# Patient Record
Sex: FEMALE | Race: WHITE | NOT HISPANIC OR LATINO | Employment: OTHER | URBAN - METROPOLITAN AREA
[De-identification: names, ages, dates, MRNs, and addresses within clinical notes are randomized per-mention and may not be internally consistent; named-entity substitution may affect disease eponyms.]

---

## 2017-09-20 ENCOUNTER — HOSPITAL ENCOUNTER (OUTPATIENT)
Dept: GENERAL RADIOLOGY | Facility: HOSPITAL | Age: 82
Discharge: HOME | End: 2017-09-20
Attending: INTERNAL MEDICINE | Admitting: INTERNAL MEDICINE

## 2017-09-20 ENCOUNTER — TRANSCRIBE ORDERS (OUTPATIENT)
Dept: ADMINISTRATIVE | Facility: HOSPITAL | Age: 82
End: 2017-09-20

## 2017-09-20 DIAGNOSIS — M79.672 LEFT FOOT PAIN: Primary | ICD-10-CM

## 2017-09-20 PROCEDURE — 73630 X-RAY EXAM OF FOOT: CPT

## 2017-11-23 ENCOUNTER — HOSPITAL ENCOUNTER (INPATIENT)
Facility: HOSPITAL | Age: 82
LOS: 6 days | Discharge: NURSING, REHAB OR LONG-TERM CARE | End: 2017-11-29
Attending: EMERGENCY MEDICINE | Admitting: HOSPITALIST

## 2017-11-23 ENCOUNTER — APPOINTMENT (OUTPATIENT)
Dept: GENERAL RADIOLOGY | Facility: HOSPITAL | Age: 82
End: 2017-11-23

## 2017-11-23 ENCOUNTER — ANESTHESIA EVENT (OUTPATIENT)
Dept: PERIOP | Facility: HOSPITAL | Age: 82
End: 2017-11-23

## 2017-11-23 ENCOUNTER — APPOINTMENT (OUTPATIENT)
Dept: CT IMAGING | Facility: HOSPITAL | Age: 82
End: 2017-11-23

## 2017-11-23 ENCOUNTER — ANESTHESIA (OUTPATIENT)
Dept: PERIOP | Facility: HOSPITAL | Age: 82
End: 2017-11-23

## 2017-11-23 DIAGNOSIS — Z86.79 HISTORY OF CORONARY ARTERY DISEASE: ICD-10-CM

## 2017-11-23 DIAGNOSIS — I48.91 ATRIAL FIBRILLATION WITH RVR (HCC): ICD-10-CM

## 2017-11-23 DIAGNOSIS — M51.34 DDD (DEGENERATIVE DISC DISEASE), THORACIC: ICD-10-CM

## 2017-11-23 DIAGNOSIS — R73.09 ELEVATED GLUCOSE: ICD-10-CM

## 2017-11-23 DIAGNOSIS — M50.30 DDD (DEGENERATIVE DISC DISEASE), CERVICAL: ICD-10-CM

## 2017-11-23 DIAGNOSIS — Z74.09 IMPAIRED MOBILITY AND ADLS: ICD-10-CM

## 2017-11-23 DIAGNOSIS — M51.36 DDD (DEGENERATIVE DISC DISEASE), LUMBAR: ICD-10-CM

## 2017-11-23 DIAGNOSIS — S72.002A CLOSED FRACTURE OF LEFT HIP, INITIAL ENCOUNTER (HCC): ICD-10-CM

## 2017-11-23 DIAGNOSIS — S09.90XA TRAUMATIC INJURY OF HEAD, INITIAL ENCOUNTER: ICD-10-CM

## 2017-11-23 DIAGNOSIS — I10 HYPERTENSION, UNSPECIFIED TYPE: ICD-10-CM
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
## 2017-11-23 DIAGNOSIS — E87.1 HYPONATREMIA: ICD-10-CM

## 2017-11-23 DIAGNOSIS — Z78.9 IMPAIRED MOBILITY AND ADLS: ICD-10-CM

## 2017-11-23 DIAGNOSIS — Z74.09 IMPAIRED FUNCTIONAL MOBILITY, BALANCE, GAIT, AND ENDURANCE: ICD-10-CM

## 2017-11-23 LAB
ABO GROUP BLD: NORMAL
ALBUMIN SERPL-MCNC: 4.7 G/DL (ref 3.2–4.8)
ALBUMIN/GLOB SERPL: 1.6 G/DL (ref 1.5–2.5)
ALP SERPL-CCNC: 80 U/L (ref 25–100)
ALT SERPL W P-5'-P-CCNC: 28 U/L (ref 7–40)
ANION GAP SERPL CALCULATED.3IONS-SCNC: 10 MMOL/L (ref 3–11)
ANION GAP SERPL CALCULATED.3IONS-SCNC: 9 MMOL/L (ref 3–11)
ANION GAP SERPL CALCULATED.3IONS-SCNC: 9 MMOL/L (ref 3–11)
AST SERPL-CCNC: 41 U/L (ref 0–33)
BACTERIA UR QL AUTO: ABNORMAL /HPF
BASOPHILS # BLD AUTO: 0.01 10*3/MM3 (ref 0–0.2)
BASOPHILS NFR BLD AUTO: 0.1 % (ref 0–1)
BILIRUB SERPL-MCNC: 1.4 MG/DL (ref 0.3–1.2)
BILIRUB UR QL STRIP: NEGATIVE
BLD GP AB SCN SERPL QL: NEGATIVE
BUN BLD-MCNC: 11 MG/DL (ref 9–23)
BUN BLD-MCNC: 13 MG/DL (ref 9–23)
BUN BLD-MCNC: 14 MG/DL (ref 9–23)
BUN/CREAT SERPL: 12.2 (ref 7–25)
BUN/CREAT SERPL: 13 (ref 7–25)
BUN/CREAT SERPL: 14 (ref 7–25)
CALCIUM SPEC-SCNC: 8.6 MG/DL (ref 8.7–10.4)
CALCIUM SPEC-SCNC: 8.7 MG/DL (ref 8.7–10.4)
CALCIUM SPEC-SCNC: 9.6 MG/DL (ref 8.7–10.4)
CHLORIDE SERPL-SCNC: 88 MMOL/L (ref 99–109)
CHLORIDE SERPL-SCNC: 93 MMOL/L (ref 99–109)
CHLORIDE SERPL-SCNC: 98 MMOL/L (ref 99–109)
CLARITY UR: ABNORMAL
CO2 SERPL-SCNC: 27 MMOL/L (ref 20–31)
CO2 SERPL-SCNC: 27 MMOL/L (ref 20–31)
CO2 SERPL-SCNC: 31 MMOL/L (ref 20–31)
COLOR UR: YELLOW
CREAT BLD-MCNC: 0.9 MG/DL (ref 0.6–1.3)
CREAT BLD-MCNC: 1 MG/DL (ref 0.6–1.3)
CREAT BLD-MCNC: 1 MG/DL (ref 0.6–1.3)
DEPRECATED RDW RBC AUTO: 45.4 FL (ref 37–54)
EOSINOPHIL # BLD AUTO: 0 10*3/MM3 (ref 0–0.3)
EOSINOPHIL NFR BLD AUTO: 0 % (ref 0–3)
ERYTHROCYTE [DISTWIDTH] IN BLOOD BY AUTOMATED COUNT: 14.2 % (ref 11.3–14.5)
GFR SERPL CREATININE-BSD FRML MDRD: 53 ML/MIN/1.73
GFR SERPL CREATININE-BSD FRML MDRD: 53 ML/MIN/1.73
GFR SERPL CREATININE-BSD FRML MDRD: 60 ML/MIN/1.73
GLOBULIN UR ELPH-MCNC: 2.9 GM/DL
GLUCOSE BLD-MCNC: 234 MG/DL (ref 70–100)
GLUCOSE BLD-MCNC: 262 MG/DL (ref 70–100)
GLUCOSE BLD-MCNC: 290 MG/DL (ref 70–100)
GLUCOSE BLDC GLUCOMTR-MCNC: 196 MG/DL (ref 70–130)
GLUCOSE BLDC GLUCOMTR-MCNC: 198 MG/DL (ref 70–130)
GLUCOSE UR STRIP-MCNC: ABNORMAL MG/DL
HCT VFR BLD AUTO: 40.2 % (ref 34.5–44)
HCT VFR BLD AUTO: 43.3 % (ref 34.5–44)
HGB BLD-MCNC: 13.6 G/DL (ref 11.5–15.5)
HGB BLD-MCNC: 14.8 G/DL (ref 11.5–15.5)
HGB UR QL STRIP.AUTO: ABNORMAL
HOLD SPECIMEN: NORMAL
HOLD SPECIMEN: NORMAL
HYALINE CASTS UR QL AUTO: ABNORMAL /LPF
IMM GRANULOCYTES # BLD: 0.04 10*3/MM3 (ref 0–0.03)
IMM GRANULOCYTES NFR BLD: 0.3 % (ref 0–0.6)
INR PPP: 1.04
KETONES UR QL STRIP: ABNORMAL
LEUKOCYTE ESTERASE UR QL STRIP.AUTO: NEGATIVE
LYMPHOCYTES # BLD AUTO: 1.18 10*3/MM3 (ref 0.6–4.8)
LYMPHOCYTES NFR BLD AUTO: 8 % (ref 24–44)
MCH RBC QN AUTO: 29.9 PG (ref 27–31)
MCHC RBC AUTO-ENTMCNC: 34.2 G/DL (ref 32–36)
MCV RBC AUTO: 87.5 FL (ref 80–99)
MONOCYTES # BLD AUTO: 1.27 10*3/MM3 (ref 0–1)
MONOCYTES NFR BLD AUTO: 8.6 % (ref 0–12)
NEUTROPHILS # BLD AUTO: 12.34 10*3/MM3 (ref 1.5–8.3)
NEUTROPHILS NFR BLD AUTO: 83 % (ref 41–71)
NITRITE UR QL STRIP: NEGATIVE
PH UR STRIP.AUTO: 6.5 [PH] (ref 5–8)
PLATELET # BLD AUTO: 207 10*3/MM3 (ref 150–450)
PMV BLD AUTO: 12.2 FL (ref 6–12)
POTASSIUM BLD-SCNC: 3.2 MMOL/L (ref 3.5–5.5)
POTASSIUM BLD-SCNC: 3.3 MMOL/L (ref 3.5–5.5)
POTASSIUM BLD-SCNC: 3.5 MMOL/L (ref 3.5–5.5)
PROT SERPL-MCNC: 7.6 G/DL (ref 5.7–8.2)
PROT UR QL STRIP: ABNORMAL
PROTHROMBIN TIME: 11.4 SECONDS (ref 9.6–11.5)
RBC # BLD AUTO: 4.95 10*6/MM3 (ref 3.89–5.14)
RBC # UR: ABNORMAL /HPF
REF LAB TEST METHOD: ABNORMAL
RH BLD: NEGATIVE
SODIUM BLD-SCNC: 128 MMOL/L (ref 132–146)
SODIUM BLD-SCNC: 130 MMOL/L (ref 132–146)
SODIUM BLD-SCNC: 134 MMOL/L (ref 132–146)
SP GR UR STRIP: 1.02 (ref 1–1.03)
SQUAMOUS #/AREA URNS HPF: ABNORMAL /HPF
UROBILINOGEN UR QL STRIP: ABNORMAL
WBC NRBC COR # BLD: 14.84 10*3/MM3 (ref 3.5–10.8)
WBC UR QL AUTO: ABNORMAL /HPF
WHOLE BLOOD HOLD SPECIMEN: NORMAL
WHOLE BLOOD HOLD SPECIMEN: NORMAL

## 2017-11-23 PROCEDURE — 86900 BLOOD TYPING SEROLOGIC ABO: CPT | Performed by: EMERGENCY MEDICINE

## 2017-11-23 PROCEDURE — 63710000001 INSULIN LISPRO (HUMAN) PER 5 UNITS: Performed by: INTERNAL MEDICINE

## 2017-11-23 PROCEDURE — C1713 ANCHOR/SCREW BN/BN,TIS/BN: HCPCS | Performed by: ORTHOPAEDIC SURGERY

## 2017-11-23 PROCEDURE — 76000 FLUOROSCOPY <1 HR PHYS/QHP: CPT

## 2017-11-23 PROCEDURE — 72128 CT CHEST SPINE W/O DYE: CPT

## 2017-11-23 PROCEDURE — 25010000002 ROPIVACAINE PER 1 MG: Performed by: ANESTHESIOLOGY

## 2017-11-23 PROCEDURE — 70450 CT HEAD/BRAIN W/O DYE: CPT

## 2017-11-23 PROCEDURE — 0QS736Z REPOSITION LEFT UPPER FEMUR WITH INTRAMEDULLARY INTERNAL FIXATION DEVICE, PERCUTANEOUS APPROACH: ICD-10-PCS | Performed by: ORTHOPAEDIC SURGERY

## 2017-11-23 PROCEDURE — 85014 HEMATOCRIT: CPT | Performed by: ORTHOPAEDIC SURGERY

## 2017-11-23 PROCEDURE — 25010000002 FENTANYL CITRATE (PF) 100 MCG/2ML SOLUTION: Performed by: EMERGENCY MEDICINE

## 2017-11-23 PROCEDURE — 80053 COMPREHEN METABOLIC PANEL: CPT | Performed by: EMERGENCY MEDICINE

## 2017-11-23 PROCEDURE — 99223 1ST HOSP IP/OBS HIGH 75: CPT | Performed by: INTERNAL MEDICINE

## 2017-11-23 PROCEDURE — 94799 UNLISTED PULMONARY SVC/PX: CPT

## 2017-11-23 PROCEDURE — 85025 COMPLETE CBC W/AUTO DIFF WBC: CPT | Performed by: EMERGENCY MEDICINE

## 2017-11-23 PROCEDURE — 25010000002 DEXAMETHASONE PER 1 MG: Performed by: ANESTHESIOLOGY

## 2017-11-23 PROCEDURE — 73502 X-RAY EXAM HIP UNI 2-3 VIEWS: CPT

## 2017-11-23 PROCEDURE — 99284 EMERGENCY DEPT VISIT MOD MDM: CPT

## 2017-11-23 PROCEDURE — 25010000002 SUCCINYLCHOLINE PER 20 MG: Performed by: ANESTHESIOLOGY

## 2017-11-23 PROCEDURE — 63710000001 INSULIN REGULAR HUMAN PER 5 UNITS: Performed by: EMERGENCY MEDICINE

## 2017-11-23 PROCEDURE — 25010000002 PHENYLEPHRINE PER 1 ML: Performed by: ANESTHESIOLOGY

## 2017-11-23 PROCEDURE — 25010000002 ONDANSETRON PER 1 MG: Performed by: ANESTHESIOLOGY

## 2017-11-23 PROCEDURE — 85018 HEMOGLOBIN: CPT | Performed by: ORTHOPAEDIC SURGERY

## 2017-11-23 PROCEDURE — 71010 HC CHEST PA OR AP: CPT

## 2017-11-23 PROCEDURE — 93005 ELECTROCARDIOGRAM TRACING: CPT | Performed by: EMERGENCY MEDICINE

## 2017-11-23 PROCEDURE — P9612 CATHETERIZE FOR URINE SPEC: HCPCS

## 2017-11-23 PROCEDURE — 25010000002 ONDANSETRON PER 1 MG: Performed by: EMERGENCY MEDICINE

## 2017-11-23 PROCEDURE — 72131 CT LUMBAR SPINE W/O DYE: CPT

## 2017-11-23 PROCEDURE — 63710000001 INSULIN DETEMIR PER 5 UNITS: Performed by: INTERNAL MEDICINE

## 2017-11-23 PROCEDURE — 25010000002 PROPOFOL 10 MG/ML EMULSION: Performed by: ANESTHESIOLOGY

## 2017-11-23 PROCEDURE — 25010000003 CEFAZOLIN IN DEXTROSE 2-4 GM/100ML-% SOLUTION: Performed by: ANESTHESIOLOGY

## 2017-11-23 PROCEDURE — 63710000001 INSULIN LISPRO (HUMAN) PER 5 UNITS

## 2017-11-23 PROCEDURE — 82962 GLUCOSE BLOOD TEST: CPT

## 2017-11-23 PROCEDURE — 85610 PROTHROMBIN TIME: CPT | Performed by: EMERGENCY MEDICINE

## 2017-11-23 PROCEDURE — 86850 RBC ANTIBODY SCREEN: CPT | Performed by: EMERGENCY MEDICINE

## 2017-11-23 PROCEDURE — 86901 BLOOD TYPING SEROLOGIC RH(D): CPT | Performed by: EMERGENCY MEDICINE

## 2017-11-23 PROCEDURE — 72125 CT NECK SPINE W/O DYE: CPT

## 2017-11-23 PROCEDURE — 25010000002 FENTANYL CITRATE (PF) 100 MCG/2ML SOLUTION: Performed by: ANESTHESIOLOGY

## 2017-11-23 PROCEDURE — 81001 URINALYSIS AUTO W/SCOPE: CPT | Performed by: EMERGENCY MEDICINE

## 2017-11-23 DEVICE — SCRW LK STRDRV TI 5X34MM STRL: Type: IMPLANTABLE DEVICE | Status: FUNCTIONAL

## 2017-11-23 DEVICE — SCRW CANN TFN ADV TI 10.35X85MM STRL: Type: IMPLANTABLE DEVICE | Status: FUNCTIONAL

## 2017-11-23 DEVICE — NAIL FEM TFN ADV PROX 130D SHT 11X170MM STRL: Type: IMPLANTABLE DEVICE | Status: FUNCTIONAL

## 2017-11-23 RX ORDER — AMLODIPINE BESYLATE 5 MG/1
5 TABLET ORAL DAILY
Status: DISCONTINUED | OUTPATIENT
Start: 2017-11-23 | End: 2017-11-25

## 2017-11-23 RX ORDER — ROPIVACAINE HYDROCHLORIDE 2 MG/ML
INJECTION, SOLUTION EPIDURAL; INFILTRATION; PERINEURAL AS NEEDED
Status: DISCONTINUED | OUTPATIENT
Start: 2017-11-23 | End: 2017-11-23 | Stop reason: SURG

## 2017-11-23 RX ORDER — ACETAMINOPHEN 650 MG
TABLET, EXTENDED RELEASE ORAL AS NEEDED
Status: DISCONTINUED | OUTPATIENT
Start: 2017-11-23 | End: 2017-11-23 | Stop reason: HOSPADM

## 2017-11-23 RX ORDER — LABETALOL HYDROCHLORIDE 5 MG/ML
20 INJECTION, SOLUTION INTRAVENOUS ONCE
Status: COMPLETED | OUTPATIENT
Start: 2017-11-23 | End: 2017-11-23

## 2017-11-23 RX ORDER — ONDANSETRON 4 MG/1
4 TABLET, FILM COATED ORAL EVERY 6 HOURS PRN
Status: DISCONTINUED | OUTPATIENT
Start: 2017-11-23 | End: 2017-11-29 | Stop reason: HOSPADM

## 2017-11-23 RX ORDER — HYDROMORPHONE HYDROCHLORIDE 1 MG/ML
0.5 INJECTION, SOLUTION INTRAMUSCULAR; INTRAVENOUS; SUBCUTANEOUS
Status: DISCONTINUED | OUTPATIENT
Start: 2017-11-23 | End: 2017-11-23 | Stop reason: HOSPADM

## 2017-11-23 RX ORDER — ROPIVACAINE HYDROCHLORIDE 2 MG/ML
8 INJECTION, SOLUTION EPIDURAL; INFILTRATION CONTINUOUS
Status: DISCONTINUED | OUTPATIENT
Start: 2017-11-23 | End: 2017-11-29 | Stop reason: HOSPADM

## 2017-11-23 RX ORDER — CEFAZOLIN SODIUM 2 G/100ML
2 INJECTION, SOLUTION INTRAVENOUS EVERY 8 HOURS
Status: COMPLETED | OUTPATIENT
Start: 2017-11-24 | End: 2017-11-24

## 2017-11-23 RX ORDER — LOSARTAN POTASSIUM AND HYDROCHLOROTHIAZIDE 25; 100 MG/1; MG/1
1 TABLET ORAL DAILY
COMMUNITY
End: 2017-11-29 | Stop reason: HOSPADM

## 2017-11-23 RX ORDER — CEFAZOLIN SODIUM 2 G/100ML
INJECTION, SOLUTION INTRAVENOUS AS NEEDED
Status: DISCONTINUED | OUTPATIENT
Start: 2017-11-23 | End: 2017-11-23 | Stop reason: SURG

## 2017-11-23 RX ORDER — ONDANSETRON 2 MG/ML
INJECTION INTRAMUSCULAR; INTRAVENOUS AS NEEDED
Status: DISCONTINUED | OUTPATIENT
Start: 2017-11-23 | End: 2017-11-23 | Stop reason: SURG

## 2017-11-23 RX ORDER — FUROSEMIDE 20 MG/1
20 TABLET ORAL DAILY
COMMUNITY
End: 2017-11-29 | Stop reason: HOSPADM

## 2017-11-23 RX ORDER — GLIMEPIRIDE 4 MG/1
8 TABLET ORAL
COMMUNITY
End: 2017-11-29 | Stop reason: HOSPADM

## 2017-11-23 RX ORDER — OXYCODONE HYDROCHLORIDE AND ACETAMINOPHEN 5; 325 MG/1; MG/1
1 TABLET ORAL EVERY 4 HOURS PRN
Status: DISCONTINUED | OUTPATIENT
Start: 2017-11-23 | End: 2017-11-29 | Stop reason: HOSPADM

## 2017-11-23 RX ORDER — SODIUM CHLORIDE 0.9 % (FLUSH) 0.9 %
1-10 SYRINGE (ML) INJECTION AS NEEDED
Status: DISCONTINUED | OUTPATIENT
Start: 2017-11-23 | End: 2017-11-23

## 2017-11-23 RX ORDER — SODIUM CHLORIDE 0.9 % (FLUSH) 0.9 %
10 SYRINGE (ML) INJECTION AS NEEDED
Status: DISCONTINUED | OUTPATIENT
Start: 2017-11-23 | End: 2017-11-23

## 2017-11-23 RX ORDER — NALOXONE HCL 0.4 MG/ML
0.4 VIAL (ML) INJECTION
Status: DISCONTINUED | OUTPATIENT
Start: 2017-11-23 | End: 2017-11-29 | Stop reason: HOSPADM

## 2017-11-23 RX ORDER — ACETAMINOPHEN 325 MG/1
650 TABLET ORAL EVERY 4 HOURS PRN
Status: DISCONTINUED | OUTPATIENT
Start: 2017-11-23 | End: 2017-11-23

## 2017-11-23 RX ORDER — FAMOTIDINE 10 MG/ML
20 INJECTION, SOLUTION INTRAVENOUS ONCE
Status: COMPLETED | OUTPATIENT
Start: 2017-11-23 | End: 2017-11-23

## 2017-11-23 RX ORDER — FENTANYL CITRATE 50 UG/ML
50 INJECTION, SOLUTION INTRAMUSCULAR; INTRAVENOUS
Status: DISCONTINUED | OUTPATIENT
Start: 2017-11-23 | End: 2017-11-23 | Stop reason: HOSPADM

## 2017-11-23 RX ORDER — NICOTINE POLACRILEX 4 MG
15 LOZENGE BUCCAL
Status: DISCONTINUED | OUTPATIENT
Start: 2017-11-23 | End: 2017-11-29 | Stop reason: HOSPADM

## 2017-11-23 RX ORDER — PROMETHAZINE HYDROCHLORIDE 25 MG/ML
6.25 INJECTION, SOLUTION INTRAMUSCULAR; INTRAVENOUS ONCE AS NEEDED
Status: DISCONTINUED | OUTPATIENT
Start: 2017-11-23 | End: 2017-11-23 | Stop reason: HOSPADM

## 2017-11-23 RX ORDER — OXYCODONE AND ACETAMINOPHEN 7.5; 325 MG/1; MG/1
1 TABLET ORAL EVERY 4 HOURS PRN
Status: DISCONTINUED | OUTPATIENT
Start: 2017-11-23 | End: 2017-11-29 | Stop reason: HOSPADM

## 2017-11-23 RX ORDER — FENTANYL CITRATE 50 UG/ML
25 INJECTION, SOLUTION INTRAMUSCULAR; INTRAVENOUS
Status: DISCONTINUED | OUTPATIENT
Start: 2017-11-23 | End: 2017-11-24

## 2017-11-23 RX ORDER — ATORVASTATIN CALCIUM 40 MG/1
40 TABLET, FILM COATED ORAL NIGHTLY
Status: DISCONTINUED | OUTPATIENT
Start: 2017-11-23 | End: 2017-11-29 | Stop reason: HOSPADM

## 2017-11-23 RX ORDER — FENTANYL CITRATE 50 UG/ML
INJECTION, SOLUTION INTRAMUSCULAR; INTRAVENOUS AS NEEDED
Status: DISCONTINUED | OUTPATIENT
Start: 2017-11-23 | End: 2017-11-23 | Stop reason: SURG

## 2017-11-23 RX ORDER — ATRACURIUM BESYLATE 10 MG/ML
INJECTION, SOLUTION INTRAVENOUS AS NEEDED
Status: DISCONTINUED | OUTPATIENT
Start: 2017-11-23 | End: 2017-11-23 | Stop reason: SURG

## 2017-11-23 RX ORDER — POTASSIUM CHLORIDE 750 MG/1
10 TABLET, FILM COATED, EXTENDED RELEASE ORAL 3 TIMES DAILY
COMMUNITY
End: 2017-11-29 | Stop reason: HOSPADM

## 2017-11-23 RX ORDER — MAGNESIUM HYDROXIDE 1200 MG/15ML
LIQUID ORAL AS NEEDED
Status: DISCONTINUED | OUTPATIENT
Start: 2017-11-23 | End: 2017-11-23 | Stop reason: HOSPADM

## 2017-11-23 RX ORDER — CITALOPRAM 20 MG/1
20 TABLET ORAL DAILY
COMMUNITY

## 2017-11-23 RX ORDER — ASPIRIN 325 MG
325 TABLET ORAL DAILY
COMMUNITY
End: 2017-11-29 | Stop reason: HOSPADM

## 2017-11-23 RX ORDER — AMLODIPINE BESYLATE 5 MG/1
5 TABLET ORAL DAILY
COMMUNITY
End: 2017-11-29 | Stop reason: HOSPADM

## 2017-11-23 RX ORDER — ONDANSETRON 2 MG/ML
4 INJECTION INTRAMUSCULAR; INTRAVENOUS EVERY 6 HOURS PRN
Status: DISCONTINUED | OUTPATIENT
Start: 2017-11-23 | End: 2017-11-29 | Stop reason: HOSPADM

## 2017-11-23 RX ORDER — DEXTROSE MONOHYDRATE 25 G/50ML
25 INJECTION, SOLUTION INTRAVENOUS
Status: DISCONTINUED | OUTPATIENT
Start: 2017-11-23 | End: 2017-11-29 | Stop reason: HOSPADM

## 2017-11-23 RX ORDER — ONDANSETRON 2 MG/ML
4 INJECTION INTRAMUSCULAR; INTRAVENOUS ONCE
Status: COMPLETED | OUTPATIENT
Start: 2017-11-23 | End: 2017-11-23

## 2017-11-23 RX ORDER — ATENOLOL 50 MG/1
50 TABLET ORAL DAILY
COMMUNITY
End: 2017-11-29 | Stop reason: HOSPADM

## 2017-11-23 RX ORDER — SODIUM CHLORIDE 0.9 % (FLUSH) 0.9 %
1-10 SYRINGE (ML) INJECTION AS NEEDED
Status: DISCONTINUED | OUTPATIENT
Start: 2017-11-23 | End: 2017-11-29 | Stop reason: HOSPADM

## 2017-11-23 RX ORDER — CALCIUM CARBONATE 200(500)MG
1 TABLET,CHEWABLE ORAL 2 TIMES DAILY PRN
Status: DISCONTINUED | OUTPATIENT
Start: 2017-11-23 | End: 2017-11-29 | Stop reason: HOSPADM

## 2017-11-23 RX ORDER — PROPOFOL 10 MG/ML
VIAL (ML) INTRAVENOUS AS NEEDED
Status: DISCONTINUED | OUTPATIENT
Start: 2017-11-23 | End: 2017-11-23 | Stop reason: SURG

## 2017-11-23 RX ORDER — ATORVASTATIN CALCIUM 40 MG/1
40 TABLET, FILM COATED ORAL DAILY
COMMUNITY
End: 2018-11-05

## 2017-11-23 RX ORDER — LIDOCAINE HYDROCHLORIDE 10 MG/ML
INJECTION, SOLUTION INFILTRATION; PERINEURAL AS NEEDED
Status: DISCONTINUED | OUTPATIENT
Start: 2017-11-23 | End: 2017-11-23 | Stop reason: SURG

## 2017-11-23 RX ORDER — PROMETHAZINE HYDROCHLORIDE 25 MG/1
25 SUPPOSITORY RECTAL ONCE AS NEEDED
Status: DISCONTINUED | OUTPATIENT
Start: 2017-11-23 | End: 2017-11-23 | Stop reason: HOSPADM

## 2017-11-23 RX ORDER — NITROGLYCERIN 400 UG/1
1 SPRAY ORAL
COMMUNITY
End: 2018-09-06 | Stop reason: HOSPADM

## 2017-11-23 RX ORDER — SODIUM CHLORIDE, SODIUM LACTATE, POTASSIUM CHLORIDE, CALCIUM CHLORIDE 600; 310; 30; 20 MG/100ML; MG/100ML; MG/100ML; MG/100ML
9 INJECTION, SOLUTION INTRAVENOUS CONTINUOUS
Status: DISCONTINUED | OUTPATIENT
Start: 2017-11-23 | End: 2017-11-23

## 2017-11-23 RX ORDER — SODIUM CHLORIDE 9 MG/ML
100 INJECTION, SOLUTION INTRAVENOUS CONTINUOUS
Status: DISCONTINUED | OUTPATIENT
Start: 2017-11-23 | End: 2017-11-26

## 2017-11-23 RX ORDER — PROMETHAZINE HYDROCHLORIDE 25 MG/1
25 TABLET ORAL ONCE AS NEEDED
Status: DISCONTINUED | OUTPATIENT
Start: 2017-11-23 | End: 2017-11-23 | Stop reason: HOSPADM

## 2017-11-23 RX ORDER — HYDROMORPHONE HYDROCHLORIDE 1 MG/ML
0.5 INJECTION, SOLUTION INTRAMUSCULAR; INTRAVENOUS; SUBCUTANEOUS
Status: DISCONTINUED | OUTPATIENT
Start: 2017-11-23 | End: 2017-11-29 | Stop reason: HOSPADM

## 2017-11-23 RX ORDER — ASPIRIN 325 MG
325 TABLET ORAL DAILY
Status: DISCONTINUED | OUTPATIENT
Start: 2017-11-24 | End: 2017-11-26

## 2017-11-23 RX ORDER — CITALOPRAM 20 MG/1
20 TABLET ORAL DAILY
Status: DISCONTINUED | OUTPATIENT
Start: 2017-11-23 | End: 2017-11-29 | Stop reason: HOSPADM

## 2017-11-23 RX ORDER — SODIUM CHLORIDE 9 MG/ML
INJECTION, SOLUTION INTRAVENOUS CONTINUOUS PRN
Status: DISCONTINUED | OUTPATIENT
Start: 2017-11-23 | End: 2017-11-23 | Stop reason: SURG

## 2017-11-23 RX ORDER — LOSARTAN POTASSIUM AND HYDROCHLOROTHIAZIDE 12.5; 5 MG/1; MG/1
1 TABLET ORAL DAILY
Status: DISCONTINUED | OUTPATIENT
Start: 2017-11-24 | End: 2017-11-24

## 2017-11-23 RX ORDER — FAMOTIDINE 20 MG/1
20 TABLET, FILM COATED ORAL ONCE
Status: DISCONTINUED | OUTPATIENT
Start: 2017-11-23 | End: 2017-11-23

## 2017-11-23 RX ORDER — LIDOCAINE HYDROCHLORIDE 10 MG/ML
0.5 INJECTION, SOLUTION EPIDURAL; INFILTRATION; INTRACAUDAL; PERINEURAL ONCE AS NEEDED
Status: DISCONTINUED | OUTPATIENT
Start: 2017-11-23 | End: 2017-11-23

## 2017-11-23 RX ORDER — DEXAMETHASONE SODIUM PHOSPHATE 4 MG/ML
INJECTION, SOLUTION INTRA-ARTICULAR; INTRALESIONAL; INTRAMUSCULAR; INTRAVENOUS; SOFT TISSUE AS NEEDED
Status: DISCONTINUED | OUTPATIENT
Start: 2017-11-23 | End: 2017-11-23 | Stop reason: SURG

## 2017-11-23 RX ORDER — DOCUSATE SODIUM 100 MG/1
100 CAPSULE, LIQUID FILLED ORAL 2 TIMES DAILY
Status: DISCONTINUED | OUTPATIENT
Start: 2017-11-23 | End: 2017-11-29 | Stop reason: HOSPADM

## 2017-11-23 RX ORDER — HYDROMORPHONE HYDROCHLORIDE 1 MG/ML
0.5 INJECTION, SOLUTION INTRAMUSCULAR; INTRAVENOUS; SUBCUTANEOUS EVERY 4 HOURS PRN
Status: DISCONTINUED | OUTPATIENT
Start: 2017-11-23 | End: 2017-11-29 | Stop reason: HOSPADM

## 2017-11-23 RX ORDER — SUCCINYLCHOLINE CHLORIDE 20 MG/ML
INJECTION INTRAMUSCULAR; INTRAVENOUS AS NEEDED
Status: DISCONTINUED | OUTPATIENT
Start: 2017-11-23 | End: 2017-11-23 | Stop reason: SURG

## 2017-11-23 RX ORDER — INSULIN GLARGINE 100 [IU]/ML
20 INJECTION, SOLUTION SUBCUTANEOUS NIGHTLY
COMMUNITY
End: 2017-11-29 | Stop reason: HOSPADM

## 2017-11-23 RX ORDER — SENNA AND DOCUSATE SODIUM 50; 8.6 MG/1; MG/1
2 TABLET, FILM COATED ORAL 2 TIMES DAILY
Status: DISCONTINUED | OUTPATIENT
Start: 2017-11-23 | End: 2017-11-23

## 2017-11-23 RX ADMIN — ATRACURIUM BESYLATE 10 MG: 10 INJECTION, SOLUTION INTRAVENOUS at 15:49

## 2017-11-23 RX ADMIN — SODIUM CHLORIDE: 9 INJECTION, SOLUTION INTRAVENOUS at 15:45

## 2017-11-23 RX ADMIN — PROPOFOL 100 MG: 10 INJECTION, EMULSION INTRAVENOUS at 15:49

## 2017-11-23 RX ADMIN — LABETALOL HYDROCHLORIDE 20 MG: 5 INJECTION INTRAVENOUS at 13:38

## 2017-11-23 RX ADMIN — INSULIN HUMAN 10 UNITS: 100 INJECTION, SOLUTION PARENTERAL at 13:37

## 2017-11-23 RX ADMIN — ROPIVACAINE HYDROCHLORIDE 100 MG: 2 INJECTION, SOLUTION EPIDURAL; INFILTRATION at 17:02

## 2017-11-23 RX ADMIN — LIDOCAINE HYDROCHLORIDE 25 MG: 10 INJECTION, SOLUTION INFILTRATION; PERINEURAL at 15:49

## 2017-11-23 RX ADMIN — ROPIVACAINE HYDROCHLORIDE 8 ML/HR: 2 INJECTION, SOLUTION EPIDURAL; INFILTRATION at 17:20

## 2017-11-23 RX ADMIN — PHENYLEPHRINE HYDROCHLORIDE 100 MCG: 10 INJECTION INTRAVENOUS at 16:02

## 2017-11-23 RX ADMIN — CITALOPRAM HYDROBROMIDE 20 MG: 20 TABLET ORAL at 20:29

## 2017-11-23 RX ADMIN — DEXAMETHASONE SODIUM PHOSPHATE 4 MG: 4 INJECTION, SOLUTION INTRAMUSCULAR; INTRAVENOUS at 16:05

## 2017-11-23 RX ADMIN — SODIUM CHLORIDE 125 ML/HR: 9 INJECTION, SOLUTION INTRAVENOUS at 12:09

## 2017-11-23 RX ADMIN — SUCCINYLCHOLINE CHLORIDE 100 MG: 20 INJECTION, SOLUTION INTRAMUSCULAR; INTRAVENOUS at 15:49

## 2017-11-23 RX ADMIN — AMLODIPINE BESYLATE 5 MG: 5 TABLET ORAL at 20:29

## 2017-11-23 RX ADMIN — ONDANSETRON 4 MG: 2 INJECTION INTRAMUSCULAR; INTRAVENOUS at 16:36

## 2017-11-23 RX ADMIN — EPHEDRINE SULFATE 10 MG: 50 INJECTION INTRAMUSCULAR; INTRAVENOUS; SUBCUTANEOUS at 16:10

## 2017-11-23 RX ADMIN — ATORVASTATIN CALCIUM 40 MG: 40 TABLET, FILM COATED ORAL at 20:30

## 2017-11-23 RX ADMIN — DOCUSATE SODIUM 100 MG: 100 CAPSULE, LIQUID FILLED ORAL at 20:30

## 2017-11-23 RX ADMIN — FENTANYL CITRATE 25 MCG: 50 INJECTION INTRAMUSCULAR; INTRAVENOUS at 13:39

## 2017-11-23 RX ADMIN — ONDANSETRON 4 MG: 2 INJECTION INTRAMUSCULAR; INTRAVENOUS at 12:16

## 2017-11-23 RX ADMIN — PHENYLEPHRINE HYDROCHLORIDE 100 MCG: 10 INJECTION INTRAVENOUS at 15:53

## 2017-11-23 RX ADMIN — FAMOTIDINE 20 MG: 10 INJECTION, SOLUTION INTRAVENOUS at 15:39

## 2017-11-23 RX ADMIN — CEFAZOLIN SODIUM 2 G: 2 INJECTION, SOLUTION INTRAVENOUS at 15:58

## 2017-11-23 RX ADMIN — INSULIN DETEMIR 15 UNITS: 100 INJECTION, SOLUTION SUBCUTANEOUS at 20:30

## 2017-11-23 RX ADMIN — FENTANYL CITRATE 50 MCG: 50 INJECTION, SOLUTION INTRAMUSCULAR; INTRAVENOUS at 15:49

## 2017-11-23 NOTE — ANESTHESIA POSTPROCEDURE EVALUATION
Patient: Mary Ramirez    Procedure Summary     Date Anesthesia Start Anesthesia Stop Room / Location    11/23/17 1545 [id number] [facility] / [facility]       Procedure Diagnosis Surgeon Provider    HIP TROCANTERIC NAILING WITH INTRAMEDULLARY HIP SCREW (Left Hip) No diagnosis on file. MD Gala Benavides MD          Anesthesia Type: general  Last vitals  BP   (!) 188/83 (11/23/17 1530)   Temp   98.7 °F (37.1 °C) (11/23/17 1049)   Pulse   67 (11/23/17 1530)   Resp   16 (11/23/17 1530)     SpO2   97 % (11/23/17 1530)     Post Anesthesia Care and Evaluation    Patient location during evaluation: PACU  Patient participation: complete - patient participated  Level of consciousness: awake  Pain score: 0  Pain management: adequate  Airway patency: patent  Anesthetic complications: No anesthetic complications  PONV Status: none  Cardiovascular status: acceptable and hemodynamically stable  Respiratory status: acceptable, spontaneous ventilation, nonlabored ventilation and nasal cannula  Hydration status: acceptable    Comments: No apparent anesthesia complications noted

## 2017-11-23 NOTE — ANESTHESIA PROCEDURE NOTES
Peripheral Block    Patient location during procedure: pre-op  Start time: 11/23/2017 5:02 PM  Reason for block: procedure for pain and at surgeon's request  Performed by  Anesthesiologist: ANNA ALDANA  Preanesthetic Checklist  Completed: patient identified, site marked, surgical consent, pre-op evaluation, timeout performed, IV checked, risks and benefits discussed and monitors and equipment checked  Prep:  Pt Position: supine  Sterile barriers:cap, gloves and mask  Prep: ChloraPrep  Patient monitoring: blood pressure monitoring, continuous pulse oximetry and EKG  Procedure  Sedation:yes  Performed under: general  Guidance:ultrasound guided  Images:still images obtained    Laterality:left  Block Type:fascia iliaca catheter  Injection Technique:catheter  Needle Type:echogenic  Needle Gauge:18 G    Catheter Size:20 G (20g)  Cath Depth at skin: 12 cm  Medications  Preservative Free Saline:10ml  Local Injected:ropivacaine 0.2% Local Amount Injected:40mL  Post Assessment  Injection Assessment: negative aspiration for heme, no paresthesia on injection and incremental injection  Patient Tolerance:comfortable throughout block  Complications:no  Additional Notes  Procedure:                 Pt placed in supine position.   The insertion site was prepped in sterile fashion with Chlorapreop and clear plastic drapes.  Analgesia was provided by skin infiltration at insertion site with Lidocaine 1% 3mls.  A B-Andrews 18 g , 4 inch echogenic Touhy needle was advance In-plane under ultrasound guidance. The   Anterior superior Iliac crest was initially visualized and the probe was directed slightly medially and slightly towards the umbilicus.  The course of the needle was tracked over the sartorius muscle through the fascia Iliacus and into the anterior portion of the Iliacus muscle.  Major vessels where identified and avoided as where structures of the peritoneal cavity.  LA injection was made incrementally in 1-5ml amounts  spread was visualized superiorly below fascia iliacus.  Injection was completed with negative aspiration of blood and negative intravascular injection.  Injection pressures where normal or minimal resistance.  A 20 g B-Andrews wire styleted catheter was then advance thru the needle and very easily placed in a superior or cephalad direction.  The catheter was secured at insertion site with skin afix , mastisol, steristreps.  A CHG tegaderm dressing was placed over the insertion site and the nerve catheter labeled and capped.  Thank You.

## 2017-11-23 NOTE — ANESTHESIA PROCEDURE NOTES
Airway  Urgency: elective    Date/Time: 11/23/2017 3:50 PM  Airway not difficult    General Information and Staff    Patient location during procedure: OR  Anesthesiologist: ANNA ALDANA    Indications and Patient Condition  Indications for airway management: airway protection    Preoxygenated: yes  MILS maintained throughout  Mask difficulty assessment: 0 - not attempted    Final Airway Details  Final airway type: endotracheal airway      Successful airway: ETT  Cuffed: yes   Successful intubation technique: direct laryngoscopy  Facilitating devices/methods: intubating stylet  Endotracheal tube insertion site: oral  Blade: Lakeisha  Blade size: #3  ETT size: 7.0 mm  Cormack-Lehane Classification: grade IIa - partial view of glottis  Placement verified by: chest auscultation and capnometry   Cuff volume (mL): 22  Measured from: teeth  Number of attempts at approach: 1    Additional Comments  Atraumatic

## 2017-11-23 NOTE — ANESTHESIA PREPROCEDURE EVALUATION
Anesthesia Evaluation     Patient summary reviewed and Nursing notes reviewed   no history of anesthetic complications:  NPO Solid Status: > 6 hours  NPO Liquid Status: Waived due to emergency     Airway   Mallampati: II  TM distance: >3 FB  Neck ROM: full  no difficulty expected  Dental - normal exam     Pulmonary - negative pulmonary ROS and normal exam    breath sounds clear to auscultation  Cardiovascular - normal exam  Exercise tolerance: good (4-7 METS)    ECG reviewed  Rhythm: regular  Rate: normal    (+) hypertension, CAD, cardiac stents more than 12 months ago hyperlipidemia      Neuro/Psych- negative ROS  GI/Hepatic/Renal/Endo    (+)  GERD well controlled, diabetes mellitus,     Musculoskeletal     (+) arthralgias,       ROS comment: Hip fx  Abdominal    Substance History - negative use     OB/GYN          Other   (+) arthritis                                   Anesthesia Plan    ASA 3 - emergent     general   (Labs/studies reviewed  FICB)  intravenous induction   Anesthetic plan and risks discussed with patient.

## 2017-11-24 PROBLEM — I10 HYPERTENSION: Status: ACTIVE | Noted: 2017-11-24

## 2017-11-24 PROBLEM — E87.1 HYPONATREMIA: Status: ACTIVE | Noted: 2017-11-24

## 2017-11-24 PROBLEM — E11.9 DIABETES (HCC): Status: ACTIVE | Noted: 2017-11-24

## 2017-11-24 PROBLEM — I25.10 CAD (CORONARY ARTERY DISEASE): Status: ACTIVE | Noted: 2017-11-24

## 2017-11-24 PROBLEM — S72.142A FRACTURE, INTERTROCHANTERIC, LEFT FEMUR: Status: ACTIVE | Noted: 2017-11-24

## 2017-11-24 LAB
ANION GAP SERPL CALCULATED.3IONS-SCNC: 9 MMOL/L (ref 3–11)
BUN BLD-MCNC: 10 MG/DL (ref 9–23)
BUN/CREAT SERPL: 11.1 (ref 7–25)
CALCIUM SPEC-SCNC: 8.2 MG/DL (ref 8.7–10.4)
CHLORIDE SERPL-SCNC: 98 MMOL/L (ref 99–109)
CO2 SERPL-SCNC: 24 MMOL/L (ref 20–31)
CREAT BLD-MCNC: 0.9 MG/DL (ref 0.6–1.3)
DEPRECATED RDW RBC AUTO: 48.2 FL (ref 37–54)
ERYTHROCYTE [DISTWIDTH] IN BLOOD BY AUTOMATED COUNT: 14.7 % (ref 11.3–14.5)
GFR SERPL CREATININE-BSD FRML MDRD: 60 ML/MIN/1.73
GLUCOSE BLD-MCNC: 293 MG/DL (ref 70–100)
GLUCOSE BLDC GLUCOMTR-MCNC: 142 MG/DL (ref 70–130)
GLUCOSE BLDC GLUCOMTR-MCNC: 163 MG/DL (ref 70–130)
GLUCOSE BLDC GLUCOMTR-MCNC: 292 MG/DL (ref 70–130)
GLUCOSE BLDC GLUCOMTR-MCNC: 296 MG/DL (ref 70–130)
HCT VFR BLD AUTO: 35.1 % (ref 34.5–44)
HGB BLD-MCNC: 11.4 G/DL (ref 11.5–15.5)
MCH RBC QN AUTO: 29.2 PG (ref 27–31)
MCHC RBC AUTO-ENTMCNC: 32.5 G/DL (ref 32–36)
MCV RBC AUTO: 89.8 FL (ref 80–99)
PLATELET # BLD AUTO: 161 10*3/MM3 (ref 150–450)
PMV BLD AUTO: 11.6 FL (ref 6–12)
POTASSIUM BLD-SCNC: 3.6 MMOL/L (ref 3.5–5.5)
RBC # BLD AUTO: 3.91 10*6/MM3 (ref 3.89–5.14)
SODIUM BLD-SCNC: 131 MMOL/L (ref 132–146)
TROPONIN I SERPL-MCNC: 0.04 NG/ML
WBC NRBC COR # BLD: 12.31 10*3/MM3 (ref 3.5–10.8)

## 2017-11-24 PROCEDURE — 97116 GAIT TRAINING THERAPY: CPT

## 2017-11-24 PROCEDURE — 84484 ASSAY OF TROPONIN QUANT: CPT | Performed by: INTERNAL MEDICINE

## 2017-11-24 PROCEDURE — 25010000002 ENOXAPARIN PER 10 MG: Performed by: ORTHOPAEDIC SURGERY

## 2017-11-24 PROCEDURE — 97166 OT EVAL MOD COMPLEX 45 MIN: CPT | Performed by: OCCUPATIONAL THERAPIST

## 2017-11-24 PROCEDURE — 80048 BASIC METABOLIC PNL TOTAL CA: CPT | Performed by: INTERNAL MEDICINE

## 2017-11-24 PROCEDURE — 93010 ELECTROCARDIOGRAM REPORT: CPT | Performed by: INTERNAL MEDICINE

## 2017-11-24 PROCEDURE — 25010000003 CEFAZOLIN IN DEXTROSE 2-4 GM/100ML-% SOLUTION: Performed by: ORTHOPAEDIC SURGERY

## 2017-11-24 PROCEDURE — 97162 PT EVAL MOD COMPLEX 30 MIN: CPT

## 2017-11-24 PROCEDURE — 82962 GLUCOSE BLOOD TEST: CPT

## 2017-11-24 PROCEDURE — 85027 COMPLETE CBC AUTOMATED: CPT | Performed by: INTERNAL MEDICINE

## 2017-11-24 PROCEDURE — 93005 ELECTROCARDIOGRAM TRACING: CPT | Performed by: INTERNAL MEDICINE

## 2017-11-24 PROCEDURE — 63710000001 INSULIN DETEMIR PER 5 UNITS: Performed by: INTERNAL MEDICINE

## 2017-11-24 PROCEDURE — 99233 SBSQ HOSP IP/OBS HIGH 50: CPT | Performed by: HOSPITALIST

## 2017-11-24 PROCEDURE — 25010000002 ROPIVACAINE PER 1 MG: Performed by: ANESTHESIOLOGY

## 2017-11-24 RX ORDER — LOSARTAN POTASSIUM 25 MG/1
50 TABLET ORAL
Status: DISCONTINUED | OUTPATIENT
Start: 2017-11-25 | End: 2017-11-29 | Stop reason: HOSPADM

## 2017-11-24 RX ADMIN — INSULIN DETEMIR 15 UNITS: 100 INJECTION, SOLUTION SUBCUTANEOUS at 21:04

## 2017-11-24 RX ADMIN — LOSARTAN POTASSIUM AND HYDROCHLOROTHIAZIDE 1 TABLET: 12.5; 5 TABLET ORAL at 08:12

## 2017-11-24 RX ADMIN — INSULIN LISPRO 5 UNITS: 100 INJECTION, SOLUTION INTRAVENOUS; SUBCUTANEOUS at 17:20

## 2017-11-24 RX ADMIN — OXYCODONE AND ACETAMINOPHEN 1 TABLET: 5; 325 TABLET ORAL at 08:14

## 2017-11-24 RX ADMIN — INSULIN LISPRO 5 UNITS: 100 INJECTION, SOLUTION INTRAVENOUS; SUBCUTANEOUS at 11:27

## 2017-11-24 RX ADMIN — SODIUM CHLORIDE 100 ML/HR: 9 INJECTION, SOLUTION INTRAVENOUS at 13:45

## 2017-11-24 RX ADMIN — DOCUSATE SODIUM 100 MG: 100 CAPSULE, LIQUID FILLED ORAL at 17:20

## 2017-11-24 RX ADMIN — ATORVASTATIN CALCIUM 40 MG: 40 TABLET, FILM COATED ORAL at 21:04

## 2017-11-24 RX ADMIN — CITALOPRAM HYDROBROMIDE 20 MG: 20 TABLET ORAL at 08:12

## 2017-11-24 RX ADMIN — CEFAZOLIN SODIUM 2 G: 2 INJECTION, SOLUTION INTRAVENOUS at 00:03

## 2017-11-24 RX ADMIN — ROPIVACAINE HYDROCHLORIDE 8 ML/HR: 2 INJECTION, SOLUTION EPIDURAL; INFILTRATION at 17:20

## 2017-11-24 RX ADMIN — DOCUSATE SODIUM 100 MG: 100 CAPSULE, LIQUID FILLED ORAL at 08:13

## 2017-11-24 RX ADMIN — AMLODIPINE BESYLATE 5 MG: 5 TABLET ORAL at 08:13

## 2017-11-24 RX ADMIN — CEFAZOLIN SODIUM 2 G: 2 INJECTION, SOLUTION INTRAVENOUS at 08:15

## 2017-11-24 RX ADMIN — INSULIN LISPRO 5 UNITS: 100 INJECTION, SOLUTION INTRAVENOUS; SUBCUTANEOUS at 08:15

## 2017-11-24 RX ADMIN — ENOXAPARIN SODIUM 40 MG: 40 INJECTION SUBCUTANEOUS at 08:13

## 2017-11-24 RX ADMIN — ROPIVACAINE HYDROCHLORIDE 8 ML/HR: 2 INJECTION, SOLUTION EPIDURAL; INFILTRATION at 05:22

## 2017-11-24 RX ADMIN — ASPIRIN 325 MG ORAL TABLET 325 MG: 325 PILL ORAL at 08:12

## 2017-11-25 LAB
ANION GAP SERPL CALCULATED.3IONS-SCNC: 8 MMOL/L (ref 3–11)
BUN BLD-MCNC: 11 MG/DL (ref 9–23)
BUN/CREAT SERPL: 13.8 (ref 7–25)
CALCIUM SPEC-SCNC: 8.1 MG/DL (ref 8.7–10.4)
CHLORIDE SERPL-SCNC: 101 MMOL/L (ref 99–109)
CO2 SERPL-SCNC: 24 MMOL/L (ref 20–31)
CREAT BLD-MCNC: 0.8 MG/DL (ref 0.6–1.3)
DEPRECATED RDW RBC AUTO: 48.7 FL (ref 37–54)
ERYTHROCYTE [DISTWIDTH] IN BLOOD BY AUTOMATED COUNT: 15 % (ref 11.3–14.5)
GFR SERPL CREATININE-BSD FRML MDRD: 69 ML/MIN/1.73
GLUCOSE BLD-MCNC: 86 MG/DL (ref 70–100)
GLUCOSE BLDC GLUCOMTR-MCNC: 133 MG/DL (ref 70–130)
GLUCOSE BLDC GLUCOMTR-MCNC: 232 MG/DL (ref 70–130)
GLUCOSE BLDC GLUCOMTR-MCNC: 83 MG/DL (ref 70–130)
GLUCOSE BLDC GLUCOMTR-MCNC: 92 MG/DL (ref 70–130)
HCT VFR BLD AUTO: 30.9 % (ref 34.5–44)
HGB BLD-MCNC: 10.2 G/DL (ref 11.5–15.5)
MAGNESIUM SERPL-MCNC: 1.6 MG/DL (ref 1.3–2.7)
MCH RBC QN AUTO: 29.7 PG (ref 27–31)
MCHC RBC AUTO-ENTMCNC: 33 G/DL (ref 32–36)
MCV RBC AUTO: 89.8 FL (ref 80–99)
PLATELET # BLD AUTO: 163 10*3/MM3 (ref 150–450)
PMV BLD AUTO: 11.8 FL (ref 6–12)
POTASSIUM BLD-SCNC: 3.1 MMOL/L (ref 3.5–5.5)
POTASSIUM BLD-SCNC: 3.7 MMOL/L (ref 3.5–5.5)
RBC # BLD AUTO: 3.44 10*6/MM3 (ref 3.89–5.14)
SODIUM BLD-SCNC: 133 MMOL/L (ref 132–146)
WBC NRBC COR # BLD: 11.84 10*3/MM3 (ref 3.5–10.8)

## 2017-11-25 PROCEDURE — 83735 ASSAY OF MAGNESIUM: CPT | Performed by: INTERNAL MEDICINE

## 2017-11-25 PROCEDURE — 25010000002 ENOXAPARIN PER 10 MG: Performed by: ORTHOPAEDIC SURGERY

## 2017-11-25 PROCEDURE — 99232 SBSQ HOSP IP/OBS MODERATE 35: CPT | Performed by: NURSE PRACTITIONER

## 2017-11-25 PROCEDURE — 97110 THERAPEUTIC EXERCISES: CPT

## 2017-11-25 PROCEDURE — 93005 ELECTROCARDIOGRAM TRACING: CPT | Performed by: INTERNAL MEDICINE

## 2017-11-25 PROCEDURE — 25010000002 ROPIVACAINE PER 1 MG: Performed by: ANESTHESIOLOGY

## 2017-11-25 PROCEDURE — 94799 UNLISTED PULMONARY SVC/PX: CPT

## 2017-11-25 PROCEDURE — 80048 BASIC METABOLIC PNL TOTAL CA: CPT | Performed by: HOSPITALIST

## 2017-11-25 PROCEDURE — 82962 GLUCOSE BLOOD TEST: CPT

## 2017-11-25 PROCEDURE — 25010000002 MAGNESIUM SULFATE 2 GM/50ML SOLUTION: Performed by: INTERNAL MEDICINE

## 2017-11-25 PROCEDURE — 84132 ASSAY OF SERUM POTASSIUM: CPT | Performed by: NURSE PRACTITIONER

## 2017-11-25 PROCEDURE — 85027 COMPLETE CBC AUTOMATED: CPT | Performed by: HOSPITALIST

## 2017-11-25 PROCEDURE — 63710000001 INSULIN DETEMIR PER 5 UNITS: Performed by: INTERNAL MEDICINE

## 2017-11-25 PROCEDURE — 93010 ELECTROCARDIOGRAM REPORT: CPT | Performed by: INTERNAL MEDICINE

## 2017-11-25 PROCEDURE — 97116 GAIT TRAINING THERAPY: CPT

## 2017-11-25 RX ORDER — POTASSIUM CHLORIDE 7.45 MG/ML
10 INJECTION INTRAVENOUS
Status: DISCONTINUED | OUTPATIENT
Start: 2017-11-25 | End: 2017-11-29 | Stop reason: HOSPADM

## 2017-11-25 RX ORDER — MAGNESIUM SULFATE HEPTAHYDRATE 40 MG/ML
2 INJECTION, SOLUTION INTRAVENOUS ONCE
Status: COMPLETED | OUTPATIENT
Start: 2017-11-25 | End: 2017-11-25

## 2017-11-25 RX ORDER — AMLODIPINE BESYLATE 5 MG/1
5 TABLET ORAL ONCE
Status: COMPLETED | OUTPATIENT
Start: 2017-11-25 | End: 2017-11-25

## 2017-11-25 RX ORDER — POTASSIUM CHLORIDE 750 MG/1
40 CAPSULE, EXTENDED RELEASE ORAL AS NEEDED
Status: DISCONTINUED | OUTPATIENT
Start: 2017-11-25 | End: 2017-11-29 | Stop reason: HOSPADM

## 2017-11-25 RX ORDER — POTASSIUM CHLORIDE 1.5 G/1.77G
40 POWDER, FOR SOLUTION ORAL AS NEEDED
Status: DISCONTINUED | OUTPATIENT
Start: 2017-11-25 | End: 2017-11-29 | Stop reason: HOSPADM

## 2017-11-25 RX ORDER — DILTIAZEM HCL IN NACL,ISO-OSM 125 MG/125
5-15 PLASTIC BAG, INJECTION (ML) INTRAVENOUS
Status: DISCONTINUED | OUTPATIENT
Start: 2017-11-26 | End: 2017-11-26

## 2017-11-25 RX ORDER — ATENOLOL 50 MG/1
25 TABLET ORAL
Status: DISCONTINUED | OUTPATIENT
Start: 2017-11-26 | End: 2017-11-29 | Stop reason: HOSPADM

## 2017-11-25 RX ORDER — AMLODIPINE BESYLATE 10 MG/1
10 TABLET ORAL DAILY
Status: DISCONTINUED | OUTPATIENT
Start: 2017-11-26 | End: 2017-11-26

## 2017-11-25 RX ADMIN — POTASSIUM CHLORIDE 40 MEQ: 750 CAPSULE, EXTENDED RELEASE ORAL at 10:02

## 2017-11-25 RX ADMIN — AMLODIPINE BESYLATE 5 MG: 5 TABLET ORAL at 17:27

## 2017-11-25 RX ADMIN — CITALOPRAM HYDROBROMIDE 20 MG: 20 TABLET ORAL at 07:22

## 2017-11-25 RX ADMIN — ATORVASTATIN CALCIUM 40 MG: 40 TABLET, FILM COATED ORAL at 19:42

## 2017-11-25 RX ADMIN — MAGNESIUM SULFATE HEPTAHYDRATE 2 G: 40 INJECTION, SOLUTION INTRAVENOUS at 23:12

## 2017-11-25 RX ADMIN — OXYCODONE AND ACETAMINOPHEN 1 TABLET: 5; 325 TABLET ORAL at 19:42

## 2017-11-25 RX ADMIN — INSULIN LISPRO 5 UNITS: 100 INJECTION, SOLUTION INTRAVENOUS; SUBCUTANEOUS at 12:05

## 2017-11-25 RX ADMIN — SODIUM CHLORIDE 100 ML/HR: 9 INJECTION, SOLUTION INTRAVENOUS at 10:05

## 2017-11-25 RX ADMIN — DOCUSATE SODIUM 100 MG: 100 CAPSULE, LIQUID FILLED ORAL at 17:22

## 2017-11-25 RX ADMIN — INSULIN DETEMIR 15 UNITS: 100 INJECTION, SOLUTION SUBCUTANEOUS at 21:04

## 2017-11-25 RX ADMIN — POTASSIUM CHLORIDE 40 MEQ: 750 CAPSULE, EXTENDED RELEASE ORAL at 18:14

## 2017-11-25 RX ADMIN — ENOXAPARIN SODIUM 40 MG: 40 INJECTION SUBCUTANEOUS at 07:22

## 2017-11-25 RX ADMIN — ASPIRIN 325 MG ORAL TABLET 325 MG: 325 PILL ORAL at 07:22

## 2017-11-25 RX ADMIN — INSULIN LISPRO 5 UNITS: 100 INJECTION, SOLUTION INTRAVENOUS; SUBCUTANEOUS at 17:22

## 2017-11-25 RX ADMIN — AMLODIPINE BESYLATE 5 MG: 5 TABLET ORAL at 07:23

## 2017-11-25 RX ADMIN — INSULIN LISPRO 5 UNITS: 100 INJECTION, SOLUTION INTRAVENOUS; SUBCUTANEOUS at 07:23

## 2017-11-25 RX ADMIN — DILTIAZEM HCL-SODIUM CHLORIDE IV SOLN 125 MG/125ML-0.9% 5 MG/HR: 125-0.9/125 SOLUTION at 23:54

## 2017-11-25 RX ADMIN — DOCUSATE SODIUM 100 MG: 100 CAPSULE, LIQUID FILLED ORAL at 07:23

## 2017-11-25 RX ADMIN — LOSARTAN POTASSIUM 50 MG: 25 TABLET, FILM COATED ORAL at 07:22

## 2017-11-25 RX ADMIN — ROPIVACAINE HYDROCHLORIDE 8 ML/HR: 2 INJECTION, SOLUTION EPIDURAL; INFILTRATION at 19:42

## 2017-11-25 RX ADMIN — ROPIVACAINE HYDROCHLORIDE 8 ML/HR: 2 INJECTION, SOLUTION EPIDURAL; INFILTRATION at 07:23

## 2017-11-26 ENCOUNTER — APPOINTMENT (OUTPATIENT)
Dept: GENERAL RADIOLOGY | Facility: HOSPITAL | Age: 82
End: 2017-11-26

## 2017-11-26 ENCOUNTER — APPOINTMENT (OUTPATIENT)
Dept: CARDIOLOGY | Facility: HOSPITAL | Age: 82
End: 2017-11-26
Attending: INTERNAL MEDICINE

## 2017-11-26 PROBLEM — R77.8 ELEVATED TROPONIN: Status: ACTIVE | Noted: 2017-11-26

## 2017-11-26 PROBLEM — I48.91 ATRIAL FIBRILLATION WITH RVR: Status: ACTIVE | Noted: 2017-11-26

## 2017-11-26 PROBLEM — R79.89 ELEVATED TROPONIN: Status: ACTIVE | Noted: 2017-11-26

## 2017-11-26 LAB
ALBUMIN SERPL-MCNC: 3.3 G/DL (ref 3.2–4.8)
ALBUMIN/GLOB SERPL: 1.6 G/DL (ref 1.5–2.5)
ALP SERPL-CCNC: 58 U/L (ref 25–100)
ALT SERPL W P-5'-P-CCNC: 19 U/L (ref 7–40)
ANION GAP SERPL CALCULATED.3IONS-SCNC: 4 MMOL/L (ref 3–11)
AST SERPL-CCNC: 35 U/L (ref 0–33)
BASOPHILS # BLD AUTO: 0.02 10*3/MM3 (ref 0–0.2)
BASOPHILS NFR BLD AUTO: 0.2 % (ref 0–1)
BILIRUB SERPL-MCNC: 0.8 MG/DL (ref 0.3–1.2)
BNP SERPL-MCNC: 471 PG/ML (ref 0–100)
BUN BLD-MCNC: 10 MG/DL (ref 9–23)
BUN/CREAT SERPL: 14.3 (ref 7–25)
CALCIUM SPEC-SCNC: 8.3 MG/DL (ref 8.7–10.4)
CHLORIDE SERPL-SCNC: 102 MMOL/L (ref 99–109)
CO2 SERPL-SCNC: 27 MMOL/L (ref 20–31)
CREAT BLD-MCNC: 0.7 MG/DL (ref 0.6–1.3)
DEPRECATED RDW RBC AUTO: 48.4 FL (ref 37–54)
EOSINOPHIL # BLD AUTO: 0.11 10*3/MM3 (ref 0–0.3)
EOSINOPHIL NFR BLD AUTO: 1 % (ref 0–3)
ERYTHROCYTE [DISTWIDTH] IN BLOOD BY AUTOMATED COUNT: 14.9 % (ref 11.3–14.5)
GFR SERPL CREATININE-BSD FRML MDRD: 80 ML/MIN/1.73
GLOBULIN UR ELPH-MCNC: 2.1 GM/DL
GLUCOSE BLD-MCNC: 120 MG/DL (ref 70–100)
GLUCOSE BLDC GLUCOMTR-MCNC: 116 MG/DL (ref 70–130)
GLUCOSE BLDC GLUCOMTR-MCNC: 194 MG/DL (ref 70–130)
GLUCOSE BLDC GLUCOMTR-MCNC: 75 MG/DL (ref 70–130)
GLUCOSE BLDC GLUCOMTR-MCNC: 91 MG/DL (ref 70–130)
HCT VFR BLD AUTO: 31.3 % (ref 34.5–44)
HGB BLD-MCNC: 10.4 G/DL (ref 11.5–15.5)
IMM GRANULOCYTES # BLD: 0.03 10*3/MM3 (ref 0–0.03)
IMM GRANULOCYTES NFR BLD: 0.3 % (ref 0–0.6)
LYMPHOCYTES # BLD AUTO: 1.27 10*3/MM3 (ref 0.6–4.8)
LYMPHOCYTES NFR BLD AUTO: 12 % (ref 24–44)
MCH RBC QN AUTO: 29.5 PG (ref 27–31)
MCHC RBC AUTO-ENTMCNC: 33.2 G/DL (ref 32–36)
MCV RBC AUTO: 88.9 FL (ref 80–99)
MONOCYTES # BLD AUTO: 0.84 10*3/MM3 (ref 0–1)
MONOCYTES NFR BLD AUTO: 7.9 % (ref 0–12)
NEUTROPHILS # BLD AUTO: 8.3 10*3/MM3 (ref 1.5–8.3)
NEUTROPHILS NFR BLD AUTO: 78.6 % (ref 41–71)
PLATELET # BLD AUTO: 165 10*3/MM3 (ref 150–450)
PMV BLD AUTO: 11 FL (ref 6–12)
POTASSIUM BLD-SCNC: 3.5 MMOL/L (ref 3.5–5.5)
POTASSIUM BLD-SCNC: 4.2 MMOL/L (ref 3.5–5.5)
PROT SERPL-MCNC: 5.4 G/DL (ref 5.7–8.2)
RBC # BLD AUTO: 3.52 10*6/MM3 (ref 3.89–5.14)
SODIUM BLD-SCNC: 133 MMOL/L (ref 132–146)
TROPONIN I SERPL-MCNC: 0.11 NG/ML
TROPONIN I SERPL-MCNC: 0.21 NG/ML
WBC NRBC COR # BLD: 10.57 10*3/MM3 (ref 3.5–10.8)

## 2017-11-26 PROCEDURE — 82962 GLUCOSE BLOOD TEST: CPT

## 2017-11-26 PROCEDURE — 84484 ASSAY OF TROPONIN QUANT: CPT | Performed by: INTERNAL MEDICINE

## 2017-11-26 PROCEDURE — 25010000002 ENOXAPARIN PER 10 MG: Performed by: ORTHOPAEDIC SURGERY

## 2017-11-26 PROCEDURE — 84132 ASSAY OF SERUM POTASSIUM: CPT | Performed by: HOSPITALIST

## 2017-11-26 PROCEDURE — 63710000001 INSULIN DETEMIR PER 5 UNITS: Performed by: INTERNAL MEDICINE

## 2017-11-26 PROCEDURE — 71010 HC CHEST PA OR AP: CPT

## 2017-11-26 PROCEDURE — 99222 1ST HOSP IP/OBS MODERATE 55: CPT | Performed by: INTERNAL MEDICINE

## 2017-11-26 PROCEDURE — 93306 TTE W/DOPPLER COMPLETE: CPT

## 2017-11-26 PROCEDURE — 83880 ASSAY OF NATRIURETIC PEPTIDE: CPT | Performed by: INTERNAL MEDICINE

## 2017-11-26 PROCEDURE — 25010000002 ROPIVACAINE PER 1 MG: Performed by: ANESTHESIOLOGY

## 2017-11-26 PROCEDURE — 93306 TTE W/DOPPLER COMPLETE: CPT | Performed by: INTERNAL MEDICINE

## 2017-11-26 PROCEDURE — 85025 COMPLETE CBC W/AUTO DIFF WBC: CPT | Performed by: INTERNAL MEDICINE

## 2017-11-26 PROCEDURE — 99233 SBSQ HOSP IP/OBS HIGH 50: CPT | Performed by: HOSPITALIST

## 2017-11-26 PROCEDURE — 80053 COMPREHEN METABOLIC PANEL: CPT | Performed by: INTERNAL MEDICINE

## 2017-11-26 RX ORDER — DILTIAZEM HCL IN NACL,ISO-OSM 125 MG/125
5-15 PLASTIC BAG, INJECTION (ML) INTRAVENOUS
Status: DISCONTINUED | OUTPATIENT
Start: 2017-11-26 | End: 2017-11-29 | Stop reason: HOSPADM

## 2017-11-26 RX ADMIN — INSULIN LISPRO 5 UNITS: 100 INJECTION, SOLUTION INTRAVENOUS; SUBCUTANEOUS at 08:25

## 2017-11-26 RX ADMIN — POTASSIUM CHLORIDE 40 MEQ: 750 CAPSULE, EXTENDED RELEASE ORAL at 11:41

## 2017-11-26 RX ADMIN — INSULIN LISPRO 5 UNITS: 100 INJECTION, SOLUTION INTRAVENOUS; SUBCUTANEOUS at 11:41

## 2017-11-26 RX ADMIN — AMLODIPINE BESYLATE 10 MG: 10 TABLET ORAL at 08:24

## 2017-11-26 RX ADMIN — ASPIRIN 325 MG ORAL TABLET 325 MG: 325 PILL ORAL at 08:24

## 2017-11-26 RX ADMIN — DILTIAZEM HCL-SODIUM CHLORIDE IV SOLN 125 MG/125ML-0.9% 5 MG/HR: 125-0.9/125 SOLUTION at 00:23

## 2017-11-26 RX ADMIN — ATORVASTATIN CALCIUM 40 MG: 40 TABLET, FILM COATED ORAL at 19:31

## 2017-11-26 RX ADMIN — DILTIAZEM HYDROCHLORIDE 30 MG: 30 TABLET, FILM COATED ORAL at 11:41

## 2017-11-26 RX ADMIN — INSULIN DETEMIR 15 UNITS: 100 INJECTION, SOLUTION SUBCUTANEOUS at 21:39

## 2017-11-26 RX ADMIN — ATENOLOL 25 MG: 50 TABLET ORAL at 08:24

## 2017-11-26 RX ADMIN — DILTIAZEM HYDROCHLORIDE 30 MG: 30 TABLET, FILM COATED ORAL at 17:13

## 2017-11-26 RX ADMIN — DILTIAZEM HCL-SODIUM CHLORIDE IV SOLN 125 MG/125ML-0.9% 10 MG/HR: 125-0.9/125 SOLUTION at 07:34

## 2017-11-26 RX ADMIN — CITALOPRAM HYDROBROMIDE 20 MG: 20 TABLET ORAL at 08:24

## 2017-11-26 RX ADMIN — ENOXAPARIN SODIUM 40 MG: 40 INJECTION SUBCUTANEOUS at 08:25

## 2017-11-26 RX ADMIN — ROPIVACAINE HYDROCHLORIDE 8 ML/HR: 2 INJECTION, SOLUTION EPIDURAL; INFILTRATION at 19:32

## 2017-11-26 RX ADMIN — POTASSIUM CHLORIDE 40 MEQ: 750 CAPSULE, EXTENDED RELEASE ORAL at 06:42

## 2017-11-26 RX ADMIN — LOSARTAN POTASSIUM 50 MG: 25 TABLET, FILM COATED ORAL at 08:24

## 2017-11-26 RX ADMIN — DOCUSATE SODIUM 100 MG: 100 CAPSULE, LIQUID FILLED ORAL at 17:13

## 2017-11-26 RX ADMIN — ROPIVACAINE HYDROCHLORIDE 8 ML/HR: 2 INJECTION, SOLUTION EPIDURAL; INFILTRATION at 07:34

## 2017-11-26 RX ADMIN — DOCUSATE SODIUM 100 MG: 100 CAPSULE, LIQUID FILLED ORAL at 08:25

## 2017-11-27 LAB
ANION GAP SERPL CALCULATED.3IONS-SCNC: 5 MMOL/L (ref 3–11)
BH CV ECHO MEAS - AO MAX PG (FULL): 5.7 MMHG
BH CV ECHO MEAS - AO MAX PG: 9 MMHG
BH CV ECHO MEAS - AO MEAN PG (FULL): 3.2 MMHG
BH CV ECHO MEAS - AO MEAN PG: 5.2 MMHG
BH CV ECHO MEAS - AO ROOT AREA (BSA CORRECTED): 1.9
BH CV ECHO MEAS - AO ROOT AREA: 5.6 CM^2
BH CV ECHO MEAS - AO ROOT DIAM: 2.7 CM
BH CV ECHO MEAS - AO V2 MAX: 152.8 CM/SEC
BH CV ECHO MEAS - AO V2 MEAN: 107 CM/SEC
BH CV ECHO MEAS - AO V2 VTI: 26.9 CM
BH CV ECHO MEAS - AVA(I,A): 1.6 CM^2
BH CV ECHO MEAS - AVA(I,D): 1.6 CM^2
BH CV ECHO MEAS - AVA(V,A): 1.4 CM^2
BH CV ECHO MEAS - AVA(V,D): 1.4 CM^2
BH CV ECHO MEAS - BSA(HAYCOCK): 1.4 M^2
BH CV ECHO MEAS - BSA: 1.4 M^2
BH CV ECHO MEAS - BZI_BMI: 21.1 KILOGRAMS/M^2
BH CV ECHO MEAS - BZI_METRIC_HEIGHT: 152.4 CM
BH CV ECHO MEAS - BZI_METRIC_WEIGHT: 49 KG
BH CV ECHO MEAS - CONTRAST EF (2CH): 69 ML/M^2
BH CV ECHO MEAS - CONTRAST EF 4CH: 86.1 ML/M^2
BH CV ECHO MEAS - EDV(CUBED): 31.4 ML
BH CV ECHO MEAS - EDV(MOD-SP2): 29 ML
BH CV ECHO MEAS - EDV(MOD-SP4): 36 ML
BH CV ECHO MEAS - EDV(TEICH): 39.6 ML
BH CV ECHO MEAS - EF(CUBED): 80.1 %
BH CV ECHO MEAS - EF(MOD-SP2): 69 %
BH CV ECHO MEAS - EF(MOD-SP4): 86.1 %
BH CV ECHO MEAS - EF(TEICH): 73.9 %
BH CV ECHO MEAS - ESV(CUBED): 6.3 ML
BH CV ECHO MEAS - ESV(MOD-SP2): 9 ML
BH CV ECHO MEAS - ESV(MOD-SP4): 5 ML
BH CV ECHO MEAS - ESV(TEICH): 10.3 ML
BH CV ECHO MEAS - FS: 41.6 %
BH CV ECHO MEAS - IVS/LVPW: 0.98
BH CV ECHO MEAS - IVSD: 1.2 CM
BH CV ECHO MEAS - LA DIMENSION: 3.2 CM
BH CV ECHO MEAS - LA/AO: 1.2
BH CV ECHO MEAS - LAT PEAK E' VEL: 5.9 CM/SEC
BH CV ECHO MEAS - LV DIASTOLIC VOL/BSA (35-75): 25.1 ML/M^2
BH CV ECHO MEAS - LV MASS(C)D: 125.3 GRAMS
BH CV ECHO MEAS - LV MASS(C)DI: 87.2 GRAMS/M^2
BH CV ECHO MEAS - LV MAX PG: 3.3 MMHG
BH CV ECHO MEAS - LV MEAN PG: 2 MMHG
BH CV ECHO MEAS - LV SYSTOLIC VOL/BSA (12-30): 3.5 ML/M^2
BH CV ECHO MEAS - LV V1 MAX: 90.5 CM/SEC
BH CV ECHO MEAS - LV V1 MEAN: 65 CM/SEC
BH CV ECHO MEAS - LV V1 VTI: 17.7 CM
BH CV ECHO MEAS - LVIDD: 3.2 CM
BH CV ECHO MEAS - LVIDS: 1.8 CM
BH CV ECHO MEAS - LVLD AP2: 6 CM
BH CV ECHO MEAS - LVLD AP4: 5.9 CM
BH CV ECHO MEAS - LVLS AP2: 5.2 CM
BH CV ECHO MEAS - LVLS AP4: 4.9 CM
BH CV ECHO MEAS - LVOT AREA (M): 2.3 CM^2
BH CV ECHO MEAS - LVOT AREA: 2.4 CM^2
BH CV ECHO MEAS - LVOT DIAM: 1.7 CM
BH CV ECHO MEAS - LVPWD: 1.3 CM
BH CV ECHO MEAS - MED PEAK E' VEL: 5.3 CM/SEC
BH CV ECHO MEAS - MV DEC TIME: 0.18 SEC
BH CV ECHO MEAS - MV E MAX VEL: 123.4 CM/SEC
BH CV ECHO MEAS - MV E/A: 22.1
BH CV ECHO MEAS - PA ACC SLOPE: 1095 CM/SEC^2
BH CV ECHO MEAS - PA ACC TIME: 0.07 SEC
BH CV ECHO MEAS - PA PR(ACCEL): 49.3 MMHG
BH CV ECHO MEAS - PULM DIAS VEL: 86.6 CM/SEC
BH CV ECHO MEAS - PULM S/D: 0.44
BH CV ECHO MEAS - PULM SYS VEL: 38.1 CM/SEC
BH CV ECHO MEAS - SI(AO): 104.8 ML/M^2
BH CV ECHO MEAS - SI(CUBED): 17.5 ML/M^2
BH CV ECHO MEAS - SI(LVOT): 29.4 ML/M^2
BH CV ECHO MEAS - SI(MOD-SP2): 13.9 ML/M^2
BH CV ECHO MEAS - SI(MOD-SP4): 21.6 ML/M^2
BH CV ECHO MEAS - SI(TEICH): 20.4 ML/M^2
BH CV ECHO MEAS - SV(AO): 150.5 ML
BH CV ECHO MEAS - SV(CUBED): 25.1 ML
BH CV ECHO MEAS - SV(LVOT): 42.3 ML
BH CV ECHO MEAS - SV(MOD-SP2): 20 ML
BH CV ECHO MEAS - SV(MOD-SP4): 31 ML
BH CV ECHO MEAS - SV(TEICH): 29.2 ML
BH CV ECHO MEAS - TAPSE (>1.6): 1.3 CM2
BH CV ECHO MEAS - TR MAX VEL: 243.5 CM/SEC
BH CV VAS BP LEFT ARM: NORMAL MMHG
BH CV XLRA - RV BASE: 3.1 CM
BH CV XLRA - RV LENGTH: 5.2 CM
BH CV XLRA - RV MID: 2.8 CM
BH CV XLRA - TDI S': 11.2 CM/SEC
BUN BLD-MCNC: 13 MG/DL (ref 9–23)
BUN/CREAT SERPL: 18.6 (ref 7–25)
CALCIUM SPEC-SCNC: 8 MG/DL (ref 8.7–10.4)
CHLORIDE SERPL-SCNC: 97 MMOL/L (ref 99–109)
CO2 SERPL-SCNC: 28 MMOL/L (ref 20–31)
CREAT BLD-MCNC: 0.7 MG/DL (ref 0.6–1.3)
DEPRECATED RDW RBC AUTO: 49.5 FL (ref 37–54)
ERYTHROCYTE [DISTWIDTH] IN BLOOD BY AUTOMATED COUNT: 15.4 % (ref 11.3–14.5)
GFR SERPL CREATININE-BSD FRML MDRD: 80 ML/MIN/1.73
GLUCOSE BLD-MCNC: 70 MG/DL (ref 70–100)
GLUCOSE BLDC GLUCOMTR-MCNC: 100 MG/DL (ref 70–130)
GLUCOSE BLDC GLUCOMTR-MCNC: 113 MG/DL (ref 70–130)
GLUCOSE BLDC GLUCOMTR-MCNC: 225 MG/DL (ref 70–130)
GLUCOSE BLDC GLUCOMTR-MCNC: 68 MG/DL (ref 70–130)
GLUCOSE BLDC GLUCOMTR-MCNC: 76 MG/DL (ref 70–130)
HCT VFR BLD AUTO: 30.4 % (ref 34.5–44)
HGB BLD-MCNC: 10 G/DL (ref 11.5–15.5)
LV EF 2D ECHO EST: 80 %
MAXIMAL PREDICTED HEART RATE: 138 BPM
MCH RBC QN AUTO: 29.3 PG (ref 27–31)
MCHC RBC AUTO-ENTMCNC: 32.9 G/DL (ref 32–36)
MCV RBC AUTO: 89.1 FL (ref 80–99)
PLATELET # BLD AUTO: 206 10*3/MM3 (ref 150–450)
PMV BLD AUTO: 12 FL (ref 6–12)
POTASSIUM BLD-SCNC: 3.8 MMOL/L (ref 3.5–5.5)
RBC # BLD AUTO: 3.41 10*6/MM3 (ref 3.89–5.14)
SODIUM BLD-SCNC: 130 MMOL/L (ref 132–146)
STRESS TARGET HR: 117 BPM
WBC NRBC COR # BLD: 8.43 10*3/MM3 (ref 3.5–10.8)

## 2017-11-27 PROCEDURE — 80048 BASIC METABOLIC PNL TOTAL CA: CPT | Performed by: HOSPITALIST

## 2017-11-27 PROCEDURE — 97530 THERAPEUTIC ACTIVITIES: CPT

## 2017-11-27 PROCEDURE — 82962 GLUCOSE BLOOD TEST: CPT

## 2017-11-27 PROCEDURE — 97116 GAIT TRAINING THERAPY: CPT

## 2017-11-27 PROCEDURE — 85027 COMPLETE CBC AUTOMATED: CPT | Performed by: HOSPITALIST

## 2017-11-27 PROCEDURE — 99232 SBSQ HOSP IP/OBS MODERATE 35: CPT | Performed by: INTERNAL MEDICINE

## 2017-11-27 PROCEDURE — 63710000001 INSULIN DETEMIR PER 5 UNITS: Performed by: INTERNAL MEDICINE

## 2017-11-27 PROCEDURE — 99233 SBSQ HOSP IP/OBS HIGH 50: CPT | Performed by: HOSPITALIST

## 2017-11-27 RX ADMIN — ATORVASTATIN CALCIUM 40 MG: 40 TABLET, FILM COATED ORAL at 21:05

## 2017-11-27 RX ADMIN — CITALOPRAM HYDROBROMIDE 20 MG: 20 TABLET ORAL at 09:03

## 2017-11-27 RX ADMIN — APIXABAN 2.5 MG: 2.5 TABLET, FILM COATED ORAL at 09:03

## 2017-11-27 RX ADMIN — INSULIN LISPRO 5 UNITS: 100 INJECTION, SOLUTION INTRAVENOUS; SUBCUTANEOUS at 11:49

## 2017-11-27 RX ADMIN — DOCUSATE SODIUM 100 MG: 100 CAPSULE, LIQUID FILLED ORAL at 17:32

## 2017-11-27 RX ADMIN — DILTIAZEM HYDROCHLORIDE 30 MG: 30 TABLET, FILM COATED ORAL at 00:10

## 2017-11-27 RX ADMIN — DILTIAZEM HYDROCHLORIDE 30 MG: 30 TABLET, FILM COATED ORAL at 05:36

## 2017-11-27 RX ADMIN — APIXABAN 2.5 MG: 2.5 TABLET, FILM COATED ORAL at 21:05

## 2017-11-27 RX ADMIN — INSULIN DETEMIR 15 UNITS: 100 INJECTION, SOLUTION SUBCUTANEOUS at 21:05

## 2017-11-27 RX ADMIN — ATENOLOL 25 MG: 50 TABLET ORAL at 09:03

## 2017-11-27 RX ADMIN — LOSARTAN POTASSIUM 50 MG: 25 TABLET, FILM COATED ORAL at 09:03

## 2017-11-27 RX ADMIN — DILTIAZEM HYDROCHLORIDE 30 MG: 30 TABLET, FILM COATED ORAL at 11:49

## 2017-11-27 RX ADMIN — DOCUSATE SODIUM 100 MG: 100 CAPSULE, LIQUID FILLED ORAL at 09:03

## 2017-11-27 RX ADMIN — DILTIAZEM HYDROCHLORIDE 30 MG: 30 TABLET, FILM COATED ORAL at 17:32

## 2017-11-28 LAB
ANION GAP SERPL CALCULATED.3IONS-SCNC: 7 MMOL/L (ref 3–11)
BUN BLD-MCNC: 15 MG/DL (ref 9–23)
BUN/CREAT SERPL: 18.8 (ref 7–25)
CALCIUM SPEC-SCNC: 7.8 MG/DL (ref 8.7–10.4)
CHLORIDE SERPL-SCNC: 102 MMOL/L (ref 99–109)
CO2 SERPL-SCNC: 25 MMOL/L (ref 20–31)
CREAT BLD-MCNC: 0.8 MG/DL (ref 0.6–1.3)
DEPRECATED RDW RBC AUTO: 50.9 FL (ref 37–54)
ERYTHROCYTE [DISTWIDTH] IN BLOOD BY AUTOMATED COUNT: 15.5 % (ref 11.3–14.5)
GFR SERPL CREATININE-BSD FRML MDRD: 69 ML/MIN/1.73
GLUCOSE BLD-MCNC: 83 MG/DL (ref 70–100)
GLUCOSE BLDC GLUCOMTR-MCNC: 112 MG/DL (ref 70–130)
GLUCOSE BLDC GLUCOMTR-MCNC: 172 MG/DL (ref 70–130)
GLUCOSE BLDC GLUCOMTR-MCNC: 75 MG/DL (ref 70–130)
GLUCOSE BLDC GLUCOMTR-MCNC: 92 MG/DL (ref 70–130)
GLUCOSE BLDC GLUCOMTR-MCNC: 97 MG/DL (ref 70–130)
HCT VFR BLD AUTO: 28.6 % (ref 34.5–44)
HGB BLD-MCNC: 9.5 G/DL (ref 11.5–15.5)
MCH RBC QN AUTO: 30.4 PG (ref 27–31)
MCHC RBC AUTO-ENTMCNC: 33.2 G/DL (ref 32–36)
MCV RBC AUTO: 91.4 FL (ref 80–99)
PLATELET # BLD AUTO: 143 10*3/MM3 (ref 150–450)
PMV BLD AUTO: 11.2 FL (ref 6–12)
POTASSIUM BLD-SCNC: 3.6 MMOL/L (ref 3.5–5.5)
POTASSIUM BLD-SCNC: 4.3 MMOL/L (ref 3.5–5.5)
RBC # BLD AUTO: 3.13 10*6/MM3 (ref 3.89–5.14)
SODIUM BLD-SCNC: 134 MMOL/L (ref 132–146)
WBC NRBC COR # BLD: 7.15 10*3/MM3 (ref 3.5–10.8)

## 2017-11-28 PROCEDURE — 82962 GLUCOSE BLOOD TEST: CPT

## 2017-11-28 PROCEDURE — 80048 BASIC METABOLIC PNL TOTAL CA: CPT | Performed by: HOSPITALIST

## 2017-11-28 PROCEDURE — 99233 SBSQ HOSP IP/OBS HIGH 50: CPT | Performed by: NURSE PRACTITIONER

## 2017-11-28 PROCEDURE — 63710000001 INSULIN DETEMIR PER 5 UNITS: Performed by: INTERNAL MEDICINE

## 2017-11-28 PROCEDURE — 97116 GAIT TRAINING THERAPY: CPT

## 2017-11-28 PROCEDURE — 99232 SBSQ HOSP IP/OBS MODERATE 35: CPT | Performed by: INTERNAL MEDICINE

## 2017-11-28 PROCEDURE — 84132 ASSAY OF SERUM POTASSIUM: CPT | Performed by: HOSPITALIST

## 2017-11-28 PROCEDURE — 85027 COMPLETE CBC AUTOMATED: CPT | Performed by: HOSPITALIST

## 2017-11-28 PROCEDURE — 97110 THERAPEUTIC EXERCISES: CPT

## 2017-11-28 PROCEDURE — 97530 THERAPEUTIC ACTIVITIES: CPT

## 2017-11-28 RX ADMIN — DILTIAZEM HYDROCHLORIDE 30 MG: 30 TABLET, FILM COATED ORAL at 17:45

## 2017-11-28 RX ADMIN — OXYCODONE AND ACETAMINOPHEN 1 TABLET: 5; 325 TABLET ORAL at 21:32

## 2017-11-28 RX ADMIN — APIXABAN 2.5 MG: 2.5 TABLET, FILM COATED ORAL at 21:32

## 2017-11-28 RX ADMIN — DOCUSATE SODIUM 100 MG: 100 CAPSULE, LIQUID FILLED ORAL at 08:47

## 2017-11-28 RX ADMIN — CITALOPRAM HYDROBROMIDE 20 MG: 20 TABLET ORAL at 08:46

## 2017-11-28 RX ADMIN — INSULIN LISPRO 5 UNITS: 100 INJECTION, SOLUTION INTRAVENOUS; SUBCUTANEOUS at 08:47

## 2017-11-28 RX ADMIN — LOSARTAN POTASSIUM 50 MG: 25 TABLET, FILM COATED ORAL at 08:46

## 2017-11-28 RX ADMIN — POTASSIUM CHLORIDE 40 MEQ: 750 CAPSULE, EXTENDED RELEASE ORAL at 11:43

## 2017-11-28 RX ADMIN — DILTIAZEM HYDROCHLORIDE 30 MG: 30 TABLET, FILM COATED ORAL at 11:43

## 2017-11-28 RX ADMIN — INSULIN DETEMIR 15 UNITS: 100 INJECTION, SOLUTION SUBCUTANEOUS at 21:32

## 2017-11-28 RX ADMIN — ATORVASTATIN CALCIUM 40 MG: 40 TABLET, FILM COATED ORAL at 21:32

## 2017-11-28 RX ADMIN — APIXABAN 2.5 MG: 2.5 TABLET, FILM COATED ORAL at 08:47

## 2017-11-28 RX ADMIN — DILTIAZEM HYDROCHLORIDE 30 MG: 30 TABLET, FILM COATED ORAL at 00:44

## 2017-11-28 RX ADMIN — INSULIN LISPRO 5 UNITS: 100 INJECTION, SOLUTION INTRAVENOUS; SUBCUTANEOUS at 17:45

## 2017-11-28 RX ADMIN — POTASSIUM CHLORIDE 40 MEQ: 750 CAPSULE, EXTENDED RELEASE ORAL at 16:00

## 2017-11-28 RX ADMIN — ATENOLOL: 50 TABLET ORAL at 08:48

## 2017-11-29 VITALS
HEIGHT: 60 IN | BODY MASS INDEX: 21.2 KG/M2 | TEMPERATURE: 96.7 F | HEART RATE: 57 BPM | SYSTOLIC BLOOD PRESSURE: 149 MMHG | RESPIRATION RATE: 16 BRPM | DIASTOLIC BLOOD PRESSURE: 62 MMHG | OXYGEN SATURATION: 94 % | WEIGHT: 108 LBS

## 2017-11-29 PROBLEM — R77.8 ELEVATED TROPONIN: Status: RESOLVED | Noted: 2017-11-26 | Resolved: 2017-11-29

## 2017-11-29 PROBLEM — E87.1 HYPONATREMIA: Status: RESOLVED | Noted: 2017-11-24 | Resolved: 2017-11-29

## 2017-11-29 PROBLEM — E11.8 TYPE 2 DIABETES MELLITUS WITH COMPLICATION, WITH LONG-TERM CURRENT USE OF INSULIN (HCC): Status: ACTIVE | Noted: 2017-11-24

## 2017-11-29 PROBLEM — Z79.4 TYPE 2 DIABETES MELLITUS WITH COMPLICATION, WITH LONG-TERM CURRENT USE OF INSULIN: Status: ACTIVE | Noted: 2017-11-24

## 2017-11-29 PROBLEM — R79.89 ELEVATED TROPONIN: Status: RESOLVED | Noted: 2017-11-26 | Resolved: 2017-11-29

## 2017-11-29 LAB
GLUCOSE BLDC GLUCOMTR-MCNC: 52 MG/DL (ref 70–130)
GLUCOSE BLDC GLUCOMTR-MCNC: 86 MG/DL (ref 70–130)

## 2017-11-29 PROCEDURE — 82962 GLUCOSE BLOOD TEST: CPT

## 2017-11-29 PROCEDURE — 99239 HOSP IP/OBS DSCHRG MGMT >30: CPT | Performed by: PHYSICIAN ASSISTANT

## 2017-11-29 RX ORDER — DILTIAZEM HYDROCHLORIDE 120 MG/1
120 CAPSULE, EXTENDED RELEASE ORAL DAILY
Start: 2017-11-29 | End: 2018-03-05 | Stop reason: HOSPADM

## 2017-11-29 RX ORDER — ATENOLOL 25 MG/1
25 TABLET ORAL
Start: 2017-11-29 | End: 2018-03-05 | Stop reason: HOSPADM

## 2017-11-29 RX ORDER — DILTIAZEM HYDROCHLORIDE 120 MG/1
120 CAPSULE, COATED, EXTENDED RELEASE ORAL
Status: DISCONTINUED | OUTPATIENT
Start: 2017-11-29 | End: 2017-11-29 | Stop reason: HOSPADM

## 2017-11-29 RX ORDER — ACETAMINOPHEN 325 MG/1
650 TABLET ORAL EVERY 6 HOURS PRN
Start: 2017-11-29 | End: 2019-01-15

## 2017-11-29 RX ORDER — PSEUDOEPHEDRINE HCL 30 MG
100 TABLET ORAL 2 TIMES DAILY
Start: 2017-11-29 | End: 2019-01-15

## 2017-11-29 RX ORDER — LOSARTAN POTASSIUM 50 MG/1
50 TABLET ORAL
Start: 2017-11-29 | End: 2018-03-05 | Stop reason: HOSPADM

## 2017-11-29 RX ORDER — ASPIRIN 81 MG/1
81 TABLET ORAL DAILY
Start: 2017-11-29 | End: 2018-04-07

## 2017-11-29 RX ORDER — HYDROCODONE BITARTRATE AND ACETAMINOPHEN 5; 325 MG/1; MG/1
1 TABLET ORAL EVERY 6 HOURS PRN
Qty: 8 TABLET | Refills: 0 | Status: SHIPPED | OUTPATIENT
Start: 2017-11-29 | End: 2018-04-11 | Stop reason: HOSPADM

## 2017-11-29 RX ADMIN — ATENOLOL 25 MG: 50 TABLET ORAL at 08:18

## 2017-11-29 RX ADMIN — DILTIAZEM HYDROCHLORIDE 30 MG: 30 TABLET, FILM COATED ORAL at 00:12

## 2017-11-29 RX ADMIN — DILTIAZEM HYDROCHLORIDE 30 MG: 30 TABLET, FILM COATED ORAL at 06:08

## 2017-11-29 RX ADMIN — LOSARTAN POTASSIUM 50 MG: 25 TABLET, FILM COATED ORAL at 08:16

## 2017-11-29 RX ADMIN — CITALOPRAM HYDROBROMIDE 20 MG: 20 TABLET ORAL at 08:16

## 2017-11-29 RX ADMIN — APIXABAN 2.5 MG: 2.5 TABLET, FILM COATED ORAL at 08:18

## 2017-11-29 RX ADMIN — DOCUSATE SODIUM 100 MG: 100 CAPSULE, LIQUID FILLED ORAL at 08:16

## 2017-12-12 ENCOUNTER — APPOINTMENT (OUTPATIENT)
Dept: GENERAL RADIOLOGY | Facility: HOSPITAL | Age: 82
End: 2017-12-12

## 2017-12-12 ENCOUNTER — APPOINTMENT (OUTPATIENT)
Dept: CT IMAGING | Facility: HOSPITAL | Age: 82
End: 2017-12-12

## 2017-12-12 ENCOUNTER — HOSPITAL ENCOUNTER (EMERGENCY)
Facility: HOSPITAL | Age: 82
Discharge: HOME | End: 2017-12-12
Attending: EMERGENCY MEDICINE | Admitting: EMERGENCY MEDICINE

## 2017-12-12 VITALS
BODY MASS INDEX: 23.18 KG/M2 | HEIGHT: 59 IN | RESPIRATION RATE: 16 BRPM | WEIGHT: 115 LBS | HEART RATE: 64 BPM | DIASTOLIC BLOOD PRESSURE: 69 MMHG | TEMPERATURE: 97.6 F | OXYGEN SATURATION: 98 % | SYSTOLIC BLOOD PRESSURE: 162 MMHG

## 2017-12-12 DIAGNOSIS — S09.90XA INJURY OF HEAD, INITIAL ENCOUNTER: ICD-10-CM

## 2017-12-12 DIAGNOSIS — S80.02XA CONTUSION OF LEFT KNEE, INITIAL ENCOUNTER: ICD-10-CM

## 2017-12-12 DIAGNOSIS — R42 EPISODE OF DIZZINESS: ICD-10-CM
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
## 2017-12-12 LAB
ALBUMIN SERPL-MCNC: 3.7 G/DL (ref 3.2–4.8)
ALBUMIN/GLOB SERPL: 1.4 G/DL (ref 1.5–2.5)
ALP SERPL-CCNC: 225 U/L (ref 25–100)
ALT SERPL W P-5'-P-CCNC: 20 U/L (ref 7–40)
ANION GAP SERPL CALCULATED.3IONS-SCNC: 5 MMOL/L (ref 3–11)
AST SERPL-CCNC: 20 U/L (ref 0–33)
BASOPHILS # BLD AUTO: 0.04 10*3/MM3 (ref 0–0.2)
BASOPHILS NFR BLD AUTO: 0.7 % (ref 0–1)
BILIRUB SERPL-MCNC: 0.8 MG/DL (ref 0.3–1.2)
BUN BLD-MCNC: 15 MG/DL (ref 9–23)
BUN/CREAT SERPL: 15 (ref 7–25)
CALCIUM SPEC-SCNC: 8.8 MG/DL (ref 8.7–10.4)
CHLORIDE SERPL-SCNC: 101 MMOL/L (ref 99–109)
CO2 SERPL-SCNC: 28 MMOL/L (ref 20–31)
CREAT BLD-MCNC: 1 MG/DL (ref 0.6–1.3)
DEPRECATED RDW RBC AUTO: 57.5 FL (ref 37–54)
EOSINOPHIL # BLD AUTO: 0.14 10*3/MM3 (ref 0–0.3)
EOSINOPHIL NFR BLD AUTO: 2.4 % (ref 0–3)
ERYTHROCYTE [DISTWIDTH] IN BLOOD BY AUTOMATED COUNT: 16.9 % (ref 11.3–14.5)
GFR SERPL CREATININE-BSD FRML MDRD: 53 ML/MIN/1.73
GLOBULIN UR ELPH-MCNC: 2.6 GM/DL
GLUCOSE BLD-MCNC: 249 MG/DL (ref 70–100)
HCT VFR BLD AUTO: 34.1 % (ref 34.5–44)
HGB BLD-MCNC: 10.5 G/DL (ref 11.5–15.5)
HOLD SPECIMEN: NORMAL
HOLD SPECIMEN: NORMAL
IMM GRANULOCYTES # BLD: 0.01 10*3/MM3 (ref 0–0.03)
IMM GRANULOCYTES NFR BLD: 0.2 % (ref 0–0.6)
INR PPP: 1.05
LYMPHOCYTES # BLD AUTO: 0.96 10*3/MM3 (ref 0.6–4.8)
LYMPHOCYTES NFR BLD AUTO: 16.6 % (ref 24–44)
MCH RBC QN AUTO: 28.7 PG (ref 27–31)
MCHC RBC AUTO-ENTMCNC: 30.8 G/DL (ref 32–36)
MCV RBC AUTO: 93.2 FL (ref 80–99)
MONOCYTES # BLD AUTO: 0.5 10*3/MM3 (ref 0–1)
MONOCYTES NFR BLD AUTO: 8.6 % (ref 0–12)
NEUTROPHILS # BLD AUTO: 4.15 10*3/MM3 (ref 1.5–8.3)
NEUTROPHILS NFR BLD AUTO: 71.5 % (ref 41–71)
PLATELET # BLD AUTO: 351 10*3/MM3 (ref 150–450)
PMV BLD AUTO: 9.8 FL (ref 6–12)
POTASSIUM BLD-SCNC: 3.8 MMOL/L (ref 3.5–5.5)
PROT SERPL-MCNC: 6.3 G/DL (ref 5.7–8.2)
PROTHROMBIN TIME: 11.5 SECONDS (ref 9.6–11.5)
RBC # BLD AUTO: 3.66 10*6/MM3 (ref 3.89–5.14)
SODIUM BLD-SCNC: 134 MMOL/L (ref 132–146)
TROPONIN I SERPL-MCNC: 0.01 NG/ML (ref 0–0.07)
WBC NRBC COR # BLD: 5.8 10*3/MM3 (ref 3.5–10.8)
WHOLE BLOOD HOLD SPECIMEN: NORMAL
WHOLE BLOOD HOLD SPECIMEN: NORMAL

## 2017-12-12 PROCEDURE — 71010 HC CHEST PA OR AP: CPT

## 2017-12-12 PROCEDURE — 84484 ASSAY OF TROPONIN QUANT: CPT

## 2017-12-12 PROCEDURE — 99284 EMERGENCY DEPT VISIT MOD MDM: CPT

## 2017-12-12 PROCEDURE — 85025 COMPLETE CBC W/AUTO DIFF WBC: CPT | Performed by: PHYSICIAN ASSISTANT

## 2017-12-12 PROCEDURE — 73560 X-RAY EXAM OF KNEE 1 OR 2: CPT

## 2017-12-12 PROCEDURE — 93005 ELECTROCARDIOGRAM TRACING: CPT | Performed by: PHYSICIAN ASSISTANT

## 2017-12-12 PROCEDURE — 73502 X-RAY EXAM HIP UNI 2-3 VIEWS: CPT

## 2017-12-12 PROCEDURE — 70450 CT HEAD/BRAIN W/O DYE: CPT

## 2017-12-12 PROCEDURE — 80053 COMPREHEN METABOLIC PANEL: CPT | Performed by: PHYSICIAN ASSISTANT

## 2017-12-12 PROCEDURE — 96374 THER/PROPH/DIAG INJ IV PUSH: CPT

## 2017-12-12 PROCEDURE — 85610 PROTHROMBIN TIME: CPT | Performed by: PHYSICIAN ASSISTANT

## 2017-12-12 RX ORDER — ATENOLOL 50 MG/1
25 TABLET ORAL ONCE
Status: COMPLETED | OUTPATIENT
Start: 2017-12-12 | End: 2017-12-12

## 2017-12-12 RX ORDER — LABETALOL HYDROCHLORIDE 5 MG/ML
10 INJECTION, SOLUTION INTRAVENOUS ONCE
Status: COMPLETED | OUTPATIENT
Start: 2017-12-12 | End: 2017-12-12

## 2017-12-12 RX ORDER — INSULIN GLARGINE 100 [IU]/ML
10 INJECTION, SOLUTION SUBCUTANEOUS DAILY
COMMUNITY
End: 2018-03-05 | Stop reason: HOSPADM

## 2017-12-12 RX ORDER — LOSARTAN POTASSIUM 50 MG/1
50 TABLET ORAL ONCE
Status: COMPLETED | OUTPATIENT
Start: 2017-12-12 | End: 2017-12-12

## 2017-12-12 RX ORDER — DILTIAZEM HYDROCHLORIDE 120 MG/1
120 CAPSULE, COATED, EXTENDED RELEASE ORAL ONCE
Status: COMPLETED | OUTPATIENT
Start: 2017-12-12 | End: 2017-12-12

## 2017-12-12 RX ADMIN — LABETALOL HYDROCHLORIDE 10 MG: 5 INJECTION INTRAVENOUS at 12:31

## 2017-12-12 RX ADMIN — LOSARTAN POTASSIUM 50 MG: 50 TABLET ORAL at 10:06

## 2017-12-12 RX ADMIN — ATENOLOL 25 MG: 50 TABLET ORAL at 10:06

## 2017-12-12 RX ADMIN — DILTIAZEM HYDROCHLORIDE 120 MG: 120 CAPSULE, COATED, EXTENDED RELEASE ORAL at 10:06

## 2017-12-12 NOTE — ED NOTES
Cee GUERRA aware of BP. Pt has not had morning meds. Will wait for CT scan results to medicate      Donna Portillo RN  12/12/17 5815

## 2017-12-12 NOTE — ED PROVIDER NOTES
Subjective   HPI Comments: Pt presenting to ED by EMS after a fall this morning at the [facility]. She states she was getting out of bed and felt lightheaded and then lost her balance falling backwards hitting her head. She is also complaining of soreness to her knees since the fall. She explains she had to go the bathroom and was trying to rush and her wheelchair was not by her bed. Pt with recent hx of surgical repair of left hip fracture by Dr. Stratton on 11-23-17. During admission patient developed Afib with RVR and was started on Eliquis, Atenolol and Diltiazem. Pt denies continued dizziness after initial episode this morning. She denies CP, SOB, visual changes or headache. She denies rectal bleeding. She has not been weight bearing since the fall this morning but denies new hip pain.     Patient is a 82 y.o. female presenting with fall.   History provided by:  Patient  Fall   Mechanism of injury: fall    Injury location:  Head/neck and leg  Leg injury location:  L knee and R knee  Incident location:  long-term  Time since incident: PTA   Fall:     Fall occurred: Getting out of bed     Point of impact:  Head  Prior to arrival data:     Loss of consciousness: no    Associated symptoms: no abdominal pain, no back pain, no chest pain, no difficulty breathing, no headaches, no loss of consciousness, no nausea, no neck pain and no vomiting    Anticoagulation therapy: Eliquis         Review of Systems   Constitutional: Negative for chills and fever.   HENT: Negative for congestion, ear pain, sore throat and trouble swallowing.    Eyes: Negative for pain, redness and visual disturbance.   Respiratory: Negative for cough, chest tightness and shortness of breath.    Cardiovascular: Negative for chest pain and leg swelling.   Gastrointestinal: Negative for abdominal pain, constipation, diarrhea, nausea and vomiting.   Genitourinary: Negative for difficulty urinating, dysuria, flank pain, hematuria and  vaginal bleeding.   Musculoskeletal: Positive for arthralgias (Bilateral knee pain). Negative for back pain, joint swelling and neck pain.   Skin: Negative for rash and wound.   Neurological: Positive for dizziness and light-headedness. Negative for loss of consciousness, syncope, speech difficulty, weakness, numbness and headaches.   Psychiatric/Behavioral: Negative for confusion.   All other systems reviewed and are negative.      History reviewed. No pertinent past medical history.    Allergies   Allergen Reactions   • Penicillins        Past Surgical History:   Procedure Laterality Date   • ANKLE SURGERY Left 1996    STEEL PLATE PLACEMENT, LATER REMOVED PLATE AND PINS   • ATHRECTOMY ILIAC, FEMORAL, TIBIAL ARTERY     • BREAST LUMPECTOMY Left 1981   • COLON SURGERY     • CORONARY STENT PLACEMENT     • HAND SURGERY Right     INDEX FINGER   • HIP TROCANTERIC NAILING WITH INTRAMEDULLARY HIP SCREW Left 11/23/2017    Procedure: HIP TROCANTERIC NAILING WITH INTRAMEDULLARY HIP SCREW;  Surgeon: Jayjay Stratton MD;  Location: [facility];  Service:    • PARTIAL HYSTERECTOMY         History reviewed. No pertinent family history.    Social History     Social History   • Marital status:      Spouse name: N/A   • Number of children: N/A   • Years of education: N/A     Social History Main Topics   • Smoking status: Never Smoker   • Smokeless tobacco: Never Used   • Alcohol use No   • Drug use: No   • Sexual activity: Defer     Other Topics Concern   • None     Social History Narrative   • None           Objective   Physical Exam   Constitutional: She is oriented to person, place, and time. Vital signs are normal. She appears well-developed.   HENT:   Head: Atraumatic.   Nose: Nose normal.   Mouth/Throat: Mucous membranes are normal.   Eyes: Conjunctivae, EOM and lids are normal. Pupils are equal, round, and reactive to light.   Neck: Normal range of motion. Neck supple.   Cardiovascular: Normal rate, regular rhythm  and normal heart sounds.    Pulmonary/Chest: Effort normal and breath sounds normal. She has no wheezes.   Abdominal: Soft. She exhibits no distension. There is no tenderness. There is no rebound and no guarding.   Musculoskeletal: Normal range of motion. She exhibits no edema.        Right hip: She exhibits normal range of motion, no tenderness and no swelling.        Left hip: She exhibits tenderness (mild, consistent with recent post op). She exhibits normal strength, no swelling and no deformity.        Right knee: She exhibits ecchymosis (and abrasion ). She exhibits normal range of motion, no swelling, no deformity and no erythema. Tenderness found.        Left knee: She exhibits ecchymosis (and abrasion). She exhibits normal range of motion, no swelling, no deformity and no erythema. Tenderness found.        Cervical back: She exhibits normal range of motion and no tenderness.        Thoracic back: She exhibits normal range of motion and no tenderness.        Lumbar back: She exhibits normal range of motion and no tenderness.   Neurological: She is alert and oriented to person, place, and time. No sensory deficit.   Skin: Skin is warm and dry. No rash noted. No erythema.   Psychiatric: She has a normal mood and affect. Her speech is normal and behavior is normal.   Nursing note and vitals reviewed.      Procedures         ED Course  ED Course      Re-examined patient several times in ED. Pt feeling better, no distress, no dizziness or lightheadedness in ED. Noted elevated BP, patient has not taken her morning BP meds. Dose given in ED. Discussed results and outpatient follow up. She is agreeable with plan.     Reviewed old records.     Recent Results (from the past 24 hour(s))   Comprehensive Metabolic Panel    Collection Time: 12/12/17  8:01 AM   Result Value Ref Range    Glucose 249 (H) 70 - 100 mg/dL    BUN 15 9 - 23 mg/dL    Creatinine 1.00 0.60 - 1.30 mg/dL    Sodium 134 132 - 146 mmol/L    Potassium 3.8  3.5 - 5.5 mmol/L    Chloride 101 99 - 109 mmol/L    CO2 28.0 20.0 - 31.0 mmol/L    Calcium 8.8 8.7 - 10.4 mg/dL    Total Protein 6.3 5.7 - 8.2 g/dL    Albumin 3.70 3.20 - 4.80 g/dL    ALT (SGPT) 20 7 - 40 U/L    AST (SGOT) 20 0 - 33 U/L    Alkaline Phosphatase 225 (H) 25 - 100 U/L    Total Bilirubin 0.8 0.3 - 1.2 mg/dL    eGFR Non African Amer 53 (L) >60 mL/min/1.73    Globulin 2.6 gm/dL    A/G Ratio 1.4 (L) 1.5 - 2.5 g/dL    BUN/Creatinine Ratio 15.0 7.0 - 25.0    Anion Gap 5.0 3.0 - 11.0 mmol/L   Protime-INR    Collection Time: 12/12/17  8:01 AM   Result Value Ref Range    Protime 11.5 9.6 - 11.5 Seconds    INR 1.05    CBC Auto Differential    Collection Time: 12/12/17  8:01 AM   Result Value Ref Range    WBC 5.80 3.50 - 10.80 10*3/mm3    RBC 3.66 (L) 3.89 - 5.14 10*6/mm3    Hemoglobin 10.5 (L) 11.5 - 15.5 g/dL    Hematocrit 34.1 (L) 34.5 - 44.0 %    MCV 93.2 80.0 - 99.0 fL    MCH 28.7 27.0 - 31.0 pg    MCHC 30.8 (L) 32.0 - 36.0 g/dL    RDW 16.9 (H) 11.3 - 14.5 %    RDW-SD 57.5 (H) 37.0 - 54.0 fl    MPV 9.8 6.0 - 12.0 fL    Platelets 351 150 - 450 10*3/mm3    Neutrophil % 71.5 (H) 41.0 - 71.0 %    Lymphocyte % 16.6 (L) 24.0 - 44.0 %    Monocyte % 8.6 0.0 - 12.0 %    Eosinophil % 2.4 0.0 - 3.0 %    Basophil % 0.7 0.0 - 1.0 %    Immature Grans % 0.2 0.0 - 0.6 %    Neutrophils, Absolute 4.15 1.50 - 8.30 10*3/mm3    Lymphocytes, Absolute 0.96 0.60 - 4.80 10*3/mm3    Monocytes, Absolute 0.50 0.00 - 1.00 10*3/mm3    Eosinophils, Absolute 0.14 0.00 - 0.30 10*3/mm3    Basophils, Absolute 0.04 0.00 - 0.20 10*3/mm3    Immature Grans, Absolute 0.01 0.00 - 0.03 10*3/mm3   Light Blue Top    Collection Time: 12/12/17  8:01 AM   Result Value Ref Range    Extra Tube hold for add-on    Green Top (Gel)    Collection Time: 12/12/17  8:01 AM   Result Value Ref Range    Extra Tube Hold for add-ons.    Lavender Top    Collection Time: 12/12/17  8:01 AM   Result Value Ref Range    Extra Tube hold for add-on    Gold Top - SST     "Collection Time: 12/12/17  8:01 AM   Result Value Ref Range    Extra Tube Hold for add-ons.    POC Troponin, Rapid    Collection Time: 12/12/17  8:08 AM   Result Value Ref Range    Troponin I 0.01 0.00 - 0.07 ng/mL     Note: In addition to lab results from this visit, the labs listed above may include labs taken at another facility or during a different encounter within the last 24 hours. Please correlate lab times with ED admission and discharge times for further clarification of the services performed during this visit.    XR Chest 1 View   Preliminary Result   1. Borderline cardiomegaly, compensated.   2. Interstitial fibrotic scarring.   3. No active disease.       D:  12/12/2017   E:  12/12/2017          XR Hip With or Without Pelvis 2 - 3 View Left   Preliminary Result   1. Stable postoperative findings left hip.   2. No evidence of pelvic or hip fracture identified.       D:  12/12/2017   E:  12/12/2017          XR Knee 1 or 2 View Left   Preliminary Result   1. Senile degenerative arthropathy left knee.   2. No acute left knee fracture.       D:  12/12/2017   E:  12/12/2017          CT Head Without Contrast    (Results Pending)     Vitals:    12/12/17 0729 12/12/17 1006   BP: (!) 221/85 (!) 178/115   BP Location: Right arm    Patient Position: Lying    Pulse: 70 64   Resp: 16    Temp: 97.6 °F (36.4 °C)    TempSrc: Oral    SpO2: 97%    Weight: 52.2 kg (115 lb)    Height: 149.9 cm (59\")      Medications   atenolol (TENORMIN) tablet 25 mg (25 mg Oral Given 12/12/17 1006)   losartan (COZAAR) tablet 50 mg (50 mg Oral Given 12/12/17 1006)   diltiaZEM CD (CARDIZEM CD) 24 hr capsule 120 mg (120 mg Oral Given 12/12/17 1006)     ECG/EMG Results (last 24 hours)     Procedure Component Value Units Date/Time    ECG 12 Lead [[id number]] Collected:  12/12/17 0812     Updated:  12/12/17 0824    Narrative:       Test Reason : dizziness  Blood Pressure : **/** mmHG  Vent. Rate : 070 BPM     Atrial Rate : 070 BPM     P-R Int : " 154 ms          QRS Dur : 082 ms      QT Int : 422 ms       P-R-T Axes : 072 -03 029 degrees     QTc Int : 455 ms    Sinus rhythm with premature atrial complexes  Otherwise normal ECG  When compared with ECG of 25-NOV-2017 22:58,  Sinus rhythm has replaced Atrial fibrillation  Vent. rate has decreased BY  68 BPM  ST no longer depressed in Inferior leads  ST no longer depressed in Anterolateral leads  T wave inversion no longer evident in Lateral leads  Confirmed by MARKELL LORA MD ([id number]) on 12/12/2017 8:24:22 AM    Referred By:  GENO           Confirmed By:MARKELL LORA MD                    [facility]    Final diagnoses:   Fall, initial encounter   Contusion of left knee, initial encounter   Injury of head, initial encounter   Episode of dizziness            MARI Angel  12/12/17 [id number]

## 2018-01-17 ENCOUNTER — APPOINTMENT (OUTPATIENT)
Dept: GENERAL RADIOLOGY | Facility: HOSPITAL | Age: 83
End: 2018-01-17

## 2018-01-17 ENCOUNTER — APPOINTMENT (OUTPATIENT)
Dept: MRI IMAGING | Facility: HOSPITAL | Age: 83
End: 2018-01-17

## 2018-01-17 ENCOUNTER — HOSPITAL ENCOUNTER (EMERGENCY)
Facility: HOSPITAL | Age: 83
Discharge: NURSING, REHAB OR LONG-TERM CARE | End: 2018-01-17
Attending: EMERGENCY MEDICINE | Admitting: EMERGENCY MEDICINE

## 2018-01-17 VITALS
DIASTOLIC BLOOD PRESSURE: 82 MMHG | HEIGHT: 61 IN | OXYGEN SATURATION: 97 % | TEMPERATURE: 100.2 F | RESPIRATION RATE: 14 BRPM | HEART RATE: 76 BPM | WEIGHT: 115 LBS | BODY MASS INDEX: 21.71 KG/M2 | SYSTOLIC BLOOD PRESSURE: 168 MMHG

## 2018-01-17 DIAGNOSIS — R40.4 TRANSIENT ALTERATION OF AWARENESS: Primary | ICD-10-CM

## 2018-01-17 DIAGNOSIS — R41.3 MEMORY DEFICIT: ICD-10-CM

## 2018-01-17 LAB
ALBUMIN SERPL-MCNC: 4 G/DL (ref 3.2–4.8)
ALBUMIN/GLOB SERPL: 1.5 G/DL (ref 1.5–2.5)
ALP SERPL-CCNC: 159 U/L (ref 25–100)
ALT SERPL W P-5'-P-CCNC: 20 U/L (ref 7–40)
ANION GAP SERPL CALCULATED.3IONS-SCNC: 6 MMOL/L (ref 3–11)
AST SERPL-CCNC: 21 U/L (ref 0–33)
BACTERIA UR QL AUTO: ABNORMAL /HPF
BASOPHILS # BLD AUTO: 0.01 10*3/MM3 (ref 0–0.2)
BASOPHILS NFR BLD AUTO: 0.1 % (ref 0–1)
BILIRUB SERPL-MCNC: 0.6 MG/DL (ref 0.3–1.2)
BILIRUB UR QL STRIP: NEGATIVE
BUN BLD-MCNC: 17 MG/DL (ref 9–23)
BUN/CREAT SERPL: 17 (ref 7–25)
CALCIUM SPEC-SCNC: 9.3 MG/DL (ref 8.7–10.4)
CHLORIDE SERPL-SCNC: 100 MMOL/L (ref 99–109)
CLARITY UR: CLEAR
CO2 SERPL-SCNC: 29 MMOL/L (ref 20–31)
COLOR UR: YELLOW
CREAT BLD-MCNC: 1 MG/DL (ref 0.6–1.3)
DEPRECATED RDW RBC AUTO: 46.3 FL (ref 37–54)
EOSINOPHIL # BLD AUTO: 0.05 10*3/MM3 (ref 0–0.3)
EOSINOPHIL NFR BLD AUTO: 0.5 % (ref 0–3)
ERYTHROCYTE [DISTWIDTH] IN BLOOD BY AUTOMATED COUNT: 14.4 % (ref 11.3–14.5)
GFR SERPL CREATININE-BSD FRML MDRD: 53 ML/MIN/1.73
GLOBULIN UR ELPH-MCNC: 2.7 GM/DL
GLUCOSE BLD-MCNC: 187 MG/DL (ref 70–100)
GLUCOSE UR STRIP-MCNC: NEGATIVE MG/DL
HCT VFR BLD AUTO: 37.3 % (ref 34.5–44)
HGB BLD-MCNC: 11.6 G/DL (ref 11.5–15.5)
HGB UR QL STRIP.AUTO: ABNORMAL
HOLD SPECIMEN: NORMAL
HOLD SPECIMEN: NORMAL
HYALINE CASTS UR QL AUTO: ABNORMAL /LPF
IMM GRANULOCYTES # BLD: 0.02 10*3/MM3 (ref 0–0.03)
IMM GRANULOCYTES NFR BLD: 0.2 % (ref 0–0.6)
KETONES UR QL STRIP: NEGATIVE
LEUKOCYTE ESTERASE UR QL STRIP.AUTO: NEGATIVE
LYMPHOCYTES # BLD AUTO: 0.71 10*3/MM3 (ref 0.6–4.8)
LYMPHOCYTES NFR BLD AUTO: 7.7 % (ref 24–44)
MCH RBC QN AUTO: 27.2 PG (ref 27–31)
MCHC RBC AUTO-ENTMCNC: 31.1 G/DL (ref 32–36)
MCV RBC AUTO: 87.6 FL (ref 80–99)
MONOCYTES # BLD AUTO: 0.33 10*3/MM3 (ref 0–1)
MONOCYTES NFR BLD AUTO: 3.6 % (ref 0–12)
NEUTROPHILS # BLD AUTO: 8.12 10*3/MM3 (ref 1.5–8.3)
NEUTROPHILS NFR BLD AUTO: 87.9 % (ref 41–71)
NITRITE UR QL STRIP: NEGATIVE
PH UR STRIP.AUTO: 5.5 [PH] (ref 5–8)
PLATELET # BLD AUTO: 252 10*3/MM3 (ref 150–450)
PMV BLD AUTO: 10.9 FL (ref 6–12)
POTASSIUM BLD-SCNC: 3.9 MMOL/L (ref 3.5–5.5)
PROT SERPL-MCNC: 6.7 G/DL (ref 5.7–8.2)
PROT UR QL STRIP: ABNORMAL
RBC # BLD AUTO: 4.26 10*6/MM3 (ref 3.89–5.14)
RBC # UR: ABNORMAL /HPF
REF LAB TEST METHOD: ABNORMAL
SODIUM BLD-SCNC: 135 MMOL/L (ref 132–146)
SP GR UR STRIP: 1.02 (ref 1–1.03)
SQUAMOUS #/AREA URNS HPF: ABNORMAL /HPF
TROPONIN I SERPL-MCNC: 0.02 NG/ML (ref 0–0.07)
TROPONIN I SERPL-MCNC: 0.02 NG/ML (ref 0–0.07)
UROBILINOGEN UR QL STRIP: ABNORMAL
WBC NRBC COR # BLD: 9.24 10*3/MM3 (ref 3.5–10.8)
WBC UR QL AUTO: ABNORMAL /HPF
WHOLE BLOOD HOLD SPECIMEN: NORMAL
WHOLE BLOOD HOLD SPECIMEN: NORMAL

## 2018-01-17 PROCEDURE — 84484 ASSAY OF TROPONIN QUANT: CPT

## 2018-01-17 PROCEDURE — 71045 X-RAY EXAM CHEST 1 VIEW: CPT

## 2018-01-17 PROCEDURE — 70551 MRI BRAIN STEM W/O DYE: CPT

## 2018-01-17 PROCEDURE — 80053 COMPREHEN METABOLIC PANEL: CPT | Performed by: EMERGENCY MEDICINE

## 2018-01-17 PROCEDURE — 81001 URINALYSIS AUTO W/SCOPE: CPT | Performed by: EMERGENCY MEDICINE

## 2018-01-17 PROCEDURE — P9612 CATHETERIZE FOR URINE SPEC: HCPCS

## 2018-01-17 PROCEDURE — 93005 ELECTROCARDIOGRAM TRACING: CPT | Performed by: EMERGENCY MEDICINE

## 2018-01-17 PROCEDURE — 99285 EMERGENCY DEPT VISIT HI MDM: CPT

## 2018-01-17 PROCEDURE — 36415 COLL VENOUS BLD VENIPUNCTURE: CPT

## 2018-01-17 PROCEDURE — 85025 COMPLETE CBC W/AUTO DIFF WBC: CPT | Performed by: EMERGENCY MEDICINE

## 2018-01-17 RX ORDER — ONDANSETRON 4 MG/1
4 TABLET, ORALLY DISINTEGRATING ORAL EVERY 8 HOURS PRN
COMMUNITY
End: 2018-04-11 | Stop reason: HOSPADM

## 2018-01-17 RX ORDER — ISOSORBIDE MONONITRATE 60 MG/1
60 TABLET, EXTENDED RELEASE ORAL 3 TIMES DAILY
Status: ON HOLD | COMMUNITY
End: 2022-08-06

## 2018-01-17 RX ORDER — CARVEDILOL 25 MG/1
25 TABLET ORAL 2 TIMES DAILY WITH MEALS
COMMUNITY
End: 2018-09-06 | Stop reason: HOSPADM

## 2018-01-17 NOTE — ED PROVIDER NOTES
"Subjective   HPI Comments: Mary Ramirez is a pleasant 83 y.o.female with dementia who presents to the emergency department with c/o altered mental status. EMS report that they were initially dispatched to the [facility] for AMS of sudden onset at 1400 this afternoon. The patient is normally \"pleasantly confused\" at baseline but was more confused today than usual. Staff at the facility told EMS that the patient was back to her mental baseline by the time they arrived.     Here in the ED, the patient is having some word finding difficulties. She says that she has had several \"knock knocks\" (falls) but denies a headache currently.     A complete history is unable to be obtained due to the patient's dementia.    There are no other known complaints at this time.         Patient is a 83 y.o. female presenting with altered mental status.   History provided by:  Patient, EMS personnel and nursing home  History limited by:  Dementia  Altered Mental Status   Presenting symptoms: confusion    Presenting symptoms comment:  Word finding difficulties  Severity:  Severe  Most recent episode:  Today  Episode history:  Single  Duration:  2 hours  Timing:  Constant  Progression:  Resolved  Chronicity:  New  Context: dementia and nursing home resident    Associated symptoms: no headaches        Review of Systems   Unable to perform ROS: Dementia   Neurological: Positive for speech difficulty (word finding difficulty). Negative for headaches.   Psychiatric/Behavioral: Positive for confusion.       Past Medical History:   Diagnosis Date   • Dementia    • Diabetes mellitus    • Hypertension      Review of medical records shows she was seen here after a fall at the [facility] on 12/12. She had stroke symptoms in 2014 and again in January of 2016. CT with perfusion was negative in 2016.       Discharge summary from 11/29/2017:    Date of Admission: 11/23/2017  Date of Discharge:    Length of Stay: 6  Primary Care Physician: Nirmal VINES" MD Toño     Consults     Date and Time Order Name Status Description     11/25/2017 2344 Inpatient Consult to Cardiology Completed           Hospital Course      Presenting Problem:   Fall, initial encounter [W19.XXXA]           Active Hospital Problems (** Indicates Principal Problem)     Diagnosis Date Noted   • **Fracture, intertrochanteric, left femur [S72.142A] 11/24/2017   • Atrial fibrillation with RVR, new onset [I48.91] 11/26/2017   • Hypertension [I10] 11/24/2017   • Type 2 diabetes mellitus with complication, with long-term current use of insulin [E11.8, Z79.4] 11/24/2017   • CAD (coronary artery disease) s/p stents [I25.10] 11/24/2017   • Fall [W19.XXXA] 11/23/2017       Resolved Hospital Problems     Diagnosis Date Noted Date Resolved   • Elevated troponin [R74.8] 11/26/2017 11/29/2017   • Hyponatremia [E87.1] 11/24/2017 11/29/2017      Hospital Course:  Ms. Mary Ramirez is an 81yo female with PMH significant for HTN, insulin-dependent DMII, CAD, mild dementia and short-term memory loss. She lves alone and is able to more-or-less, care for herself independently. She was brought to [facility] ED on 11/23/17 after a fall at home. A neighbor brought her dinner on the evening of 11/22 and the next morning, went over to check on her and found her on the floor, confused, with dinner still on the table untouched. ED evaluation revealed a L intertrochanteric hip fracture. She was also hyperglycemic, hyponatremic, hypokalemic.      She underwent L hip intertrochanteric nailing with intramedullary hip screw on 11/23/17 by Dr. Stratton. She tolerated the procedure well. HCTZ held due to worsening hyponatremia. BP meds adjusted due to worsening hypertension.      She developed atrial fibrillation with RVR on 11/25. Troponins noted to be elevated, thought to be secondary to rapid afib. She was started on Cardizem and cardiology was consulted. She converted to normal sinus rhythm spontaneously.  Echocardiogram normal with EF >70%. She was started on Eliquis 2.5mg PO BID for stroke prophylaxis. She was transitioned to Atenolol and Diltiazem ER 120mg daily for rate control.     Insulin regimen adjusted due to hypoglycemia while hospitalized. Decreased home insulin and stopped home PO hypoglycemics.      PT/OT recommended rehab and she was accepted to the [facility] [place]. She will transfer in stable condition on 11/29/17.   Follow up with Dr. Stratton on Wednesday 12/6 @ 11:30am  PCP follow up one week after d/c from rehab     Procedure(s):  HIP TROCANTERIC NAILING WITH INTRAMEDULLARY HIP SCREW       Allergies   Allergen Reactions   • Penicillins        Past Surgical History:   Procedure Laterality Date   • ANKLE SURGERY Left 1996    STEEL PLATE PLACEMENT, LATER REMOVED PLATE AND PINS   • ATHRECTOMY ILIAC, FEMORAL, TIBIAL ARTERY     • BREAST LUMPECTOMY Left 1981   • COLON SURGERY     • CORONARY STENT PLACEMENT     • HAND SURGERY Right     INDEX FINGER   • HIP TROCANTERIC NAILING WITH INTRAMEDULLARY HIP SCREW Left 11/23/2017    Procedure: HIP TROCANTERIC NAILING WITH INTRAMEDULLARY HIP SCREW;  Surgeon: Jayjay Stratton MD;  Location: [facility];  Service:    • PARTIAL HYSTERECTOMY         History reviewed. No pertinent family history.    Social History     Social History   • Marital status:      Spouse name: N/A   • Number of children: N/A   • Years of education: N/A     Social History Main Topics   • Smoking status: Never Smoker   • Smokeless tobacco: Never Used   • Alcohol use No   • Drug use: No   • Sexual activity: Defer     Other Topics Concern   • None     Social History Narrative         Objective   Physical Exam   Constitutional: She appears well-developed and well-nourished. No distress.   She is alert but pleasantly confused and has problems finding he words. This seems to be more likely to be dementia than any other etiology. No acute distress.   HENT:   Head: Normocephalic and  atraumatic.   Nose: Nose normal.   Mouth/Throat: Oropharynx is clear and moist.   Eyes: Conjunctivae are normal. No scleral icterus.   Neck: Normal range of motion. Neck supple. Carotid bruit is not present.   Cardiovascular: Normal rate, regular rhythm and normal heart sounds.  Exam reveals no gallop and no friction rub.    No murmur heard.  Pulmonary/Chest: Effort normal and breath sounds normal. No respiratory distress. She has no wheezes. She has no rales.   Abdominal: Soft. Bowel sounds are normal. She exhibits no mass. There is no tenderness. There is no rebound and no guarding.   Musculoskeletal: Normal range of motion. She exhibits no edema.   No synovitis, edema, or rash.   Neurological: She is alert.   Word finding difficulties. Good strength in the upper and lower extremities. Good  strength. 1+ reflexes. Face symmetric. Voice strong. Tongue midline. Vision, hearing and speech all preserved.   Skin: Skin is warm and dry. No erythema.   Psychiatric: She has a normal mood and affect. Her behavior is normal.   Nursing note and vitals reviewed.      Procedures         ED Course  ED Course     Recent Results (from the past 24 hour(s))   Comprehensive Metabolic Panel    Collection Time: 01/17/18  4:55 PM   Result Value Ref Range    Glucose 187 (H) 70 - 100 mg/dL    BUN 17 9 - 23 mg/dL    Creatinine 1.00 0.60 - 1.30 mg/dL    Sodium 135 132 - 146 mmol/L    Potassium 3.9 3.5 - 5.5 mmol/L    Chloride 100 99 - 109 mmol/L    CO2 29.0 20.0 - 31.0 mmol/L    Calcium 9.3 8.7 - 10.4 mg/dL    Total Protein 6.7 5.7 - 8.2 g/dL    Albumin 4.00 3.20 - 4.80 g/dL    ALT (SGPT) 20 7 - 40 U/L    AST (SGOT) 21 0 - 33 U/L    Alkaline Phosphatase 159 (H) 25 - 100 U/L    Total Bilirubin 0.6 0.3 - 1.2 mg/dL    eGFR Non African Amer 53 (L) >60 mL/min/1.73    Globulin 2.7 gm/dL    A/G Ratio 1.5 1.5 - 2.5 g/dL    BUN/Creatinine Ratio 17.0 7.0 - 25.0    Anion Gap 6.0 3.0 - 11.0 mmol/L   CBC Auto Differential    Collection Time:  01/17/18  4:55 PM   Result Value Ref Range    WBC 9.24 3.50 - 10.80 10*3/mm3    RBC 4.26 3.89 - 5.14 10*6/mm3    Hemoglobin 11.6 11.5 - 15.5 g/dL    Hematocrit 37.3 34.5 - 44.0 %    MCV 87.6 80.0 - 99.0 fL    MCH 27.2 27.0 - 31.0 pg    MCHC 31.1 (L) 32.0 - 36.0 g/dL    RDW 14.4 11.3 - 14.5 %    RDW-SD 46.3 37.0 - 54.0 fl    MPV 10.9 6.0 - 12.0 fL    Platelets 252 150 - 450 10*3/mm3    Neutrophil % 87.9 (H) 41.0 - 71.0 %    Lymphocyte % 7.7 (L) 24.0 - 44.0 %    Monocyte % 3.6 0.0 - 12.0 %    Eosinophil % 0.5 0.0 - 3.0 %    Basophil % 0.1 0.0 - 1.0 %    Immature Grans % 0.2 0.0 - 0.6 %    Neutrophils, Absolute 8.12 1.50 - 8.30 10*3/mm3    Lymphocytes, Absolute 0.71 0.60 - 4.80 10*3/mm3    Monocytes, Absolute 0.33 0.00 - 1.00 10*3/mm3    Eosinophils, Absolute 0.05 0.00 - 0.30 10*3/mm3    Basophils, Absolute 0.01 0.00 - 0.20 10*3/mm3    Immature Grans, Absolute 0.02 0.00 - 0.03 10*3/mm3   Light Blue Top    Collection Time: 01/17/18  4:55 PM   Result Value Ref Range    Extra Tube hold for add-on    Green Top (Gel)    Collection Time: 01/17/18  4:55 PM   Result Value Ref Range    Extra Tube Hold for add-ons.    Lavender Top    Collection Time: 01/17/18  4:55 PM   Result Value Ref Range    Extra Tube hold for add-on    Gold Top - SST    Collection Time: 01/17/18  4:55 PM   Result Value Ref Range    Extra Tube Hold for add-ons.    POC Troponin, Rapid    Collection Time: 01/17/18  4:59 PM   Result Value Ref Range    Troponin I 0.02 0.00 - 0.07 ng/mL   Urinalysis With / Culture If Indicated - Urine, Catheter    Collection Time: 01/17/18  5:27 PM   Result Value Ref Range    Color, UA Yellow Yellow, Straw    Appearance, UA Clear Clear    pH, UA 5.5 5.0 - 8.0    Specific Gravity, UA 1.016 1.001 - 1.030    Glucose, UA Negative Negative    Ketones, UA Negative Negative    Bilirubin, UA Negative Negative    Blood, UA Moderate (2+) (A) Negative    Protein, UA Trace (A) Negative    Leuk Esterase, UA Negative Negative    Nitrite, UA  Negative Negative    Urobilinogen, UA 0.2 E.U./dL 0.2 - 1.0 E.U./dL   Urinalysis, Microscopic Only - Urine, Clean Catch    Collection Time: 01/17/18  5:27 PM   Result Value Ref Range    RBC, UA 3-6 (A) None Seen, 0-2 /HPF    WBC, UA 3-5 (A) None Seen /HPF    Bacteria, UA None Seen None Seen, Trace /HPF    Squamous Epithelial Cells, UA 0-2 None Seen, 0-2 /HPF    Hyaline Casts, UA 0-6 0 - 6 /LPF    Methodology Automated Microscopy    POC Troponin, Rapid    Collection Time: 01/17/18  7:48 PM   Result Value Ref Range    Troponin I 0.02 0.00 - 0.07 ng/mL     Note: In addition to lab results from this visit, the labs listed above may include labs taken at another facility or during a different encounter within the last 24 hours. Please correlate lab times with ED admission and discharge times for further clarification of the services performed during this visit.    MRI Brain Without Contrast   Final Result     Periventricular white matter changes consistent with chronic microvascular    ischemia.     Generalized cerebral atrophy.     Otherwise, no acute changes within the brain.      THIS DOCUMENT HAS BEEN ELECTRONICALLY SIGNED BY NLELY RDZ MD      XR Chest 1 View   Final Result   Chronic change; no acute disease.       DICTATED:     01/17/2018   EDITED    :     01/17/2018        This report was finalized on 1/17/2018 5:21 PM by Dr. Raad De Paz MD.            Vitals:    01/17/18 2008 01/17/18 2030 01/17/18 2041 01/17/18 2100   BP:  172/67  168/82   Pulse: 77 74  76   Resp: 14  14    Temp:       TempSrc:       SpO2:  97%  97%   Weight:       Height:         Medications - No data to display  ECG/EMG Results (last 24 hours)     ** No results found for the last 24 hours. **                        MDM  Number of Diagnoses or Management Options  Memory deficit:   Transient alteration of awareness:   Diagnosis management comments:       I reviewed all available studies at the bedside the patient and her son.  Per her son's  report she is back to her call her baseline is had fairly steady decline in her memory over the past several months.    Exact etiology of the event today is unclear but her MRI is reassuring as there is no acute change.  She is a low-grade fever but her urine and labs are bland that her chest x-ray doesn't show anything acute on recheck she did not feel arm.  An occult infection cannot be ruled out that this point she is backed her baseline I don't think she needs admission.  She changes and gets worse she'll need to be reevaluated.    Talked to them about treatment of memory issues and I'll refer her to neurology for this but suggested Dr. Michael baker in the interim to see if this offers her any relief.    She'll return to the ER if worsening anyway.  Follow-up will be with her primary care doctor this week for recheck.    All are agreeable with the plan       Amount and/or Complexity of Data Reviewed  Clinical lab tests: reviewed  Tests in the radiology section of CPT®: reviewed  Tests in the medicine section of CPT®: reviewed        Final diagnoses:   Transient alteration of awareness   Memory deficit       Documentation assistance provided by drew Evans.  Information recorded by the drew was done at my direction and has been verified and validated by me.     Isamar Evans  01/17/18 2993       Shukri Heck MD  01/17/18 1482

## 2018-02-27 ENCOUNTER — APPOINTMENT (OUTPATIENT)
Dept: CT IMAGING | Facility: HOSPITAL | Age: 83
End: 2018-02-27

## 2018-02-27 ENCOUNTER — HOSPITAL ENCOUNTER (INPATIENT)
Facility: HOSPITAL | Age: 83
LOS: 5 days | Discharge: HOME | End: 2018-03-05
Attending: EMERGENCY MEDICINE | Admitting: FAMILY MEDICINE

## 2018-02-27 DIAGNOSIS — I48.91 ATRIAL FIBRILLATION WITH RVR (HCC): Primary | ICD-10-CM

## 2018-02-27 DIAGNOSIS — I50.23 ACUTE ON CHRONIC SYSTOLIC CONGESTIVE HEART FAILURE (HCC): ICD-10-CM

## 2018-02-27 DIAGNOSIS — Z74.09 IMPAIRED FUNCTIONAL MOBILITY, BALANCE, GAIT, AND ENDURANCE: ICD-10-CM

## 2018-02-27 DIAGNOSIS — K52.9 ENTERITIS: ICD-10-CM

## 2018-02-27 DIAGNOSIS — J90 PLEURAL EFFUSION: ICD-10-CM

## 2018-02-27 LAB
BASOPHILS # BLD AUTO: 0.02 10*3/MM3 (ref 0–0.2)
BASOPHILS NFR BLD AUTO: 0.2 % (ref 0–1)
DEPRECATED RDW RBC AUTO: 46.2 FL (ref 37–54)
EOSINOPHIL # BLD AUTO: 0.2 10*3/MM3 (ref 0–0.3)
EOSINOPHIL NFR BLD AUTO: 1.6 % (ref 0–3)
ERYTHROCYTE [DISTWIDTH] IN BLOOD BY AUTOMATED COUNT: 15.3 % (ref 11.3–14.5)
HCT VFR BLD AUTO: 36.8 % (ref 34.5–44)
HGB BLD-MCNC: 11.2 G/DL (ref 11.5–15.5)
IMM GRANULOCYTES # BLD: 0.04 10*3/MM3 (ref 0–0.03)
IMM GRANULOCYTES NFR BLD: 0.3 % (ref 0–0.6)
LYMPHOCYTES # BLD AUTO: 1.37 10*3/MM3 (ref 0.6–4.8)
LYMPHOCYTES NFR BLD AUTO: 10.7 % (ref 24–44)
MCH RBC QN AUTO: 25 PG (ref 27–31)
MCHC RBC AUTO-ENTMCNC: 30.4 G/DL (ref 32–36)
MCV RBC AUTO: 82.1 FL (ref 80–99)
MONOCYTES # BLD AUTO: 1.04 10*3/MM3 (ref 0–1)
MONOCYTES NFR BLD AUTO: 8.1 % (ref 0–12)
NEUTROPHILS # BLD AUTO: 10.15 10*3/MM3 (ref 1.5–8.3)
NEUTROPHILS NFR BLD AUTO: 79.1 % (ref 41–71)
PLATELET # BLD AUTO: 276 10*3/MM3 (ref 150–450)
PMV BLD AUTO: 10.5 FL (ref 6–12)
RBC # BLD AUTO: 4.48 10*6/MM3 (ref 3.89–5.14)
TROPONIN I SERPL-MCNC: 0.01 NG/ML (ref 0–0.07)
WBC NRBC COR # BLD: 12.82 10*3/MM3 (ref 3.5–10.8)

## 2018-02-27 PROCEDURE — 85025 COMPLETE CBC W/AUTO DIFF WBC: CPT | Performed by: EMERGENCY MEDICINE

## 2018-02-27 PROCEDURE — 99284 EMERGENCY DEPT VISIT MOD MDM: CPT

## 2018-02-27 PROCEDURE — 93005 ELECTROCARDIOGRAM TRACING: CPT

## 2018-02-27 PROCEDURE — 93005 ELECTROCARDIOGRAM TRACING: CPT | Performed by: EMERGENCY MEDICINE

## 2018-02-27 PROCEDURE — 86901 BLOOD TYPING SEROLOGIC RH(D): CPT | Performed by: EMERGENCY MEDICINE

## 2018-02-27 PROCEDURE — 36415 COLL VENOUS BLD VENIPUNCTURE: CPT

## 2018-02-27 PROCEDURE — 74176 CT ABD & PELVIS W/O CONTRAST: CPT

## 2018-02-27 PROCEDURE — 86850 RBC ANTIBODY SCREEN: CPT | Performed by: EMERGENCY MEDICINE

## 2018-02-27 PROCEDURE — 83880 ASSAY OF NATRIURETIC PEPTIDE: CPT | Performed by: PHYSICIAN ASSISTANT

## 2018-02-27 PROCEDURE — P9612 CATHETERIZE FOR URINE SPEC: HCPCS

## 2018-02-27 PROCEDURE — 86900 BLOOD TYPING SEROLOGIC ABO: CPT | Performed by: EMERGENCY MEDICINE

## 2018-02-27 PROCEDURE — 83690 ASSAY OF LIPASE: CPT | Performed by: EMERGENCY MEDICINE

## 2018-02-27 PROCEDURE — 70450 CT HEAD/BRAIN W/O DYE: CPT

## 2018-02-27 PROCEDURE — 80053 COMPREHEN METABOLIC PANEL: CPT | Performed by: EMERGENCY MEDICINE

## 2018-02-27 PROCEDURE — 84484 ASSAY OF TROPONIN QUANT: CPT

## 2018-02-27 RX ORDER — DONEPEZIL HYDROCHLORIDE 5 MG/1
5 TABLET, FILM COATED ORAL NIGHTLY
COMMUNITY
End: 2018-11-05

## 2018-02-27 RX ORDER — DILTIAZEM HYDROCHLORIDE 5 MG/ML
20 INJECTION INTRAVENOUS ONCE
Status: COMPLETED | OUTPATIENT
Start: 2018-02-27 | End: 2018-02-28

## 2018-02-27 RX ORDER — SODIUM CHLORIDE 0.9 % (FLUSH) 0.9 %
10 SYRINGE (ML) INJECTION AS NEEDED
Status: DISCONTINUED | OUTPATIENT
Start: 2018-02-27 | End: 2018-03-05 | Stop reason: HOSPADM

## 2018-02-28 ENCOUNTER — APPOINTMENT (OUTPATIENT)
Dept: GENERAL RADIOLOGY | Facility: HOSPITAL | Age: 83
End: 2018-02-28

## 2018-02-28 ENCOUNTER — APPOINTMENT (OUTPATIENT)
Dept: GENERAL RADIOLOGY | Facility: HOSPITAL | Age: 83
End: 2018-02-28
Attending: HOSPITALIST

## 2018-02-28 PROBLEM — K52.9 ENTERITIS: Status: ACTIVE | Noted: 2018-02-28

## 2018-02-28 PROBLEM — F03.90 DEMENTIA: Status: ACTIVE | Noted: 2018-02-28

## 2018-02-28 PROBLEM — M25.561 RIGHT KNEE PAIN: Status: ACTIVE | Noted: 2018-02-28

## 2018-02-28 PROBLEM — D72.829 LEUKOCYTOSIS: Status: ACTIVE | Noted: 2018-02-28

## 2018-02-28 PROBLEM — J90 BILATERAL PLEURAL EFFUSION: Status: ACTIVE | Noted: 2018-02-28

## 2018-02-28 PROBLEM — I48.91 ATRIAL FIBRILLATION WITH RVR (HCC): Status: ACTIVE | Noted: 2018-02-28

## 2018-02-28 PROBLEM — K56.7 ILEUS (HCC): Status: ACTIVE | Noted: 2018-02-28

## 2018-02-28 LAB
ABO GROUP BLD: NORMAL
ALBUMIN SERPL-MCNC: 4.1 G/DL (ref 3.2–4.8)
ALBUMIN/GLOB SERPL: 1.3 G/DL (ref 1.5–2.5)
ALP SERPL-CCNC: 109 U/L (ref 25–100)
ALT SERPL W P-5'-P-CCNC: 18 U/L (ref 7–40)
ANION GAP SERPL CALCULATED.3IONS-SCNC: 8 MMOL/L (ref 3–11)
ANION GAP SERPL CALCULATED.3IONS-SCNC: 9 MMOL/L (ref 3–11)
AST SERPL-CCNC: 36 U/L (ref 0–33)
BACTERIA UR QL AUTO: ABNORMAL /HPF
BILIRUB SERPL-MCNC: 0.4 MG/DL (ref 0.3–1.2)
BILIRUB UR QL STRIP: NEGATIVE
BLD GP AB SCN SERPL QL: NEGATIVE
BNP SERPL-MCNC: 254 PG/ML (ref 0–100)
BUN BLD-MCNC: 17 MG/DL (ref 9–23)
BUN BLD-MCNC: 18 MG/DL (ref 9–23)
BUN/CREAT SERPL: 15.5 (ref 7–25)
BUN/CREAT SERPL: 16.4 (ref 7–25)
CALCIUM SPEC-SCNC: 8.7 MG/DL (ref 8.7–10.4)
CALCIUM SPEC-SCNC: 9.1 MG/DL (ref 8.7–10.4)
CHLORIDE SERPL-SCNC: 101 MMOL/L (ref 99–109)
CHLORIDE SERPL-SCNC: 106 MMOL/L (ref 99–109)
CLARITY UR: CLEAR
CO2 SERPL-SCNC: 21 MMOL/L (ref 20–31)
CO2 SERPL-SCNC: 26 MMOL/L (ref 20–31)
COLOR UR: YELLOW
CREAT BLD-MCNC: 1.1 MG/DL (ref 0.6–1.3)
CREAT BLD-MCNC: 1.1 MG/DL (ref 0.6–1.3)
DEPRECATED RDW RBC AUTO: 45.8 FL (ref 37–54)
ERYTHROCYTE [DISTWIDTH] IN BLOOD BY AUTOMATED COUNT: 15.3 % (ref 11.3–14.5)
GFR SERPL CREATININE-BSD FRML MDRD: 47 ML/MIN/1.73
GFR SERPL CREATININE-BSD FRML MDRD: 47 ML/MIN/1.73
GLOBULIN UR ELPH-MCNC: 3.2 GM/DL
GLUCOSE BLD-MCNC: 128 MG/DL (ref 70–100)
GLUCOSE BLD-MCNC: 202 MG/DL (ref 70–100)
GLUCOSE BLDC GLUCOMTR-MCNC: 160 MG/DL (ref 70–130)
GLUCOSE BLDC GLUCOMTR-MCNC: 206 MG/DL (ref 70–130)
GLUCOSE BLDC GLUCOMTR-MCNC: 214 MG/DL (ref 70–130)
GLUCOSE UR STRIP-MCNC: NEGATIVE MG/DL
HCT VFR BLD AUTO: 35.1 % (ref 34.5–44)
HGB BLD-MCNC: 10.3 G/DL (ref 11.5–15.5)
HGB UR QL STRIP.AUTO: NEGATIVE
HYALINE CASTS UR QL AUTO: ABNORMAL /LPF
KETONES UR QL STRIP: NEGATIVE
LEUKOCYTE ESTERASE UR QL STRIP.AUTO: ABNORMAL
LIPASE SERPL-CCNC: 115 U/L (ref 6–51)
MAGNESIUM SERPL-MCNC: 1.8 MG/DL (ref 1.3–2.7)
MCH RBC QN AUTO: 24.2 PG (ref 27–31)
MCHC RBC AUTO-ENTMCNC: 29.3 G/DL (ref 32–36)
MCV RBC AUTO: 82.4 FL (ref 80–99)
NITRITE UR QL STRIP: NEGATIVE
PH UR STRIP.AUTO: 6.5 [PH] (ref 5–8)
PLATELET # BLD AUTO: 313 10*3/MM3 (ref 150–450)
PMV BLD AUTO: 10.9 FL (ref 6–12)
POTASSIUM BLD-SCNC: 4.2 MMOL/L (ref 3.5–5.5)
POTASSIUM BLD-SCNC: 4.9 MMOL/L (ref 3.5–5.5)
PROCALCITONIN SERPL-MCNC: <0.05 NG/ML
PROT SERPL-MCNC: 7.3 G/DL (ref 5.7–8.2)
PROT UR QL STRIP: NEGATIVE
RBC # BLD AUTO: 4.26 10*6/MM3 (ref 3.89–5.14)
RBC # UR: ABNORMAL /HPF
REF LAB TEST METHOD: ABNORMAL
RH BLD: NEGATIVE
SODIUM BLD-SCNC: 135 MMOL/L (ref 132–146)
SODIUM BLD-SCNC: 136 MMOL/L (ref 132–146)
SP GR UR STRIP: 1.01 (ref 1–1.03)
SQUAMOUS #/AREA URNS HPF: ABNORMAL /HPF
TSH SERPL DL<=0.05 MIU/L-ACNC: 1.86 MIU/ML (ref 0.35–5.35)
URATE SERPL-MCNC: 5.9 MG/DL (ref 3.1–7.8)
UROBILINOGEN UR QL STRIP: ABNORMAL
WBC NRBC COR # BLD: 7.48 10*3/MM3 (ref 3.5–10.8)
WBC UR QL AUTO: ABNORMAL /HPF

## 2018-02-28 PROCEDURE — 80048 BASIC METABOLIC PNL TOTAL CA: CPT | Performed by: PHYSICIAN ASSISTANT

## 2018-02-28 PROCEDURE — 25010000002 HYDRALAZINE PER 20 MG: Performed by: NURSE PRACTITIONER

## 2018-02-28 PROCEDURE — 83735 ASSAY OF MAGNESIUM: CPT | Performed by: PHYSICIAN ASSISTANT

## 2018-02-28 PROCEDURE — 63710000001 INSULIN LISPRO (HUMAN) PER 5 UNITS: Performed by: PHYSICIAN ASSISTANT

## 2018-02-28 PROCEDURE — 93005 ELECTROCARDIOGRAM TRACING: CPT | Performed by: EMERGENCY MEDICINE

## 2018-02-28 PROCEDURE — 97161 PT EVAL LOW COMPLEX 20 MIN: CPT

## 2018-02-28 PROCEDURE — 84550 ASSAY OF BLOOD/URIC ACID: CPT | Performed by: PHYSICIAN ASSISTANT

## 2018-02-28 PROCEDURE — 73560 X-RAY EXAM OF KNEE 1 OR 2: CPT

## 2018-02-28 PROCEDURE — 81001 URINALYSIS AUTO W/SCOPE: CPT | Performed by: EMERGENCY MEDICINE

## 2018-02-28 PROCEDURE — 85027 COMPLETE CBC AUTOMATED: CPT | Performed by: PHYSICIAN ASSISTANT

## 2018-02-28 PROCEDURE — 82962 GLUCOSE BLOOD TEST: CPT

## 2018-02-28 PROCEDURE — 87086 URINE CULTURE/COLONY COUNT: CPT | Performed by: EMERGENCY MEDICINE

## 2018-02-28 PROCEDURE — 99223 1ST HOSP IP/OBS HIGH 75: CPT | Performed by: PHYSICIAN ASSISTANT

## 2018-02-28 PROCEDURE — 84443 ASSAY THYROID STIM HORMONE: CPT | Performed by: PHYSICIAN ASSISTANT

## 2018-02-28 PROCEDURE — 71046 X-RAY EXAM CHEST 2 VIEWS: CPT

## 2018-02-28 PROCEDURE — 84145 PROCALCITONIN (PCT): CPT | Performed by: PHYSICIAN ASSISTANT

## 2018-02-28 RX ORDER — CITALOPRAM 20 MG/1
20 TABLET ORAL DAILY
Status: DISCONTINUED | OUTPATIENT
Start: 2018-02-28 | End: 2018-03-05 | Stop reason: HOSPADM

## 2018-02-28 RX ORDER — DONEPEZIL HYDROCHLORIDE 10 MG/1
5 TABLET, FILM COATED ORAL NIGHTLY
Status: DISCONTINUED | OUTPATIENT
Start: 2018-02-28 | End: 2018-03-05 | Stop reason: HOSPADM

## 2018-02-28 RX ORDER — ESMOLOL HYDROCHLORIDE 10 MG/ML
500 INJECTION INTRAVENOUS ONCE
Status: COMPLETED | OUTPATIENT
Start: 2018-02-28 | End: 2018-02-28

## 2018-02-28 RX ORDER — NICOTINE POLACRILEX 4 MG
15 LOZENGE BUCCAL
Status: DISCONTINUED | OUTPATIENT
Start: 2018-02-28 | End: 2018-03-05 | Stop reason: HOSPADM

## 2018-02-28 RX ORDER — ACETAMINOPHEN 325 MG/1
650 TABLET ORAL EVERY 4 HOURS PRN
Status: DISCONTINUED | OUTPATIENT
Start: 2018-02-28 | End: 2018-03-05 | Stop reason: HOSPADM

## 2018-02-28 RX ORDER — DEXTROSE MONOHYDRATE 25 G/50ML
25 INJECTION, SOLUTION INTRAVENOUS
Status: DISCONTINUED | OUTPATIENT
Start: 2018-02-28 | End: 2018-03-05 | Stop reason: HOSPADM

## 2018-02-28 RX ORDER — FAMOTIDINE 20 MG/1
20 TABLET, FILM COATED ORAL 2 TIMES DAILY
Status: DISCONTINUED | OUTPATIENT
Start: 2018-02-28 | End: 2018-03-05 | Stop reason: HOSPADM

## 2018-02-28 RX ORDER — HYDRALAZINE HYDROCHLORIDE 20 MG/ML
10 INJECTION INTRAMUSCULAR; INTRAVENOUS EVERY 6 HOURS PRN
Status: DISCONTINUED | OUTPATIENT
Start: 2018-02-28 | End: 2018-03-05 | Stop reason: HOSPADM

## 2018-02-28 RX ORDER — FAMOTIDINE 10 MG/ML
20 INJECTION, SOLUTION INTRAVENOUS EVERY 12 HOURS SCHEDULED
Status: DISCONTINUED | OUTPATIENT
Start: 2018-02-28 | End: 2018-02-28

## 2018-02-28 RX ORDER — DOCUSATE SODIUM 100 MG/1
100 CAPSULE, LIQUID FILLED ORAL 2 TIMES DAILY
Status: DISCONTINUED | OUTPATIENT
Start: 2018-02-28 | End: 2018-03-05 | Stop reason: HOSPADM

## 2018-02-28 RX ORDER — ESMOLOL HYDROCHLORIDE 10 MG/ML
50-300 INJECTION, SOLUTION INTRAVENOUS
Status: DISCONTINUED | OUTPATIENT
Start: 2018-02-28 | End: 2018-03-04

## 2018-02-28 RX ORDER — SODIUM CHLORIDE 0.9 % (FLUSH) 0.9 %
1-10 SYRINGE (ML) INJECTION AS NEEDED
Status: DISCONTINUED | OUTPATIENT
Start: 2018-02-28 | End: 2018-03-05 | Stop reason: HOSPADM

## 2018-02-28 RX ORDER — ATORVASTATIN CALCIUM 40 MG/1
40 TABLET, FILM COATED ORAL DAILY
Status: DISCONTINUED | OUTPATIENT
Start: 2018-02-28 | End: 2018-03-05 | Stop reason: HOSPADM

## 2018-02-28 RX ORDER — METOCLOPRAMIDE 10 MG/1
5 TABLET ORAL
Status: DISCONTINUED | OUTPATIENT
Start: 2018-02-28 | End: 2018-03-01

## 2018-02-28 RX ORDER — ISOSORBIDE MONONITRATE 60 MG/1
60 TABLET, EXTENDED RELEASE ORAL DAILY
Status: DISCONTINUED | OUTPATIENT
Start: 2018-02-28 | End: 2018-03-05 | Stop reason: HOSPADM

## 2018-02-28 RX ORDER — LOSARTAN POTASSIUM 50 MG/1
100 TABLET ORAL
Status: DISCONTINUED | OUTPATIENT
Start: 2018-02-28 | End: 2018-03-05 | Stop reason: HOSPADM

## 2018-02-28 RX ADMIN — ESMOLOL HYDROCHLORIDE 200 MCG/KG/MIN: 10 INJECTION INTRAVENOUS at 18:49

## 2018-02-28 RX ADMIN — DILTIAZEM HYDROCHLORIDE 20 MG: 5 INJECTION INTRAVENOUS at 00:20

## 2018-02-28 RX ADMIN — DONEPEZIL HYDROCHLORIDE 5 MG: 10 TABLET, FILM COATED ORAL at 20:24

## 2018-02-28 RX ADMIN — CITALOPRAM HYDROBROMIDE 20 MG: 20 TABLET ORAL at 09:35

## 2018-02-28 RX ADMIN — APIXABAN 2.5 MG: 2.5 TABLET, FILM COATED ORAL at 09:35

## 2018-02-28 RX ADMIN — ESMOLOL HYDROCHLORIDE 250 MCG/KG/MIN: 10 INJECTION INTRAVENOUS at 15:18

## 2018-02-28 RX ADMIN — INSULIN LISPRO 3 UNITS: 100 INJECTION, SOLUTION INTRAVENOUS; SUBCUTANEOUS at 09:36

## 2018-02-28 RX ADMIN — Medication 10 ML: at 00:31

## 2018-02-28 RX ADMIN — Medication 10 MG: at 23:53

## 2018-02-28 RX ADMIN — ESMOLOL HYDROCHLORIDE 26.1 MG: 10 INJECTION, SOLUTION INTRAVENOUS at 04:46

## 2018-02-28 RX ADMIN — FAMOTIDINE 20 MG: 10 INJECTION, SOLUTION INTRAVENOUS at 09:35

## 2018-02-28 RX ADMIN — ESMOLOL HYDROCHLORIDE 50 MCG/KG/MIN: 10 INJECTION INTRAVENOUS at 04:46

## 2018-02-28 RX ADMIN — INSULIN LISPRO 2 UNITS: 100 INJECTION, SOLUTION INTRAVENOUS; SUBCUTANEOUS at 17:31

## 2018-02-28 RX ADMIN — ESMOLOL HYDROCHLORIDE 250 MCG/KG/MIN: 10 INJECTION INTRAVENOUS at 11:45

## 2018-02-28 RX ADMIN — ACETAMINOPHEN 650 MG: 325 TABLET, FILM COATED ORAL at 06:25

## 2018-02-28 RX ADMIN — ISOSORBIDE MONONITRATE 60 MG: 60 TABLET, EXTENDED RELEASE ORAL at 09:35

## 2018-02-28 RX ADMIN — INSULIN LISPRO 3 UNITS: 100 INJECTION, SOLUTION INTRAVENOUS; SUBCUTANEOUS at 21:44

## 2018-02-28 RX ADMIN — ESMOLOL HYDROCHLORIDE 250 MCG/KG/MIN: 10 INJECTION INTRAVENOUS at 08:42

## 2018-02-28 RX ADMIN — LOSARTAN POTASSIUM 100 MG: 50 TABLET ORAL at 09:35

## 2018-02-28 RX ADMIN — FAMOTIDINE 20 MG: 20 TABLET, FILM COATED ORAL at 20:24

## 2018-02-28 RX ADMIN — ACETAMINOPHEN 650 MG: 325 TABLET, FILM COATED ORAL at 20:24

## 2018-02-28 RX ADMIN — APIXABAN 2.5 MG: 2.5 TABLET, FILM COATED ORAL at 20:24

## 2018-02-28 RX ADMIN — SODIUM CHLORIDE 1000 ML: 9 INJECTION, SOLUTION INTRAVENOUS at 00:16

## 2018-02-28 RX ADMIN — ATORVASTATIN CALCIUM 40 MG: 40 TABLET, FILM COATED ORAL at 09:35

## 2018-02-28 RX ADMIN — METOCLOPRAMIDE HYDROCHLORIDE 5 MG: 10 TABLET ORAL at 17:31

## 2018-03-01 LAB
ANION GAP SERPL CALCULATED.3IONS-SCNC: 8 MMOL/L (ref 3–11)
BASOPHILS # BLD AUTO: 0.02 10*3/MM3 (ref 0–0.2)
BASOPHILS NFR BLD AUTO: 0.3 % (ref 0–1)
BUN BLD-MCNC: 13 MG/DL (ref 9–23)
BUN/CREAT SERPL: 13 (ref 7–25)
CALCIUM SPEC-SCNC: 8.9 MG/DL (ref 8.7–10.4)
CHLORIDE SERPL-SCNC: 103 MMOL/L (ref 99–109)
CO2 SERPL-SCNC: 20 MMOL/L (ref 20–31)
CREAT BLD-MCNC: 1 MG/DL (ref 0.6–1.3)
DEPRECATED RDW RBC AUTO: 48.1 FL (ref 37–54)
EOSINOPHIL # BLD AUTO: 0.06 10*3/MM3 (ref 0–0.3)
EOSINOPHIL NFR BLD AUTO: 0.9 % (ref 0–3)
ERYTHROCYTE [DISTWIDTH] IN BLOOD BY AUTOMATED COUNT: 15.8 % (ref 11.3–14.5)
GFR SERPL CREATININE-BSD FRML MDRD: 53 ML/MIN/1.73
GLUCOSE BLD-MCNC: 217 MG/DL (ref 70–100)
GLUCOSE BLDC GLUCOMTR-MCNC: 177 MG/DL (ref 70–130)
GLUCOSE BLDC GLUCOMTR-MCNC: 196 MG/DL (ref 70–130)
GLUCOSE BLDC GLUCOMTR-MCNC: 212 MG/DL (ref 70–130)
GLUCOSE BLDC GLUCOMTR-MCNC: 252 MG/DL (ref 70–130)
HBA1C MFR BLD: 7.5 % (ref 4.8–5.6)
HCT VFR BLD AUTO: 37.6 % (ref 34.5–44)
HGB BLD-MCNC: 10.9 G/DL (ref 11.5–15.5)
IMM GRANULOCYTES # BLD: 0.01 10*3/MM3 (ref 0–0.03)
IMM GRANULOCYTES NFR BLD: 0.2 % (ref 0–0.6)
LYMPHOCYTES # BLD AUTO: 1.27 10*3/MM3 (ref 0.6–4.8)
LYMPHOCYTES NFR BLD AUTO: 19.1 % (ref 24–44)
MCH RBC QN AUTO: 24.3 PG (ref 27–31)
MCHC RBC AUTO-ENTMCNC: 29 G/DL (ref 32–36)
MCV RBC AUTO: 83.7 FL (ref 80–99)
MONOCYTES # BLD AUTO: 0.42 10*3/MM3 (ref 0–1)
MONOCYTES NFR BLD AUTO: 6.3 % (ref 0–12)
NEUTROPHILS # BLD AUTO: 4.87 10*3/MM3 (ref 1.5–8.3)
NEUTROPHILS NFR BLD AUTO: 73.2 % (ref 41–71)
PLATELET # BLD AUTO: 343 10*3/MM3 (ref 150–450)
PMV BLD AUTO: 10.5 FL (ref 6–12)
POTASSIUM BLD-SCNC: 4.2 MMOL/L (ref 3.5–5.5)
RBC # BLD AUTO: 4.49 10*6/MM3 (ref 3.89–5.14)
SODIUM BLD-SCNC: 131 MMOL/L (ref 132–146)
WBC NRBC COR # BLD: 6.65 10*3/MM3 (ref 3.5–10.8)

## 2018-03-01 PROCEDURE — 83036 HEMOGLOBIN GLYCOSYLATED A1C: CPT | Performed by: HOSPITALIST

## 2018-03-01 PROCEDURE — 85025 COMPLETE CBC W/AUTO DIFF WBC: CPT | Performed by: HOSPITALIST

## 2018-03-01 PROCEDURE — 25010000002 HYDRALAZINE PER 20 MG: Performed by: NURSE PRACTITIONER

## 2018-03-01 PROCEDURE — 82962 GLUCOSE BLOOD TEST: CPT

## 2018-03-01 PROCEDURE — 80048 BASIC METABOLIC PNL TOTAL CA: CPT | Performed by: HOSPITALIST

## 2018-03-01 PROCEDURE — 97165 OT EVAL LOW COMPLEX 30 MIN: CPT

## 2018-03-01 PROCEDURE — 63710000001 INSULIN DETEMIR PER 5 UNITS: Performed by: HOSPITALIST

## 2018-03-01 PROCEDURE — 99233 SBSQ HOSP IP/OBS HIGH 50: CPT | Performed by: HOSPITALIST

## 2018-03-01 RX ORDER — DILTIAZEM HYDROCHLORIDE 120 MG/1
120 CAPSULE, COATED, EXTENDED RELEASE ORAL
Status: DISCONTINUED | OUTPATIENT
Start: 2018-03-01 | End: 2018-03-04

## 2018-03-01 RX ORDER — ASPIRIN 81 MG/1
81 TABLET ORAL DAILY
Status: DISCONTINUED | OUTPATIENT
Start: 2018-03-01 | End: 2018-03-05 | Stop reason: HOSPADM

## 2018-03-01 RX ORDER — HYDROCODONE BITARTRATE AND ACETAMINOPHEN 5; 325 MG/1; MG/1
1 TABLET ORAL EVERY 6 HOURS PRN
Status: DISCONTINUED | OUTPATIENT
Start: 2018-03-01 | End: 2018-03-05 | Stop reason: HOSPADM

## 2018-03-01 RX ORDER — NITROGLYCERIN 400 UG/1
1 SPRAY ORAL
Status: DISCONTINUED | OUTPATIENT
Start: 2018-03-01 | End: 2018-03-05 | Stop reason: HOSPADM

## 2018-03-01 RX ORDER — METOPROLOL TARTRATE 5 MG/5ML
5 INJECTION INTRAVENOUS ONCE
Status: COMPLETED | OUTPATIENT
Start: 2018-03-01 | End: 2018-03-01

## 2018-03-01 RX ORDER — ATENOLOL 25 MG/1
25 TABLET ORAL
Status: DISCONTINUED | OUTPATIENT
Start: 2018-03-01 | End: 2018-03-02

## 2018-03-01 RX ADMIN — DILTIAZEM HYDROCHLORIDE 120 MG: 120 CAPSULE, COATED, EXTENDED RELEASE ORAL at 11:38

## 2018-03-01 RX ADMIN — ATORVASTATIN CALCIUM 40 MG: 40 TABLET, FILM COATED ORAL at 08:29

## 2018-03-01 RX ADMIN — INSULIN LISPRO 2 UNITS: 100 INJECTION, SOLUTION INTRAVENOUS; SUBCUTANEOUS at 21:29

## 2018-03-01 RX ADMIN — Medication 10 MG: at 10:37

## 2018-03-01 RX ADMIN — INSULIN LISPRO 3 UNITS: 100 INJECTION, SOLUTION INTRAVENOUS; SUBCUTANEOUS at 08:29

## 2018-03-01 RX ADMIN — APIXABAN 2.5 MG: 2.5 TABLET, FILM COATED ORAL at 08:29

## 2018-03-01 RX ADMIN — ATENOLOL 25 MG: 25 TABLET ORAL at 11:38

## 2018-03-01 RX ADMIN — LOSARTAN POTASSIUM 100 MG: 50 TABLET ORAL at 08:27

## 2018-03-01 RX ADMIN — DONEPEZIL HYDROCHLORIDE 5 MG: 10 TABLET, FILM COATED ORAL at 21:20

## 2018-03-01 RX ADMIN — FAMOTIDINE 20 MG: 20 TABLET, FILM COATED ORAL at 21:20

## 2018-03-01 RX ADMIN — DOCUSATE SODIUM 100 MG: 100 CAPSULE, LIQUID FILLED ORAL at 21:20

## 2018-03-01 RX ADMIN — METOCLOPRAMIDE HYDROCHLORIDE 5 MG: 10 TABLET ORAL at 08:28

## 2018-03-01 RX ADMIN — APIXABAN 2.5 MG: 2.5 TABLET, FILM COATED ORAL at 21:20

## 2018-03-01 RX ADMIN — ASPIRIN 81 MG: 81 TABLET, COATED ORAL at 11:38

## 2018-03-01 RX ADMIN — ESMOLOL HYDROCHLORIDE 250 MCG/KG/MIN: 10 INJECTION INTRAVENOUS at 10:37

## 2018-03-01 RX ADMIN — INSULIN DETEMIR 10 UNITS: 100 INJECTION, SOLUTION SUBCUTANEOUS at 21:29

## 2018-03-01 RX ADMIN — CITALOPRAM HYDROBROMIDE 20 MG: 20 TABLET ORAL at 08:29

## 2018-03-01 RX ADMIN — METOROPROLOL TARTRATE 5 MG: 5 INJECTION, SOLUTION INTRAVENOUS at 05:40

## 2018-03-01 RX ADMIN — INSULIN LISPRO 2 UNITS: 100 INJECTION, SOLUTION INTRAVENOUS; SUBCUTANEOUS at 11:38

## 2018-03-01 RX ADMIN — ISOSORBIDE MONONITRATE 60 MG: 60 TABLET, EXTENDED RELEASE ORAL at 08:29

## 2018-03-01 RX ADMIN — ESMOLOL HYDROCHLORIDE 250 MCG/KG/MIN: 10 INJECTION INTRAVENOUS at 06:29

## 2018-03-01 RX ADMIN — INSULIN LISPRO 4 UNITS: 100 INJECTION, SOLUTION INTRAVENOUS; SUBCUTANEOUS at 18:32

## 2018-03-01 RX ADMIN — FAMOTIDINE 20 MG: 20 TABLET, FILM COATED ORAL at 08:28

## 2018-03-01 NOTE — PLAN OF CARE
Problem: Patient Care Overview (Adult)  Goal: Plan of Care Review  Outcome: Ongoing (interventions implemented as appropriate)   03/01/18 1126   Coping/Psychosocial Response Interventions   Plan Of Care Reviewed With patient   Outcome Evaluation   Outcome Summary/Follow up Plan Pt's ADL participation limited by current evolving symptoms and PMH. Pt supervision for bed mobility, min A for StoS transfers, expected min A for ADL's, pt declined functional mobility. Recommend SNF at discharge.       Problem: Inpatient Occupational Therapy  Goal: Transfer Training Goal 1 LTG- OT  Outcome: Ongoing (interventions implemented as appropriate)   03/01/18 1126   Transfer Training OT LTG   Transfer Training OT LTG, Date Established 03/01/18   Transfer Training OT LTG, Time to Achieve 1 wk   Transfer Training OT LTG, Activity Type sit to stand/stand to sit;toilet   Transfer Training OT LTG, [place] Level contact guard assist   Transfer Training OT LTG, Assist Device walker, rolling   Transfer Training OT LTG, Outcome goal ongoing     Goal: Toileting Goal LTG- OT  Outcome: Ongoing (interventions implemented as appropriate)   03/01/18 1126   Toileting OT LTG   Toileting Goal OT LTG, Date Established 03/01/18   Toileting Goal OT LTG, Time to Achieve 1 wk   Toileting Goal OT LTG, [place] Level minimum assist (75% patient effort)   Toileting Goal OT LTG, Outcome goal ongoing     Goal: Functional Mobility Goal LTG- OT  Outcome: Ongoing (interventions implemented as appropriate)   03/01/18 1126   Functional Mobility OT LTG   Functional Mobility Goal OT LTG, Date Established 03/01/18   Functional Mobility Goal OT LTG, Time to Achieve 1 wk   Functional Mobility Goal OT LTG, [place] Level min assist   Functional Mobility Goal OT LTG, Assist Device rolling walker   Functional Mobility Goal OT LTG, Distance to Achieve to the bathroom;in hallway   Functional Mobility Goal OT LTG, Outcome goal ongoing     Goal: Activity  Tolerance Goal LTG- OT  Outcome: Ongoing (interventions implemented as appropriate)   03/01/18 1126   Activity Tolerance OT LTG   Activity Tolerance Goal OT LTG, Date Established 03/01/18   Activity Tolerance Goal OT LTG, Time to Achieve 1 wk   Activity Tolerance Goal OT LTG, Activity Level 5 min activity;10 min activity   Activity Tolerance Goal OT LTG, Additional Goal ADL of choice while standing   Activity Tolerance Goal OT LTG, Outcome goal ongoing

## 2018-03-01 NOTE — PROGRESS NOTES
[facility] Medicine Services  PROGRESS NOTE    Patient Name: Mary Ramirez  : 1934  MRN: [id number]    Date of Admission: 2018  Length of Stay: 1  Primary Care Physician: Nirmal Lundberg MD    Subjective   Subjective     CC:  N/V    HPI:  No acute events O/N. Pt is doing well and denies any abd pain, N/V, chest pain, palpitations, or SOA. No BM, but reports flatus this am. No family present.    Review of Systems  Otherwise ROS is negative except as mentioned in the HPI.    Objective   Objective     Vital Signs:   Temp:  [97.2 °F (36.2 °C)-98.1 °F (36.7 °C)] 98.1 °F (36.7 °C)  Heart Rate:  [] 69  Resp:  [16-18] 18  BP: ()/() 126/60        Physical Exam:  General Assessment: No acute cardiopulmonary distress. nontoxic     HEENT: NCAT, PERRL, MM moist     Neck: Supple     CVS: RRR (sinus rhythm on tele), S1S2 normal, no murmurs     Resp: CTAB, no adventitious sound     Abd: soft, NT, distended, +BS, no guarding or peritoneal signs     Ext: No edema, both calves are symmetric and NTTP     Neuro: No facial asymmetry, speech clear     Skin: W/D/I. No rash.     Psych: Affect is appropriate         Results Reviewed:  I have personally reviewed current lab, radiology, and data and agree.      Results from last 7 days  Lab Units 18  2306   WBC 10*3/mm3 6.65 7.48 12.82*   HEMOGLOBIN g/dL 10.9* 10.3* 11.2*   HEMATOCRIT % 37.6 35.1 36.8   PLATELETS 10*3/mm3 343 313 276       Results from last 7 days  Lab Units 18  0718  2306   SODIUM mmol/L 131* 135 136   POTASSIUM mmol/L 4.2 4.2 4.9   CHLORIDE mmol/L 103 101 106   CO2 mmol/L 20.0 26.0 21.0   BUN mg/dL 13 17 18   CREATININE mg/dL 1.00 1.10 1.10   GLUCOSE mg/dL 217* 202* 128*   CALCIUM mg/dL 8.9 8.7 9.1   ALT (SGPT) U/L  --   --  18   AST (SGOT) U/L  --   --  36*     Estimated Creatinine Clearance: 35.5 mL/min (by C-G formula based on Cr of 1).  BNP    Date Value Ref Range Status   02/27/2018 254.0 (H) 0.0 - 100.0 pg/mL Final     Comment:     Results may be falsely decreased if patient taking Biotin.     No results found for: PHART    Microbiology Results Abnormal     Procedure Component Value - Date/Time    Urine Culture - Urine, Urine, Clean Catch [[id number]]  (Normal) Collected:  02/28/18 0013    Lab Status:  Preliminary result Specimen:  Urine from Urine, Catheter Updated:  03/01/18 0823     Urine Culture Culture in progress          Imaging Results (last 24 hours)     Procedure Component Value Units Date/Time    XR Chest PA & Lateral [[id number]] Collected:  02/28/18 1344     Updated:  02/28/18 1631    Narrative:       EXAMINATION: XR CHEST PA AND LATERAL- 02/28/2018     INDICATION: R/O pneumonia, had emesis, possible aspiration;  I48.91-Unspecified atrial fibrillation; I50.23-Acute on chronic systolic  (congestive) heart failure; K50-Uktkjcy effusion, not elsewhere  classified; K52.9-Noninfective gastroenteritis and colitis, unspecified      COMPARISON: 01/17/2018     FINDINGS: PA and lateral views of the chest reveals heart to be  enlarged. Increased pulmonary vascularity bilaterally worsened in the  interval. Small bilateral pleural effusions. Degenerative changes seen  within the spine. No new focal parenchymal opacification present.           Impression:       Increased pulmonary vascularity bilaterally with small  bilateral pleural effusions. Heart is enlarged.     D:  02/28/2018  E:  02/28/2018     This report was finalized on 2/28/2018 4:29 PM by Dr. Renetta Alvarenga MD.           Results for orders placed during the hospital encounter of 11/23/17   Adult Transthoracic Echo Complete W/ Cont if Necessary Per Protocol    Narrative · The left ventricular cavity is small.  · Left ventricular wall thickness is consistent with mild concentric   hypertrophy.  · Left ventricular systolic function is hyperdynamic (EF > 70).  · Mild mitral valve  regurgitation is present  · Mild tricuspid valve regurgitation is present.          I have reviewed the medications.    Assessment/Plan   Assessment / Plan     Hospital Problem List     * (Principal)Atrial fibrillation with RVR    Hypertension    Type 2 diabetes mellitus with complication, with long-term current use of insulin    CAD (coronary artery disease) s/p stents    Dementia    Bilateral pleural effusion    Leukocytosis    Enteritis    Right knee pain    Ileus             Brief Hospital Course to date:  Mary Ramirez is an 84 yo F who is pleasant, but unfortunately relatively poor historian. No family present at time of my exam. She stated that her son just left to go to work. According to record, she is from [facility]. She has history of dementia, CAD-s/p stent, PAF, IDDM2, and HTN. She was brought in due to concerns for hematemesis. Apparently she ate some spaghetti with red  sauce and had nausea and vomiting after. Staff could not discern between blood vs sauce, so she was brought in for evaluation. She reported that she had some flutter in her chest, but denies any pain or SOA. She felt weak/dizzy and felt like she just wanted to go to sleep. In the ED, she was found to be in Afib with RVR. She also had leukocytosis, but afebrile. CT abp/pelv showed no signs of any bowel obstructions or other acute findings, but did show incidental cardiomegaly with bibasilar pulm edema/effusion, and small pericardial effusion. Pt reports no BM or flatus today.    Assessment & Plan:  - ACS ruled out. TSH WNL.  - BP uncontrolled and HR on the upper limit of normal range. NH had dc'ed Coreg and Cardizem, not sure why, I will resume and  been on Atenolol and Cardizem, so I will resume them and wean off Esmolol gtt. Will consider cardiology consult if not able to wean from Esmolol after pt gets all her home meds.   - DC Reglan today since BS is present and pt passing flatus. If symptoms return after Reglan is stopped,  will consider Gastric emptying study to R/O gastroparesis. I suspect pt has mild ileus/viral gastroenteritis.  - Leukocytosis has resolved, likely reactive in the first place. CXR and UA negative.  - Bilat pleural effusion/? pulm edema on CXR, but pt is asymptomatic.  - DM is fairly controlled with insulin, A1C 7.5. Currently hyperglycemic, likely from basal insulin being held. Will start Levemir 10 units BID.  - Ongoing PT/OT      CODE STATUS: Full Code    Disposition: Back to the [facility] once medically stable, hopefully in 1-2 days.    Electronically signed by Rocael Parada MD, 03/01/18, 2:01 PM.

## 2018-03-01 NOTE — SIGNIFICANT NOTE
RN states HTN -190s. HR 50s-60s. On esmolol gtt.    Back off on BB gtt, goal to sustain rate <110. Add hydralazine PRN for SBP>180, already on an ARB. Could consider cardene gtt but want to wean her off gtts as able and not add. Appears her facility stopped her BB and PO diltiazem?? Consider to add back PO diltiazem (IV on backorder).    Otherwise assessment unchanged and asymptomatic.    Please f/u.

## 2018-03-01 NOTE — PLAN OF CARE
Problem: Patient Care Overview (Adult)  Goal: Plan of Care Review  Outcome: Ongoing (interventions implemented as appropriate)   03/01/18 0218   Coping/Psychosocial Response Interventions   Plan Of Care Reviewed With patient   Patient Care Overview   Progress improving   Outcome Evaluation   Outcome Summary/Follow up Plan Pt. has been in NSR with no complaints. Resting well        Problem: Pressure Ulcer (Adult)  Goal: Signs and Symptoms of Listed Potential Problems Will be Absent or Manageable (Pressure Ulcer)  Outcome: Ongoing (interventions implemented as appropriate)   03/01/18 0218   Pressure Ulcer   Problems Assessed (Pressure Ulcer) all   Problems Present (Pressure Ulcer) none

## 2018-03-01 NOTE — THERAPY EVALUATION
Acute Care - Occupational Therapy Initial Evaluation  [facility]     Patient Name: Mary Ramirez  : 1934  MRN: [id number]  Today's Date: 3/1/2018  Onset of Illness/Injury or Date of Surgery Date: (P) 18  Date of Referral to OT: (P) 18  Referring Physician: IBIS) JOI Jhaveri    Admit Date: 2018       ICD-10-CM ICD-9-CM   1. Atrial fibrillation with RVR I48.91 427.31   2. Acute on chronic systolic congestive heart failure I50.23 428.23     428.0   3. Pleural effusion J90 511.9   4. Enteritis K52.9 558.9   5. Impaired functional mobility, balance, gait, and endurance Z74.09 V49.89     Patient Active Problem List   Diagnosis   • Fall   • Fracture, intertrochanteric, left femur   • Hypertension   • Type 2 diabetes mellitus with complication, with long-term current use of insulin   • CAD (coronary artery disease) s/p stents   • Atrial fibrillation with RVR, new onset   • Dementia   • Atrial fibrillation with RVR   • Bilateral pleural effusion   • Leukocytosis   • Enteritis   • Right knee pain   • Ileus     Past Medical History:   Diagnosis Date   • Atrial fibrillation    • Dementia    • Diabetes mellitus    • Hypertension      Past Surgical History:   Procedure Laterality Date   • ANKLE SURGERY Left     STEEL PLATE PLACEMENT, LATER REMOVED PLATE AND PINS   • ATHRECTOMY ILIAC, FEMORAL, TIBIAL ARTERY     • BREAST LUMPECTOMY Left    • COLON SURGERY     • CORONARY STENT PLACEMENT     • HAND SURGERY Right     INDEX FINGER   • HIP TROCANTERIC NAILING WITH INTRAMEDULLARY HIP SCREW Left 2017    Procedure: HIP TROCANTERIC NAILING WITH INTRAMEDULLARY HIP SCREW;  Surgeon: Jayjay Stratton MD;  Location: [facility];  Service:    • PARTIAL HYSTERECTOMY            OT ASSESSMENT FLOWSHEET (last 72 hours)      OT Evaluation       18 1036 18 0815 18 1557 18 1556    Rehab Evaluation    Document Type (P)  evaluation  -SS        Subjective Information (P)  no  complaints;agree to therapy  -SS        Patient Effort, Rehab Treatment (P)  good  -SS        Symptoms Noted During/After Treatment (P)  dizziness  -SS        Symptoms Noted Comment (P)  Mild dizziness with standing  -        General Information    Patient Profile Review (P)  yes  -SS        Onset of Illness/Injury or Date of Surgery Date (P)  02/27/18  -        Referring Physician (P)  MARI Jhaveri-C  -SS        General Observations (P)  Pt supine in bed, bed alarm on, IV  -SS        Pertinent History Of Current Problem (P)     Pt presents to ED from [facility] with intractable nausea/vomiting. Pt with c/o abdominal pain and fatigue. Pt found to be in a-fib upon arrival to ED   -SS        Precautions/Limitations (P)  fall precautions   Dementia  -SS        Prior Level of Function (P)  independent:;all household mobility;transfer;ADL's;dependent:;driving   By pt report. Pt poor hisorian  -        Equipment Currently Used at Home (P)  walker, rolling  -SS   walker, rolling  -SG     Plans/Goals Discussed With (P)  patient;agreed upon  -SS        Risks Reviewed (P)  patient:;LOB;lines disloged;change in vital signs  -SS        Benefits Reviewed (P)  patient:;improve function;increase independence;increase strength;increase balance  -        Barriers to Rehab (P)  cognitive status  -SS        Living Environment    Lives With (P)  facility resident  -SS   facility resident  -     Living Arrangements (P)  residential facility   [facility]  -   other (see comments)   PERSONAL CARE UNIT  -     Home Accessibility (P)  no concerns  -SS        Stair Railings at Home (P)  none  -SS        Type of Financial/Environmental Concern (P)  none  -SS        Transportation Available    car;family or friend will provide  -SG     Clinical Impression    Date of Referral to OT (P)  03/01/18  -SS        OT Diagnosis (P)  Decreased I with ADL's  -SS        Impairments Found (describe specific impairments) (P)  arousal, attention,  and cognition;ergonomics and body mechanics;gait, locomotion, and balance;muscle performance;motor function  -SS        Patient/Family Goals Statement (P)  Agreeable to OT eval  -        Criteria for Skilled Therapeutic Interventions Met (P)  yes  -SS        Rehab Potential (P)  good, to achieve stated therapy goals  -        Therapy Frequency (P)  daily  -        Anticipated Equipment Needs At Discharge (P)  --   TBD  -        Anticipated Discharge Disposition (P)  skilled nursing facility  -        Functional Level Prior    Ambulation     1-->assistive equipment  -SG    Transferring     1-->assistive equipment  -SG    Toileting     0-->independent  -SG    Bathing     1-->assistive equipment  -SG    Dressing     0-->independent  -SG    Eating     0-->independent  -SG    Communication     2-->difficulty understanding (not related to language barrier)  -SG    Vital Signs    Pre Systolic BP Rehab (P)  190  -SS        Pre Treatment Diastolic BP (P)  83  -SS        Post Systolic BP Rehab (P)  163  -SS        Post Treatment Diastolic BP (P)  72  -SS        Pretreatment Heart Rate (beats/min) (P)  74  -SS        Posttreatment Heart Rate (beats/min) (P)  74  -SS        Pre SpO2 (%) (P)  96  -SS        O2 Delivery Pre Treatment (P)  room air  -SS        Post SpO2 (%) (P)  98  -SS        O2 Delivery Post Treatment (P)  room air  -SS        Pre Patient Position (P)  Supine  -SS        Intra Patient Position (P)  Standing  -SS        Post Patient Position (P)  Sitting  -SS        Pain Assessment    Pain Assessment (P)  No/denies pain  -SS        Vision Assessment/Intervention    Visual Impairment (P)  WNL  -SS        Visual Impairment Comment (P)  Pt states her glasses are at home  -        Cognitive Assessment/Intervention    Current Cognitive/Communication Assessment (P)  impaired  -SS        Orientation Status (P)  oriented x 4   Needed extended time to answer questions  -        Follows Commands/Answers  Questions (P)  100% of the time;able to follow single-step instructions;needs increased time;needs cueing  -        Personal Safety (P)  mild impairment;decreased awareness, need for assist;decreased awareness, need for safety;decreased insight to deficits;unaware of cognitive deficits  -        Personal Safety Interventions (P)  gait belt;muscle strengthening facilitated;supervised activity  -        ROM (Range of Motion)    General ROM (P)  no range of motion deficits identified  -        MMT (Manual Muscle Testing)    General MMT Assessment (P)  no strength deficits identified  -        Bed Mobility, Assessment/Treatment    Bed Mobility, Assistive Device (P)  bed rails;head of bed elevated  -        Bed Mob, Supine to Sit, Mobile (P)  supervision required;verbal cues required  -        Bed Mobility, Impairments (P)  strength decreased;impaired balance;coordination impaired  -        Bed Mobility, Comment (P)  VC to grab bed rails and to scoot to EOB  -SS        Transfer Assessment/Treatment    Transfers, Chair-Bed Mobile (P)  moderate assist (50% patient effort);verbal cues required  -SS        Transfers, Sit-Stand Mobile (P)  minimum assist (75% patient effort);verbal cues required  -SS        Transfers, Stand-Sit Mobile (P)  minimum assist (75% patient effort);verbal cues required  -SS        Transfer, Safety Issues (P)  sequencing ability decreased;step length decreased;weight-shifting ability decreased  -        Transfer, Impairments (P)  strength decreased;impaired balance;coordination impaired  -        Transfer, Comment (P)  VC for sequencing   -        Functional Mobility    Functional Mobility- Ind. Level (P)  not tested  -SS        Lower Body Dressing Assessment/Training    LB Dressing Assess/Train, Clothing Type (P)  donning:;slipper socks  -        LB Dressing Assess/Train, Position (P)  edge of bed  -        LB Dressing Assess/Train, Mobile (P)   contact guard assist  -SS        LB Dressing Assess/Train, Impairments (P)  impaired balance  -SS        Motor Skills/Interventions    Additional Documentation (P)  Balance Skills Training (Group);Fine Motor Coordination Training (Group);Gross Motor Coordination Training (Group)  -SS        Balance Skills Training    Sitting-Level of Assistance (P)  Close supervision  -SS        Sitting-Balance Support (P)  Feet supported  -SS        Sitting-Balance Activities (P)  Forward lean;Right UE Weight Bearing;Left UE Weight Bearing  -SS        Sitting # of Minutes (P)  10  -SS        Standing-Level of Assistance (P)  Minimum assistance  -SS        Static Standing Balance Support (P)  Right upper extremity supported;Left upper extremity supported  -SS        Standing-Balance Activities (P)  Weight Shift A-P;Weight Shift R-L;Forward lean  -SS        Standing Balance # of Minutes (P)  2  -SS        Gross Motor Coordination Training    Gross Motor Skill, Impairments Detail (P)  Intact  -SS        Fine Motor Coordination Training    Detail (Fine Motor Coordination Training) (P)  Intact  -SS        Opposition (P)  Right:;Left:;intact  -SS        Sensory Assessment/Intervention    Light Touch (P)  LUE;RUE  -SS (P)  LLE;RLE  -BR LLE;RLE  -HG      LUE Light Touch (P)  WNL  -SS        RUE Light Touch (P)  WNL  -SS        LLE Light Touch  (P)  WNL  -BR WNL  -HG      RLE Light Touch  (P)  WNL  -BR WNL  -HG      General Therapy Interventions    Planned Therapy Interventions (P)  ADL retraining;transfer training;activity intolerance  -SS        Positioning and Restraints    Pre-Treatment Position (P)  in bed  -SS        Post Treatment Position (P)  chair  -SS        In Chair (P)  call light within reach;encouraged to call for assist;exit alarm on;sitting  -SS          02/28/18 1547 02/28/18 0553 02/28/18 0546          Rehab Evaluation    Document Type evaluation  -        Subjective Information agree to therapy;no complaints  -MJ         Patient Effort, Rehab Treatment good  -MJ        Symptoms Noted During/After Treatment none  -MJ        General Information    Patient Profile Review yes  -MJ        Onset of Illness/Injury or Date of Surgery Date 02/27/18  -        Referring Physician MARI Jhaveri  -MJ        General Observations Pt supine in bed, IV, tele.  -MJ        Pertinent History Of Current Problem Pt presents to ED from the [facility] with intractable nausea/vomiting. Pt with c/o abdominal pain and fatigue. Pt found to be in a-fib upon arrival to ED  -MJ        Precautions/Limitations fall precautions;other (see comments)   dementia, exit alarm  -MJ        Prior Level of Function independent:;all household mobility;gait;transfer;bed mobility;ADL's   per pt, questionable historian  -MJ        Equipment Currently Used at Home walker, rolling  -MJ  walker, standard  -IRIS      Plans/Goals Discussed With patient;agreed upon  -        Risks Reviewed patient:;LOB;increased discomfort  -MJ        Benefits Reviewed patient:;improve function;increase independence;increase strength;increase balance;decrease pain  -MJ        Barriers to Rehab cognitive status;previous functional deficit  -MJ        Living Environment    Lives With facility resident  -MJ facility resident  -IRIS       Living Arrangements residential facility  -MJ residential facility  -IRIS       Home Accessibility no concerns  -MJ no concerns  -IRIS       Stair Railings at Home  none  -IRIS       Type of Financial/Environmental Concern  none  -IRIS       Transportation Available  ambulance;car;family or friend will provide  -IRIS       Living Environment Comment Pt reports going to dining room for meals, but that meals could be brought to her. States she ambulates with RW and performs ADLs, independently  -MJ        Functional Level Prior    Ambulation   1-->assistive equipment  -IRIS      Transferring   1-->assistive equipment  -IRIS      Toileting   0-->independent  -IRIS      Bathing    0-->independent  -IRIS      Dressing   0-->independent  -IRIS      Eating   0-->independent  -IRIS      Communication   0-->understands/communicates without difficulty  -IRIS      Swallowing   0-->swallows foods/liquids without difficulty  -IRIS      Vital Signs    Pretreatment Heart Rate (beats/min) 67  -MJ        Posttreatment Heart Rate (beats/min) 72  -MJ        Pre Patient Position Supine  -MJ        Post Patient Position Sitting  -MJ        Pain Assessment    Pain Assessment No/denies pain  -MJ        Vision Assessment/Intervention    Visual Impairment WFL  -MJ        Cognitive Assessment/Intervention    Current Cognitive/Communication Assessment impaired  -MJ        Orientation Status oriented to;person;disoriented to;place;time   knew [place], [facility]; choices for month and year  -        Follows Commands/Answers Questions 100% of the time;able to follow single-step instructions;needs cueing;needs repetition  -MJ        Personal Safety WNL/WFL  -MJ        ROM (Range of Motion)    General ROM no range of motion deficits identified  -MJ        MMT (Manual Muscle Testing)    General MMT Assessment lower extremity strength deficits identified  -MJ        Bed Mobility, Assessment/Treatment    Bed Mobility, Assistive Device bed rails;head of bed elevated  -MJ        Bed Mob, Supine to Sit, Pablo supervision required;verbal cues required  -MJ        Bed Mob, Sit to Supine, Pablo not tested   Pt UIC  -MJ        Bed Mobility, Impairments strength decreased  -MJ        Bed Mobility, Comment Verbal cues for sequencing  -MJ        Transfer Assessment/Treatment    Transfers, Sit-Stand Pablo contact guard assist;verbal cues required  -MJ        Transfers, Stand-Sit Pablo contact guard assist;verbal cues required  -MJ        Transfers, Sit-Stand-Sit, Assist Device rolling walker  -MJ        Transfer, Safety Issues step length decreased  -MJ        Transfer, Impairments strength decreased;impaired  balance  -MJ        Transfer, Comment Verbal cues for correct hand placement  -MJ        Motor Skills/Interventions    Additional Documentation Balance Skills Training (Group)  -MJ        Balance Skills Training    Sitting-Level of Assistance Independent  -MJ        Standing-Level of Assistance Contact guard  -MJ        Static Standing Balance Support assistive device  -MJ        Standing-Balance Activities --   Pt stood and brushed hair at mirror  -MJ        Standing Balance # of Minutes 1  -MJ        Gait Balance-Level of Assistance Contact guard  -MJ        Gait Balance Support assistive device  -MJ        Sensory Assessment/Intervention    Light Touch LLE;RLE  -MJ        LLE Light Touch WNL  -MJ        RLE Light Touch WNL  -MJ        Positioning and Restraints    Pre-Treatment Position in bed  -MJ        Post Treatment Position chair  -MJ        In Chair notified nsg;reclined;call light within reach;encouraged to call for assist;exit alarm on  -MJ        Lower Extremity    Lower Ext Manual Muscle Testing Detail R LE 4/5; L LE 4/5  -MJ          User Key  (r) = Recorded By, (t) = Taken By, (c) = Cosigned By    Initials Name Effective Dates    AMITA Oliva 05/02/16 -     MJ Jerry Echavarria, PT 03/14/16 -     IRIS Laguerre, RN 06/16/16 -     HG Theresa Fernandes, RN 01/17/17 -     BR Ana Maria Becker, Nursing Student 03/07/17 -     SS Roger Keys, OT Student 12/11/17 -            Occupational Therapy Education     Title: PT OT SLP Therapies (Active)     Topic: Occupational Therapy (Active)     Point: ADL training (Active)    Description: Instruct learner(s) on proper safety adaptation and remediation techniques during self care or transfers.   Instruct in proper use of assistive devices.    Learning Progress Summary    Learner Readiness Method Response Comment Documented by Status   Patient Acceptance E NR Pt educated on proper hand placement and body mechanics during transfers to facilitate  participation in ADL's.  03/01/18 1124 Active               Point: Body mechanics (Active)    Description: Instruct learner(s) on proper positioning and spine alignment during self-care, functional mobility activities and/or exercises.    Learning Progress Summary    Learner Readiness Method Response Comment Documented by Status   Patient Acceptance E NR Pt educated on proper hand placement and body mechanics during transfers to facilitate participation in ADL's.  03/01/18 1124 Active                      User Key     Initials Effective Dates Name Provider Type Discipline     12/11/17 -  Roger Keys, OT Student OT Student OT                  OT Recommendation and Plan  Anticipated Equipment Needs At Discharge: (P)  (TBD)  Anticipated Discharge Disposition: (P) skilled nursing facility  Planned Therapy Interventions: (P) ADL retraining, transfer training, activity intolerance  Therapy Frequency: (P) daily  Plan of Care Review  Plan Of Care Reviewed With: (P) patient  Outcome Summary/Follow up Plan: (P) Pt's ADL participation limited by current evolving symptoms and PMH. Pt supervision for bed mobility, min A for StoS transfers, expected min A for ADL's, pt declined functional mobility. Recommend SNF at discharge.          OT Goals       03/01/18 1126          Transfer Training OT LTG    Transfer Training OT LTG, Date Established (P)  03/01/18  -SS      Transfer Training OT LTG, Time to Achieve (P)  1 wk  -SS      Transfer Training OT LTG, Activity Type (P)  sit to stand/stand to sit;toilet  -SS      Transfer Training OT LTG, [place] Level (P)  contact guard assist  -SS      Transfer Training OT LTG, Assist Device (P)  walker, rolling  -SS      Transfer Training OT LTG, Outcome (P)  goal ongoing  -SS      Toileting OT LTG    Toileting Goal OT LTG, Date Established (P)  03/01/18  -SS      Toileting Goal OT LTG, Time to Achieve (P)  1 wk  -SS      Toileting Goal OT LTG, [place] Level (P)   minimum assist (75% patient effort)  -      Toileting Goal OT LTG, Outcome (P)  goal ongoing  -SS      Functional Mobility OT LTG    Functional Mobility Goal OT LTG, Date Established (P)  03/01/18  -      Functional Mobility Goal OT LTG, Time to Achieve (P)  1 wk  -SS      Functional Mobility Goal OT LTG, [place] Level (P)  min assist  -SS      Functional Mobility Goal OT LTG, Assist Device (P)  rolling walker  -      Functional Mobility Goal OT LTG, Distance to Achieve (P)  to the bathroom;in hallway  -      Functional Mobility Goal OT LTG, Outcome (P)  goal ongoing  -      Activity Tolerance OT LTG    Activity Tolerance Goal OT LTG, Date Established (P)  03/01/18  -      Activity Tolerance Goal OT LTG, Time to Achieve (P)  1 wk  -SS      Activity Tolerance Goal OT LTG, Activity Level (P)  5 min activity;10 min activity  -SS      Activity Tolerance Goal OT LTG, Additional Goal (P)  ADL of choice while standing  -      Activity Tolerance Goal OT LTG, Outcome (P)  goal ongoing  -        User Key  (r) = Recorded By, (t) = Taken By, (c) = Cosigned By    Initials Name Provider Type    SS Roger Keys, OT Student OT Student                Outcome Measures       03/01/18 1036 02/28/18 1547       How much help from another person do you currently need...    Turning from your back to your side while in flat bed without using bedrails?  3  -MJ     Moving from lying on back to sitting on the side of a flat bed without bedrails?  3  -MJ     Moving to and from a bed to a chair (including a wheelchair)?  3  -MJ     Standing up from a chair using your arms (e.g., wheelchair, bedside chair)?  3  -MJ     Climbing 3-5 steps with a railing?  2  -MJ     To walk in hospital room?  3  -MJ     AM-PAC 6 Clicks Score  17  -MJ     How much help from another is currently needed...    Putting on and taking off regular lower body clothing? (P)  3  -SS      Bathing (including washing, rinsing, and drying) (P)  3   -SS      Toileting (which includes using toilet bed pan or urinal) (P)  3  -SS      Putting on and taking off regular upper body clothing (P)  3  -SS      Taking care of personal grooming (such as brushing teeth) (P)  4  -SS      Eating meals (P)  4  -SS      Score (P)  20  -SS      Functional Assessment    Outcome Measure Options (P)  AM-PAC 6 Clicks Daily Activity (OT)  -SS AM-PAC 6 Clicks Basic Mobility (PT)  -       User Key  (r) = Recorded By, (t) = Taken By, (c) = Cosigned By    Initials Name Provider Type    RJEI Echavarria, PT Physical Therapist    SS Roger Keys, OT Student OT Student          Time Calculation:   OT Start Time: (P) 1036    Therapy Charges for Today     Code Description Service Date Service Provider Modifiers Qty    [id number] HC OT EVAL LOW COMPLEXITY 4 3/1/2018 Roger Keys, OT Student GO 1               Roger Keys OT Student  3/1/2018

## 2018-03-02 LAB
BACTERIA SPEC AEROBE CULT: NORMAL
BACTERIA SPEC AEROBE CULT: NORMAL
GLUCOSE BLDC GLUCOMTR-MCNC: 109 MG/DL (ref 70–130)
GLUCOSE BLDC GLUCOMTR-MCNC: 124 MG/DL (ref 70–130)
GLUCOSE BLDC GLUCOMTR-MCNC: 149 MG/DL (ref 70–130)
GLUCOSE BLDC GLUCOMTR-MCNC: 180 MG/DL (ref 70–130)

## 2018-03-02 PROCEDURE — 25010000002 HYDRALAZINE PER 20 MG: Performed by: NURSE PRACTITIONER

## 2018-03-02 PROCEDURE — 99222 1ST HOSP IP/OBS MODERATE 55: CPT | Performed by: INTERNAL MEDICINE

## 2018-03-02 PROCEDURE — 97116 GAIT TRAINING THERAPY: CPT

## 2018-03-02 PROCEDURE — 99232 SBSQ HOSP IP/OBS MODERATE 35: CPT | Performed by: NURSE PRACTITIONER

## 2018-03-02 PROCEDURE — 63710000001 INSULIN DETEMIR PER 5 UNITS: Performed by: HOSPITALIST

## 2018-03-02 PROCEDURE — 82962 GLUCOSE BLOOD TEST: CPT

## 2018-03-02 PROCEDURE — 97110 THERAPEUTIC EXERCISES: CPT

## 2018-03-02 RX ORDER — AMIODARONE HYDROCHLORIDE 200 MG/1
400 TABLET ORAL EVERY 12 HOURS SCHEDULED
Status: DISCONTINUED | OUTPATIENT
Start: 2018-03-02 | End: 2018-03-04

## 2018-03-02 RX ADMIN — DILTIAZEM HYDROCHLORIDE 120 MG: 120 CAPSULE, COATED, EXTENDED RELEASE ORAL at 08:44

## 2018-03-02 RX ADMIN — Medication 10 MG: at 04:43

## 2018-03-02 RX ADMIN — ISOSORBIDE MONONITRATE 60 MG: 60 TABLET, EXTENDED RELEASE ORAL at 08:45

## 2018-03-02 RX ADMIN — APIXABAN 2.5 MG: 2.5 TABLET, FILM COATED ORAL at 08:44

## 2018-03-02 RX ADMIN — CITALOPRAM HYDROBROMIDE 20 MG: 20 TABLET ORAL at 08:45

## 2018-03-02 RX ADMIN — FAMOTIDINE 20 MG: 20 TABLET, FILM COATED ORAL at 08:45

## 2018-03-02 RX ADMIN — AMIODARONE HYDROCHLORIDE 400 MG: 200 TABLET ORAL at 12:44

## 2018-03-02 RX ADMIN — INSULIN LISPRO 2 UNITS: 100 INJECTION, SOLUTION INTRAVENOUS; SUBCUTANEOUS at 12:47

## 2018-03-02 RX ADMIN — ATORVASTATIN CALCIUM 40 MG: 40 TABLET, FILM COATED ORAL at 08:44

## 2018-03-02 RX ADMIN — ATENOLOL 25 MG: 25 TABLET ORAL at 08:44

## 2018-03-02 RX ADMIN — LOSARTAN POTASSIUM 100 MG: 50 TABLET ORAL at 08:45

## 2018-03-02 RX ADMIN — AMIODARONE HYDROCHLORIDE 400 MG: 200 TABLET ORAL at 20:33

## 2018-03-02 RX ADMIN — DONEPEZIL HYDROCHLORIDE 5 MG: 10 TABLET, FILM COATED ORAL at 20:33

## 2018-03-02 RX ADMIN — INSULIN DETEMIR 10 UNITS: 100 INJECTION, SOLUTION SUBCUTANEOUS at 20:34

## 2018-03-02 RX ADMIN — INSULIN DETEMIR 10 UNITS: 100 INJECTION, SOLUTION SUBCUTANEOUS at 08:40

## 2018-03-02 RX ADMIN — APIXABAN 2.5 MG: 2.5 TABLET, FILM COATED ORAL at 20:33

## 2018-03-02 RX ADMIN — DOCUSATE SODIUM 100 MG: 100 CAPSULE, LIQUID FILLED ORAL at 20:33

## 2018-03-02 RX ADMIN — FAMOTIDINE 20 MG: 20 TABLET, FILM COATED ORAL at 20:33

## 2018-03-02 RX ADMIN — ASPIRIN 81 MG: 81 TABLET, COATED ORAL at 08:44

## 2018-03-02 RX ADMIN — HYDROCODONE BITARTRATE AND ACETAMINOPHEN 1 TABLET: 5; 325 TABLET ORAL at 20:33

## 2018-03-02 NOTE — PLAN OF CARE
Problem: Patient Care Overview (Adult)  Goal: Plan of Care Review  Outcome: Ongoing (interventions implemented as appropriate)   03/02/18 9501   Coping/Psychosocial Response Interventions   Plan Of Care Reviewed With patient;shahab   Patient Care Overview   Progress improving   Outcome Evaluation   Outcome Summary/Follow up Plan Pt is now tolerating the advancement of her diet, with no N/V noted. Pt exhibits no signs or symptoms of SOA or dizziness with the controlled a-fib.       Problem: Arrhythmia/Dysrhythmia (Symptomatic) (Adult)  Goal: Signs and Symptoms of Listed Potential Problems Will be Absent or Manageable (Arrhythmia/Dysrhythmia)  Outcome: Ongoing (interventions implemented as appropriate)      Problem: Pressure Ulcer (Adult)  Goal: Signs and Symptoms of Listed Potential Problems Will be Absent or Manageable (Pressure Ulcer)  Outcome: Ongoing (interventions implemented as appropriate)

## 2018-03-02 NOTE — CONSULTS
[facility] Cardiology Consult    03/02/2018     Subjective:      Mary Ramirez  [id number]  1934  2    Nirmal Lundberg MD    Chief Complaint: Atrial Fibrillation      Problem List:  1. Atrial Fibrillation  A. Diagnosed 11/26/2017 following Left femur repair  B. CHADS-VASc= 5  C. Chronic Eliquis therapy  D. Atrial Fibrillation with RVR on Adm 2/27/18  2. Hypertension  3. Type 2 Diabetes Mellitus  4. Coronary Artery Disease- S/P Stents  5. Nausea and Vomiting 2/27/18  6. Gout      apixaban 2.5 mg Oral Q12H   aspirin 81 mg Oral Daily   atenolol 25 mg Oral Q24H   atorvastatin 40 mg Oral Daily   citalopram 20 mg Oral Daily   diltiaZEM  mg Oral Q24H   docusate sodium 100 mg Oral BID   donepezil 5 mg Oral Nightly   famotidine 20 mg Oral BID   insulin detemir 10 Units Subcutaneous Q12H   insulin lispro 0-7 Units Subcutaneous 4x Daily With Meals & Nightly   isosorbide mononitrate 60 mg Oral Daily   losartan 100 mg Oral Q24H       esmolol  mcg/kg/min Last Rate: Stopped (03/01/18 1703)     is allergic to penicillins.    HPI: Mrs. Ramirez is an 83-year-old white female with a past medical history significant for paroxysmal atrial fibrillation, coronary artery disease, status post stent placement, hypertension, insulin-dependent diabetes mellitus type 2 and dementia who presented to the [facility] ED on 2/27/18 from the [facility] with complaints of intractable nausea and vomiting which began after eating spaghetti for dinner.  There was concern for hematemesis as the patient and nursing staff were unable to discern the difference between food and blood.  Due to the fact that she was on chronic anticoagulation EMS was called and the patient was transported to the [facility] ED.  Arrival patient was noted to be in atrial fibrillation with RVR at a rate in the 140s.  Undergone an asthma well drip.  It was noted that for some reason the nursing home had stopped the  demonstration of the patient's atenolol and Cardizem.  These medications have been restarted.  The patient did convert to normal sinus rhythm on 3/1/18 and her esmolol drip was discontinued.  Since that time she has converted back into atrial fibrillation with RVR.     Cardiac risk factors: advanced age (older than 55 for men, 65 for women), diabetes mellitus, hypertension and sedentary lifestyle.     History  History reviewed. No pertinent family history.  Past Surgical History:   Procedure Laterality Date   • ANKLE SURGERY Left 1996    STEEL PLATE PLACEMENT, LATER REMOVED PLATE AND PINS   • ATHRECTOMY ILIAC, FEMORAL, TIBIAL ARTERY     • BREAST LUMPECTOMY Left 1981   • COLON SURGERY     • CORONARY STENT PLACEMENT     • HAND SURGERY Right     INDEX FINGER   • HIP TROCANTERIC NAILING WITH INTRAMEDULLARY HIP SCREW Left 11/23/2017    Procedure: HIP TROCANTERIC NAILING WITH INTRAMEDULLARY HIP SCREW;  Surgeon: Jayjay Stratton MD;  Location: [facility];  Service:    • PARTIAL HYSTERECTOMY        Past Medical History:   Diagnosis Date   • Atrial fibrillation    • Dementia    • Diabetes mellitus    • Hypertension      History   Smoking Status   • Former Smoker   Smokeless Tobacco   • Never Used     History   Alcohol Use No     Past Surgical History:   Procedure Laterality Date   • ANKLE SURGERY Left 1996    STEEL PLATE PLACEMENT, LATER REMOVED PLATE AND PINS   • ATHRECTOMY ILIAC, FEMORAL, TIBIAL ARTERY     • BREAST LUMPECTOMY Left 1981   • COLON SURGERY     • CORONARY STENT PLACEMENT     • HAND SURGERY Right     INDEX FINGER   • HIP TROCANTERIC NAILING WITH INTRAMEDULLARY HIP SCREW Left 11/23/2017    Procedure: HIP TROCANTERIC NAILING WITH INTRAMEDULLARY HIP SCREW;  Surgeon: Jayjay Stratton MD;  Location: [facility] OR;  Service:    • PARTIAL HYSTERECTOMY         Review of Systems  Review of Systems   Constitution: Positive for weakness.   Cardiovascular: Positive for dyspnea on exertion, irregular heartbeat, leg  "swelling and palpitations. Negative for near-syncope, orthopnea, paroxysmal nocturnal dyspnea and syncope.   Respiratory: Positive for shortness of breath.    Endocrine: Negative.    Hematologic/Lymphatic: Negative.    Skin: Negative.    Musculoskeletal: Positive for arthritis and gout.   Gastrointestinal: Positive for nausea and vomiting.   Genitourinary: Negative.    Psychiatric/Behavioral: Positive for altered mental status and memory loss.   Allergic/Immunologic: Negative.        Objective:     height is 156.2 cm (61.5\") and weight is 58.3 kg (128 lb 8 oz). Her oral temperature is 97.9 °F (36.6 °C). Her blood pressure is 155/95 and her pulse is 106. Her respiration is 18 and oxygen saturation is 95%.     Physical Exam   Constitutional: She appears well-developed and well-nourished.   HENT:   Head: Normocephalic and atraumatic.   Neck: Normal range of motion. No thyromegaly present.   Cardiovascular: Exam reveals no gallop and no friction rub.    No murmur heard.  Irregular rate and rhythm   Pulmonary/Chest: Effort normal and breath sounds normal. No respiratory distress. She has no wheezes. She has no rales.   Abdominal: Soft. Bowel sounds are normal.   Neurological: She is alert.   Skin: Skin is warm and dry.   Psychiatric: She has a normal mood and affect. Her behavior is normal.   Vitals reviewed.      Cardiographics  EC/28 Atrial Fibrillation @ 118 bpm .  Echocardiogram: not done    Imaging  Chest x-ray: increased pulmonary vascularity bilaterally with small bilateral pleural effusions. Cardiomegaly.     Lab Review   Lab Results   Component Value Date    GLUCOSE 217 (H) 2018    BUN 13 2018    CREATININE 1.00 2018    EGFRIFNONA 53 (L) 2018    BCR 13.0 2018    CO2 20.0 2018    CALCIUM 8.9 2018    ALBUMIN 4.10 2018    LABIL2 1.3 (L) 2018    AST 36 (H) 2018    ALT 18 2018     Lab Results   Component Value Date    WBC 6.65 2018    HGB " 10.9 (L) 03/01/2018    HCT 37.6 03/01/2018    MCV 83.7 03/01/2018     03/01/2018     No results found for: CHOL  Lab Results   Component Value Date    TRIG 149 01/24/2016     Lab Results   Component Value Date    HDL 32 (L) 01/24/2016        Assessment:    83 yr old white female with a history of CAD, Hypertension, DM2 and paroxysmal atrial fibrillation with a CHADS-VASc of 5 who presented to the  ED on 2/27/18 with intractable nausea and vomiting.  Patient was noted to be in Atrial fibrillation with RVR.     Plan:   1. Discontinue Atenolol  2. Add Amiodarone 400 mg po bid while in hospital  3. Decrease to Amiodarone 200 mg po bid at discharge  4. EKG in 1 week  5. If remains in Atrial Fibrillation will plan for External Cardioversion in 2 weeks.  Our office will call to arrange.      Scribed for Ta Flores MD by Zahra Sen RN. 3/2/2018  10:09 AM     ITa M.D., personally performed the services described in this documentation as scribed by the above named individual in my presence, and it is both accurate and complete.  3/2/2018  12:16 PM

## 2018-03-02 NOTE — PROGRESS NOTES
Continued Stay Note  [facility]     Patient Name: Mary Ramirez  MRN: [id number]  Today's Date: 3/2/2018    Admit Date: 2/27/2018          Discharge Plan       03/02/18 1104    Case Management/Social Work Plan    Patient/Family In Agreement With Plan yes    Additional Comments Spoke with pt's son, Josh, via phone to f/u DCP.  Also spoke with Pennie at [facility].  Plan is for pt to return to her personal care unit when medically ready.  Confirmed they will accept admissions over the weekend.  RN to call report to [phone] (ask for personal care unit) and fax DC summary to [phone].  If pt is ready to transfer on Sat, she will need [facility] to transport as son will be out of town.  However, he will be available should she not be ready until Sunday.  CM will cont to follow and facilitate transportation as needed.                Discharge Codes     None        Expected Discharge Date and Time     Expected Discharge Date Expected Discharge Time    Mar 3, 2018             Jackelyn Oliva

## 2018-03-02 NOTE — PROGRESS NOTES
Continued Stay Note  [facility]     Patient Name: Mary Ramirez  MRN: [id number]  Today's Date: 3/2/2018    Admit Date: 2/27/2018          Discharge Plan     Consent obtained for the participation in the [facility] Transitions Program.     Neha Kang RN                Discharge Codes     None        Expected Discharge Date and Time     Expected Discharge Date Expected Discharge Time    Mar 2, 2018             Neha Kang RN

## 2018-03-02 NOTE — PLAN OF CARE
Problem: Patient Care Overview (Adult)  Goal: Plan of Care Review  Outcome: Ongoing (interventions implemented as appropriate)   03/02/18 0205   Coping/Psychosocial Response Interventions   Plan Of Care Reviewed With patient   Patient Care Overview   Progress no change   Outcome Evaluation   Outcome Summary/Follow up Plan Pt. has been in a controlled rate all night with no complaints of pain or discomfort. She is confused at times but easily redirected        Problem: Arrhythmia/Dysrhythmia (Symptomatic) (Adult)  Goal: Signs and Symptoms of Listed Potential Problems Will be Absent or Manageable (Arrhythmia/Dysrhythmia)  Outcome: Ongoing (interventions implemented as appropriate)   02/28/18 0608 03/02/18 0205   Arrhythmia/Dysrhythmia (Symptomatic)   Problems Assessed (Arrhythmia/Dysrhythmia) --  all   Problems Present (Arrhythmia/Dysrhythmia) electrophysiological conduction defect --

## 2018-03-02 NOTE — PROGRESS NOTES
[facility] Medicine Services  PROGRESS NOTE    Patient Name: Mary Ramirez  : 1934  MRN: [id number]    Date of Admission: 2018  Length of Stay: 2  Primary Care Physician: Nirmal Lundberg MD    Subjective   Subjective     CC:  N/V    HPI:  Patient resting in bed in NAD. Patient is alert to name only. She is pleasant and follows commands. Denies any N/V or soa this am. Per nurse no acute events overnight. Was weaned off esmolol drip and converted back to Afib once drip dc'd.    Review of Systems  Otherwise ROS is negative except as mentioned in the HPI.    Objective   Objective     Vital Signs:   Temp:  [97.9 °F (36.6 °C)-98.2 °F (36.8 °C)] 97.9 °F (36.6 °C)  Heart Rate:  [] 106  Resp:  [18] 18  BP: (115-190)/() 155/95        Physical Exam:  General Assessment: No acute cardiopulmonary distress. Nontoxic, confused but pleasent     HEENT: NCAT, PERRL, MM moist     Neck: Supple     CVS: irregualar Afib on monitor, S1S2 normal, no murmurs     Resp: CTAB, no adventitious sound     Abd: soft, NT, distended, +BS, no guarding or peritoneal signs     Ext: No edema, both calves are symmetric and NTTP     Neuro: No facial asymmetry, speech clear     Skin: W/D/I. No rash.     Psych: Affect is appropriate         Results Reviewed:  I have personally reviewed current lab, radiology, and data and agree.      Results from last 7 days  Lab Units 18  2306   WBC 10*3/mm3 6.65 7.48 12.82*   HEMOGLOBIN g/dL 10.9* 10.3* 11.2*   HEMATOCRIT % 37.6 35.1 36.8   PLATELETS 10*3/mm3 343 313 276       Results from last 7 days  Lab Units 18  0718  2306   SODIUM mmol/L 131* 135 136   POTASSIUM mmol/L 4.2 4.2 4.9   CHLORIDE mmol/L 103 101 106   CO2 mmol/L 20.0 26.0 21.0   BUN mg/dL 13 17 18   CREATININE mg/dL 1.00 1.10 1.10   GLUCOSE mg/dL 217* 202* 128*   CALCIUM mg/dL 8.9 8.7 9.1   ALT (SGPT) U/L  --   --  18   AST (SGOT)  U/L  --   --  36*     Estimated Creatinine Clearance: 39.2 mL/min (by C-G formula based on Cr of 1).  BNP   Date Value Ref Range Status   02/27/2018 254.0 (H) 0.0 - 100.0 pg/mL Final     Comment:     Results may be falsely decreased if patient taking Biotin.     No results found for: PHART    Microbiology Results Abnormal     Procedure Component Value - Date/Time    Urine Culture - Urine, Urine, Clean Catch [[id number]] Collected:  02/28/18 0013    Lab Status:  Final result Specimen:  Urine from Urine, Catheter Updated:  03/02/18 0824     Urine Culture --      50,000-60,000 CFU/mL Normal Urogenital Barbara          Imaging Results (last 24 hours)     ** No results found for the last 24 hours. **        Results for orders placed during the hospital encounter of 11/23/17   Adult Transthoracic Echo Complete W/ Cont if Necessary Per Protocol    Narrative · The left ventricular cavity is small.  · Left ventricular wall thickness is consistent with mild concentric   hypertrophy.  · Left ventricular systolic function is hyperdynamic (EF > 70).  · Mild mitral valve regurgitation is present  · Mild tricuspid valve regurgitation is present.          I have reviewed the medications.    Assessment/Plan   Assessment / Plan     Hospital Problem List     * (Principal)Atrial fibrillation with RVR    Hypertension    Type 2 diabetes mellitus with complication, with long-term current use of insulin    CAD (coronary artery disease) s/p stents    Dementia    Bilateral pleural effusion    Leukocytosis    Enteritis    Right knee pain    Ileus             Brief Hospital Course to date:  Mary Ramirez is an 84 yo F who is pleasant, but unfortunately relatively poor historian. No family present at time of my exam. She stated that her son just left to go to work. According to record, she is from [facility]. She has history of dementia, CAD-s/p stent, PAF, IDDM2, and HTN. She was brought in due to concerns for hematemesis. Apparently she ate  some spaghetti with red  sauce and had nausea and vomiting after. Staff could not discern between blood vs sauce, so she was brought in for evaluation. She reported that she had some flutter in her chest, but denies any pain or SOA. She felt weak/dizzy and felt like she just wanted to go to sleep. In the ED, she was found to be in Afib with RVR. She also had leukocytosis, but afebrile. CT abp/pelv showed no signs of any bowel obstructions or other acute findings, but did show incidental cardiomegaly with bibasilar pulm edema/effusion, and small pericardial effusion. Pt reports no BM or flatus today.    Assessment & Plan:  - ACS ruled out. TSH WNL.  - BP uncontrolled and HR on the upper limit of normal range. NH had dc'ed Coreg and Cardizem, not sure why, Atenolol and Cardizem were resumed and esmolol gtt weaned off. so I will resume them and wean off Esmolol gtt. Patient still afib and rate 120's after home meds  -- consult cardiology   - DC Reglan today since BS is present and pt passing flatus. If symptoms return after Reglan is stopped, will consider Gastric emptying study to R/O gastroparesis. I suspect pt has mild ileus/viral gastroenteritis.  - Leukocytosis has resolved, likely reactive in the first place. CXR and UA negative.  - Bilat pleural effusion/? pulm edema on CXR, but pt is asymptomatic.  - DM is fairly controlled with insulin, A1C 7.5. Currently hyperglycemic, likely from basal insulin being held. Will start Levemir 10 units BID.  - Ongoing PT/OT      CODE STATUS: Full Code    Disposition: Back to the [place] once medically stable, hopefully in 1-2 days.    Electronically signed by CRISTA Correia, 03/02/18, 9:11 AM.

## 2018-03-02 NOTE — THERAPY TREATMENT NOTE
Acute Care - Physical Therapy Treatment Note  [facility]     Patient Name: Mary Ramirez  : 1934  MRN: [id number]  Today's Date: 3/2/2018  Onset of Illness/Injury or Date of Surgery Date: 18  Date of Referral to PT: 18  Referring Physician: JOI Jhaveri    Admit Date: 2018    Visit Dx:    ICD-10-CM ICD-9-CM   1. Atrial fibrillation with RVR I48.91 427.31   2. Acute on chronic systolic congestive heart failure I50.23 428.23     428.0   3. Pleural effusion J90 511.9   4. Enteritis K52.9 558.9   5. Impaired functional mobility, balance, gait, and endurance Z74.09 V49.89     Patient Active Problem List   Diagnosis   • Fall   • Fracture, intertrochanteric, left femur   • Hypertension   • Type 2 diabetes mellitus with complication, with long-term current use of insulin   • CAD (coronary artery disease) s/p stents   • Atrial fibrillation with RVR, new onset   • Dementia   • Atrial fibrillation with RVR   • Bilateral pleural effusion   • Leukocytosis   • Enteritis   • Right knee pain   • Ileus               Adult Rehabilitation Note       18 1120          Rehab Assessment/Intervention    Discipline physical therapy assistant  -UD      Document Type therapy note (daily note)  -UD      Subjective Information agree to therapy;no complaints  -UD      Patient Effort, Rehab Treatment good  -UD      Symptoms Noted During/After Treatment fatigue  -UD      Precautions/Limitations fall precautions  -UD      Recorded by [UD] Barbara Vaughan PTA      Vital Signs    O2 Delivery Post Treatment room air  -UD      Pre Patient Position Sitting  -UD      Intra Patient Position Standing  -UD      Post Patient Position Sitting  -UD      Recorded by [UD] Barbara Vaughan PTA      Pain Assessment    Pain Assessment No/denies pain  -UD      Recorded by [UD] Barbara Vaughan PTA      Cognitive Assessment/Intervention    Orientation Status oriented to;person  -UD      Follows Commands/Answers Questions 100% of the time   -UD      Personal Safety mild impairment  -UD      Recorded by [UD] Barbara Vaughan PTA      Bed Mobility, Assessment/Treatment    Bed Mob, Supine to Sit, [place] not tested   up in chair  -UD      Recorded by [UD] Barbara Vaughan PTA      Transfer Assessment/Treatment    Transfers, Sit-Stand [place] contact guard assist  -UD      Transfers, Stand-Sit [place] contact guard assist  -UD      Transfers, Sit-Stand-Sit, Assist Device rolling walker  -UD      Recorded by [UD] Barbara Vaughan PTA      Gait Assessment/Treatment    Gait, [place] Level contact guard assist  -UD      Gait, Assistive Device rolling walker  -UD      Gait, Distance (Feet) 350  -UD      Gait, Gait Deviations trey decreased  -UD      Gait, Safety Issues step length decreased  -UD      Gait, Impairments strength decreased  -UD      Recorded by [UD] Barbara Vaughan PTA      Therapy Exercises    Bilateral Lower Extremities 10 reps;AROM:;sitting  -UD      Recorded by [UD] Barbara Vaughan PTA      Positioning and Restraints    Pre-Treatment Position sitting in chair/recliner  -UD      Post Treatment Position chair  -UD      In Chair notified nsg;reclined;sitting;call light within reach;exit alarm on;legs elevated  -UD      Recorded by [UD] Barbara Vaughan PTA        User Key  (r) = Recorded By, (t) = Taken By, (c) = Cosigned By    Initials Name Effective Dates    UD Barbraa Vaughan PTA 06/22/15 -                 IP PT Goals       03/02/18 1227 02/28/18 1624       Bed Mobility PT LTG    Bed Mobility PT LTG, Date Established  02/28/18  -MJ     Bed Mobility PT LTG, Time to Achieve  5 days  -MJ     Bed Mobility PT LTG, Activity Type  supine to sit/sit to supine  -MJ     Bed Mobility PT LTG, [place] Level  conditional independence  -MJ     Bed Mobility PT LTG, Date Goal Reviewed  02/28/18  -MJ     Bed Mobility PT LTG, Outcome goal ongoing  -UD goal ongoing  -MJ     Transfer Training PT LTG    Transfer Training PT LTG, Date Established  02/28/18   -MJ     Transfer Training PT LTG, Time to Achieve  5 days  -MJ     Transfer Training PT LTG, Activity Type  sit to stand/stand to sit  -MJ     Transfer Training PT LTG, [place] Level  conditional independence  -MJ     Transfer Training PT LTG, Assist Device  walker, rolling  -MJ     Transfer Training PT  LTG, Date Goal Reviewed  02/28/18  -MJ     Transfer Training PT LTG, Outcome goal ongoing  -UD goal ongoing  -MJ     Gait Training PT LTG    Gait Training Goal PT LTG, Date Established  02/28/18  -MJ     Gait Training Goal PT LTG, Time to Achieve  5 days  -MJ     Gait Training Goal PT LTG, [place] Level  conditional independence  -MJ     Gait Training Goal PT LTG, Assist Device  walker, rolling  -MJ     Gait Training Goal PT LTG, Distance to Achieve  250 feet  -MJ     Gait Training Goal PT LTG, Date Goal Reviewed  02/28/18  -MJ     Gait Training Goal PT LTG, Outcome goal partially met  -UD goal ongoing  -MJ       User Key  (r) = Recorded By, (t) = Taken By, (c) = Cosigned By    Initials Name Provider Type    EMILIANA Vaughan, PTA Physical Therapy Assistant    REJI Echavarria, PT Physical Therapist          Physical Therapy Education     Title: PT OT SLP Therapies (Active)     Topic: Physical Therapy (Done)     Point: Mobility training (Done)    Learning Progress Summary    Learner Readiness Method Response Comment Documented by Status   Patient Acceptance GLORIA FARRELL,NR   03/02/18 1226 Done    Acceptance E,D NR Reviewed benefits of mobility  02/28/18 1624 Active               Point: Home exercise program (Done)    Learning Progress Summary    Learner Readiness Method Response Comment Documented by Status   Patient Acceptance GLORIA FARRELL,NR   03/02/18 1226 Done               Point: Body mechanics (Done)    Learning Progress Summary    Learner Readiness Method Response Comment Documented by Status   Patient Acceptance GLORIA FARRELL,NR   03/02/18 1226 Done    Acceptance E,D NR Reviewed benefits of mobility  02/28/18  1624 Active               Point: Precautions (Done)    Learning Progress Summary    Learner Readiness Method Response Comment Documented by Status   Patient Acceptance E,D DU,NR   03/02/18 1226 Done    Acceptance E,D NR Reviewed benefits of mobility  02/28/18 1624 Active                      User Key     Initials Effective Dates Name Provider Type Discipline     06/22/15 -  Barbara Vaughan, PTA Physical Therapy Assistant PT     03/14/16 -  Jerry Echavarria, PT Physical Therapist PT                    PT Recommendation and Plan  Anticipated Discharge Disposition: extended care facility (return to the [facility])  Planned Therapy Interventions: balance training, bed mobility training, gait training, home exercise program, patient/family education, strengthening, transfer training  PT Frequency: daily  Plan of Care Review  Plan Of Care Reviewed With: patient  Progress: improving  Outcome Summary/Follow up Plan: pt up in chair.needs only CG assist for transfers and gait.ambulat 350 ft.          Outcome Measures       03/02/18 1120 03/01/18 1036 02/28/18 1547    How much help from another person do you currently need...    Turning from your back to your side while in flat bed without using bedrails? 3  -UD  3  -MJ    Moving from lying on back to sitting on the side of a flat bed without bedrails? 3  -UD  3  -MJ    Moving to and from a bed to a chair (including a wheelchair)? 3  -UD  3  -MJ    Standing up from a chair using your arms (e.g., wheelchair, bedside chair)? 3  -UD  3  -MJ    Climbing 3-5 steps with a railing? 2  -UD  2  -MJ    To walk in hospital room? 3  -UD  3  -MJ    AM-PAC 6 Clicks Score 17  -UD  17  -MJ    How much help from another is currently needed...    Putting on and taking off regular lower body clothing?  3  -AN (r) SS (t) AN (c)     Bathing (including washing, rinsing, and drying)  3  -AN (r) SS (t) AN (c)     Toileting (which includes using toilet bed pan or urinal)  3  -AN (r) SS (t) AN (c)      Putting on and taking off regular upper body clothing  3  -AN (r) SS (t) AN (c)     Taking care of personal grooming (such as brushing teeth)  4  -AN (r) SS (t) AN (c)     Eating meals  4  -AN (r) SS (t) AN (c)     Score  20  -AN (r) SS (t)     Functional Assessment    Outcome Measure Options  AM-PAC 6 Clicks Daily Activity (OT)  -AN (r) SS (t) AN (c) AM-PAC 6 Clicks Basic Mobility (PT)  -      User Key  (r) = Recorded By, (t) = Taken By, (c) = Cosigned By    Initials Name Provider Type    UD Barbara Vaughan PTA Physical Therapy Assistant    GUILLERMO Villa, OT Occupational Therapist    REJI Echavarria, PT Physical Therapist    SS Roger Keys, OT Student OT Student           Time Calculation:         PT Charges       03/02/18 1228          Time Calculation    PT Received On 03/02/18  -      PT Goal Re-Cert Due Date 03/10/18  -      Time Calculation- PT    Total Timed Code Minutes- PT 24 minute(s)  -UD        User Key  (r) = Recorded By, (t) = Taken By, (c) = Cosigned By    Initials Name Provider Type    EMILIANA Vaughan PTA Physical Therapy Assistant          Therapy Charges for Today     Code Description Service Date Service Provider Modifiers Qty    34901022870 HC PT THER PROC EA 15 MIN 3/2/2018 Barbara Vaughan PTA GP 1    13527543799 HC GAIT TRAINING EA 15 MIN 3/2/2018 Barbara Vaughan PTA GP 1    99753545114 HC PT THER SUPP EA 15 MIN 3/2/2018 Barbara Vaughan, COCO GP 1          PT G-Codes  Outcome Measure Options: AM-PAC 6 Clicks Daily Activity (OT)    Barbara Vaughan PTA  3/2/2018

## 2018-03-02 NOTE — PLAN OF CARE
Problem: Patient Care Overview (Adult)  Goal: Plan of Care Review  Outcome: Ongoing (interventions implemented as appropriate)   03/02/18 1227   Coping/Psychosocial Response Interventions   Plan Of Care Reviewed With patient   Patient Care Overview   Progress improving   Outcome Evaluation   Outcome Summary/Follow up Plan pt up in chair.needs only CG assist for transfers and gait.ambulat 350 ft.       Problem: Inpatient Physical Therapy  Goal: Bed Mobility Goal LTG- PT  Outcome: Ongoing (interventions implemented as appropriate)   02/28/18 1624 03/02/18 1227   Bed Mobility PT LTG   Bed Mobility PT LTG, Date Established 02/28/18 --    Bed Mobility PT LTG, Time to Achieve 5 days --    Bed Mobility PT LTG, Activity Type supine to sit/sit to supine --    Bed Mobility PT LTG, [place] Level conditional independence --    Bed Mobility PT LTG, Date Goal Reviewed 02/28/18 --    Bed Mobility PT LTG, Outcome --  goal ongoing     Goal: Transfer Training Goal 1 LTG- PT  Outcome: Ongoing (interventions implemented as appropriate)   02/28/18 1624 03/02/18 1227   Transfer Training PT LTG   Transfer Training PT LTG, Date Established 02/28/18 --    Transfer Training PT LTG, Time to Achieve 5 days --    Transfer Training PT LTG, Activity Type sit to stand/stand to sit --    Transfer Training PT LTG, [place] Level conditional independence --    Transfer Training PT LTG, Assist Device walker, rolling --    Transfer Training PT LTG, Date Goal Reviewed 02/28/18 --    Transfer Training PT LTG, Outcome --  goal ongoing     Goal: Gait Training Goal LTG- PT  Outcome: Ongoing (interventions implemented as appropriate)   02/28/18 1624 03/02/18 1227   Gait Training PT LTG   Gait Training Goal PT LTG, Date Established 02/28/18 --    Gait Training Goal PT LTG, Time to Achieve 5 days --    Gait Training Goal PT LTG, [place] Level conditional independence --    Gait Training Goal PT LTG, Assist Device walker, rolling --    Gait  Training Goal PT LTG, Distance to Achieve 250 feet --    Gait Training Goal PT LTG, Date Goal Reviewed 02/28/18 --    Gait Training Goal PT LTG, Outcome --  goal partially met

## 2018-03-02 NOTE — DISCHARGE INSTR - LAB
Patient needs to have an EKG done in 1 week with the results faxed to Dr. [facility] office.  Also, [facility] office will call the patient for a electrical cardioversion to be done in 2 weeks.

## 2018-03-03 LAB
ALBUMIN SERPL-MCNC: 3.7 G/DL (ref 3.2–4.8)
ALBUMIN/GLOB SERPL: 1.4 G/DL (ref 1.5–2.5)
ALP SERPL-CCNC: 94 U/L (ref 25–100)
ALT SERPL W P-5'-P-CCNC: 26 U/L (ref 7–40)
ANION GAP SERPL CALCULATED.3IONS-SCNC: 8 MMOL/L (ref 3–11)
AST SERPL-CCNC: 28 U/L (ref 0–33)
BILIRUB SERPL-MCNC: 0.8 MG/DL (ref 0.3–1.2)
BUN BLD-MCNC: 12 MG/DL (ref 9–23)
BUN/CREAT SERPL: 13.3 (ref 7–25)
CALCIUM SPEC-SCNC: 8.9 MG/DL (ref 8.7–10.4)
CHLORIDE SERPL-SCNC: 102 MMOL/L (ref 99–109)
CO2 SERPL-SCNC: 26 MMOL/L (ref 20–31)
CREAT BLD-MCNC: 0.9 MG/DL (ref 0.6–1.3)
DEPRECATED RDW RBC AUTO: 45.5 FL (ref 37–54)
ERYTHROCYTE [DISTWIDTH] IN BLOOD BY AUTOMATED COUNT: 15.5 % (ref 11.3–14.5)
GFR SERPL CREATININE-BSD FRML MDRD: 60 ML/MIN/1.73
GLOBULIN UR ELPH-MCNC: 2.6 GM/DL
GLUCOSE BLD-MCNC: 119 MG/DL (ref 70–100)
GLUCOSE BLDC GLUCOMTR-MCNC: 115 MG/DL (ref 70–130)
GLUCOSE BLDC GLUCOMTR-MCNC: 121 MG/DL (ref 70–130)
GLUCOSE BLDC GLUCOMTR-MCNC: 173 MG/DL (ref 70–130)
HCT VFR BLD AUTO: 36.1 % (ref 34.5–44)
HGB BLD-MCNC: 11 G/DL (ref 11.5–15.5)
MCH RBC QN AUTO: 24.4 PG (ref 27–31)
MCHC RBC AUTO-ENTMCNC: 30.5 G/DL (ref 32–36)
MCV RBC AUTO: 80 FL (ref 80–99)
PLATELET # BLD AUTO: 345 10*3/MM3 (ref 150–450)
PMV BLD AUTO: 11 FL (ref 6–12)
POTASSIUM BLD-SCNC: 3.1 MMOL/L (ref 3.5–5.5)
PROT SERPL-MCNC: 6.3 G/DL (ref 5.7–8.2)
RBC # BLD AUTO: 4.51 10*6/MM3 (ref 3.89–5.14)
SODIUM BLD-SCNC: 136 MMOL/L (ref 132–146)
WBC NRBC COR # BLD: 8.6 10*3/MM3 (ref 3.5–10.8)

## 2018-03-03 PROCEDURE — 85027 COMPLETE CBC AUTOMATED: CPT | Performed by: INTERNAL MEDICINE

## 2018-03-03 PROCEDURE — 80053 COMPREHEN METABOLIC PANEL: CPT | Performed by: INTERNAL MEDICINE

## 2018-03-03 PROCEDURE — 63710000001 INSULIN DETEMIR PER 5 UNITS: Performed by: HOSPITALIST

## 2018-03-03 PROCEDURE — 99232 SBSQ HOSP IP/OBS MODERATE 35: CPT | Performed by: INTERNAL MEDICINE

## 2018-03-03 PROCEDURE — 25010000002 HYDRALAZINE PER 20 MG: Performed by: NURSE PRACTITIONER

## 2018-03-03 PROCEDURE — 82962 GLUCOSE BLOOD TEST: CPT

## 2018-03-03 PROCEDURE — 99232 SBSQ HOSP IP/OBS MODERATE 35: CPT | Performed by: NURSE PRACTITIONER

## 2018-03-03 RX ORDER — CLONIDINE HYDROCHLORIDE 0.1 MG/1
0.1 TABLET ORAL EVERY 6 HOURS PRN
Status: DISCONTINUED | OUTPATIENT
Start: 2018-03-03 | End: 2018-03-05 | Stop reason: HOSPADM

## 2018-03-03 RX ORDER — POTASSIUM CHLORIDE 7.45 MG/ML
10 INJECTION INTRAVENOUS
Status: DISCONTINUED | OUTPATIENT
Start: 2018-03-03 | End: 2018-03-05 | Stop reason: HOSPADM

## 2018-03-03 RX ORDER — POTASSIUM CHLORIDE 1.5 G/1.77G
40 POWDER, FOR SOLUTION ORAL AS NEEDED
Status: DISCONTINUED | OUTPATIENT
Start: 2018-03-03 | End: 2018-03-03

## 2018-03-03 RX ORDER — POTASSIUM CHLORIDE 750 MG/1
40 CAPSULE, EXTENDED RELEASE ORAL AS NEEDED
Status: DISCONTINUED | OUTPATIENT
Start: 2018-03-03 | End: 2018-03-05 | Stop reason: HOSPADM

## 2018-03-03 RX ORDER — CARVEDILOL 12.5 MG/1
25 TABLET ORAL EVERY 12 HOURS SCHEDULED
Status: DISCONTINUED | OUTPATIENT
Start: 2018-03-03 | End: 2018-03-05 | Stop reason: HOSPADM

## 2018-03-03 RX ADMIN — CARVEDILOL 25 MG: 12.5 TABLET, FILM COATED ORAL at 11:13

## 2018-03-03 RX ADMIN — APIXABAN 2.5 MG: 2.5 TABLET, FILM COATED ORAL at 21:16

## 2018-03-03 RX ADMIN — POTASSIUM CHLORIDE 40 MEQ: 750 CAPSULE, EXTENDED RELEASE ORAL at 13:39

## 2018-03-03 RX ADMIN — FAMOTIDINE 20 MG: 20 TABLET, FILM COATED ORAL at 21:16

## 2018-03-03 RX ADMIN — FAMOTIDINE 20 MG: 20 TABLET, FILM COATED ORAL at 08:04

## 2018-03-03 RX ADMIN — AMIODARONE HYDROCHLORIDE 400 MG: 200 TABLET ORAL at 21:16

## 2018-03-03 RX ADMIN — CITALOPRAM HYDROBROMIDE 20 MG: 20 TABLET ORAL at 08:04

## 2018-03-03 RX ADMIN — INSULIN DETEMIR 10 UNITS: 100 INJECTION, SOLUTION SUBCUTANEOUS at 21:16

## 2018-03-03 RX ADMIN — ATORVASTATIN CALCIUM 40 MG: 40 TABLET, FILM COATED ORAL at 08:04

## 2018-03-03 RX ADMIN — AMIODARONE HYDROCHLORIDE 400 MG: 200 TABLET ORAL at 08:04

## 2018-03-03 RX ADMIN — APIXABAN 2.5 MG: 2.5 TABLET, FILM COATED ORAL at 08:04

## 2018-03-03 RX ADMIN — Medication 10 MG: at 09:04

## 2018-03-03 RX ADMIN — HYDROCODONE BITARTRATE AND ACETAMINOPHEN 1 TABLET: 5; 325 TABLET ORAL at 21:16

## 2018-03-03 RX ADMIN — DILTIAZEM HYDROCHLORIDE 120 MG: 120 CAPSULE, COATED, EXTENDED RELEASE ORAL at 08:01

## 2018-03-03 RX ADMIN — POTASSIUM CHLORIDE 40 MEQ: 750 CAPSULE, EXTENDED RELEASE ORAL at 17:42

## 2018-03-03 RX ADMIN — ISOSORBIDE MONONITRATE 60 MG: 60 TABLET, EXTENDED RELEASE ORAL at 08:04

## 2018-03-03 RX ADMIN — ASPIRIN 81 MG: 81 TABLET, COATED ORAL at 08:04

## 2018-03-03 RX ADMIN — DOCUSATE SODIUM 100 MG: 100 CAPSULE, LIQUID FILLED ORAL at 21:16

## 2018-03-03 RX ADMIN — LOSARTAN POTASSIUM 100 MG: 50 TABLET ORAL at 08:04

## 2018-03-03 RX ADMIN — DOCUSATE SODIUM 100 MG: 100 CAPSULE, LIQUID FILLED ORAL at 08:01

## 2018-03-03 RX ADMIN — INSULIN DETEMIR 10 UNITS: 100 INJECTION, SOLUTION SUBCUTANEOUS at 09:09

## 2018-03-03 RX ADMIN — DONEPEZIL HYDROCHLORIDE 5 MG: 10 TABLET, FILM COATED ORAL at 21:16

## 2018-03-03 RX ADMIN — CARVEDILOL 25 MG: 12.5 TABLET, FILM COATED ORAL at 21:15

## 2018-03-03 RX ADMIN — INSULIN LISPRO 2 UNITS: 100 INJECTION, SOLUTION INTRAVENOUS; SUBCUTANEOUS at 17:27

## 2018-03-03 NOTE — PROGRESS NOTES
"    [facility] Medicine Services  PROGRESS NOTE    Patient Name: Mary Ramirez  : 1934  MRN: [id number]    Date of Admission: 2018  Length of Stay: 3  Primary Care Physician: Nirmal Lundberg MD    Subjective   Subjective     CC:  N/V    HPI:  Patient is seen at 11:55 AM resting upright in bed dosing intermittently.  No visitors at bedside.  Upon awakening she is alert and oriented to person, hospital in [place] and year but is very slow with her responses.  She reports that she slept poorly.  Denies current pain, headache, nausea, vomiting, abdominal pain, or hematemesis.  Asked specifically about bloody this she stated \"did I ever do that\"?  Per nursing staff patient is not eating well today, sleeping a lot, and not as interactive.  She is reportedly back in sinus rhythm and off of her esmolol drip.  Controlled on amiodarone by mouth.  However significant hypertension today with BP meds changes per cardiology.    Review of Systems   Gen- no pain  CV- No chest pain, palpitations  Resp- No cough, dyspnea  GI- No N/V/D, abd pain    Full ROS difficult to obtain due to mild dementia.     Otherwise ROS is negative except as mentioned in the HPI.    Objective   Objective     Vital Signs:   Temp:  [98 °F (36.7 °C)-98.4 °F (36.9 °C)] 98 °F (36.7 °C)  Heart Rate:  [] 61  Resp:  [16-18] 16  BP: (138-234)/() 138/61        Physical Exam:  General Assessment: No acute cardiopulmonary distress. Nontoxic, confused but pleasant.  Resting upright in bed dosing intermittently.  No visitors at bedside.     HEENT: NCAT, PERRL, MM moist     Neck: Supple, trachea midline     CVS: SR, S1S2 normal, no murmurs     Resp: CTAB but decreased at bases bilaterally, no adventitious sound     Abd: soft, NT, distended, +BS     Ext: No edema.  ARTE[place]A spont      Neuro: No facial asymmetry, speech clear.  Easily awakens and is oriented to person, hospital, town and year but very slow responses.  Mild " generalized confusion.  Very pleasant and calm.  Follows simple commands.     Skin: W/D/I. No rash.     Psych: Affect is appropriate, and cooperative.         Results Reviewed:  I have personally reviewed current lab, radiology, and data and agree.      Results from last 7 days  Lab Units 03/03/18  1058 03/01/18  0457 02/28/18  0724   WBC 10*3/mm3 8.60 6.65 7.48   HEMOGLOBIN g/dL 11.0* 10.9* 10.3*   HEMATOCRIT % 36.1 37.6 35.1   PLATELETS 10*3/mm3 345 343 313       Results from last 7 days  Lab Units 03/03/18  1058 03/01/18  0457 02/28/18  0724 02/27/18  2306   SODIUM mmol/L 136 131* 135 136   POTASSIUM mmol/L 3.1* 4.2 4.2 4.9   CHLORIDE mmol/L 102 103 101 106   CO2 mmol/L 26.0 20.0 26.0 21.0   BUN mg/dL 12 13 17 18   CREATININE mg/dL 0.90 1.00 1.10 1.10   GLUCOSE mg/dL 119* 217* 202* 128*   CALCIUM mg/dL 8.9 8.9 8.7 9.1   ALT (SGPT) U/L 26  --   --  18   AST (SGOT) U/L 28  --   --  36*     Estimated Creatinine Clearance: 43.6 mL/min (by C-G formula based on Cr of 0.9).  No results found for: BNP  No results found for: PHART    Microbiology Results Abnormal     Procedure Component Value - Date/Time    Urine Culture - Urine, Urine, Clean Catch [[id number]] Collected:  02/28/18 0013    Lab Status:  Final result Specimen:  Urine from Urine, Catheter Updated:  03/02/18 0824     Urine Culture --      50,000-60,000 CFU/mL Normal Urogenital Barbara          Imaging Results (last 24 hours)     ** No results found for the last 24 hours. **        Results for orders placed during the hospital encounter of 11/23/17   Adult Transthoracic Echo Complete W/ Cont if Necessary Per Protocol    Narrative · The left ventricular cavity is small.  · Left ventricular wall thickness is consistent with mild concentric   hypertrophy.  · Left ventricular systolic function is hyperdynamic (EF > 70).  · Mild mitral valve regurgitation is present  · Mild tricuspid valve regurgitation is present.          I have reviewed the  medications.    Assessment/Plan   Assessment / Plan     Hospital Problem List     * (Principal)Atrial fibrillation with RVR    Hypertension    Type 2 diabetes mellitus with complication, with long-term current use of insulin    CAD (coronary artery disease) s/p stents    Dementia    Bilateral pleural effusion    Leukocytosis    Enteritis    Right knee pain    Ileus             Brief Hospital Course to date:  Mary Ramirez is an 84 yo F who is pleasant, but unfortunately relatively poor historian. No family present at time of my exam. She stated that her son just left to go to work. According to record, she is from [facility]. She has history of dementia, CAD-s/p stent, PAF, IDDM2, and HTN. She was brought in due to concerns for hematemesis. Apparently she ate some spaghetti with red  sauce and had nausea and vomiting after. Staff could not discern between blood vs sauce, so she was brought in for evaluation. She reported that she had some flutter in her chest, but denies any pain or SOA. She felt weak/dizzy and felt like she just wanted to go to sleep. In the ED, she was found to be in Afib with RVR. She also had leukocytosis, but afebrile. CT abp/pelv showed no signs of any bowel obstructions or other acute findings, but did show incidental cardiomegaly with bibasilar pulm edema/effusion, and small pericardial effusion. Pt reports no BM or flatus today.    Assessment & Plan:  - ACS ruled out. TSH WNL.  - BP uncontrolled and HR on the upper limit of normal range. NH had dc'ed Coreg and Cardizem, not sure why.  She remained tachycardic/A. fib.  On esmolol drip, since DC'd and heart rate sinus rhythm on oral amiodarone per cardiology.   ----- However this morning A. fib is now controlled yet patient is significantly hypertensive.  Medication adjustments per cardiology and will monitor.    - N/V poss viral gastroenteritis vs other--Prior DC Reglan 3/2 since BS present and pt passing flatus. If symptoms return  after Reglan is stopped, will consider Gastric emptying study to R/O gastroparesis. Suspect pt has mild ileus/viral gastroenteritis-->>>3/3 nausea/vomiting improved however poor appetite still.  Will add boost supplement and consult nutrition.  Consider further workup if indicated.  - Leukocytosis has resolved, likely reactive in the first place. CXR and UA negative.  - Bilat pleural effusion/? pulm edema on CXR, but pt is asymptomatic.  - DM is fairly controlled with insulin, A1C 7.5. Was hyperglycemic, likely from basal insulin being held so restarted Levemir at 10 units BID 3/2.  Glucoses is currently stable  - Ongoing PT/OT  --Hypokalemia  replace per protocol  --AM labs    PLAN:  Continue bp meds per cards  Ck am labs  Encourage po intake  Poss txr back to the [place] by car per her son tomorrow, [place] 3/4 if medically ready        CODE STATUS: Full Code    Disposition: Back to the [place] once medically stable, hopefully in 1-2 days.    Electronically signed by CRISTA Castillo, 03/03/18, 2:06 PM.

## 2018-03-03 NOTE — PROGRESS NOTES
"  [facility]   Inpatient Progress Note       LOS: 3 days   Patient Care Team:  iNrmal Lundberg MD as PCP - General (Internal Medicine)    Chief Complaint:  Follow-up for atrial fibrillation.    Subjective     Interval History:   Patient is poor historian, says she feels bad but cannot give specific complaints.      Review of Systems:   Pertinent positives noted in history, exam, and assessment. Otherwise reviewed and negative.      Objective     Vitals:  Blood pressure (!) 213/81, pulse 71, temperature 98 °F (36.7 °C), temperature source Oral, resp. rate 16, height 156.2 cm (61.5\"), weight 58.3 kg (128 lb 8 oz), SpO2 97 %.     Intake/Output Summary (Last 24 hours) at 03/03/18 1017  Last data filed at 03/02/18 1800   Gross per 24 hour   Intake              240 ml   Output                0 ml   Net              240 ml     Physical Exam   Constitutional: She appears well-developed and well-nourished.   HENT:   Mouth/Throat: Oropharynx is clear and moist.   Neck: No JVD present. Carotid bruit is not present. No thyromegaly present.   Cardiovascular: Regular rhythm, S1 normal, S2 normal, normal heart sounds and intact distal pulses.  Exam reveals no gallop, no S3 and no S4.    No murmur heard.  Pulses:       Carotid pulses are 2+ on the right side, and 2+ on the left side.       Radial pulses are 2+ on the right side, and 2+ on the left side.   Pulmonary/Chest: Breath sounds normal.   Abdominal: Soft. Bowel sounds are normal. She exhibits no mass. There is no tenderness.   Musculoskeletal: She exhibits no edema.   Skin: Skin is warm and dry. No rash noted.          Results Review:     I reviewed the patient's new clinical results.      Results from last 7 days  Lab Units 03/01/18  0457   WBC 10*3/mm3 6.65   HEMOGLOBIN g/dL 10.9*   HEMATOCRIT % 37.6   PLATELETS 10*3/mm3 343       Results from last 7 days  Lab Units 03/01/18  0457  02/27/18  2306   SODIUM mmol/L 131*  < > 136   POTASSIUM " mmol/L 4.2  < > 4.9   CHLORIDE mmol/L 103  < > 106   CO2 mmol/L 20.0  < > 21.0   BUN mg/dL 13  < > 18   CREATININE mg/dL 1.00  < > 1.10   CALCIUM mg/dL 8.9  < > 9.1   BILIRUBIN mg/dL  --   --  0.4   ALK PHOS U/L  --   --  109*   ALT (SGPT) U/L  --   --  18   AST (SGOT) U/L  --   --  36*   GLUCOSE mg/dL 217*  < > 128*   < > = values in this interval not displayed.    Results from last 7 days  Lab Units 03/01/18  0457   SODIUM mmol/L 131*   POTASSIUM mmol/L 4.2   CHLORIDE mmol/L 103   CO2 mmol/L 20.0   BUN mg/dL 13   CREATININE mg/dL 1.00   GLUCOSE mg/dL 217*   CALCIUM mg/dL 8.9           Lab Results  Lab Value Date/Time   TROPONINI 0.208 (H) 11/26/2017 0502   TROPONINI 0.108 (H) 11/26/2017 0000   TROPONINI 0.039 11/24/2017 0536   TROPONINI 0.02 02/13/2016 1552   TROPONINI <0.01 05/12/2014 0705       Results from last 7 days  Lab Units 02/28/18  0724   TSH mIU/mL 1.865                 Tele:  SR    Assessment/Plan     Principal Problem:    Atrial fibrillation with RVR  Active Problems:    Hypertension    Type 2 diabetes mellitus with complication, with long-term current use of insulin    CAD (coronary artery disease) s/p stents    Dementia    Bilateral pleural effusion    Leukocytosis    Enteritis    Right knee pain    Ileus      1. Atrial fibrillation  - RVR on admission  - on chronic anticoagulation  - on amiodarone, cardizem and chronic anticoagulation  2. Hypertension, uncontrolled.  3. CAD, stable no angina  4. Dementia  5. Dyslipidemia, on statin    Plan:  Patient is back in sinus rhythm on amiodarone, will continue this.  She is however severely hypertensive today, unclear whether this represents some component of beta blocker withdrawal (now off esmolol) or due to patient having pain in her other symptom that she cannot express to us due to her dementia.  We'll need to evaluate further for this, I'll order labs, hospitalist input.  We will resume her beta blocker and add when necessary clonidine.  Karen  Alida, CRISTA  03/03/18  10:17 AM  I, Bartolo Goldstein MD, personally performed the services described in this documentation as scribed by the above individual in my presence, and it is both accurate and complete    Dictated utilizing Dragon dictation

## 2018-03-03 NOTE — PLAN OF CARE
Problem: Patient Care Overview (Adult)  Goal: Plan of Care Review  Outcome: Ongoing (interventions implemented as appropriate)    Goal: Adult Individualization and Mutuality  Outcome: Ongoing (interventions implemented as appropriate)    Goal: Discharge Needs Assessment  Outcome: Ongoing (interventions implemented as appropriate)      Problem: Arrhythmia/Dysrhythmia (Symptomatic) (Adult)  Goal: Signs and Symptoms of Listed Potential Problems Will be Absent or Manageable (Arrhythmia/Dysrhythmia)  Outcome: Ongoing (interventions implemented as appropriate)      Problem: Pressure Ulcer (Adult)  Goal: Signs and Symptoms of Listed Potential Problems Will be Absent or Manageable (Pressure Ulcer)  Outcome: Ongoing (interventions implemented as appropriate)

## 2018-03-04 LAB
ANION GAP SERPL CALCULATED.3IONS-SCNC: 6 MMOL/L (ref 3–11)
BASOPHILS # BLD AUTO: 0.01 10*3/MM3 (ref 0–0.2)
BASOPHILS NFR BLD AUTO: 0.1 % (ref 0–1)
BUN BLD-MCNC: 15 MG/DL (ref 9–23)
BUN/CREAT SERPL: 15 (ref 7–25)
CALCIUM SPEC-SCNC: 9 MG/DL (ref 8.7–10.4)
CHLORIDE SERPL-SCNC: 107 MMOL/L (ref 99–109)
CO2 SERPL-SCNC: 26 MMOL/L (ref 20–31)
CREAT BLD-MCNC: 1 MG/DL (ref 0.6–1.3)
DEPRECATED RDW RBC AUTO: 46.8 FL (ref 37–54)
EOSINOPHIL # BLD AUTO: 0.16 10*3/MM3 (ref 0–0.3)
EOSINOPHIL NFR BLD AUTO: 1.9 % (ref 0–3)
ERYTHROCYTE [DISTWIDTH] IN BLOOD BY AUTOMATED COUNT: 15.9 % (ref 11.3–14.5)
GFR SERPL CREATININE-BSD FRML MDRD: 53 ML/MIN/1.73
GLUCOSE BLD-MCNC: 43 MG/DL (ref 70–100)
GLUCOSE BLDC GLUCOMTR-MCNC: 103 MG/DL (ref 70–130)
GLUCOSE BLDC GLUCOMTR-MCNC: 114 MG/DL (ref 70–130)
GLUCOSE BLDC GLUCOMTR-MCNC: 133 MG/DL (ref 70–130)
GLUCOSE BLDC GLUCOMTR-MCNC: 147 MG/DL (ref 70–130)
GLUCOSE BLDC GLUCOMTR-MCNC: 58 MG/DL (ref 70–130)
HCT VFR BLD AUTO: 34.7 % (ref 34.5–44)
HGB BLD-MCNC: 10.4 G/DL (ref 11.5–15.5)
IMM GRANULOCYTES # BLD: 0.02 10*3/MM3 (ref 0–0.03)
IMM GRANULOCYTES NFR BLD: 0.2 % (ref 0–0.6)
LYMPHOCYTES # BLD AUTO: 1.13 10*3/MM3 (ref 0.6–4.8)
LYMPHOCYTES NFR BLD AUTO: 13.6 % (ref 24–44)
MCH RBC QN AUTO: 24.2 PG (ref 27–31)
MCHC RBC AUTO-ENTMCNC: 30 G/DL (ref 32–36)
MCV RBC AUTO: 80.9 FL (ref 80–99)
MONOCYTES # BLD AUTO: 0.95 10*3/MM3 (ref 0–1)
MONOCYTES NFR BLD AUTO: 11.5 % (ref 0–12)
NEUTROPHILS # BLD AUTO: 6.02 10*3/MM3 (ref 1.5–8.3)
NEUTROPHILS NFR BLD AUTO: 72.7 % (ref 41–71)
PLATELET # BLD AUTO: 291 10*3/MM3 (ref 150–450)
PMV BLD AUTO: 11.5 FL (ref 6–12)
POTASSIUM BLD-SCNC: 3.7 MMOL/L (ref 3.5–5.5)
RBC # BLD AUTO: 4.29 10*6/MM3 (ref 3.89–5.14)
SODIUM BLD-SCNC: 139 MMOL/L (ref 132–146)
WBC NRBC COR # BLD: 8.29 10*3/MM3 (ref 3.5–10.8)

## 2018-03-04 PROCEDURE — 85025 COMPLETE CBC W/AUTO DIFF WBC: CPT | Performed by: NURSE PRACTITIONER

## 2018-03-04 PROCEDURE — 63710000001 INSULIN DETEMIR PER 5 UNITS: Performed by: HOSPITALIST

## 2018-03-04 PROCEDURE — 99232 SBSQ HOSP IP/OBS MODERATE 35: CPT | Performed by: NURSE PRACTITIONER

## 2018-03-04 PROCEDURE — 97530 THERAPEUTIC ACTIVITIES: CPT

## 2018-03-04 PROCEDURE — 80048 BASIC METABOLIC PNL TOTAL CA: CPT | Performed by: NURSE PRACTITIONER

## 2018-03-04 PROCEDURE — 25010000002 HYDRALAZINE PER 20 MG: Performed by: NURSE PRACTITIONER

## 2018-03-04 PROCEDURE — 82962 GLUCOSE BLOOD TEST: CPT

## 2018-03-04 RX ORDER — AMIODARONE HYDROCHLORIDE 200 MG/1
200 TABLET ORAL EVERY 12 HOURS SCHEDULED
Status: DISCONTINUED | OUTPATIENT
Start: 2018-03-04 | End: 2018-03-05 | Stop reason: HOSPADM

## 2018-03-04 RX ORDER — AMLODIPINE BESYLATE 10 MG/1
10 TABLET ORAL
Status: DISCONTINUED | OUTPATIENT
Start: 2018-03-04 | End: 2018-03-05 | Stop reason: HOSPADM

## 2018-03-04 RX ADMIN — APIXABAN 2.5 MG: 2.5 TABLET, FILM COATED ORAL at 08:58

## 2018-03-04 RX ADMIN — INSULIN DETEMIR 10 UNITS: 100 INJECTION, SOLUTION SUBCUTANEOUS at 08:59

## 2018-03-04 RX ADMIN — AMLODIPINE BESYLATE 10 MG: 10 TABLET ORAL at 13:17

## 2018-03-04 RX ADMIN — HYDROCODONE BITARTRATE AND ACETAMINOPHEN 1 TABLET: 5; 325 TABLET ORAL at 20:39

## 2018-03-04 RX ADMIN — ACETAMINOPHEN 650 MG: 325 TABLET, FILM COATED ORAL at 14:33

## 2018-03-04 RX ADMIN — AMIODARONE HYDROCHLORIDE 200 MG: 200 TABLET ORAL at 20:40

## 2018-03-04 RX ADMIN — CITALOPRAM HYDROBROMIDE 20 MG: 20 TABLET ORAL at 08:58

## 2018-03-04 RX ADMIN — AMIODARONE HYDROCHLORIDE 400 MG: 200 TABLET ORAL at 08:58

## 2018-03-04 RX ADMIN — LOSARTAN POTASSIUM 100 MG: 50 TABLET ORAL at 08:58

## 2018-03-04 RX ADMIN — DOCUSATE SODIUM 100 MG: 100 CAPSULE, LIQUID FILLED ORAL at 20:40

## 2018-03-04 RX ADMIN — DOCUSATE SODIUM 100 MG: 100 CAPSULE, LIQUID FILLED ORAL at 08:58

## 2018-03-04 RX ADMIN — ATORVASTATIN CALCIUM 40 MG: 40 TABLET, FILM COATED ORAL at 08:58

## 2018-03-04 RX ADMIN — FAMOTIDINE 20 MG: 20 TABLET, FILM COATED ORAL at 20:39

## 2018-03-04 RX ADMIN — ASPIRIN 81 MG: 81 TABLET, COATED ORAL at 08:58

## 2018-03-04 RX ADMIN — DONEPEZIL HYDROCHLORIDE 5 MG: 10 TABLET, FILM COATED ORAL at 20:40

## 2018-03-04 RX ADMIN — APIXABAN 2.5 MG: 2.5 TABLET, FILM COATED ORAL at 20:39

## 2018-03-04 RX ADMIN — CLONIDINE HYDROCHLORIDE 0.1 MG: 0.1 TABLET ORAL at 05:29

## 2018-03-04 RX ADMIN — FAMOTIDINE 20 MG: 20 TABLET, FILM COATED ORAL at 08:58

## 2018-03-04 RX ADMIN — Medication 10 MG: at 07:01

## 2018-03-04 RX ADMIN — ISOSORBIDE MONONITRATE 60 MG: 60 TABLET, EXTENDED RELEASE ORAL at 08:58

## 2018-03-04 NOTE — CONSULTS
"Adult Nutrition  Assessment/PES    Patient Name:  Mary Ramirez  YOB: 1934  MRN: [id number]  Admit Date:  2/27/2018    Assessment Date:  3/4/2018    Comments:            Reason for Assessment       03/04/18 1122    Reason for Assessment    Reason For Assessment/Visit nurse/nurse practitioner consult    Identified At Risk By Screening Criteria other (see comments)   nutrition assessment    Time Spent (min) 15    Cardiac AVR;PAF;HTN;CAD   CAD s/p stents    Endocrine DM Type 2    Gastrointestinal Other (comment)   Enteritis    Hematological Other (comment)   Leukocytosis    Neurological Dementia    Pulmonary/Critical Care Other (comment)   bilateral pleural effusion              Nutrition/Diet History       03/04/18 1124    Nutrition/Diet History    Reported/Observed By Patient    Other Patient unable to answer my questions at time of visit.            Anthropometrics       03/04/18 1124    Anthropometrics    Height 156.2 cm (61.5\")    Weight 57.7 kg (127 lb 3.3 oz)    Ideal Body Weight (IBW)    Ideal Body Weight (IBW), Female 49.69    % Ideal Body Weight 116.37    Body Mass Index (BMI)    BMI (kg/m2) 23.7            Labs/Tests/Procedures/Meds       03/04/18 1124    Labs/Tests/Procedures/Meds    Labs/Tests Review Reviewed                Nutrition Prescription Ordered       03/04/18 1125    Nutrition Prescription PO    Current PO Diet Soft Texture    Texture Whole foods    Fluid Consistency Thin    Supplement Boost Glucose Control    Supplement Frequency Daily    Common Modifiers Cardiac;Consistent Carbohydrate;GI Soft/[place]            Evaluation of Received Nutrient/Fluid Intake       03/04/18 1125    PO Evaluation    Number of Meals 3    % PO Intake 25            Problem/Interventions:        Problem 1       03/04/18 1125    Nutrition Diagnoses Problem 1    Problem 1 Inadequate Intake/Infusion    Inadequate Intake Type Oral    Etiology (related to) Medical Diagnosis    Neurological Dementia    " Signs/Symptoms (evidenced by) PO Intake    Percent (%) intake recorded 25 %    Over number of meals 3                    Intervention Goal       03/04/18 1125    Intervention Goal    General Nutrition support treatment    PO Increase intake            Nutrition Intervention       03/04/18 1126    Nutrition Intervention    RD/Tech Action Follow Tx progress;Care plan reviewd              Education/Evaluation       03/04/18 1126    Monitor/Evaluation    Monitor Per protocol;PO intake;Supplement intake;Pertinent labs        Electronically signed by:  Joann Roth RD  03/04/18 11:26 AM

## 2018-03-04 NOTE — PLAN OF CARE
Problem: Patient Care Overview (Adult)  Goal: Plan of Care Review  Outcome: Ongoing (interventions implemented as appropriate)   03/04/18 7466   Coping/Psychosocial Response Interventions   Plan Of Care Reviewed With patient   Patient Care Overview   Progress improving   Outcome Evaluation   Outcome Summary/Follow up Plan bp meds changed today. bp much better, hr in the 70's       Problem: Arrhythmia/Dysrhythmia (Symptomatic) (Adult)  Goal: Signs and Symptoms of Listed Potential Problems Will be Absent or Manageable (Arrhythmia/Dysrhythmia)  Outcome: Ongoing (interventions implemented as appropriate)      Problem: Pressure Ulcer (Adult)  Goal: Signs and Symptoms of Listed Potential Problems Will be Absent or Manageable (Pressure Ulcer)  Outcome: Ongoing (interventions implemented as appropriate)

## 2018-03-04 NOTE — PROGRESS NOTES
"  [facility]   Inpatient Progress Note       LOS: 4 days   Patient Care Team:  Nirmal Lundberg MD as PCP - General (Internal Medicine)    Chief Complaint:  Follow-up for atrial fibrillation.    Subjective     Interval History:   BB and CCB held this AM due to HR/bradycardia. Has only received one dose of clonidine which was this AM at 0530. Patient with no current CV complaints. HR in 40's while in the room.  Sitting in chair, asymptomatic despite low heart rate      Review of Systems:   Pertinent positives noted in history, exam, and assessment. Otherwise reviewed and negative.      Objective     Vitals:  Blood pressure 174/75, pulse 50, temperature 98.8 °F (37.1 °C), temperature source Oral, resp. rate 16, height 156.2 cm (61.5\"), weight 57.7 kg (127 lb 4.8 oz), SpO2 96 %.     Intake/Output Summary (Last 24 hours) at 03/04/18 1013  Last data filed at 03/03/18 1939   Gross per 24 hour   Intake              120 ml   Output              200 ml   Net              -80 ml     Physical Exam   Constitutional: She appears well-developed and well-nourished.   HENT:   Mouth/Throat: Oropharynx is clear and moist.   Neck: No JVD present. Carotid bruit is not present. No thyromegaly present.   Cardiovascular: Regular rhythm, S1 normal, S2 normal, normal heart sounds and intact distal pulses.  Exam reveals no gallop, no S3 and no S4.    No murmur heard.  Pulmonary/Chest: Breath sounds normal.   Abdominal: Soft. Bowel sounds are normal. She exhibits no mass. There is no tenderness.   Musculoskeletal: She exhibits no edema.   Skin: Skin is warm and dry. No rash noted.     I've examined and agree with the above     Results Review:     I reviewed the patient's new clinical results.      Results from last 7 days  Lab Units 03/04/18  0358   WBC 10*3/mm3 8.29   HEMOGLOBIN g/dL 10.4*   HEMATOCRIT % 34.7   PLATELETS 10*3/mm3 291       Results from last 7 days  Lab Units 03/04/18  0358 03/03/18  1058 "   SODIUM mmol/L 139 136   POTASSIUM mmol/L 3.7 3.1*   CHLORIDE mmol/L 107 102   CO2 mmol/L 26.0 26.0   BUN mg/dL 15 12   CREATININE mg/dL 1.00 0.90   CALCIUM mg/dL 9.0 8.9   BILIRUBIN mg/dL  --  0.8   ALK PHOS U/L  --  94   ALT (SGPT) U/L  --  26   AST (SGOT) U/L  --  28   GLUCOSE mg/dL 43* 119*       Results from last 7 days  Lab Units 03/04/18  0358   SODIUM mmol/L 139   POTASSIUM mmol/L 3.7   CHLORIDE mmol/L 107   CO2 mmol/L 26.0   BUN mg/dL 15   CREATININE mg/dL 1.00   GLUCOSE mg/dL 43*   CALCIUM mg/dL 9.0           Lab Results  Lab Value Date/Time   TROPONINI 0.208 (H) 11/26/2017 0502   TROPONINI 0.108 (H) 11/26/2017 0000   TROPONINI 0.039 11/24/2017 0536   TROPONINI 0.02 02/13/2016 1552   TROPONINI <0.01 05/12/2014 0705       Results from last 7 days  Lab Units 02/28/18  0724   TSH mIU/mL 1.865                 Tele:  SR    Assessment/Plan     Principal Problem:    Atrial fibrillation with RVR  Active Problems:    Hypertension    Type 2 diabetes mellitus with complication, with long-term current use of insulin    CAD (coronary artery disease) s/p stents    Dementia    Bilateral pleural effusion    Leukocytosis    Enteritis    Right knee pain    Ileus      1. Atrial fibrillation  - RVR on admission  - on chronic anticoagulation  - on amiodarone, cardizem and chronic anticoagulation  2. Sick sinus syndrome/bradycardia, asymptomatic  3. Hypertension, uncontrolled.  4. CAD, stable no angina  5. Dementia  6. Dyslipidemia, on statin    Plan:  Change diltiazem to amlodipine.  Will decrease amiodarone loading dose  to resume cardiology care in AM.    Karen Irwin, APRN  03/04/18  10:13 AM    Dictated utilizing Dragon dictation

## 2018-03-04 NOTE — PROGRESS NOTES
[facility] Medicine Services  PROGRESS NOTE    Patient Name: Mary Ramirez  : 1934  MRN: [id number]    Date of Admission: 2018  Length of Stay: 4  Primary Care Physician: Nirmal Lundberg MD    Subjective   Subjective     CC:  N/V    HPI:  Patient sitting up in chair when seen earlier today.  No family at bedside.  Denies any new complaints or issues. NSR on monitor.      Review of Systems   Gen- no pain  CV- No chest pain, palpitations  Resp- No cough, dyspnea  GI- No N/V/D, abd pain    Full ROS difficult to obtain due to mild dementia.     Otherwise ROS is negative except as mentioned in the HPI.    Objective   Objective     Vital Signs:   Temp:  [98.2 °F (36.8 °C)-98.8 °F (37.1 °C)] 98.8 °F (37.1 °C)  Heart Rate:  [44-61] 51  Resp:  [16] 16  BP: (116-200)/(56-93) 133/63        Physical Exam:  General Assessment: No acute cardiopulmonary distress. Nontoxic, confused but pleasant.  Sitting up in chair.     HEENT: NCAT, PERRL, MM moist      CVS: SR, S1S2 normal, no murmurs     Resp: CTAB but decreased at bases bilaterally, no adventitious sound     Abd: soft, NT, distended, +BS     Ext: No edema.  [place] spont      Neuro: No facial asymmetry, speech clear.  Easily awakens and is oriented to person, hospital, town and year but very slow responses.  Mild generalized confusion.  Very pleasant and calm.  Follows simple commands.     Skin: W/D/I. No rash.     Psych: Affect is appropriate, and cooperative.         Results Reviewed:  I have personally reviewed current lab, radiology, and data and agree.      Results from last 7 days  Lab Units 18  0358 18  1058 18  0457   WBC 10*3/mm3 8.29 8.60 6.65   HEMOGLOBIN g/dL 10.4* 11.0* 10.9*   HEMATOCRIT % 34.7 36.1 37.6   PLATELETS 10*3/mm3 291 345 343       Results from last 7 days  Lab Units 18  0358 18  1058 18  0457  18  2306   SODIUM mmol/L 139 136 131*  < > 136   POTASSIUM mmol/L 3.7  3.1* 4.2  < > 4.9   CHLORIDE mmol/L 107 102 103  < > 106   CO2 mmol/L 26.0 26.0 20.0  < > 21.0   BUN mg/dL 15 12 13  < > 18   CREATININE mg/dL 1.00 0.90 1.00  < > 1.10   GLUCOSE mg/dL 43* 119* 217*  < > 128*   CALCIUM mg/dL 9.0 8.9 8.9  < > 9.1   ALT (SGPT) U/L  --  26  --   --  18   AST (SGOT) U/L  --  28  --   --  36*   < > = values in this interval not displayed.  Estimated Creatinine Clearance: 38.8 mL/min (by C-G formula based on Cr of 1).  No results found for: BNP  No results found for: PHART    Microbiology Results Abnormal     Procedure Component Value - Date/Time    Urine Culture - Urine, Urine, Clean Catch [[id number]] Collected:  02/28/18 0013    Lab Status:  Final result Specimen:  Urine from Urine, Catheter Updated:  03/02/18 0824     Urine Culture --      50,000-60,000 CFU/mL Normal Urogenital Barbara          Imaging Results (last 24 hours)     ** No results found for the last 24 hours. **        Results for orders placed during the hospital encounter of 11/23/17   Adult Transthoracic Echo Complete W/ Cont if Necessary Per Protocol    Narrative · The left ventricular cavity is small.  · Left ventricular wall thickness is consistent with mild concentric   hypertrophy.  · Left ventricular systolic function is hyperdynamic (EF > 70).  · Mild mitral valve regurgitation is present  · Mild tricuspid valve regurgitation is present.          I have reviewed the medications.    Assessment/Plan   Assessment / Plan     Hospital Problem List     * (Principal)Atrial fibrillation with RVR    Hypertension    Type 2 diabetes mellitus with complication, with long-term current use of insulin    CAD (coronary artery disease) s/p stents    Dementia    Bilateral pleural effusion    Leukocytosis    Enteritis    Right knee pain    Ileus             Brief Hospital Course to date:  Mary Ramirez is an 84 yo F who is pleasant, but unfortunately relatively poor historian. No family present at time of my exam. She stated that her  son just left to go to work. According to record, she is from [facility]. She has history of dementia, CAD-s/p stent, PAF, IDDM2, and HTN. She was brought in due to concerns for hematemesis. Apparently she ate some spaghetti with red sauce and had nausea and vomiting after. Staff could not discern between blood vs sauce, so she was brought in for evaluation. She reported that she had some flutter in her chest, but denies any pain or SOA. She felt weak/dizzy and felt like she just wanted to go to sleep. In the ED, she was found to be in Afib with RVR. She also had leukocytosis, but afebrile. CT abp/pelv showed no signs of any bowel obstructions or other acute findings, but did show incidental cardiomegaly with bibasilar pulm edema/effusion, and small pericardial effusion. Pt reports no BM or flatus today.    Assessment & Plan:  - ACS ruled out. TSH WNL.  - BP uncontrolled and HR on the upper limit of normal range. NH had dc'ed Coreg and Cardizem, not sure why.  She remained tachycardic/A. fib.  On esmolol drip, since DC'd and heart rate sinus rhythm on oral amiodarone per cardiology.   ----- However this morning A. fib is now controlled yet patient continues with hypertension.  Medication adjustmented per cardiology and will monitor.    - N/V poss viral gastroenteritis vs other--Prior DC Reglan 3/2 since BS present and pt passing flatus. If symptoms return after Reglan is stopped, will consider Gastric emptying study to R/O gastroparesis. Suspect pt has mild ileus/viral gastroenteritis.  Appetite poor.  Continue boost supplement.    - Leukocytosis has resolved, likely reactive in the first place. CXR and UA negative.  - Bilat pleural effusion/? pulm edema on CXR, but pt is asymptomatic.  - DM is fairly controlled with insulin, A1C 7.5.   - Hypoglycemic this am.  Appetite poor.  Decrease Levemir to once a day from every 12 hours.    - Ongoing PT/OT  --Hypokalemia  replace per protocol  --AM labs    PLAN:  Continue  bp meds per cards  Encourage po intake  Possible transfer back to the [facility] by car per her son tomorrow, Monday 3/5 if medically ready        CODE STATUS: Full Code    Disposition: Back to the [facility] once medically stable, hopefully in 1-2 days.    Electronically signed by CRISTA Foreman, 03/04/18, 1:40 PM.

## 2018-03-04 NOTE — PLAN OF CARE
Problem: Patient Care Overview (Adult)  Goal: Plan of Care Review  Outcome: Ongoing (interventions implemented as appropriate)   03/04/18 1151   Coping/Psychosocial Response Interventions   Plan Of Care Reviewed With patient   Patient Care Overview   Progress progress toward functional goals as expected   Outcome Evaluation   Outcome Summary/Follow up Plan Pt limited this date by dizziness; VSS; RN notified/aware; deferred fxl mobility d/t dizziness. /68. Pt tolerated BU/LE strengthening exercises, 10 reps all ex's. Cotninue per OT POC.       Problem: Inpatient Occupational Therapy  Goal: Transfer Training Goal 1 LTG- OT  Outcome: Ongoing (interventions implemented as appropriate)   03/01/18 1126 03/04/18 1151   Transfer Training OT LTG   Transfer Training OT LTG, Date Established 03/01/18 --    Transfer Training OT LTG, Time to Achieve 1 wk --    Transfer Training OT LTG, Activity Type sit to stand/stand to sit;toilet --    Transfer Training OT LTG, [place] Level contact guard assist --    Transfer Training OT LTG, Assist Device walker, rolling --    Transfer Training OT LTG, Outcome --  goal ongoing  (Deferred this date d/t dizziness)     Goal: Toileting Goal LTG- OT  Outcome: Ongoing (interventions implemented as appropriate)   03/01/18 1126 03/04/18 1151   Toileting OT LTG   Toileting Goal OT LTG, Date Established 03/01/18 --    Toileting Goal OT LTG, Time to Achieve 1 wk --    Toileting Goal OT LTG, [place] Level minimum assist (75% patient effort) --    Toileting Goal OT LTG, Outcome --  goal ongoing     Goal: Functional Mobility Goal LTG- OT  Outcome: Ongoing (interventions implemented as appropriate)   03/01/18 1126 03/04/18 1151   Functional Mobility OT LTG   Functional Mobility Goal OT LTG, Date Established 03/01/18 --    Functional Mobility Goal OT LTG, Time to Achieve 1 wk --    Functional Mobility Goal OT LTG, [place] Level min assist --    Functional Mobility Goal OT LTG, Assist  Device rolling walker --    Functional Mobility Goal OT LTG, Distance to Achieve to the bathroom;in hallway --    Functional Mobility Goal OT LTG, Outcome --  goal ongoing  (Deferred this date d/t dizziness)     Goal: Activity Tolerance Goal LTG- OT  Outcome: Ongoing (interventions implemented as appropriate)   03/01/18 1126 03/04/18 1151   Activity Tolerance OT LTG   Activity Tolerance Goal OT LTG, Date Established 03/01/18 --    Activity Tolerance Goal OT LTG, Time to Achieve 1 wk --    Activity Tolerance Goal OT LTG, Activity Level 5 min activity;10 min activity --    Activity Tolerance Goal OT LTG, Additional Goal ADL of choice while standing --    Activity Tolerance Goal OT LTG, Outcome --  goal ongoing

## 2018-03-04 NOTE — THERAPY TREATMENT NOTE
Acute Care - Occupational Therapy Treatment Note  [facility]     Patient Name: Mary Ramirez  : 1934  MRN: [id number]  Today's Date: 3/4/2018  Onset of Illness/Injury or Date of Surgery Date: 18  Date of Referral to OT: 18  Referring Physician: JOI Jhaveri      Admit Date: 2018    Visit Dx:     ICD-10-CM ICD-9-CM   1. Atrial fibrillation with RVR I48.91 427.31   2. Acute on chronic systolic congestive heart failure I50.23 428.23     428.0   3. Pleural effusion J90 511.9   4. Enteritis K52.9 558.9   5. Impaired functional mobility, balance, gait, and endurance Z74.09 V49.89     Patient Active Problem List   Diagnosis   • Fall   • Fracture, intertrochanteric, left femur   • Hypertension   • Type 2 diabetes mellitus with complication, with long-term current use of insulin   • CAD (coronary artery disease) s/p stents   • Atrial fibrillation with RVR, new onset   • Dementia   • Atrial fibrillation with RVR   • Bilateral pleural effusion   • Leukocytosis   • Enteritis   • Right knee pain   • Ileus             Adult Rehabilitation Note       18 1118 18 1120       Rehab Assessment/Intervention    Discipline occupational therapist  -TA physical therapy assistant  -UD     Document Type therapy note (daily note)  -TA therapy note (daily note)  -UD     Subjective Information agree to therapy;complains of;dizziness;weakness  -TA agree to therapy;no complaints  -UD     Patient Effort, Rehab Treatment adequate  -TA good  -UD     Symptoms Noted During/After Treatment fatigue;dizziness  -TA fatigue  -UD     Symptoms Noted Comment Mild dizziness throughout tx; VSS; fxl mobility deferred  -TA      Precautions/Limitations fall precautions  -TA fall precautions  -UD     Recorded by [TA] Raad Kendrick OT [UD] Barbara Vaughan PTA     Vital Signs    Pre Systolic BP Rehab 165  -TA      Pre Treatment Diastolic BP 68  -TA      Pretreatment Heart Rate (beats/min) 49  -TA      Intratreatment Heart  Rate (beats/min) 51  -TA      Posttreatment Heart Rate (beats/min) 50  -TA      Pre SpO2 (%) 96  -TA      O2 Delivery Pre Treatment room air  -TA      Post SpO2 (%) 97  -TA      O2 Delivery Post Treatment room air  -TA room air  -UD     Pre Patient Position Sitting  -TA Sitting  -UD     Intra Patient Position Sitting  -TA Standing  -UD     Post Patient Position Sitting  -TA Sitting  -UD     Recorded by [TA] Raad Kendrick OT [UD] Barbara Vaughan PTA     Pain Assessment    Pain Assessment No/denies pain  -TA No/denies pain  -UD     Recorded by [TA] Raad Kendrick OT [UD] Barbara Vaughan PTA     Cognitive Assessment/Intervention    Current Cognitive/Communication Assessment impaired  -TA      Orientation Status oriented to;person;situation  -TA oriented to;person  -UD     Follows Commands/Answers Questions 100% of the time;able to follow single-step instructions;needs cueing;needs repetition  -% of the time  -UD     Personal Safety mild impairment  -TA mild impairment  -UD     Personal Safety Interventions fall prevention program maintained;gait belt;muscle strengthening facilitated;nonskid shoes/slippers when out of bed;supervised activity  -TA      Recorded by [TA] Raad Kendrick OT [UD] Barbara Vaughan PTA     Bed Mobility, Assessment/Treatment    Bed Mob, Supine to Sit, [place] not tested  -TA not tested   up in chair  -UD     Bed Mob, Sit to Supine, [place] not tested  -TA      Bed Mobility, Comment Pt UIC  -TA      Recorded by [TA] Raad Kendrick OT [UD] Barbara Vaughan PTA     Transfer Assessment/Treatment    Transfers, Sit-Stand [place]  contact guard assist  -UD     Transfers, Stand-Sit [place]  contact guard assist  -UD     Transfers, Sit-Stand-Sit, Assist Device  rolling walker  -UD     Transfer, Comment Transfers deferred 2/2 pt dizziness  -TA      Recorded by [TA] Raad Kendrick OT [UD] Barbara Vaughan PTA     Gait Assessment/Treatment    Gait, [place] Level   contact guard assist  -UD     Gait, Assistive Device  rolling walker  -UD     Gait, Distance (Feet)  350  -UD     Gait, Gait Deviations  trey decreased  -UD     Gait, Safety Issues  step length decreased  -UD     Gait, Impairments  strength decreased  -UD     Recorded by  [UD] Barbara Vaughan PTA     Motor Skills/Interventions    Additional Documentation Balance Skills Training (Group)  -TA      Recorded by [TA] Raad Kendrick OT      Balance Skills Training    Sitting-Level of Assistance Close supervision  -TA      Sitting-Balance Support Feet supported  -TA      Sitting-Balance Activities Forward lean;Trunk control activities  -TA      Sitting # of Minutes 15  -TA      Recorded by [TA] Raad Kendrick OT      Therapy Exercises    Left Lower Extremity AROM:;10 reps;sitting;glut sets;quad sets;SLR;SAQ  -TA      Bilateral Lower Extremities  10 reps;AROM:;sitting  -UD     Bilateral Upper Extremity AROM:;10 reps;sitting;elbow flexion/extension;hand pumps;shoulder circles;shoulder extension/flexion;shoulder horizontal abd/add;shoulder rolls/shrugs  -TA      Trunk Exercises 5 reps;sitting;trunk flexion  -TA      Recorded by [TA] Raad Kendrick OT [UD] Barbara Vaughan PTA     Positioning and Restraints    Pre-Treatment Position sitting in chair/recliner  -TA sitting in chair/recliner  -UD     Post Treatment Position chair  -TA chair  -UD     In Chair notified nsg;reclined;call light within reach;encouraged to call for assist;exit alarm on;legs elevated;heels elevated  -TA notified nsg;reclined;sitting;call light within reach;exit alarm on;legs elevated  -UD     Recorded by [TA] Raad Kendrick OT [UD] Barbara Vaughan PTA       User Key  (r) = Recorded By, (t) = Taken By, (c) = Cosigned By    Initials Name Effective Dates    UD Barbara Vaughan PTA 06/22/15 -     TA Raad Kendrick OT 03/14/16 -                 OT Goals       03/04/18 1151 03/01/18 1126       Transfer Training OT LTG    Transfer Training OT  LTG, Date Established  03/01/18  -AN (r) SS (t) AN (c)     Transfer Training OT LTG, Time to Achieve  1 wk  -AN (r) SS (t) AN (c)     Transfer Training OT LTG, Activity Type  sit to stand/stand to sit;toilet  -AN (r) SS (t) AN (c)     Transfer Training OT LTG, [place] Level  contact guard assist  -AN (r) SS (t) AN (c)     Transfer Training OT LTG, Assist Device  walker, rolling  -AN (r) SS (t) AN (c)     Transfer Training OT LTG, Outcome goal ongoing   Deferred this date d/t dizziness  -TA goal ongoing  -AN (r) SS (t) AN (c)     Toileting OT LTG    Toileting Goal OT LTG, Date Established  03/01/18  -AN (r) SS (t) AN (c)     Toileting Goal OT LTG, Time to Achieve  1 wk  -AN (r) SS (t) AN (c)     Toileting Goal OT LTG, [place] Level  minimum assist (75% patient effort)  -AN (r) SS (t) AN (c)     Toileting Goal OT LTG, Outcome goal ongoing  -TA goal ongoing  -AN (r) SS (t) AN (c)     Functional Mobility OT LTG    Functional Mobility Goal OT LTG, Date Established  03/01/18  -AN (r) SS (t) AN (c)     Functional Mobility Goal OT LTG, Time to Achieve  1 wk  -AN (r) SS (t) AN (c)     Functional Mobility Goal OT LTG, [place] Level  min assist  -AN (r) SS (t) AN (c)     Functional Mobility Goal OT LTG, Assist Device  rolling walker  -AN (r) SS (t) AN (c)     Functional Mobility Goal OT LTG, Distance to Achieve  to the bathroom;in hallway  -AN (r) SS (t) AN (c)     Functional Mobility Goal OT LTG, Outcome goal ongoing   Deferred this date d/t dizziness  -TA goal ongoing  -AN (r) SS (t) AN (c)     Activity Tolerance OT LTG    Activity Tolerance Goal OT LTG, Date Established  03/01/18  -AN (r) SS (t) AN (c)     Activity Tolerance Goal OT LTG, Time to Achieve  1 wk  -AN (r) SS (t) AN (c)     Activity Tolerance Goal OT LTG, Activity Level  5 min activity;10 min activity  -AN (r) SS (t) AN (c)     Activity Tolerance Goal OT LTG, Additional Goal  ADL of choice while standing  -AN (r) SS (t) AN (c)     Activity  Tolerance Goal OT LTG, Outcome goal ongoing  -TA goal ongoing  -AN (r) SS (t) AN (c)       User Key  (r) = Recorded By, (t) = Taken By, (c) = Cosigned By    Initials Name Provider Type    GUILLERMO Villa, OT Occupational Therapist    TA Raad Kendrick, OT Occupational Therapist    SS Roger Keys, OT Student OT Student          Occupational Therapy Education     Title: PT OT SLP Therapies (Active)     Topic: Occupational Therapy (Active)     Point: ADL training (Active)    Description: Instruct learner(s) on proper safety adaptation and remediation techniques during self care or transfers.   Instruct in proper use of assistive devices.    Learning Progress Summary    Learner Readiness Method Response Comment Documented by Status   Patient Acceptance E NR Pt educated on proper hand placement and body mechanics during transfers to facilitate participation in ADL's.  03/01/18 1124 Active               Point: Home exercise program (Done)    Description: Instruct learner(s) on appropriate technique for monitoring, assisting and/or progressing therapeutic exercises/activities.    Learning Progress Summary    Learner Readiness Method Response Comment Documented by Status   Patient Acceptance E,D VU,DU,NR BUE HEP initiated; reinforceed need for call for assist with OOB activities. TA 03/04/18 1151 Done               Point: Precautions (Done)    Description: Instruct learner(s) on prescribed precautions during self-care and functional transfers.    Learning Progress Summary    Learner Readiness Method Response Comment Documented by Status   Patient Acceptance E,D VU,DU,NR BUE HEP initiated; reinforceed need for call for assist with OOB activities. TA 03/04/18 1151 Done               Point: Body mechanics (Active)    Description: Instruct learner(s) on proper positioning and spine alignment during self-care, functional mobility activities and/or exercises.    Learning Progress Summary    Learner Readiness  Method Response Comment Documented by Status   Patient Acceptance E NR Pt educated on proper hand placement and body mechanics during transfers to facilitate participation in ADL's.  03/01/18 1124 Active                      User Key     Initials Effective Dates Name Provider Type Discipline    TA 03/14/16 -  Raad Kendrick, OT Occupational Therapist OT    SS 12/11/17 -  Roger Keys, OT Student OT Student OT                  OT Recommendation and Plan  Anticipated Equipment Needs At Discharge:  (TBD)  Anticipated Discharge Disposition: skilled nursing facility  Planned Therapy Interventions: ADL retraining, transfer training, activity intolerance  Therapy Frequency: daily  Plan of Care Review  Plan Of Care Reviewed With: patient  Progress: progress toward functional goals as expected  Outcome Summary/Follow up Plan: Pt limited this date by dizziness; VSS; RN notified/aware; deferred fxl mobility d/t dizziness. /68. Pt tolerated BU/LE strengthening exercises, 10 reps all ex's. Cotninue per OT POC.        Outcome Measures       03/04/18 1118 03/02/18 1120       How much help from another person do you currently need...    Turning from your back to your side while in flat bed without using bedrails?  3  -UD     Moving from lying on back to sitting on the side of a flat bed without bedrails?  3  -UD     Moving to and from a bed to a chair (including a wheelchair)?  3  -UD     Standing up from a chair using your arms (e.g., wheelchair, bedside chair)?  3  -UD     Climbing 3-5 steps with a railing?  2  -UD     To walk in hospital room?  3  -UD     AM-PAC 6 Clicks Score  17  -UD     How much help from another is currently needed...    Putting on and taking off regular lower body clothing? 3  -TA      Bathing (including washing, rinsing, and drying) 3  -TA      Toileting (which includes using toilet bed pan or urinal) 3  -TA      Putting on and taking off regular upper body clothing 3  -TA      Taking  care of personal grooming (such as brushing teeth) 4  -TA      Eating meals 4  -TA      Score 20  -TA      Functional Assessment    Outcome Measure Options AM-PAC 6 Clicks Daily Activity (OT)  -TA        User Key  (r) = Recorded By, (t) = Taken By, (c) = Cosigned By    Initials Name Provider Type    EMILIANA Vaughan PTA Physical Therapy Assistant    MARIA ELENA Kendrick OT Occupational Therapist           Time Calculation:         Time Calculation- OT       03/04/18 1155          Time Calculation- OT    OT Start Time 1118   ttc 23 minutes  -TA      Total Timed Code Minutes- OT 23 minute(s)  -TA      OT Received On 03/04/18  -TA      OT Goal Re-Cert Due Date 03/11/18  -TA        User Key  (r) = Recorded By, (t) = Taken By, (c) = Cosigned By    Initials Name Provider Type    MARIA ELENA Kendrick OT Occupational Therapist           Therapy Charges for Today     Code Description Service Date Service Provider Modifiers Qty    [id number]  OT THERAPEUTIC ACT EA 15 MIN 3/4/2018 Raad Kendrick OT GO 2               Raad Kendrick OT  3/4/2018

## 2018-03-05 VITALS
RESPIRATION RATE: 16 BRPM | HEART RATE: 73 BPM | BODY MASS INDEX: 22.78 KG/M2 | OXYGEN SATURATION: 97 % | WEIGHT: 123.8 LBS | SYSTOLIC BLOOD PRESSURE: 169 MMHG | DIASTOLIC BLOOD PRESSURE: 68 MMHG | TEMPERATURE: 98.2 F | HEIGHT: 62 IN

## 2018-03-05 PROBLEM — K52.9 ENTERITIS: Status: RESOLVED | Noted: 2018-02-28 | Resolved: 2018-03-05

## 2018-03-05 PROBLEM — K56.7 ILEUS (HCC): Status: RESOLVED | Noted: 2018-02-28 | Resolved: 2018-03-05

## 2018-03-05 PROBLEM — I48.91 ATRIAL FIBRILLATION WITH RVR (HCC): Status: RESOLVED | Noted: 2017-11-26 | Resolved: 2018-03-05

## 2018-03-05 PROBLEM — D72.829 LEUKOCYTOSIS: Status: RESOLVED | Noted: 2018-02-28 | Resolved: 2018-03-05

## 2018-03-05 PROBLEM — I48.91 ATRIAL FIBRILLATION WITH RVR (HCC): Status: RESOLVED | Noted: 2018-02-28 | Resolved: 2018-03-05

## 2018-03-05 LAB
GLUCOSE BLDC GLUCOMTR-MCNC: 187 MG/DL (ref 70–130)
GLUCOSE BLDC GLUCOMTR-MCNC: 254 MG/DL (ref 70–130)
GLUCOSE BLDC GLUCOMTR-MCNC: 71 MG/DL (ref 70–130)

## 2018-03-05 PROCEDURE — 97139 UNLISTED THERAPEUTIC PX: CPT

## 2018-03-05 PROCEDURE — 82962 GLUCOSE BLOOD TEST: CPT

## 2018-03-05 PROCEDURE — 97110 THERAPEUTIC EXERCISES: CPT

## 2018-03-05 PROCEDURE — 99239 HOSP IP/OBS DSCHRG MGMT >30: CPT | Performed by: NURSE PRACTITIONER

## 2018-03-05 PROCEDURE — 99232 SBSQ HOSP IP/OBS MODERATE 35: CPT | Performed by: NURSE PRACTITIONER

## 2018-03-05 PROCEDURE — 97116 GAIT TRAINING THERAPY: CPT

## 2018-03-05 RX ORDER — AMIODARONE HYDROCHLORIDE 200 MG/1
200 TABLET ORAL EVERY 12 HOURS SCHEDULED
Start: 2018-03-05 | End: 2018-03-14 | Stop reason: SDUPTHER

## 2018-03-05 RX ORDER — AMLODIPINE BESYLATE 10 MG/1
10 TABLET ORAL
Start: 2018-03-06 | End: 2018-12-12

## 2018-03-05 RX ORDER — TERAZOSIN 5 MG/1
5 CAPSULE ORAL NIGHTLY
Status: DISCONTINUED | OUTPATIENT
Start: 2018-03-05 | End: 2018-03-05 | Stop reason: HOSPADM

## 2018-03-05 RX ORDER — TERAZOSIN 5 MG/1
5 CAPSULE ORAL NIGHTLY
Start: 2018-03-05 | End: 2018-04-11 | Stop reason: HOSPADM

## 2018-03-05 RX ORDER — LOSARTAN POTASSIUM 100 MG/1
100 TABLET ORAL
Start: 2018-03-06 | End: 2018-09-06 | Stop reason: HOSPADM

## 2018-03-05 RX ADMIN — INSULIN LISPRO 4 UNITS: 100 INJECTION, SOLUTION INTRAVENOUS; SUBCUTANEOUS at 12:40

## 2018-03-05 RX ADMIN — APIXABAN 2.5 MG: 2.5 TABLET, FILM COATED ORAL at 08:02

## 2018-03-05 RX ADMIN — AMIODARONE HYDROCHLORIDE 200 MG: 200 TABLET ORAL at 08:02

## 2018-03-05 RX ADMIN — LOSARTAN POTASSIUM 100 MG: 50 TABLET ORAL at 08:02

## 2018-03-05 RX ADMIN — INSULIN LISPRO 2 UNITS: 100 INJECTION, SOLUTION INTRAVENOUS; SUBCUTANEOUS at 17:00

## 2018-03-05 RX ADMIN — DOCUSATE SODIUM 100 MG: 100 CAPSULE, LIQUID FILLED ORAL at 08:02

## 2018-03-05 RX ADMIN — FAMOTIDINE 20 MG: 20 TABLET, FILM COATED ORAL at 08:02

## 2018-03-05 RX ADMIN — CITALOPRAM HYDROBROMIDE 20 MG: 20 TABLET ORAL at 08:02

## 2018-03-05 RX ADMIN — ASPIRIN 81 MG: 81 TABLET, COATED ORAL at 08:02

## 2018-03-05 RX ADMIN — AMLODIPINE BESYLATE 10 MG: 10 TABLET ORAL at 08:02

## 2018-03-05 RX ADMIN — ISOSORBIDE MONONITRATE 60 MG: 60 TABLET, EXTENDED RELEASE ORAL at 08:02

## 2018-03-05 RX ADMIN — ATORVASTATIN CALCIUM 40 MG: 40 TABLET, FILM COATED ORAL at 08:02

## 2018-03-05 NOTE — PROGRESS NOTES
[facility] at [facility]  Cardiology Progress Note      Chief Complaint/Reason for service:    · F/U Atrial Fibrillation  · Hypertension         Patient's BB and CCB were held yesterday d/t bradycardia. Diltiazem changed to amlodipine and amiodarone dose decreased. She is currently in NSR.  Denies chest pain or dyspnea.,  Is confused.  Blood pressure elevated.     Past medical, surgical, social and family history reviewed in the patient's electronic medical record.         Vital Sign Min/Max for last 24 hours  Temp  Min: 97.8 °F (36.6 °C)  Max: 98.2 °F (36.8 °C)   BP  Min: 111/71  Max: 208/75   Pulse  Min: 51  Max: 63   Resp  Min: 16  Max: 18   SpO2  Min: 97 %  Max: 97 %   No Data Recorded      Intake/Output Summary (Last 24 hours) at 03/05/18 0944  Last data filed at 03/04/18 1800   Gross per 24 hour   Intake              480 ml   Output                0 ml   Net              480 ml           Physical Exam   Constitutional: She appears well-developed and well-nourished.   HENT:   Head: Normocephalic.   Neck: No JVD present. Carotid bruit is not present.   Cardiovascular: Normal rate, regular rhythm, normal heart sounds and intact distal pulses.  Exam reveals no gallop and no friction rub.    No murmur heard.  Pulmonary/Chest: Effort normal and breath sounds normal.   Abdominal: Soft.   Musculoskeletal: She exhibits no edema.   Neurological: She is alert.   Skin: Skin is warm and dry. No cyanosis. Nails show no clubbing.   Psychiatric: Her behavior is normal.       Results Review:   I reviewed the patient's recent labs in the electronic medical record.        Results from last 7 days  Lab Units 03/04/18  0358 03/03/18  1058 03/01/18  0457 02/28/18  0724 02/27/18  2306   SODIUM mmol/L 139 136 131* 135 136   POTASSIUM mmol/L 3.7 3.1* 4.2 4.2 4.9   CHLORIDE mmol/L 107 102 103 101 106   BUN mg/dL 15 12 13 17 18   CREATININE mg/dL 1.00 0.90 1.00 1.10 1.10   MAGNESIUM mg/dL  --   --   --  1.8  --             Results from last 7 days  Lab Units 03/04/18  0358 03/03/18  1058 03/01/18  0457   WBC 10*3/mm3 8.29 8.60 6.65   HEMOGLOBIN g/dL 10.4* 11.0* 10.9*   HEMATOCRIT % 34.7 36.1 37.6   PLATELETS 10*3/mm3 291 345 343       Lab Results   Component Value Date    HGBA1C 7.50 (H) 03/01/2018       Lab Results   Component Value Date    CHLPL 123 01/24/2016    TRIG 149 01/24/2016    HDL 32 (L) 01/24/2016    LDL 67 01/24/2016        Tele:  NSR         Hospital Problem List     * (Principal)Atrial fibrillation with RVR    Overview Addendum 3/5/2018  9:43 AM by CRISTA Oleary     · Diagnosed 11/26/2017 following Left femur repair  · CHADS-VASc= 5  · Chronic Eliquis therapy  · Atrial Fibrillation with RVR on Adm 2/27/18. Had been off atenolol and cardizem  · Converted back to NSR on Esmolol drip         Coronary artery disease involving native coronary artery of native heart without angina pectoris    Overview Addendum 3/5/2018  9:42 AM by CRISTA Oleary     · Cardiac cath with PCI data deficit  · Echo (11/27/2017): Mild LVH. Mild MR and TR.  Normal LVEF           Hypertension    Type 2 diabetes mellitus with complication, with long-term current use of insulin    Dementia    Bilateral pleural effusion    Leukocytosis    Enteritis    Right knee pain    Ileus                 · Consider Cardura 2 mg nightly for elevated b/p  · Continue Eliquis, amiodarone, amlodipine, Losartan  And coreg    CRISTA Dumont  3/5/2018

## 2018-03-05 NOTE — THERAPY TREATMENT NOTE
Acute Care - Physical Therapy Treatment Note  [facility]     Patient Name: Mary Ramirez  : 1934  MRN: [id number]  Today's Date: 3/5/2018  Onset of Illness/Injury or Date of Surgery Date: 18  Date of Referral to PT: 18  Referring Physician: JOI Jhaveri    Admit Date: 2018    Visit Dx:    ICD-10-CM ICD-9-CM   1. Atrial fibrillation with RVR I48.91 427.31   2. Acute on chronic systolic congestive heart failure I50.23 428.23     428.0   3. Pleural effusion J90 511.9   4. Enteritis K52.9 558.9   5. Impaired functional mobility, balance, gait, and endurance Z74.09 V49.89     Patient Active Problem List   Diagnosis   • Fall   • Fracture, intertrochanteric, left femur   • Hypertension   • Type 2 diabetes mellitus with complication, with long-term current use of insulin   • CAD (coronary artery disease) s/p stents   • Atrial fibrillation with RVR, new onset   • Dementia   • Atrial fibrillation with RVR   • Bilateral pleural effusion   • Leukocytosis   • Enteritis   • Right knee pain   • Ileus               Adult Rehabilitation Note       18 0841 18 1118 18 1120    Rehab Assessment/Intervention    Discipline physical therapy assistant  -UD occupational therapist  -TA physical therapy assistant  -UD    Document Type therapy note (daily note)  -UD therapy note (daily note)  -TA therapy note (daily note)  -UD    Subjective Information agree to therapy;no complaints  -UD agree to therapy;complains of;dizziness;weakness  -TA agree to therapy;no complaints  -UD    Patient Effort, Rehab Treatment good  -UD adequate  -TA good  -UD    Symptoms Noted During/After Treatment fatigue  -UD fatigue;dizziness  -TA fatigue  -UD    Symptoms Noted Comment  Mild dizziness throughout tx; VSS; fxl mobility deferred  -TA     Precautions/Limitations fall precautions  -UD fall precautions  -TA fall precautions  -UD    Recorded by [UD] Barbara Vaughan PTA [TA] Raad Kendrick OT [UD] Barbara Vaughan PTA     Vital Signs    Pre Systolic BP Rehab  165  -TA     Pre Treatment Diastolic BP  68  -TA     Pretreatment Heart Rate (beats/min)  49  -TA     Intratreatment Heart Rate (beats/min)  51  -TA     Posttreatment Heart Rate (beats/min)  50  -TA     Pre SpO2 (%)  96  -TA     O2 Delivery Pre Treatment  room air  -TA     Post SpO2 (%)  97  -TA     O2 Delivery Post Treatment room air  -UD room air  -TA room air  -UD    Pre Patient Position Sitting  -UD Sitting  -TA Sitting  -UD    Intra Patient Position Standing  -UD Sitting  -TA Standing  -UD    Post Patient Position Sitting  -UD Sitting  -TA Sitting  -UD    Recorded by [UD] Barbara Vaughan PTA [TA] Raad Kendrick OT [UD] Barbara Vaughan PTA    Pain Assessment    Pain Assessment No/denies pain  -UD No/denies pain  -TA No/denies pain  -UD    Recorded by [UD] Barbara Vaughan PTA [TA] Raad Kendrick OT [UD] Barbara Vaughan PTA    Cognitive Assessment/Intervention    Current Cognitive/Communication Assessment  impaired  -TA     Orientation Status oriented to;person;situation;place  -UD oriented to;person;situation  -TA oriented to;person  -UD    Follows Commands/Answers Questions 100% of the time  -% of the time;able to follow single-step instructions;needs cueing;needs repetition  -% of the time  -UD    Personal Safety  mild impairment  -TA mild impairment  -UD    Personal Safety Interventions fall prevention program maintained  -UD fall prevention program maintained;gait belt;muscle strengthening facilitated;nonskid shoes/slippers when out of bed;supervised activity  -TA     Recorded by [UD] Barbara Vaughan PTA [TA] Raad Kendrick, OT [UD] Barbara Vaughan PTA    Bed Mobility, Assessment/Treatment    Bed Mob, Supine to Sit, [place] not tested   up in chair  -UD not tested  -TA not tested   up in chair  -UD    Bed Mob, Sit to Supine, [place]  not tested  -TA     Bed Mobility, Comment  Pt UIC  -TA     Recorded by [UD] Barbara Vaughan PTA [TA] Raad LOPEZ  LAURA Kendrick [UD] Barbara Vaughan PTA    Transfer Assessment/Treatment    Transfers, Sit-Stand [place] stand by assist  -UD  contact guard assist  -UD    Transfers, Stand-Sit [place] stand by assist  -UD  contact guard assist  -UD    Transfers, Sit-Stand-Sit, Assist Device rolling walker  -UD  rolling walker  -UD    Transfer, Comment  Transfers deferred 2/2 pt dizziness  -TA     Recorded by [UD] Barbara Vaughan PTA [TA] Raad Kendrick OT [UD] Barbara Vaughan PTA    Gait Assessment/Treatment    Gait, [place] Level contact guard assist  -UD  contact guard assist  -UD    Gait, Assistive Device rolling walker  -UD  rolling walker  -UD    Gait, Distance (Feet) 350   some cues for directions  -UD  350  -UD    Gait, Gait Deviations trey decreased  -UD  trey decreased  -UD    Gait, Safety Issues step length decreased  -UD  step length decreased  -UD    Gait, Impairments strength decreased  -UD  strength decreased  -UD    Recorded by [UD] Barbara Vaughan PTA  [UD] Barbara Vaughan PTA    Motor Skills/Interventions    Additional Documentation  Balance Skills Training (Group)  -TA     Recorded by  [TA] Raad Kendrick OT     Balance Skills Training    Sitting-Level of Assistance  Close supervision  -TA     Sitting-Balance Support  Feet supported  -TA     Sitting-Balance Activities  Forward lean;Trunk control activities  -TA     Sitting # of Minutes  15  -TA     Recorded by  [TA] Raad Kendrick OT     Therapy Exercises    Left Lower Extremity AROM:;10 reps;sitting  -UD AROM:;10 reps;sitting;glut sets;quad sets;SLR;SAQ  -TA     Bilateral Lower Extremities   10 reps;AROM:;sitting  -UD    Bilateral Upper Extremity AROM:;10 reps;sitting  -UD AROM:;10 reps;sitting;elbow flexion/extension;hand pumps;shoulder circles;shoulder extension/flexion;shoulder horizontal abd/add;shoulder rolls/shrugs  -TA     Trunk Exercises  5 reps;sitting;trunk flexion  -TA     Recorded by [UD] Barbara Vaughan PTA [TA] Raad LOPEZ  [place], OT [UD] Barbara Vaughan PTA    Positioning and Restraints    Pre-Treatment Position sitting in chair/recliner  -UD sitting in chair/recliner  -TA sitting in chair/recliner  -UD    Post Treatment Position chair  -UD chair  -TA chair  -UD    In Chair notified nsg;reclined;sitting;call light within reach;exit alarm on;legs elevated  -UD notified nsg;reclined;call light within reach;encouraged to call for assist;exit alarm on;legs elevated;heels elevated  -TA notified nsg;reclined;sitting;call light within reach;exit alarm on;legs elevated  -UD    Recorded by [UD] Barbara Vaughan PTA [TA] Raad Kendrick OT [UD] Barbara Vaughan PTA      User Key  (r) = Recorded By, (t) = Taken By, (c) = Cosigned By    Initials Name Effective Dates    UD Barbara Vaughan PTA 06/22/15 -     TA Raad Kendrick OT 03/14/16 -                 IP PT Goals       03/05/18 0930 03/02/18 1227 02/28/18 1624    Bed Mobility PT LTG    Bed Mobility PT LTG, Date Established   02/28/18  -MJ    Bed Mobility PT LTG, Time to Achieve   5 days  -MJ    Bed Mobility PT LTG, Activity Type   supine to sit/sit to supine  -MJ    Bed Mobility PT LTG, [place] Level   conditional independence  -MJ    Bed Mobility PT LTG, Date Goal Reviewed   02/28/18  -MJ    Bed Mobility PT LTG, Outcome goal met  -UD goal ongoing  -UD goal ongoing  -MJ    Transfer Training PT LTG    Transfer Training PT LTG, Date Established   02/28/18  -MJ    Transfer Training PT LTG, Time to Achieve   5 days  -MJ    Transfer Training PT LTG, Activity Type   sit to stand/stand to sit  -MJ    Transfer Training PT LTG, [place] Level   conditional independence  -MJ    Transfer Training PT LTG, Assist Device   walker, rolling  -MJ    Transfer Training PT  LTG, Date Goal Reviewed   02/28/18  -MJ    Transfer Training PT LTG, Outcome goal met  -UD goal ongoing  -UD goal ongoing  -MJ    Gait Training PT LTG    Gait Training Goal PT LTG, Date Established   02/28/18  -MJ    Gait Training  Goal PT LTG, Time to Achieve   5 days  -MJ    Gait Training Goal PT LTG, [place] Level   conditional independence  -MJ    Gait Training Goal PT LTG, Assist Device   walker, rolling  -MJ    Gait Training Goal PT LTG, Distance to Achieve   250 feet  -MJ    Gait Training Goal PT LTG, Date Goal Reviewed   02/28/18  -MJ    Gait Training Goal PT LTG, Outcome goal partially met  -UD goal partially met  -UD goal ongoing  -MJ      User Key  (r) = Recorded By, (t) = Taken By, (c) = Cosigned By    Initials Name Provider Type    EMILIANA Vaughan, PTA Physical Therapy Assistant    REJI Echavarria, PT Physical Therapist          Physical Therapy Education     Title: PT OT SLP Therapies (Active)     Topic: Physical Therapy (Done)     Point: Mobility training (Done)    Learning Progress Summary    Learner Readiness Method Response Comment Documented by Status   Patient Eager GLORIA FARRELL,NR   03/05/18 0929 Done    Acceptance E,D DU,NR   03/02/18 1226 Done    Acceptance E,D NR Reviewed benefits of mobility  02/28/18 1624 Active               Point: Home exercise program (Done)    Learning Progress Summary    Learner Readiness Method Response Comment Documented by Status   Patient Eager GLORIA FARRELL,NR   03/05/18 0929 Done    Acceptance E,D DU,NR   03/02/18 1226 Done               Point: Body mechanics (Done)    Learning Progress Summary    Learner Readiness Method Response Comment Documented by Status   Patient Eager GLORIA FARRELL,NR   03/05/18 0929 Done    Acceptance E,D DU,NR   03/02/18 1226 Done    Acceptance E,D NR Reviewed benefits of mobility  02/28/18 1624 Active               Point: Precautions (Done)    Learning Progress Summary    Learner Readiness Method Response Comment Documented by Status   Patient Eager GLORIA FARRELL,NR   03/05/18 0929 Done    Acceptance E,D DU,NR   03/02/18 1226 Done    Acceptance E,D NR Reviewed benefits of mobility  02/28/18 1624 Active                      User Key     Initials Effective Dates Name  Provider Type Discipline    UD 06/22/15 -  Barbara Vaughan PTA Physical Therapy Assistant PT     03/14/16 -  Jerry Echavarria PT Physical Therapist PT                    PT Recommendation and Plan  Anticipated Discharge Disposition: extended care facility (return to the [facility])  Planned Therapy Interventions: balance training, bed mobility training, gait training, home exercise program, patient/family education, strengthening, transfer training  PT Frequency: daily  Plan of Care Review  Plan Of Care Reviewed With: patient  Progress: improving  Outcome Summary/Follow up Plan: pt up in chair.only needs minim. assist for gait.ambulat [facility]          Outcome Measures       03/05/18 0841 03/04/18 1118 03/02/18 1120    How much help from another person do you currently need...    Turning from your back to your side while in flat bed without using bedrails? 4  -UD  3  -UD    Moving from lying on back to sitting on the side of a flat bed without bedrails? 4  -UD  3  -UD    Moving to and from a bed to a chair (including a wheelchair)? 4  -UD  3  -UD    Standing up from a chair using your arms (e.g., wheelchair, bedside chair)? 4  -UD  3  -UD    Climbing 3-5 steps with a railing? 3  -UD  2  -UD    To walk in hospital room? 3  -UD  3  -UD    AM-PAC 6 Clicks Score 22  -UD  17  -UD    How much help from another is currently needed...    Putting on and taking off regular lower body clothing?  3  -TA     Bathing (including washing, rinsing, and drying)  3  -TA     Toileting (which includes using toilet bed pan or urinal)  3  -TA     Putting on and taking off regular upper body clothing  3  -TA     Taking care of personal grooming (such as brushing teeth)  4  -TA     Eating meals  4  -TA     Score  20  -TA     Functional Assessment    Outcome Measure Options  AM-PAC 6 Clicks Daily Activity (OT)  -TA       User Key  (r) = Recorded By, (t) = Taken By, (c) = Cosigned By    Initials Name Provider Type    UD  Barbara Vaughan PTA Physical Therapy Assistant    MARIA ELENA Kendrick, OT Occupational Therapist           Time Calculation:         PT Charges       03/05/18 0931          Time Calculation    PT Received On 03/05/18  -UD      PT Goal Re-Cert Due Date 03/10/18  -UD      Time Calculation- PT    Total Timed Code Minutes- PT 24 minute(s)  -UD        User Key  (r) = Recorded By, (t) = Taken By, (c) = Cosigned By    Initials Name Provider Type    UD Barbara Vaughan PTA Physical Therapy Assistant          Therapy Charges for Today     Code Description Service Date Service Provider Modifiers Qty    82453293092 HC PT THER PROC EA 15 MIN 3/5/2018 Barbara Vaughan, PTA GP 1    68554990665 HC GAIT TRAINING EA 15 MIN 3/5/2018 Barbara Vaughan PTA GP 1    61719018671 HC NEXT MOVE PER MONTH RATE 3/5/2018 Barbara Vaughan PTA  1          PT G-Codes  Outcome Measure Options: AM-PAC 6 Clicks Daily Activity (OT)    Barbara Vaughan PTA  3/5/2018

## 2018-03-05 NOTE — PROGRESS NOTES
Continued Stay Note  [facility]     Patient Name: Mary Ramirez  MRN: [id number]  Today's Date: 3/5/2018    Admit Date: 2/27/2018          Discharge Plan       03/05/18 1052    Case Management/Social Work Plan    Plan [facility] personal care unit    Patient/Family In Agreement With Plan yes    Additional Comments Anticipate pt will return to her personal care unit today.  RN to call report to [phone] (ask for personal care unit) and fax DC summary to [phone].  Spoke with pt's son, Josh.  He will transport pt after work today.  CM will cont to follow.               Discharge Codes     None        Expected Discharge Date and Time     Expected Discharge Date Expected Discharge Time    Mar 6, 2018             Jackelyn Oliva

## 2018-03-05 NOTE — PLAN OF CARE
Problem: Patient Care Overview (Adult)  Goal: Plan of Care Review  Outcome: Ongoing (interventions implemented as appropriate)   03/05/18 0358   Coping/Psychosocial Response Interventions   Plan Of Care Reviewed With patient   Patient Care Overview   Progress improving   Outcome Evaluation   Outcome Summary/Follow up Plan Pt. has had no complaints all night. Resting well and looking forward to d/c today        Problem: Pressure Ulcer (Adult)  Goal: Signs and Symptoms of Listed Potential Problems Will be Absent or Manageable (Pressure Ulcer)  Outcome: Ongoing (interventions implemented as appropriate)   03/05/18 0358   Pressure Ulcer   Problems Assessed (Pressure Ulcer) all   Problems Present (Pressure Ulcer) pain

## 2018-03-05 NOTE — DISCHARGE PLACEMENT REQUEST
"LEXIE SCOTT, RN    [phone]          Don Meade (83 y.o. Female)     Date of Birth Social Security Number Address Home Phone MRN    1934  [address]   [facility] [id number]  [id number]    [employer] Marital Status          Jainism        Admission Date Admission Type Admitting Provider Attending Provider Department, Room/Bed    18 Emergency Fouzia Green MD Hall, Holly, MD [facility] 4G, S454/1    Discharge Date Discharge Disposition Discharge Destination         Skilled Nursing Facility (DC - External)             Attending Provider: Fouzia Green MD     Allergies:  Penicillins    Isolation:  None   Infection:  None   Code Status:  FULL    Ht:  156.2 cm (61.5\")   Wt:  56.2 kg (123 lb 12.8 oz)    Admission Cmt:  None   Principal Problem:  Atrial fibrillation with RVR [I48.91] More...                 Active Insurance as of 2018     Primary Coverage     Payor Plan Insurance Group Employer/Plan Group    HUMANA MEDICARE REPLACEMENT HUMANA MEDICARE REPL [id number]     Payor Plan Address Payor Plan Phone Number Effective From Effective To    [address] [address] [phone] 2018     [place] [address]       Subscriber Name Subscriber Birth Date Member ID       DON MEADE 1934 [id number]                 Emergency Contacts      (Rel.) Home Phone Work Phone Mobile Phone    Josh Meade (Son) [phone] -- --               Discharge Summary      CRISTA Foreman at 3/5/2018  1:58 PM     Attestation signed by Fouzia Green MD at 3/5/2018  2:23 PM        Attending Nehemiah PITT supervised care of the patient on day of discharge with direct care provided by the advanced care provider (APC).    Electronically signed by Fouzia Green MD, 18, 2:23 PM.                                       [facility] Medicine Services  DISCHARGE SUMMARY    Patient Name: Dno Meade  : 1934  MRN: " [id number]    Date of Admission: 2/27/2018  Date of Discharge:  3/5/2018  Primary Care Physician: Nirmal Lundberg MD    Consults     Date and Time Order Name Status Description    3/2/2018 0757 Inpatient Cardiology Consult Completed         Hospital Course     Presenting Problem:   Atrial fibrillation with RVR [I48.91]    Active Hospital Problems (** Indicates Principal Problem)    Diagnosis Date Noted   • Dementia [F03.90] 02/28/2018   • Bilateral pleural effusion [J90] 02/28/2018   • Right knee pain [M25.561] 02/28/2018   • Hypertension [I10] 11/24/2017   • Type 2 diabetes mellitus with complication, with long-term current use of insulin [E11.8, Z79.4] 11/24/2017   • Coronary artery disease involving native coronary artery of native heart without angina pectoris [I25.10] 11/24/2017      Resolved Hospital Problems    Diagnosis Date Noted Date Resolved   • **Atrial fibrillation with RVR [I48.91] 02/28/2018 03/05/2018   • Leukocytosis [D72.829] 02/28/2018 03/05/2018   • Enteritis [K52.9] 02/28/2018 03/05/2018   • Ileus [K56.7] 02/28/2018 03/05/2018      Hospital Course:  Mary Ramirez is a 83 y.o. female with history of dementia, CAD status post, CHF, type 2 diabetes, and hypertension.  She presented from the [facility] with concerns for hematemesis.  Apparently she ate some spaghetti with red sauce and had N/V.  Staff could not discern between blood versus sauce, so she was brought to ED for evaluation.  She reported that she had some flutter in her chest, but denied any pain or shortness of breath.  In ED, she was found to be in A fib with RVR.  She also had leukocytosis but was afebrile.  CT abdomen/pelvis showed no signs of any bowel obstructions or other acute finding, but did show incidental cardiomegaly with bibasilar pulmonary edema/effusion, and small pericardial effusion.  Patient was admitted to the hospital medicine service for further evaluation and management.  Patient was treated with  esmolol drip and transitioned to oral amiodarone per cardiology.  Blood pressure has been elevated especially in the morning.  Cardiology have adjusted medications and also added Hytrin at bedtime on day of discharge.  Leukocytosis has resolved, likely reactive.  CXR and UA negative.  N/V resolved.  Suspect related to mild ileus or viral gastroenteritis.  Appetite has been poor.  Boost supplement has been encouraged.  Patient is medically stable and ready for discharge to the [facility] today.  Her son will provide transportation.  Patient needs to have EKG done in 1 week and faxed to Dr. Flores's office.          Day of Discharge     HPI:   Patient sitting up in chair today and states she wants to go back to the [facility].  No complaints or issues reported.  No family at bedside.  Pleasantly confused.      Review of Systems  Gen- no pain  CV- No chest pain, palpitations  Resp- No cough, dyspnea  GI- No N/V/D, abd pain     Full ROS difficult to obtain due to mild dementia.     Otherwise ROS is negative except as mentioned in the HPI.    Vital Signs:   Temp:  [97.8 °F (36.6 °C)-98.2 °F (36.8 °C)] 98.2 °F (36.8 °C)  Heart Rate:  [55-63] 63  Resp:  [16-18] 16  BP: (111-208)/(66-75) 169/68     Physical Exam:  Constitutional: No acute distress, awake, alert  HENT: NCAT, mucous membranes moist  Neck: Supple, no thyromegaly, no lymphadenopathy, trachea midline  Respiratory: Clear to auscultation but decreased bases bilaterally, nonlabored respirations   Cardiovascular: RRR, no murmurs, rubs, or gallops, palpable pedal pulses bilaterally  Gastrointestinal: Positive bowel sounds, soft, nontender, nondistended  Musculoskeletal: No bilateral ankle edema, no clubbing or cyanosis to extremities  Psychiatric: Appropriate affect, cooperative  Neurologic: No facial asymmetry, speech clear.   Oriented to person, hospital, town and year but very slow responses.  Mild generalized confusion.  Very pleasant and calm.  Follows simple  commands.  Skin: No rashes    Pertinent  and/or Most Recent Results       Results from last 7 days  Lab Units 03/04/18  0358 03/03/18  1058 03/01/18  0457 02/28/18  0724 02/27/18  2306   WBC 10*3/mm3 8.29 8.60 6.65 7.48 12.82*   HEMOGLOBIN g/dL 10.4* 11.0* 10.9* 10.3* 11.2*   HEMATOCRIT % 34.7 36.1 37.6 35.1 36.8   PLATELETS 10*3/mm3 291 345 343 313 276   SODIUM mmol/L 139 136 131* 135 136   POTASSIUM mmol/L 3.7 3.1* 4.2 4.2 4.9   CHLORIDE mmol/L 107 102 103 101 106   CO2 mmol/L 26.0 26.0 20.0 26.0 21.0   BUN mg/dL 15 12 13 17 18   CREATININE mg/dL 1.00 0.90 1.00 1.10 1.10   GLUCOSE mg/dL 43* 119* 217* 202* 128*   CALCIUM mg/dL 9.0 8.9 8.9 8.7 9.1       Results from last 7 days  Lab Units 03/03/18  1058 02/27/18  2306   BILIRUBIN mg/dL 0.8 0.4   ALK PHOS U/L 94 109*   ALT (SGPT) U/L 26 18   AST (SGOT) U/L 28 36*       Results from last 7 days  Lab Units 03/01/18  0457 02/28/18  0724 02/27/18  2306   TSH mIU/mL  --  1.865  --    HEMOGLOBIN A1C % 7.50*  --   --    BNP pg/mL  --   --  254.0*     Brief Urine Lab Results  (Last result in the past 365 days)      Color   Clarity   Blood   Leuk Est   Nitrite   Protein   CREAT   Urine HCG        02/28/18 0013 Yellow Clear Negative Small (1+)(A) Negative Negative             Microbiology Results Abnormal     Procedure Component Value - Date/Time    Urine Culture - Urine, Urine, Clean Catch [[id number]] Collected:  02/28/18 0013    Lab Status:  Final result Specimen:  Urine from Urine, Catheter Updated:  03/02/18 0824     Urine Culture --      50,000-60,000 CFU/mL Normal Urogenital Barbara        Imaging Results (all)     Procedure Component Value Units Date/Time    CT Head Without Contrast [[id number]] Collected:  02/27/18 2336     Updated:  02/28/18 0041    Narrative:       EXAM:    CT Head Without Intravenous Contrast    EXAM DATE/TIME:    2/27/2018 11:36  PM    CLINICAL HISTORY:    83 years old, female; Signs and symptoms; Syncope and collapse n/v    TECHNIQUE:    Axial  computed tomography images of the head/brain without intravenous   contrast.  All CT scans at this facility use one or more dose reduction   techniques, viz.: automated exposure control; ma/kV adjustment per patient size   (including targeted exams where dose is matched to indication; i.e. head); or   iterative reconstruction technique.    COMPARISON:    CT HEAD WO CONTRAST 2017-12-12 08:58    FINDINGS:    There is no intracranial hemorrhage, extraaxial collection, or evidence of   acute transcortical infarction.        Patchy foci of low attenuation within the periventricular and subcortical   white matter are stable and compatible with chronic microvascular disease.    Stable diffuse volume loss without mass effect or midline shift.        Vascular calcifications are noted.  Stable chronic right lamina papyracea   fracture.  No acute fracture.  The paranasal sinuses and mastoid air cells are   clear.       Impression:         No acute intracranial abnormality.  Stable chronic microvascular changes and   volume loss.    THIS DOCUMENT HAS BEEN ELECTRONICALLY SIGNED BY CHELSY VARGAS MD    CT Abdomen Pelvis Without Contrast [[id number]] Collected:  02/27/18 2337     Updated:  02/28/18 0045    Narrative:       EXAM:    CT Abdomen and Pelvis Without Intravenous Contrast    EXAM DATE/TIME:    2/27/2018 11:37 PM    CLINICAL HISTORY:    83 years old, female; Signs and symptoms; Nausea and vomiting; Abdominal   pain, Near syncope n/v    TECHNIQUE:    Axial computed tomography images of the abdomen and pelvis without   intravenous contrast.  All CT scans at this facility use one or more dose   reduction techniques, viz.: automated exposure control; ma/kV adjustment per   patient size (including targeted exams where dose is matched to indication;   i.e. head); or iterative reconstruction technique.    Coronal reformatted images were created and reviewed.    COMPARISON:    No relevant prior studies available.    FINDINGS:     Moderate cardiomegaly with an at least small pericardial effusion.  At least   small bilateral pleural effusions, right greater than left.  Bibasilar   pulmonary edema and atelectasis.  Small hiatal hernia.      The unenhanced liver, biliary tree, gallbladder, pancreas,, adrenal glands,   kidneys, and urinary bladder demonstrate no acute findings.  Prior hysterectomy.      There is no bowel inflammation, obstruction, free intraperitoneal air, or   ascites.  Suspected prior appendectomy.      Atherosclerosis of the abdominal aorta without aneurysm or evidence of acute   intramural hemorrhage.        Chronic degenerative changes present throughout the spine and at both hips.    Prior open reduction internal fixation of a left intertrochanteric proximal   femoral fracture.      Impression:         Moderate cardiomegaly with an at least small pericardial effusion.  At least   moderate CHF with bilateral pleural effusions.      No acute findings in the abdomen or pelvis.    THIS DOCUMENT HAS BEEN ELECTRONICALLY SIGNED BY CHELSY VARGAS MD    XR Knee 1 or 2 View Right [[id number]] Collected:  02/28/18 0539     Updated:  02/28/18 0655    Narrative:       EXAM:    XR Right Knee, 1 or 2 views    CLINICAL HISTORY:    83 years, female; Unspecified atrial fibrillation; Acute on chronic systolic   (congestive) heart failure; Pleural effusion, not elsewhere classified;   Noninfective gastroenteritis and colitis, unspecified; Pain; Knee; Right;   Patient HX: Right knee pain HX: Hospital hypertension type 2 diabetes mellitus   with complication, with long-term current use of insulin cad (coronary artery   disease) S/P stents dementia atrial fibrillation with rvr bilateral pleural   effusion leukocytosis enteritis right knee pain non-hospital fall fracture,   intertrochanteric, left femur atrial fibrillation with rvr, new onset    TECHNIQUE:    Frontal and/or lateral views of the right knee.    COMPARISON:    No relevant prior studies  available.    FINDINGS:    Bones/joints:  Probable small knee joint effusion.  Small amount of calcific   density visualized adjacent to the lateral tibial plateau could represent   callus formation related to a prior fracture. However, there is no obvious   acute fracture. No dislocation.    Soft tissues:  There is mild soft tissue swelling about the knee.    Vasculature:  Vascular calcifications are noted.      Impression:         Probable small knee joint effusion. No obvious acute fracture.    THIS DOCUMENT HAS BEEN ELECTRONICALLY SIGNED BY VIBHA GUZMAN MD    XR Chest PA & Lateral [[id number]] Collected:  02/28/18 1344     Updated:  02/28/18 1631    Narrative:       EXAMINATION: XR CHEST PA AND LATERAL- 02/28/2018     INDICATION: R/O pneumonia, had emesis, possible aspiration;  I48.91-Unspecified atrial fibrillation; I50.23-Acute on chronic systolic  (congestive) heart failure; C44-Ezodffn effusion, not elsewhere  classified; K52.9-Noninfective gastroenteritis and colitis, unspecified      COMPARISON: 01/17/2018     FINDINGS: PA and lateral views of the chest reveals heart to be  enlarged. Increased pulmonary vascularity bilaterally worsened in the  interval. Small bilateral pleural effusions. Degenerative changes seen  within the spine. No new focal parenchymal opacification present.           Impression:       Increased pulmonary vascularity bilaterally with small  bilateral pleural effusions. Heart is enlarged.     D:  02/28/2018  E:  02/28/2018     This report was finalized on 2/28/2018 4:29 PM by Dr. Renetta Alvarenga MD.           Results for orders placed during the hospital encounter of 11/23/17   Adult Transthoracic Echo Complete W/ Cont if Necessary Per Protocol    Narrative · The left ventricular cavity is small.  · Left ventricular wall thickness is consistent with mild concentric   hypertrophy.  · Left ventricular systolic function is hyperdynamic (EF > 70).  · Mild mitral valve regurgitation is  present  · Mild tricuspid valve regurgitation is present.          Discharge Details      Mary Ramirez   Home Medication Instructions CHAZ:[id number]    Printed on:03/05/18 2226   Medication Information                      acetaminophen (TYLENOL) 325 MG tablet  Take 2 tablets by mouth Every 6 (Six) Hours As Needed for Mild Pain  or Moderate Pain .             amiodarone (PACERONE) 200 MG tablet  Take 1 tablet by mouth Every 12 (Twelve) Hours.             amLODIPine (NORVASC) 10 MG tablet  Take 1 tablet by mouth Daily.             apixaban (ELIQUIS) 2.5 MG tablet tablet  Take 1 tablet by mouth Every 12 (Twelve) Hours.             aspirin 81 MG EC tablet  Take 1 tablet by mouth Daily.             atorvastatin (LIPITOR) 40 MG tablet  Take 40 mg by mouth Daily.             carvedilol (COREG) 25 MG tablet  Take 25 mg by mouth 2 (Two) Times a Day With Meals.             citalopram (CeleXA) 20 MG tablet  Take 20 mg by mouth Daily.             docusate sodium 100 MG capsule  Take 100 mg by mouth 2 (Two) Times a Day.             donepezil (ARICEPT) 5 MG tablet  Take 5 mg by mouth Every Night.             HYDROcodone-acetaminophen (NORCO) 5-325 MG per tablet  Take 1 tablet by mouth Every 6 (Six) Hours As Needed for Moderate Pain .             insulin detemir (LEVEMIR) 100 UNIT/ML injection  Inject 10 Units under the skin Every Night.             insulin lispro (humaLOG) 100 UNIT/ML injection  Inject 3 Units under the skin 3 (Three) Times a Day With Meals.             isosorbide mononitrate (IMDUR) 60 MG 24 hr tablet  Take 60 mg by mouth Daily.             losartan (COZAAR) 100 MG tablet  Take 1 tablet by mouth Daily.             Multiple Vitamin (MULTIVITAMINS PO)  Take  by mouth.             nitroglycerin (NITROLINGUAL) 0.4 MG/SPRAY spray  Place 1 spray under the tongue Every 5 (Five) Minutes As Needed for Chest Pain.             ondansetron ODT (ZOFRAN-ODT) 4 MG disintegrating tablet  Take 4 mg by mouth Every 8 (Eight)  Hours As Needed for Nausea or Vomiting.             terazosin (HYTRIN) 5 MG capsule  Take 1 capsule by mouth Every Night.               Discharge Disposition:  Skilled Nursing Facility (DC - External)    Discharge Diet:  Diet Instructions     Diet: Soft Texture; Thin Liquids, No Restrictions; Whole       Discharge Diet:  Soft Texture   Fluid Consistency:  Thin Liquids, No Restrictions   Soft Options:  Whole               Discharge Activity:   Activity Instructions     Activity as Tolerated                   Special Instructions:      Additional Instructions for the Follow-ups that You Need to Schedule     Discharge Follow-up with PCP    As directed    Follow Up Details:  1 week for hospital follow up               Additional information on Labs and Follow-ups:      Patient needs to have an EKG done in 1 week with the results faxed to Dr. Gregg office.  Also, [facility] office will call the patient for a electrical cardioversion to be done in 2 weeks.                  Time Spent on Discharge:  40 minutes    Electronically signed by CRISTA Foreman, 03/05/18, 1:59 PM.       Electronically signed by Fouzia Green MD at 3/5/2018  2:23 PM

## 2018-03-05 NOTE — PLAN OF CARE
Problem: Patient Care Overview (Adult)  Goal: Plan of Care Review  Outcome: Ongoing (interventions implemented as appropriate)   03/05/18 0930   Coping/Psychosocial Response Interventions   Plan Of Care Reviewed With patient   Patient Care Overview   Progress improving   Outcome Evaluation   Outcome Summary/Follow up Plan pt up in chair.only needs minim. assist for gait.ambulat 350 ft.occan cues for directions       Problem: Inpatient Physical Therapy  Goal: Bed Mobility Goal LTG- PT  Outcome: Outcome(s) achieved Date Met: 03/05/18 02/28/18 1624 03/05/18 0930   Bed Mobility PT LTG   Bed Mobility PT LTG, Date Established 02/28/18 --    Bed Mobility PT LTG, Time to Achieve 5 days --    Bed Mobility PT LTG, Activity Type supine to sit/sit to supine --    Bed Mobility PT LTG, [place] Level conditional independence --    Bed Mobility PT LTG, Date Goal Reviewed 02/28/18 --    Bed Mobility PT LTG, Outcome --  goal met     Goal: Transfer Training Goal 1 LTG- PT  Outcome: Outcome(s) achieved Date Met: 03/05/18 02/28/18 1624 03/05/18 0930   Transfer Training PT LTG   Transfer Training PT LTG, Date Established 02/28/18 --    Transfer Training PT LTG, Time to Achieve 5 days --    Transfer Training PT LTG, Activity Type sit to stand/stand to sit --    Transfer Training PT LTG, [place] Level conditional independence --    Transfer Training PT LTG, Assist Device walker, rolling --    Transfer Training PT LTG, Date Goal Reviewed 02/28/18 --    Transfer Training PT LTG, Outcome --  goal met     Goal: Gait Training Goal LTG- PT  Outcome: Ongoing (interventions implemented as appropriate)   02/28/18 1624 03/05/18 0930   Gait Training PT LTG   Gait Training Goal PT LTG, Date Established 02/28/18 --    Gait Training Goal PT LTG, Time to Achieve 5 days --    Gait Training Goal PT LTG, [place] Level conditional independence --    Gait Training Goal PT LTG, Assist Device walker, rolling --    Gait Training Goal PT LTG,  Distance to Achieve 250 feet --    Gait Training Goal PT LTG, Date Goal Reviewed 02/28/18 --    Gait Training Goal PT LTG, Outcome --  goal partially met

## 2018-03-05 NOTE — DISCHARGE SUMMARY
[facility] Medicine Services  DISCHARGE SUMMARY    Patient Name: Mary Ramirez  : 1934  MRN: [id number]    Date of Admission: 2018  Date of Discharge:  3/5/2018  Primary Care Physician: Nirmal Lundberg MD    Consults     Date and Time Order Name Status Description    3/2/2018 0757 Inpatient Cardiology Consult Completed         Hospital Course     Presenting Problem:   Atrial fibrillation with RVR [I48.91]    Active Hospital Problems (** Indicates Principal Problem)    Diagnosis Date Noted   • Dementia [F03.90] 2018   • Bilateral pleural effusion [J90] 2018   • Right knee pain [M25.561] 2018   • Hypertension [I10] 2017   • Type 2 diabetes mellitus with complication, with long-term current use of insulin [E11.8, Z79.4] 2017   • Coronary artery disease involving native coronary artery of native heart without angina pectoris [I25.10] 2017      Resolved Hospital Problems    Diagnosis Date Noted Date Resolved   • **Atrial fibrillation with RVR [I48.91] 2018   • Leukocytosis [D72.829] 2018   • Enteritis [K52.9] 2018   • Ileus [K56.7] 2018      Hospital Course:  Mary Ramirez is a 83 y.o. female with history of dementia, CAD status post, CHF, type 2 diabetes, and hypertension.  She presented from the [facility] with concerns for hematemesis.  Apparently she ate some spaghetti with red sauce and had N/V.  Staff could not discern between blood versus sauce, so she was brought to ED for evaluation.  She reported that she had some flutter in her chest, but denied any pain or shortness of breath.  In ED, she was found to be in A fib with RVR.  She also had leukocytosis but was afebrile.  CT abdomen/pelvis showed no signs of any bowel obstructions or other acute finding, but did show incidental cardiomegaly with bibasilar pulmonary edema/effusion, and small pericardial  effusion.  Patient was admitted to the hospital medicine service for further evaluation and management.  Patient was treated with esmolol drip and transitioned to oral amiodarone per cardiology.  Blood pressure has been elevated especially in the morning.  Cardiology have adjusted medications and also added Hytrin at bedtime on day of discharge.  Leukocytosis has resolved, likely reactive.  CXR and UA negative.  N/V resolved.  Suspect related to mild ileus or viral gastroenteritis.  Appetite has been poor.  Boost supplement has been encouraged.  Patient is medically stable and ready for discharge to the [facility] today.  Her son will provide transportation.  Patient needs to have EKG done in 1 week and faxed to Dr. Flores's office.          Day of Discharge     HPI:   Patient sitting up in chair today and states she wants to go back to the [facility].  No complaints or issues reported.  No family at bedside.  Pleasantly confused.      Review of Systems  Gen- no pain  CV- No chest pain, palpitations  Resp- No cough, dyspnea  GI- No N/V/D, abd pain     Full ROS difficult to obtain due to mild dementia.     Otherwise ROS is negative except as mentioned in the HPI.    Vital Signs:   Temp:  [97.8 °F (36.6 °C)-98.2 °F (36.8 °C)] 98.2 °F (36.8 °C)  Heart Rate:  [55-63] 63  Resp:  [16-18] 16  BP: (111-208)/(66-75) 169/68     Physical Exam:  Constitutional: No acute distress, awake, alert  HENT: NCAT, mucous membranes moist  Neck: Supple, no thyromegaly, no lymphadenopathy, trachea midline  Respiratory: Clear to auscultation but decreased bases bilaterally, nonlabored respirations   Cardiovascular: RRR, no murmurs, rubs, or gallops, palpable pedal pulses bilaterally  Gastrointestinal: Positive bowel sounds, soft, nontender, nondistended  Musculoskeletal: No bilateral ankle edema, no clubbing or cyanosis to extremities  Psychiatric: Appropriate affect, cooperative  Neurologic: No facial asymmetry, speech clear.  Oriented to person,  hospital, town and year but very slow responses.  Mild generalized confusion.  Very pleasant and calm.  Follows simple commands.  Skin: No rashes    Pertinent  and/or Most Recent Results       Results from last 7 days  Lab Units 03/04/18  0358 03/03/18  1058 03/01/18  0457 02/28/18  0724 02/27/18  2306   WBC 10*3/mm3 8.29 8.60 6.65 7.48 12.82*   HEMOGLOBIN g/dL 10.4* 11.0* 10.9* 10.3* 11.2*   HEMATOCRIT % 34.7 36.1 37.6 35.1 36.8   PLATELETS 10*3/mm3 291 345 343 313 276   SODIUM mmol/L 139 136 131* 135 136   POTASSIUM mmol/L 3.7 3.1* 4.2 4.2 4.9   CHLORIDE mmol/L 107 102 103 101 106   CO2 mmol/L 26.0 26.0 20.0 26.0 21.0   BUN mg/dL 15 12 13 17 18   CREATININE mg/dL 1.00 0.90 1.00 1.10 1.10   GLUCOSE mg/dL 43* 119* 217* 202* 128*   CALCIUM mg/dL 9.0 8.9 8.9 8.7 9.1       Results from last 7 days  Lab Units 03/03/18  1058 02/27/18  2306   BILIRUBIN mg/dL 0.8 0.4   ALK PHOS U/L 94 109*   ALT (SGPT) U/L 26 18   AST (SGOT) U/L 28 36*       Results from last 7 days  Lab Units 03/01/18  0457 02/28/18  0724 02/27/18  2306   TSH mIU/mL  --  1.865  --    HEMOGLOBIN A1C % 7.50*  --   --    BNP pg/mL  --   --  254.0*     Brief Urine Lab Results  (Last result in the past 365 days)      Color   Clarity   Blood   Leuk Est   Nitrite   Protein   CREAT   Urine HCG        02/28/18 0013 Yellow Clear Negative Small (1+)(A) Negative Negative             Microbiology Results Abnormal     Procedure Component Value - Date/Time    Urine Culture - Urine, Urine, Clean Catch [[id number]] Collected:  02/28/18 0013    Lab Status:  Final result Specimen:  Urine from Urine, Catheter Updated:  03/02/18 0824     Urine Culture --      50,000-60,000 CFU/mL Normal Urogenital Barbara        Imaging Results (all)     Procedure Component Value Units Date/Time    CT Head Without Contrast [[id number]] Collected:  02/27/18 2336     Updated:  02/28/18 0041    Narrative:       EXAM:    CT Head Without Intravenous Contrast    EXAM DATE/TIME:    2/27/2018 11:36   PM    CLINICAL HISTORY:    83 years old, female; Signs and symptoms; Syncope and collapse n/v    TECHNIQUE:    Axial computed tomography images of the head/brain without intravenous   contrast.  All CT scans at this facility use one or more dose reduction   techniques, viz.: automated exposure control; ma/kV adjustment per patient size   (including targeted exams where dose is matched to indication; i.e. head); or   iterative reconstruction technique.    COMPARISON:    CT HEAD WO CONTRAST 2017-12-12 08:58    FINDINGS:    There is no intracranial hemorrhage, extraaxial collection, or evidence of   acute transcortical infarction.        Patchy foci of low attenuation within the periventricular and subcortical   white matter are stable and compatible with chronic microvascular disease.    Stable diffuse volume loss without mass effect or midline shift.        Vascular calcifications are noted.  Stable chronic right lamina papyracea   fracture.  No acute fracture.  The paranasal sinuses and mastoid air cells are   clear.       Impression:         No acute intracranial abnormality.  Stable chronic microvascular changes and   volume loss.    THIS DOCUMENT HAS BEEN ELECTRONICALLY SIGNED BY CHELSY VARGAS MD    CT Abdomen Pelvis Without Contrast [[id number]] Collected:  02/27/18 2337     Updated:  02/28/18 0045    Narrative:       EXAM:    CT Abdomen and Pelvis Without Intravenous Contrast    EXAM DATE/TIME:    2/27/2018 11:37 PM    CLINICAL HISTORY:    83 years old, female; Signs and symptoms; Nausea and vomiting; Abdominal   pain, Near syncope n/v    TECHNIQUE:    Axial computed tomography images of the abdomen and pelvis without   intravenous contrast.  All CT scans at this facility use one or more dose   reduction techniques, viz.: automated exposure control; ma/kV adjustment per   patient size (including targeted exams where dose is matched to indication;   i.e. head); or iterative reconstruction technique.    Coronal  reformatted images were created and reviewed.    COMPARISON:    No relevant prior studies available.    FINDINGS:    Moderate cardiomegaly with an at least small pericardial effusion.  At least   small bilateral pleural effusions, right greater than left.  Bibasilar   pulmonary edema and atelectasis.  Small hiatal hernia.      The unenhanced liver, biliary tree, gallbladder, pancreas,, adrenal glands,   kidneys, and urinary bladder demonstrate no acute findings.  Prior hysterectomy.      There is no bowel inflammation, obstruction, free intraperitoneal air, or   ascites.  Suspected prior appendectomy.      Atherosclerosis of the abdominal aorta without aneurysm or evidence of acute   intramural hemorrhage.        Chronic degenerative changes present throughout the spine and at both hips.    Prior open reduction internal fixation of a left intertrochanteric proximal   femoral fracture.      Impression:         Moderate cardiomegaly with an at least small pericardial effusion.  At least   moderate CHF with bilateral pleural effusions.      No acute findings in the abdomen or pelvis.    THIS DOCUMENT HAS BEEN ELECTRONICALLY SIGNED BY CHELSY VARGAS MD    XR Knee 1 or 2 View Right [[id number]] Collected:  02/28/18 0539     Updated:  02/28/18 0655    Narrative:       EXAM:    XR Right Knee, 1 or 2 views    CLINICAL HISTORY:    83 years, female; Unspecified atrial fibrillation; Acute on chronic systolic   (congestive) heart failure; Pleural effusion, not elsewhere classified;   Noninfective gastroenteritis and colitis, unspecified; Pain; Knee; Right;   Patient HX: Right knee pain HX: Hospital hypertension type 2 diabetes mellitus   with complication, with long-term current use of insulin cad (coronary artery   disease) S/P stents dementia atrial fibrillation with rvr bilateral pleural   effusion leukocytosis enteritis right knee pain non-hospital fall fracture,   intertrochanteric, left femur atrial fibrillation with rvr, new  onset    TECHNIQUE:    Frontal and/or lateral views of the right knee.    COMPARISON:    No relevant prior studies available.    FINDINGS:    Bones/joints:  Probable small knee joint effusion.  Small amount of calcific   density visualized adjacent to the lateral tibial plateau could represent   callus formation related to a prior fracture. However, there is no obvious   acute fracture. No dislocation.    Soft tissues:  There is mild soft tissue swelling about the knee.    Vasculature:  Vascular calcifications are noted.      Impression:         Probable small knee joint effusion. No obvious acute fracture.    THIS DOCUMENT HAS BEEN ELECTRONICALLY SIGNED BY VIBHA GUZMAN MD    XR Chest PA & Lateral [[id number]] Collected:  02/28/18 1344     Updated:  02/28/18 1631    Narrative:       EXAMINATION: XR CHEST PA AND LATERAL- 02/28/2018     INDICATION: R/O pneumonia, had emesis, possible aspiration;  I48.91-Unspecified atrial fibrillation; I50.23-Acute on chronic systolic  (congestive) heart failure; C64-Xbgobhc effusion, not elsewhere  classified; K52.9-Noninfective gastroenteritis and colitis, unspecified      COMPARISON: 01/17/2018     FINDINGS: PA and lateral views of the chest reveals heart to be  enlarged. Increased pulmonary vascularity bilaterally worsened in the  interval. Small bilateral pleural effusions. Degenerative changes seen  within the spine. No new focal parenchymal opacification present.           Impression:       Increased pulmonary vascularity bilaterally with small  bilateral pleural effusions. Heart is enlarged.     D:  02/28/2018  E:  02/28/2018     This report was finalized on 2/28/2018 4:29 PM by Dr. Renetta Alvarenga MD.           Results for orders placed during the hospital encounter of 11/23/17   Adult Transthoracic Echo Complete W/ Cont if Necessary Per Protocol    Narrative · The left ventricular cavity is small.  · Left ventricular wall thickness is consistent with mild concentric    hypertrophy.  · Left ventricular systolic function is hyperdynamic (EF > 70).  · Mild mitral valve regurgitation is present  · Mild tricuspid valve regurgitation is present.          Discharge Details      Mary Ramirez   Home Medication Instructions CHAZ:[id number]    Printed on:03/05/18 8343   Medication Information                      acetaminophen (TYLENOL) 325 MG tablet  Take 2 tablets by mouth Every 6 (Six) Hours As Needed for Mild Pain  or Moderate Pain .             amiodarone (PACERONE) 200 MG tablet  Take 1 tablet by mouth Every 12 (Twelve) Hours.             amLODIPine (NORVASC) 10 MG tablet  Take 1 tablet by mouth Daily.             apixaban (ELIQUIS) 2.5 MG tablet tablet  Take 1 tablet by mouth Every 12 (Twelve) Hours.             aspirin 81 MG EC tablet  Take 1 tablet by mouth Daily.             atorvastatin (LIPITOR) 40 MG tablet  Take 40 mg by mouth Daily.             carvedilol (COREG) 25 MG tablet  Take 25 mg by mouth 2 (Two) Times a Day With Meals.             citalopram (CeleXA) 20 MG tablet  Take 20 mg by mouth Daily.             docusate sodium 100 MG capsule  Take 100 mg by mouth 2 (Two) Times a Day.             donepezil (ARICEPT) 5 MG tablet  Take 5 mg by mouth Every Night.             HYDROcodone-acetaminophen (NORCO) 5-325 MG per tablet  Take 1 tablet by mouth Every 6 (Six) Hours As Needed for Moderate Pain .             insulin detemir (LEVEMIR) 100 UNIT/ML injection  Inject 10 Units under the skin Every Night.             insulin lispro (humaLOG) 100 UNIT/ML injection  Inject 3 Units under the skin 3 (Three) Times a Day With Meals.             isosorbide mononitrate (IMDUR) 60 MG 24 hr tablet  Take 60 mg by mouth Daily.             losartan (COZAAR) 100 MG tablet  Take 1 tablet by mouth Daily.             Multiple Vitamin (MULTIVITAMINS PO)  Take  by mouth.             nitroglycerin (NITROLINGUAL) 0.4 MG/SPRAY spray  Place 1 spray under the tongue Every 5 (Five) Minutes As Needed  for Chest Pain.             ondansetron ODT (ZOFRAN-ODT) 4 MG disintegrating tablet  Take 4 mg by mouth Every 8 (Eight) Hours As Needed for Nausea or Vomiting.             terazosin (HYTRIN) 5 MG capsule  Take 1 capsule by mouth Every Night.               Discharge Disposition:  Skilled Nursing Facility (DC - External)    Discharge Diet:  Diet Instructions     Diet: Soft Texture; Thin Liquids, No Restrictions; Whole       Discharge Diet:  Soft Texture   Fluid Consistency:  Thin Liquids, No Restrictions   Soft Options:  Whole               Discharge Activity:   Activity Instructions     Activity as Tolerated                   Special Instructions:      Additional Instructions for the Follow-ups that You Need to Schedule     Discharge Follow-up with PCP    As directed    Follow Up Details:  1 week for hospital follow up               Additional information on Labs and Follow-ups:      Patient needs to have an EKG done in 1 week with the results faxed to Dr. Gregg office.  Also, [facility] office will call the patient for a electrical cardioversion to be done in 2 weeks.                  Time Spent on Discharge:  40 minutes    Electronically signed by CRISTA Foreman, 03/05/18, 1:59 PM.

## 2018-03-13 ENCOUNTER — TELEPHONE (OUTPATIENT)
Dept: CARDIOLOGY | Facility: CLINIC | Age: 83
End: 2018-03-13

## 2018-03-13 DIAGNOSIS — I48.91 ATRIAL FIBRILLATION, UNSPECIFIED TYPE (HCC): Primary | ICD-10-CM

## 2018-03-13 PROCEDURE — 93000 ELECTROCARDIOGRAM COMPLETE: CPT | Performed by: INTERNAL MEDICINE

## 2018-03-13 NOTE — TELEPHONE ENCOUNTER
Per Liliam VALDERRAMA.Called [facility] # [phone] and talked to Gerard. Asked her about the EKG that was to be done in 1 week post hospital discharge. Gerard stated she would call the [facility]choox and get it done today. Provided fax# [phone] to be sent to once completed. Gerard verbalized understanding.

## 2018-03-14 RX ORDER — AMIODARONE HYDROCHLORIDE 200 MG/1
200 TABLET ORAL DAILY
Start: 2018-03-14 | End: 2022-06-17 | Stop reason: HOSPADM

## 2018-03-14 NOTE — TELEPHONE ENCOUNTER
reviewed EKG. Order given to decrease amiodarone to 200 mg by mouth every day. Called [facility] and talked to Gerard Styles LPN told her Amiodarone id decreased to 200 mg by mouth every day. She verbalized understanding.

## 2018-04-07 ENCOUNTER — APPOINTMENT (OUTPATIENT)
Dept: NEUROLOGY | Facility: HOSPITAL | Age: 83
End: 2018-04-07

## 2018-04-07 ENCOUNTER — HOSPITAL ENCOUNTER (INPATIENT)
Facility: HOSPITAL | Age: 83
LOS: 4 days | Discharge: NURSING, REHAB OR LONG-TERM CARE | End: 2018-04-11
Attending: EMERGENCY MEDICINE | Admitting: INTERNAL MEDICINE

## 2018-04-07 ENCOUNTER — APPOINTMENT (OUTPATIENT)
Dept: GENERAL RADIOLOGY | Facility: HOSPITAL | Age: 83
End: 2018-04-07

## 2018-04-07 ENCOUNTER — APPOINTMENT (OUTPATIENT)
Dept: CT IMAGING | Facility: HOSPITAL | Age: 83
End: 2018-04-07

## 2018-04-07 DIAGNOSIS — Z74.09 IMPAIRED FUNCTIONAL MOBILITY, BALANCE, GAIT, AND ENDURANCE: ICD-10-CM

## 2018-04-07 DIAGNOSIS — E16.2 HYPOGLYCEMIA: ICD-10-CM

## 2018-04-07 DIAGNOSIS — S09.90XA INJURY OF HEAD, INITIAL ENCOUNTER: ICD-10-CM

## 2018-04-07 DIAGNOSIS — J81.0 ACUTE PULMONARY EDEMA (HCC): ICD-10-CM

## 2018-04-07 DIAGNOSIS — T68.XXXA HYPOTHERMIA, INITIAL ENCOUNTER: Primary | ICD-10-CM

## 2018-04-07 PROBLEM — R41.82 ALTERED MENTAL STATE: Status: ACTIVE | Noted: 2018-04-07

## 2018-04-07 PROBLEM — Z79.01 ANTICOAGULATED: Status: ACTIVE | Noted: 2018-04-07

## 2018-04-07 PROBLEM — I48.91 A-FIB (HCC): Status: ACTIVE | Noted: 2018-04-07

## 2018-04-07 PROBLEM — R40.4 UNRESPONSIVE EPISODE: Status: ACTIVE | Noted: 2018-04-07

## 2018-04-07 PROBLEM — A41.9 SEPSIS (HCC): Status: ACTIVE | Noted: 2018-04-07

## 2018-04-07 PROBLEM — R41.89 UNRESPONSIVE EPISODE: Status: ACTIVE | Noted: 2018-04-07

## 2018-04-07 PROBLEM — I49.5 SSS (SICK SINUS SYNDROME) (HCC): Status: ACTIVE | Noted: 2018-04-07

## 2018-04-07 LAB
ALBUMIN SERPL-MCNC: 4.2 G/DL (ref 3.2–4.8)
ALBUMIN/GLOB SERPL: 1.5 G/DL (ref 1.5–2.5)
ALP SERPL-CCNC: 105 U/L (ref 25–100)
ALT SERPL W P-5'-P-CCNC: 21 U/L (ref 7–40)
ANION GAP SERPL CALCULATED.3IONS-SCNC: 7 MMOL/L (ref 3–11)
AST SERPL-CCNC: 26 U/L (ref 0–33)
BASOPHILS # BLD AUTO: 0.04 10*3/MM3 (ref 0–0.2)
BASOPHILS NFR BLD AUTO: 0.5 % (ref 0–1)
BILIRUB SERPL-MCNC: 0.6 MG/DL (ref 0.3–1.2)
BILIRUB UR QL STRIP: NEGATIVE
BNP SERPL-MCNC: 234 PG/ML (ref 0–100)
BUN BLD-MCNC: 22 MG/DL (ref 9–23)
BUN/CREAT SERPL: 20 (ref 7–25)
CALCIUM SPEC-SCNC: 9 MG/DL (ref 8.7–10.4)
CHLORIDE SERPL-SCNC: 103 MMOL/L (ref 99–109)
CLARITY UR: CLEAR
CO2 SERPL-SCNC: 26 MMOL/L (ref 20–31)
COLOR UR: YELLOW
CREAT BLD-MCNC: 1.1 MG/DL (ref 0.6–1.3)
D-LACTATE SERPL-SCNC: 0.7 MMOL/L (ref 0.5–2)
DEPRECATED RDW RBC AUTO: 54.6 FL (ref 37–54)
EOSINOPHIL # BLD AUTO: 0.05 10*3/MM3 (ref 0–0.3)
EOSINOPHIL NFR BLD AUTO: 0.7 % (ref 0–3)
ERYTHROCYTE [DISTWIDTH] IN BLOOD BY AUTOMATED COUNT: 18.7 % (ref 11.3–14.5)
GFR SERPL CREATININE-BSD FRML MDRD: 47 ML/MIN/1.73
GLOBULIN UR ELPH-MCNC: 2.8 GM/DL
GLUCOSE BLD-MCNC: 181 MG/DL (ref 70–100)
GLUCOSE BLDC GLUCOMTR-MCNC: 123 MG/DL (ref 70–130)
GLUCOSE BLDC GLUCOMTR-MCNC: 126 MG/DL (ref 70–130)
GLUCOSE BLDC GLUCOMTR-MCNC: 195 MG/DL (ref 70–130)
GLUCOSE UR STRIP-MCNC: ABNORMAL MG/DL
HCT VFR BLD AUTO: 37.3 % (ref 34.5–44)
HGB BLD-MCNC: 11.2 G/DL (ref 11.5–15.5)
HGB UR QL STRIP.AUTO: NEGATIVE
HOLD SPECIMEN: NORMAL
HOLD SPECIMEN: NORMAL
IMM GRANULOCYTES # BLD: 0.02 10*3/MM3 (ref 0–0.03)
IMM GRANULOCYTES NFR BLD: 0.3 % (ref 0–0.6)
KETONES UR QL STRIP: NEGATIVE
LEUKOCYTE ESTERASE UR QL STRIP.AUTO: NEGATIVE
LYMPHOCYTES # BLD AUTO: 0.9 10*3/MM3 (ref 0.6–4.8)
LYMPHOCYTES NFR BLD AUTO: 12.2 % (ref 24–44)
MAGNESIUM SERPL-MCNC: 1.9 MG/DL (ref 1.3–2.7)
MCH RBC QN AUTO: 24 PG (ref 27–31)
MCHC RBC AUTO-ENTMCNC: 30 G/DL (ref 32–36)
MCV RBC AUTO: 80 FL (ref 80–99)
MONOCYTES # BLD AUTO: 0.46 10*3/MM3 (ref 0–1)
MONOCYTES NFR BLD AUTO: 6.3 % (ref 0–12)
NEUTROPHILS # BLD AUTO: 5.89 10*3/MM3 (ref 1.5–8.3)
NEUTROPHILS NFR BLD AUTO: 80 % (ref 41–71)
NITRITE UR QL STRIP: NEGATIVE
NRBC BLD MANUAL-RTO: 0 /100 WBC (ref 0–0)
PH UR STRIP.AUTO: 5.5 [PH] (ref 5–8)
PLATELET # BLD AUTO: 256 10*3/MM3 (ref 150–450)
PMV BLD AUTO: 10.9 FL (ref 6–12)
POTASSIUM BLD-SCNC: 3.7 MMOL/L (ref 3.5–5.5)
PROCALCITONIN SERPL-MCNC: <0.05 NG/ML
PROT SERPL-MCNC: 7 G/DL (ref 5.7–8.2)
PROT UR QL STRIP: NEGATIVE
RBC # BLD AUTO: 4.66 10*6/MM3 (ref 3.89–5.14)
SODIUM BLD-SCNC: 136 MMOL/L (ref 132–146)
SP GR UR STRIP: 1.01 (ref 1–1.03)
TROPONIN I SERPL-MCNC: 0 NG/ML (ref 0–0.07)
TSH SERPL DL<=0.05 MIU/L-ACNC: 3.55 MIU/ML (ref 0.35–5.35)
UROBILINOGEN UR QL STRIP: ABNORMAL
WBC NRBC COR # BLD: 7.36 10*3/MM3 (ref 3.5–10.8)
WHOLE BLOOD HOLD SPECIMEN: NORMAL
WHOLE BLOOD HOLD SPECIMEN: NORMAL

## 2018-04-07 PROCEDURE — 83735 ASSAY OF MAGNESIUM: CPT | Performed by: EMERGENCY MEDICINE

## 2018-04-07 PROCEDURE — 85025 COMPLETE CBC W/AUTO DIFF WBC: CPT | Performed by: EMERGENCY MEDICINE

## 2018-04-07 PROCEDURE — 25010000002 VANCOMYCIN PER 500 MG: Performed by: EMERGENCY MEDICINE

## 2018-04-07 PROCEDURE — 84443 ASSAY THYROID STIM HORMONE: CPT | Performed by: PHYSICIAN ASSISTANT

## 2018-04-07 PROCEDURE — 81003 URINALYSIS AUTO W/O SCOPE: CPT | Performed by: EMERGENCY MEDICINE

## 2018-04-07 PROCEDURE — 87040 BLOOD CULTURE FOR BACTERIA: CPT | Performed by: EMERGENCY MEDICINE

## 2018-04-07 PROCEDURE — P9612 CATHETERIZE FOR URINE SPEC: HCPCS

## 2018-04-07 PROCEDURE — 84484 ASSAY OF TROPONIN QUANT: CPT

## 2018-04-07 PROCEDURE — 83605 ASSAY OF LACTIC ACID: CPT | Performed by: EMERGENCY MEDICINE

## 2018-04-07 PROCEDURE — 25010000002 CEFEPIME: Performed by: PHYSICIAN ASSISTANT

## 2018-04-07 PROCEDURE — 82962 GLUCOSE BLOOD TEST: CPT

## 2018-04-07 PROCEDURE — 72125 CT NECK SPINE W/O DYE: CPT

## 2018-04-07 PROCEDURE — 93005 ELECTROCARDIOGRAM TRACING: CPT | Performed by: EMERGENCY MEDICINE

## 2018-04-07 PROCEDURE — 70450 CT HEAD/BRAIN W/O DYE: CPT

## 2018-04-07 PROCEDURE — 83880 ASSAY OF NATRIURETIC PEPTIDE: CPT | Performed by: EMERGENCY MEDICINE

## 2018-04-07 PROCEDURE — 71045 X-RAY EXAM CHEST 1 VIEW: CPT

## 2018-04-07 PROCEDURE — 25010000002 LEVOFLOXACIN PER 250 MG: Performed by: EMERGENCY MEDICINE

## 2018-04-07 PROCEDURE — 99285 EMERGENCY DEPT VISIT HI MDM: CPT

## 2018-04-07 PROCEDURE — 80053 COMPREHEN METABOLIC PANEL: CPT | Performed by: EMERGENCY MEDICINE

## 2018-04-07 PROCEDURE — 95816 EEG AWAKE AND DROWSY: CPT

## 2018-04-07 PROCEDURE — 99223 1ST HOSP IP/OBS HIGH 75: CPT | Performed by: FAMILY MEDICINE

## 2018-04-07 PROCEDURE — 84145 PROCALCITONIN (PCT): CPT | Performed by: PHYSICIAN ASSISTANT

## 2018-04-07 RX ORDER — DONEPEZIL HYDROCHLORIDE 5 MG/1
5 TABLET, FILM COATED ORAL NIGHTLY
Status: DISCONTINUED | OUTPATIENT
Start: 2018-04-07 | End: 2018-04-11 | Stop reason: HOSPADM

## 2018-04-07 RX ORDER — AMIODARONE HYDROCHLORIDE 200 MG/1
100 TABLET ORAL DAILY
Status: DISCONTINUED | OUTPATIENT
Start: 2018-04-08 | End: 2018-04-11 | Stop reason: HOSPADM

## 2018-04-07 RX ORDER — ACETAMINOPHEN 325 MG/1
650 TABLET ORAL EVERY 4 HOURS PRN
Status: DISCONTINUED | OUTPATIENT
Start: 2018-04-07 | End: 2018-04-11 | Stop reason: HOSPADM

## 2018-04-07 RX ORDER — NICOTINE POLACRILEX 4 MG
15 LOZENGE BUCCAL
Status: DISCONTINUED | OUTPATIENT
Start: 2018-04-07 | End: 2018-04-11 | Stop reason: HOSPADM

## 2018-04-07 RX ORDER — SACCHAROMYCES BOULARDII 250 MG
250 CAPSULE ORAL 2 TIMES DAILY
Status: DISCONTINUED | OUTPATIENT
Start: 2018-04-07 | End: 2018-04-11 | Stop reason: HOSPADM

## 2018-04-07 RX ORDER — BISACODYL 5 MG/1
5 TABLET, DELAYED RELEASE ORAL DAILY PRN
Status: DISCONTINUED | OUTPATIENT
Start: 2018-04-07 | End: 2018-04-11 | Stop reason: HOSPADM

## 2018-04-07 RX ORDER — ASPIRIN 81 MG/1
81 TABLET, CHEWABLE ORAL DAILY
COMMUNITY
End: 2019-04-05 | Stop reason: HOSPADM

## 2018-04-07 RX ORDER — ISOSORBIDE MONONITRATE 60 MG/1
60 TABLET, EXTENDED RELEASE ORAL DAILY
Status: DISCONTINUED | OUTPATIENT
Start: 2018-04-08 | End: 2018-04-11 | Stop reason: HOSPADM

## 2018-04-07 RX ORDER — FAMOTIDINE 20 MG/1
40 TABLET, FILM COATED ORAL DAILY
Status: DISCONTINUED | OUTPATIENT
Start: 2018-04-08 | End: 2018-04-11 | Stop reason: HOSPADM

## 2018-04-07 RX ORDER — SODIUM CHLORIDE 0.9 % (FLUSH) 0.9 %
1-10 SYRINGE (ML) INJECTION AS NEEDED
Status: DISCONTINUED | OUTPATIENT
Start: 2018-04-07 | End: 2018-04-11 | Stop reason: HOSPADM

## 2018-04-07 RX ORDER — ATORVASTATIN CALCIUM 40 MG/1
40 TABLET, FILM COATED ORAL NIGHTLY
Status: DISCONTINUED | OUTPATIENT
Start: 2018-04-07 | End: 2018-04-11 | Stop reason: HOSPADM

## 2018-04-07 RX ORDER — DEXTROSE MONOHYDRATE 25 G/50ML
25 INJECTION, SOLUTION INTRAVENOUS
Status: DISCONTINUED | OUTPATIENT
Start: 2018-04-07 | End: 2018-04-11 | Stop reason: HOSPADM

## 2018-04-07 RX ORDER — VANCOMYCIN HYDROCHLORIDE 1 G/200ML
20 INJECTION, SOLUTION INTRAVENOUS ONCE
Status: COMPLETED | OUTPATIENT
Start: 2018-04-07 | End: 2018-04-07

## 2018-04-07 RX ORDER — DOCUSATE SODIUM 100 MG/1
100 CAPSULE, LIQUID FILLED ORAL 2 TIMES DAILY
Status: DISCONTINUED | OUTPATIENT
Start: 2018-04-07 | End: 2018-04-11 | Stop reason: HOSPADM

## 2018-04-07 RX ORDER — ASPIRIN 81 MG/1
81 TABLET, CHEWABLE ORAL DAILY
Status: DISCONTINUED | OUTPATIENT
Start: 2018-04-08 | End: 2018-04-11 | Stop reason: HOSPADM

## 2018-04-07 RX ORDER — LEVOFLOXACIN 5 MG/ML
750 INJECTION, SOLUTION INTRAVENOUS ONCE
Status: COMPLETED | OUTPATIENT
Start: 2018-04-07 | End: 2018-04-07

## 2018-04-07 RX ORDER — SODIUM CHLORIDE 0.9 % (FLUSH) 0.9 %
10 SYRINGE (ML) INJECTION AS NEEDED
Status: DISCONTINUED | OUTPATIENT
Start: 2018-04-07 | End: 2018-04-11 | Stop reason: HOSPADM

## 2018-04-07 RX ORDER — SODIUM CHLORIDE 9 MG/ML
50 INJECTION, SOLUTION INTRAVENOUS CONTINUOUS
Status: DISCONTINUED | OUTPATIENT
Start: 2018-04-07 | End: 2018-04-09

## 2018-04-07 RX ADMIN — SODIUM CHLORIDE 2 G: 9 INJECTION, SOLUTION INTRAVENOUS at 12:16

## 2018-04-07 RX ADMIN — SODIUM CHLORIDE 50 ML/HR: 9 INJECTION, SOLUTION INTRAVENOUS at 16:31

## 2018-04-07 RX ADMIN — ATORVASTATIN CALCIUM 40 MG: 40 TABLET, FILM COATED ORAL at 20:53

## 2018-04-07 RX ADMIN — DONEPEZIL HYDROCHLORIDE 5 MG: 5 TABLET ORAL at 20:53

## 2018-04-07 RX ADMIN — CEFEPIME 2 G: 2 INJECTION, POWDER, FOR SOLUTION INTRAVENOUS at 17:12

## 2018-04-07 RX ADMIN — Medication 250 MG: at 20:53

## 2018-04-07 RX ADMIN — LEVOFLOXACIN 750 MG: 5 INJECTION, SOLUTION INTRAVENOUS at 12:58

## 2018-04-07 RX ADMIN — SODIUM CHLORIDE 125 ML/HR: 9 INJECTION, SOLUTION INTRAVENOUS at 10:24

## 2018-04-07 RX ADMIN — VANCOMYCIN HYDROCHLORIDE 1000 MG: 1 INJECTION, SOLUTION INTRAVENOUS at 12:59

## 2018-04-07 NOTE — ED PROVIDER NOTES
"Subjective   Mary Ramirez is a 83 y.o.female who presents to the ED with c/o altered mental status. EMS reports that the pt was found unresponsive down on the floor with a head laceration. Soon thereafter, [facility] checked pt's glucose and gave 50 of glucose. En route to the ED, EMS gave the pt d 50 at this time the pt began to regain consciousness and was able to answer questions. The pt also complains of abd pain and neck pain but denies cough, congestion, fever, or any other complaints at this time. She is has hx of Dm. The duration of the time that the pt was down is unknown. The pt's last known well is unknown as well.        History provided by:  Patient    Associated symptoms: abdominal pain and neck pain    Associated symptoms: no back pain    Altered Mental Status   Presenting symptoms: confusion    Severity:  Moderate  Most recent episode:  Today  Episode history:  Single  Timing:  Constant  Progression:  Improving  Chronicity:  New  Associated symptoms: abdominal pain    Associated symptoms: no fever        Review of Systems   Constitutional: Negative for fever.   HENT: Negative for congestion.    Respiratory: Negative for cough.    Gastrointestinal: Positive for abdominal pain.   Musculoskeletal: Positive for neck pain. Negative for arthralgias, back pain, myalgias and neck stiffness.   Skin: Positive for pallor and wound (Head laceration).   Psychiatric/Behavioral: Positive for confusion.   All other systems reviewed and are negative.      Past Medical History:   Diagnosis Date   • Atrial fibrillation    • CAD (coronary artery disease)    • CHF (congestive heart failure)    • CKD (chronic kidney disease) stage 3, GFR 30-59 ml/min    • DDD (degenerative disc disease), cervical    • Dementia    • Diabetes mellitus    • Hypertension    • SSS (sick sinus syndrome)        Allergies   Allergen Reactions   • Penicillins Other (See Comments)     Pt states \"i don't know\"       Past Surgical " History:   Procedure Laterality Date   • ANKLE SURGERY Left 1996   • ATHRECTOMY ILIAC, FEMORAL, TIBIAL ARTERY     • BREAST LUMPECTOMY Left 1981   • COLON SURGERY     • CORONARY STENT PLACEMENT     • FINGER FRACTURE SURGERY Right    • HIP TROCANTERIC NAILING WITH INTRAMEDULLARY HIP SCREW Left 11/23/2017   • HYSTERECTOMY         Family History   Problem Relation Age of Onset   • No Known Problems Mother    • No Known Problems Father        Social History     Social History   • Marital status:      Spouse name: N/A   • Number of children: 2   • Years of education: H.S     Occupational History   •  Retired     Social History Main Topics   • Smoking status: Never Smoker   • Smokeless tobacco: Never Used   • Alcohol use No   • Drug use: No   • Sexual activity: No     Other Topics Concern   • Not on file         Objective   Physical Exam   Constitutional: She appears well-developed and well-nourished.   Somnolent but arousable.    HENT:   Head: Normocephalic.   Nose: Nose normal.   Dried blood to right scalp. Laceration not visualized at this time. Need to clean blood.   Eyes: Conjunctivae and EOM are normal. Pupils are equal, round, and reactive to light. No scleral icterus.   Neck: Normal range of motion. Neck supple.   Cardiovascular: Regular rhythm and normal heart sounds.  Bradycardia present.    No murmur heard.  Pulmonary/Chest: Effort normal and breath sounds normal. No respiratory distress.   Abdominal: Soft. Bowel sounds are normal. She exhibits distension. There is tenderness (Mild diffuse TTP). There is no rebound and no guarding.   Musculoskeletal: She exhibits tenderness.   No signs of trauma. Mild C-spine tenderness, midline and paraspinal tenderness. No T or L spine tenderness.    Neurological: She is alert.   Oriented to person and place only. Neurovascularly intact in all extremities. No drift. No weakness.   Skin: Skin is dry. There is pallor.   Cool to the touch.   Nursing note  and vitals reviewed.      Critical Care  Performed by: DOMENICO ALVARENGA  Authorized by: DOMENICO ALVARENGA     Critical care provider statement:     Critical care time (minutes):  35    Critical care time was exclusive of:  Separately billable procedures and treating other patients    Critical care was necessary to treat or prevent imminent or life-threatening deterioration of the following conditions:  Respiratory failure and CNS failure or compromise    Critical care was time spent personally by me on the following activities:  Evaluation of patient's response to treatment, examination of patient, obtaining history from patient or surrogate, ordering and performing treatments and interventions, ordering and review of laboratory studies, ordering and review of radiographic studies, re-evaluation of patient's condition, development of treatment plan with patient or surrogate and pulse oximetry          Even following removal of C-collar and removal of blood from hair I was unable to locate a laceration to repair.         ED Course  ED Course     Recent Results (from the past 24 hour(s))   POC Glucose Once    Collection Time: 04/07/18  8:25 AM   Result Value Ref Range    Glucose 195 (H) 70 - 130 mg/dL   Urinalysis With / Culture If Indicated - Urine, Catheter    Collection Time: 04/07/18  8:39 AM   Result Value Ref Range    Color, UA Yellow Yellow, Straw    Appearance, UA Clear Clear    pH, UA 5.5 5.0 - 8.0    Specific Gravity, UA 1.014 1.001 - 1.030    Glucose,  mg/dL (Trace) (A) Negative    Ketones, UA Negative Negative    Bilirubin, UA Negative Negative    Blood, UA Negative Negative    Protein, UA Negative Negative    Leuk Esterase, UA Negative Negative    Nitrite, UA Negative Negative    Urobilinogen, UA 0.2 E.U./dL 0.2 - 1.0 E.U./dL   Comprehensive Metabolic Panel    Collection Time: 04/07/18  9:21 AM   Result Value Ref Range    Glucose 181 (H) 70 - 100 mg/dL    BUN 22 9 - 23 mg/dL    Creatinine 1.10 0.60 - 1.30  mg/dL    Sodium 136 132 - 146 mmol/L    Potassium 3.7 3.5 - 5.5 mmol/L    Chloride 103 99 - 109 mmol/L    CO2 26.0 20.0 - 31.0 mmol/L    Calcium 9.0 8.7 - 10.4 mg/dL    Total Protein 7.0 5.7 - 8.2 g/dL    Albumin 4.20 3.20 - 4.80 g/dL    ALT (SGPT) 21 7 - 40 U/L    AST (SGOT) 26 0 - 33 U/L    Alkaline Phosphatase 105 (H) 25 - 100 U/L    Total Bilirubin 0.6 0.3 - 1.2 mg/dL    eGFR Non African Amer 47 (L) >60 mL/min/1.73    Globulin 2.8 gm/dL    A/G Ratio 1.5 1.5 - 2.5 g/dL    BUN/Creatinine Ratio 20.0 7.0 - 25.0    Anion Gap 7.0 3.0 - 11.0 mmol/L   Magnesium    Collection Time: 04/07/18  9:21 AM   Result Value Ref Range    Magnesium 1.9 1.3 - 2.7 mg/dL   Light Blue Top    Collection Time: 04/07/18  9:21 AM   Result Value Ref Range    Extra Tube hold for add-on    Green Top (Gel)    Collection Time: 04/07/18  9:21 AM   Result Value Ref Range    Extra Tube Hold for add-ons.    Lavender Top    Collection Time: 04/07/18  9:21 AM   Result Value Ref Range    Extra Tube hold for add-on    Gold Top - SST    Collection Time: 04/07/18  9:21 AM   Result Value Ref Range    Extra Tube Hold for add-ons.    CBC Auto Differential    Collection Time: 04/07/18  9:21 AM   Result Value Ref Range    WBC 7.36 3.50 - 10.80 10*3/mm3    RBC 4.66 3.89 - 5.14 10*6/mm3    Hemoglobin 11.2 (L) 11.5 - 15.5 g/dL    Hematocrit 37.3 34.5 - 44.0 %    MCV 80.0 80.0 - 99.0 fL    MCH 24.0 (L) 27.0 - 31.0 pg    MCHC 30.0 (L) 32.0 - 36.0 g/dL    RDW 18.7 (H) 11.3 - 14.5 %    RDW-SD 54.6 (H) 37.0 - 54.0 fl    MPV 10.9 6.0 - 12.0 fL    Platelets 256 150 - 450 10*3/mm3    Neutrophil % 80.0 (H) 41.0 - 71.0 %    Lymphocyte % 12.2 (L) 24.0 - 44.0 %    Monocyte % 6.3 0.0 - 12.0 %    Eosinophil % 0.7 0.0 - 3.0 %    Basophil % 0.5 0.0 - 1.0 %    Immature Grans % 0.3 0.0 - 0.6 %    Neutrophils, Absolute 5.89 1.50 - 8.30 10*3/mm3    Lymphocytes, Absolute 0.90 0.60 - 4.80 10*3/mm3    Monocytes, Absolute 0.46 0.00 - 1.00 10*3/mm3    Eosinophils, Absolute 0.05 0.00 -  0.30 10*3/mm3    Basophils, Absolute 0.04 0.00 - 0.20 10*3/mm3    Immature Grans, Absolute 0.02 0.00 - 0.03 10*3/mm3    nRBC 0.0 0.0 - 0.0 /100 WBC   Lactic Acid, Plasma    Collection Time: 04/07/18  9:21 AM   Result Value Ref Range    Lactate 0.7 0.5 - 2.0 mmol/L   TSH    Collection Time: 04/07/18  9:21 AM   Result Value Ref Range    TSH 3.546 0.350 - 5.350 mIU/mL   Procalcitonin    Collection Time: 04/07/18  9:21 AM   Result Value Ref Range    Procalcitonin <0.05 <=0.25 ng/mL   POC Troponin, Rapid    Collection Time: 04/07/18  9:31 AM   Result Value Ref Range    Troponin I 0.00 0.00 - 0.07 ng/mL   BNP    Collection Time: 04/07/18 11:49 AM   Result Value Ref Range    .0 (H) 0.0 - 100.0 pg/mL   Blood Culture - Blood,    Collection Time: 04/07/18 11:57 AM   Result Value Ref Range    Blood Culture No growth at less than 24 hours    Blood Culture - Blood,    Collection Time: 04/07/18 11:57 AM   Result Value Ref Range    Blood Culture No growth at less than 24 hours    POC Glucose Once    Collection Time: 04/07/18 12:30 PM   Result Value Ref Range    Glucose 123 70 - 130 mg/dL   POC Glucose Once    Collection Time: 04/07/18  4:37 PM   Result Value Ref Range    Glucose 126 70 - 130 mg/dL   POC Glucose Once    Collection Time: 04/08/18 12:28 AM   Result Value Ref Range    Glucose 139 (H) 70 - 130 mg/dL   CBC (No Diff)    Collection Time: 04/08/18  4:48 AM   Result Value Ref Range    WBC 8.31 3.50 - 10.80 10*3/mm3    RBC 4.68 3.89 - 5.14 10*6/mm3    Hemoglobin 11.2 (L) 11.5 - 15.5 g/dL    Hematocrit 37.2 34.5 - 44.0 %    MCV 79.5 (L) 80.0 - 99.0 fL    MCH 23.9 (L) 27.0 - 31.0 pg    MCHC 30.1 (L) 32.0 - 36.0 g/dL    RDW 19.0 (H) 11.3 - 14.5 %    RDW-SD 55.1 (H) 37.0 - 54.0 fl    MPV 10.7 6.0 - 12.0 fL    Platelets 286 150 - 450 10*3/mm3   Basic Metabolic Panel    Collection Time: 04/08/18  4:48 AM   Result Value Ref Range    Glucose 170 (H) 70 - 100 mg/dL    BUN 15 9 - 23 mg/dL    Creatinine 1.00 0.60 - 1.30 mg/dL     Sodium 137 132 - 146 mmol/L    Potassium 3.5 3.5 - 5.5 mmol/L    Chloride 102 99 - 109 mmol/L    CO2 25.0 20.0 - 31.0 mmol/L    Calcium 8.8 8.7 - 10.4 mg/dL    eGFR Non African Amer 53 (L) >60 mL/min/1.73    BUN/Creatinine Ratio 15.0 7.0 - 25.0    Anion Gap 10.0 3.0 - 11.0 mmol/L   Vancomycin, Trough    Collection Time: 04/08/18  4:48 AM   Result Value Ref Range    Vancomycin Trough 10.70 10.00 - 20.00 mcg/mL     Note: In addition to lab results from this visit, the labs listed above may include labs taken at another facility or during a different encounter within the last 24 hours. Please correlate lab times with ED admission and discharge times for further clarification of the services performed during this visit.    XR Chest 1 View   Final Result   Massive cardiomegaly with increased pulmonary vascularity   and pulmonary edema pattern of perihilar opacifications. Trace to small   right pleural effusion.       DICTATED:     04/07/2018   EDITED/ls :     04/07/2018        This report was finalized on 4/7/2018 12:22 PM by Dr. Cj Obando.          CT Head Without Contrast   Final Result   No acute intracranial abnormality.       DICTATED:     04/07/2018   EDITED/ls :     04/07/2018            This report was finalized on 4/7/2018 12:22 PM by Dr. Cj Obando.          CT Cervical Spine Without Contrast   Final Result   1. No evidence for acute fracture, subluxation or dislocation of the   cervical spine with background multilevel degenerative changes, similar   to prior comparison.   2. Partially imaged lung apices demonstrate intralobular septal   thickening consistent with edema pattern as well as partially visualized   right pleural effusion and/or pleural thickening.         DICTATED:     04/07/2018   EDITED/ls :     04/07/2018            This report was finalized on 4/7/2018 12:22 PM by Dr. Cj Obando.          MRI Brain Without Contrast    (Results Pending)     Vitals:    04/07/18 1529 04/07/18 1918 04/07/18  2327 04/08/18 0358   BP: 162/57 163/60 150/64 168/61   BP Location: Right arm Right arm Right arm Right arm   Patient Position: Lying Lying Lying Lying   Pulse: 68 65 66 62   Resp: 16 16 16 16   Temp: 98.7 °F (37.1 °C) 98.2 °F (36.8 °C) 98.6 °F (37 °C) 98.5 °F (36.9 °C)   TempSrc: Oral Oral Oral Oral   SpO2:       Weight:       Height:         Medications   sodium chloride 0.9 % flush 10 mL (not administered)   sodium chloride 0.9 % infusion (50 mL/hr Intravenous Rate/Dose Change 4/7/18 1712)   amiodarone (PACERONE) tablet 100 mg (not administered)   aspirin chewable tablet 81 mg (not administered)   atorvastatin (LIPITOR) tablet 40 mg (40 mg Oral Given 4/7/18 2053)   docusate sodium (COLACE) capsule 100 mg (100 mg Oral Not Given 4/7/18 2053)   donepezil (ARICEPT) tablet 5 mg (5 mg Oral Given 4/7/18 2053)   isosorbide mononitrate (IMDUR) 24 hr tablet 60 mg (not administered)   Pharmacy to dose vancomycin (not administered)   dextrose (GLUTOSE) oral gel 15 g (not administered)   dextrose (D50W) solution 25 g (not administered)   glucagon (human recombinant) (GLUCAGEN DIAGNOSTIC) injection 1 mg (not administered)   sodium chloride 0.9 % flush 1-10 mL (not administered)   acetaminophen (TYLENOL) tablet 650 mg (not administered)   famotidine (PEPCID) tablet 40 mg (not administered)   bisacodyl (DULCOLAX) EC tablet 5 mg (not administered)   cefepime (MAXIPIME) 2 g/100 mL 0.9% NS (mbp) (2 g Intravenous New Bag 4/8/18 1856)   saccharomyces boulardii (FLORASTOR) capsule 250 mg (250 mg Oral Given 4/7/18 2053)   vancomycin (dosing per levels) (not administered)   vancomycin in dextrose 5% 150 mL (VANCOCIN) IVPB 750 mg (not administered)   aztreonam (AZACTAM) 2 g in sodium chloride 0.9 % 100 mL IVPB-MBP (0 g Intravenous Stopped 4/7/18 1257)   levoFLOXacin (LEVAQUIN) 750 mg/150 mL D5W (premix) (LEVAQUIN) 750 mg (750 mg Intravenous Handoff 4/7/18 1410)   vancomycin (VANCOCIN) in iso-osmotic dextrose IVPB 1 g (premix) 200 mL (0  mg Intravenous Stopped 4/7/18 1410)   cefepime (MAXIPIME) 2 g/100 mL 0.9% NS (mbp) (2 g Intravenous New Bag 4/7/18 1712)     ECG/EMG Results (last 24 hours)     ** No results found for the last 24 hours. **                        [facility]    Final diagnoses:   Hypothermia, initial encounter   Acute pulmonary edema   Hypoglycemia   Injury of head, initial encounter       Documentation assistance provided by drew Oviedo.  Information recorded by the scribe was done at my direction and has been verified and validated by me.     Marc Oviedo  04/07/18 0911       Marc Oviedo  04/07/18 1232       Oracio Durand DO  04/08/18 0703

## 2018-04-07 NOTE — H&P
"    [facility] Medicine Services  HISTORY AND PHYSICAL    Patient Name: Mary Ramirez  : 1934  MRN: [id number]  Primary Care Physician: Nirmal Lundberg MD    Subjective   Subjective     Chief Complaint:  AMS    HPI: is a 83 y.o. female with PMH Of Afib on Chronic Eliquis/amio, SSS/bradycardia, CAD, HTN, Dementia, T2DM, hospitalized 2017 for hip fx, Recent hospitalization for Afib with RVR, and enteritis dc to Assisted living 3/5/18    Found unresponsive on the floor with scalp laceration by staff at [facility]. Noted to have BG of 50 and Received 50 of glucose, EMS checked BG 44 additional amp of D50% by en route to ED. Upon arrival to ED noted to hare core temp (rectal ) of 90.3F (improved to 98.0F with bear hugger), bradycardic HR 45 (improved 64), BP stable.  At time of interview pt alone, oriented only to person (review of records of prior stay was oriented to person, place and year). \"I feel lousy\" Not answering questions appropriately, thinks she was found walking on the side of the road, UTO ROS.     CT head and C spine no acute findings. Reviewed CXR shows cardiomegaly with increased pulm vascularity/pulm edema w/mild blunting of right costophrenic angle. EKG shows sinus bradycardia and QTc 521  (recent ECHO 2017 hyperdynamic systolic function EF >70%, mild concentric hypertrophy. U/A negative.  Lactate/wbc normal, ,  ( at baseline ), blood cx's obtained. Received  IV Azactam (allergic to PCN), vanc, and levaquin in ED, IVF at 125ml/hr.     Review of Systems   Unable to perform ROS: Mental status change      Personal History     Past Medical History:   Diagnosis Date   • Atrial fibrillation    • CAD (coronary artery disease)    • CHF (congestive heart failure)    • CKD (chronic kidney disease) stage 3, GFR 30-59 ml/min    • DDD (degenerative disc disease), cervical    • Dementia    • Diabetes mellitus    • Hypertension    • SSS (sick " "sinus syndrome)        Past Surgical History:   Procedure Laterality Date   • ANKLE SURGERY Left 1996   • ATHRECTOMY ILIAC, FEMORAL, TIBIAL ARTERY     • BREAST LUMPECTOMY Left 1981   • COLON SURGERY     • CORONARY STENT PLACEMENT     • FINGER FRACTURE SURGERY Right    • HIP TROCANTERIC NAILING WITH INTRAMEDULLARY HIP SCREW Left 11/23/2017   • HYSTERECTOMY         Family History: family history includes No Known Problems in her father and mother.     Social History:  reports that she has never smoked. She has never used smokeless tobacco. She reports that she does not drink alcohol or use drugs.    Medications:  Reviewed and reconciled    Allergies   Allergen Reactions   • Penicillins Other (See Comments)     Pt states \"i don't know\"       Objective   Objective     Vital Signs:   Temp:  [90.3 °F (32.4 °C)-95 °F (35 °C)] 95 °F (35 °C)  Heart Rate:  [43-57] 57  Resp:  [16-18] 16  BP: (130-152)/(57-68) 140/60 HR improved to 64       Physical Exam   Constitutional: No acute distress, awake, alert resting in bed under bear hugger appears in NAD  Eyes: PERRLA, sclerae anicteric, no conjunctival injection  HENT:, mucous membranes moist, mild nonbleeding abrasion on bridge of nose, no trauma noted to scalp/forehead. Trauma to distal tongue (possible bite)  Neck: Supple,trachea midline  Respiratory: Clear to auscultation bilaterally, nonlabored respirations   Cardiovascular: RRR, no murmurs, rubs, or gallops, palpable pedal pulses bilaterally  Gastrointestinal: Positive bowel sounds, soft, nontender, nondistended  Musculoskeletal: No bilateral ankle edema, no clubbing or cyanosis to extremities  Psychiatric: Appropriate affect, cooperative  Neurologic: Oriented x 1 (person only), strength symmetric in all extremities, Cranial Nerves II-XI grossly intact to confrontation, speech clear  Skin: warm, dry.  Dry skin/rubor bilat distal LE.     Results Reviewed:  I have personally reviewed and interpreted available lab data dated " 04/07/2018.  I have personally reviewed CXR and CT head & C/spine imaging reports available and dated 04/07/2018.  I have personally reviewed ECG report dated 04/07/2018.  I have discussed the tests with the performing ED provider.  I have personally reviewed and summarized old records.      Results from last 7 days  Lab Units 04/07/18  0921   WBC 10*3/mm3 7.36   HEMOGLOBIN g/dL 11.2*   HEMATOCRIT % 37.3   PLATELETS 10*3/mm3 256       Results from last 7 days  Lab Units 04/07/18  0921   SODIUM mmol/L 136   POTASSIUM mmol/L 3.7   CHLORIDE mmol/L 103   CO2 mmol/L 26.0   BUN mg/dL 22   CREATININE mg/dL 1.10   GLUCOSE mg/dL 181*   CALCIUM mg/dL 9.0   ALT (SGPT) U/L 21   AST (SGOT) U/L 26     Estimated Creatinine Clearance: 34.1 mL/min (by C-G formula based on SCr of 1.1 mg/dL).  Brief Urine Lab Results  (Last result in the past 365 days)      Color   Clarity   Blood   Leuk Est   Nitrite   Protein   CREAT   Urine HCG        04/07/18 0839 Yellow Clear Negative Negative Negative Negative             BNP   Date Value Ref Range Status   04/07/2018 234.0 (H) 0.0 - 100.0 pg/mL Final     Comment:     Results may be falsely decreased if patient taking Biotin.     No results found for: PHART  Imaging Results (last 24 hours)     Procedure Component Value Units Date/Time    XR Chest 1 View [[id number]] Collected:  04/07/18 0909     Updated:  04/07/18 1224    Narrative:          EXAMINATION: XR CHEST, SINGLE VIEW - 04/07/2018     INDICATION: Weakness, dizziness, altered mental status.     COMPARISON: Chest x-ray 02/28/2018.     FINDINGS: Cardiac silhouette enlarged with increased central pulmonary  vascularity as well as ill-defined patchy bilateral pulmonary  opacifications within the midlungs, greatest on the right. These are  superimposed upon chronic lung changes. No pneumothorax with trace to  small right pleural effusion. Degenerative changes of the thoracic  spine.       Impression:       Massive cardiomegaly with increased  pulmonary vascularity  and pulmonary edema pattern of perihilar opacifications. Trace to small  right pleural effusion.     DICTATED:     04/07/2018  EDITED/ls :     04/07/2018      This report was finalized on 4/7/2018 12:22 PM by Dr. Cj Obando.       CT Head Without Contrast [[id number]] Collected:  04/07/18 0958     Updated:  04/07/18 1224    Narrative:       EXAMINATION: CT HEAD WO CONTRAST - 04/07/2018     INDICATION: Head trauma, mental status change.     TECHNIQUE: Axial CT of the head without intravenous contrast  administration.     The radiation dose reduction device was turned on for each scan per the  ALARA (As Low as Reasonably Achievable) protocol.     COMPARISON: CT head dated 02/28/2018.     FINDINGS: Midline structures are symmetric without evidence of mass,  mass effect or midline shift. Ventricles and sulci are mildly prominent  consistent with components of atrophy as well as mild to moderate  attenuation changes in the periventricular and deep white matter  consistent with chronic small vessel ischemic disease. No intra-axial  hemorrhage or extra-axial fluid collection. Globes and orbits are  grossly unremarkable. Visualized paranasal sinuses and mastoid air cells  are grossly clear and well-pneumatized. Calvarium intact with  hyperostosis frontalis noted.       Impression:       No acute intracranial abnormality.     DICTATED:     04/07/2018  EDITED/ls :     04/07/2018         This report was finalized on 4/7/2018 12:22 PM by Dr. Cj Obando.       CT Cervical Spine Without Contrast [[id number]] Collected:  04/07/18 1001     Updated:  04/07/18 1224    Narrative:       EXAMINATION: CT CERVICAL SPINE WO CONTRAST - 04/07/2018     INDICATION: Fall, trauma, neck pain, altered mental status.      TECHNIQUE: CT cervical spine without intravenous contrast  administration.     The radiation dose reduction device was turned on for each scan per the  ALARA (As Low as Reasonably Achievable) protocol.      COMPARISON: CT cervical spine 11/23/2017.     FINDINGS: Sagittal datasets reveal flattening of the cervical lordotic  curvature with persistent grade 1 anterolisthesis of C4 on C5. Vertebral  body heights are preserved with intervertebral disc height space loss in  the mid and lower cervical segments, greatest at the C5-C6 and C6-C7  levels where there is degenerative disc disease. Facets are well-aligned  without evidence for discrete fracture associated. Lateral masses of C1  are well seated on C2. Dens is normal in appearance.     Axial datasets without critical spinal canal narrowing or paraspinal  hematoma. Visualized lung apices demonstrate intralobular septal  thickening consistent with edema as well as partially visualized right  pleural effusion.       Impression:       1. No evidence for acute fracture, subluxation or dislocation of the  cervical spine with background multilevel degenerative changes, similar  to prior comparison.  2. Partially imaged lung apices demonstrate intralobular septal  thickening consistent with edema pattern as well as partially visualized  right pleural effusion and/or pleural thickening.       DICTATED:     04/07/2018  EDITED/ls :     04/07/2018         This report was finalized on 4/7/2018 12:22 PM by Dr. Cj Obando.           Results for orders placed during the hospital encounter of 11/23/17   Adult Transthoracic Echo Complete W/ Cont if Necessary Per Protocol    Narrative · The left ventricular cavity is small.  · Left ventricular wall thickness is consistent with mild concentric   hypertrophy.  · Left ventricular systolic function is hyperdynamic (EF > 70).  · Mild mitral valve regurgitation is present  · Mild tricuspid valve regurgitation is present.          Assessment/Plan    Assessment / Plan   Principal Problem:    Unresponsive episode  Active Problems:    Altered mental state    Hypoglycemia    Hypothermia    Sepsis    Type 2 diabetes mellitus with complication,  with long-term current use of insulin    Coronary artery disease involving native coronary artery of native heart without angina pectoris    Dementia    A-fib    Anticoagulated    SSS (sick sinus syndrome)    Assessment & Plan:    Found unresponsive and now with AMS  -Possible seizure with Postictal state superimposed on dementia  -wbc, UA, and lactate normal  -obtaining MRI of brain, EEG  -neuro checks, sz precautions, fall precautions  -holding norco  -consult neuro in a.m.  -checking TSH/procal  -will decrease IVF to 50ml/hr (consider stopping in a.m. If doing better)    Hypothermia  -core temp 90F upon arrival, normalized with bear hugger  -MRI to identify thalamic lesions  -Continue with re-warming orders    Possible Sepsis  -continue broad spectrum abx for now (follow bcx's) for possible sepsis   -Pharmacy to dose vanc, cefepime (no levaquin 2nd to QTc)  -probiotic  -blood cultures    Hypoglycemia  -BG 44 enroute, resolved after D50 by EMS  -holding insulin (was not on oral agents based on recent MAR), may need to add SSI tomorrow    Bradycardia and QTc prolongation, Hx of Afib  -will decrease amio from 200 to 100mg daily for now  -holding coreg  For now  -K+/Mg normal  -monitor for need of cardiology consultation  -hold Celexa 2nd to QTc  -Repeat EKG in a.m.    Hx CAD  -ASA, statin, holding beta blocker 2nd to HR 40s upon arrival  -resume imdur in am    Hx of diastolic dysfunction, EF >70% recent ECHO  -BNP at baseline, mild increase pulm vasc and trace right effusion on CXR (no diuresis for now)    Hx of recent Afib  -Hold Eliquis until MRI of brain    Hx of HTN  Hold norvasc, losartan, terazosin tonight, consider resuming BP remains stable and MRI negative for stroke    Consult PT/OT/CM in a.m.    DVT prophylaxis:  mechanical until MRI of brain, if ok resume Eliquis    CODE STATUS:  Prior    Admission Status:  I believe this patient meets INPATIENT status due to the need for care which can only be  reasonably provided in an hospital setting such as aggressive/expedited ancillary services and/or consultation services, the necessity for IV medications, close physician monitoring and/or the possible need for procedures.  In such, I feel patient’s risk for adverse outcomes and need for care warrant INPATIENT evaluation and predict the patient’s care encounter to likely last beyond 2 midnights.    Electronically signed by Casie M Mayne, PA-C, 04/07/18, 2:03 PM.    Brief Attending Admission Attestation     I have seen and examined the patient, performing an independent face-to-face diagnostic evaluation with plan of care reviewed and developed with the advanced practice clinician (APC).  I have addended and modified the above history of present illness, assessment and plan to reflect my findings and impressions. See above for further detailed assessment and plan developed with APC which I have reviewed and/or edited.    Brief Summary Statement/HPI:   Mary Ramirez is a 83 y.o. female that was found unresponsive this AM and identified with a low blood sugar.  Her presenting vitals included a core temperature in the low 90's.  She was placed in a bear hugger.  Her exam was consistent with a scalp laceration and left distal tongue bite.  She has an altered mental status that according to the ED nurse is slowly improving.    Attending Physical Exam:  Constitutional: patient is not oriented to person, place, and time. Appears well-developed and well-nourished. Patient is cooperative.   HENT: Head: Normocephalic with scalp injury. Decreased hearing and external ears normal.  Nose: Nose normal. Mouth/Throat: Uvula is midline, oropharynx is clear and moist and mucous membranes are normal with left distal tongue injury.   Eyes: Conjunctivae and EOM are normal. Pupils are equal, round, and reactive to light. No scleral icterus.   Neck: Trachea normal and normal range of motion. Neck supple. No JVD present. Carotid bruit is  not present. No thyromegaly present.   Cardiovascular: Normal rate, regular rhythm, normal heart sounds and intact distal pulses.    Pulmonary/Chest: Effort normal and breath sounds normal.   Abdominal: Soft. Bowel sounds are normal. There is no hepatosplenomegaly. There is no tenderness.   Musculoskeletal: Normal range of motion.   Lymphadenopathy:    There is no cervical adenopathy.   Neurological:  Normal strength. No sensory deficit. Gait not assessed today.   Skin: Skin is warm and dry without diffuse rash or excessive bruising  Psychiatric: Cognition and memory are impaired.   Nursing note and vitals reviewed.    Brief Assessment/Plan :  With her abrupt change in neurological status we will admit as an inpatient.  I believe this patient meets INPATIENT status due to the need for care which can only be reasonably provided in an hospital setting such as aggressive/expedited ancillary services and/or consultation services and the necessity for IV medications, close physician monitoring and/or the possible need for procedures.  In such, I feel patient’s risk for adverse outcomes and need for care warrant INPATIENT evaluation and predict the patient’s care encounter to likely last beyond 2 midnights.  See above for further detailed assessment and plan developed with APC which I have reviewed and/or edited.    Electronically signed by Josué Wheeler MD, 04/07/18, 7:59 PM.

## 2018-04-08 ENCOUNTER — APPOINTMENT (OUTPATIENT)
Dept: MRI IMAGING | Facility: HOSPITAL | Age: 83
End: 2018-04-08

## 2018-04-08 LAB
ANION GAP SERPL CALCULATED.3IONS-SCNC: 10 MMOL/L (ref 3–11)
BUN BLD-MCNC: 15 MG/DL (ref 9–23)
BUN/CREAT SERPL: 15 (ref 7–25)
CALCIUM SPEC-SCNC: 8.8 MG/DL (ref 8.7–10.4)
CHLORIDE SERPL-SCNC: 102 MMOL/L (ref 99–109)
CO2 SERPL-SCNC: 25 MMOL/L (ref 20–31)
CREAT BLD-MCNC: 1 MG/DL (ref 0.6–1.3)
DEPRECATED RDW RBC AUTO: 55.1 FL (ref 37–54)
ERYTHROCYTE [DISTWIDTH] IN BLOOD BY AUTOMATED COUNT: 19 % (ref 11.3–14.5)
GFR SERPL CREATININE-BSD FRML MDRD: 53 ML/MIN/1.73
GLUCOSE BLD-MCNC: 170 MG/DL (ref 70–100)
GLUCOSE BLDC GLUCOMTR-MCNC: 139 MG/DL (ref 70–130)
GLUCOSE BLDC GLUCOMTR-MCNC: 168 MG/DL (ref 70–130)
GLUCOSE BLDC GLUCOMTR-MCNC: 181 MG/DL (ref 70–130)
GLUCOSE BLDC GLUCOMTR-MCNC: 206 MG/DL (ref 70–130)
GLUCOSE BLDC GLUCOMTR-MCNC: 227 MG/DL (ref 70–130)
HBA1C MFR BLD: 6.8 % (ref 4.8–5.6)
HCT VFR BLD AUTO: 37.2 % (ref 34.5–44)
HGB BLD-MCNC: 11.2 G/DL (ref 11.5–15.5)
MCH RBC QN AUTO: 23.9 PG (ref 27–31)
MCHC RBC AUTO-ENTMCNC: 30.1 G/DL (ref 32–36)
MCV RBC AUTO: 79.5 FL (ref 80–99)
PLATELET # BLD AUTO: 286 10*3/MM3 (ref 150–450)
PMV BLD AUTO: 10.7 FL (ref 6–12)
POTASSIUM BLD-SCNC: 3.5 MMOL/L (ref 3.5–5.5)
RBC # BLD AUTO: 4.68 10*6/MM3 (ref 3.89–5.14)
SODIUM BLD-SCNC: 137 MMOL/L (ref 132–146)
VANCOMYCIN TROUGH SERPL-MCNC: 10.7 MCG/ML (ref 10–20)
WBC NRBC COR # BLD: 8.31 10*3/MM3 (ref 3.5–10.8)

## 2018-04-08 PROCEDURE — 80202 ASSAY OF VANCOMYCIN: CPT | Performed by: FAMILY MEDICINE

## 2018-04-08 PROCEDURE — 83036 HEMOGLOBIN GLYCOSYLATED A1C: CPT | Performed by: INTERNAL MEDICINE

## 2018-04-08 PROCEDURE — 93005 ELECTROCARDIOGRAM TRACING: CPT | Performed by: PHYSICIAN ASSISTANT

## 2018-04-08 PROCEDURE — 97161 PT EVAL LOW COMPLEX 20 MIN: CPT

## 2018-04-08 PROCEDURE — 70551 MRI BRAIN STEM W/O DYE: CPT

## 2018-04-08 PROCEDURE — 82962 GLUCOSE BLOOD TEST: CPT

## 2018-04-08 PROCEDURE — 25010000002 VANCOMYCIN PER 500 MG

## 2018-04-08 PROCEDURE — 99233 SBSQ HOSP IP/OBS HIGH 50: CPT | Performed by: INTERNAL MEDICINE

## 2018-04-08 PROCEDURE — 85027 COMPLETE CBC AUTOMATED: CPT | Performed by: PHYSICIAN ASSISTANT

## 2018-04-08 PROCEDURE — 93010 ELECTROCARDIOGRAM REPORT: CPT | Performed by: INTERNAL MEDICINE

## 2018-04-08 PROCEDURE — 80048 BASIC METABOLIC PNL TOTAL CA: CPT | Performed by: PHYSICIAN ASSISTANT

## 2018-04-08 PROCEDURE — 25010000002 CEFEPIME: Performed by: PHYSICIAN ASSISTANT

## 2018-04-08 RX ORDER — CARVEDILOL 12.5 MG/1
25 TABLET ORAL 2 TIMES DAILY WITH MEALS
Status: DISCONTINUED | OUTPATIENT
Start: 2018-04-08 | End: 2018-04-11 | Stop reason: HOSPADM

## 2018-04-08 RX ORDER — CITALOPRAM 20 MG/1
20 TABLET ORAL DAILY
Status: DISCONTINUED | OUTPATIENT
Start: 2018-04-08 | End: 2018-04-11 | Stop reason: HOSPADM

## 2018-04-08 RX ORDER — AMLODIPINE BESYLATE 10 MG/1
10 TABLET ORAL
Status: DISCONTINUED | OUTPATIENT
Start: 2018-04-09 | End: 2018-04-11 | Stop reason: HOSPADM

## 2018-04-08 RX ADMIN — ISOSORBIDE MONONITRATE 60 MG: 60 TABLET, EXTENDED RELEASE ORAL at 09:30

## 2018-04-08 RX ADMIN — CEFEPIME 2 G: 2 INJECTION, POWDER, FOR SOLUTION INTRAVENOUS at 04:56

## 2018-04-08 RX ADMIN — DONEPEZIL HYDROCHLORIDE 5 MG: 5 TABLET ORAL at 20:29

## 2018-04-08 RX ADMIN — CARVEDILOL 25 MG: 12.5 TABLET, FILM COATED ORAL at 20:29

## 2018-04-08 RX ADMIN — Medication 250 MG: at 20:29

## 2018-04-08 RX ADMIN — CITALOPRAM HYDROBROMIDE 20 MG: 20 TABLET ORAL at 20:29

## 2018-04-08 RX ADMIN — DOCUSATE SODIUM 100 MG: 100 CAPSULE, LIQUID FILLED ORAL at 09:24

## 2018-04-08 RX ADMIN — FAMOTIDINE 40 MG: 20 TABLET, FILM COATED ORAL at 09:24

## 2018-04-08 RX ADMIN — AMIODARONE HYDROCHLORIDE 100 MG: 200 TABLET ORAL at 09:24

## 2018-04-08 RX ADMIN — ASPIRIN 81 MG 81 MG: 81 TABLET ORAL at 09:24

## 2018-04-08 RX ADMIN — CEFEPIME 2 G: 2 INJECTION, POWDER, FOR SOLUTION INTRAVENOUS at 17:26

## 2018-04-08 RX ADMIN — Medication 250 MG: at 09:24

## 2018-04-08 RX ADMIN — VANCOMYCIN HYDROCHLORIDE 750 MG: 750 INJECTION, SOLUTION INTRAVENOUS at 09:25

## 2018-04-08 RX ADMIN — ATORVASTATIN CALCIUM 40 MG: 40 TABLET, FILM COATED ORAL at 20:29

## 2018-04-08 RX ADMIN — APIXABAN 2.5 MG: 2.5 TABLET, FILM COATED ORAL at 20:29

## 2018-04-08 NOTE — CONSULTS
NEUROLOGY CONSULTATION    Mary Ramirez    Consulting Physician: Dr. Wheeler    Chief Complaint/Reason for Consultation: found down, possible seizure    HPI:   Subjective     Mary Ramirez is a 83 y.o. F who was found unresponsive on the floor with scalp laceration by staff at her living facility.  She was noted to have hypoglycemia with BG of 50 and received glucose. She was also noted to have hypothermia and bradycardia and was disoriented.  She was noted to have bit her tongue.  MRI brain and EEG have been unremarkable.       Patient Active Problem List    Diagnosis   • A-fib [I48.91]   • Anticoagulated [Z79.01]   • Altered mental state [R41.82]   • SSS (sick sinus syndrome) [I49.5]   • Unresponsive episode [R41.89]   • Hypoglycemia [E16.2]   • Hypothermia [T68.XXXA]   • Sepsis [A41.9]   • Dementia [F03.90]   • Bilateral pleural effusion [J90]   • Right knee pain [M25.561]   • Fracture, intertrochanteric, left femur [S72.142A]   • Hypertension [I10]   • Type 2 diabetes mellitus with complication, with long-term current use of insulin [E11.8, Z79.4]   • Coronary artery disease involving native coronary artery of native heart without angina pectoris [I25.10]     Overview Note:     · Cardiac cath with PCI data deficit  · Echo (11/27/2017): Mild LVH. Mild MR and TR.  Normal LVEF       • Fall [W19.XXXA]     Past Surgical History:   Procedure Laterality Date   • ANKLE SURGERY Left 1996   • ATHRECTOMY ILIAC, FEMORAL, TIBIAL ARTERY     • BREAST LUMPECTOMY Left 1981   • COLON SURGERY     • CORONARY STENT PLACEMENT     • FINGER FRACTURE SURGERY Right    • HIP TROCANTERIC NAILING WITH INTRAMEDULLARY HIP SCREW Left 11/23/2017   • HYSTERECTOMY       Family History   Problem Relation Age of Onset   • No Known Problems Mother    • No Known Problems Father      Social History     Social History   • Marital status:      Spouse name: N/A   • Number of children: 2   • Years of education: H.S     Occupational History   •   Retired     Social History Main Topics   • Smoking status: Never Smoker   • Smokeless tobacco: Never Used   • Alcohol use No   • Drug use: No   • Sexual activity: No     Other Topics Concern   • Not on file        Review of Systems  Review of Systems as per HPI and PMHx    Objective     Vital signs in last 24 hours:  Temp:  [96.8 °F (36 °C)-98.7 °F (37.1 °C)] 98.4 °F (36.9 °C)  Heart Rate:  [60-77] 67  Resp:  [14-16] 16  BP: (150-177)/(57-72) 170/63  Gen: pleasant, NAD  Neuro: awake, a+ox2. Fluent. F/c. CN intact. ARTEAGA, SILT.       Current Facility-Administered Medications:   •  acetaminophen (TYLENOL) tablet 650 mg, 650 mg, Oral, Q4H PRN, Casie M Mayne, PA-C  •  amiodarone (PACERONE) tablet 100 mg, 100 mg, Oral, Daily, Casie M Mayne, PA-C, 100 mg at 04/08/18 0924  •  aspirin chewable tablet 81 mg, 81 mg, Oral, Daily, Casie M Mayne, PA-C, 81 mg at 04/08/18 0924  •  atorvastatin (LIPITOR) tablet 40 mg, 40 mg, Oral, Nightly, Casie M Mayne, PA-C, 40 mg at 04/07/18 2053  •  bisacodyl (DULCOLAX) EC tablet 5 mg, 5 mg, Oral, Daily PRN, Casie M Mayne, PA-C  •  cefepime (MAXIPIME) 2 g/100 mL 0.9% NS (mbp), 2 g, Intravenous, Q12H, Casie M Mayne, PA-C, 2 g at 04/08/18 0456  •  dextrose (D50W) solution 25 g, 25 g, Intravenous, Q15 Min PRN, Casie M Mayne, PA-C  •  dextrose (GLUTOSE) oral gel 15 g, 15 g, Oral, Q15 Min PRN, Casie M Mayne, PA-C  •  docusate sodium (COLACE) capsule 100 mg, 100 mg, Oral, BID, Casie M Mayne, PA-C, 100 mg at 04/08/18 0924  •  donepezil (ARICEPT) tablet 5 mg, 5 mg, Oral, Nightly, Casie M Mayne, PA-C, 5 mg at 04/07/18 2053  •  famotidine (PEPCID) tablet 40 mg, 40 mg, Oral, Daily, Casie M Mayne, PA-C, 40 mg at 04/08/18 0924  •  glucagon (human recombinant) (GLUCAGEN DIAGNOSTIC) injection 1 mg, 1 mg, Subcutaneous, PRN, Casie M Mayne, PA-C  •  isosorbide mononitrate (IMDUR) 24 hr tablet 60 mg, 60 mg, Oral, Daily, Casie M Mayne, PA-C, 60 mg at 04/08/18 0930  •  Pharmacy to dose vancomycin, ,  "Does not apply, Continuous PRN, Casie M Mayne, PA-C  •  saccharomyces boulardii (FLORASTOR) capsule 250 mg, 250 mg, Oral, BID, Casie M Mayne, PA-C, 250 mg at 04/08/18 0924  •  sodium chloride 0.9 % flush 1-10 mL, 1-10 mL, Intravenous, PRN, Casie M Mayne, PA-C  •  sodium chloride 0.9 % flush 10 mL, 10 mL, Intravenous, PRN, Oracio Durand DO  •  sodium chloride 0.9 % infusion, 50 mL/hr, Intravenous, Continuous, Casie M Mayne, PA-C, Last Rate: 50 mL/hr at 04/07/18 1712, 50 mL/hr at 04/07/18 1712  •  vancomycin (dosing per levels), , Does not apply, Daily, Josué Wheeler MD  •  vancomycin in dextrose 5% 150 mL (VANCOCIN) IVPB 750 mg, 15 mg/kg, Intravenous, Daily PRN, Josué Wheeler MD    Allergies   Allergen Reactions   • Penicillins Other (See Comments)     Pt states \"i don't know\"         Imaging/Results:    Lab Results   Component Value Date    GLUCOSE 170 (H) 04/08/2018    BUN 15 04/08/2018    CREATININE 1.00 04/08/2018    EGFRIFNONA 53 (L) 04/08/2018    BCR 15.0 04/08/2018    K 3.5 04/08/2018    CO2 25.0 04/08/2018    CALCIUM 8.8 04/08/2018    ALBUMIN 4.20 04/07/2018    LABIL2 1.5 04/07/2018    AST 26 04/07/2018    ALT 21 04/07/2018     Lab Results   Component Value Date    WBC 8.31 04/08/2018    HGB 11.2 (L) 04/08/2018    HCT 37.2 04/08/2018    MCV 79.5 (L) 04/08/2018     04/08/2018     MRI brain: no acute abnormalities; generalized atrophy  EEG: no epileptiform changes; mild generalized slowing      Assessment/Plan     1. Possible Seizure  2. Hypoglycemia   3. Dementia  4. Encephalopathy  5. Possible Sepsis  6. Bradycardia  7. Hypothermia      -possible seizure in the setting of hypoglycemia/possible sepsis, w/ unremarkable MRI and EEG would hold off on antiepileptics at this time, but if further episodes were to occur would reconsider  -continue current medical tx    Anatoliy Quintana MD      "

## 2018-04-08 NOTE — PROGRESS NOTES
[facility] Medicine Services  PROGRESS NOTE    Patient Name: Mary Ramirez  : 1934  MRN: [id number]    Date of Admission: 2018  Length of Stay: 1  Primary Care Physician: Nirmal Lundberg MD    Subjective   Subjective     CC:  Altered mental status    HPI:  No further events. Patient denies current pain. No nausea, no cough, no fever. Looking forward to returning to SNF    Review of Systems  Gen- No fevers, chills  CV- No chest pain, palpitations  Resp- No cough, dyspnea  GI- No N/V/D, abd pain      Otherwise ROS is negative except as mentioned in the HPI.    Objective   Objective     Vital Signs:   Temp:  [97.8 °F (36.6 °C)-98.6 °F (37 °C)] 97.8 °F (36.6 °C)  Heart Rate:  [60-69] 60  Resp:  [16] 16  BP: (150-177)/(60-76) 155/76        Physical Exam:  Constitutional -no acute distress, non toxic, in bed  HEENT-NCAT, mucous membranes moist  CV-RRR, S1 S2 normal, no m/r/g  Resp-CTAB, no wheezes, rhonchi or rales  Abd-soft, non-tender, non-distended, normo active bowel sounds  Ext-No lower extremity cyanosis, clubbing or edema bilaterally  Neuro-alert but with mild confusion, speech clear, moves all extremities   Psych-normal affect   Skin- No rash on exposed UE or LE bilaterally      Results Reviewed:  I have personally reviewed current lab, radiology, and data and agree.      Results from last 7 days  Lab Units 18  0448 18  0921   WBC 10*3/mm3 8.31 7.36   HEMOGLOBIN g/dL 11.2* 11.2*   HEMATOCRIT % 37.2 37.3   PLATELETS 10*3/mm3 286 256       Results from last 7 days  Lab Units 18  0448 18  0921   SODIUM mmol/L 137 136   POTASSIUM mmol/L 3.5 3.7   CHLORIDE mmol/L 102 103   CO2 mmol/L 25.0 26.0   BUN mg/dL 15 22   CREATININE mg/dL 1.00 1.10   GLUCOSE mg/dL 170* 181*   CALCIUM mg/dL 8.8 9.0   ALT (SGPT) U/L  --  21   AST (SGOT) U/L  --  26     Estimated Creatinine Clearance: 37.5 mL/min (by C-G formula based on SCr of 1 mg/dL).  BNP   Date Value Ref  Range Status   04/07/2018 234.0 (H) 0.0 - 100.0 pg/mL Final     Comment:     Results may be falsely decreased if patient taking Biotin.     No results found for: PHART    Microbiology Results Abnormal     Procedure Component Value - Date/Time    Blood Culture - Blood, [[id number]]  (Normal) Collected:  04/07/18 1157    Lab Status:  Preliminary result Specimen:  Blood from Arm, Right Updated:  04/08/18 1231     Blood Culture No growth at 24 hours    Blood Culture - Blood, [[id number]]  (Normal) Collected:  04/07/18 1157    Lab Status:  Preliminary result Specimen:  Blood from Arm, Right Updated:  04/08/18 1231     Blood Culture No growth at 24 hours          Imaging Results (last 24 hours)     Procedure Component Value Units Date/Time    MRI Brain Without Contrast [[id number]] Collected:  04/08/18 0932     Updated:  04/08/18 1310    Narrative:       EXAMINATION: MRI BRAIN WO CONTRAST - 04/08/2018     INDICATION: T68.XXXA-Hypothermia, initial encounter; J81.0-Acute  pulmonary edema; E16.2-Hypoglycemia, unspecified. Confusion.       TECHNIQUE: Multiplanar MRI of the brain without intravenous contrast  administration.     COMPARISON: CT head dated 04/07/2018.     FINDINGS: No restriction on diffusion-weighted sequences to suggest  acute ischemia. Midline structures are symmetric without evidence of  mass, mass effect or midline shift. Ventricles and sulci are prominent  indicating cerebral atrophy. Moderate T2 and FLAIR hyperintense signal  abnormalities within the periventricular and deep white matter  consistent with chronic small vessel ischemic disease. Pituitary and  sella within normal limits. Cervicomedullary junction widely patent.  Globes and orbits retain normal T2 signal characteristics. Visualized  paranasal sinuses and mastoid air cells are grossly clear and  well-pneumatized. Normal signal flow voids of the distal internal  carotid and basilar arteries.          Impression:       1. No acute intracranial  abnormality. Specifically, no evidence for  acute ischemia or infarction.  2. Moderate amount of chronic small vessel ischemic changes as well as  cerebral atrophy of the supratentorial white matter.      DICTATED:     04/08/2018  EDITED/ls :     04/08/2018            Results for orders placed during the hospital encounter of 11/23/17   Adult Transthoracic Echo Complete W/ Cont if Necessary Per Protocol    Narrative · The left ventricular cavity is small.  · Left ventricular wall thickness is consistent with mild concentric   hypertrophy.  · Left ventricular systolic function is hyperdynamic (EF > 70).  · Mild mitral valve regurgitation is present  · Mild tricuspid valve regurgitation is present.          I have reviewed the medications.    amiodarone 100 mg Oral Daily   [START ON 4/9/2018] amLODIPine 10 mg Oral Q24H   apixaban 2.5 mg Oral Q12H   aspirin 81 mg Oral Daily   atorvastatin 40 mg Oral Nightly   carvedilol 25 mg Oral BID With Meals   cefepime 2 g Intravenous Q12H   citalopram 20 mg Oral Daily   docusate sodium 100 mg Oral BID   donepezil 5 mg Oral Nightly   famotidine 40 mg Oral Daily   isosorbide mononitrate 60 mg Oral Daily   saccharomyces boulardii 250 mg Oral BID         Assessment/Plan   Assessment / Plan     Hospital Problem List     * (Principal)Unresponsive episode    Type 2 diabetes mellitus with complication, with long-term current use of insulin    Coronary artery disease involving native coronary artery of native heart without angina pectoris    Overview Addendum 3/5/2018  9:42 AM by CRISTA Oleary     · Cardiac cath with PCI data deficit  · Echo (11/27/2017): Mild LVH. Mild MR and TR.  Normal LVEF           Dementia    A-fib    Anticoagulated    Altered mental state    SSS (sick sinus syndrome)    Hypoglycemia    Hypothermia    Sepsis             Brief Hospital Course to date:  Mary Ramirez is a 83 y.o. female with unresponsive episode and hypoglycemia      Assessment &  Plan:    Altered mental status with possible seizure  - associated with hypoglycemia  - no further events - no need for anti-epileptics at this point per Neurology  - will scale back insulin dosing  - no fever, no infiltrate on cxr, UA bland, will d/c vanc today, likely discontinue other antibiotics in am    DMII  - hypoglycemia noted  - most recent a1c 7.5, will repeat  - needs lower insulin dosing vs treatment with oral medications    Dementia    Atrial fibrillation  - eliquis/amio/coreg    HTN    DVT Prophylaxis:  eliquis    CODE STATUS: Full Code    Disposition: I expect the patient to be discharged to snf in 1-2 days.      Electronically signed by Emmanuel Mehta MD, 04/08/18, 6:20 PM.

## 2018-04-08 NOTE — PLAN OF CARE
Problem: Patient Care Overview  Goal: Plan of Care Review  Outcome: Ongoing (interventions implemented as appropriate)   04/08/18 1380   Coping/Psychosocial   Plan of Care Reviewed With patient   OTHER   Outcome Summary Pt presents with deficits in balance and functional mobility. Pt would benefit from skilled PT services to improve function and help her return to prior level of independence.

## 2018-04-08 NOTE — THERAPY EVALUATION
Acute Care - Physical Therapy Initial Evaluation  [facility]     Patient Name: Mary Ramirez  : 1934  MRN: [id number]  Today's Date: 2018      Date of Referral to PT: 18  Referring Physician: Casie Mayne, PA-C      Admit Date: 2018    Visit Dx:     ICD-10-CM ICD-9-CM   1. Hypothermia, initial encounter T68.XXXA 991.6   2. Acute pulmonary edema J81.0 518.4   3. Hypoglycemia E16.2 251.2   4. Injury of head, initial encounter S09.90XA 959.01   5. Impaired functional mobility, balance, gait, and endurance Z74.09 V49.89     Patient Active Problem List   Diagnosis   • Fall   • Fracture, intertrochanteric, left femur   • Hypertension   • Type 2 diabetes mellitus with complication, with long-term current use of insulin   • Coronary artery disease involving native coronary artery of native heart without angina pectoris   • Dementia   • Bilateral pleural effusion   • Right knee pain   • A-fib   • Anticoagulated   • Altered mental state   • SSS (sick sinus syndrome)   • Unresponsive episode   • Hypoglycemia   • Hypothermia   • Sepsis     Past Medical History:   Diagnosis Date   • Atrial fibrillation    • CAD (coronary artery disease)    • CHF (congestive heart failure)    • CKD (chronic kidney disease) stage 3, GFR 30-59 ml/min    • DDD (degenerative disc disease), cervical    • Dementia    • Diabetes mellitus    • Hypertension    • SSS (sick sinus syndrome)      Past Surgical History:   Procedure Laterality Date   • ANKLE SURGERY Left    • ATHRECTOMY ILIAC, FEMORAL, TIBIAL ARTERY     • BREAST LUMPECTOMY Left    • COLON SURGERY     • CORONARY STENT PLACEMENT     • FINGER FRACTURE SURGERY Right    • HIP TROCANTERIC NAILING WITH INTRAMEDULLARY HIP SCREW Left 2017   • HYSTERECTOMY          PT ASSESSMENT (last 72 hours)      Physical Therapy Evaluation     Row Name 18 1430          PT Evaluation Time/Intention    Subjective Information --   agreeable to PT.  -     Document Type  evaluation  -     Row Name 04/08/18 1430          General Information    Patient Profile Reviewed? yes  -     Referring Physician Casie Mayne, PA-C  -     Prior Level of Function independent:;gait;transfer;bed mobility  -     Equipment Currently Used at Home walker, rolling   pt said she walks to meals at [facility]  -     Pertinent History of Current Functional Problem pt found unresonsive w/a scalp laceration (by staff at [facility]). To ED. Possible seizure with postictal state superimposed on dementia  -     Risks Reviewed patient:;LOB;increased discomfort;change in vital signs  -     Benefits Reviewed patient:;improve function;increase independence;increase strength  -     Row Name 04/08/18 1430          Relationship/Environment    Lives With --   Lives at [facility]  -     Row Name 04/08/18 1430          Cognitive Assessment/Intervention- PT/OT    Orientation Status (Cognition) person;place;situation;time  -LS     Follows Commands (Cognition) follows one step commands;over 90% accuracy  -     Safety Deficit (Cognitive) mild deficit  -     Personal Safety Interventions fall prevention program maintained;gait belt;nonskid shoes/slippers when out of bed;supervised activity  -     Cognitive Assessment/Intervention Comment Oriented to 4 but confused about her walker. Pt insisting that she brought her walker w/her and that someone must have stolden it. Insists that she used the walker earlier today (nsg said she has not used walker w/pt). Pt said she has been here a week (came in yesterday). PT did leave a walker in pt's room at end of session today. Nsg notified.  -     Row Name 04/08/18 1430          Bed Mobility Assessment/Treatment    Bed Mobility Assessment/Treatment supine-sit;sit-supine  -     Supine-Sit [place] (Bed Mobility) verbal cues;minimum assist (75% patient effort)  -     Assistive Device (Bed Mobility) head of bed elevated  -     Row Name 04/08/18 1430           Transfer Assessment/Treatment    Sit-Stand [place] (Transfers) verbal cues;minimum assist (75% patient effort)  -LS     Stand-Sit [place] (Transfers) verbal cues;contact guard  -LS     [place] Level (Toilet Transfer) contact guard  -LS     Assistive Device (Toilet Transfer) grab bars/safety frame;walker, front-wheeled  -LS     Row Name 04/08/18 1430          Toilet Transfer    Type (Toilet Transfer) stand-sit  -LS     Row Name 04/08/18 1430          Gait/Stairs Assessment/Training    [place] Level (Gait) contact guard  -LS     Assistive Device (Gait) walker, front-wheeled  -LS     Distance in Feet (Gait) 200  -LS     Pattern (Gait) step-to  -LS     Deviations/Abnormal Patterns (Gait) trey decreased;gait speed decreased  -LS     Bilateral Gait Deviations forward flexed posture  -LS     Row Name 04/08/18 1430          General ROM    GENERAL ROM COMMENTS AROM WFL X 4 extremities  -LS     Row Name 04/08/18 1430          General Assessment (Manual Muscle Testing)    Comment, General Manual Muscle Testing (MMT) Assessment B sh flexors 4+. B elbow flexors & extensors 5/5. B hip flexors, knee extensors, ankle dorsiflexors 4+/5  -LS     Row Name 04/08/18 1430          Pain Scale: Numbers Pre/Post-Treatment    Pain Scale: Numbers, Pretreatment 0/10 - no pain  -LS     Pain Scale: Numbers, Post-Treatment 0/10 - no pain  -LS     Row Name             Wound 04/07/18 1555 Left hand skin tear;abrasion    Wound - Properties Group Date first assessed: 04/07/18  -RM Time first assessed: 1555  -RM Present On Admission : yes  -RM Side: Left  -RM Location: hand  -RM Type: skin tear;abrasion  -RM Additional Comments: 2 areas   -RM    Row Name             Wound 04/07/18 1557 Right shoulder skin tear    Wound - Properties Group Date first assessed: 04/07/18  -RM Time first assessed: 1557  -RM Present On Admission : yes  -RM Side: Right  -RM Location: shoulder  -RM Type: skin tear  -RM    Row Name 04/08/18 1430           Plan of Care Review    Plan of Care Reviewed With patient  -LS     Row Name 04/08/18 1430          Physical Therapy Clinical Impression    Date of Referral to PT 04/07/18  -LS     PT Diagnosis (PT Clinical Impression) Impaired functional mobility  -LS     Patient/Family Goals Statement (PT Clinical Impression) Pt is hoping to go home tomorrow.  -LS     Criteria for Skilled Interventions Met (PT Clinical Impression) yes;treatment indicated  -LS     Rehab Potential (PT Clinical Summary) good, to achieve stated therapy goals  -LS     Care Plan Review (PT) evaluation/treatment results reviewed  -LS     Row Name 04/08/18 1430          Bed Mobility Goal 1 (PT)    Activity/Assistive Device (Bed Mobility Goal 1, PT) sit to supine;supine to sit  -LS     [place] Level/Cues Needed (Bed Mobility Goal 1, PT) independent  -LS     Time Frame (Bed Mobility Goal 1, PT) 10 days  -LS     Row Name 04/08/18 1430          Transfer Goal 1 (PT)    Activity/Assistive Device (Transfer Goal 1, PT) sit-to-stand/stand-to-sit;walker, rolling  -LS     [place] Level/Cues Needed (Transfer Goal 1, PT) conditional independence  -LS     Time Frame (Transfer Goal 1, PT) 10 days  -LS     Row Name 04/08/18 1430          Gait Training Goal 1 (PT)    Activity/Assistive Device (Gait Training Goal 1, PT) gait (walking locomotion)  -LS     [place] Level (Gait Training Goal 1, PT) conditional independence  -LS     Distance (Gait Goal 1, PT) 300  -LS     Time Frame (Gait Training Goal 1, PT) 10 days  -LS     Row Name 04/08/18 1430          Positioning and Restraints    Pre-Treatment Position in bed  -LS     Post Treatment Position chair  -LS     In Chair notified nsg;sitting;call light within reach;encouraged to call for assist;exit alarm on;waffle cushion;legs elevated  -LS       User Key  (r) = Recorded By, (t) = Taken By, (c) = Cosigned By    Initials Name Provider Type    LS Yasmin Seo, PT Physical Therapist    ARNULFO Medina,  RN Registered Nurse          Physical Therapy Education     Title: PT OT SLP Therapies (Active)     Topic: Physical Therapy (Active)     Point: Home exercise program (Done)    Learning Progress Summary     Learner Status Readiness Method Response Comment Documented by    Patient Done Acceptance E BIANCA,NR Discussed benefits of activity.  04/08/18 3380                      User Key     Initials Effective Dates Name Provider Type Discipline     06/19/15 -  Yasmin Seo, PT Physical Therapist PT                PT Recommendation and Plan  Anticipated Discharge Disposition (PT):  (Pt lives at the [facility]; recommend PT there if possible)  Planned Therapy Interventions (PT Eval): balance training, bed mobility training, gait training, home exercise program, patient/family education, strengthening, transfer training  Therapy Frequency (PT Clinical Impression): daily  Outcome Summary/Treatment Plan (PT)  Anticipated Discharge Disposition (PT):  (Pt lives at the [facility]; recommend PT there if possible)  Plan of Care Reviewed With: patient  Outcome Summary: Pt presents with deficits in balance and functional mobility. Pt would benefit from skilled PT services to improve function and help her return to prior level of independence.          Outcome Measures     Row Name 04/08/18 6960             How much help from another person do you currently need...    Turning from your back to your side while in flat bed without using bedrails? 4  -LS      Moving from lying on back to sitting on the side of a flat bed without bedrails? 3  -LS      Moving to and from a bed to a chair (including a wheelchair)? 3  -LS      Standing up from a chair using your arms (e.g., wheelchair, bedside chair)? 3  -LS      Climbing 3-5 steps with a railing? 3  -LS      To walk in hospital room? 3  -LS      AM-PAC 6 Clicks Score 19  -         Functional Assessment    Outcome Measure Options AM-PAC 6 Clicks Basic Mobility (PT)  -        User Key   (r) = Recorded By, (t) = Taken By, (c) = Cosigned By    Initials Name Provider Type    JUVENCIO Seo PT Physical Therapist           Time Calculation:         PT Charges     Row Name 04/08/18 1545             Time Calculation    Start Time 1430  -LS      PT Received On 04/08/18  -      PT Goal Re-Cert Due Date 04/18/18  -        User Key  (r) = Recorded By, (t) = Taken By, (c) = Cosigned By    Initials Name Provider Type     Yasmin Seo, TIFFANIE Physical Therapist          Therapy Charges for Today     Code Description Service Date Service Provider Modifiers Qty    28162727170 HC PT EVAL LOW COMPLEXITY 4 4/8/2018 Yasmin Seo, PT GP 1          PT G-Codes  Outcome Measure Options: AM-PAC 6 Clicks Basic Mobility (PT)      Yasmin Seo, PT  4/8/2018

## 2018-04-09 ENCOUNTER — APPOINTMENT (OUTPATIENT)
Dept: CARDIOLOGY | Facility: HOSPITAL | Age: 83
End: 2018-04-09
Attending: INTERNAL MEDICINE

## 2018-04-09 LAB
BH CV ECHO MEAS - AO ROOT AREA (BSA CORRECTED): 1.9
BH CV ECHO MEAS - AO ROOT AREA: 6.5 CM^2
BH CV ECHO MEAS - AO ROOT DIAM: 2.9 CM
BH CV ECHO MEAS - BSA(HAYCOCK): 1.6 M^2
BH CV ECHO MEAS - BSA: 1.5 M^2
BH CV ECHO MEAS - BZI_BMI: 23.2 KILOGRAMS/M^2
BH CV ECHO MEAS - BZI_METRIC_HEIGHT: 154.9 CM
BH CV ECHO MEAS - BZI_METRIC_WEIGHT: 55.8 KG
BH CV ECHO MEAS - CONTRAST EF (2CH): 72.1 ML/M^2
BH CV ECHO MEAS - CONTRAST EF 4CH: 76.6 ML/M^2
BH CV ECHO MEAS - EDV(CUBED): 81.6 ML
BH CV ECHO MEAS - EDV(MOD-SP2): 43 ML
BH CV ECHO MEAS - EDV(MOD-SP4): 47 ML
BH CV ECHO MEAS - EDV(TEICH): 84.7 ML
BH CV ECHO MEAS - EF(CUBED): 83.7 %
BH CV ECHO MEAS - EF(MOD-SP2): 72.1 %
BH CV ECHO MEAS - EF(MOD-SP4): 76.6 %
BH CV ECHO MEAS - EF(TEICH): 76.9 %
BH CV ECHO MEAS - ESV(CUBED): 13.3 ML
BH CV ECHO MEAS - ESV(MOD-SP2): 12 ML
BH CV ECHO MEAS - ESV(MOD-SP4): 11 ML
BH CV ECHO MEAS - ESV(TEICH): 19.5 ML
BH CV ECHO MEAS - FS: 45.3 %
BH CV ECHO MEAS - IVS/LVPW: 0.95
BH CV ECHO MEAS - IVSD: 1.3 CM
BH CV ECHO MEAS - LA DIMENSION: 5.3 CM
BH CV ECHO MEAS - LA/AO: 1.8
BH CV ECHO MEAS - LAT PEAK E' VEL: 6.5 CM/SEC
BH CV ECHO MEAS - LV DIASTOLIC VOL/BSA (35-75): 30.6 ML/M^2
BH CV ECHO MEAS - LV MASS(C)D: 206.6 GRAMS
BH CV ECHO MEAS - LV MASS(C)DI: 134.5 GRAMS/M^2
BH CV ECHO MEAS - LV SYSTOLIC VOL/BSA (12-30): 7.2 ML/M^2
BH CV ECHO MEAS - LVIDD: 4.3 CM
BH CV ECHO MEAS - LVIDS: 2.4 CM
BH CV ECHO MEAS - LVLD AP2: 7.5 CM
BH CV ECHO MEAS - LVLD AP4: 6.6 CM
BH CV ECHO MEAS - LVLS AP2: 5.1 CM
BH CV ECHO MEAS - LVLS AP4: 4.9 CM
BH CV ECHO MEAS - LVPWD: 1.3 CM
BH CV ECHO MEAS - MED PEAK E' VEL: 4.9 CM/SEC
BH CV ECHO MEAS - MV A MAX VEL: 107.6 CM/SEC
BH CV ECHO MEAS - MV E MAX VEL: 127.3 CM/SEC
BH CV ECHO MEAS - MV E/A: 1.2
BH CV ECHO MEAS - PA ACC SLOPE: 351.6 CM/SEC^2
BH CV ECHO MEAS - PA ACC TIME: 0.21 SEC
BH CV ECHO MEAS - PA PR(ACCEL): -14.5 MMHG
BH CV ECHO MEAS - RAP SYSTOLE: 3 MMHG
BH CV ECHO MEAS - RVSP: 43 MMHG
BH CV ECHO MEAS - SI(CUBED): 44.4 ML/M^2
BH CV ECHO MEAS - SI(MOD-SP2): 20.2 ML/M^2
BH CV ECHO MEAS - SI(MOD-SP4): 23.4 ML/M^2
BH CV ECHO MEAS - SI(TEICH): 42.4 ML/M^2
BH CV ECHO MEAS - SV(CUBED): 68.2 ML
BH CV ECHO MEAS - SV(MOD-SP2): 31 ML
BH CV ECHO MEAS - SV(MOD-SP4): 36 ML
BH CV ECHO MEAS - SV(TEICH): 65.2 ML
BH CV ECHO MEAS - TAPSE (>1.6): 1.4 CM2
BH CV ECHO MEAS - TR MAX V: 40 MMHG
BH CV ECHO MEAS - TR MAX VEL: 292.5 CM/SEC
BH CV VAS BP RIGHT ARM: NORMAL MMHG
BH CV XLRA - RV BASE: 3.9 CM
BH CV XLRA - RV LENGTH: 6.9 CM
BH CV XLRA - RV MID: 3 CM
BH CV XLRA - TDI S': 15.2 CM/SEC
GLUCOSE BLDC GLUCOMTR-MCNC: 138 MG/DL (ref 70–130)
GLUCOSE BLDC GLUCOMTR-MCNC: 159 MG/DL (ref 70–130)
GLUCOSE BLDC GLUCOMTR-MCNC: 176 MG/DL (ref 70–130)
GLUCOSE BLDC GLUCOMTR-MCNC: 189 MG/DL (ref 70–130)
LV EF 2D ECHO EST: 70 %
MAXIMAL PREDICTED HEART RATE: 137 BPM
STRESS TARGET HR: 116 BPM
VANCOMYCIN TROUGH SERPL-MCNC: 14.1 MCG/ML (ref 10–20)

## 2018-04-09 PROCEDURE — 93306 TTE W/DOPPLER COMPLETE: CPT

## 2018-04-09 PROCEDURE — 80202 ASSAY OF VANCOMYCIN: CPT | Performed by: FAMILY MEDICINE

## 2018-04-09 PROCEDURE — 97165 OT EVAL LOW COMPLEX 30 MIN: CPT

## 2018-04-09 PROCEDURE — 93306 TTE W/DOPPLER COMPLETE: CPT | Performed by: INTERNAL MEDICINE

## 2018-04-09 PROCEDURE — 25010000002 CEFEPIME: Performed by: PHYSICIAN ASSISTANT

## 2018-04-09 PROCEDURE — 99233 SBSQ HOSP IP/OBS HIGH 50: CPT | Performed by: INTERNAL MEDICINE

## 2018-04-09 PROCEDURE — 82962 GLUCOSE BLOOD TEST: CPT

## 2018-04-09 PROCEDURE — 99232 SBSQ HOSP IP/OBS MODERATE 35: CPT | Performed by: PSYCHIATRY & NEUROLOGY

## 2018-04-09 RX ADMIN — AMIODARONE HYDROCHLORIDE 100 MG: 200 TABLET ORAL at 10:19

## 2018-04-09 RX ADMIN — CEFEPIME 2 G: 2 INJECTION, POWDER, FOR SOLUTION INTRAVENOUS at 16:02

## 2018-04-09 RX ADMIN — DONEPEZIL HYDROCHLORIDE 5 MG: 5 TABLET ORAL at 21:12

## 2018-04-09 RX ADMIN — AMLODIPINE BESYLATE 10 MG: 10 TABLET ORAL at 10:17

## 2018-04-09 RX ADMIN — ATORVASTATIN CALCIUM 40 MG: 40 TABLET, FILM COATED ORAL at 21:12

## 2018-04-09 RX ADMIN — ISOSORBIDE MONONITRATE 60 MG: 60 TABLET, EXTENDED RELEASE ORAL at 10:18

## 2018-04-09 RX ADMIN — CARVEDILOL 25 MG: 12.5 TABLET, FILM COATED ORAL at 10:22

## 2018-04-09 RX ADMIN — DOCUSATE SODIUM 100 MG: 100 CAPSULE, LIQUID FILLED ORAL at 10:18

## 2018-04-09 RX ADMIN — CARVEDILOL 25 MG: 12.5 TABLET, FILM COATED ORAL at 16:33

## 2018-04-09 RX ADMIN — ASPIRIN 81 MG 81 MG: 81 TABLET ORAL at 10:18

## 2018-04-09 RX ADMIN — CITALOPRAM HYDROBROMIDE 20 MG: 20 TABLET ORAL at 10:18

## 2018-04-09 RX ADMIN — APIXABAN 2.5 MG: 2.5 TABLET, FILM COATED ORAL at 10:17

## 2018-04-09 RX ADMIN — CEFEPIME 2 G: 2 INJECTION, POWDER, FOR SOLUTION INTRAVENOUS at 05:05

## 2018-04-09 RX ADMIN — Medication 250 MG: at 10:17

## 2018-04-09 RX ADMIN — Medication 250 MG: at 21:12

## 2018-04-09 RX ADMIN — BISACODYL 5 MG: 5 TABLET, COATED ORAL at 10:17

## 2018-04-09 RX ADMIN — APIXABAN 2.5 MG: 2.5 TABLET, FILM COATED ORAL at 21:12

## 2018-04-09 RX ADMIN — DOCUSATE SODIUM 100 MG: 100 CAPSULE, LIQUID FILLED ORAL at 21:12

## 2018-04-09 RX ADMIN — FAMOTIDINE 40 MG: 20 TABLET, FILM COATED ORAL at 10:17

## 2018-04-09 NOTE — THERAPY EVALUATION
Acute Care - Occupational Therapy Initial Evaluation  [facility]     Patient Name: Mary Ramirez  : 1934  MRN: [id number]  Today's Date: 2018  Onset of Illness/Injury or Date of Surgery: 18  Date of Referral to OT: 18  Referring Physician: Mayne, PA-C    Admit Date: 2018       ICD-10-CM ICD-9-CM   1. Hypothermia, initial encounter T68.XXXA 991.6   2. Acute pulmonary edema J81.0 518.4   3. Hypoglycemia E16.2 251.2   4. Injury of head, initial encounter S09.90XA 959.01   5. Impaired functional mobility, balance, gait, and endurance Z74.09 V49.89     Patient Active Problem List   Diagnosis   • Fall   • Fracture, intertrochanteric, left femur   • Hypertension   • Type 2 diabetes mellitus with complication, with long-term current use of insulin   • Coronary artery disease involving native coronary artery of native heart without angina pectoris   • Dementia   • Bilateral pleural effusion   • Right knee pain   • A-fib   • Anticoagulated   • Altered mental state   • SSS (sick sinus syndrome)   • Unresponsive episode   • Hypoglycemia   • Hypothermia   • Sepsis     Past Medical History:   Diagnosis Date   • Atrial fibrillation    • CAD (coronary artery disease)    • CHF (congestive heart failure)    • CKD (chronic kidney disease) stage 3, GFR 30-59 ml/min    • DDD (degenerative disc disease), cervical    • Dementia    • Diabetes mellitus    • Hypertension    • SSS (sick sinus syndrome)      Past Surgical History:   Procedure Laterality Date   • ANKLE SURGERY Left    • ATHRECTOMY ILIAC, FEMORAL, TIBIAL ARTERY     • BREAST LUMPECTOMY Left    • COLON SURGERY     • CORONARY STENT PLACEMENT     • FINGER FRACTURE SURGERY Right    • HIP TROCANTERIC NAILING WITH INTRAMEDULLARY HIP SCREW Left 2017   • HYSTERECTOMY            OT ASSESSMENT FLOWSHEET (last 72 hours)      Occupational Therapy Evaluation     Row Name 18 0740                   OT Evaluation Time/Intention    Subjective  Information complains of  -        Document Type evaluation  -        Mode of Treatment occupational therapy  -        Patient Effort good  -           General Information    Patient Profile Reviewed? yes  -        Onset of Illness/Injury or Date of Surgery 04/07/18  -        Referring Physician Mayne, PA-C  -        Patient/Family Observations Pt received supine in bed.  IV intact.  -        Prior Level of Function independent:;all household mobility;ADL's   per pt  report  -        Equipment Currently Used at Home walker, rolling  -        Pertinent History of Current Functional Problem Pt found unresponsive w/a scalp laceration (by staff at [facility]). To ED. Possible seizure with postictal state superimposed on dementia  -        Existing Precautions/Restrictions fall   exit alarm  -        Limitations/Impairments --   mild confusion  -        Risks Reviewed patient:;LOB  -        Benefits Reviewed patient:;improve function;increase independence;increase strength;increase balance;increase knowledge  -           Relationship/Environment    Primary Source of Support/Comfort --   dtr lives in [place]  -        Lives With --   lives at [facility]  -           Resource/Environmental Concerns    Current Living Arrangements independent/assisted living facility  -           Cognitive Assessment/Intervention- PT/OT    Orientation Status (Cognition) person;place;time   choices for name of hospital  -        Follows Commands (Cognition) over 90% accuracy  -        Safety Deficit (Cognitive) mild deficit  -        Personal Safety Interventions fall prevention program maintained;gait belt;nonskid shoes/slippers when out of bed  -           Safety Issues, Functional Mobility    Impairments Affecting Function (Mobility) balance;strength  -AC           Bed Mobility Assessment/Treatment    Bed Mobility Assessment/Treatment rolling left;rolling right;supine-sit   pt requesting brief ;  rolled for placement  -AC        Rolling Left [facility] (Bed Mobility) verbal cues;contact guard  -AC        Supine-Sit [facility] (Bed Mobility) verbal cues;contact guard  -AC        Assistive Device (Bed Mobility) head of bed elevated  -AC           Functional Mobility    Functional Mobility- Ind. Level verbal cues required;contact guard assist  -AC        Functional Mobility- Device rolling walker  -AC        Functional Mobility-Distance (Feet) 300  -AC           Transfer Assessment/Treatment    Sit-Stand [facility] (Transfers) verbal cues;contact guard  -AC        Stand-Sit [facility] (Transfers) verbal cues;contact guard  -AC           ADL Assessment/Intervention    BADL Assessment/Intervention lower body dressing  -AC           Lower Body Dressing Assessment/Training    Comment (Lower Body Dressing) pt demo ability to reach down and pull up socks given supervision  -AC           General ROM    GENERAL ROM COMMENTS BUE AROM WFL  -AC           General Assessment (Manual Muscle Testing)    Comment, General Manual Muscle Testing (MMT) Assessment B UE grossly 4-/5  -AC           Positioning and Restraints    Pre-Treatment Position in bed  -AC        Post Treatment Position chair  -AC        In Chair reclined;call light within reach;encouraged to call for assist;exit alarm on  -AC           Pain Assessment    Additional Documentation Pain Scale: Numbers Pre/Post-Treatment (Group)  -AC           Pain Scale: Numbers Pre/Post-Treatment    Pain Scale: Numbers, Pretreatment 2/10  -AC        Pain Scale: Numbers, Post-Treatment 2/10  -AC        Pain Location - Side Bilateral  -AC        Pain Location knee  -AC        Pain Intervention(s) Repositioned  -AC           Wound 04/07/18 1555 Left hand skin tear;abrasion    Wound - Properties Group Date first assessed: 04/07/18  -RM Time first assessed: 1555  -RM Present On Admission : yes  -RM Side: Left  -RM Location: hand  -RM Type: skin tear;abrasion  -RM Additional  Comments: 2 areas   -RM       Wound 04/07/18 1557 Right shoulder skin tear    Wound - Properties Group Date first assessed: 04/07/18  -RM Time first assessed: 1557  -RM Present On Admission : yes  -RM Side: Right  -RM Location: shoulder  -RM Type: skin tear  -RM       Clinical Impression (OT)    Date of Referral to OT 04/07/18  -AC        OT Diagnosis ADL decline  -AC        Patient/Family Goals Statement (OT Eval) increase ADL independence  -AC        Criteria for Skilled Therapeutic Interventions Met (OT Eval) yes;treatment indicated  -AC        Therapy Frequency (OT Eval) daily  -AC        Care Plan Review (OT) care plan/treatment goals reviewed;risks/benefits reviewed  -AC        Anticipated Discharge Disposition (OT) --   [facility]  -AC           Planned OT Interventions    Planned Therapy Interventions (OT Eval) BADL retraining;functional balance retraining;strengthening exercise;transfer/mobility retraining  -AC           OT Goals    Transfer Goal Selection (OT) transfer, OT goal 1  -AC        Dressing Goal Selection (OT) dressing, OT goal 1  -AC        Toileting Goal Selection (OT) toileting, OT goal 1  -AC           Transfer Goal 1 (OT)    Activity/Assistive Device (Transfer Goal 1, OT) sit-to-stand/stand-to-sit;bed-to-chair/chair-to-bed;toilet;walker, rolling  -AC        [place] Level/Cues Needed (Transfer Goal 1, OT) supervision required  -AC        Time Frame (Transfer Goal 1, OT) by discharge  -AC           Dressing Goal 1 (OT)    Activity/Assistive Device (Dressing Goal 1, OT) lower body dressing  -AC        [place]/Cues Needed (Dressing Goal 1, OT) supervision required  -AC        Time Frame (Dressing Goal 1, OT) by discharge  -AC           Toileting Goal 1 (OT)    Activity/Device (Toileting Goal 1, OT) grab bar/safety frame;adjust/manage clothing;perform perineal hygiene  -AC        [place] Level/Cues Needed (Toileting Goal 1, OT) supervision required  -AC        Time Frame  (Toileting Goal 1, OT) by discharge  -          User Key  (r) = Recorded By, (t) = Taken By, (c) = Cosigned By    Initials Name Effective Dates     Brianna Hammer OT 06/23/15 -      Deanna Medina RN 03/01/17 -            Occupational Therapy Education     Title: PT OT SLP Therapies (Active)     Topic: Occupational Therapy (Active)     Point: ADL training (Done)     Description: Instruct learner(s) on proper safety adaptation and remediation techniques during self care or transfers.   Instruct in proper use of assistive devices.   Learning Progress Summary     Learner Status Readiness Method Response Comment Documented by    Patient Done Acceptance E VU benefits of activity, role of OT  04/09/18 1013                      User Key     Initials Effective Dates Name Provider Type Discipline     06/23/15 -  Brianna Hammer OT Occupational Therapist OT                  OT Recommendation and Plan  Outcome Summary/Treatment Plan (OT)  Anticipated Discharge Disposition (OT):  ([facility])  Planned Therapy Interventions (OT Eval): BADL retraining, functional balance retraining, strengthening exercise, transfer/mobility retraining  Therapy Frequency (OT Eval): daily  Plan of Care Review  Plan of Care Reviewed With: spouse, patient  Plan of Care Reviewed With: spouse, patient  Outcome Summary: OT eval complete. Pt presents with weakness, decreased balance, and decreased independence with self care.  OT will follow to advance pt toward PLOF with self care.   Recommend return to [facility] upon d/c.           Outcome Measures     Row Name 04/09/18 0740 04/08/18 2640          How much help from another person do you currently need...    Turning from your back to your side while in flat bed without using bedrails?  -- 4  -LS     Moving from lying on back to sitting on the side of a flat bed without bedrails?  -- 3  -LS     Moving to and from a bed to a chair (including a wheelchair)?  -- 3  -LS     Standing up from a  chair using your arms (e.g., wheelchair, bedside chair)?  -- 3  -LS     Climbing 3-5 steps with a railing?  -- 3  -LS     To walk in hospital room?  -- 3  -LS     AM-PAC 6 Clicks Score  -- 19  -LS        How much help from another is currently needed...    Putting on and taking off regular lower body clothing? 3  -AC  --     Bathing (including washing, rinsing, and drying) 3  -AC  --     Toileting (which includes using toilet bed pan or urinal) 3  -AC  --     Putting on and taking off regular upper body clothing 3  -AC  --     Taking care of personal grooming (such as brushing teeth) 4  -AC  --     Eating meals 4  -AC  --     Score 20  -AC  --        Functional Assessment    Outcome Measure Options AM-PAC 6 Clicks Daily Activity (OT)  -AC AM-PAC 6 Clicks Basic Mobility (PT)  -LS       User Key  (r) = Recorded By, (t) = Taken By, (c) = Cosigned By    Initials Name Provider Type    AC Brianna Hammer OT Occupational Therapist     Yasmin Seo, PT Physical Therapist          Time Calculation:   OT Start Time: 0740    Therapy Charges for Today     Code Description Service Date Service Provider Modifiers Qty    [id number]  OT EVAL LOW COMPLEXITY 4 4/9/2018 Brianna Hammer OT GO 1               Brianna Hammer OT  4/9/2018

## 2018-04-09 NOTE — PLAN OF CARE
Problem: Patient Care Overview  Goal: Plan of Care Review   04/09/18 5111   Coping/Psychosocial   Plan of Care Reviewed With spouse;patient   OTHER   Outcome Summary OT eval complete. Pt presents with weakness, decreased balance, and decreased independence with self care. OT will follow to advance pt toward PLOF with self care. Recommend return to [facility] upon d/c.    Plan of Care Review   Progress (Evaluation)

## 2018-04-09 NOTE — PROGRESS NOTES
Discharge Planning Assessment  [facility]     Patient Name: Mary Ramirez  MRN: [id number]  Today's Date: 4/9/2018    Admit Date: 4/7/2018          Discharge Needs Assessment     Row Name 04/09/18 1331       Living Environment    Lives With facility resident    Name(s) of Who Lives With Patient Lives at [place] at [facility]    Current Living Arrangements extended care facility;independent/assisted living facility    Primary Care Provided by self;other (see comments)   [place] [facility]    Provides Primary Care For no one, unable/limited ability to care for self    Family Caregiver if Needed none    Quality of Family Relationships supportive    Able to Return to Prior Arrangements yes    Living Arrangement Comments Spoke with pt in room with permission in room in regards to discharge planning. Pt son, Josh, present. Pt resides in [facility] in [place] at "[facility]. Son reports the [facility] helps her as needed including bathing and preparing meals. Pt states she goes to [employer] for lunch and dinner.        Resource/Environmental Concerns    Resource/Environmental Concerns none    Transportation Concerns other (see comments)   Denies transportation concerns       Transition Planning    Patient/Family Anticipates Transition to other (see comments)   [place] [facility]    Patient/Family Anticipated Services at Transition        Discharge Needs Assessment    Readmission Within the Last 30 Days no previous admission in last 30 days    Concerns to be Addressed discharge planning    Equipment Currently Used at Home walker, rolling    Anticipated Changes Related to Illness none    Equipment Needed After Discharge walker, rolling    Outpatient/Agency/Support Group Needs assisted living facility;other (see comments)   Pt states recently completed PT at [place](outpatient)    Discharge Coordination/Progress Pt has [employer] Medicare  Replacement Insurance and denies recent changes in insurance. Pt states the Assisted Living gets all her prescriptions filled for   devan and unsure of agency. Pt states plan is to return to [facility]. CM will cont to follow. Pt denies discharge needs at this time.            Discharge Plan     Row Name 04/09/18 0752       Plan    Plan discharge plan    Patient/Family in Agreement with Plan yes    Plan Comments Per pt and pt's son, plan is to return to [facility]. Pt denies discharge needs at this time. Family will transport. CM will cont to follow        Destination     No service coordination in this encounter.      Durable Medical Equipment     No service coordination in this encounter.      Dialysis/Infusion     No service coordination in this encounter.      Home Medical Care     No service coordination in this encounter.      Social Care     No service coordination in this encounter.        Expected Discharge Date and Time     Expected Discharge Date Expected Discharge Time    Apr 12, 2018               Demographic Summary     Row Name 04/09/18 1960       General Information    General Information Comments Pt's PCP is Nirmal Lundberg in [place]       Contact Information    Permission Granted to Share Info With     Contact Information Obtained for     Contact Information Comments Josh Ramirez(son):  [phone]            Functional Status     Row Name 04/09/18 1333       Functional Status    Functional Status Comments Pt lives a [facility]. Pt states she has assistance with bathing, but is able to go to [employer] for meals.             Psychosocial    No documentation.           Abuse/Neglect    No documentation.           Legal    No documentation.           Substance Abuse    No documentation.           Patient Forms    No documentation.         Maria D Ge RN

## 2018-04-09 NOTE — PROGRESS NOTES
Pharmacy Consult-Vancomycin Dosing  Mary Ramirez is an 84 yo woman admitted 4/7/18 after being found down at her assisted living facility.  She was hypoglycemic and hypothermic.  She was started empirically on cefepime and vancomycin for possible sepsis.  Pharmacy Service has been asked to dose and monitor the vancomycin therapy.  PMH includes Afib on chronic apixaban/amiodarone, SSS/bradycardia, CAD, HTN, dementia, T2DM, hospitalized 11/2017 for hip fx.  Recent hospitalization for Afib with RVR, and enteritis.    Indication: sepsis  Consulting Provider: Dr. Wheeler  ID Consult: no    Current Antimicrobial Therapy  Anti-Infectives       Ordered     Dose/Rate Route Frequency Start Stop    04/08/18 0748  vancomycin in dextrose 5% 150 mL (VANCOCIN) IVPB 750 mg     Ordering Provider:  Ta Quinn RPH    750 mg  over 60 Minutes Intravenous Once 04/08/18 0900 04/08/18 1025    04/07/18 1521  cefepime (MAXIPIME) 2 g/100 mL 0.9% NS (mbp)     Ordering Provider:  Casie M Mayne, PA-C    2 g  over 4 Hours Intravenous Every 12 Hours 04/08/18 0400 04/15/18 0359    04/07/18 1521  cefepime (MAXIPIME) 2 g/100 mL 0.9% NS (mbp)     Ordering Provider:  Casie M Mayne, PA-C    2 g  200 mL/hr over 30 Minutes Intravenous Once 04/07/18 1600 04/07/18 1742    04/07/18 1533  Pharmacy to dose vancomycin     Ordering Provider:  Casie M Mayne, PA-C     Does not apply Continuous PRN 04/07/18 1533 04/14/18 1532    04/07/18 1136  vancomycin (VANCOCIN) in iso-osmotic dextrose IVPB 1 g (premix) 200 mL     Ordering Provider:  Oracio Durand DO    20 mg/kg × 55.8 kg Intravenous Once 04/07/18 1138 04/07/18 1410    04/07/18 1128  aztreonam (AZACTAM) 2 g in sodium chloride 0.9 % 100 mL IVPB-MBP     Ordering Provider:  Oracio Durand DO    2 g  200 mL/hr over 30 Minutes Intravenous Once 04/07/18 1130 04/07/18 1257    04/07/18 1128  levoFLOXacin (LEVAQUIN) 750 mg/150 mL D5W (premix) (LEVAQUIN) 750 mg     Ordering Provider:  Oracio Durand DO    750 mg Intravenous  "Once 04/07/18 1130 04/07/18 1258    04/07/18 1610  vancomycin in dextrose 5% 150 mL (VANCOCIN) IVPB 750 mg     Ordering Provider:  Josué Wheeler MD    15 mg/kg × 55.8 kg  over 60 Minutes Intravenous Daily PRN 04/07/18 0800 04/14/18 0759        Labs      Results from last 7 days     Lab Units 04/08/18  0448 04/07/18  0921   BUN mg/dL 15 22   CREATININE mg/dL 1.00 1.10     Results from last 7 days     Lab Units 04/08/18  0448 04/07/18  0921   WBC 10*3/mm3 8.31 7.36     Evaluation of Dosing     Ht - 154.9 cm (61\")  Wt - 55.8 kg (123 lb)    Estimated Creatinine Clearance: 37.5 mL/min (by C-G formula based on SCr of 1 mg/dL).    Microbiology and Radiology  Microbiology Results (last 10 days)       Procedure Component Value - Date/Time    Blood Culture - Blood, [[id number]]  (Normal) Collected:  04/07/18 1157    Lab Status:  Preliminary result Specimen:  Blood from Arm, Right Updated:  04/09/18 1231     Blood Culture No growth at 2 days    Blood Culture - Blood, [[id number]]  (Normal) Collected:  04/07/18 1157    Lab Status:  Preliminary result Specimen:  Blood from Arm, Right Updated:  04/09/18 1231     Blood Culture No growth at 2 days          Evaluation of Level    Lab Results   Component Value Date    VANCOTROUGH 14.10 04/09/2018       Assessment/Plan:     • Patient received vancomycin 1 gm IV load on 4/7/18 at 1410, and another dose of 750mg on 4/8/18 at 1025.  • Population pharmacokinetic parameters are half life of 22.36 hr, Ke of 0.03 hr-1 and volume of distribution of 0.7 liters/kg.  Based on these projections and the random vancomycin levels, a dose of vancomycin 750 mg IV q24h is expected to produce a trough concentration of 17.6 mcg/ml.  • Will obtain another vancomycin trough on 4/11/18 to verify dose is producing a trough within the goal range.  • Pharmacy Service will continue to follow the patient's clinical progress and monitor for evidence of vancomycin-induced adverse effects.    Shania Heller, PharmD, " BCPS

## 2018-04-09 NOTE — PROGRESS NOTES
Neurology       Patient Care Team:  Nirmal Lundberg MD as PCP - General (Internal Medicine)    Chief complaint: Syncope    History:  Patient is a feeling well and back to baseline.    She been diagnosed with sepsis.    He was noted that her blood sugars 46 5.    EEG and MRI were unremarkable for acute problems.      Past Medical History:   Diagnosis Date   • Atrial fibrillation    • CAD (coronary artery disease)    • CHF (congestive heart failure)    • CKD (chronic kidney disease) stage 3, GFR 30-59 ml/min    • DDD (degenerative disc disease), cervical    • Dementia    • Diabetes mellitus    • Hypertension    • SSS (sick sinus syndrome)        Vital Signs   Vitals:    04/08/18 1525 04/08/18 1915 04/08/18 2029 04/09/18 0739   BP: 155/76 151/56  138/63   BP Location:  Left arm  Left arm   Patient Position:  Lying  Lying   Pulse: 60 64 66 63   Resp: 16 20  16   Temp: 97.8 °F (36.6 °C) 98.8 °F (37.1 °C)  98.6 °F (37 °C)   TempSrc:  Oral  Oral   SpO2: 92%      Weight:       Height:           Physical Exam:   General: Awake and alert              Neuro: Oriented to person place and time    Speech is articulate with no word finding problems.     are equal.    No drift is noted.    Finger to nose testing is normal.    Reflexes are equal.        Results Review:  EEG showed no epileptic activity.    His mild generalized slow.    MRI the brain shows no acute intracranial abnormality.        Results from last 7 days  Lab Units 04/08/18  0448   WBC 10*3/mm3 8.31   HEMOGLOBIN g/dL 11.2*   HEMATOCRIT % 37.2   PLATELETS 10*3/mm3 286       Results from last 7 days  Lab Units 04/08/18  0448 04/07/18  0921   SODIUM mmol/L 137 136   POTASSIUM mmol/L 3.5 3.7   CHLORIDE mmol/L 102 103   CO2 mmol/L 25.0 26.0   BUN mg/dL 15 22   CREATININE mg/dL 1.00 1.10   CALCIUM mg/dL 8.8 9.0   BILIRUBIN mg/dL  --  0.6   ALK PHOS U/L  --  105*   ALT (SGPT) U/L  --  21   AST (SGOT) U/L  --  26   GLUCOSE mg/dL 170* 181*       Imaging Results  (last 24 hours)     Procedure Component Value Units Date/Time    MRI Brain Without Contrast [[id number]] Collected:  04/08/18 0932     Updated:  04/08/18 1845    Narrative:       EXAMINATION: MRI BRAIN WO CONTRAST - 04/08/2018     INDICATION: T68.XXXA-Hypothermia, initial encounter; J81.0-Acute  pulmonary edema; E16.2-Hypoglycemia, unspecified. Confusion.       TECHNIQUE: Multiplanar MRI of the brain without intravenous contrast  administration.     COMPARISON: CT head dated 04/07/2018.     FINDINGS: No restriction on diffusion-weighted sequences to suggest  acute ischemia. Midline structures are symmetric without evidence of  mass, mass effect or midline shift. Ventricles and sulci are prominent  indicating cerebral atrophy. Moderate T2 and FLAIR hyperintense signal  abnormalities within the periventricular and deep white matter  consistent with chronic small vessel ischemic disease. Pituitary and  sella within normal limits. Cervicomedullary junction widely patent.  Globes and orbits retain normal T2 signal characteristics. Visualized  paranasal sinuses and mastoid air cells are grossly clear and  well-pneumatized. Normal signal flow voids of the distal internal  carotid and basilar arteries.          Impression:       1. No acute intracranial abnormality. Specifically, no evidence for  acute ischemia or infarction.  2. Moderate amount of chronic small vessel ischemic changes as well as  cerebral atrophy of the supratentorial white matter.      DICTATED:     04/08/2018  EDITED/ls :     04/08/2018      This report was finalized on 4/8/2018 6:42 PM by Dr. Cj Obando.             Assessment:  Syncope, multifactorial    Plan:  The patient to be discharged whenever her sepsis is the end control.        Comment:  Likely a syncopal event related to multiple issues including low blood pressure and low blood sugar         I discussed the patients findings and my recommendations with patient    Niraml Hernandez,  MD  04/09/18  10:31 AM

## 2018-04-09 NOTE — PLAN OF CARE
Problem: Patient Care Overview  Goal: Plan of Care Review  Outcome: Ongoing (interventions implemented as appropriate)   04/09/18 0452   Coping/Psychosocial   Plan of Care Reviewed With patient   Plan of Care Review   Progress improving       Problem: Fall Risk (Adult)  Goal: Identify Related Risk Factors and Signs and Symptoms  Outcome: Ongoing (interventions implemented as appropriate)   04/09/18 0452   Fall Risk (Adult)   Related Risk Factors (Fall Risk) age-related changes;confusion/agitation;fatigue/slow reaction;fear of falling;gait/mobility problems;history of falls;impaired vision;neuro disease/injury;sensory deficits;environment unfamiliar   Signs and Symptoms (Fall Risk) presence of risk factors     Goal: Absence of Fall  Outcome: Ongoing (interventions implemented as appropriate)   04/09/18 0452   Fall Risk (Adult)   Absence of Fall making progress toward outcome       Problem: Skin Injury Risk (Adult)  Goal: Identify Related Risk Factors and Signs and Symptoms  Outcome: Ongoing (interventions implemented as appropriate)   04/09/18 0452   Skin Injury Risk (Adult)   Related Risk Factors (Skin Injury Risk) advanced age;cognitive impairment;infection;medication;mobility impaired;nutritional deficiencies     Goal: Skin Health and Integrity  Outcome: Ongoing (interventions implemented as appropriate)   04/09/18 0452   Skin Injury Risk (Adult)   Skin Health and Integrity making progress toward outcome      04/09/18 0452   Skin Injury Risk (Adult)   Skin Health and Integrity making progress toward outcome

## 2018-04-09 NOTE — PROGRESS NOTES
[facility] Medicine Services  PROGRESS NOTE    Patient Name: Mary Ramirez  : 1934  MRN: [id number]    Date of Admission: 2018  Length of Stay: 2  Primary Care Physician: Nirmal Lundberg MD    Subjective   Subjective     CC:  Altered mental status    HPI:  No further events. Patient denies current pain. No nausea, no cough, no fever. Looking forward to returning to SNF    Review of Systems  Gen- No fevers, chills  CV- No chest pain, palpitations  Resp- No cough, dyspnea  GI- No N/V/D, abd pain      Otherwise ROS is negative except as mentioned in the HPI.    Objective   Objective     Vital Signs:   Temp:  [98.6 °F (37 °C)] 98.6 °F (37 °C)  Heart Rate:  [61-66] 61  Resp:  [16] 16  BP: (138-156)/(63-73) 156/73        Physical Exam:  Constitutional -no acute distress, non toxic, in bed  HEENT-NCAT, mucous membranes moist  CV-RRR, S1 S2 normal, no m/r/g  Resp-CTAB, no wheezes, rhonchi or rales  Abd-soft, non-tender, non-distended, normo active bowel sounds  Ext-No lower extremity cyanosis, clubbing or edema bilaterally  Neuro-alert but with mild confusion, speech clear, moves all extremities   Psych-normal affect   Skin- No rash on exposed UE or LE bilaterally      Results Reviewed:  I have personally reviewed current lab, radiology, and data and agree.      Results from last 7 days  Lab Units 18  0448 18  0921   WBC 10*3/mm3 8.31 7.36   HEMOGLOBIN g/dL 11.2* 11.2*   HEMATOCRIT % 37.2 37.3   PLATELETS 10*3/mm3 286 256       Results from last 7 days  Lab Units 18  0448 18  0921   SODIUM mmol/L 137 136   POTASSIUM mmol/L 3.5 3.7   CHLORIDE mmol/L 102 103   CO2 mmol/L 25.0 26.0   BUN mg/dL 15 22   CREATININE mg/dL 1.00 1.10   GLUCOSE mg/dL 170* 181*   CALCIUM mg/dL 8.8 9.0   ALT (SGPT) U/L  --  21   AST (SGOT) U/L  --  26     Estimated Creatinine Clearance: 37.5 mL/min (by C-G formula based on SCr of 1 mg/dL).  BNP   Date Value Ref Range Status    04/07/2018 234.0 (H) 0.0 - 100.0 pg/mL Final     Comment:     Results may be falsely decreased if patient taking Biotin.     No results found for: PHART    Microbiology Results Abnormal     Procedure Component Value - Date/Time    Blood Culture - Blood, [[id number]]  (Normal) Collected:  04/07/18 1157    Lab Status:  Preliminary result Specimen:  Blood from Arm, Right Updated:  04/09/18 1231     Blood Culture No growth at 2 days    Blood Culture - Blood, [[id number]]  (Normal) Collected:  04/07/18 1157    Lab Status:  Preliminary result Specimen:  Blood from Arm, Right Updated:  04/09/18 1231     Blood Culture No growth at 2 days          Imaging Results (last 24 hours)     ** No results found for the last 24 hours. **        Results for orders placed during the hospital encounter of 04/07/18   Adult Transthoracic Echo Complete W/ Cont if Necessary Per Protocol    Narrative · Left ventricular wall thickness is consistent with mild concentric   hypertrophy.  · Left atrial cavity size is moderately dilated.  · Mild mitral valve regurgitation is present.  · Mild to moderate tricuspid valve regurgitation is present.  · There is a small (<1cm) circumferential pericardial effusion.  · Calculated right ventricular systolic pressure from tricuspid   regurgitation is 43 mmHg.  · Left ventricular systolic function is normal. Estimated EF = 70%.  · Mildly reduced right ventricular systolic function noted.  · There is a small (<1cm) circumferential pericardial effusion.  · Mild pulmonary hypertension is present.  · The aortic valve exhibits sclerosis.          I have reviewed the medications.    amiodarone 100 mg Oral Daily   amLODIPine 10 mg Oral Q24H   apixaban 2.5 mg Oral Q12H   aspirin 81 mg Oral Daily   atorvastatin 40 mg Oral Nightly   carvedilol 25 mg Oral BID With Meals   cefepime 2 g Intravenous Q12H   citalopram 20 mg Oral Daily   docusate sodium 100 mg Oral BID   donepezil 5 mg Oral Nightly   famotidine 40 mg Oral  Daily   isosorbide mononitrate 60 mg Oral Daily   [START ON 4/10/2018] metFORMIN 500 mg Oral Daily With Breakfast   saccharomyces boulardii 250 mg Oral BID         Assessment/Plan   Assessment / Plan     Hospital Problem List     * (Principal)Unresponsive episode    Type 2 diabetes mellitus with complication, with long-term current use of insulin    Coronary artery disease involving native coronary artery of native heart without angina pectoris    Overview Addendum 3/5/2018  9:42 AM by CRISTA Oleary     · Cardiac cath with PCI data deficit  · Echo (11/27/2017): Mild LVH. Mild MR and TR.  Normal LVEF           Dementia    A-fib    Anticoagulated    Altered mental state    SSS (sick sinus syndrome)    Hypoglycemia    Hypothermia    Sepsis             Brief Hospital Course to date:  Mary Ramirez is a 83 y.o. female with unresponsive episode and hypoglycemia      Assessment & Plan:    Altered mental status with possible seizure  - associated with hypoglycemia  - no further events - no need for anti-epileptics at this point per Neurology  - no fever, no infiltrate on cxr, UA bland, patient placed on empiric antibiotics at admission -- will d/c vanc today, will likely discontinue Cefepime in am (patient will have received 3 days total, adequate coverage even if occult pulmonary infection).    DMII  - hypoglycemia noted  - most recent a1c 7.5, will repeat  -  will scale back insulin dosing -- prior admit in 2016 for hypoglycemia as well.  - restart low dose metformin - will need periodic monitoring of renal function however. GFR approx 50 this hospitalization. Attempted to call son to see why this med was stopped as patient took this in the past, but no answer    Dementia    Atrial fibrillation  - eliquis/amio/coreg  - cardiomegaly noted on CXR - obtained echo which shows a small circumferential pericardial effusion - currently asymptomatic    HTN    DVT Prophylaxis:  eliquis    CODE STATUS: Full  Code    Disposition: I expect the patient to be discharged to snf in 1-2 days.      Electronically signed by Emmanuel Mehta MD, 04/09/18, 7:28 PM.

## 2018-04-10 LAB
ANION GAP SERPL CALCULATED.3IONS-SCNC: 5 MMOL/L (ref 3–11)
BUN BLD-MCNC: 15 MG/DL (ref 9–23)
BUN/CREAT SERPL: 13.6 (ref 7–25)
CALCIUM SPEC-SCNC: 8.5 MG/DL (ref 8.7–10.4)
CHLORIDE SERPL-SCNC: 106 MMOL/L (ref 99–109)
CO2 SERPL-SCNC: 25 MMOL/L (ref 20–31)
CREAT BLD-MCNC: 1.1 MG/DL (ref 0.6–1.3)
GFR SERPL CREATININE-BSD FRML MDRD: 47 ML/MIN/1.73
GLUCOSE BLD-MCNC: 150 MG/DL (ref 70–100)
GLUCOSE BLDC GLUCOMTR-MCNC: 136 MG/DL (ref 70–130)
GLUCOSE BLDC GLUCOMTR-MCNC: 163 MG/DL (ref 70–130)
GLUCOSE BLDC GLUCOMTR-MCNC: 168 MG/DL (ref 70–130)
POTASSIUM BLD-SCNC: 3.2 MMOL/L (ref 3.5–5.5)
SODIUM BLD-SCNC: 136 MMOL/L (ref 132–146)

## 2018-04-10 PROCEDURE — 99232 SBSQ HOSP IP/OBS MODERATE 35: CPT | Performed by: INTERNAL MEDICINE

## 2018-04-10 PROCEDURE — 80048 BASIC METABOLIC PNL TOTAL CA: CPT | Performed by: INTERNAL MEDICINE

## 2018-04-10 PROCEDURE — 25010000002 CEFEPIME: Performed by: PHYSICIAN ASSISTANT

## 2018-04-10 PROCEDURE — 82962 GLUCOSE BLOOD TEST: CPT

## 2018-04-10 RX ORDER — POTASSIUM CHLORIDE 750 MG/1
20 CAPSULE, EXTENDED RELEASE ORAL ONCE
Status: COMPLETED | OUTPATIENT
Start: 2018-04-10 | End: 2018-04-10

## 2018-04-10 RX ADMIN — CEFEPIME 2 G: 2 INJECTION, POWDER, FOR SOLUTION INTRAVENOUS at 14:38

## 2018-04-10 RX ADMIN — Medication 250 MG: at 20:24

## 2018-04-10 RX ADMIN — CEFEPIME 2 G: 2 INJECTION, POWDER, FOR SOLUTION INTRAVENOUS at 05:17

## 2018-04-10 RX ADMIN — ISOSORBIDE MONONITRATE 60 MG: 60 TABLET, EXTENDED RELEASE ORAL at 08:40

## 2018-04-10 RX ADMIN — POTASSIUM CHLORIDE 20 MEQ: 750 CAPSULE, EXTENDED RELEASE ORAL at 17:02

## 2018-04-10 RX ADMIN — METFORMIN HYDROCHLORIDE 500 MG: 500 TABLET, FILM COATED ORAL at 08:40

## 2018-04-10 RX ADMIN — FAMOTIDINE 40 MG: 20 TABLET, FILM COATED ORAL at 08:40

## 2018-04-10 RX ADMIN — CITALOPRAM HYDROBROMIDE 20 MG: 20 TABLET ORAL at 08:40

## 2018-04-10 RX ADMIN — AMLODIPINE BESYLATE 10 MG: 10 TABLET ORAL at 08:40

## 2018-04-10 RX ADMIN — Medication 250 MG: at 08:40

## 2018-04-10 RX ADMIN — APIXABAN 2.5 MG: 2.5 TABLET, FILM COATED ORAL at 20:24

## 2018-04-10 RX ADMIN — AMIODARONE HYDROCHLORIDE 100 MG: 200 TABLET ORAL at 08:39

## 2018-04-10 RX ADMIN — ASPIRIN 81 MG 81 MG: 81 TABLET ORAL at 08:40

## 2018-04-10 RX ADMIN — DONEPEZIL HYDROCHLORIDE 5 MG: 5 TABLET ORAL at 20:23

## 2018-04-10 RX ADMIN — APIXABAN 2.5 MG: 2.5 TABLET, FILM COATED ORAL at 08:40

## 2018-04-10 RX ADMIN — CARVEDILOL 25 MG: 12.5 TABLET, FILM COATED ORAL at 17:02

## 2018-04-10 RX ADMIN — CARVEDILOL 25 MG: 12.5 TABLET, FILM COATED ORAL at 08:40

## 2018-04-10 RX ADMIN — ATORVASTATIN CALCIUM 40 MG: 40 TABLET, FILM COATED ORAL at 20:24

## 2018-04-10 NOTE — PLAN OF CARE
Problem: Fall Risk (Adult)  Goal: Identify Related Risk Factors and Signs and Symptoms  Outcome: Ongoing (interventions implemented as appropriate)   04/09/18 0452   Fall Risk (Adult)   Related Risk Factors (Fall Risk) age-related changes;confusion/agitation;fatigue/slow reaction;fear of falling;gait/mobility problems;history of falls;impaired vision;neuro disease/injury;sensory deficits;environment unfamiliar   Signs and Symptoms (Fall Risk) presence of risk factors     Goal: Absence of Fall  Outcome: Ongoing (interventions implemented as appropriate)   04/10/18 1550   Fall Risk (Adult)   Absence of Fall making progress toward outcome       Problem: Skin Injury Risk (Adult)  Goal: Identify Related Risk Factors and Signs and Symptoms  Outcome: Ongoing (interventions implemented as appropriate)   04/09/18 0452   Skin Injury Risk (Adult)   Related Risk Factors (Skin Injury Risk) advanced age;cognitive impairment;infection;medication;mobility impaired;nutritional deficiencies     Goal: Skin Health and Integrity  Outcome: Ongoing (interventions implemented as appropriate)   04/10/18 2220   Skin Injury Risk (Adult)   Skin Health and Integrity making progress toward outcome

## 2018-04-10 NOTE — PROGRESS NOTES
Continued Stay Note  [facility]     Patient Name: Mary Ramirez  MRN: [id number]  Today's Date: 4/10/2018    Admit Date: 4/7/2018          Discharge Plan     Row Name 04/10/18 1039       Plan    Plan discharge plan    Patient/Family in Agreement with Plan yes    Plan Comments Pt agreeable to in patient skilled rehab per PT recommendation. Referral made to [facility] at [facility] per pt requests. Spoke with Pennie at [facility] and she will retrieve clinical information from [facility]. CM will cont to follow.              Discharge Codes    No documentation.       Expected Discharge Date and Time     Expected Discharge Date Expected Discharge Time    Apr 12, 2018             Maria D Ge RN

## 2018-04-10 NOTE — PROGRESS NOTES
[facility] Medicine Services  PROGRESS NOTE    Patient Name: Mary Ramirez  : 1934  MRN: [id number]    Date of Admission: 2018  Length of Stay: 3  Primary Care Physician: Nirmal Lundberg MD    Subjective   Subjective     CC:  Altered mental status    HPI:  No further events. Still a little weaker than baseline    Review of Systems  Gen- No fevers, chills  CV- No chest pain, palpitations  Resp- No cough, dyspnea  GI- No N/V/D, abd pain      Otherwise ROS is negative except as mentioned in the HPI.    Objective   Objective     Vital Signs:   Temp:  [98 °F (36.7 °C)] 98 °F (36.7 °C)  Heart Rate:  [58] 58  Resp:  [18] 18  BP: (168)/(62) 168/62        Physical Exam:  Constitutional -non toxic, in bed  HEENT-NCAT, mucous membranes moist  CV-RRR, S1 S2 normal, no m/r/g  Resp-CTAB  Abd-soft, non-tender, non-distended, normo active bowel sounds  Ext-No lower extremity cyanosis, clubbing or edema bilaterally  Neuro-alert but with mild confusion, speech clear, moves all extremities   Psych-normal affect   Skin- No rash on exposed UE or LE bilaterally      Results Reviewed:  I have personally reviewed current lab, radiology, and data and agree.      Results from last 7 days  Lab Units 18  0448 18  0921   WBC 10*3/mm3 8.31 7.36   HEMOGLOBIN g/dL 11.2* 11.2*   HEMATOCRIT % 37.2 37.3   PLATELETS 10*3/mm3 286 256       Results from last 7 days  Lab Units 04/10/18  0600 18  0448 18  0921   SODIUM mmol/L 136 137 136   POTASSIUM mmol/L 3.2* 3.5 3.7   CHLORIDE mmol/L 106 102 103   CO2 mmol/L 25.0 25.0 26.0   BUN mg/dL 15 15 22   CREATININE mg/dL 1.10 1.00 1.10   GLUCOSE mg/dL 150* 170* 181*   CALCIUM mg/dL 8.5* 8.8 9.0   ALT (SGPT) U/L  --   --  21   AST (SGOT) U/L  --   --  26     Estimated Creatinine Clearance: 34.1 mL/min (by C-G formula based on SCr of 1.1 mg/dL).  No results found for: BNP  No results found for: PHART    Microbiology Results Abnormal      Procedure Component Value - Date/Time    Blood Culture - Blood, [[id number]]  (Normal) Collected:  04/07/18 1157    Lab Status:  Preliminary result Specimen:  Blood from Arm, Right Updated:  04/10/18 1231     Blood Culture No growth at 3 days    Blood Culture - Blood, [[id number]]  (Normal) Collected:  04/07/18 1157    Lab Status:  Preliminary result Specimen:  Blood from Arm, Right Updated:  04/10/18 1231     Blood Culture No growth at 3 days          Imaging Results (last 24 hours)     ** No results found for the last 24 hours. **        Results for orders placed during the hospital encounter of 04/07/18   Adult Transthoracic Echo Complete W/ Cont if Necessary Per Protocol    Narrative · Left ventricular wall thickness is consistent with mild concentric   hypertrophy.  · Left atrial cavity size is moderately dilated.  · Mild mitral valve regurgitation is present.  · Mild to moderate tricuspid valve regurgitation is present.  · There is a small (<1cm) circumferential pericardial effusion.  · Calculated right ventricular systolic pressure from tricuspid   regurgitation is 43 mmHg.  · Left ventricular systolic function is normal. Estimated EF = 70%.  · Mildly reduced right ventricular systolic function noted.  · There is a small (<1cm) circumferential pericardial effusion.  · Mild pulmonary hypertension is present.  · The aortic valve exhibits sclerosis.          I have reviewed the medications.    amiodarone 100 mg Oral Daily   amLODIPine 10 mg Oral Q24H   apixaban 2.5 mg Oral Q12H   aspirin 81 mg Oral Daily   atorvastatin 40 mg Oral Nightly   carvedilol 25 mg Oral BID With Meals   cefepime 2 g Intravenous Q12H   citalopram 20 mg Oral Daily   docusate sodium 100 mg Oral BID   donepezil 5 mg Oral Nightly   famotidine 40 mg Oral Daily   isosorbide mononitrate 60 mg Oral Daily   metFORMIN 500 mg Oral Daily With Breakfast   saccharomyces boulardii 250 mg Oral BID         Assessment/Plan   Assessment / Plan     Hospital  Problem List     * (Principal)Unresponsive episode    Type 2 diabetes mellitus with complication, with long-term current use of insulin    Coronary artery disease involving native coronary artery of native heart without angina pectoris    Overview Addendum 3/5/2018  9:42 AM by CRISTA Oleary     · Cardiac cath with PCI data deficit  · Echo (11/27/2017): Mild LVH. Mild MR and TR.  Normal LVEF           Dementia    A-fib    Anticoagulated    Altered mental state    SSS (sick sinus syndrome)    Hypoglycemia    Hypothermia    Sepsis             Brief Hospital Course to date:  Mary Ramirez is a 83 y.o. female with unresponsive episode and hypoglycemia      Assessment & Plan:    Altered mental status with possible seizure  - associated with hypoglycemia  - no further events - no need for anti-epileptics at this point per Neurology  - some concern for sepsis at admission: however no fever, no infiltrate on cxr, UA bland, patient placed on empiric antibiotics at admission -- discontinued vanc yesterday, will stop cefepime today (Day 3)    DMII  - hypoglycemia noted at admission -- prior admit in 2016 for hypoglycemia as well.  - most recent a1c 7.5, now 6.8  -  will scale back insulin dosing, held long acting insulin   - restart low dose metformin - will need periodic monitoring of renal function however. GFR approx 50 this hospitalization. Attempted to call son to see why this med was stopped as patient took metformin in the past, but no answer again today    Dementia    Atrial fibrillation  - eliquis/amio/coreg  - cardiomegaly noted on CXR - obtained echo which shows a small circumferential pericardial effusion - currently asymptomatic    HTN    DVT Prophylaxis:  eliquis    CODE STATUS: Full Code    Disposition: I expect the patient to be discharged to snf/rehab in 1-2 days. Discussed with CM      Electronically signed by Emmanuel Mehta MD, 04/10/18, 5:22 PM.

## 2018-04-11 VITALS
SYSTOLIC BLOOD PRESSURE: 135 MMHG | DIASTOLIC BLOOD PRESSURE: 71 MMHG | HEART RATE: 108 BPM | OXYGEN SATURATION: 95 % | HEIGHT: 61 IN | RESPIRATION RATE: 16 BRPM | TEMPERATURE: 98.7 F | WEIGHT: 123 LBS | BODY MASS INDEX: 23.22 KG/M2

## 2018-04-11 LAB
GLUCOSE BLDC GLUCOMTR-MCNC: 152 MG/DL (ref 70–130)
GLUCOSE BLDC GLUCOMTR-MCNC: 200 MG/DL (ref 70–130)
GLUCOSE BLDC GLUCOMTR-MCNC: 220 MG/DL (ref 70–130)

## 2018-04-11 PROCEDURE — 99239 HOSP IP/OBS DSCHRG MGMT >30: CPT | Performed by: INTERNAL MEDICINE

## 2018-04-11 PROCEDURE — 97110 THERAPEUTIC EXERCISES: CPT

## 2018-04-11 PROCEDURE — 97116 GAIT TRAINING THERAPY: CPT

## 2018-04-11 PROCEDURE — 82962 GLUCOSE BLOOD TEST: CPT

## 2018-04-11 PROCEDURE — 97530 THERAPEUTIC ACTIVITIES: CPT

## 2018-04-11 RX ORDER — COLCHICINE 0.6 MG/1
0.6 TABLET ORAL ONCE
Status: COMPLETED | OUTPATIENT
Start: 2018-04-11 | End: 2018-04-11

## 2018-04-11 RX ORDER — BISACODYL 5 MG/1
5 TABLET, DELAYED RELEASE ORAL DAILY PRN
Start: 2018-04-11 | End: 2022-06-17 | Stop reason: HOSPADM

## 2018-04-11 RX ORDER — COLCHICINE 0.6 MG/1
1.2 TABLET ORAL ONCE
Status: COMPLETED | OUTPATIENT
Start: 2018-04-11 | End: 2018-04-11

## 2018-04-11 RX ORDER — PREDNISONE 20 MG/1
40 TABLET ORAL DAILY
Qty: 14 TABLET | Refills: 0 | Status: SHIPPED | OUTPATIENT
Start: 2018-04-11 | End: 2018-04-11 | Stop reason: HOSPADM

## 2018-04-11 RX ADMIN — ISOSORBIDE MONONITRATE 60 MG: 60 TABLET, EXTENDED RELEASE ORAL at 09:08

## 2018-04-11 RX ADMIN — COLCHICINE 1.2 MG: 0.6 TABLET, FILM COATED ORAL at 12:35

## 2018-04-11 RX ADMIN — CARVEDILOL 25 MG: 12.5 TABLET, FILM COATED ORAL at 09:08

## 2018-04-11 RX ADMIN — COLCHICINE 0.6 MG: 0.6 TABLET, FILM COATED ORAL at 15:57

## 2018-04-11 RX ADMIN — POLYETHYLENE GLYCOL (3350) 17 G: 17 POWDER, FOR SOLUTION ORAL at 15:57

## 2018-04-11 RX ADMIN — CITALOPRAM HYDROBROMIDE 20 MG: 20 TABLET ORAL at 09:08

## 2018-04-11 RX ADMIN — AMLODIPINE BESYLATE 10 MG: 10 TABLET ORAL at 09:08

## 2018-04-11 RX ADMIN — Medication 250 MG: at 09:06

## 2018-04-11 RX ADMIN — FAMOTIDINE 40 MG: 20 TABLET, FILM COATED ORAL at 09:06

## 2018-04-11 RX ADMIN — AMIODARONE HYDROCHLORIDE 100 MG: 200 TABLET ORAL at 09:06

## 2018-04-11 RX ADMIN — METFORMIN HYDROCHLORIDE 500 MG: 500 TABLET, FILM COATED ORAL at 09:07

## 2018-04-11 RX ADMIN — APIXABAN 2.5 MG: 2.5 TABLET, FILM COATED ORAL at 09:07

## 2018-04-11 RX ADMIN — ASPIRIN 81 MG 81 MG: 81 TABLET ORAL at 09:07

## 2018-04-11 NOTE — THERAPY TREATMENT NOTE
Acute Care - Occupational Therapy Treatment Note  [facility]     Patient Name: Mary Ramirez  : 1934  MRN: [id number]  Today's Date: 2018  Onset of Illness/Injury or Date of Surgery: 18  Date of Referral to OT: 18  Referring Physician: Mayne, PA-C    Admit Date: 2018       ICD-10-CM ICD-9-CM   1. Hypothermia, initial encounter T68.XXXA 991.6   2. Acute pulmonary edema J81.0 518.4   3. Hypoglycemia E16.2 251.2   4. Injury of head, initial encounter S09.90XA 959.01   5. Impaired functional mobility, balance, gait, and endurance Z74.09 V49.89     Patient Active Problem List   Diagnosis   • Fall   • Fracture, intertrochanteric, left femur   • Hypertension   • Type 2 diabetes mellitus with complication, with long-term current use of insulin   • Coronary artery disease involving native coronary artery of native heart without angina pectoris   • Dementia   • Bilateral pleural effusion   • Right knee pain   • A-fib   • Anticoagulated   • Altered mental state   • SSS (sick sinus syndrome)   • Unresponsive episode   • Hypoglycemia   • Hypothermia   • Sepsis     Past Medical History:   Diagnosis Date   • Atrial fibrillation    • CAD (coronary artery disease)    • CHF (congestive heart failure)    • CKD (chronic kidney disease) stage 3, GFR 30-59 ml/min    • DDD (degenerative disc disease), cervical    • Dementia    • Diabetes mellitus    • Hypertension    • SSS (sick sinus syndrome)      Past Surgical History:   Procedure Laterality Date   • ANKLE SURGERY Left    • ATHRECTOMY ILIAC, FEMORAL, TIBIAL ARTERY     • BREAST LUMPECTOMY Left    • COLON SURGERY     • CORONARY STENT PLACEMENT     • FINGER FRACTURE SURGERY Right    • HIP TROCANTERIC NAILING WITH INTRAMEDULLARY HIP SCREW Left 2017   • HYSTERECTOMY         Therapy Treatment          Rehabilitation Treatment Summary     Row Name 18 1120 18 1018          Treatment Time/Intention    Discipline occupational therapist   -AC physical therapy assistant  -AS     Document Type therapy note (daily note)  -AC therapy note (daily note)  -AS     Subjective Information complains of;pain  -AC complains of;pain  -AS     Mode of Treatment occupational therapy  -AC physical therapy  -AS     Patient/Family Observations Pt received reclined in chair  -AC  --     Patient Effort good  -AC good  -AS     Comment  -- patient with increased pain in right knee limiting tolerance to activity  -AS     Existing Precautions/Restrictions fall  -AC  --     Treatment Considerations/Comments pt with R knee pain ; pt not putting weight through RLE when standing  -AC  --     Recorded by [AC] Brianna Hammer, OT 04/11/18 1153 04/11/18 1153 [AS] Debbie Head, PTA 04/11/18 1117 04/11/18 1117     Row Name 04/11/18 1120 04/11/18 1018          Cognitive Assessment/Intervention- PT/OT    Affect/Mental Status (Cognitive)  -- WNL  -AS     Orientation Status (Cognition) oriented x 3  -AC oriented x 3  -AS     Follows Commands (Cognition) over 90% accuracy  -AC over 90% accuracy;follows one step commands  -AS     Safety Deficit (Cognitive) mild deficit  -AC mild deficit  -AS     Personal Safety Interventions fall prevention program maintained;gait belt;nonskid shoes/slippers when out of bed  -AC fall prevention program maintained;gait belt;nonskid shoes/slippers when out of bed;other (see comments)   exit alarm  -AS     Recorded by [AC] Brianna Hammer, OT 04/11/18 1153 04/11/18 1153 [AS] Debbie Head, PTA 04/11/18 1117 04/11/18 1117     Row Name 04/11/18 1120             Safety Issues, Functional Mobility    Impairments Affecting Function (Mobility) balance;strength  -AC      Recorded by [AC] Brianna Hammer, OT 04/11/18 1153 04/11/18 1153      Row Name 04/11/18 1120 04/11/18 1018          Bed Mobility Assessment/Treatment    Supine-Sit [place] (Bed Mobility)  -- verbal cues;minimum assist (75% patient effort)  -AS     Assistive Device (Bed Mobility)  -- head of  bed elevated;bed rails  -AS     Comment (Bed Mobility) NT; up in chair  -AC increased pain in right knee, hand placement lateral thigh to assist patient OOB  -AS     Recorded by [AC] Brianna Hammer, OT 04/11/18 1153 04/11/18 1153 [AS] Debbie Head, PTA 04/11/18 1117 04/11/18 1117     Row Name 04/11/18 1120             Functional Mobility    Functional Mobility- Comment pt declined d/t R knee pain  -AC      Recorded by [AC] Brianna Hammer, OT 04/11/18 1153 04/11/18 1153      Row Name 04/11/18 1120 04/11/18 1018          Transfer Assessment/Treatment    Transfer Assessment/Treatment sit-stand transfer;stand-sit transfer  -AC  --     Comment (Transfers) pt not putting weight trough RLE d/t pain  -AC  --     Sit-Stand [place] (Transfers) verbal cues;minimum assist (75% patient effort);2 person assist  -AC verbal cues;minimum assist (75% patient effort)  -AS     Stand-Sit [place] (Transfers) verbal cues;minimum assist (75% patient effort);2 person assist  -AC verbal cues;minimum assist (75% patient effort)  -AS     Recorded by [AC] Brianna Hammer, OT 04/11/18 1153 04/11/18 1153 [AS] Debbie Head, PTA 04/11/18 1117 04/11/18 1117     Row Name 04/11/18 1120 04/11/18 1018          Sit-Stand Transfer    Assistive Device (Sit-Stand Transfers) walker, front-wheeled  -AC walker, front-wheeled  -AS     Recorded by [AC] Brianna Hammer, OT 04/11/18 1153 04/11/18 1153 [AS] Debbie Head, PTA 04/11/18 1117 04/11/18 1117     Row Name 04/11/18 1120 04/11/18 1018          Stand-Sit Transfer    Assistive Device (Stand-Sit Transfers) walker, front-wheeled  -AC walker, front-wheeled  -AS     Recorded by [AC] Brianna Hammer, OT 04/11/18 1153 04/11/18 1153 [AS] Debbie Head, PTA 04/11/18 1117 04/11/18 1117     Row Name 04/11/18 1018             Gait/Stairs Assessment/Training    Gait/Stairs Assessment/Training gait/ambulation assistive device  -AS      [place] Level (Gait) minimum assist (75% patient  effort);verbal cues  -AS      Assistive Device (Gait) walker, front-wheeled  -AS      Distance in Feet (Gait) 6  -AS      Deviations/Abnormal Patterns (Gait) antalgic;right sided deviations;base of support, narrow   decreased heel strike on right due to increased R knee pain  -AS      Comment (Gait/Stairs) patient with decreased tolerance to mobility due to right knee pain  -AS      Recorded by [AS] Debbie Head, PTA 04/11/18 1117 04/11/18 1117      Row Name 04/11/18 1120             ADL Assessment/Intervention    BADL Assessment/Intervention grooming  -AC      Recorded by [AC] Brianna Hammer, OT 04/11/18 1153 04/11/18 1153      Row Name 04/11/18 1120             Grooming Assessment/Training    [place] Level (Grooming) oral care regimen;wash face, hands;set up   retrieval of grooming items only  -AC      Grooming Position supported sitting  -AC      Recorded by [AC] Brianna Hammer, OT 04/11/18 1153 04/11/18 1153      Row Name 04/11/18 1120             Motor Skills Assessment/Interventions    Additional Documentation Therapeutic Exercise (Group)  -AC      Recorded by [AC] Brianna Hammer, OT 04/11/18 1153 04/11/18 1153      Row Name 04/11/18 1120             Therapeutic Exercise    Upper Extremity Range of Motion (Therapeutic Exercise) shoulder flexion/extension, bilateral;shoulder horizontal abduction/adduction, bilateral;elbow flexion/extension, bilateral  -AC      Sets/Reps (Therapeutic Exercise) 2 sets x15  -AC      Recorded by [AC] Brianna Hammer, OT 04/11/18 1153 04/11/18 1153      Row Name 04/11/18 1120 04/11/18 1018          Positioning and Restraints    Pre-Treatment Position sitting in chair/recliner  -AC in bed  -AS     Post Treatment Position chair  -AC chair  -AS     In Chair reclined;call light within reach;encouraged to call for assist;exit alarm on  -AC reclined;call light within reach;encouraged to call for assist;exit alarm on  -AS     Recorded by [AC] Brianna Hammer, OT 04/11/18 1153 04/11/18  1153 [AS] Debbie Adele Head, PTA 04/11/18 1117 04/11/18 1117     Row Name 04/11/18 1120 04/11/18 1018          Pain Scale: Numbers Pre/Post-Treatment    Pain Scale: Numbers, Pretreatment 5/10  -AC 5/10  -AS     Pain Scale: Numbers, Post-Treatment 5/10  -AC 5/10  -AS     Pain Location - Side Right  -AC Right  -AS     Pain Location knee  -AC knee  -AS     Pain Intervention(s) Repositioned  -AC Repositioned;Ambulation/increased activity  -AS     Recorded by [AC] Brianna Hammer, OT 04/11/18 1153 04/11/18 1153 [AS] Debbie Head, PTA 04/11/18 1117 04/11/18 1117     Row Name                Wound 04/07/18 1555 Left hand skin tear;abrasion    Wound - Properties Group Date first assessed: 04/07/18 [RM] Time first assessed: 1555 [RM] Present On Admission : yes [RM] Side: Left [RM] Location: hand [RM] Type: skin tear;abrasion [RM] Additional Comments: 2 areas  [RM] Recorded by:  [] Deanna Medina RN 04/07/18 1757 04/07/18 1757    Row Name                Wound 04/07/18 1557 Right shoulder skin tear    Wound - Properties Group Date first assessed: 04/07/18 [RM] Time first assessed: 1557 [RM] Present On Admission : yes [RM] Side: Right [RM] Location: shoulder [RM] Type: skin tear [RM] Recorded by:  [] Deanna Medina RN 04/07/18 1758 04/07/18 1758      User Key  (r) = Recorded By, (t) = Taken By, (c) = Cosigned By    Initials Name Effective Dates Discipline     Brianna Hammer, OT 06/23/15 -  OT    AS Debbie Head, PTA 06/22/15 -  PT     Deanna Medina RN 03/01/17 -  Nurse        Wound 04/07/18 1555 Left hand skin tear;abrasion (Active)   Dressing Appearance dry;intact;no drainage 4/11/2018  8:45 AM   Closure KAYLEEN 4/11/2018  8:45 AM   Base dressing in place, unable to visualize 4/11/2018  8:45 AM   Dressing Care, Wound other (see comments) 4/10/2018  8:24 PM       Wound 04/07/18 1557 Right shoulder skin tear (Active)   Dressing Appearance open to air 4/11/2018  8:45 AM         Occupational Therapy Education      Title: PT OT SLP Therapies (Active)     Topic: Occupational Therapy (Active)     Point: ADL training (Done)     Description: Instruct learner(s) on proper safety adaptation and remediation techniques during self care or transfers.   Instruct in proper use of assistive devices.   Learning Progress Summary     Learner Status Readiness Method Response Comment Documented by    Patient Done Acceptance E VU UE HEP  04/11/18 1154     Done Acceptance E VU benefits of activity, role of OT  04/09/18 1013                      User Key     Initials Effective Dates Name Provider Type Discipline     06/23/15 -  Brianna Hammer, OT Occupational Therapist OT                OT Recommendation and Plan  Outcome Summary/Treatment Plan (OT)  Anticipated Discharge Disposition (OT):  ([facility])  Planned Therapy Interventions (OT Eval): BADL retraining, functional balance retraining, strengthening exercise, transfer/mobility retraining  Therapy Frequency (OT Eval): daily  Plan of Care Review  Plan of Care Reviewed With: patient  Plan of Care Reviewed With: patient  Outcome Summary: Pt  supervsion with grooming, and gave good effort with UE ther ex.  Pt min A x 2 sit to stand to RW with pt not placing weight through R LE;  declined to mobilize further d/t R knee pain        Outcome Measures     Row Name 04/11/18 1120 04/11/18 1018 04/09/18 0740       How much help from another person do you currently need...    Turning from your back to your side while in flat bed without using bedrails?  -- 3  -AS  --    Moving from lying on back to sitting on the side of a flat bed without bedrails?  -- 3  -AS  --    Moving to and from a bed to a chair (including a wheelchair)?  -- 3  -AS  --    Standing up from a chair using your arms (e.g., wheelchair, bedside chair)?  -- 3  -AS  --    Climbing 3-5 steps with a railing?  -- 2  -AS  --    To walk in hospital room?  -- 3  -AS  --    AM-PAC 6 Clicks Score  -- 17  -AS  --       How much help from  another is currently needed...    Putting on and taking off regular lower body clothing? 3  -AC  -- 3  -AC    Bathing (including washing, rinsing, and drying) 3  -AC  -- 3  -AC    Toileting (which includes using toilet bed pan or urinal) 3  -AC  -- 3  -AC    Putting on and taking off regular upper body clothing 3  -AC  -- 3  -AC    Taking care of personal grooming (such as brushing teeth) 3   item retrieval only  -AC  -- 4  -AC    Eating meals 4  -AC  -- 4  -AC    Score 19  -AC  -- 20  -AC       Functional Assessment    Outcome Measure Options AM-PAC 6 Clicks Daily Activity (OT)  -AC AM-PAC 6 Clicks Basic Mobility (PT)  -AS AM-PAC 6 Clicks Daily Activity (OT)  -    Row Name 04/08/18 1430             How much help from another person do you currently need...    Turning from your back to your side while in flat bed without using bedrails? 4  -LS      Moving from lying on back to sitting on the side of a flat bed without bedrails? 3  -LS      Moving to and from a bed to a chair (including a wheelchair)? 3  -LS      Standing up from a chair using your arms (e.g., wheelchair, bedside chair)? 3  -LS      Climbing 3-5 steps with a railing? 3  -LS      To walk in hospital room? 3  -LS      AM-PAC 6 Clicks Score 19  -LS         Functional Assessment    Outcome Measure Options AM-PAC 6 Clicks Basic Mobility (PT)  -LS        User Key  (r) = Recorded By, (t) = Taken By, (c) = Cosigned By    Initials Name Provider Type    AC Brianna Hammer, OT Occupational Therapist    AS Debbie Head, PTA Physical Therapy Assistant    JUVENCIO Seo, PT Physical Therapist           Time Calculation:         Time Calculation- OT     Row Name 04/11/18 8158             Time Calculation- OT    OT Start Time 1120  -      Total Timed Code Minutes- OT 25 minute(s)  -      OT Received On 04/11/18  -      OT Goal Re-Cert Due Date 04/19/18  -        User Key  (r) = Recorded By, (t) = Taken By, (c) = Cosigned By    Initials Name  Provider Type     Brianna Hammer OT Occupational Therapist           Therapy Charges for Today     Code Description Service Date Service Provider Modifiers Qty    [id number]  OT THERAPEUTIC ACT EA 15 MIN 4/11/2018 Brianna aHmmer OT GO 2               Brianna Hammer OT  4/11/2018

## 2018-04-11 NOTE — THERAPY TREATMENT NOTE
Acute Care - Physical Therapy Treatment Note  [facility]     Patient Name: Mary Ramirez  : 1934  MRN: [id number]  Today's Date: 2018  Onset of Illness/Injury or Date of Surgery: 18  Date of Referral to PT: 18  Referring Physician: Mayne, PA-C    Admit Date: 2018    Visit Dx:    ICD-10-CM ICD-9-CM   1. Hypothermia, initial encounter T68.XXXA 991.6   2. Acute pulmonary edema J81.0 518.4   3. Hypoglycemia E16.2 251.2   4. Injury of head, initial encounter S09.90XA 959.01   5. Impaired functional mobility, balance, gait, and endurance Z74.09 V49.89     Patient Active Problem List   Diagnosis   • Fall   • Fracture, intertrochanteric, left femur   • Hypertension   • Type 2 diabetes mellitus with complication, with long-term current use of insulin   • Coronary artery disease involving native coronary artery of native heart without angina pectoris   • Dementia   • Bilateral pleural effusion   • Right knee pain   • A-fib   • Anticoagulated   • Altered mental state   • SSS (sick sinus syndrome)   • Unresponsive episode   • Hypoglycemia   • Hypothermia   • Sepsis       Therapy Treatment          Rehabilitation Treatment Summary     Row Name 18 1018             Treatment Time/Intention    Discipline physical therapy assistant  -AS      Document Type therapy note (daily note)  -AS      Subjective Information complains of;pain  -AS      Mode of Treatment physical therapy  -AS      Patient Effort good  -AS      Comment patient with increased pain in right knee limiting tolerance to activity  -AS      Recorded by [AS] Debbie Head, PTA 18 1117 18 1117      Row Name 18 1018             Cognitive Assessment/Intervention- PT/OT    Affect/Mental Status (Cognitive) WNL  -AS      Orientation Status (Cognition) oriented x 3  -AS      Follows Commands (Cognition) over 90% accuracy;follows one step commands  -AS      Safety Deficit (Cognitive) mild deficit  -AS      Personal  Safety Interventions fall prevention program maintained;gait belt;nonskid shoes/slippers when out of bed;other (see comments)   exit alarm  -AS      Recorded by [AS] Debbie Head, PTA 04/11/18 1117 04/11/18 1117      Row Name 04/11/18 1018             Bed Mobility Assessment/Treatment    Supine-Sit [place] (Bed Mobility) verbal cues;minimum assist (75% patient effort)  -AS      Assistive Device (Bed Mobility) head of bed elevated;bed rails  -AS      Comment (Bed Mobility) increased pain in right knee, hand placement lateral thigh to assist patient OOB  -AS      Recorded by [AS] Debbie Head, PTA 04/11/18 1117 04/11/18 1117      Row Name 04/11/18 1018             Transfer Assessment/Treatment    Sit-Stand [place] (Transfers) verbal cues;minimum assist (75% patient effort)  -AS      Stand-Sit [place] (Transfers) verbal cues;minimum assist (75% patient effort)  -AS      Recorded by [AS] Debbie Head, [facility] 04/11/18 1117 04/11/18 1117      Row Name 04/11/18 1018             Sit-Stand Transfer    Assistive Device (Sit-Stand Transfers) walker, front-wheeled  -AS      Recorded by [AS] Debbie Head, [facility] 04/11/18 1117 04/11/18 1117      Row Name 04/11/18 1018             Stand-Sit Transfer    Assistive Device (Stand-Sit Transfers) walker, front-wheeled  -AS      Recorded by [AS] Debbie Head, PTA 04/11/18 1117 04/11/18 1117      Row Name 04/11/18 1018             Gait/Stairs Assessment/Training    Gait/Stairs Assessment/Training gait/ambulation assistive device  -AS      [place] Level (Gait) minimum assist (75% patient effort);verbal cues  -AS      Assistive Device (Gait) walker, front-wheeled  -AS      Distance in Feet (Gait) 6  -AS      Deviations/Abnormal Patterns (Gait) antalgic;right sided deviations;base of support, narrow   decreased heel strike on right due to increased R knee pain  -AS      Comment (Gait/Stairs) patient with decreased tolerance to mobility due to right  knee pain  -AS      Recorded by [AS] Debbie Head, PTA 04/11/18 1117 04/11/18 1117      Row Name 04/11/18 1018             Positioning and Restraints    Pre-Treatment Position in bed  -AS      Post Treatment Position chair  -AS      In Chair reclined;call light within reach;encouraged to call for assist;exit alarm on  -AS      Recorded by [AS] Debbie Head, PTA 04/11/18 1117 04/11/18 1117      Row Name 04/11/18 1018             Pain Scale: Numbers Pre/Post-Treatment    Pain Scale: Numbers, Pretreatment 5/10  -AS      Pain Scale: Numbers, Post-Treatment 5/10  -AS      Pain Location - Side Right  -AS      Pain Location knee  -AS      Pain Intervention(s) Repositioned;Ambulation/increased activity  -AS      Recorded by [AS] Debbie Head, PTA 04/11/18 1117 04/11/18 1117      Row Name                Wound 04/07/18 1555 Left hand skin tear;abrasion    Wound - Properties Group Date first assessed: 04/07/18 [RM] Time first assessed: 1555 [RM] Present On Admission : yes [RM] Side: Left [RM] Location: hand [RM] Type: skin tear;abrasion [RM] Additional Comments: 2 areas  [RM] Recorded by:  [] Deanna Medina RN 04/07/18 1757 04/07/18 1757    Row Name                Wound 04/07/18 1557 Right shoulder skin tear    Wound - Properties Group Date first assessed: 04/07/18 [RM] Time first assessed: 1557 [RM] Present On Admission : yes [RM] Side: Right [RM] Location: shoulder [RM] Type: skin tear [RM] Recorded by:  [RM] Deanna Medina RN 04/07/18 1758 04/07/18 1758      User Key  (r) = Recorded By, (t) = Taken By, (c) = Cosigned By    Initials Name Effective Dates Discipline    AS Debbie Head, PTA 06/22/15 -  PT     Deanna Medina RN 03/01/17 -  Nurse          Wound 04/07/18 1555 Left hand skin tear;abrasion (Active)   Dressing Appearance dry;intact;no drainage 4/10/2018  8:24 PM   Closure KAYLEEN 4/10/2018  8:24 PM   Base dressing in place, unable to visualize 4/10/2018  8:24 PM   Dressing Care, Wound other  (see comments) 4/10/2018  8:24 PM       Wound 04/07/18 1557 Right shoulder skin tear (Active)   Dressing Appearance open to air 4/10/2018  8:24 PM             Physical Therapy Education     Title: PT OT SLP Therapies (Active)     Topic: Physical Therapy (Active)     Point: Mobility training (Active)    Learning Progress Summary     Learner Status Readiness Method Response Comment Documented by    Patient Active Acceptance E NR  AS 04/11/18 1118          Point: Home exercise program (Active)    Learning Progress Summary     Learner Status Readiness Method Response Comment Documented by    Patient Active Acceptance E NR  AS 04/11/18 1118     Done Acceptance E VU,NR Discussed benefits of activity. LS 04/08/18 1544          Point: Body mechanics (Active)    Learning Progress Summary     Learner Status Readiness Method Response Comment Documented by    Patient Active Acceptance E NR  AS 04/11/18 1118          Point: Precautions (Active)    Learning Progress Summary     Learner Status Readiness Method Response Comment Documented by    Patient Active Acceptance E NR  AS 04/11/18 1118                      User Key     Initials Effective Dates Name Provider Type Discipline    AS 06/22/15 -  Debbie Head, PTA Physical Therapy Assistant PT    LS 06/19/15 -  Yasmin Seo, PT Physical Therapist PT                    PT Recommendation and Plan     Plan of Care Reviewed With: patient  Progress: no change  Outcome Summary: patient limited by right knee pain, no edema or redness noticed. Performed B LE exercises in seated position, no complaints.          Outcome Measures     Row Name 04/11/18 1018 04/09/18 0740 04/08/18 1430       How much help from another person do you currently need...    Turning from your back to your side while in flat bed without using bedrails? 3  -AS  -- 4  -LS    Moving from lying on back to sitting on the side of a flat bed without bedrails? 3  -AS  -- 3  -LS    Moving to and from a bed to a  chair (including a wheelchair)? 3  -AS  -- 3  -LS    Standing up from a chair using your arms (e.g., wheelchair, bedside chair)? 3  -AS  -- 3  -LS    Climbing 3-5 steps with a railing? 2  -AS  -- 3  -LS    To walk in hospital room? 3  -AS  -- 3  -LS    AM-PAC 6 Clicks Score 17  -AS  -- 19  -LS       How much help from another is currently needed...    Putting on and taking off regular lower body clothing?  -- 3  -AC  --    Bathing (including washing, rinsing, and drying)  -- 3  -AC  --    Toileting (which includes using toilet bed pan or urinal)  -- 3  -AC  --    Putting on and taking off regular upper body clothing  -- 3  -AC  --    Taking care of personal grooming (such as brushing teeth)  -- 4  -AC  --    Eating meals  -- 4  -AC  --    Score  -- 20  -AC  --       Functional Assessment    Outcome Measure Options AM-PAC 6 Clicks Basic Mobility (PT)  -AS AM-PAC 6 Clicks Daily Activity (OT)  -AC AM-PAC 6 Clicks Basic Mobility (PT)  -LS      User Key  (r) = Recorded By, (t) = Taken By, (c) = Cosigned By    Initials Name Provider Type    AC Brianna Hammer, OT Occupational Therapist    AS Debbie Head PTA Physical Therapy Assistant    JUVENCIO Seo, PT Physical Therapist           Time Calculation:         PT Charges     Row Name 04/11/18 1120             Time Calculation    Start Time 1018  -AS      PT Received On 04/11/18  -AS      PT Goal Re-Cert Due Date 04/18/18  -AS         Time Calculation- PT    Total Timed Code Minutes- PT 23 minute(s)  -AS        User Key  (r) = Recorded By, (t) = Taken By, (c) = Cosigned By    Initials Name Provider Type    AS Debbie Head PTA Physical Therapy Assistant          Therapy Charges for Today     Code Description Service Date Service Provider Modifiers Qty    73849267813 HC GAIT TRAINING EA 15 MIN 4/11/2018 Debbie Head PTA GP 1    45434541221 HC PT THER PROC EA 15 MIN 4/11/2018 Debbie Head PTA GP 1          PT G-Codes  Outcome Measure Options:  AM-PAC 6 Clicks Basic Mobility (PT)    Debbie Head, PTA  4/11/2018

## 2018-04-11 NOTE — PROGRESS NOTES
Continued Stay Note  [facility]     Patient Name: Mary Ramirez  MRN: [id number]  Today's Date: 4/11/2018    Admit Date: 4/7/2018          Discharge Plan     Row Name 04/11/18 1628       Plan    Plan discharge plan    Patient/Family in Agreement with Plan yes    Plan Comments Spoke with Dr Black and pt is medically ready for discharge for today. Dr Black spoke with pt son, Josh on the phone. CM spoke with pt son, Josh( [phone]) and he is agreeable to discharge plan and will transport pt around 5pm when he gets off work. Per Pennie Bejarano([facility]), they are agreeable to time son can transport.  CM will fax discharge summary to [phone]. Nurse can call report to [phone] and send a copy of discharge summary along with any scripts with pt.     Final Discharge Disposition Code 03 - skilled nursing facility (SNF)              Discharge Codes    No documentation.       Expected Discharge Date and Time     Expected Discharge Date Expected Discharge Time    Apr 11, 2018             Maria D Ge RN

## 2018-04-11 NOTE — PROGRESS NOTES
Discharge Planning Assessment  [facility]     Patient Name: Mary Ramirez  MRN: [id number]  Today's Date: 4/11/2018    Admit Date: 4/7/2018          Discharge Needs Assessment    No documentation.             Discharge Plan     Row Name 04/11/18 1018       Plan    Plan discharge plan    Patient/Family in Agreement with Plan yes    Plan Comments Received a call from Pennie at [facility] and pt is approved for rehab. Pt informed and states her son is out of town today. Attempted to contact sonJosh on cell([phone]) in regards to bed approval and transportation. Message left son to call CM. CM will cont to follow        Destination     No service coordination in this encounter.      Durable Medical Equipment     No service coordination in this encounter.      Dialysis/Infusion     No service coordination in this encounter.      Home Medical Care     No service coordination in this encounter.      Social Care     No service coordination in this encounter.        Expected Discharge Date and Time     Expected Discharge Date Expected Discharge Time    Apr 11, 2018               Demographic Summary    No documentation.           Functional Status    No documentation.           Psychosocial    No documentation.           Abuse/Neglect    No documentation.           Legal    No documentation.           Substance Abuse    No documentation.           Patient Forms    No documentation.         Maria D Ge, JANNIE

## 2018-04-11 NOTE — PLAN OF CARE
Problem: Patient Care Overview  Goal: Plan of Care Review  Outcome: Ongoing (interventions implemented as appropriate)   04/11/18 1118   Coping/Psychosocial   Plan of Care Reviewed With patient   OTHER   Outcome Summary patient limited by right knee pain, no edema or redness noticed. Performed B LE exercises in seated position, no complaints.   Plan of Care Review   Progress no change

## 2018-04-11 NOTE — DISCHARGE PLACEMENT REQUEST
"Don Meade (83 y.o. Female)   To [place]  From [place] [facility]() [phone]    Date of Birth Social Security Number Address Home Phone MRN    1934  [address] [facility] [address]  [id number]    [place]ondo[place] Marital Status          Mu-ism        Admission Date Admission Type Admitting Provider Attending Provider Department, Room/Bed    18 Emergency Theresa Black MD Brown, Hannah, MD [facility] 5G, S559/1    Discharge Date Discharge Disposition Discharge Destination         Skilled Nursing Facility (DC - External)              Attending Provider:  Theresa Black MD    Allergies:  Penicillins    Isolation:  None   Infection:  None   Code Status:  FULL    Ht:  154.9 cm (61\")   Wt:  55.8 kg (123 lb)    Admission Cmt:  None   Principal Problem:  Hypoglycemia [E16.2]                 Active Insurance as of 2018     Primary Coverage     Payor Plan Insurance Group Employer/Plan Group    HUMANA MEDICARE REPLACEMENT HUMANA MEDICARE REPL [id number]     Payor Plan Address Payor Plan Phone Number Effective From Effective To    [address] [address] [phone] 2018     [place] [address]       Subscriber Name Subscriber Birth Date Member ID       DON MEADE 1934 [id number]                 Emergency Contacts      (Rel.) Home Phone Work Phone Mobile Phone    Josh Meade (Son) [phone] --               Discharge Summary      Theresa Black MD at 2018  4:06 PM              [facility]  DISCHARGE SUMMARY    Patient Name: Don Meade  : 1934  MRN: [id number]    Date of Admission: 2018  Date of Discharge:  2018  Primary Care Physician: Nirmal Lundberg MD    Consults     Date and Time Order Name Status Description    2018 0030 Inpatient Neurology Consult Completed         Hospital Course     Presenting Problem:   Hypothermia, initial encounter " [T68.XXXA]    Active Hospital Problems (** Indicates Principal Problem)    Diagnosis Date Noted   • **Hypoglycemia [E16.2] 04/07/2018   • A-fib [I48.91] 04/07/2018   • Anticoagulated [Z79.01] 04/07/2018   • Altered mental state [R41.82] 04/07/2018   • SSS (sick sinus syndrome) [I49.5] 04/07/2018   • Unresponsive episode [R41.89] 04/07/2018   • Hypothermia [T68.XXXA] 04/07/2018   • Dementia [F03.90] 02/28/2018   • Type 2 diabetes mellitus with complication, with long-term current use of insulin [E11.8, Z79.4] 11/24/2017   • Coronary artery disease involving native coronary artery of native heart without angina pectoris [I25.10] 11/24/2017      Resolved Hospital Problems    Diagnosis Date Noted Date Resolved   No resolved problems to display.          Hospital Course:  Mary Ramirez is a 83 y.o. female with Afib on Chronic Eliquis/amiodarone, SSS/bradycardia, CAD, HTN, Dementia, diabetes who was found unresponsive on the floor of her assisted living facility with blood glucose of 50 requiring multiple doses of IV dextrose by EMS.  She was noted to be hypothermic upon arrival to the ED and bradycardic but these parameters as well as her mental status improved with bear hugger.  She was initially started on broad spectrum antibiotics but infectious workup ultimately negative without evidence of pneumonia or UTI, and negative blood cultures, no further signs of infection such as temperature instability so antibiotics were discontinued and she remained stable off antibiotics.  CT head and C spine on admission showed no acute abnormality or fracture.  She was evaluated by neurology due to concern for seizure prior to being found down and no EEG or antiepileptic drugs were recommended as seizure would have been in the setting of hypoglycemia.  Her insulin was discontinued and she was started back on metformin at a dose of 500mg daily with breakfast with adequate control of blood sugar.  In talking with her son, it seems  "this may have been discontinued during a hospitalization at  when her medications got \"messed up\" because all of her records are at [facility].  He did already schedule follow up with her primary care provider tomorrow.  She developed some pain and warmth in her right knee on the day of discharge consistent with gout which patient and her son report she has battled for a long time which improved with colchicine.  It does not appear that she has been on preventive medication for gout recently, but son reports she may have been at some point in the past.  Will defer to PCP at follow up to consider restarting once acute flare has resolved.  She was evaluated by physical therapy and inpatient rehab recommended so she will go to the [facility] rehab unit upon discharge for rehab prior to returning to the assisted living section.         Day of Discharge     HPI: Right knee pain today that became worse yesterday afternoon.  Improved some with colchicine.  States she has had gout \"up and down that leg\" for a long time and this is similar to her gout flares but she is not sure what she has taken before for gout.    Review of Systems  Gen- No fevers, chills  CV- No chest pain, palpitations  Resp- No cough, dyspnea  GI- No N/V/D, abd pain    Otherwise ROS is negative except as mentioned in the HPI.    Vital Signs:   Temp:  [98.7 °F (37.1 °C)-98.8 °F (37.1 °C)] 98.7 °F (37.1 °C)  Heart Rate:  [] 108  Resp:  [16-17] 16  BP: (135-148)/(65-71) 135/71     Physical Exam:  Constitutional: No acute distress, awake, alert, sitting up in chair  HENT: NCAT, mucous membranes moist  Respiratory: Clear to auscultation bilaterally, respiratory effort normal   Cardiovascular: RRR, no murmurs, rubs, or gallops  Gastrointestinal: Positive bowel sounds, soft, nontender, nondistended  Musculoskeletal: No bilateral ankle edema, right knee swollen, mild erythema and warmth in the morning which resolved later in the day after " colchicine  Psychiatric: Appropriate affect, cooperative  Neurologic: Cranial Nerves grossly intact to confrontation, speech clear  Skin: No rashes    Pertinent  and/or Most Recent Results       Results from last 7 days  Lab Units 04/10/18  0600 04/08/18  0448 04/07/18  0921   WBC 10*3/mm3  --  8.31 7.36   HEMOGLOBIN g/dL  --  11.2* 11.2*   HEMATOCRIT %  --  37.2 37.3   PLATELETS 10*3/mm3  --  286 256   SODIUM mmol/L 136 137 136   POTASSIUM mmol/L 3.2* 3.5 3.7   CHLORIDE mmol/L 106 102 103   CO2 mmol/L 25.0 25.0 26.0   BUN mg/dL 15 15 22   CREATININE mg/dL 1.10 1.00 1.10   GLUCOSE mg/dL 150* 170* 181*   CALCIUM mg/dL 8.5* 8.8 9.0       Results from last 7 days  Lab Units 04/07/18  0921   BILIRUBIN mg/dL 0.6   ALK PHOS U/L 105*   ALT (SGPT) U/L 21   AST (SGOT) U/L 26           Invalid input(s): TG, LDLCALC, LDLREALC    Results from last 7 days  Lab Units 04/08/18  0448 04/07/18  1149 04/07/18  0921   TSH mIU/mL  --   --  3.546   HEMOGLOBIN A1C % 6.80*  --   --    BNP pg/mL  --  234.0*  --      Brief Urine Lab Results  (Last result in the past 365 days)      Color   Clarity   Blood   Leuk Est   Nitrite   Protein   CREAT   Urine HCG        04/07/18 0839 Yellow Clear Negative Negative Negative Negative               Microbiology Results Abnormal     Procedure Component Value - Date/Time    Blood Culture - Blood, [[id number]]  (Normal) Collected:  04/07/18 1157    Lab Status:  Preliminary result Specimen:  Blood from Arm, Right Updated:  04/11/18 1231     Blood Culture No growth at 4 days    Blood Culture - Blood, [[id number]]  (Normal) Collected:  04/07/18 1157    Lab Status:  Preliminary result Specimen:  Blood from Arm, Right Updated:  04/11/18 1231     Blood Culture No growth at 4 days          Imaging Results (all)     Procedure Component Value Units Date/Time    MRI Brain Without Contrast [[id number]] Collected:  04/08/18 0932     Updated:  04/08/18 1845    Narrative:       EXAMINATION: MRI BRAIN WO CONTRAST -  04/08/2018     INDICATION: T68.XXXA-Hypothermia, initial encounter; J81.0-Acute  pulmonary edema; E16.2-Hypoglycemia, unspecified. Confusion.       TECHNIQUE: Multiplanar MRI of the brain without intravenous contrast  administration.     COMPARISON: CT head dated 04/07/2018.     FINDINGS: No restriction on diffusion-weighted sequences to suggest  acute ischemia. Midline structures are symmetric without evidence of  mass, mass effect or midline shift. Ventricles and sulci are prominent  indicating cerebral atrophy. Moderate T2 and FLAIR hyperintense signal  abnormalities within the periventricular and deep white matter  consistent with chronic small vessel ischemic disease. Pituitary and  sella within normal limits. Cervicomedullary junction widely patent.  Globes and orbits retain normal T2 signal characteristics. Visualized  paranasal sinuses and mastoid air cells are grossly clear and  well-pneumatized. Normal signal flow voids of the distal internal  carotid and basilar arteries.          Impression:       1. No acute intracranial abnormality. Specifically, no evidence for  acute ischemia or infarction.  2. Moderate amount of chronic small vessel ischemic changes as well as  cerebral atrophy of the supratentorial white matter.      DICTATED:     04/08/2018  EDITED/ls :     04/08/2018      This report was finalized on 4/8/2018 6:42 PM by Dr. Cj Obando.       XR Chest 1 View [[id number]] Collected:  04/07/18 0909     Updated:  04/07/18 1224    Narrative:          EXAMINATION: XR CHEST, SINGLE VIEW - 04/07/2018     INDICATION: Weakness, dizziness, altered mental status.     COMPARISON: Chest x-ray 02/28/2018.     FINDINGS: Cardiac silhouette enlarged with increased central pulmonary  vascularity as well as ill-defined patchy bilateral pulmonary  opacifications within the midlungs, greatest on the right. These are  superimposed upon chronic lung changes. No pneumothorax with trace to  small right pleural effusion.  Degenerative changes of the thoracic  spine.       Impression:       Massive cardiomegaly with increased pulmonary vascularity  and pulmonary edema pattern of perihilar opacifications. Trace to small  right pleural effusion.     DICTATED:     04/07/2018  EDITED/ls :     04/07/2018      This report was finalized on 4/7/2018 12:22 PM by Dr. Cj Obando.       CT Head Without Contrast [[id number]] Collected:  04/07/18 0958     Updated:  04/07/18 1224    Narrative:       EXAMINATION: CT HEAD WO CONTRAST - 04/07/2018     INDICATION: Head trauma, mental status change.     TECHNIQUE: Axial CT of the head without intravenous contrast  administration.     The radiation dose reduction device was turned on for each scan per the  ALARA (As Low as Reasonably Achievable) protocol.     COMPARISON: CT head dated 02/28/2018.     FINDINGS: Midline structures are symmetric without evidence of mass,  mass effect or midline shift. Ventricles and sulci are mildly prominent  consistent with components of atrophy as well as mild to moderate  attenuation changes in the periventricular and deep white matter  consistent with chronic small vessel ischemic disease. No intra-axial  hemorrhage or extra-axial fluid collection. Globes and orbits are  grossly unremarkable. Visualized paranasal sinuses and mastoid air cells  are grossly clear and well-pneumatized. Calvarium intact with  hyperostosis frontalis noted.       Impression:       No acute intracranial abnormality.     DICTATED:     04/07/2018  EDITED/ls :     04/07/2018         This report was finalized on 4/7/2018 12:22 PM by Dr. Cj Obando.       CT Cervical Spine Without Contrast [[id number]] Collected:  04/07/18 1001     Updated:  04/07/18 1224    Narrative:       EXAMINATION: CT CERVICAL SPINE WO CONTRAST - 04/07/2018     INDICATION: Fall, trauma, neck pain, altered mental status.      TECHNIQUE: CT cervical spine without intravenous contrast  administration.     The radiation dose  reduction device was turned on for each scan per the  ALARA (As Low as Reasonably Achievable) protocol.     COMPARISON: CT cervical spine 11/23/2017.     FINDINGS: Sagittal datasets reveal flattening of the cervical lordotic  curvature with persistent grade 1 anterolisthesis of C4 on C5. Vertebral  body heights are preserved with intervertebral disc height space loss in  the mid and lower cervical segments, greatest at the C5-C6 and C6-C7  levels where there is degenerative disc disease. Facets are well-aligned  without evidence for discrete fracture associated. Lateral masses of C1  are well seated on C2. Dens is normal in appearance.     Axial datasets without critical spinal canal narrowing or paraspinal  hematoma. Visualized lung apices demonstrate intralobular septal  thickening consistent with edema as well as partially visualized right  pleural effusion.       Impression:       1. No evidence for acute fracture, subluxation or dislocation of the  cervical spine with background multilevel degenerative changes, similar  to prior comparison.  2. Partially imaged lung apices demonstrate intralobular septal  thickening consistent with edema pattern as well as partially visualized  right pleural effusion and/or pleural thickening.       DICTATED:     04/07/2018  EDITED/ls :     04/07/2018         This report was finalized on 4/7/2018 12:22 PM by Dr. Cj Obando.                       Results for orders placed during the hospital encounter of 04/07/18   Adult Transthoracic Echo Complete W/ Cont if Necessary Per Protocol    Narrative · Left ventricular wall thickness is consistent with mild concentric   hypertrophy.  · Left atrial cavity size is moderately dilated.  · Mild mitral valve regurgitation is present.  · Mild to moderate tricuspid valve regurgitation is present.  · There is a small (<1cm) circumferential pericardial effusion.  · Calculated right ventricular systolic pressure from tricuspid   regurgitation  is 43 mmHg.  · Left ventricular systolic function is normal. Estimated EF = 70%.  · Mildly reduced right ventricular systolic function noted.  · There is a small (<1cm) circumferential pericardial effusion.  · Mild pulmonary hypertension is present.  · The aortic valve exhibits sclerosis.           Order Current Status    Blood Culture - Blood, Preliminary result    Blood Culture - Blood, Preliminary result        Discharge Details      Mary Ramirez   Home Medication Instructions CHAZ:[id number]    Printed on:04/11/18 9567   Medication Information                      acetaminophen (TYLENOL) 325 MG tablet  Take 2 tablets by mouth Every 6 (Six) Hours As Needed for Mild Pain  or Moderate Pain .             amiodarone (PACERONE) 200 MG tablet  Take 1 tablet by mouth Daily.             amLODIPine (NORVASC) 10 MG tablet  Take 1 tablet by mouth Daily.             apixaban (ELIQUIS) 2.5 MG tablet tablet  Take 1 tablet by mouth Every 12 (Twelve) Hours.             aspirin 81 MG chewable tablet  Chew 81 mg Daily.             atorvastatin (LIPITOR) 40 MG tablet  Take 40 mg by mouth Daily.             bisacodyl (DULCOLAX) 5 MG EC tablet  Take 1 tablet by mouth Daily As Needed for Constipation.             carvedilol (COREG) 25 MG tablet  Take 25 mg by mouth 2 (Two) Times a Day With Meals.             citalopram (CeleXA) 20 MG tablet  Take 20 mg by mouth Daily.             docusate sodium 100 MG capsule  Take 100 mg by mouth 2 (Two) Times a Day.             donepezil (ARICEPT) 5 MG tablet  Take 5 mg by mouth Every Night.             isosorbide mononitrate (IMDUR) 60 MG 24 hr tablet  Take 60 mg by mouth Daily.             losartan (COZAAR) 100 MG tablet  Take 1 tablet by mouth Daily.             metFORMIN (GLUCOPHAGE) 500 MG tablet  Take 1 tablet by mouth Daily With Breakfast.             Multiple Vitamin (MULTIVITAMINS PO)  Take 1 tablet by mouth Daily.             nitroglycerin (NITROLINGUAL) 0.4 MG/SPRAY spray  Place 1  spray under the tongue Every 5 (Five) Minutes As Needed for Chest Pain.             polyethylene glycol (MIRALAX) pack packet  Take 17 g by mouth Daily.                   Discharge Disposition:  Skilled Nursing Facility (DC - External)    Discharge Diet:      Discharge Activity:       Special Instructions:  Terazosin held at discharge (had not been receiving it in the hospital with well-controlled blood pressure)-resume as needed    No future appointments.    Additional Instructions for the Follow-ups that You Need to Schedule     Discharge Follow-up with PCP    As directed      Follow Up Details:  Already scheduled for tomorrow per patient's son               Time Spent on Discharge:  56 minutes    Electronically signed by Theresa Black MD, 04/11/18, 4:13 PM.      Electronically signed by Theresa Black MD at 4/11/2018  4:41 PM

## 2018-04-11 NOTE — PLAN OF CARE
Problem: Patient Care Overview  Goal: Plan of Care Review   04/11/18 9385   Coping/Psychosocial   Plan of Care Reviewed With patient   OTHER   Outcome Summary Pt supervsion with grooming, and gave good effort with UE ther ex. Pt min A x 2 sit to stand to RW with pt not placing weight through R LE; declined to mobilize further d/t R knee pain

## 2018-04-11 NOTE — DISCHARGE SUMMARY
[facility] Medicine Services  DISCHARGE SUMMARY    Patient Name: Mary Ramirez  : 1934  MRN: [id number]    Date of Admission: 2018  Date of Discharge:  2018  Primary Care Physician: Nirmal Lundberg MD    Consults     Date and Time Order Name Status Description    2018 0030 Inpatient Neurology Consult Completed         Hospital Course     Presenting Problem:   Hypothermia, initial encounter [T68.XXXA]    Active Hospital Problems (** Indicates Principal Problem)    Diagnosis Date Noted   • **Hypoglycemia [E16.2] 2018   • A-fib [I48.91] 2018   • Anticoagulated [Z79.01] 2018   • Altered mental state [R41.82] 2018   • SSS (sick sinus syndrome) [I49.5] 2018   • Unresponsive episode [R41.89] 2018   • Hypothermia [T68.XXXA] 2018   • Dementia [F03.90] 2018   • Type 2 diabetes mellitus with complication, with long-term current use of insulin [E11.8, Z79.4] 2017   • Coronary artery disease involving native coronary artery of native heart without angina pectoris [I25.10] 2017      Resolved Hospital Problems    Diagnosis Date Noted Date Resolved   No resolved problems to display.          Hospital Course:  Mary Ramirez is a 83 y.o. female with Afib on Chronic Eliquis/amiodarone, SSS/bradycardia, CAD, HTN, Dementia, diabetes who was found unresponsive on the floor of her assisted living facility with blood glucose of 50 requiring multiple doses of IV dextrose by EMS.  She was noted to be hypothermic upon arrival to the ED and bradycardic but these parameters as well as her mental status improved with bear hugger.  She was initially started on broad spectrum antibiotics but infectious workup ultimately negative without evidence of pneumonia or UTI, and negative blood cultures, no further signs of infection such as temperature instability so antibiotics were discontinued and she remained stable off antibiotics.  CT  "head and C spine on admission showed no acute abnormality or fracture.  She was evaluated by neurology due to concern for seizure prior to being found down and no EEG or antiepileptic drugs were recommended as seizure would have been in the setting of hypoglycemia.  Her insulin was discontinued and she was started back on metformin at a dose of 500mg daily with breakfast with adequate control of blood sugar.  In talking with her son, it seems this may have been discontinued during a hospitalization at  when her medications got \"messed up\" because all of her records are at [facility].  He did already schedule follow up with her primary care provider tomorrow.  She developed some pain and warmth in her right knee on the day of discharge consistent with gout which patient and her son report she has battled for a long time which improved with colchicine.  It does not appear that she has been on preventive medication for gout recently, but son reports she may have been at some point in the past.  Will defer to PCP at follow up to consider restarting once acute flare has resolved.  She was evaluated by physical therapy and inpatient rehab recommended so she will go to the [facility] rehab unit upon discharge for rehab prior to returning to the assisted living section.         Day of Discharge     HPI: Right knee pain today that became worse yesterday afternoon.  Improved some with colchicine.  States she has had gout \"up and down that leg\" for a long time and this is similar to her gout flares but she is not sure what she has taken before for gout.    Review of Systems  Gen- No fevers, chills  CV- No chest pain, palpitations  Resp- No cough, dyspnea  GI- No N/V/D, abd pain    Otherwise ROS is negative except as mentioned in the HPI.    Vital Signs:   Temp:  [98.7 °F (37.1 °C)-98.8 °F (37.1 °C)] 98.7 °F (37.1 °C)  Heart Rate:  [] 108  Resp:  [16-17] 16  BP: (135-148)/(65-71) 135/71     Physical " Exam:  Constitutional: No acute distress, awake, alert, sitting up in chair  HENT: NCAT, mucous membranes moist  Respiratory: Clear to auscultation bilaterally, respiratory effort normal   Cardiovascular: RRR, no murmurs, rubs, or gallops  Gastrointestinal: Positive bowel sounds, soft, nontender, nondistended  Musculoskeletal: No bilateral ankle edema, right knee swollen, mild erythema and warmth in the morning which resolved later in the day after colchicine  Psychiatric: Appropriate affect, cooperative  Neurologic: Cranial Nerves grossly intact to confrontation, speech clear  Skin: No rashes    Pertinent  and/or Most Recent Results       Results from last 7 days  Lab Units 04/10/18  0600 04/08/18  0448 04/07/18  0921   WBC 10*3/mm3  --  8.31 7.36   HEMOGLOBIN g/dL  --  11.2* 11.2*   HEMATOCRIT %  --  37.2 37.3   PLATELETS 10*3/mm3  --  286 256   SODIUM mmol/L 136 137 136   POTASSIUM mmol/L 3.2* 3.5 3.7   CHLORIDE mmol/L 106 102 103   CO2 mmol/L 25.0 25.0 26.0   BUN mg/dL 15 15 22   CREATININE mg/dL 1.10 1.00 1.10   GLUCOSE mg/dL 150* 170* 181*   CALCIUM mg/dL 8.5* 8.8 9.0       Results from last 7 days  Lab Units 04/07/18  0921   BILIRUBIN mg/dL 0.6   ALK PHOS U/L 105*   ALT (SGPT) U/L 21   AST (SGOT) U/L 26           Invalid input(s): TG, LDLCALC, LDLREALC    Results from last 7 days  Lab Units 04/08/18  0448 04/07/18  1149 04/07/18  0921   TSH mIU/mL  --   --  3.546   HEMOGLOBIN A1C % 6.80*  --   --    BNP pg/mL  --  234.0*  --      Brief Urine Lab Results  (Last result in the past 365 days)      Color   Clarity   Blood   Leuk Est   Nitrite   Protein   CREAT   Urine HCG        04/07/18 0839 Yellow Clear Negative Negative Negative Negative               Microbiology Results Abnormal     Procedure Component Value - Date/Time    Blood Culture - Blood, [[id number]]  (Normal) Collected:  04/07/18 1157    Lab Status:  Preliminary result Specimen:  Blood from Arm, Right Updated:  04/11/18 1231     Blood Culture No  growth at 4 days    Blood Culture - Blood, [[id number]]  (Normal) Collected:  04/07/18 1157    Lab Status:  Preliminary result Specimen:  Blood from Arm, Right Updated:  04/11/18 1231     Blood Culture No growth at 4 days          Imaging Results (all)     Procedure Component Value Units Date/Time    MRI Brain Without Contrast [[id number]] Collected:  04/08/18 0932     Updated:  04/08/18 1845    Narrative:       EXAMINATION: MRI BRAIN WO CONTRAST - 04/08/2018     INDICATION: T68.XXXA-Hypothermia, initial encounter; J81.0-Acute  pulmonary edema; E16.2-Hypoglycemia, unspecified. Confusion.       TECHNIQUE: Multiplanar MRI of the brain without intravenous contrast  administration.     COMPARISON: CT head dated 04/07/2018.     FINDINGS: No restriction on diffusion-weighted sequences to suggest  acute ischemia. Midline structures are symmetric without evidence of  mass, mass effect or midline shift. Ventricles and sulci are prominent  indicating cerebral atrophy. Moderate T2 and FLAIR hyperintense signal  abnormalities within the periventricular and deep white matter  consistent with chronic small vessel ischemic disease. Pituitary and  sella within normal limits. Cervicomedullary junction widely patent.  Globes and orbits retain normal T2 signal characteristics. Visualized  paranasal sinuses and mastoid air cells are grossly clear and  well-pneumatized. Normal signal flow voids of the distal internal  carotid and basilar arteries.          Impression:       1. No acute intracranial abnormality. Specifically, no evidence for  acute ischemia or infarction.  2. Moderate amount of chronic small vessel ischemic changes as well as  cerebral atrophy of the supratentorial white matter.      DICTATED:     04/08/2018  EDITED/ls :     04/08/2018      This report was finalized on 4/8/2018 6:42 PM by Dr. Cj Obando.       XR Chest 1 View [[id number]] Collected:  04/07/18 0909     Updated:  04/07/18 1224    Narrative:           EXAMINATION: XR CHEST, SINGLE VIEW - 04/07/2018     INDICATION: Weakness, dizziness, altered mental status.     COMPARISON: Chest x-ray 02/28/2018.     FINDINGS: Cardiac silhouette enlarged with increased central pulmonary  vascularity as well as ill-defined patchy bilateral pulmonary  opacifications within the midlungs, greatest on the right. These are  superimposed upon chronic lung changes. No pneumothorax with trace to  small right pleural effusion. Degenerative changes of the thoracic  spine.       Impression:       Massive cardiomegaly with increased pulmonary vascularity  and pulmonary edema pattern of perihilar opacifications. Trace to small  right pleural effusion.     DICTATED:     04/07/2018  EDITED/ls :     04/07/2018      This report was finalized on 4/7/2018 12:22 PM by Dr. Cj Obando.       CT Head Without Contrast [[id number]] Collected:  04/07/18 0958     Updated:  04/07/18 1224    Narrative:       EXAMINATION: CT HEAD WO CONTRAST - 04/07/2018     INDICATION: Head trauma, mental status change.     TECHNIQUE: Axial CT of the head without intravenous contrast  administration.     The radiation dose reduction device was turned on for each scan per the  ALARA (As Low as Reasonably Achievable) protocol.     COMPARISON: CT head dated 02/28/2018.     FINDINGS: Midline structures are symmetric without evidence of mass,  mass effect or midline shift. Ventricles and sulci are mildly prominent  consistent with components of atrophy as well as mild to moderate  attenuation changes in the periventricular and deep white matter  consistent with chronic small vessel ischemic disease. No intra-axial  hemorrhage or extra-axial fluid collection. Globes and orbits are  grossly unremarkable. Visualized paranasal sinuses and mastoid air cells  are grossly clear and well-pneumatized. Calvarium intact with  hyperostosis frontalis noted.       Impression:       No acute intracranial abnormality.     DICTATED:      04/07/2018  EDITED/ls :     04/07/2018         This report was finalized on 4/7/2018 12:22 PM by Dr. Cj Obando.       CT Cervical Spine Without Contrast [[id number]] Collected:  04/07/18 1001     Updated:  04/07/18 1224    Narrative:       EXAMINATION: CT CERVICAL SPINE WO CONTRAST - 04/07/2018     INDICATION: Fall, trauma, neck pain, altered mental status.      TECHNIQUE: CT cervical spine without intravenous contrast  administration.     The radiation dose reduction device was turned on for each scan per the  ALARA (As Low as Reasonably Achievable) protocol.     COMPARISON: CT cervical spine 11/23/2017.     FINDINGS: Sagittal datasets reveal flattening of the cervical lordotic  curvature with persistent grade 1 anterolisthesis of C4 on C5. Vertebral  body heights are preserved with intervertebral disc height space loss in  the mid and lower cervical segments, greatest at the C5-C6 and C6-C7  levels where there is degenerative disc disease. Facets are well-aligned  without evidence for discrete fracture associated. Lateral masses of C1  are well seated on C2. Dens is normal in appearance.     Axial datasets without critical spinal canal narrowing or paraspinal  hematoma. Visualized lung apices demonstrate intralobular septal  thickening consistent with edema as well as partially visualized right  pleural effusion.       Impression:       1. No evidence for acute fracture, subluxation or dislocation of the  cervical spine with background multilevel degenerative changes, similar  to prior comparison.  2. Partially imaged lung apices demonstrate intralobular septal  thickening consistent with edema pattern as well as partially visualized  right pleural effusion and/or pleural thickening.       DICTATED:     04/07/2018  EDITED/ls :     04/07/2018         This report was finalized on 4/7/2018 12:22 PM by Dr. Cj Obando.                       Results for orders placed during the hospital encounter of 04/07/18   Adult  Transthoracic Echo Complete W/ Cont if Necessary Per Protocol    Narrative · Left ventricular wall thickness is consistent with mild concentric   hypertrophy.  · Left atrial cavity size is moderately dilated.  · Mild mitral valve regurgitation is present.  · Mild to moderate tricuspid valve regurgitation is present.  · There is a small (<1cm) circumferential pericardial effusion.  · Calculated right ventricular systolic pressure from tricuspid   regurgitation is 43 mmHg.  · Left ventricular systolic function is normal. Estimated EF = 70%.  · Mildly reduced right ventricular systolic function noted.  · There is a small (<1cm) circumferential pericardial effusion.  · Mild pulmonary hypertension is present.  · The aortic valve exhibits sclerosis.           Order Current Status    Blood Culture - Blood, Preliminary result    Blood Culture - Blood, Preliminary result        Discharge Details      Mary Ramirez   Home Medication Instructions CHAZ:[id number]    Printed on:04/11/18 5405   Medication Information                      acetaminophen (TYLENOL) 325 MG tablet  Take 2 tablets by mouth Every 6 (Six) Hours As Needed for Mild Pain  or Moderate Pain .             amiodarone (PACERONE) 200 MG tablet  Take 1 tablet by mouth Daily.             amLODIPine (NORVASC) 10 MG tablet  Take 1 tablet by mouth Daily.             apixaban (ELIQUIS) 2.5 MG tablet tablet  Take 1 tablet by mouth Every 12 (Twelve) Hours.             aspirin 81 MG chewable tablet  Chew 81 mg Daily.             atorvastatin (LIPITOR) 40 MG tablet  Take 40 mg by mouth Daily.             bisacodyl (DULCOLAX) 5 MG EC tablet  Take 1 tablet by mouth Daily As Needed for Constipation.             carvedilol (COREG) 25 MG tablet  Take 25 mg by mouth 2 (Two) Times a Day With Meals.             citalopram (CeleXA) 20 MG tablet  Take 20 mg by mouth Daily.             docusate sodium 100 MG capsule  Take 100 mg by mouth 2 (Two) Times a Day.             donepezil  (ARICEPT) 5 MG tablet  Take 5 mg by mouth Every Night.             isosorbide mononitrate (IMDUR) 60 MG 24 hr tablet  Take 60 mg by mouth Daily.             losartan (COZAAR) 100 MG tablet  Take 1 tablet by mouth Daily.             metFORMIN (GLUCOPHAGE) 500 MG tablet  Take 1 tablet by mouth Daily With Breakfast.             Multiple Vitamin (MULTIVITAMINS PO)  Take 1 tablet by mouth Daily.             nitroglycerin (NITROLINGUAL) 0.4 MG/SPRAY spray  Place 1 spray under the tongue Every 5 (Five) Minutes As Needed for Chest Pain.             polyethylene glycol (MIRALAX) pack packet  Take 17 g by mouth Daily.                   Discharge Disposition:  Skilled Nursing Facility (DC - External)    Discharge Diet:      Discharge Activity:       Special Instructions:  Terazosin held at discharge (had not been receiving it in the hospital with well-controlled blood pressure)-resume as needed    No future appointments.    Additional Instructions for the Follow-ups that You Need to Schedule     Discharge Follow-up with PCP    As directed      Follow Up Details:  Already scheduled for tomorrow per patient's son               Time Spent on Discharge:  56 minutes    Electronically signed by Theresa Black MD, 04/11/18, 4:13 PM.

## 2018-04-12 LAB
BACTERIA SPEC AEROBE CULT: NORMAL
BACTERIA SPEC AEROBE CULT: NORMAL

## 2018-04-12 NOTE — PROGRESS NOTES
Continued Stay Note  [facility]     Patient Name: Mary Ramirez  MRN: [id number]  Today's Date: 4/12/2018    Admit Date: 4/7/2018    Consent obtained for the participation in the [facility] Transitions Program. Neha Kang RN        Discharge Plan    No documentation.             Discharge Codes    No documentation.       Expected Discharge Date and Time     Expected Discharge Date Expected Discharge Time    Apr 11, 2018             Neha Kang RN

## 2018-05-07 ENCOUNTER — APPOINTMENT (OUTPATIENT)
Dept: GENERAL RADIOLOGY | Facility: HOSPITAL | Age: 83
End: 2018-05-07

## 2018-05-07 ENCOUNTER — HOSPITAL ENCOUNTER (EMERGENCY)
Facility: HOSPITAL | Age: 83
Discharge: HOME | End: 2018-05-07
Attending: EMERGENCY MEDICINE | Admitting: EMERGENCY MEDICINE

## 2018-05-07 VITALS
TEMPERATURE: 98.5 F | RESPIRATION RATE: 16 BRPM | WEIGHT: 115 LBS | BODY MASS INDEX: 24.14 KG/M2 | SYSTOLIC BLOOD PRESSURE: 116 MMHG | HEART RATE: 50 BPM | HEIGHT: 58 IN | OXYGEN SATURATION: 94 % | DIASTOLIC BLOOD PRESSURE: 90 MMHG
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
## 2018-05-07 DIAGNOSIS — S52.592A OTHER CLOSED FRACTURE OF DISTAL END OF LEFT RADIUS, INITIAL ENCOUNTER: Primary | ICD-10-CM

## 2018-05-07 PROCEDURE — 73070 X-RAY EXAM OF ELBOW: CPT

## 2018-05-07 PROCEDURE — 73110 X-RAY EXAM OF WRIST: CPT

## 2018-05-07 PROCEDURE — 99284 EMERGENCY DEPT VISIT MOD MDM: CPT

## 2018-05-07 RX ORDER — ONDANSETRON 4 MG/1
4 TABLET, ORALLY DISINTEGRATING ORAL ONCE
Status: COMPLETED | OUTPATIENT
Start: 2018-05-07 | End: 2018-05-07

## 2018-05-07 RX ORDER — HYDROCODONE BITARTRATE AND ACETAMINOPHEN 5; 325 MG/1; MG/1
1 TABLET ORAL ONCE
Status: COMPLETED | OUTPATIENT
Start: 2018-05-07 | End: 2018-05-07

## 2018-05-07 RX ORDER — HYDROCODONE BITARTRATE AND ACETAMINOPHEN 5; 325 MG/1; MG/1
1 TABLET ORAL EVERY 6 HOURS PRN
Qty: 15 TABLET | Refills: 0 | Status: SHIPPED | OUTPATIENT
Start: 2018-05-07 | End: 2018-09-06 | Stop reason: HOSPADM

## 2018-05-07 RX ADMIN — HYDROCODONE BITARTRATE AND ACETAMINOPHEN 1 TABLET: 5; 325 TABLET ORAL at 13:08

## 2018-05-07 RX ADMIN — ONDANSETRON 4 MG: 4 TABLET, ORALLY DISINTEGRATING ORAL at 13:08

## 2018-05-07 NOTE — ED PROVIDER NOTES
"Subjective   Pt is a 82 yo female presenting to ED with left wrist pain after a fall. She lives at the [facility] and fell while trying to make her bed this morning tripping over the bed skirt. She is having left wrist swelling but denies numbness or tingling to left wrist. She denies pain in left shoulder but has some mild discomfort when moving her left elbow. She denies hitting her head, LOC, neck pain or back pain. She has no other complaints. She took 650 of Tylenol PTA and states it has helped with the pain. She has significant hx for Afib(Eliquis), CHF, CAD, CKD, DM, SSS, CKD, Dementia, and HTN.         History provided by:  Patient and EMS personnel  Fall   Mechanism of injury: fall    Injury location:  Shoulder/arm  Shoulder/arm injury location:  L wrist  Incident location:  [facility]  Time since incident:  3 hours  Fall:     Fall occurred: Trying to make bed   Associated symptoms: no back pain, no loss of consciousness and no neck pain    Risk factors: anticoagulation therapy        Review of Systems   Musculoskeletal: Positive for arthralgias (Left wrist ). Negative for back pain and neck pain.   Neurological: Negative for loss of consciousness.   All other systems reviewed and are negative.      Past Medical History:   Diagnosis Date   • Atrial fibrillation    • CAD (coronary artery disease)    • CHF (congestive heart failure)    • CKD (chronic kidney disease) stage 3, GFR 30-59 ml/min    • DDD (degenerative disc disease), cervical    • Dementia    • Diabetes mellitus    • Hypertension    • SSS (sick sinus syndrome)        Allergies   Allergen Reactions   • Penicillins Other (See Comments)     Pt states \"i don't know\"       Past Surgical History:   Procedure Laterality Date   • ANKLE SURGERY Left 1996   • ATHRECTOMY ILIAC, FEMORAL, TIBIAL ARTERY     • BREAST LUMPECTOMY Left 1981   • COLON SURGERY     • CORONARY STENT PLACEMENT     • FINGER FRACTURE SURGERY Right    • HIP TROCANTERIC NAILING WITH " INTRAMEDULLARY HIP SCREW Left 11/23/2017   • HYSTERECTOMY         Family History   Problem Relation Age of Onset   • No Known Problems Mother    • No Known Problems Father        Social History     Social History   • Marital status:      Spouse name: N/A   • Number of children: 2   • Years of education: H.S     Occupational History   •  Retired     Social History Main Topics   • Smoking status: Never Smoker   • Smokeless tobacco: Never Used   • Alcohol use No   • Drug use: No   • Sexual activity: No     Other Topics Concern   • Not on file           Objective   Physical Exam   Constitutional: She is oriented to person, place, and time. She appears well-developed and well-nourished.   HENT:   Head: Atraumatic.   Mouth/Throat: Oropharynx is clear and moist.   Eyes: Conjunctivae are normal.   Neck: Normal range of motion.   Cardiovascular: Normal rate and intact distal pulses.    Pulmonary/Chest: Effort normal. No respiratory distress.   Musculoskeletal:        Left shoulder: She exhibits normal range of motion, no tenderness, no swelling and no deformity.        Left elbow: She exhibits normal range of motion and no deformity. No tenderness found.        Left wrist: She exhibits decreased range of motion, tenderness, swelling and deformity.   Neurological: She is alert and oriented to person, place, and time. No sensory deficit.   Skin: Skin is warm.   Psychiatric: She has a normal mood and affect.   Nursing note and vitals reviewed.      Splint - Cast - Strapping  Date/Time: 5/7/2018 12:42 PM  Performed by: SUSANNE MCKENNA  Authorized by: DOMENICO ALVARENGA     Consent:     Consent obtained:  Verbal    Consent given by:  Patient    Risks discussed:  Discoloration, numbness, pain and swelling  Pre-procedure details:     Sensation:  Normal  Procedure details:     Laterality:  Left    Location:  Wrist    Wrist:  L wrist    Splint type:  Sugar tong    Supplies:  Ortho-Glass  Post-procedure details:      "Pain:  Improved    Sensation:  Normal    Skin color:  Pink     Patient tolerance of procedure:  Tolerated well, no immediate complications               ED Course  ED Course      Consulted Ortho on call Dr. Fernandez. He is agreeable with splint and outpatient f/u.     Re-examined patient several times in ED. Pt resting comfortably, declined pain meds. Ice applied. Discussed results and tx plan. Will splint and have her f/u outpatient with Ortho. Short course of Norco will be Rx for pain control.     JESSIE query complete. Treatment plan to include limited course of prescribed  controlled substance. Risks including addiction, benefits, and alternatives presented to patient.       No results found for this or any previous visit (from the past 24 hour(s)).  Note: In addition to lab results from this visit, the labs listed above may include labs taken at another facility or during a different encounter within the last 24 hours. Please correlate lab times with ED admission and discharge times for further clarification of the services performed during this visit.    XR Elbow 2 View Left   Preliminary Result   No acute bony abnormality.       D:  05/07/2018   E:  05/07/2018                  XR Wrist 3+ View Left   Preliminary Result   Comminuted fracture seen of distal radius with impaction of   the fracture fragments. Carpal bones are intact.       D:  05/07/2018   E:  05/07/2018                Vitals:    05/07/18 1106 05/07/18 1111   BP: 149/69    Patient Position: Lying    Pulse: 53    Resp: 16    Temp:  98.5 °F (36.9 °C)   SpO2: 98%    Weight: 52.2 kg (115 lb)    Height: 147.3 cm (58\")      Medications - No data to display                   MDM      Final diagnoses:   Other closed fracture of distal end of left radius, initial encounter   Fall, initial encounter            MARI Angel  05/07/18 2301    "

## 2018-08-21 ENCOUNTER — APPOINTMENT (OUTPATIENT)
Dept: GENERAL RADIOLOGY | Facility: HOSPITAL | Age: 83
End: 2018-08-21

## 2018-08-21 ENCOUNTER — HOSPITAL ENCOUNTER (EMERGENCY)
Facility: HOSPITAL | Age: 83
Discharge: HOME | End: 2018-08-21
Attending: EMERGENCY MEDICINE | Admitting: EMERGENCY MEDICINE

## 2018-08-21 ENCOUNTER — APPOINTMENT (OUTPATIENT)
Dept: CT IMAGING | Facility: HOSPITAL | Age: 83
End: 2018-08-21

## 2018-08-21 VITALS
HEIGHT: 63 IN | BODY MASS INDEX: 21.26 KG/M2 | RESPIRATION RATE: 20 BRPM | DIASTOLIC BLOOD PRESSURE: 79 MMHG | OXYGEN SATURATION: 97 % | HEART RATE: 61 BPM | WEIGHT: 120 LBS | SYSTOLIC BLOOD PRESSURE: 148 MMHG | TEMPERATURE: 98.4 F

## 2018-08-21 DIAGNOSIS — R93.89 ABNORMAL CHEST X-RAY: ICD-10-CM

## 2018-08-21 DIAGNOSIS — D72.829 LEUKOCYTOSIS, UNSPECIFIED TYPE: ICD-10-CM

## 2018-08-21 DIAGNOSIS — R11.2 NON-INTRACTABLE VOMITING WITH NAUSEA, UNSPECIFIED VOMITING TYPE: Primary | ICD-10-CM

## 2018-08-21 DIAGNOSIS — R10.9 LEFT SIDED ABDOMINAL PAIN: ICD-10-CM

## 2018-08-21 LAB
ALBUMIN SERPL-MCNC: 4.15 G/DL (ref 3.2–4.8)
ALBUMIN/GLOB SERPL: 1.4 G/DL (ref 1.5–2.5)
ALP SERPL-CCNC: 107 U/L (ref 25–100)
ALT SERPL W P-5'-P-CCNC: 42 U/L (ref 7–40)
ANION GAP SERPL CALCULATED.3IONS-SCNC: 10 MMOL/L (ref 3–11)
AST SERPL-CCNC: 40 U/L (ref 0–33)
BASOPHILS # BLD AUTO: 0.02 10*3/MM3 (ref 0–0.2)
BASOPHILS NFR BLD AUTO: 0.1 % (ref 0–1)
BILIRUB SERPL-MCNC: 0.3 MG/DL (ref 0.3–1.2)
BUN BLD-MCNC: 32 MG/DL (ref 9–23)
BUN/CREAT SERPL: 22.2 (ref 7–25)
CALCIUM SPEC-SCNC: 9 MG/DL (ref 8.7–10.4)
CHLORIDE SERPL-SCNC: 103 MMOL/L (ref 99–109)
CO2 SERPL-SCNC: 25 MMOL/L (ref 20–31)
CREAT BLD-MCNC: 1.44 MG/DL (ref 0.6–1.3)
DEPRECATED RDW RBC AUTO: 53.7 FL (ref 37–54)
EOSINOPHIL # BLD AUTO: 0.12 10*3/MM3 (ref 0–0.3)
EOSINOPHIL NFR BLD AUTO: 0.7 % (ref 0–3)
ERYTHROCYTE [DISTWIDTH] IN BLOOD BY AUTOMATED COUNT: 17.3 % (ref 11.3–14.5)
GFR SERPL CREATININE-BSD FRML MDRD: 35 ML/MIN/1.73
GLOBULIN UR ELPH-MCNC: 3.1 GM/DL
GLUCOSE BLD-MCNC: 128 MG/DL (ref 70–100)
HCT VFR BLD AUTO: 42.2 % (ref 34.5–44)
HGB BLD-MCNC: 13.1 G/DL (ref 11.5–15.5)
IMM GRANULOCYTES # BLD: 0.04 10*3/MM3 (ref 0–0.03)
IMM GRANULOCYTES NFR BLD: 0.2 % (ref 0–0.6)
LYMPHOCYTES # BLD AUTO: 1.06 10*3/MM3 (ref 0.6–4.8)
LYMPHOCYTES NFR BLD AUTO: 6.6 % (ref 24–44)
MCH RBC QN AUTO: 26.5 PG (ref 27–31)
MCHC RBC AUTO-ENTMCNC: 31 G/DL (ref 32–36)
MCV RBC AUTO: 85.3 FL (ref 80–99)
MONOCYTES # BLD AUTO: 1.71 10*3/MM3 (ref 0–1)
MONOCYTES NFR BLD AUTO: 10.7 % (ref 0–12)
NEUTROPHILS # BLD AUTO: 13.07 10*3/MM3 (ref 1.5–8.3)
NEUTROPHILS NFR BLD AUTO: 81.7 % (ref 41–71)
PLATELET # BLD AUTO: 221 10*3/MM3 (ref 150–450)
PMV BLD AUTO: 11.4 FL (ref 6–12)
POTASSIUM BLD-SCNC: 4.5 MMOL/L (ref 3.5–5.5)
PROT SERPL-MCNC: 7.2 G/DL (ref 5.7–8.2)
RBC # BLD AUTO: 4.95 10*6/MM3 (ref 3.89–5.14)
SODIUM BLD-SCNC: 138 MMOL/L (ref 132–146)
WBC NRBC COR # BLD: 16.02 10*3/MM3 (ref 3.5–10.8)

## 2018-08-21 PROCEDURE — 71045 X-RAY EXAM CHEST 1 VIEW: CPT

## 2018-08-21 PROCEDURE — 99284 EMERGENCY DEPT VISIT MOD MDM: CPT

## 2018-08-21 PROCEDURE — 85025 COMPLETE CBC W/AUTO DIFF WBC: CPT | Performed by: EMERGENCY MEDICINE

## 2018-08-21 PROCEDURE — 74176 CT ABD & PELVIS W/O CONTRAST: CPT

## 2018-08-21 PROCEDURE — 80053 COMPREHEN METABOLIC PANEL: CPT | Performed by: EMERGENCY MEDICINE

## 2018-08-21 PROCEDURE — 93005 ELECTROCARDIOGRAM TRACING: CPT | Performed by: EMERGENCY MEDICINE

## 2018-08-21 RX ORDER — ONDANSETRON 2 MG/ML
4 INJECTION INTRAMUSCULAR; INTRAVENOUS ONCE
Status: DISCONTINUED | OUTPATIENT
Start: 2018-08-21 | End: 2018-08-22 | Stop reason: HOSPADM

## 2018-08-21 NOTE — ED PROVIDER NOTES
Subjective   Mary Ramirez is a 83 y.o.female who presents to the ED with c/o nausea with onset today. She was washing her face and putting her make up on 2 days ago when she suddenly became near-syncopal. She dropped her make up then walked to her bed. She did not experience full syncope and did not fall. She experienced light-headedness but denies headache, room spinning dizziness and does not know if she was diaphoretic. She slept the rest of the day and stayed in bed most of yesterday. She did eat and drink well yesterday. She did not experience any nausea, vomiting, dizziness, syncope, light-headedness yesterday but did not feel at her baseline. This morning she woke up feeling well. She lives at [facility] and walked down to the cafeteria to eat at 1200. She started to eat then suddenly developed nausea. She walked back to her room with her walker but felt off balance. She vomited upon returning to her room then experienced room spinning dizziness but denies near syncope. She states she took her medication then felt improved. She still feels nauseated but denies any abdominal pain, vomiting, diarrhea or dizziness. She denies any chronic nausea or abdominal pain. She has experienced post nasal trip and sinus pressure but denies fevers, chills, cough, tremors or any other complaints at this time. She denies any sick contacts. She has a h/o HTN and DM2 on diet control, metformin and insulin PRN. She denies a h/o diverticulitis.          History provided by:  Patient  Vomiting   The primary symptoms include nausea and vomiting. Primary symptoms do not include fever, abdominal pain, diarrhea or dysuria. The illness began today. The onset was sudden. The problem has been gradually improving.   The illness does not include chills. Associated medical issues do not include diverticulitis.       Review of Systems   Constitutional: Negative for chills, diaphoresis and fever.   HENT: Positive for postnasal drip and  "sinus pressure.    Respiratory: Negative for cough and shortness of breath.    Cardiovascular: Negative for chest pain, palpitations and leg swelling.   Gastrointestinal: Positive for nausea and vomiting. Negative for abdominal pain and diarrhea.   Genitourinary: Negative for dysuria.   Neurological: Positive for dizziness and light-headedness. Negative for tremors and syncope.   All other systems reviewed and are negative.      Past Medical History:   Diagnosis Date   • Atrial fibrillation (CMS/[facility])    • CAD (coronary artery disease)    • CHF (congestive heart failure) (CMS/[facility])    • CKD (chronic kidney disease) stage 3, GFR 30-59 ml/min    • DDD (degenerative disc disease), cervical    • Dementia    • Diabetes mellitus (CMS/[facility])    • Hypertension    • SSS (sick sinus syndrome) (CMS/[facility])        Allergies   Allergen Reactions   • Penicillins Other (See Comments)     Pt states \"i don't know\"       Past Surgical History:   Procedure Laterality Date   • ANKLE SURGERY Left 1996   • ATHRECTOMY ILIAC, FEMORAL, TIBIAL ARTERY     • BREAST LUMPECTOMY Left 1981   • COLON SURGERY     • CORONARY STENT PLACEMENT     • FINGER FRACTURE SURGERY Right    • HIP TROCANTERIC NAILING WITH INTRAMEDULLARY HIP SCREW Left 11/23/2017   • HYSTERECTOMY         Family History   Problem Relation Age of Onset   • No Known Problems Mother    • No Known Problems Father        Social History     Social History   • Marital status:      Spouse name: N/A   • Number of children: 2   • Years of education: H.S     Occupational History   •  Retired     Social History Main Topics   • Smoking status: Never Smoker   • Smokeless tobacco: Never Used   • Alcohol use No   • Drug use: No   • Sexual activity: No     Other Topics Concern   • Not on file         Objective   Physical Exam   Constitutional: She is oriented to person, place, and time. She appears well-developed and well-nourished. No distress.   HENT:   Head: Normocephalic and " atraumatic.   Right Ear: External ear normal.   Left Ear: External ear normal.   Nose: Nose normal.   Mouth/Throat: Oropharynx is clear and moist.   Benign.   Airway patent.    Eyes: Pupils are equal, round, and reactive to light. Conjunctivae and EOM are normal. No scleral icterus.   No nystagmus.    Neck: Normal range of motion. Neck supple.   Cardiovascular: Normal rate, regular rhythm and normal heart sounds.    No murmur heard.  Pulmonary/Chest: Effort normal and breath sounds normal. No respiratory distress. She has no wheezes. She has no rales.   Abdominal: Soft. Bowel sounds are normal. She exhibits no distension. There is tenderness. There is no rebound and no guarding.   Tender all along left abdomen.    Musculoskeletal: Normal range of motion. She exhibits no edema or tenderness.   Neurological: She is alert and oriented to person, place, and time.   Skin: Skin is warm and dry. She is not diaphoretic.   Psychiatric: She has a normal mood and affect. Her behavior is normal.   Nursing note and vitals reviewed.      Procedures         ED Course  ED Course as of Aug 21 2327   Tue Aug 21, 2018   2121 I have reviewed several admissions and imaging reports.  Persistent pleural effusions.  Pt not aware of CHF diagnoses.  She continues to have clear lungs, no respiratory distress, no cough, no fever, and good oxygen sats, so pneumonia not clinically likely.  Discussed with Dr. YENY Hernandez who will arrange for office follow-up tomorrow.  Her stomach feels fine; she feels back to normal.  In addition, her son now tells me that she will periodically have episodes where something disagrees with her stomach and she will vomit.  [LI]   2126 Nothing on exam or labs suggests a reason for her near-syncope two days ago.  [LI]      ED Course User Index  [LI] Callum Guevara MD     Recent Results (from the past 24 hour(s))   Comprehensive Metabolic Panel    Collection Time: 08/21/18  6:31 PM   Result Value Ref Range     Glucose 128 (H) 70 - 100 mg/dL    BUN 32 (H) 9 - 23 mg/dL    Creatinine 1.44 (H) 0.60 - 1.30 mg/dL    Sodium 138 132 - 146 mmol/L    Potassium 4.5 3.5 - 5.5 mmol/L    Chloride 103 99 - 109 mmol/L    CO2 25.0 20.0 - 31.0 mmol/L    Calcium 9.0 8.7 - 10.4 mg/dL    Total Protein 7.2 5.7 - 8.2 g/dL    Albumin 4.15 3.20 - 4.80 g/dL    ALT (SGPT) 42 (H) 7 - 40 U/L    AST (SGOT) 40 (H) 0 - 33 U/L    Alkaline Phosphatase 107 (H) 25 - 100 U/L    Total Bilirubin 0.3 0.3 - 1.2 mg/dL    eGFR Non African Amer 35 (L) >60 mL/min/1.73    Globulin 3.1 gm/dL    A/G Ratio 1.4 (L) 1.5 - 2.5 g/dL    BUN/Creatinine Ratio 22.2 7.0 - 25.0    Anion Gap 10.0 3.0 - 11.0 mmol/L   CBC Auto Differential    Collection Time: 08/21/18  6:31 PM   Result Value Ref Range    WBC 16.02 (H) 3.50 - 10.80 10*3/mm3    RBC 4.95 3.89 - 5.14 10*6/mm3    Hemoglobin 13.1 11.5 - 15.5 g/dL    Hematocrit 42.2 34.5 - 44.0 %    MCV 85.3 80.0 - 99.0 fL    MCH 26.5 (L) 27.0 - 31.0 pg    MCHC 31.0 (L) 32.0 - 36.0 g/dL    RDW 17.3 (H) 11.3 - 14.5 %    RDW-SD 53.7 37.0 - 54.0 fl    MPV 11.4 6.0 - 12.0 fL    Platelets 221 150 - 450 10*3/mm3    Neutrophil % 81.7 (H) 41.0 - 71.0 %    Lymphocyte % 6.6 (L) 24.0 - 44.0 %    Monocyte % 10.7 0.0 - 12.0 %    Eosinophil % 0.7 0.0 - 3.0 %    Basophil % 0.1 0.0 - 1.0 %    Immature Grans % 0.2 0.0 - 0.6 %    Neutrophils, Absolute 13.07 (H) 1.50 - 8.30 10*3/mm3    Lymphocytes, Absolute 1.06 0.60 - 4.80 10*3/mm3    Monocytes, Absolute 1.71 (H) 0.00 - 1.00 10*3/mm3    Eosinophils, Absolute 0.12 0.00 - 0.30 10*3/mm3    Basophils, Absolute 0.02 0.00 - 0.20 10*3/mm3    Immature Grans, Absolute 0.04 (H) 0.00 - 0.03 10*3/mm3     Note: In addition to lab results from this visit, the labs listed above may include labs taken at another facility or during a different encounter within the last 24 hours. Please correlate lab times with ED admission and discharge times for further clarification of the services performed during this visit.    XR Chest  1 View   Final Result     Findings suggestive of CHF, mild pulmonary edema and moderate RIGHT pleural    effusion.        Possibility of RIGHT lower lobe pneumonia cannot be excluded.  Recommend    followup to complete resolution.         THIS DOCUMENT HAS BEEN ELECTRONICALLY SIGNED BY PATRICIA KATZ MD      CT Abdomen Pelvis Without Contrast   Final Result     Mildly distended air and fluid-filled loops of small bowel in the abdomen    likely within normal limits versus mild enteritis/ileus.        Colon is distended with fecal matter suggestive of constipation. Colonic    diverticulosis without evidence of acute diverticulitis.        Small to moderate RIGHT pleural effusion with RIGHT lung base atelectasis,    consolidation and possibly infectious bronchiolitis. Possibility of associated    mild CHF with pulmonary edema cannot be excluded.  Recommend followup to    complete resolution.        A large hiatal hernia with partially intrathoracic stomach and associated    esophagitis/reflux.        Numerous other nonemergent/incidental findings as described.        NOTE: Suboptimal evaluation of bowel loops and abdominal organs due to lack    of oral and intravenous contrast.         THIS DOCUMENT HAS BEEN ELECTRONICALLY SIGNED BY PATRICIA KATZ MD        Vitals:    08/21/18 1747 08/21/18 1830 08/21/18 2005 08/21/18 2236   BP: 169/76 155/67  148/79   BP Location: Right arm      Patient Position:    Sitting   Pulse:  59  61   Resp:   18 20   Temp:    98.4 °F (36.9 °C)   TempSrc:    Oral   SpO2:  95%  97%   Weight:       Height:         Medications   ondansetron (ZOFRAN) injection 4 mg (4 mg Intravenous Not Given 8/21/18 1928)     ECG/EMG Results (last 24 hours)     ** No results found for the last 24 hours. **                        MDM    Final diagnoses:   Non-intractable vomiting with nausea, unspecified vomiting type   Abnormal chest x-ray   Left sided abdominal pain   Leukocytosis, unspecified type        Documentation assistance provided by drew Antoine.  Information recorded by the scribe was done at my direction and has been verified and validated by me.     Theo Antoine  08/21/18 8033       Callum Guevara MD  08/21/18 5710

## 2018-08-22 NOTE — DISCHARGE INSTRUCTIONS
If you have not had a call from Dr. Lundberg's office tomorrow by 10 AM, call them to let them know that Dr. Hernandez wanted you rechecked Wednesday.    Otherwise, continue your usual medications, diet, and activity.

## 2018-09-01 ENCOUNTER — HOSPITAL ENCOUNTER (INPATIENT)
Facility: HOSPITAL | Age: 83
LOS: 4 days | Discharge: HOME | End: 2018-09-05
Attending: EMERGENCY MEDICINE | Admitting: FAMILY MEDICINE

## 2018-09-01 ENCOUNTER — APPOINTMENT (OUTPATIENT)
Dept: CARDIOLOGY | Facility: HOSPITAL | Age: 83
End: 2018-09-01

## 2018-09-01 ENCOUNTER — APPOINTMENT (OUTPATIENT)
Dept: GENERAL RADIOLOGY | Facility: HOSPITAL | Age: 83
End: 2018-09-01

## 2018-09-01 DIAGNOSIS — I10 SEVERE UNCONTROLLED HYPERTENSION: ICD-10-CM

## 2018-09-01 DIAGNOSIS — R07.9 CHEST PAIN, UNSPECIFIED TYPE: Primary | ICD-10-CM

## 2018-09-01 DIAGNOSIS — Z74.09 IMPAIRED FUNCTIONAL MOBILITY, BALANCE, GAIT, AND ENDURANCE: ICD-10-CM

## 2018-09-01 DIAGNOSIS — Z86.79 HISTORY OF CORONARY ARTERY DISEASE: ICD-10-CM

## 2018-09-01 DIAGNOSIS — Z74.09 IMPAIRED MOBILITY AND ADLS: ICD-10-CM

## 2018-09-01 DIAGNOSIS — I16.0 HYPERTENSIVE URGENCY: ICD-10-CM

## 2018-09-01 DIAGNOSIS — Z78.9 IMPAIRED MOBILITY AND ADLS: ICD-10-CM

## 2018-09-01 DIAGNOSIS — I48.20 CHRONIC ATRIAL FIBRILLATION (HCC): ICD-10-CM

## 2018-09-01 DIAGNOSIS — E11.65 TYPE 2 DIABETES MELLITUS WITH HYPERGLYCEMIA, UNSPECIFIED LONG TERM INSULIN USE STATUS: ICD-10-CM

## 2018-09-01 PROBLEM — E16.2 HYPOGLYCEMIA: Status: RESOLVED | Noted: 2018-04-07 | Resolved: 2018-09-01

## 2018-09-01 PROBLEM — I25.110 CORONARY ARTERY DISEASE INVOLVING NATIVE CORONARY ARTERY OF NATIVE HEART WITH UNSTABLE ANGINA PECTORIS (HCC): Status: ACTIVE | Noted: 2017-11-24

## 2018-09-01 PROBLEM — I20.0 UNSTABLE ANGINA (HCC): Status: RESOLVED | Noted: 2018-09-01 | Resolved: 2018-09-01

## 2018-09-01 PROBLEM — R41.89 UNRESPONSIVE EPISODE: Status: RESOLVED | Noted: 2018-04-07 | Resolved: 2018-09-01

## 2018-09-01 PROBLEM — I25.119 CORONARY ARTERY DISEASE INVOLVING NATIVE CORONARY ARTERY OF NATIVE HEART WITH ANGINA PECTORIS (HCC): Status: ACTIVE | Noted: 2017-11-24

## 2018-09-01 PROBLEM — N17.9 AKI (ACUTE KIDNEY INJURY) (HCC): Status: ACTIVE | Noted: 2018-09-01

## 2018-09-01 PROBLEM — R41.82 ALTERED MENTAL STATE: Status: RESOLVED | Noted: 2018-04-07 | Resolved: 2018-09-01

## 2018-09-01 PROBLEM — I48.0 PAROXYSMAL ATRIAL FIBRILLATION (HCC): Status: ACTIVE | Noted: 2018-04-07

## 2018-09-01 PROBLEM — Z79.01 ANTICOAGULATED: Status: RESOLVED | Noted: 2018-04-07 | Resolved: 2018-09-01

## 2018-09-01 PROBLEM — I50.32 CHRONIC DIASTOLIC CONGESTIVE HEART FAILURE (HCC): Status: ACTIVE | Noted: 2018-09-01

## 2018-09-01 PROBLEM — T68.XXXA HYPOTHERMIA: Status: RESOLVED | Noted: 2018-04-07 | Resolved: 2018-09-01

## 2018-09-01 PROBLEM — R40.4 UNRESPONSIVE EPISODE: Status: RESOLVED | Noted: 2018-04-07 | Resolved: 2018-09-01

## 2018-09-01 PROBLEM — J90 BILATERAL PLEURAL EFFUSION: Status: RESOLVED | Noted: 2018-02-28 | Resolved: 2018-09-01

## 2018-09-01 PROBLEM — M25.561 RIGHT KNEE PAIN: Status: RESOLVED | Noted: 2018-02-28 | Resolved: 2018-09-01

## 2018-09-01 PROBLEM — I20.0 UNSTABLE ANGINA (HCC): Status: ACTIVE | Noted: 2018-09-01

## 2018-09-01 LAB
ALBUMIN SERPL-MCNC: 4.42 G/DL (ref 3.2–4.8)
ALBUMIN/GLOB SERPL: 1.5 G/DL (ref 1.5–2.5)
ALP SERPL-CCNC: 115 U/L (ref 25–100)
ALT SERPL W P-5'-P-CCNC: 40 U/L (ref 7–40)
ANION GAP SERPL CALCULATED.3IONS-SCNC: 8 MMOL/L (ref 3–11)
AST SERPL-CCNC: 38 U/L (ref 0–33)
BASOPHILS # BLD AUTO: 0.02 10*3/MM3 (ref 0–0.2)
BASOPHILS NFR BLD AUTO: 0.3 % (ref 0–1)
BH CV ECHO MEAS - AO MAX PG (FULL): 8 MMHG
BH CV ECHO MEAS - AO MAX PG: 10 MMHG
BH CV ECHO MEAS - AO MEAN PG (FULL): 4.3 MMHG
BH CV ECHO MEAS - AO MEAN PG: 5.3 MMHG
BH CV ECHO MEAS - AO ROOT AREA (BSA CORRECTED): 1.9
BH CV ECHO MEAS - AO ROOT AREA: 5.7 CM^2
BH CV ECHO MEAS - AO ROOT DIAM: 2.7 CM
BH CV ECHO MEAS - AO V2 MAX: 168.3 CM/SEC
BH CV ECHO MEAS - AO V2 MEAN: 107 CM/SEC
BH CV ECHO MEAS - AO V2 VTI: 37.9 CM
BH CV ECHO MEAS - AVA(I,A): 1.5 CM^2
BH CV ECHO MEAS - AVA(I,D): 1.5 CM^2
BH CV ECHO MEAS - AVA(V,A): 1.2 CM^2
BH CV ECHO MEAS - AVA(V,D): 1.2 CM^2
BH CV ECHO MEAS - BSA(HAYCOCK): 1.4 M^2
BH CV ECHO MEAS - BSA: 1.4 M^2
BH CV ECHO MEAS - BZI_BMI: 20.5 KILOGRAMS/M^2
BH CV ECHO MEAS - BZI_METRIC_HEIGHT: 152.4 CM
BH CV ECHO MEAS - BZI_METRIC_WEIGHT: 47.6 KG
BH CV ECHO MEAS - EDV(CUBED): 82.1 ML
BH CV ECHO MEAS - EDV(MOD-SP2): 97 ML
BH CV ECHO MEAS - EDV(MOD-SP4): 124 ML
BH CV ECHO MEAS - EDV(TEICH): 85.2 ML
BH CV ECHO MEAS - EF(CUBED): 58.1 %
BH CV ECHO MEAS - EF(MOD-SP2): 45.4 %
BH CV ECHO MEAS - EF(MOD-SP4): 58.9 %
BH CV ECHO MEAS - EF(TEICH): 50 %
BH CV ECHO MEAS - ESV(CUBED): 34.4 ML
BH CV ECHO MEAS - ESV(MOD-SP2): 53 ML
BH CV ECHO MEAS - ESV(MOD-SP4): 51 ML
BH CV ECHO MEAS - ESV(TEICH): 42.6 ML
BH CV ECHO MEAS - FS: 25.2 %
BH CV ECHO MEAS - IVS/LVPW: 1
BH CV ECHO MEAS - IVSD: 1.3 CM
BH CV ECHO MEAS - LA DIMENSION: 5 CM
BH CV ECHO MEAS - LA/AO: 1.9
BH CV ECHO MEAS - LAD MAJOR: 5.9 CM
BH CV ECHO MEAS - LAT PEAK E' VEL: 6.2 CM/SEC
BH CV ECHO MEAS - LATERAL E/E' RATIO: 12.3
BH CV ECHO MEAS - LV DIASTOLIC VOL/BSA (35-75): 87.4 ML/M^2
BH CV ECHO MEAS - LV MASS(C)D: 202 GRAMS
BH CV ECHO MEAS - LV MASS(C)DI: 142.3 GRAMS/M^2
BH CV ECHO MEAS - LV MAX PG: 2 MMHG
BH CV ECHO MEAS - LV MEAN PG: 0.97 MMHG
BH CV ECHO MEAS - LV SYSTOLIC VOL/BSA (12-30): 35.9 ML/M^2
BH CV ECHO MEAS - LV V1 MAX: 69.6 CM/SEC
BH CV ECHO MEAS - LV V1 MEAN: 45.5 CM/SEC
BH CV ECHO MEAS - LV V1 VTI: 18.6 CM
BH CV ECHO MEAS - LVIDD: 4.3 CM
BH CV ECHO MEAS - LVIDS: 3.3 CM
BH CV ECHO MEAS - LVLD AP2: 7.1 CM
BH CV ECHO MEAS - LVLD AP4: 7.3 CM
BH CV ECHO MEAS - LVLS AP2: 6.4 CM
BH CV ECHO MEAS - LVLS AP4: 5.7 CM
BH CV ECHO MEAS - LVOT AREA (M): 3.1 CM^2
BH CV ECHO MEAS - LVOT AREA: 3 CM^2
BH CV ECHO MEAS - LVOT DIAM: 1.9 CM
BH CV ECHO MEAS - LVPWD: 1.2 CM
BH CV ECHO MEAS - MED PEAK E' VEL: 3.82 CM/SEC
BH CV ECHO MEAS - MEDIAL E/E' RATIO: 19.9
BH CV ECHO MEAS - MV A MAX VEL: 105.8 CM/SEC
BH CV ECHO MEAS - MV DEC TIME: 0.4 SEC
BH CV ECHO MEAS - MV E MAX VEL: 76.8 CM/SEC
BH CV ECHO MEAS - MV E/A: 0.73
BH CV ECHO MEAS - MV MAX PG: 4 MMHG
BH CV ECHO MEAS - MV MEAN PG: 1.2 MMHG
BH CV ECHO MEAS - MV V2 MAX: 99.5 CM/SEC
BH CV ECHO MEAS - MV V2 MEAN: 49.6 CM/SEC
BH CV ECHO MEAS - MV V2 VTI: 34.6 CM
BH CV ECHO MEAS - MVA(VTI): 1.6 CM^2
BH CV ECHO MEAS - PA ACC SLOPE: 352.8 CM/SEC^2
BH CV ECHO MEAS - PA ACC TIME: 0.13 SEC
BH CV ECHO MEAS - PA MAX PG: 2.9 MMHG
BH CV ECHO MEAS - PA PR(ACCEL): 20.4 MMHG
BH CV ECHO MEAS - PA V2 MAX: 85.5 CM/SEC
BH CV ECHO MEAS - RAP SYSTOLE: 3 MMHG
BH CV ECHO MEAS - RVSP: 20.6 MMHG
BH CV ECHO MEAS - SI(AO): 151 ML/M^2
BH CV ECHO MEAS - SI(CUBED): 33.6 ML/M^2
BH CV ECHO MEAS - SI(LVOT): 39 ML/M^2
BH CV ECHO MEAS - SI(MOD-SP2): 31 ML/M^2
BH CV ECHO MEAS - SI(MOD-SP4): 51.4 ML/M^2
BH CV ECHO MEAS - SI(TEICH): 30 ML/M^2
BH CV ECHO MEAS - SV(AO): 214.4 ML
BH CV ECHO MEAS - SV(CUBED): 47.7 ML
BH CV ECHO MEAS - SV(LVOT): 55.3 ML
BH CV ECHO MEAS - SV(MOD-SP2): 44 ML
BH CV ECHO MEAS - SV(MOD-SP4): 73 ML
BH CV ECHO MEAS - SV(TEICH): 42.6 ML
BH CV ECHO MEAS - TAPSE (>1.6): 1.8 CM2
BH CV ECHO MEAS - TR MAX PG: 17.6 MMHG
BH CV ECHO MEAS - TR MAX VEL: 209.9 CM/SEC
BH CV ECHO MEASUREMENTS AVERAGE E/E' RATIO: 15.33
BH CV VAS BP LEFT ARM: NORMAL MMHG
BH CV XLRA - RV BASE: 3.5 CM
BH CV XLRA - RV LENGTH: 6.9 CM
BH CV XLRA - RV MID: 2.2 CM
BH CV XLRA - TDI S': 16.9 CM/SEC
BILIRUB SERPL-MCNC: 0.5 MG/DL (ref 0.3–1.2)
BNP SERPL-MCNC: 131 PG/ML (ref 0–100)
BUN BLD-MCNC: 21 MG/DL (ref 9–23)
BUN/CREAT SERPL: 15.9 (ref 7–25)
CALCIUM SPEC-SCNC: 9.5 MG/DL (ref 8.7–10.4)
CHLORIDE SERPL-SCNC: 103 MMOL/L (ref 99–109)
CLUMPED PLATELETS: PRESENT
CO2 SERPL-SCNC: 24 MMOL/L (ref 20–31)
CREAT BLD-MCNC: 1.32 MG/DL (ref 0.6–1.3)
DEPRECATED RDW RBC AUTO: 52.6 FL (ref 37–54)
EOSINOPHIL # BLD AUTO: 0.14 10*3/MM3 (ref 0–0.3)
EOSINOPHIL NFR BLD AUTO: 2.1 % (ref 0–3)
ERYTHROCYTE [DISTWIDTH] IN BLOOD BY AUTOMATED COUNT: 17.2 % (ref 11.3–14.5)
GFR SERPL CREATININE-BSD FRML MDRD: 38 ML/MIN/1.73
GLOBULIN UR ELPH-MCNC: 3 GM/DL
GLUCOSE BLD-MCNC: 274 MG/DL (ref 70–100)
GLUCOSE BLDC GLUCOMTR-MCNC: 159 MG/DL (ref 70–130)
GLUCOSE BLDC GLUCOMTR-MCNC: 177 MG/DL (ref 70–130)
HCT VFR BLD AUTO: 44.8 % (ref 34.5–44)
HGB BLD-MCNC: 14 G/DL (ref 11.5–15.5)
HOLD SPECIMEN: NORMAL
HOLD SPECIMEN: NORMAL
IMM GRANULOCYTES # BLD: 0.01 10*3/MM3 (ref 0–0.03)
IMM GRANULOCYTES NFR BLD: 0.1 % (ref 0–0.6)
LEFT ATRIUM VOLUME INDEX: 66.2 ML/M^2
LIPASE SERPL-CCNC: 44 U/L (ref 6–51)
LV EF 2D ECHO EST: 60 %
LYMPHOCYTES # BLD AUTO: 1.37 10*3/MM3 (ref 0.6–4.8)
LYMPHOCYTES NFR BLD AUTO: 20.5 % (ref 24–44)
MCH RBC QN AUTO: 26.4 PG (ref 27–31)
MCHC RBC AUTO-ENTMCNC: 31.3 G/DL (ref 32–36)
MCV RBC AUTO: 84.4 FL (ref 80–99)
MONOCYTES # BLD AUTO: 0.42 10*3/MM3 (ref 0–1)
MONOCYTES NFR BLD AUTO: 6.3 % (ref 0–12)
NEUTROPHILS # BLD AUTO: 4.73 10*3/MM3 (ref 1.5–8.3)
NEUTROPHILS NFR BLD AUTO: 70.8 % (ref 41–71)
PLATELET # BLD AUTO: 148 10*3/MM3 (ref 150–450)
PMV BLD AUTO: 11.8 FL (ref 6–12)
POTASSIUM BLD-SCNC: 4.3 MMOL/L (ref 3.5–5.5)
PROT SERPL-MCNC: 7.4 G/DL (ref 5.7–8.2)
RBC # BLD AUTO: 5.31 10*6/MM3 (ref 3.89–5.14)
RBC MORPH BLD: NORMAL
SODIUM BLD-SCNC: 135 MMOL/L (ref 132–146)
TROPONIN I SERPL-MCNC: 0 NG/ML (ref 0–0.07)
TROPONIN I SERPL-MCNC: 0 NG/ML (ref 0–0.07)
TROPONIN I SERPL-MCNC: 0.01 NG/ML
TROPONIN I SERPL-MCNC: 0.01 NG/ML
WBC MORPH BLD: NORMAL
WBC NRBC COR # BLD: 6.68 10*3/MM3 (ref 3.5–10.8)
WHOLE BLOOD HOLD SPECIMEN: NORMAL
WHOLE BLOOD HOLD SPECIMEN: NORMAL

## 2018-09-01 PROCEDURE — A9270 NON-COVERED ITEM OR SERVICE: HCPCS | Performed by: FAMILY MEDICINE

## 2018-09-01 PROCEDURE — 63710000001 INSULIN LISPRO (HUMAN) PER 5 UNITS: Performed by: FAMILY MEDICINE

## 2018-09-01 PROCEDURE — A9270 NON-COVERED ITEM OR SERVICE: HCPCS | Performed by: NURSE PRACTITIONER

## 2018-09-01 PROCEDURE — 85007 BL SMEAR W/DIFF WBC COUNT: CPT | Performed by: EMERGENCY MEDICINE

## 2018-09-01 PROCEDURE — 93306 TTE W/DOPPLER COMPLETE: CPT | Performed by: INTERNAL MEDICINE

## 2018-09-01 PROCEDURE — 83690 ASSAY OF LIPASE: CPT | Performed by: EMERGENCY MEDICINE

## 2018-09-01 PROCEDURE — 93005 ELECTROCARDIOGRAM TRACING: CPT | Performed by: EMERGENCY MEDICINE

## 2018-09-01 PROCEDURE — 63710000001 HYDRALAZINE 50 MG TABLET: Performed by: NURSE PRACTITIONER

## 2018-09-01 PROCEDURE — 99222 1ST HOSP IP/OBS MODERATE 55: CPT | Performed by: INTERNAL MEDICINE

## 2018-09-01 PROCEDURE — 85025 COMPLETE CBC W/AUTO DIFF WBC: CPT | Performed by: EMERGENCY MEDICINE

## 2018-09-01 PROCEDURE — 99285 EMERGENCY DEPT VISIT HI MDM: CPT

## 2018-09-01 PROCEDURE — 84484 ASSAY OF TROPONIN QUANT: CPT

## 2018-09-01 PROCEDURE — 80053 COMPREHEN METABOLIC PANEL: CPT | Performed by: EMERGENCY MEDICINE

## 2018-09-01 PROCEDURE — 63710000001 ATORVASTATIN 40 MG TABLET: Performed by: FAMILY MEDICINE

## 2018-09-01 PROCEDURE — 63710000001 ISOSORBIDE MONONITRATE 60 MG TABLET SUSTAINED-RELEASE 24 HOUR: Performed by: FAMILY MEDICINE

## 2018-09-01 PROCEDURE — 63710000001 DONEPEZIL 10 MG TABLET: Performed by: FAMILY MEDICINE

## 2018-09-01 PROCEDURE — 84484 ASSAY OF TROPONIN QUANT: CPT | Performed by: FAMILY MEDICINE

## 2018-09-01 PROCEDURE — 63710000001 POLYETHYLENE GLYCOL PACK: Performed by: FAMILY MEDICINE

## 2018-09-01 PROCEDURE — 93306 TTE W/DOPPLER COMPLETE: CPT

## 2018-09-01 PROCEDURE — 71045 X-RAY EXAM CHEST 1 VIEW: CPT

## 2018-09-01 PROCEDURE — 63710000001 AMIODARONE 200 MG TABLET: Performed by: FAMILY MEDICINE

## 2018-09-01 PROCEDURE — 63710000001 DOCUSATE SODIUM 100 MG CAPSULE: Performed by: FAMILY MEDICINE

## 2018-09-01 PROCEDURE — 93005 ELECTROCARDIOGRAM TRACING: CPT | Performed by: FAMILY MEDICINE

## 2018-09-01 PROCEDURE — 99223 1ST HOSP IP/OBS HIGH 75: CPT | Performed by: FAMILY MEDICINE

## 2018-09-01 PROCEDURE — 82962 GLUCOSE BLOOD TEST: CPT

## 2018-09-01 PROCEDURE — 93010 ELECTROCARDIOGRAM REPORT: CPT | Performed by: INTERNAL MEDICINE

## 2018-09-01 PROCEDURE — 63710000001 AMLODIPINE 10 MG TABLET: Performed by: FAMILY MEDICINE

## 2018-09-01 PROCEDURE — 63710000001 CITALOPRAM 20 MG TABLET: Performed by: FAMILY MEDICINE

## 2018-09-01 PROCEDURE — 83880 ASSAY OF NATRIURETIC PEPTIDE: CPT | Performed by: EMERGENCY MEDICINE

## 2018-09-01 PROCEDURE — 63710000001 CARVEDILOL 12.5 MG TABLET: Performed by: FAMILY MEDICINE

## 2018-09-01 PROCEDURE — 63710000001 APIXABAN 2.5 MG TABLET: Performed by: FAMILY MEDICINE

## 2018-09-01 PROCEDURE — 93005 ELECTROCARDIOGRAM TRACING: CPT

## 2018-09-01 PROCEDURE — 63710000001 HYDRALAZINE 25 MG TABLET: Performed by: FAMILY MEDICINE

## 2018-09-01 PROCEDURE — 63710000001 CETIRIZINE 10 MG TABLET: Performed by: FAMILY MEDICINE

## 2018-09-01 PROCEDURE — 25010000002 MORPHINE SULFATE (PF) 2 MG/ML SOLUTION: Performed by: EMERGENCY MEDICINE

## 2018-09-01 RX ORDER — CARVEDILOL 12.5 MG/1
25 TABLET ORAL 2 TIMES DAILY WITH MEALS
Status: DISCONTINUED | OUTPATIENT
Start: 2018-09-01 | End: 2018-09-03

## 2018-09-01 RX ORDER — NITROGLYCERIN 20 MG/100ML
10-50 INJECTION INTRAVENOUS
Status: DISCONTINUED | OUTPATIENT
Start: 2018-09-01 | End: 2018-09-03

## 2018-09-01 RX ORDER — TERAZOSIN 2 MG/1
2 CAPSULE ORAL NIGHTLY
Status: DISCONTINUED | OUTPATIENT
Start: 2018-09-01 | End: 2018-09-01

## 2018-09-01 RX ORDER — ATORVASTATIN CALCIUM 40 MG/1
40 TABLET, FILM COATED ORAL NIGHTLY
Status: DISCONTINUED | OUTPATIENT
Start: 2018-09-01 | End: 2018-09-06 | Stop reason: HOSPADM

## 2018-09-01 RX ORDER — NICOTINE POLACRILEX 4 MG
15 LOZENGE BUCCAL
Status: DISCONTINUED | OUTPATIENT
Start: 2018-09-01 | End: 2018-09-06 | Stop reason: HOSPADM

## 2018-09-01 RX ORDER — ONDANSETRON 2 MG/ML
4 INJECTION INTRAMUSCULAR; INTRAVENOUS EVERY 6 HOURS PRN
Status: DISCONTINUED | OUTPATIENT
Start: 2018-09-01 | End: 2018-09-06 | Stop reason: HOSPADM

## 2018-09-01 RX ORDER — ACETAMINOPHEN 325 MG/1
650 TABLET ORAL EVERY 4 HOURS PRN
Status: DISCONTINUED | OUTPATIENT
Start: 2018-09-01 | End: 2018-09-06 | Stop reason: HOSPADM

## 2018-09-01 RX ORDER — MORPHINE SULFATE 2 MG/ML
1 INJECTION, SOLUTION INTRAMUSCULAR; INTRAVENOUS EVERY 4 HOURS PRN
Status: DISCONTINUED | OUTPATIENT
Start: 2018-09-01 | End: 2018-09-06 | Stop reason: HOSPADM

## 2018-09-01 RX ORDER — FLUTICASONE PROPIONATE 50 MCG
2 SPRAY, SUSPENSION (ML) NASAL DAILY
COMMUNITY

## 2018-09-01 RX ORDER — HYDRALAZINE HYDROCHLORIDE 50 MG/1
50 TABLET, FILM COATED ORAL EVERY 8 HOURS SCHEDULED
Status: DISCONTINUED | OUTPATIENT
Start: 2018-09-01 | End: 2018-09-03

## 2018-09-01 RX ORDER — CITALOPRAM 20 MG/1
20 TABLET ORAL DAILY
Status: DISCONTINUED | OUTPATIENT
Start: 2018-09-01 | End: 2018-09-06 | Stop reason: HOSPADM

## 2018-09-01 RX ORDER — HYDRALAZINE HYDROCHLORIDE 25 MG/1
25 TABLET, FILM COATED ORAL EVERY 8 HOURS SCHEDULED
Status: DISCONTINUED | OUTPATIENT
Start: 2018-09-01 | End: 2018-09-01

## 2018-09-01 RX ORDER — NALOXONE HCL 0.4 MG/ML
0.4 VIAL (ML) INJECTION
Status: DISCONTINUED | OUTPATIENT
Start: 2018-09-01 | End: 2018-09-06 | Stop reason: HOSPADM

## 2018-09-01 RX ORDER — SODIUM CHLORIDE 0.9 % (FLUSH) 0.9 %
1-10 SYRINGE (ML) INJECTION AS NEEDED
Status: DISCONTINUED | OUTPATIENT
Start: 2018-09-01 | End: 2018-09-06 | Stop reason: HOSPADM

## 2018-09-01 RX ORDER — DEXTROSE MONOHYDRATE 25 G/50ML
25 INJECTION, SOLUTION INTRAVENOUS
Status: DISCONTINUED | OUTPATIENT
Start: 2018-09-01 | End: 2018-09-06 | Stop reason: HOSPADM

## 2018-09-01 RX ORDER — SODIUM CHLORIDE 9 MG/ML
100 INJECTION, SOLUTION INTRAVENOUS CONTINUOUS
Status: DISCONTINUED | OUTPATIENT
Start: 2018-09-01 | End: 2018-09-03

## 2018-09-01 RX ORDER — ASPIRIN 81 MG/1
81 TABLET, CHEWABLE ORAL DAILY
Status: DISCONTINUED | OUTPATIENT
Start: 2018-09-01 | End: 2018-09-01

## 2018-09-01 RX ORDER — ASPIRIN 81 MG/1
324 TABLET, CHEWABLE ORAL ONCE
Status: DISCONTINUED | OUTPATIENT
Start: 2018-09-01 | End: 2018-09-01

## 2018-09-01 RX ORDER — MORPHINE SULFATE 2 MG/ML
2 INJECTION, SOLUTION INTRAMUSCULAR; INTRAVENOUS ONCE
Status: COMPLETED | OUTPATIENT
Start: 2018-09-01 | End: 2018-09-01

## 2018-09-01 RX ORDER — AMIODARONE HYDROCHLORIDE 200 MG/1
200 TABLET ORAL DAILY
Status: DISCONTINUED | OUTPATIENT
Start: 2018-09-01 | End: 2018-09-06 | Stop reason: HOSPADM

## 2018-09-01 RX ORDER — LORATADINE 10 MG/1
10 TABLET ORAL DAILY
COMMUNITY
End: 2019-03-29

## 2018-09-01 RX ORDER — SODIUM CHLORIDE 0.9 % (FLUSH) 0.9 %
10 SYRINGE (ML) INJECTION AS NEEDED
Status: DISCONTINUED | OUTPATIENT
Start: 2018-09-01 | End: 2018-09-06 | Stop reason: HOSPADM

## 2018-09-01 RX ORDER — CETIRIZINE HYDROCHLORIDE 10 MG/1
5 TABLET ORAL DAILY
Status: DISCONTINUED | OUTPATIENT
Start: 2018-09-01 | End: 2018-09-06 | Stop reason: HOSPADM

## 2018-09-01 RX ORDER — HYDROCODONE BITARTRATE AND ACETAMINOPHEN 5; 325 MG/1; MG/1
1 TABLET ORAL EVERY 6 HOURS PRN
Status: DISCONTINUED | OUTPATIENT
Start: 2018-09-01 | End: 2018-09-05

## 2018-09-01 RX ORDER — CLONIDINE HYDROCHLORIDE 0.1 MG/1
0.1 TABLET ORAL EVERY 6 HOURS PRN
Status: DISCONTINUED | OUTPATIENT
Start: 2018-09-01 | End: 2018-09-01

## 2018-09-01 RX ORDER — DONEPEZIL HYDROCHLORIDE 10 MG/1
5 TABLET, FILM COATED ORAL NIGHTLY
Status: DISCONTINUED | OUTPATIENT
Start: 2018-09-01 | End: 2018-09-06 | Stop reason: HOSPADM

## 2018-09-01 RX ORDER — BISACODYL 5 MG/1
5 TABLET, DELAYED RELEASE ORAL DAILY PRN
Status: DISCONTINUED | OUTPATIENT
Start: 2018-09-01 | End: 2018-09-06 | Stop reason: HOSPADM

## 2018-09-01 RX ORDER — DOCUSATE SODIUM 100 MG/1
100 CAPSULE, LIQUID FILLED ORAL 2 TIMES DAILY
Status: DISCONTINUED | OUTPATIENT
Start: 2018-09-01 | End: 2018-09-06 | Stop reason: HOSPADM

## 2018-09-01 RX ORDER — ISOSORBIDE MONONITRATE 60 MG/1
60 TABLET, EXTENDED RELEASE ORAL DAILY
Status: DISCONTINUED | OUTPATIENT
Start: 2018-09-01 | End: 2018-09-06 | Stop reason: HOSPADM

## 2018-09-01 RX ORDER — CLONIDINE HYDROCHLORIDE 0.1 MG/1
0.2 TABLET ORAL EVERY 6 HOURS PRN
Status: DISCONTINUED | OUTPATIENT
Start: 2018-09-01 | End: 2018-09-05

## 2018-09-01 RX ORDER — AMLODIPINE BESYLATE 10 MG/1
10 TABLET ORAL
Status: DISCONTINUED | OUTPATIENT
Start: 2018-09-01 | End: 2018-09-06 | Stop reason: HOSPADM

## 2018-09-01 RX ADMIN — AMLODIPINE BESYLATE 10 MG: 10 TABLET ORAL at 14:36

## 2018-09-01 RX ADMIN — DONEPEZIL HYDROCHLORIDE 5 MG: 10 TABLET, FILM COATED ORAL at 21:07

## 2018-09-01 RX ADMIN — DOCUSATE SODIUM 100 MG: 100 CAPSULE, LIQUID FILLED ORAL at 21:07

## 2018-09-01 RX ADMIN — MORPHINE SULFATE 2 MG: 2 INJECTION, SOLUTION INTRAMUSCULAR; INTRAVENOUS at 11:35

## 2018-09-01 RX ADMIN — CITALOPRAM HYDROBROMIDE 20 MG: 20 TABLET ORAL at 14:36

## 2018-09-01 RX ADMIN — POLYETHYLENE GLYCOL 3350 17 G: 17 POWDER, FOR SOLUTION ORAL at 15:00

## 2018-09-01 RX ADMIN — HYDRALAZINE HYDROCHLORIDE 50 MG: 50 TABLET, FILM COATED ORAL at 21:07

## 2018-09-01 RX ADMIN — CARVEDILOL 25 MG: 12.5 TABLET, FILM COATED ORAL at 18:02

## 2018-09-01 RX ADMIN — ATORVASTATIN CALCIUM 40 MG: 40 TABLET, FILM COATED ORAL at 21:07

## 2018-09-01 RX ADMIN — HYDRALAZINE HYDROCHLORIDE 25 MG: 25 TABLET ORAL at 18:01

## 2018-09-01 RX ADMIN — APIXABAN 2.5 MG: 2.5 TABLET, FILM COATED ORAL at 21:07

## 2018-09-01 RX ADMIN — INSULIN LISPRO 2 UNITS: 100 INJECTION, SOLUTION INTRAVENOUS; SUBCUTANEOUS at 17:20

## 2018-09-01 RX ADMIN — CETIRIZINE HYDROCHLORIDE 5 MG: 10 TABLET, FILM COATED ORAL at 14:36

## 2018-09-01 RX ADMIN — NITROGLYCERIN 10 MCG/MIN: 20 INJECTION INTRAVENOUS at 11:32

## 2018-09-01 RX ADMIN — ISOSORBIDE MONONITRATE 60 MG: 60 TABLET, EXTENDED RELEASE ORAL at 14:36

## 2018-09-01 RX ADMIN — AMIODARONE HYDROCHLORIDE 200 MG: 200 TABLET ORAL at 14:35

## 2018-09-01 RX ADMIN — SODIUM CHLORIDE 100 ML/HR: 9 INJECTION, SOLUTION INTRAVENOUS at 15:38

## 2018-09-01 RX ADMIN — APIXABAN 2.5 MG: 2.5 TABLET, FILM COATED ORAL at 14:36

## 2018-09-01 NOTE — H&P
[facility] Medicine Services  HISTORY AND PHYSICAL    Patient Name: Mary Ramirez  : 1934  MRN: [id number]  Primary Care Physician: Nirmal Lundberg MD    Subjective   Subjective     Chief Complaint:  Chest pain    HPI:  Mary Ramirez is a 83 y.o. female with PMHx CAD with remote stents, diastolic CHF, PAF on chronic Eliquis, mild dementia, and DM 2.  Patient lives at assisted living and has her medications dispense to her and regular blood pressure checks.  Over the past several weeks to a few months it has been noted that she has been having escalated blood pressures despite her home hypertension medications.  Over the past several weeks patient has noted bothersome midsternal and left chest pain.  Patient states that the pain is noticeable when she wakes in the morning, it is not related to activity, it is not relieved with nitroglycerin, it is associated with headache at times.  Due to hypertensive urgency and chest pain unrelieved by nitroglycerin she was sent to [facility] ED for further evaluation.    In ED, her initial blood pressures were markedly elevated at 200's/150's and she was placed on nitro drip which has not had any effect on her chest pain and only minimal effect on her blood pressure.  Initial troponin and EKG are unremarkable.      Review of Systems   Otherwise 10-system ROS reviewed and is negative except as mentioned in the HPI.    Personal History     Past Medical History:   Diagnosis Date   • Atrial fibrillation (CMS/HCC)    • CAD (coronary artery disease)    • CHF (congestive heart failure) (CMS/HCC)    • DDD (degenerative disc disease), cervical    • Dementia    • Diabetes mellitus (CMS/HCC)    • Hypertension    • SSS (sick sinus syndrome) (CMS/HCC)        Past Surgical History:   Procedure Laterality Date   • ANKLE SURGERY Left    • ATHRECTOMY ILIAC, FEMORAL, TIBIAL ARTERY     • BREAST LUMPECTOMY Left    • COLON SURGERY     • CORONARY STENT  "PLACEMENT     • FINGER FRACTURE SURGERY Right    • HIP TROCANTERIC NAILING WITH INTRAMEDULLARY HIP SCREW Left 11/23/2017   • HYSTERECTOMY         Family History: family history includes No Known Problems in her father and mother.     Social History:  reports that she has never smoked. She has never used smokeless tobacco. She reports that she does not drink alcohol or use drugs.  Social History     Social History Narrative   • No narrative on file       Medications:    (Not in a hospital admission)    Allergies   Allergen Reactions   • Penicillins Other (See Comments)     Pt states \"i don't know\"       Objective   Objective     Vital Signs:   Temp:  [98.2 °F (36.8 °C)] 98.2 °F (36.8 °C)  Heart Rate:  [47-52] 49  Resp:  [14] 14  BP: (173-207)/() 182/79        Physical Exam   Constitutional: No acute distress, awake, alert, nontoxic, normal body habitus  Respiratory: Clear to auscultation bilaterally, good effort, nonlabored respirations   Cardiovascular: RRR, no murmur  Gastrointestinal: Positive bowel sounds, soft, nontender, nondistended  Musculoskeletal: No peripheral edema, normal muscle tone for age  Psychiatric: Appropriate affect, good insight and judgement, cooperative  Neurologic: Oriented x 3 and able to give reliable history, movements symmetric BUE and BLE, Cranial Nerves grossly intact to confrontation, speech clear and fluent  Skin: No rashes, no jaundice, no petechiae, no mottling      Results Reviewed:  I have personally reviewed current lab, radiology, and data and agree.      Results from last 7 days  Lab Units 09/01/18  1043   WBC 10*3/mm3 6.68   HEMOGLOBIN g/dL 14.0   HEMATOCRIT % 44.8*   PLATELETS 10*3/mm3 148*       Results from last 7 days  Lab Units 09/01/18  1042   SODIUM mmol/L 135   POTASSIUM mmol/L 4.3   CHLORIDE mmol/L 103   CO2 mmol/L 24.0   BUN mg/dL 21   CREATININE mg/dL 1.32*   GLUCOSE mg/dL 274*   CALCIUM mg/dL 9.5   ALT (SGPT) U/L 40   AST (SGOT) U/L 38*     Estimated " Creatinine Clearance: 25.4 mL/min (A) (by C-G formula based on SCr of 1.32 mg/dL (H)).  Brief Urine Lab Results  (Last result in the past 365 days)      Color   Clarity   Blood   Leuk Est   Nitrite   Protein   CREAT   Urine HCG        04/07/18 0839 Yellow Clear Negative Negative Negative Negative             BNP   Date Value Ref Range Status   09/01/2018 131.0 (H) 0.0 - 100.0 pg/mL Final     Comment:     Results may be falsely decreased if patient taking Biotin.     Imaging Results (last 24 hours)     Procedure Component Value Units Date/Time    XR Chest 1 View [[id number]] Updated:  09/01/18 1204        Results for orders placed during the hospital encounter of 04/07/18   Adult Transthoracic Echo Complete W/ Cont if Necessary Per Protocol    Narrative · Left ventricular wall thickness is consistent with mild concentric   hypertrophy.  · Left atrial cavity size is moderately dilated.  · Mild mitral valve regurgitation is present.  · Mild to moderate tricuspid valve regurgitation is present.  · There is a small (<1cm) circumferential pericardial effusion.  · Calculated right ventricular systolic pressure from tricuspid   regurgitation is 43 mmHg.  · Left ventricular systolic function is normal. Estimated EF = 70%.  · Mildly reduced right ventricular systolic function noted.  · There is a small (<1cm) circumferential pericardial effusion.  · Mild pulmonary hypertension is present.  · The aortic valve exhibits sclerosis.          Assessment/Plan   Assessment / Plan     Hospital Problem List     * (Principal)Unstable angina (CMS/HCC)    Hypertension    Type 2 diabetes mellitus with complication, with long-term current use of insulin (CMS/HCC)    Coronary artery disease involving native coronary artery of native heart without angina pectoris    Overview Addendum 3/5/2018  9:42 AM by Gracy Venegas APRN     · Cardiac cath with PCI data deficit  · Echo (11/27/2017): Mild LVH. Mild MR and TR.  Normal LVEF            Dementia    A-fib (CMS/[facility])    Anticoagulated    SSS (sick sinus syndrome) (CMS/[facility])    Chronic diastolic congestive heart failure (CMS/[facility])    Hypertensive urgency    DEANGELO (acute kidney injury) (CMS/[facility])            Assessment & Plan:  Please see above Hospital Problem List which is being actively managed to reflect all current/pertinent diagnoses, the following items may only represent the diagnoses most acutely being managed at time of admission.    Unstable angina  Hypertensive urgency, history of difficult to control HTN  CAD with history of prior stents remotely  - Trend troponins and EKG  - Chest pain sounds as if it may be related to hypertensive urgency, however given history of stents and intermittent nature will ask cardiology to evaluate --> may need myocardial perfusion testing versus L [facility] surveillance per their discretion.  At baseline patient still is very active for her age  - Continue home HTN meds, except holding ARB due to DEANGELO  - Cannot increase BB due to heart rate, on max dose Norvasc, at baseline on on max dose ARB -->  further pharmacotherapy for uncontrolled BP may prove to be difficult.  Will add hydralazine TID for now.   - prn clonidine -->  Could consider clonidine patch if needed to ad to home meds    PAF on chronic Eliquis  SSS  - Continue home meds    Chronic diastolic CHF  - Most recent echo from April 2018 reviewed, will not repeat at this time unless cards thinks is indicated      DVT prophylaxis: Eliquis    CODE STATUS:    There are no questions and answers to display.       Admission Status:  I believe this patient meets INPATIENT status due to the need for care which can only be reasonably provided in an hospital setting such as aggressive/expedited ancillary services and/or consultation services, the necessity for IV medications, close physician monitoring and/or the possible need for procedures.  In such, I feel patient’s risk for adverse outcomes and need for care warrant  INPATIENT evaluation and predict the patient’s care encounter to likely last beyond 2 midnights.        Electronically signed by Fouzia Green MD, 09/01/18, 12:26 PM.

## 2018-09-01 NOTE — ED PROVIDER NOTES
"Subjective   Mary Ramirez is a 83 y.o. female with a hx of dementia, HTN, DM, a fib, CHF, CKD, CAD who presents to the ED with c/o chest pain. The pain is located in her left chest and is described as a dull sensation. It has been intermittent for the past 5 or 6 weeks and she states that the pain would onset with both exertion and while at rest. Today, she notes of a blood pressure reading of 192 systolic and so she was sent by her assisted living staff to the ED. 324 mg ASA and NTG x2 were given by EMS while en route to the ED. The patient denies SoA, numbness to her BUE, or any other acute complaints at this time.        History provided by:  Patient  Chest Pain   Pain location:  L chest  Pain quality: dull    Pain radiates to:  Does not radiate  Onset quality:  Gradual  Duration:  6 weeks  Timing:  Intermittent  Progression:  Unchanged  Chronicity:  New  Relieved by:  None tried  Worsened by:  Nothing  Ineffective treatments:  None tried  Associated symptoms: no fever, no numbness and no shortness of breath    Risk factors: coronary artery disease and diabetes mellitus        Review of Systems   Constitutional: Negative for chills and fever.   Respiratory: Negative for shortness of breath.    Cardiovascular: Positive for chest pain.   Neurological: Negative for numbness.   All other systems reviewed and are negative.      Past Medical History:   Diagnosis Date   • Atrial fibrillation (CMS/HCC)    • CAD (coronary artery disease)    • CHF (congestive heart failure) (CMS/HCC)    • DDD (degenerative disc disease), cervical    • Dementia    • Diabetes mellitus (CMS/HCC)    • Hypertension    • SSS (sick sinus syndrome) (CMS/HCC)        Allergies   Allergen Reactions   • Penicillins Other (See Comments)     Pt states \"i don't know\"       Past Surgical History:   Procedure Laterality Date   • ANKLE SURGERY Left 1996   • ATHRECTOMY ILIAC, FEMORAL, TIBIAL ARTERY     • BREAST LUMPECTOMY Left 1981   • COLON SURGERY     • " CORONARY STENT PLACEMENT     • FINGER FRACTURE SURGERY Right    • HIP TROCANTERIC NAILING WITH INTRAMEDULLARY HIP SCREW Left 11/23/2017   • HYSTERECTOMY         Family History   Problem Relation Age of Onset   • No Known Problems Mother    • No Known Problems Father        Social History     Social History   • Marital status:      Spouse name: N/A   • Number of children: 2   • Years of education: H.S     Occupational History   •  Retired     Social History Main Topics   • Smoking status: Never Smoker   • Smokeless tobacco: Never Used   • Alcohol use No   • Drug use: No   • Sexual activity: No     Other Topics Concern   • Not on file         Objective   Physical Exam   Constitutional: She is oriented to person, place, and time. She appears well-developed and well-nourished. No distress.   HENT:   Head: Normocephalic and atraumatic.   Nose: Nose normal.   Eyes: Pupils are equal, round, and reactive to light. Conjunctivae are normal. No scleral icterus.   Neck: Normal range of motion. Neck supple.   Cardiovascular: Normal rate, regular rhythm, normal heart sounds and intact distal pulses.    No murmur heard.  Pulmonary/Chest: Effort normal and breath sounds normal. No respiratory distress.   Abdominal: Soft. Bowel sounds are normal. There is no tenderness.   Musculoskeletal: Normal range of motion. She exhibits no edema (pitting).   Neurological: She is alert and oriented to person, place, and time.   Skin: Skin is warm and dry.   Psychiatric: She has a normal mood and affect. Her behavior is normal.   Nursing note and vitals reviewed.      Procedures         ED Course  ED Course as of Sep 01 1502   Sat Sep 01, 2018   1101 Troponin I: 0.00 [RS]   1101 BP: (!) 207/149 [RS]   1110 Creatinine: (!) 1.32 [RS]   1222 Discussed the case with Dr. Green, hospitalist, who agrees to admit the patient.  [DT]      ED Course User Index  [DT] Misbah Negrete  [RS] Kamlesh Trammell MD     Recent Results (from  the past 24 hour(s))   Comprehensive Metabolic Panel    Collection Time: 09/01/18 10:42 AM   Result Value Ref Range    Glucose 274 (H) 70 - 100 mg/dL    BUN 21 9 - 23 mg/dL    Creatinine 1.32 (H) 0.60 - 1.30 mg/dL    Sodium 135 132 - 146 mmol/L    Potassium 4.3 3.5 - 5.5 mmol/L    Chloride 103 99 - 109 mmol/L    CO2 24.0 20.0 - 31.0 mmol/L    Calcium 9.5 8.7 - 10.4 mg/dL    Total Protein 7.4 5.7 - 8.2 g/dL    Albumin 4.42 3.20 - 4.80 g/dL    ALT (SGPT) 40 7 - 40 U/L    AST (SGOT) 38 (H) 0 - 33 U/L    Alkaline Phosphatase 115 (H) 25 - 100 U/L    Total Bilirubin 0.5 0.3 - 1.2 mg/dL    eGFR Non African Amer 38 (L) >60 mL/min/1.73    Globulin 3.0 gm/dL    A/G Ratio 1.5 1.5 - 2.5 g/dL    BUN/Creatinine Ratio 15.9 7.0 - 25.0    Anion Gap 8.0 3.0 - 11.0 mmol/L   Lipase    Collection Time: 09/01/18 10:42 AM   Result Value Ref Range    Lipase 44 6 - 51 U/L   Light Blue Top    Collection Time: 09/01/18 10:42 AM   Result Value Ref Range    Extra Tube hold for add-on    Green Top (Gel)    Collection Time: 09/01/18 10:42 AM   Result Value Ref Range    Extra Tube Hold for add-ons.    Gold Top - SST    Collection Time: 09/01/18 10:42 AM   Result Value Ref Range    Extra Tube Hold for add-ons.    BNP    Collection Time: 09/01/18 10:43 AM   Result Value Ref Range    .0 (H) 0.0 - 100.0 pg/mL   Lavender Top    Collection Time: 09/01/18 10:43 AM   Result Value Ref Range    Extra Tube hold for add-on    CBC Auto Differential    Collection Time: 09/01/18 10:43 AM   Result Value Ref Range    WBC 6.68 3.50 - 10.80 10*3/mm3    RBC 5.31 (H) 3.89 - 5.14 10*6/mm3    Hemoglobin 14.0 11.5 - 15.5 g/dL    Hematocrit 44.8 (H) 34.5 - 44.0 %    MCV 84.4 80.0 - 99.0 fL    MCH 26.4 (L) 27.0 - 31.0 pg    MCHC 31.3 (L) 32.0 - 36.0 g/dL    RDW 17.2 (H) 11.3 - 14.5 %    RDW-SD 52.6 37.0 - 54.0 fl    MPV 11.8 6.0 - 12.0 fL    Platelets 148 (L) 150 - 450 10*3/mm3    Neutrophil % 70.8 41.0 - 71.0 %    Lymphocyte % 20.5 (L) 24.0 - 44.0 %    Monocyte %  6.3 0.0 - 12.0 %    Eosinophil % 2.1 0.0 - 3.0 %    Basophil % 0.3 0.0 - 1.0 %    Immature Grans % 0.1 0.0 - 0.6 %    Neutrophils, Absolute 4.73 1.50 - 8.30 10*3/mm3    Lymphocytes, Absolute 1.37 0.60 - 4.80 10*3/mm3    Monocytes, Absolute 0.42 0.00 - 1.00 10*3/mm3    Eosinophils, Absolute 0.14 0.00 - 0.30 10*3/mm3    Basophils, Absolute 0.02 0.00 - 0.20 10*3/mm3    Immature Grans, Absolute 0.01 0.00 - 0.03 10*3/mm3   Scan Slide    Collection Time: 09/01/18 10:43 AM   Result Value Ref Range    RBC Morphology Normal Normal    WBC Morphology Normal Normal    Clumped Platelets Present None Seen   POC Troponin, Rapid    Collection Time: 09/01/18 10:45 AM   Result Value Ref Range    Troponin I 0.00 0.00 - 0.07 ng/mL   POC Troponin, Rapid    Collection Time: 09/01/18  1:12 PM   Result Value Ref Range    Troponin I 0.00 0.00 - 0.07 ng/mL   Troponin    Collection Time: 09/01/18  1:38 PM   Result Value Ref Range    Troponin I 0.007 <=0.039 ng/mL     Note: In addition to lab results from this visit, the labs listed above may include labs taken at another facility or during a different encounter within the last 24 hours. Please correlate lab times with ED admission and discharge times for further clarification of the services performed during this visit.    XR Chest 1 View   Preliminary Result   Stable right pleural effusion.       DICTATED:   9/1/2018   EDITED/ls :   9/1/2018                 Vitals:    09/01/18 1245 09/01/18 1321 09/01/18 1325 09/01/18 1435   BP:  (!) 209/79 (!) 205/94 (!) 205/94   BP Location:  Right arm Left arm    Patient Position:       Pulse: (!) 49 50 50 53   Resp:  18     Temp:  96 °F (35.6 °C)     TempSrc:  Axillary     SpO2: 98%      Weight:   47.7 kg (105 lb 3.2 oz)    Height:         Medications   sodium chloride 0.9 % flush 10 mL (not administered)   nitroglycerin 50 mg/250 mL (0.2 mg/mL) infusion (15 mcg/min Intravenous Currently Infusing 9/1/18 3337)   amiodarone (PACERONE) tablet 200 mg (200 mg  Oral Given 9/1/18 1435)   amLODIPine (NORVASC) tablet 10 mg (10 mg Oral Given 9/1/18 1436)   apixaban (ELIQUIS) tablet 2.5 mg (2.5 mg Oral Given 9/1/18 1436)   aspirin chewable tablet 81 mg (81 mg Oral Not Given 9/1/18 1436)   atorvastatin (LIPITOR) tablet 40 mg (not administered)   bisacodyl (DULCOLAX) EC tablet 5 mg (not administered)   carvedilol (COREG) tablet 25 mg (not administered)   citalopram (CeleXA) tablet 20 mg (20 mg Oral Given 9/1/18 1436)   docusate sodium (COLACE) capsule 100 mg (not administered)   donepezil (ARICEPT) tablet 5 mg (not administered)   HYDROcodone-acetaminophen (NORCO) 5-325 MG per tablet 1 tablet (not administered)   isosorbide mononitrate (IMDUR) 24 hr tablet 60 mg (60 mg Oral Given 9/1/18 1436)   cetirizine (zyrTEC) tablet 5 mg (5 mg Oral Given 9/1/18 1436)   polyethylene glycol 3350 powder (packet) (17 g Oral Not Given 9/1/18 1437)   dextrose (GLUTOSE) oral gel 15 g (not administered)   dextrose (D50W) solution 25 g (not administered)   glucagon (human recombinant) (GLUCAGEN DIAGNOSTIC) injection 1 mg (not administered)   sodium chloride 0.9 % flush 1-10 mL (not administered)   sodium chloride 0.9 % infusion (not administered)   acetaminophen (TYLENOL) tablet 650 mg (not administered)   Morphine sulfate (PF) injection 1 mg (not administered)     And   naloxone (NARCAN) injection 0.4 mg (not administered)   insulin lispro (humaLOG) injection 0-9 Units (not administered)   ondansetron (ZOFRAN) injection 4 mg (not administered)   CloNIDine (CATAPRES) tablet 0.1 mg (not administered)   Morphine sulfate (PF) injection 2 mg (2 mg Intravenous Given 9/1/18 1135)     ECG/EMG Results (last 24 hours)     Procedure Component Value Units Date/Time    ECG 12 Lead [[id number]] Collected:  09/01/18 1034     Updated:  09/01/18 1100    Narrative:       Test Reason : chest pain  Blood Pressure : **/** mmHG  Vent. Rate : 050 BPM     Atrial Rate : 050 BPM     P-R Int : 190 ms          QRS Dur : 092  ms      QT Int : 488 ms       P-R-T Axes : 063 -17 046 degrees     QTc Int : 444 ms    Sinus bradycardia  Otherwise normal ECG  When compared with ECG of 21-AUG-2018 18:24,  No significant change was found  Confirmed by SERGIO MI MD ([id number]) on 9/1/2018 11:00:33 AM    Referred By:  EDMD           Confirmed By:SERGIO MI MD                        MDM  Number of Diagnoses or Management Options  Chest pain, unspecified type:   Chronic atrial fibrillation (CMS/[facility]):   History of coronary artery disease:   Severe uncontrolled hypertension:   Type 2 diabetes mellitus with hyperglycemia, unspecified long term insulin use status (CMS/[facility]):   Diagnosis management comments: ECG/EMG Results (last 24 hours)     Procedure Component Value Units Date/Time    ECG 12 Lead ([id number]) Collected:  09/01/18 1034     Updated:  09/01/18 1100    Narrative:       Test Reason : chest pain  Blood Pressure : **/** mmHG  Vent. Rate : 050 BPM     Atrial Rate : 050 BPM     P-R Int : 190 ms          QRS Dur : 092 ms      QT Int : 488 ms       P-R-T Axes : 063 -17 046 degrees     QTc Int : 444 ms    Sinus bradycardia  Otherwise normal ECG  When compared with ECG of 21-AUG-2018 18:24,  No significant change was found  Confirmed by SERGIO MI MD ([id number]) on 9/1/2018 11:00:33 AM    Referred By:  EDMD           Confirmed By:SERGIO MI MD    ECG 12 Lead ([id number]) Collected:  09/01/18 1305     Updated:  09/01/18 1306    ECG 12 Lead ([id number]) Collected:  09/01/18 1405     Updated:  09/01/18 1405    Narrative:       Test Reason : unstable angina, hx CAD with stents  Blood Pressure : **/** mmHG  Vent. Rate : 051 BPM     Atrial Rate : 051 BPM     P-R Int : 188 ms          QRS Dur : 098 ms      QT Int : 506 ms       P-R-T Axes : 002 -16 040 degrees     QTc Int : 466 ms    Sinus bradycardia  Otherwise normal ECG  When compared with ECG of 01-SEP-2018 13:05, (Unconfirmed)  No significant change was found    Referred By:             Confirmed By:               Amount and/or Complexity of Data Reviewed  Clinical lab tests: reviewed  Tests in the radiology section of CPT®: reviewed  Decide to obtain previous medical records or to obtain history from someone other than the patient: yes  Obtain history from someone other than the patient: yes  Discuss the patient with other providers: yes  Independent visualization of images, tracings, or specimens: yes        Final diagnoses:   Chest pain, unspecified type   Severe uncontrolled hypertension   Chronic atrial fibrillation (CMS/[facility])   History of coronary artery disease   Type 2 diabetes mellitus with hyperglycemia, unspecified long term insulin use status (CMS/[facility])       Documentation assistance provided by drew Negrete.  Information recorded by the drew was done at my direction and has been verified and validated by me.     Misbah Negrete  09/01/18 1109       Misbah Negrete  09/01/18 1203       Kamlesh Trammell MD  09/01/18 [id number]

## 2018-09-01 NOTE — CONSULTS
Inpatient Cardiology Consult  Consult performed by: ALEX WIGGINS IV  Consult ordered by: EDGAR RODRIGUEZ  Reason for consult: unstable angina / hypertensive urgency          [facility]  Cardiovascular Consultation Note          The patient is an 83-year-old female with a history of coronary disease with remote PCI, paroxysmal atrial fibrillation, and multiple cardiac risk factors. The patient presented to [facility] this morning with nonexertional substernal left chest discomfort which has been occurring over the last several weeks. This is coincided with her having worsening hypertension. She is admitted to the hospitalist service and started on intravenous nitroglycerin for blood pressure control. She is presently chest pain-free. She lives in nursing home and has some degree of dementia, although is very pleasant and response my questions appropriately. The patient is home antihypertensive medications include amlodipine, carvedilol, isosorbide, losartan. Losartan is presently on hold due to renal insufficiency.      Cardiac risk factors: Known CAD, HTN, HLD, Advanced age, DM    Past medical and surgical history, social and family history reviewed in EMR.    REVIEW OF SYSTEMS:   H&P ROS reviewed and pertinent CV ROS as noted in HPI.         Vital Sign Min/Max for last 24 hours  Temp  Min: 96 °F (35.6 °C)  Max: 98.2 °F (36.8 °C)   BP  Min: 164/84  Max: 209/79   Pulse  Min: 47  Max: 54   Resp  Min: 14  Max: 18   SpO2  Min: 96 %  Max: 100 %   No Data Recorded      Intake/Output Summary (Last 24 hours) at 09/01/18 2050  Last data filed at 09/01/18 1801   Gross per 24 hour   Intake              360 ml   Output                0 ml   Net              360 ml           Physical Exam   Constitutional: She is oriented to person, place, and time. She appears well-developed and well-nourished.   HENT:   Head: Normocephalic and atraumatic.   Cardiovascular: Normal rate and  regular rhythm.    No murmur heard.  Pulmonary/Chest: Effort normal.   Abdominal: Soft.   Neurological: She is alert and oriented to person, place, and time.   Skin: Skin is warm and dry.   Psychiatric: She has a normal mood and affect. Her behavior is normal.     EKG: Sinus Bradycardia     Lab Review:   Labs reviewed in the electronic medical record.  Pertinent findings include:  Lab Results   Component Value Date    GLUCOSE 274 (H) 09/01/2018    BUN 21 09/01/2018    CREATININE 1.32 (H) 09/01/2018    EGFRIFNONA 38 (L) 09/01/2018    BCR 15.9 09/01/2018    K 4.3 09/01/2018    CO2 24.0 09/01/2018    CALCIUM 9.5 09/01/2018    ALBUMIN 4.42 09/01/2018    AST 38 (H) 09/01/2018    ALT 40 09/01/2018     Lab Results   Component Value Date    WBC 6.68 09/01/2018    HGB 14.0 09/01/2018    HCT 44.8 (H) 09/01/2018    MCV 84.4 09/01/2018     (L) 09/01/2018     Lab Results   Component Value Date    CHLPL 123 01/24/2016    TRIG 149 01/24/2016    HDL 32 (L) 01/24/2016    LDL 67 01/24/2016           Hospital Problem List     * (Principal)Hypertensive urgency    Coronary artery disease involving native coronary artery of native heart with angina pectoris (CMS/HCC)    Overview Addendum 9/1/2018  8:49 PM by Jorge Fonseca IV, MD     · Cardiac cath with PCI data deficit  · Echo (9/1/18): LVEF 60%. LVH. Left atrial dilatation. Aortic calcification with no stenosis.         Paroxysmal atrial fibrillation (CMS/HCC)    Overview Addendum 9/1/2018  8:49 PM by Jorge Fonseca IV, MD     · Chads Vasc 6 (age > 75, female, CAD, DM, HTN)   · Rhythm control strategy with amiodarone         DEANGELO (acute kidney injury) (CMS/[facility])    Type 2 diabetes mellitus with complication, with long-term current use of insulin (CMS/HCC)    Dementia    Chronic diastolic congestive heart failure (CMS/HCC)    Overview Signed 9/1/2018  8:49 PM by Jorge Fonseca IV, MD     · Echo (9/1/18): LVEF 60%. LVH. Left atrial dilatation. Aortic  calcification with no stenosis.                      · Titrate NTG to keep SBP < 180 mmHg  · Increase Hydralazine 50 mg q 8 hours  · Pharmacologic nuclear stress test to be considered once blood pressure controlled  · Add chlorthalidone 12.5 mg daily once renal function normalizes.      Jorge Fonseca IV, MD  9/1/2018

## 2018-09-02 LAB
ANION GAP SERPL CALCULATED.3IONS-SCNC: 8 MMOL/L (ref 3–11)
BUN BLD-MCNC: 25 MG/DL (ref 9–23)
BUN/CREAT SERPL: 18.2 (ref 7–25)
CALCIUM SPEC-SCNC: 8.7 MG/DL (ref 8.7–10.4)
CHLORIDE SERPL-SCNC: 106 MMOL/L (ref 99–109)
CO2 SERPL-SCNC: 25 MMOL/L (ref 20–31)
CREAT BLD-MCNC: 1.37 MG/DL (ref 0.6–1.3)
GFR SERPL CREATININE-BSD FRML MDRD: 37 ML/MIN/1.73
GLUCOSE BLD-MCNC: 183 MG/DL (ref 70–100)
GLUCOSE BLDC GLUCOMTR-MCNC: 119 MG/DL (ref 70–130)
GLUCOSE BLDC GLUCOMTR-MCNC: 154 MG/DL (ref 70–130)
GLUCOSE BLDC GLUCOMTR-MCNC: 198 MG/DL (ref 70–130)
GLUCOSE BLDC GLUCOMTR-MCNC: 223 MG/DL (ref 70–130)
POTASSIUM BLD-SCNC: 3.9 MMOL/L (ref 3.5–5.5)
SODIUM BLD-SCNC: 139 MMOL/L (ref 132–146)
TROPONIN I SERPL-MCNC: 0.01 NG/ML

## 2018-09-02 PROCEDURE — 63710000001 AMIODARONE 200 MG TABLET: Performed by: FAMILY MEDICINE

## 2018-09-02 PROCEDURE — G8988 SELF CARE GOAL STATUS: HCPCS

## 2018-09-02 PROCEDURE — 63710000001 CITALOPRAM 20 MG TABLET: Performed by: FAMILY MEDICINE

## 2018-09-02 PROCEDURE — 97165 OT EVAL LOW COMPLEX 30 MIN: CPT

## 2018-09-02 PROCEDURE — 97162 PT EVAL MOD COMPLEX 30 MIN: CPT

## 2018-09-02 PROCEDURE — A9270 NON-COVERED ITEM OR SERVICE: HCPCS | Performed by: FAMILY MEDICINE

## 2018-09-02 PROCEDURE — 63710000001 CETIRIZINE 10 MG TABLET: Performed by: FAMILY MEDICINE

## 2018-09-02 PROCEDURE — 99232 SBSQ HOSP IP/OBS MODERATE 35: CPT | Performed by: INTERNAL MEDICINE

## 2018-09-02 PROCEDURE — G8987 SELF CARE CURRENT STATUS: HCPCS

## 2018-09-02 PROCEDURE — 97535 SELF CARE MNGMENT TRAINING: CPT

## 2018-09-02 PROCEDURE — 63710000001 AMLODIPINE 10 MG TABLET: Performed by: FAMILY MEDICINE

## 2018-09-02 PROCEDURE — 82962 GLUCOSE BLOOD TEST: CPT

## 2018-09-02 PROCEDURE — 63710000001 APIXABAN 2.5 MG TABLET: Performed by: FAMILY MEDICINE

## 2018-09-02 PROCEDURE — 84484 ASSAY OF TROPONIN QUANT: CPT | Performed by: INTERNAL MEDICINE

## 2018-09-02 PROCEDURE — A9270 NON-COVERED ITEM OR SERVICE: HCPCS | Performed by: NURSE PRACTITIONER

## 2018-09-02 PROCEDURE — 63710000001 ISOSORBIDE MONONITRATE 60 MG TABLET SUSTAINED-RELEASE 24 HOUR: Performed by: FAMILY MEDICINE

## 2018-09-02 PROCEDURE — 99232 SBSQ HOSP IP/OBS MODERATE 35: CPT | Performed by: FAMILY MEDICINE

## 2018-09-02 PROCEDURE — 80048 BASIC METABOLIC PNL TOTAL CA: CPT | Performed by: FAMILY MEDICINE

## 2018-09-02 PROCEDURE — 63710000001 HYDRALAZINE 50 MG TABLET: Performed by: NURSE PRACTITIONER

## 2018-09-02 RX ADMIN — INSULIN LISPRO 4 UNITS: 100 INJECTION, SOLUTION INTRAVENOUS; SUBCUTANEOUS at 12:09

## 2018-09-02 RX ADMIN — INSULIN LISPRO 2 UNITS: 100 INJECTION, SOLUTION INTRAVENOUS; SUBCUTANEOUS at 08:40

## 2018-09-02 RX ADMIN — HYDRALAZINE HYDROCHLORIDE 50 MG: 50 TABLET, FILM COATED ORAL at 15:31

## 2018-09-02 RX ADMIN — SODIUM CHLORIDE 100 ML/HR: 9 INJECTION, SOLUTION INTRAVENOUS at 12:09

## 2018-09-02 RX ADMIN — AMIODARONE HYDROCHLORIDE 200 MG: 200 TABLET ORAL at 08:40

## 2018-09-02 RX ADMIN — CITALOPRAM HYDROBROMIDE 20 MG: 20 TABLET ORAL at 08:41

## 2018-09-02 RX ADMIN — HYDRALAZINE HYDROCHLORIDE 50 MG: 50 TABLET, FILM COATED ORAL at 06:33

## 2018-09-02 RX ADMIN — APIXABAN 2.5 MG: 2.5 TABLET, FILM COATED ORAL at 20:54

## 2018-09-02 RX ADMIN — AMLODIPINE BESYLATE 10 MG: 10 TABLET ORAL at 08:41

## 2018-09-02 RX ADMIN — DONEPEZIL HYDROCHLORIDE 5 MG: 10 TABLET, FILM COATED ORAL at 20:54

## 2018-09-02 RX ADMIN — HYDRALAZINE HYDROCHLORIDE 50 MG: 50 TABLET, FILM COATED ORAL at 21:42

## 2018-09-02 RX ADMIN — ISOSORBIDE MONONITRATE 60 MG: 60 TABLET, EXTENDED RELEASE ORAL at 08:41

## 2018-09-02 RX ADMIN — ATORVASTATIN CALCIUM 40 MG: 40 TABLET, FILM COATED ORAL at 20:54

## 2018-09-02 RX ADMIN — APIXABAN 2.5 MG: 2.5 TABLET, FILM COATED ORAL at 08:41

## 2018-09-02 RX ADMIN — DOCUSATE SODIUM 100 MG: 100 CAPSULE, LIQUID FILLED ORAL at 20:54

## 2018-09-02 RX ADMIN — SODIUM CHLORIDE 100 ML/HR: 9 INJECTION, SOLUTION INTRAVENOUS at 20:52

## 2018-09-02 RX ADMIN — CETIRIZINE HYDROCHLORIDE 5 MG: 10 TABLET, FILM COATED ORAL at 08:40

## 2018-09-02 NOTE — THERAPY EVALUATION
Acute Care - Occupational Therapy Initial Evaluation  [facility]     Patient Name: Mary Ramirez  : 1934  MRN: [id number]  Today's Date: 2018  Onset of Illness/Injury or Date of Surgery: 18  Date of Referral to OT: 18  Referring Physician: MD Peter    Admit Date: 2018       ICD-10-CM ICD-9-CM   1. Chest pain, unspecified type R07.9 786.50   2. Severe uncontrolled hypertension I10 401.9   3. Chronic atrial fibrillation (CMS/[facility]) I48.2 427.31   4. History of coronary artery disease Z86.79 V12.59   5. Type 2 diabetes mellitus with hyperglycemia, unspecified long term insulin use status (CMS/[facility]) E11.65 250.00   6. Hypertensive urgency I16.0 401.9   7. Impaired mobility and ADLs Z74.09 799.89     Patient Active Problem List   Diagnosis   • Fall   • Fracture, intertrochanteric, left femur (CMS/[facility])   • Essential hypertension   • Type 2 diabetes mellitus with complication, with long-term current use of insulin (CMS/[facility])   • Coronary artery disease involving native coronary artery of native heart with angina pectoris (CMS/[facility])   • Dementia   • Paroxysmal atrial fibrillation (CMS/[facility])   • SSS (sick sinus syndrome) (CMS/[facility])   • Chronic diastolic congestive heart failure (CMS/[facility])   • Hypertensive urgency   • DEANGELO (acute kidney injury) (CMS/[facility])     Past Medical History:   Diagnosis Date   • Atrial fibrillation (CMS/[facility])    • CAD (coronary artery disease)    • CHF (congestive heart failure) (CMS/[facility])    • DDD (degenerative disc disease), cervical    • Dementia    • Diabetes mellitus (CMS/[facility])    • Hypertension    • SSS (sick sinus syndrome) (CMS/[facility])      Past Surgical History:   Procedure Laterality Date   • ANKLE SURGERY Left    • ATHRECTOMY ILIAC, FEMORAL, TIBIAL ARTERY     • BREAST LUMPECTOMY Left    • COLON SURGERY     • CORONARY STENT PLACEMENT     • FINGER FRACTURE SURGERY Right    • HIP TROCANTERIC NAILING WITH INTRAMEDULLARY HIP SCREW Left 2017   • HYSTERECTOMY             OT ASSESSMENT FLOWSHEET (last 72 hours)      Occupational Therapy Evaluation     Row Name 09/02/18 0800                   OT Evaluation Time/Intention    Subjective Information complains of;weakness  -KF        Document Type evaluation  -KF        Mode of Treatment individual therapy;occupational therapy  -KF        Patient Effort excellent  -KF        Symptoms Noted During/After Treatment none  -KF        Comment tolerated well, RN cleared for tx  -KF           General Information    Patient Profile Reviewed? yes  -KF        Onset of Illness/Injury or Date of Surgery 09/01/18  -KF        Referring Physician MD Peter  -KF        Patient Observations alert;agree to therapy;cooperative  -KF        Patient/Family Observations no family present, Pt oriented x4, very pleasant and willing to work with OT  -KF        General Observations of Patient up in [place] [facility]  -KF        Prior Level of Function independent:;ADL's;min assist:;bathing;mod assist:;home management  -KF        Equipment Currently Used at Home walker, rolling;wheelchair;bath bench  -KF        Pertinent History of Current Functional Problem Pt 84 yo female hx of HTN, DM, CHF, CKD, CAD presents to ED c/o chest pain EKG and troponin unremarkable with elevated BP  -KF        Existing Precautions/Restrictions fall  -KF        Risks Reviewed patient:;LOB;nausea/vomiting;dizziness  -KF        Benefits Reviewed patient:;improve function;increase independence;increase strength;decrease pain;increase balance;increase knowledge  -KF        Barriers to Rehab none identified  -KF           Relationship/Environment    Lives With facility resident  -KF        Concerns About Impact on Relationships lives at [facility] that provides assistance/supervision for bathing tasks and meals daily  -KF           Resource/Environmental Concerns    Current Living Arrangements independent/assisted living facility  -KF           Cognitive Assessment/Interventions    Additional  Documentation Cognitive Assessment/Intervention (Group)  -KF           Cognitive Assessment/Intervention- PT/OT    Affect/Mental Status (Cognitive) WNL  -KF        Orientation Status (Cognition) oriented x 4  -KF        Follows Commands (Cognition) follows one step commands;over 90% accuracy;repetition of directions required  -KF        Cognitive Function (Cognitive) memory deficit;safety deficit  -KF        Memory Deficit (Cognitive) mild deficit;working memory  -KF        Safety Deficit (Cognitive) mild deficit;safety precautions awareness;safety precautions follow-through/compliance;awareness of need for assistance  -KF        Cognitive Interventions (Cognitive) occupation/activity based interventions;process/task specific training  -KF        Personal Safety Interventions fall prevention program maintained;gait belt;muscle strengthening facilitated;nonskid shoes/slippers when out of bed  -KF           Safety Issues, Functional Mobility    Safety Issues Affecting Function (Mobility) awareness of need for assistance;safety precaution awareness  -KF        Impairments Affecting Function (Mobility) balance;pain;endurance/activity tolerance  -KF        Comment, Safety Issues/Impairments (Mobility) pain in L LE with some movements states that is baseline  -KF           Bed Mobility Assessment/Treatment    Comment (Bed Mobility) up with nursing staff prior to entry  -KF           Functional Mobility    Functional Mobility- Ind. Level contact guard assist;verbal cues required  -KF        Functional Mobility- Device rolling walker  -KF        Functional Mobility-Distance (Feet) 4  -KF        Functional Mobility- Safety Issues weight-shifting ability decreased  -KF           Transfer Assessment/Treatment    Transfer Assessment/Treatment bed-chair transfer;chair-bed transfer;sit-stand transfer;stand-sit transfer;toilet transfer  -KF        Comment (Transfers) VC's for hand placement to push from bed and reach back for  chair  -KF           Bed-Chair Transfer    Bed-Chair [place] (Transfers) contact guard;verbal cues  -KF        Assistive Device (Bed-Chair Transfers) walker, front-wheeled  -KF           Chair-Bed Transfer    Chair-Bed [place] (Transfers) contact guard;verbal cues  -KF        Assistive Device (Chair-Bed Transfers) walker, front-wheeled  -KF           Sit-Stand Transfer    Sit-Stand [place] (Transfers) verbal cues;contact guard  -KF        Assistive Device (Sit-Stand Transfers) walker, front-wheeled  -KF           Stand-Sit Transfer    Stand-Sit [place] (Transfers) contact guard;verbal cues  -KF        Assistive Device (Stand-Sit Transfers) walker, front-wheeled  -KF           Toilet Transfer    Type (Toilet Transfer) sit-stand;stand-sit  -KF        [place] Level (Toilet Transfer) contact guard;verbal cues  -KF        Assistive Device (Toilet Transfer) commode, bedside without drop arms;walker, front-wheeled  -KF           ADL Assessment/Intervention    98708 - OT Self Care/Mgmt Minutes 15  -KF        BADL Assessment/Intervention lower body dressing;feeding;toileting;grooming  -KF           Lower Body Dressing Assessment/Training    Lower Body Dressing [place] Level don;doff;undergarment;minimum assist (75% patient effort);verbal cues;pants/bottoms;contact guard assist  -KF        Lower Body Dressing Position edge of bed sitting;supported sitting  -KF        Comment (Lower Body Dressing) required min A for undergarment while sitting EOB, CGA for donning pants UIC  -KF           Grooming Assessment/Training    [place] Level (Grooming) hair care, combing/brushing;wash face, hands;supervision;verbal cues  -KF        Grooming Position edge of bed sitting  -KF        Comment (Grooming) cues for completion   -KF           Self-Feeding Assessment/Training    [place] Level (Feeding) prepare tray/open items;independent  -KF        Self-Feeding Assess/Train, Spillage Amount minimal  -KF         Comment (Feeding) UIC, able to open containers without assistance   -KF           Toileting Assessment/Training    [place] Level (Toileting) adjust/manage clothing;minimum assist (75% patient effort);verbal cues;change pad/brief;perform perineal hygiene;supervision  -KF        Assistive Devices (Toileting) commode, bedside without drop arms  -KF        Toileting Position supported sitting;supported standing  -KF        Comment (Toileting) min A for clothing management in standing   -KF           BADL Safety/Performance    Impairments, BADL Safety/Performance balance;cognition;endurance/activity tolerance;pain;range of motion;strength  -KF        Cognitive Impairments, BADL Safety/Performance safety precaution awareness;sequencing abilities;awareness, need for assistance  -KF        Skilled BADL Treatment/Intervention BADL process/adaptation training;cognitive/safety deficit modifications  -KF           General ROM    GENERAL ROM COMMENTS BUE WFL AROM  -KF           MMT (Manual Muscle Testing)    General MMT Comments BUE grossly 3+/5  -KF           Motor Assessment/Interventions    Additional Documentation Balance (Group)  -KF           Balance    Balance static sitting balance;static standing balance;dynamic sitting balance;dynamic standing balance  -KF           Static Sitting Balance    Level of [place] (Unsupported Sitting, Static Balance) independent  -KF        Sitting Position (Unsupported Sitting, Static Balance) sitting on edge of bed  -KF        Time Able to Maintain Position (Unsupported Sitting, Static Balance) more than 5 minutes  -KF           Dynamic Sitting Balance    Level of [place], Reaches Outside Midline (Sitting, Dynamic Balance) standby assist  -KF        Sitting Position, Reaches Outside Midline (Sitting, Dynamic Balance) sitting in chair  -KF        Comment, Reaches Outside Midline (Sitting, Dynamic Balance) mild balance deficits with posterior LOB Pt corrects  -KF            Static Standing Balance    Level of [place] (Supported Standing, Static Balance) standby assist;contact guard assist  -KF        Time Able to Maintain Position (Supported Standing, Static Balance) 30 to 45 seconds;1 to 2 minutes  -KF        Assistive Device Utilized (Supported Standing, Static Balance) rolling walker  -KF        Comment (Supported Standing, Static Balance) SBA up to 30 seconds, CGA most of time  -KF           Dynamic Standing Balance    Level of [place], Reaches Outside Midline (Standing, Dynamic Balance) contact guard assist  -KF        Time Able to Maintain Position, Reaches Outside Midline (Standing, Dynamic Balance) 30 to 45 seconds  -KF           Sensory Assessment/Intervention    Sensory General Assessment no sensation deficits identified  -KF        Additional Documentation Vision Assessment/Intervention (Group);Hearing Assessment (Group)  -KF           Hearing Assessment    Hearing Status WNL  -KF           Vision Assessment/Intervention    Visual Impairment/Limitations corrective lenses full time  -KF           Positioning and Restraints    Pre-Treatment Position bedside commode  -KF        Post Treatment Position chair  -KF        In Chair notified nsg;reclined;sitting;call light within reach;encouraged to call for assist;exit alarm on;waffle cushion;legs elevated  -KF           Pain Assessment    Additional Documentation Pain Scale: Numbers Pre/Post-Treatment (Group)  -KF           Pain Scale: Numbers Pre/Post-Treatment    Pain Scale: Numbers, Pretreatment 0/10 - no pain  -KF        Pain Scale: Numbers, Post-Treatment 0/10 - no pain  -KF        Pre/Post Treatment Pain Comment states has pain in LLE most of time, no pain in rest  -KF        Pain Intervention(s) Repositioned;Ambulation/increased activity  -KF           [REMOVED] Wound 04/07/18 1555 Left hand skin tear;abrasion    Wound - Properties Group Date first assessed: 04/07/18  -RM Time first assessed: 1555  -RM Present  On Admission : yes  -RM Side: Left  -RM Location: hand  -RM Type: skin tear;abrasion  -RM Additional Comments: 2 areas   -RM Resolution Date: 09/01/18  -MS Resolution Time: 1330  -MS       [REMOVED] Wound 04/07/18 1557 Right shoulder skin tear    Wound - Properties Group Date first assessed: 04/07/18  -RM Time first assessed: 1557  -RM Present On Admission : yes  -RM Side: Right  -RM Location: shoulder  -RM Type: skin tear  -RM Resolution Date: 09/01/18  -MS Resolution Time: 1330  -MS       Plan of Care Review    Plan of Care Reviewed With patient  -KF           Clinical Impression (OT)    Date of Referral to OT 09/01/18  -KF        OT Diagnosis ADL decline  -KF        Criteria for Skilled Therapeutic Interventions Met (OT Eval) yes;treatment indicated  -KF        Rehab Potential (OT Eval) good, to achieve stated therapy goals  -KF        Care Plan Review (OT) care plan/treatment goals reviewed;evaluation/treatment results reviewed;risks/benefits reviewed;current/potential barriers reviewed;patient/other agree to care plan  -KF        Anticipated Discharge Disposition (OT) assisted living facility (skilled nursing);home with 24/7 care  -KF           Vital Signs    Pre Systolic BP Rehab 151  -KF        Pre Treatment Diastolic BP 65   RN cleared, vitals monitored stable throughout  -KF        Pretreatment Heart Rate (beats/min) 55  -KF        Intratreatment Heart Rate (beats/min) 70  -KF        Posttreatment Heart Rate (beats/min) 58  -KF        O2 Delivery Pre Treatment room air  -KF        O2 Delivery Post Treatment room air  -KF        Pre Patient Position Sitting  -KF        Intra Patient Position Standing  -KF        Post Patient Position Sitting  -KF           Planned OT Interventions    Planned Therapy Interventions (OT Eval) activity tolerance training;BADL retraining;adaptive equipment training;cognitive/visual perception retraining;functional balance retraining;IADL retraining;neuromuscular control/coordination  retraining;occupation/activity based interventions;patient/caregiver education/training;ROM/therapeutic exercise;strengthening exercise;transfer/mobility retraining  -KF           OT Goals    Bed Mobility Goal Selection (OT) bed mobility, OT goal 1  -KF        Transfer Goal Selection (OT) transfer, OT goal 1  -KF        Toileting Goal Selection (OT) toileting, OT goal 1  -KF        Strength Goal Selection (OT) strength, OT goal 1  -KF        Additional Documentation Strength Goal Selection (OT) (Row)  -KF           Bed Mobility Goal 1 (OT)    Activity/Assistive Device (Bed Mobility Goal 1, OT) sit to supine/supine to sit  -KF        [place] Level/Cues Needed (Bed Mobility Goal 1, OT) independent  -KF        Time Frame (Bed Mobility Goal 1, OT) long term goal (LTG);by discharge  -KF        Progress/Outcomes (Bed Mobility Goal 1, OT) goal ongoing  -KF           Transfer Goal 1 (OT)    Activity/Assistive Device (Transfer Goal 1, OT) sit-to-stand/stand-to-sit;bed-to-chair/chair-to-bed  -KF        [place] Level/Cues Needed (Transfer Goal 1, OT) supervision required;verbal cues required  -KF        Time Frame (Transfer Goal 1, OT) long term goal (LTG);by discharge  -KF        Progress/Outcome (Transfer Goal 1, OT) goal ongoing  -KF           Toileting Goal 1 (OT)    Activity/Device (Toileting Goal 1, OT) adjust/manage clothing;perform perineal hygiene;commode;grab bar/safety frame  -KF        [place] Level/Cues Needed (Toileting Goal 1, OT) standby assist;verbal cues required  -KF        Time Frame (Toileting Goal 1, OT) long term goal (LTG);by discharge  -KF        Progress/Outcome (Toileting Goal 1, OT) goal ongoing  -KF           Strength Goal 1 (OT)    Strength Goal 1 (OT) Demonstrate BUE 20 reps x2 HEP to improve strength for ADL completion  -KF        Time Frame (Strength Goal 1, OT) long term goal (LTG);by discharge  -KF        Progress/Outcome (Strength Goal 1, OT) goal ongoing  -KF           Living  Environment    Home Accessibility wheelchair accessible;other (see comments)   walk in shower  -          User Key  (r) = Recorded By, (t) = Taken By, (c) = Cosigned By    Initials Name Effective Dates    MS Coker Quinn CASTRO RN 03/13/17 -      May, Deanna FARRELL RN 03/01/17 -     KF Myrna Rodriguez, OT 04/03/18 -            Occupational Therapy Education     Title: PT OT SLP Therapies (Active)     Topic: Occupational Therapy (Active)     Point: ADL training (Done)     Description: Instruct learner(s) on proper safety adaptation and remediation techniques during self care or transfers.   Instruct in proper use of assistive devices.   Learning Progress Summary     Learner Status Readiness Method Response Comment Documented by    Patient Done Acceptance GLORIA FARRELL DU, NR Purpose of OT services, ADL retraining for LB dressing and toileting, safety awareness and sequencing for functional transfers  09/02/18 0857          Point: Precautions (Done)     Description: Instruct learner(s) on prescribed precautions during self-care and functional transfers.   Learning Progress Summary     Learner Status Readiness Method Response Comment Documented by    Patient Done Acceptance GLORIA FARRELL DU,NR Purpose of OT services, ADL retraining for LB dressing and toileting, safety awareness and sequencing for functional transfers  09/02/18 0857          Point: Body mechanics (Done)     Description: Instruct learner(s) on proper positioning and spine alignment during self-care, functional mobility activities and/or exercises.   Learning Progress Summary     Learner Status Readiness Method Response Comment Documented by    Patient Done Acceptance GLORIA FARRELL DU, NR Purpose of OT services, ADL retraining for LB dressing and toileting, safety awareness and sequencing for functional transfers  09/02/18 0857                      User Key     Initials Effective Dates Name Provider Type Discipline     04/03/18 -  Myrna Rodriguez, OT  Occupational Therapist OT                  OT Recommendation and Plan  Outcome Summary/Treatment Plan (OT)  Anticipated Discharge Disposition (OT): assisted living facility (group home), home with 24/7 care  Planned Therapy Interventions (OT Eval): activity tolerance training, BADL retraining, adaptive equipment training, cognitive/visual perception retraining, functional balance retraining, IADL retraining, neuromuscular control/coordination retraining, occupation/activity based interventions, patient/caregiver education/training, ROM/therapeutic exercise, strengthening exercise, transfer/mobility retraining  Plan of Care Review  Plan of Care Reviewed With: patient  Plan of Care Reviewed With: patient  Outcome Summary: OT eval completed Pt presents with deficits in strength, AROM, activity tolerance and dynamic balance limiting ADL completion. CGA for sit to stand transfers with VC's at RW and bed<>chair transfers, min A for clothing managment in standing, Karina for donning undergarment. Recom IPOT d/c group home with assist 24/7.          Outcome Measures     Row Name 09/02/18 0800             How much help from another is currently needed...    Putting on and taking off regular lower body clothing? 3  -KF      Bathing (including washing, rinsing, and drying) 3  -KF      Toileting (which includes using toilet bed pan or urinal) 3  -KF      Putting on and taking off regular upper body clothing 3  -KF      Taking care of personal grooming (such as brushing teeth) 3  -KF      Eating meals 3  -KF      Score 18  -KF         Functional Assessment    Outcome Measure Options AM-PAC 6 Clicks Daily Activity (OT)  -KF        User Key  (r) = Recorded By, (t) = Taken By, (c) = Cosigned By    Initials Name Provider Type    Myrna Rodriguez OT Occupational Therapist          Time Calculation:         Time Calculation- OT     Row Name 09/02/18 0800             Time Calculation- OT    OT Start Time 0800  -KF      Total Timed Code Minutes-  OT 15 minute(s)  -KF      OT Received On 09/02/18  -KF      OT Goal Re-Cert Due Date 09/12/18  -KF         Timed Charges    98435 - OT Self Care/Mgmt Minutes 15  -KF        User Key  (r) = Recorded By, (t) = Taken By, (c) = Cosigned By    Initials Name Provider Type    KF Myrna Rodriguez, OT Occupational Therapist        Therapy Suggested Charges     Code   Minutes Charges    02456 (CPT®) Hc Ot Neuromusc Re Education Ea 15 Min      92244 (CPT®) Hc Ot Ther Proc Ea 15 Min      56330 (CPT®) Hc Ot Therapeutic Act Ea 15 Min      39195 (CPT®) Hc Ot Manual Therapy Ea 15 Min      23928 (CPT®) Hc Ot Iontophoresis Ea 15 Min      38871 (CPT®) Hc Ot Elec Stim Ea-Per 15 Min      43534 (CPT®) Hc Ot Ultrasound Ea 15 Min      71002 (CPT®) Hc Ot Self Care/Mgmt/Train Ea 15 Min 15 1    Total  15 1        Therapy Charges for Today     Code Description Service Date Service Provider Modifiers Qty    29663199749 HC OT SELFCARE CURRENT 9/2/2018 Myrna Rodriguez OT GO, CK 1    71367977356 HC OT SELFCARE PROJECTED 9/2/2018 Myrna Rodriguez OT GO, CJ 1    94402370637 HC OT SELF CARE/MGMT/TRAIN EA 15 MIN 9/2/2018 Myrna Rodriguez, OT GO 1    [id number] HC OT EVAL LOW COMPLEXITY 3 9/2/2018 Myrna Rodriguez OT GO 1          OT G-codes  OT Professional Judgement Used?: Yes  OT Functional Scales Options: AM-PAC 6 Clicks Daily Activity (OT)  Functional Assessment Tool Used: 6 clicks  Score: 18  Functional Limitation: Self care  Self Care Current Status (): At least 40 percent but less than 60 percent impaired, limited or restricted  Self Care Goal Status (): At least 20 percent but less than 40 percent impaired, limited or restricted    Myrna Rodriguez OT  9/2/2018

## 2018-09-02 NOTE — PROGRESS NOTES
"    [facility] Medicine Services  PROGRESS NOTE    Patient Name: Mary Ramirez  : 1934  MRN: [id number]    Date of Admission: 2018  Length of Stay: 1  Primary Care Physician: Nirmal Lundberg MD    Subjective   Subjective     CC:  CP, HTN urgency    HPI:  BP's much improved.  CP less intense but \"I still feel it in there\" pointing to left lower sternal border.  No radiation of pain, nausea, SOA, edema.     Review of Systems  Otherwise ROS is negative except as mentioned in the HPI.    Objective   Objective     Vital Signs:   Temp:  [96 °F (35.6 °C)-98.7 °F (37.1 °C)] 97.8 °F (36.6 °C)  Heart Rate:  [48-56] 55  Resp:  [16-18] 17  BP: (140-209)/(56-94) 158/57        Physical Exam:  Constitutional: No acute distress, awake, alert, nontoxic, normal body habitus  Respiratory: Clear to auscultation bilaterally, good effort, nonlabored respirations   Cardiovascular: RRR, no murmur  Gastrointestinal: Positive bowel sounds, soft, nontender, nondistended  Musculoskeletal: No peripheral edema, normal muscle tone for age  Psychiatric: Appropriate affect, good insight and judgement, cooperative  Neurologic: Oriented x 3 and able to give reliable history, movements symmetric BUE and BLE, Cranial Nerves grossly intact to confrontation, speech clear and fluent  Skin: No rashes, no jaundice, no petechiae, no mottling       Results Reviewed:  I have personally reviewed current lab, radiology, and data and agree.      Results from last 7 days  Lab Units 18  1043   WBC 10*3/mm3 6.68   HEMOGLOBIN g/dL 14.0   HEMATOCRIT % 44.8*   PLATELETS 10*3/mm3 148*       Results from last 7 days  Lab Units 18  0446 18  1917 18  1338 18  1042   SODIUM mmol/L 139  --   --  135   POTASSIUM mmol/L 3.9  --   --  4.3   CHLORIDE mmol/L 106  --   --  103   CO2 mmol/L 25.0  --   --  24.0   BUN mg/dL 25*  --   --  21   CREATININE mg/dL 1.37*  --   --  1.32*   GLUCOSE mg/dL 183*  --   --  " 274*   CALCIUM mg/dL 8.7  --   --  9.5   ALT (SGPT) U/L  --   --   --  40   AST (SGOT) U/L  --   --   --  38*   TROPONIN I ng/mL 0.008 0.007 0.007  --      Estimated Creatinine Clearance: 23.4 mL/min (A) (by C-G formula based on SCr of 1.37 mg/dL (H)).  BNP   Date Value Ref Range Status   09/01/2018 131.0 (H) 0.0 - 100.0 pg/mL Final     Comment:     Results may be falsely decreased if patient taking Biotin.       Microbiology Results Abnormal     None          Imaging Results (last 24 hours)     Procedure Component Value Units Date/Time    XR Chest 1 View [[id number]] Collected:  09/01/18 1440     Updated:  09/01/18 1638    Narrative:       EXAMINATION: XR CHEST 1 VW - 9/1/2018     INDICATION: R07.9-Chest pain, unspecified; I10-Essential (primary)  hypertension; I48.2-Chronic atrial fibrillation; Z86.79-Personal history  of other diseases of the circulatory system; E11.65-Type 2 diabetes  mellitus with hyperglycemia.     TECHNIQUE:  Single view frontal chest.     COMPARISONS: 8/21/2018.     FINDINGS:  Unchanged right pleural effusion and adjacent atelectasis.  Atherosclerosis. No pneumothorax.       Impression:       Stable right pleural effusion.     DICTATED:   9/1/2018  EDITED/ls :   9/1/2018      This report was finalized on 9/1/2018 4:36 PM by Enrique Wood.           Results for orders placed during the hospital encounter of 09/01/18   Adult Transthoracic Echo Complete W/ Cont if Necessary Per Protocol    Narrative · Left ventricular systolic function is normal. Estimated EF = 60%.  · Left ventricular wall thickness is consistent with hypertrophy.  · Left atrial cavity size is dilated.  · There is calcification of the aortic valve.  · Ao mean PG 5.3 mmHg          I have reviewed the medications.    Assessment/Plan   Assessment / Plan     Active Hospital Problems    Diagnosis Date Noted   • **Hypertensive urgency [I16.0] 09/01/2018   • DEANGELO (acute kidney injury) (CMS/HCC) [N17.9] 09/01/2018   • Chronic diastolic  congestive heart failure (CMS/[facility]) [I50.32] 09/01/2018   • Paroxysmal atrial fibrillation (CMS/[facility]) [I48.0] 04/07/2018   • Anticoagulated [Z79.01] 04/07/2018   • Dementia [F03.90] 02/28/2018   • Type 2 diabetes mellitus with complication, with long-term current use of insulin (CMS/HCC) [E11.8, Z79.4] 11/24/2017   • Coronary artery disease involving native coronary artery of native heart with angina pectoris (CMS/[facility]) [I25.119] 11/24/2017      Resolved Hospital Problems    Diagnosis Date Noted Date Resolved   • Unstable angina (CMS/[facility]) [I20.0] 09/01/2018 09/01/2018          Brief Hospital Course to date:  Mary Ramirez is a 83 y.o. female with PMHx CAD with remote stents, diastolic CHF, PAF on chronic Eliquis, mild dementia, and DM 2.  Patient lives at assisted living and has her medications dispense to her and regular blood pressure checks.  Over the past several weeks to a few months it has been noted that she has been having escalated blood pressures despite her home hypertension medication and bothersome midsternal and left chest pain.       In ED, her initial blood pressures were markedly elevated at 200's/150's and she was placed on nitro drip which did not have much effect on her chest pain and only minimal effect on her blood pressure.      Please see above Hospital Problem List which is being actively managed to reflect all current/pertinent diagnoses, the following items may only represent today's acute or active assessments and/or plans of care.      Chest pain  HTN urgency  CAD hx of stents  - BP's better on scheduled hydralazine in addition to home meds  - once DEANGELO resolves can consider addition of chlorthalidone  - Cards follows -->  nuclear stress planned once BP's reliably resolved    DEANGELO  - cont IVF's, pt admits to poor PO hydration at baseline  - recheck renal panel am    Chronic DHF  PAF on Eliquis  - home meds    DM2  - SSI    DVT Prophylaxis:  Eliquis    CODE STATUS:   Code Status and Medical  Interventions:   Ordered at: 09/01/18 1235     Code Status:    CPR     Medical Interventions (Level of Support Prior to Arrest):    Full       Disposition: I expect the patient to be discharged ~1-2 days.      Electronically signed by Fouzia Green MD, 09/02/18, 12:16 PM.

## 2018-09-02 NOTE — PLAN OF CARE
Problem: Patient Care Overview  Goal: Plan of Care Review  Outcome: Ongoing (interventions implemented as appropriate)   09/02/18 08   Coping/Psychosocial   Plan of Care Reviewed With patient   Plan of Care Review   Progress improving   OTHER   Outcome Summary OT eval completed Pt presents with deficits in strength, AROM, activity tolerance and dynamic balance limiting ADL completion. CGA for sit to stand transfers with VC's at RW and bed<>chair transfers, min A for clothing management in standing, Karina for donning undergarment. Recom IPOT d/c KALEY with assist.

## 2018-09-02 NOTE — CONSULTS
Clinical Nutrition   Reason For Visit: Identified at risk by screening criteria, MST score 2+, Unintentional weight loss    Patient Name: Mary Ramirez  YOB: 1934  MRN: [id number]  Date of Encounter: 09/02/18 1:09 PM  Admission date: 9/1/2018        Nutrition Assessment     Hospital Problem List  Principal Problem:    Hypertensive urgency  Active Problems:    Type 2 diabetes mellitus with complication, with long-term current use of insulin (CMS/[facility])    Coronary artery disease involving native coronary artery of native heart with angina pectoris (CMS/[facility])    Dementia    Paroxysmal atrial fibrillation (CMS/HCC)    Anticoagulated    Chronic diastolic congestive heart failure (CMS/HCC)    DEANGELO (acute kidney injury) (CMS/[facility])        PMH: She  has a past medical history of Atrial fibrillation (CMS/[facility]); CAD (coronary artery disease); CHF (congestive heart failure) (CMS/[facility]); DDD (degenerative disc disease), cervical; Dementia; Diabetes mellitus (CMS/[facility]); Hypertension; and SSS (sick sinus syndrome) (CMS/[facility]).   PSxH: She  has a past surgical history that includes Breast lumpectomy (Left, 1981); Colon surgery; Ankle surgery (Left, 1996); Coronary stent placement; Athrectomy Iliac, Femoral, Tibial Artery; Hip Trochanteric Nailing (Left, 11/23/2017); Finger fracture surgery (Right); and Hysterectomy.          Reported/Observed/Food/Nutrition Related History   Patient and son present. Son reports patient has been living at an assisted living facility since Nov 2017 and has been eating less since January from a combination of 1) food preferences and 2) eliminating foods from her meal if she thinks it will result in a high blood sugar. Patient states she weighed 140 lbs Nov 2017 when she moved to her AL facility, but son, who is closely involved in her care, reports she weighed at most 115 lbs and has had only ~10 lb unintentional weight loss. Son purchased Glucerna for patient who has not been drinking them. RD  explained to patient that whenever she does not eat much (or anything at all) at a meal, she should drink the Glucerna so that she may take in some nutrition. Encouraged patient/son to continue monitoring patient's weight and add Glucerna to her diet if any additional wt loss occurs and if she is not willing to eat more food at meals/snacks.        Anthropometrics   Height: 60 in  Weight: 105 lbs (standing weight 9/1 per nsg doc)  BMI: 20.5  BMI classification: Normal: 18.5-24.9kg/m2   UBW: 115 lbs (per son)  Weight hx (per EMR):  123 lbs (2/28) standing  128 lbs (3/2) standing  123 lbs (3/5) no method specified  115 lbs (5/7) bedscale    Weight change: RD unable to determine accurate weight loss amount. EMR illustrates that patient has possibly lost 18 lbs (14.6%) x 6 months. Per pt's son, patient has had unintentional wt loss of 10 lbs (8.6%) x 8 months.      Labs reviewed   Labs reviewed: Yes    Medications reviewed   Medications reviewed: Yes    Current Nutrition Prescription   PO: Diet Regular; Cardiac, Consistent Carbohydrate    Evaluation of Received Nutrient/Fluid Intake: 100% / 1 meal (today's lunch observed by RD)  Insufficient data       Nutrition Diagnosis     No nutrition dx at this time 2/2 insufficient wt hx and PO intake data.    Intervention   Intervention: Follow treatment progress, Care plan reviewed, Encourage intake     Ordered one-time receipt of strawberry Boost GC tomorrow at breakfast per pt request      Goal:   General: Nutrition support treatment  PO: Establish PO      Monitoring/Evaluation:       Monitoring/Evaluation: Per protocol, PO intake  Will Continue to follow per protocol  Christin Suarez RD  Time Spent: 30 min

## 2018-09-02 NOTE — PROGRESS NOTES
"[facility]  Cardiovascular Consultation Note  Mary Ramirez  [id number]  1934    DATE OF ADMISSION: 9/1/2018  DATE OF FOLLOW UP: 09/02/18    Nirmal Lundberg MD    Subjective:     Patient ID: Mary Ramirez is a 83 y.o. female.    Chief Complaint:   Chief Complaint   Patient presents with   • Chest Pain       Allergies:   Allergies   Allergen Reactions   • Penicillins Other (See Comments)     Pt states \"i don't know\"       Current medications:    Current Facility-Administered Medications:   •  acetaminophen (TYLENOL) tablet 650 mg, 650 mg, Oral, Q4H PRN, Fouzia Green MD  •  amiodarone (PACERONE) tablet 200 mg, 200 mg, Oral, Daily, Fouzia Green MD, 200 mg at 09/02/18 0840  •  amLODIPine (NORVASC) tablet 10 mg, 10 mg, Oral, Q24H, Fouzia Green MD, 10 mg at 09/02/18 0841  •  apixaban (ELIQUIS) tablet 2.5 mg, 2.5 mg, Oral, Q12H, Fouzia Green MD, 2.5 mg at 09/02/18 0841  •  atorvastatin (LIPITOR) tablet 40 mg, 40 mg, Oral, Nightly, Fouzia Green MD, 40 mg at 09/01/18 2107  •  bisacodyl (DULCOLAX) EC tablet 5 mg, 5 mg, Oral, Daily PRN, Fouzia Green MD  •  carvedilol (COREG) tablet 25 mg, 25 mg, Oral, BID With Meals, Fouzia Green MD, 25 mg at 09/01/18 1802  •  cetirizine (zyrTEC) tablet 5 mg, 5 mg, Oral, Daily, Fouzia Green MD, 5 mg at 09/02/18 0840  •  citalopram (CeleXA) tablet 20 mg, 20 mg, Oral, Daily, Fouzia Green MD, 20 mg at 09/02/18 0841  •  CloNIDine (CATAPRES) tablet 0.2 mg, 0.2 mg, Oral, Q6H PRN, Fouzia Green MD  •  dextrose (D50W) solution 25 g, 25 g, Intravenous, Q15 Min PRN, Fouzia Green MD  •  dextrose (GLUTOSE) oral gel 15 g, 15 g, Oral, Q15 Min PRN, Fouzia Green MD  •  docusate sodium (COLACE) capsule 100 mg, 100 mg, Oral, BID, Fouzia Green MD, 100 mg at 09/01/18 2107  •  donepezil (ARICEPT) tablet 5 mg, 5 mg, Oral, Nightly, Fouzia Green MD, 5 mg at 09/01/18 2107  •  glucagon (human recombinant) (GLUCAGEN DIAGNOSTIC) injection 1 mg, 1 mg, Subcutaneous, PRN, " Fouzia Green MD  •  hydrALAZINE (APRESOLINE) tablet 50 mg, 50 mg, Oral, Q8H, TheoAditily, APRN, 50 mg at 09/02/18 0633  •  HYDROcodone-acetaminophen (NORCO) 5-325 MG per tablet 1 tablet, 1 tablet, Oral, Q6H PRN, Fouzia Green MD  •  insulin lispro (humaLOG) injection 0-9 Units, 0-9 Units, Subcutaneous, 4x Daily With Meals & Nightly, Fouzia Green MD, 2 Units at 09/02/18 0840  •  isosorbide mononitrate (IMDUR) 24 hr tablet 60 mg, 60 mg, Oral, Daily, Fouzia Green MD, 60 mg at 09/02/18 0841  •  Morphine sulfate (PF) injection 1 mg, 1 mg, Intravenous, Q4H PRN **AND** naloxone (NARCAN) injection 0.4 mg, 0.4 mg, Intravenous, Q5 Min PRN, Fouzia Green MD  •  nitroglycerin 50 mg/250 mL (0.2 mg/mL) infusion, 10-50 mcg/min, Intravenous, Titrated, Kamlesh Trammell MD, Last Rate: 4.5 mL/hr at 09/01/18 1719, 15 mcg/min at 09/01/18 1719  •  ondansetron (ZOFRAN) injection 4 mg, 4 mg, Intravenous, Q6H PRN, Fouzia Green MD  •  polyethylene glycol 3350 powder (packet), 17 g, Oral, Daily, Fouzia Green MD, 17 g at 09/01/18 1500  •  sodium chloride 0.9 % flush 1-10 mL, 1-10 mL, Intravenous, PRN, Fouzia Green MD  •  sodium chloride 0.9 % flush 10 mL, 10 mL, Intravenous, PRN, Kamlesh Trammell MD  •  sodium chloride 0.9 % infusion, 100 mL/hr, Intravenous, Continuous, Fouzia Green MD, Last Rate: 100 mL/hr at 09/01/18 1833, 100 mL/hr at 09/01/18 1833    History of Present Illness: Reports feeling okay today. No complaints.     The following portions of the patient's history were reviewed and updated as appropriate: allergies, current medications, past family history, past medical history, past social history, past surgical history and problem list.    ROS:  A complete ROS obtained and negative except as outlined in problem list, HPI and other parts of the note.    Procedures       Objective:       Vitals:    09/02/18 0755 09/02/18 0835 09/02/18 0840 09/02/18 0916   BP: 151/65   158/57   BP Location: Right arm      Patient  Position: Sitting      Pulse: 55 56 55 55   Resp: 17      Temp:       TempSrc: Oral      SpO2: 97% 99%  99%   Weight:       Height:           Intake/Output Summary (Last 24 hours) at 09/02/18 1152  Last data filed at 09/02/18 0755   Gross per 24 hour   Intake              360 ml   Output              250 ml   Net              110 ml       Physical Exam:  GENERAL: Well-developed, well-nourished patient in no acute distress.  HEENT: Normocephalic, atraumatic, PERRLA. Moist mucous membranes.  NECK: No JVD present at 30°. No carotid bruits auscultated.  LUNGS: Nonlabored. Clear to auscultation.  CARDIOVASCULAR: Regular rate and rhythm. No murmurs, gallops or rubs noted.   ABDOMEN: Soft, non-tender, non-distended. Normoactive bowel sounds.  MUSCULOSKELETAL: No gross deformities. No clubbing or cyanosis  EXT: pulses intact, no swelling.  SKIN: Pink, warm.   Neuro: No gross neurologic deficits.    The patient's old records including ambulatory rhythm recordings (ECGs, Holter/event monitor) were reviewed and discussed.      Lab Review:     Results from last 7 days  Lab Units 09/02/18  0446 09/01/18  1042   SODIUM mmol/L 139 135   POTASSIUM mmol/L 3.9 4.3   CHLORIDE mmol/L 106 103   CO2 mmol/L 25.0 24.0   BUN mg/dL 25* 21   CREATININE mg/dL 1.37* 1.32*   GLUCOSE mg/dL 183* 274*   CALCIUM mg/dL 8.7 9.5       Results from last 7 days  Lab Units 09/02/18  0446 09/01/18  1917 09/01/18  1338   TROPONIN I ng/mL 0.008 0.007 0.007       Results from last 7 days  Lab Units 09/01/18  1043   WBC 10*3/mm3 6.68   HEMOGLOBIN g/dL 14.0   HEMATOCRIT % 44.8*   PLATELETS 10*3/mm3 148*                             Telemetry: SB, 50's  Assessment & Plan:     (Principal)Hypertensive urgency      Coronary artery disease involving native coronary artery of native heart with angina pectoris (CMS/[facility])     Overview Addendum 9/1/2018  8:49 PM by Jorge Fonseca IV, MD       · Cardiac cath with PCI data deficit  · Echo (9/1/18): LVEF 60%. LVH.  Left atrial dilatation. Aortic calcification with no stenosis.           Paroxysmal atrial fibrillation (CMS/HCC)     Overview Addendum 9/1/2018  8:49 PM by Jorge Fonseca IV, MD       · Chads Vasc 6 (age > 75, female, CAD, DM, HTN)   · Rhythm control strategy with amiodarone  · Maintaining rhythm           DEANGELO (acute kidney injury) (CMS/[facility])     Type 2 diabetes mellitus with complication, with long-term current use of insulin (CMS/[facility])     Dementia     Chronic diastolic congestive heart failure (CMS/[facility])     Overview Signed 9/1/2018  8:49 PM by Jorge Fonseca IV, MD       · Echo (9/1/18): LVEF 60%. LVH. Left atrial dilatation. Aortic calcification with no stenosis.          Plan:  · Titrate NTG to keep SBP < 180 mmHg- wean as tolerated.   · Increased Hydralazine 50 mg q 8 hours and continue to increase as needed.   · Continue norvasc 10 mg daily  · Pharmacologic nuclear stress test to be considered once blood pressure controlled  · Add chlorthalidone 12.5 mg daily once renal function normalizes.  · Continue amio, coreg and eliquis 2.5 mg bid         [facility]  9/2/2018  11:52 AM    I, Cristian Camargo, have reviewed the note in full and agree with all aspects of the above including physical exam, assessment, labs and plan with changes made accordingly. Face to Face Time was spent with the patient.    Cristian Camargo DO  09/02/18  12:26 PM

## 2018-09-02 NOTE — THERAPY EVALUATION
Acute Care - Physical Therapy Initial Evaluation  [facility]     Patient Name: Mary Ramirez  : 1934  MRN: [id number]  Today's Date: 2018   Onset of Illness/Injury or Date of Surgery: 18  Date of Referral to PT: 18  Referring Physician: MD Peter      Admit Date: 2018    Visit Dx:     ICD-10-CM ICD-9-CM   1. Chest pain, unspecified type R07.9 786.50   2. Severe uncontrolled hypertension I10 401.9   3. Chronic atrial fibrillation (CMS/[facility]) I48.2 427.31   4. History of coronary artery disease Z86.79 V12.59   5. Type 2 diabetes mellitus with hyperglycemia, unspecified long term insulin use status (CMS/[facility]) E11.65 250.00   6. Hypertensive urgency I16.0 401.9   7. Impaired mobility and ADLs Z74.09 799.89   8. Impaired functional mobility, balance, gait, and endurance Z74.09 V49.89     Patient Active Problem List   Diagnosis   • Fall   • Fracture, intertrochanteric, left femur (CMS/[facility])   • Essential hypertension   • Type 2 diabetes mellitus with complication, with long-term current use of insulin (CMS/[facility])   • Coronary artery disease involving native coronary artery of native heart with angina pectoris (CMS/[facility])   • Dementia   • Paroxysmal atrial fibrillation (CMS/[facility])   • SSS (sick sinus syndrome) (CMS/[facility])   • Chronic diastolic congestive heart failure (CMS/[facility])   • Hypertensive urgency   • DEANGELO (acute kidney injury) (CMS/[facility])     Past Medical History:   Diagnosis Date   • Atrial fibrillation (CMS/[facility])    • CAD (coronary artery disease)    • CHF (congestive heart failure) (CMS/[facility])    • DDD (degenerative disc disease), cervical    • Dementia    • Diabetes mellitus (CMS/[facility])    • Hypertension    • SSS (sick sinus syndrome) (CMS/[facility])      Past Surgical History:   Procedure Laterality Date   • ANKLE SURGERY Left    • ATHRECTOMY ILIAC, FEMORAL, TIBIAL ARTERY     • BREAST LUMPECTOMY Left    • COLON SURGERY     • CORONARY STENT PLACEMENT     • FINGER FRACTURE SURGERY Right    • HIP  TROCANTERIC NAILING WITH INTRAMEDULLARY HIP SCREW Left 11/23/2017   • HYSTERECTOMY          PT ASSESSMENT (last 12 hours)      Physical Therapy Evaluation     Row Name 09/02/18 0910          PT Evaluation Time/Intention    Subjective Information no complaints  -AA     Document Type evaluation  -AA     Mode of Treatment individual therapy;physical therapy  -AA     Patient Effort excellent  -AA     Symptoms Noted During/After Treatment none  -AA     Row Name 09/02/18 0910          General Information    Patient Profile Reviewed? yes  -AA     Onset of Illness/Injury or Date of Surgery 09/01/18  -AA     Referring Physician MD Peter  -AA     Patient Observations alert;cooperative;agree to therapy  -AA     Patient/Family Observations no family present  -AA     General Observations of Patient UIC with RN, pleasant, agreeable to therapy  -AA     Prior Level of Function independent:;gait;transfer;ADL's;bed mobility  -AA     Equipment Currently Used at Home walker, rolling  -AA     Pertinent History of Current Functional Problem Pt is 83 year old female presenting with unstable angina and a h/o HTN, DM, CAD  -AA     Existing Precautions/Restrictions fall  -AA     Risks Reviewed patient:;dizziness;LOB;change in vital signs  -AA     Benefits Reviewed patient:;improve function;increase independence;increase strength;increase balance;increase knowledge  -AA     Barriers to Rehab none identified  -AA     Row Name 09/02/18 0910          Relationship/Environment    Primary Source of Support/Comfort child(josef)  -AA     Lives With facility resident  -AA     Row Name 09/02/18 0910          Resource/Environmental Concerns    Current Living Arrangements independent/assisted living facility  -AA     Resource/Environmental Concerns none  -AA     Row Name 09/02/18 0910          Cognitive Assessment/Interventions    Additional Documentation Cognitive Assessment/Intervention (Group)  -AA     Row Name 09/02/18 0910          Cognitive  Assessment/Intervention- PT/OT    Affect/Mental Status (Cognitive) WNL  -AA     Orientation Status (Cognition) oriented x 4  -AA     Follows Commands (Cognition) WNL  -AA     Cognitive Function (Cognitive) memory deficit;safety deficit  -AA     Memory Deficit (Cognitive) mild deficit  -AA     Safety Deficit (Cognitive) mild deficit;awareness of need for assistance;insight into deficits/self awareness  -AA     Personal Safety Interventions fall prevention program maintained;gait belt;muscle strengthening facilitated;nonskid shoes/slippers when out of bed  -AA     Row Name 09/02/18 0910          Safety Issues, Functional Mobility    Safety Issues Affecting Function (Mobility) awareness of need for assistance;insight into deficits/self awareness  -AA     Impairments Affecting Function (Mobility) balance;coordination;endurance/activity tolerance  -AA     Row Name 09/02/18 0910          Bed Mobility Assessment/Treatment    Comment (Bed Mobility) pt UIC, not assessed  -AA     Row Name 09/02/18 0910          Transfer Assessment/Treatment    Transfer Assessment/Treatment sit-stand transfer;stand-sit transfer  -AA     Sit-Stand [place] (Transfers) verbal cues;contact guard  -     Stand-Sit [place] (Transfers) verbal cues;contact guard  -AA     Row Name 09/02/18 0910          Sit-Stand Transfer    Assistive Device (Sit-Stand Transfers) walker, front-wheeled  -AA     Row Name 09/02/18 0910          Stand-Sit Transfer    Assistive Device (Stand-Sit Transfers) walker, front-wheeled  -AA     Row Name 09/02/18 0910          Gait/Stairs Assessment/Training    Gait/Stairs Assessment/Training gait/ambulation independence;distance ambulated  -AA     [place] Level (Gait) contact guard;verbal cues  -     Assistive Device (Gait) walker, front-wheeled  -AA     Distance in Feet (Gait) 150  -AA     Pattern (Gait) step-through  -AA     Deviations/Abnormal Patterns (Gait) bilateral deviations;stride length decreased;trey  decreased  -AA     Bilateral Gait Deviations forward flexed posture  -AA     Row Name 09/02/18 0910          MMT (Manual Muscle Testing)    Rt Lower Ext Comments   4/5 grossly assessed  -     Lt Lower Ext Comments   4/5 grossly assessed  -AA     Row Name 09/02/18 0910          Balance    Balance static standing balance;dynamic sitting balance;dynamic standing balance  -AA     Row Name 09/02/18 0910          Static Sitting Balance    Level of [place] (Unsupported Sitting, Static Balance) independent  -AA     Row Name 09/02/18 0910          Dynamic Sitting Balance    Level of [place], Reaches Outside Midline (Sitting, Dynamic Balance) supervision  -     Sitting Position, Reaches Outside Midline (Sitting, Dynamic Balance) sitting in chair  -AA     Row Name 09/02/18 0910          Static Standing Balance    Level of [place] (Supported Standing, Static Balance) standby assist;contact guard assist  -     Time Able to Maintain Position (Supported Standing, Static Balance) 1 to 2 minutes  -     Assistive Device Utilized (Supported Standing, Static Balance) rolling walker  -AA     Row Name 09/02/18 0910          Dynamic Standing Balance    Level of [place], Reaches Outside Midline (Standing, Dynamic Balance) minimal assist, 75% patient effort  -     Time Able to Maintain Position, Reaches Outside Midline (Standing, Dynamic Balance) 30 to 45 seconds  -AA     Row Name 09/02/18 0910          Sensory Assessment/Intervention    Sensory General Assessment no sensation deficits identified  -AA     Row Name 09/02/18 0910          Pain Assessment    Additional Documentation Pain Scale: Numbers Pre/Post-Treatment (Group)  -AA     Row Name 09/02/18 0910          Pain Scale: Numbers Pre/Post-Treatment    Pain Scale: Numbers, Pretreatment 0/10 - no pain  -     Pain Scale: Numbers, Post-Treatment 0/10 - no pain  -AA     Row Name 09/02/18 0910          Coping    Observed Emotional State  accepting;calm;cooperative  -AA     Verbalized Emotional State acceptance  -AA     Row Name 09/02/18 0910          Plan of Care Review    Plan of Care Reviewed With patient  -AA     Row Name 09/02/18 0910          Physical Therapy Clinical Impression    Date of Referral to PT 09/01/18  -AA     PT Diagnosis (PT Clinical Impression) difficulty ambulating  -AA     Criteria for Skilled Interventions Met (PT Clinical Impression) yes  -AA     Impairments Found (describe specific impairments) gait, locomotion, and balance  -AA     Rehab Potential (PT Clinical Summary) good, to achieve stated therapy goals  -AA     Predicted Duration of Therapy (PT) 2 weeks  -AA     Care Plan Review (PT) evaluation/treatment results reviewed;care plan/treatment goals reviewed;risks/benefits reviewed;current/potential barriers reviewed;patient/other agree to care plan  -AA     Row Name 09/02/18 0910          Vital Signs    Pre Systolic BP Rehab 151  -AA     Pre Treatment Diastolic BP 65  -AA     Post Systolic BP Rehab 158  -AA     Post Treatment Diastolic BP 57  -AA     Pretreatment Heart Rate (beats/min) 52  -AA     Posttreatment Heart Rate (beats/min) 54  -AA     Pre SpO2 (%) 99  -AA     O2 Delivery Pre Treatment room air  -AA     Post SpO2 (%) 99  -AA     O2 Delivery Post Treatment room air  -AA     Pre Patient Position Sitting  -AA     Intra Patient Position Standing  -AA     Post Patient Position Sitting  -AA     Row Name 09/02/18 0910          Physical Therapy Goals    Transfer Goal Selection (PT) transfer, PT goal 1  -AA     Gait Training Goal Selection (PT) gait training, PT goal 1  -AA     Balance Goal Selection (PT) balance, PT goal 1  -AA     Additional Documentation Balance Goal Selection (PT) (Row)  -AA     Row Name 09/02/18 0910          Transfer Goal 1 (PT)    Activity/Assistive Device (Transfer Goal 1, PT) sit-to-stand/stand-to-sit  -AA     [place] Level/Cues Needed (Transfer Goal 1, PT) conditional independence  -AA      Time Frame (Transfer Goal 1, PT) 2 weeks  -AA     Progress/Outcome (Transfer Goal 1, PT) goal ongoing  -AA     Row Name 09/02/18 0910          Gait Training Goal 1 (PT)    Activity/Assistive Device (Gait Training Goal 1, PT) gait (walking locomotion)  -AA     [place] Level (Gait Training Goal 1, PT) conditional independence  -AA     Distance (Gait Goal 1, PT) 300  -AA     Time Frame (Gait Training Goal 1, PT) 2 weeks  -AA     Progress/Outcome (Gait Training Goal 1, PT) goal ongoing  -AA     Row Name 09/02/18 0910          Balance Goal 1 (PT)    Activity/Assistive Device (Balance Goal 1, PT) standing, dynamic  -AA     [place] Level/Cues Needed (Balance Goal 1, PT) supervision required  -AA     Time Frame (Balance Goal 1, PT) 2 weeks  -AA     Progress/Outcomes (Balance Goal 1, PT) goal ongoing  -AA     Row Name 09/02/18 0910          Positioning and Restraints    Pre-Treatment Position sitting in chair/recliner  -AA     Post Treatment Position chair  -AA     In Chair notified nsg;reclined;call light within reach;encouraged to call for assist;exit alarm on;legs elevated  -AA     Row Name 09/02/18 0910          Living Environment    Home Accessibility wheelchair accessible  -AA       User Key  (r) = Recorded By, (t) = Taken By, (c) = Cosigned By    Initials Name Provider Type    Ursula Rascon, PT Physical Therapist          Physical Therapy Education     Title: PT OT SLP Therapies (Active)     Topic: Physical Therapy (Active)     Point: Mobility training (Active)    Learning Progress Summary     Learner Status Readiness Method Response Comment Documented by    Patient Active Mannie E NR  AA 09/02/18 0957          Point: Body mechanics (Active)    Learning Progress Summary     Learner Status Readiness Method Response Comment Documented by    Patient Active Mannie E NR  AA 09/02/18 0957          Point: Precautions (Active)    Learning Progress Summary     Learner Status Readiness Method Response Comment  Documented by    Patient Active Mannie BOWDEN   09/02/18 0957                      User Key     Initials Effective Dates Name Provider Type Discipline     04/02/18 -  Ursula Ramsey, PT Physical Therapist PT                PT Recommendation and Plan  Anticipated Discharge Disposition (PT): assisted living facility (intermediate)  Therapy Frequency (PT Clinical Impression): daily  Outcome Summary/Treatment Plan (PT)  Anticipated Discharge Disposition (PT): assisted living facility (intermediate)  Plan of Care Reviewed With: patient  Progress: improving  Outcome Summary: PT evaluation completed.  Pt presents to ER with unstable angina and high BP/  Pt presents with ambualtion, balance, and transfer defiicts during evalatuion.  Pt ambulated 150 feet wtih RW and CGA wtih slow trey and cues for sequencing.  CGA for transfers.  Standing balance F.  Recommend DC to intermediate with 24/7 A          Outcome Measures     Row Name 09/02/18 0910 09/02/18 0800          How much help from another person do you currently need...    Turning from your back to your side while in flat bed without using bedrails? 3  -AA  --     Moving from lying on back to sitting on the side of a flat bed without bedrails? 3  -AA  --     Moving to and from a bed to a chair (including a wheelchair)? 3  -AA  --     Standing up from a chair using your arms (e.g., wheelchair, bedside chair)? 3  -AA  --     Climbing 3-5 steps with a railing? 2  -AA  --     To walk in hospital room? 3  -AA  --     AM-PAC 6 Clicks Score 17  -AA  --        How much help from another is currently needed...    Putting on and taking off regular lower body clothing?  -- 3  -KF     Bathing (including washing, rinsing, and drying)  -- 3  -KF     Toileting (which includes using toilet bed pan or urinal)  -- 3  -KF     Putting on and taking off regular upper body clothing  -- 3  -KF     Taking care of personal grooming (such as brushing teeth)  -- 3  -KF     Eating meals  -- 3  -KF     Score  -- 18  -KF         Functional Assessment    Outcome Measure Options AM-PAC 6 Clicks Basic Mobility (PT)  -AA AM-PAC 6 Clicks Daily Activity (OT)  -       User Key  (r) = Recorded By, (t) = Taken By, (c) = Cosigned By    Initials Name Provider Type    KF Myrna Rodriguez, OT Occupational Therapist    AA Ursula Ramsey, PT Physical Therapist           Time Calculation:         PT Charges     Row Name 09/02/18 0910             Time Calculation    Start Time 0910  -AA      PT Received On 09/02/18  -AA      PT Goal Re-Cert Due Date 09/12/18  -AA        User Key  (r) = Recorded By, (t) = Taken By, (c) = Cosigned By    Initials Name Provider Type    Ursula Rascon, PT Physical Therapist        Therapy Suggested Charges     Code   Minutes Charges    None           Therapy Charges for Today     Code Description Service Date Service Provider Modifiers Qty    83491803970 HC PT EVAL MOD COMPLEXITY 3 9/2/2018 Ursula Ramsey, PT GP 1          PT G-Codes  Outcome Measure Options: AM-PAC 6 Clicks Basic Mobility (PT)      Ursula Ramsey PT  9/2/2018

## 2018-09-02 NOTE — PLAN OF CARE
Problem: Patient Care Overview  Goal: Plan of Care Review   09/02/18 0910   OTHER   Outcome Summary PT evaluation completed. Pt presents to ER with unstable angina and high BP/ Pt presents with ambualtion, balance, and transfer defiicts during evalatuion. Pt ambulated 150 feet wtih RW and CGA wtih slow trey and cues for sequencing. CGA for transfers. Standing balance F. Recommend DC to correction with 24/7 A

## 2018-09-03 LAB
ALBUMIN SERPL-MCNC: 3.71 G/DL (ref 3.2–4.8)
ANION GAP SERPL CALCULATED.3IONS-SCNC: 6 MMOL/L (ref 3–11)
BUN BLD-MCNC: 19 MG/DL (ref 9–23)
BUN/CREAT SERPL: 18.4 (ref 7–25)
CALCIUM SPEC-SCNC: 8.7 MG/DL (ref 8.7–10.4)
CHLORIDE SERPL-SCNC: 103 MMOL/L (ref 99–109)
CO2 SERPL-SCNC: 28 MMOL/L (ref 20–31)
CREAT BLD-MCNC: 1.03 MG/DL (ref 0.6–1.3)
GFR SERPL CREATININE-BSD FRML MDRD: 51 ML/MIN/1.73
GLUCOSE BLD-MCNC: 189 MG/DL (ref 70–100)
GLUCOSE BLDC GLUCOMTR-MCNC: 131 MG/DL (ref 70–130)
GLUCOSE BLDC GLUCOMTR-MCNC: 159 MG/DL (ref 70–130)
GLUCOSE BLDC GLUCOMTR-MCNC: 210 MG/DL (ref 70–130)
GLUCOSE BLDC GLUCOMTR-MCNC: 218 MG/DL (ref 70–130)
PHOSPHATE SERPL-MCNC: 2.5 MG/DL (ref 2.4–5.1)
POTASSIUM BLD-SCNC: 3.6 MMOL/L (ref 3.5–5.5)
SODIUM BLD-SCNC: 137 MMOL/L (ref 132–146)

## 2018-09-03 PROCEDURE — 80069 RENAL FUNCTION PANEL: CPT | Performed by: FAMILY MEDICINE

## 2018-09-03 PROCEDURE — 97116 GAIT TRAINING THERAPY: CPT

## 2018-09-03 PROCEDURE — 99232 SBSQ HOSP IP/OBS MODERATE 35: CPT | Performed by: INTERNAL MEDICINE

## 2018-09-03 PROCEDURE — 97535 SELF CARE MNGMENT TRAINING: CPT

## 2018-09-03 PROCEDURE — 97530 THERAPEUTIC ACTIVITIES: CPT

## 2018-09-03 PROCEDURE — 99232 SBSQ HOSP IP/OBS MODERATE 35: CPT | Performed by: FAMILY MEDICINE

## 2018-09-03 PROCEDURE — 82962 GLUCOSE BLOOD TEST: CPT

## 2018-09-03 PROCEDURE — 97110 THERAPEUTIC EXERCISES: CPT

## 2018-09-03 RX ORDER — CARVEDILOL 6.25 MG/1
6.25 TABLET ORAL 2 TIMES DAILY WITH MEALS
Status: DISCONTINUED | OUTPATIENT
Start: 2018-09-03 | End: 2018-09-04

## 2018-09-03 RX ADMIN — SODIUM CHLORIDE 100 ML/HR: 9 INJECTION, SOLUTION INTRAVENOUS at 05:18

## 2018-09-03 RX ADMIN — AMLODIPINE BESYLATE 10 MG: 10 TABLET ORAL at 08:32

## 2018-09-03 RX ADMIN — CITALOPRAM HYDROBROMIDE 20 MG: 20 TABLET ORAL at 08:09

## 2018-09-03 RX ADMIN — INSULIN LISPRO 4 UNITS: 100 INJECTION, SOLUTION INTRAVENOUS; SUBCUTANEOUS at 08:10

## 2018-09-03 RX ADMIN — DOCUSATE SODIUM 100 MG: 100 CAPSULE, LIQUID FILLED ORAL at 08:12

## 2018-09-03 RX ADMIN — CLONIDINE HYDROCHLORIDE 0.2 MG: 0.1 TABLET ORAL at 03:41

## 2018-09-03 RX ADMIN — APIXABAN 2.5 MG: 2.5 TABLET, FILM COATED ORAL at 08:11

## 2018-09-03 RX ADMIN — HYDRALAZINE HYDROCHLORIDE 50 MG: 50 TABLET, FILM COATED ORAL at 05:19

## 2018-09-03 RX ADMIN — POLYETHYLENE GLYCOL 3350 17 G: 17 POWDER, FOR SOLUTION ORAL at 08:14

## 2018-09-03 RX ADMIN — HYDRALAZINE HYDROCHLORIDE 75 MG: 25 TABLET ORAL at 20:20

## 2018-09-03 RX ADMIN — CETIRIZINE HYDROCHLORIDE 5 MG: 10 TABLET, FILM COATED ORAL at 08:12

## 2018-09-03 RX ADMIN — ISOSORBIDE MONONITRATE 60 MG: 60 TABLET, EXTENDED RELEASE ORAL at 08:23

## 2018-09-03 RX ADMIN — ATORVASTATIN CALCIUM 40 MG: 40 TABLET, FILM COATED ORAL at 20:28

## 2018-09-03 RX ADMIN — APIXABAN 2.5 MG: 2.5 TABLET, FILM COATED ORAL at 20:28

## 2018-09-03 RX ADMIN — DONEPEZIL HYDROCHLORIDE 5 MG: 10 TABLET, FILM COATED ORAL at 20:28

## 2018-09-03 RX ADMIN — INSULIN LISPRO 2 UNITS: 100 INJECTION, SOLUTION INTRAVENOUS; SUBCUTANEOUS at 12:40

## 2018-09-03 RX ADMIN — AMIODARONE HYDROCHLORIDE 200 MG: 200 TABLET ORAL at 08:31

## 2018-09-03 RX ADMIN — DOCUSATE SODIUM 100 MG: 100 CAPSULE, LIQUID FILLED ORAL at 20:28

## 2018-09-03 RX ADMIN — INSULIN LISPRO 4 UNITS: 100 INJECTION, SOLUTION INTRAVENOUS; SUBCUTANEOUS at 22:06

## 2018-09-03 RX ADMIN — HYDROCODONE BITARTRATE AND ACETAMINOPHEN 1 TABLET: 5; 325 TABLET ORAL at 18:50

## 2018-09-03 NOTE — PLAN OF CARE
Problem: Patient Care Overview  Goal: Plan of Care Review  Outcome: Ongoing (interventions implemented as appropriate)   09/03/18 0229   Coping/Psychosocial   Plan of Care Reviewed With patient   Plan of Care Review   Progress improving   OTHER   Outcome Summary Pt is improving. NTG drip discontinued. BP has been stable for most of the day. C/o knee pain, norco provided

## 2018-09-03 NOTE — PLAN OF CARE
Problem: Patient Care Overview  Goal: Plan of Care Review  Outcome: Ongoing (interventions implemented as appropriate)   09/03/18 0727   Coping/Psychosocial   Plan of Care Reviewed With patient   Plan of Care Review   Progress improving   OTHER   Outcome Summary Pt demo improved endurance with gait training, amb x 250 using rw and CGA. Somewhat confused throughout. Recommend KALEY.

## 2018-09-03 NOTE — THERAPY TREATMENT NOTE
Acute Care - Physical Therapy Treatment Note  [facility]     Patient Name: Mary Ramirez  : 1934  MRN: [id number]  Today's Date: 9/3/2018  Onset of Illness/Injury or Date of Surgery: 18  Date of Referral to PT: 18  Referring Physician: MD Peter    Admit Date: 2018    Visit Dx:    ICD-10-CM ICD-9-CM   1. Chest pain, unspecified type R07.9 786.50   2. Severe uncontrolled hypertension I10 401.9   3. Chronic atrial fibrillation (CMS/[facility]) I48.2 427.31   4. History of coronary artery disease Z86.79 V12.59   5. Type 2 diabetes mellitus with hyperglycemia, unspecified long term insulin use status (CMS/[facility]) E11.65 250.00   6. Hypertensive urgency I16.0 401.9   7. Impaired mobility and ADLs Z74.09 799.89   8. Impaired functional mobility, balance, gait, and endurance Z74.09 V49.89     Patient Active Problem List   Diagnosis   • Fall   • Fracture, intertrochanteric, left femur (CMS/[facility])   • Essential hypertension   • Type 2 diabetes mellitus with complication, with long-term current use of insulin (CMS/[facility])   • Coronary artery disease involving native coronary artery of native heart with angina pectoris (CMS/[facility])   • Dementia   • Paroxysmal atrial fibrillation (CMS/[facility])   • Anticoagulated   • SSS (sick sinus syndrome) (CMS/[facility])   • Chronic diastolic congestive heart failure (CMS/[facility])   • Hypertensive urgency   • DEANGELO (acute kidney injury) (CMS/[facility])       Therapy Treatment          Rehabilitation Treatment Summary     Row Name 18 1401 18 1331          Treatment Time/Intention    Discipline occupational therapist  -AC physical therapist  -SR     Document Type therapy note (daily note)  -AC therapy note (daily note)  -SR     Subjective Information complains of;weakness;fatigue;pain  -AC complains of;weakness  -SR     Mode of Treatment individual therapy;occupational therapy  -AC physical therapy  -SR     Patient/Family Observations Pt received in bed.  IV intact  -AC UIC, no family present   -SR     Care Plan Review  -- patient/other agree to care plan  -SR     Therapy Frequency (PT Clinical Impression)  -- daily  -SR     Patient Effort good  -AC good  -SR     Existing Precautions/Restrictions fall  -AC fall  -SR     Recorded by [AC] Brianna Hammer, OT 09/03/18 1522 [SR] Xiomara Velez, PT 09/03/18 1636     Row Name 09/03/18 1331             Vital Signs    Pre Systolic BP Rehab --   vitals are stable throughout  -SR      Recorded by [SR] Xiomara Velez, PT 09/03/18 1636      Row Name 09/03/18 1401 09/03/18 1331          Cognitive Assessment/Intervention- PT/OT    Affect/Mental Status (Cognitive) WNL  -AC confused  -SR     Orientation Status (Cognition) oriented x 4  -AC oriented x 4  -SR     Follows Commands (Cognition) WNL  -AC WFL;delayed response/completion;increased processing time needed;initiation impaired  -SR     Cognitive Function (Cognitive) memory deficit;safety deficit  -AC memory deficit  -SR     Memory Deficit (Cognitive) mild deficit;working memory  -AC mild deficit  -SR     Safety Deficit (Cognitive)  -- moderate deficit  -SR     Personal Safety Interventions  -- fall prevention program maintained;gait belt;nonskid shoes/slippers when out of bed  -SR     Recorded by [AC] Brianna Hammer, OT 09/03/18 1522 [SR] Xiomara Velez, PT 09/03/18 1636     Row Name 09/03/18 1401             Safety Issues, Functional Mobility    Impairments Affecting Function (Mobility) balance;pain;endurance/activity tolerance  -AC      Recorded by [AC] Brianna Hammer, OT 09/03/18 1522      Row Name 09/03/18 1331             Bed Mobility Assessment/Treatment    Comment (Bed Mobility) UI  -SR      Recorded by [SR] Xiomara Velez, PT 09/03/18 1636      Row Name 09/03/18 1401             Functional Mobility    Functional Mobility- Ind. Level contact guard assist;verbal cues required  -AC      Functional Mobility- Device rolling walker  -AC      Functional Mobility-Distance (Feet) 20  -AC      Functional Mobility-  Comment Reports LLE gives out at times  -AC      Recorded by [AC] Brianna Hammer, OT 09/03/18 1522      Row Name 09/03/18 1401 09/03/18 1331          Transfer Assessment/Treatment    Transfer Assessment/Treatment sit-stand transfer;stand-sit transfer;toilet transfer  -AC sit-stand transfer;stand-sit transfer  -SR     Comment (Transfers)  -- cues for hand placement  -SR     Recorded by [AC] Brianna Hammer, OT 09/03/18 1522 [SR] Xiomara Velez, PT 09/03/18 1636     Row Name 09/03/18 1401 09/03/18 1331          Sit-Stand Transfer    Sit-Stand [place] (Transfers) verbal cues;contact guard  -AC contact guard;verbal cues  -SR     Assistive Device (Sit-Stand Transfers) walker, front-wheeled  -AC walker, front-wheeled  -SR     Recorded by [AC] Brianna Hammer, OT 09/03/18 1522 [SR] Xiomara Velez, PT 09/03/18 1636     Row Name 09/03/18 1401 09/03/18 1331          Stand-Sit Transfer    Stand-Sit [place] (Transfers) contact guard;verbal cues  -AC contact guard;verbal cues  -SR     Assistive Device (Stand-Sit Transfers) walker, front-wheeled  -AC walker, front-wheeled  -SR     Recorded by [AC] Brianna Hammer, OT 09/03/18 1522 [SR] Xiomara Velez, PT 09/03/18 1636     Row Name 09/03/18 1401             Toilet Transfer    Type (Toilet Transfer) sit-stand;stand-sit  -AC      [place] Level (Toilet Transfer) contact guard;verbal cues  -AC      Assistive Device (Toilet Transfer) commode, bedside without drop arms;walker, front-wheeled  -AC      Recorded by [AC] Brianna Hammer, OT 09/03/18 1522      Row Name 09/03/18 1331             Gait/Stairs Assessment/Training    40786 - Gait Training Minutes  15  -SR      Gait/Stairs Assessment/Training gait/ambulation assistive device  -SR      [place] Level (Gait) contact guard  -SR      Assistive Device (Gait) walker, front-wheeled  -SR      Distance in Feet (Gait) 250  -SR      Pattern (Gait) step-through  -SR      Deviations/Abnormal Patterns (Gait) bilateral  deviations;stride length decreased;trey decreased;ataxic  -SR      Bilateral Gait Deviations forward flexed posture  -SR      Comment (Gait/Stairs) pt reports L LE pain, but tolerates, cues for increased stride length, DUARTE  -SR      Recorded by [SR] Xiomara Velez, PT 09/03/18 1636      Row Name 09/03/18 1401             ADL Assessment/Intervention    95947 - OT Self Care/Mgmt Minutes 10  -AC      BADL Assessment/Intervention lower body dressing;toileting  -AC      Recorded by [AC] Brianna Hammer, OT 09/03/18 1522      Row Name 09/03/18 1401             Lower Body Dressing Assessment/Training    Lower Body Dressing [place] Level don;doff;minimum assist (75% patient effort);verbal cues;pants/bottoms  -AC      Lower Body Dressing Position supported sitting;supported standing  -AC      Comment (Lower Body Dressing) doffed larger pants and donned smaller size,  min A to don over feet  -AC      Recorded by [AC] Brianna Hammer, OT 09/03/18 1522      Row Name 09/03/18 1401             Grooming Assessment/Training    [place] Level (Grooming) --  -AC      Grooming Position --  -AC      Recorded by [AC] Brianna Hammer, OT 09/03/18 1522      Row Name 09/03/18 1401             Self-Feeding Assessment/Training    [place] Level (Feeding) --  -AC      Recorded by [AC] Brianna Hammer, OT 09/03/18 1522      Row Name 09/03/18 1401             Toileting Assessment/Training    [place] Level (Toileting) adjust/manage clothing;minimum assist (75% patient effort);verbal cues;change pad/brief;perform perineal hygiene;supervision  -AC      Assistive Devices (Toileting) commode, bedside without drop arms  -AC      Toileting Position supported sitting;supported standing  -AC      Comment (Toileting) setup hygiene, min A clothing mgmt  -AC      Recorded by [AC] Brianna Hammer, OT 09/03/18 1522      Row Name 09/03/18 1401             Motor Skills Assessment/Interventions    Additional Documentation Therapeutic Exercise  (Group)  -AC      Recorded by [AC] Brianna Hammer, OT 09/03/18 1522      Row Name 09/03/18 1401 09/03/18 1331          Therapeutic Exercise    35654 - PT Therapeutic Exercise Minutes  -- 10  -SR     76737 - OT Therapeutic Exercise Minutes 5  -AC  --     39630 - OT Therapeutic Activity Minutes 10  -AC  --     Recorded by [AC] Brianna Hammer, OT 09/03/18 1522 [SR] Xiomara Velez, PT 09/03/18 1636     Row Name 09/03/18 1401 09/03/18 1331          Therapeutic Exercise    Upper Extremity Range of Motion (Therapeutic Exercise) shoulder flexion/extension, bilateral;shoulder horizontal abduction/adduction, bilateral;elbow flexion/extension, bilateral;forearm supination/pronation, bilateral;wrist flexion/extension, bilateral  -AC  --     Lower Extremity (Therapeutic Exercise)  -- gluteal sets;LAQ (long arc quad), bilateral;marching while seated;quad sets, bilateral;heel slides, bilateral  -SR     Exercise Type (Therapeutic Exercise) AROM (active range of motion)  -AC  --     Position (Therapeutic Exercise) seated  -AC  --     Sets/Reps (Therapeutic Exercise) 1/15  -AC 10 reps BLE  -SR     Comment (Therapeutic Exercise)  -- sitting, long sitting  -SR     Recorded by [AC] Brianna Hammer, OT 09/03/18 1522 [SR] Xiomara Velez, PT 09/03/18 1636     Row Name 09/03/18 1331             Static Standing Balance    Level of [place] (Supported Standing, Static Balance) contact guard assist  -SR      Time Able to Maintain Position (Supported Standing, Static Balance) 1 to 2 minutes  -SR      Assistive Device Utilized (Supported Standing, Static Balance) rolling walker  -SR      Recorded by [SR] Xiomara Velez, PT 09/03/18 1636      Row Name 09/03/18 1331             Positioning and Restraints    Pre-Treatment Position sitting in chair/recliner  -SR      Post Treatment Position chair  -SR      In Chair notified nsg;reclined;sitting;call light within reach;encouraged to call for assist;exit alarm on  -SR      Recorded by [SR]  Xiomara Velez, PT 09/03/18 1636      Row Name 09/03/18 1401 09/03/18 1331          Pain Scale: Numbers Pre/Post-Treatment    Pain Scale: Numbers, Pretreatment 0/10 - no pain   LLE 2/10 in standing  -AC 0/10 - no pain  -SR     Pain Scale: Numbers, Post-Treatment 0/10 - no pain  -AC 2/10   LLE  -SR     Pre/Post Treatment Pain Comment  -- tolerates  -SR     Pain Intervention(s) Repositioned  -AC Repositioned;Ambulation/increased activity  -SR     Recorded by [AC] Brianna Hammer, OT 09/03/18 1522 [SR] Xiomara Velez, PT 09/03/18 1636     Row Name 09/03/18 1401             Sensory Assessment/Intervention    Sensory General Assessment no sensation deficits identified  -AC      Recorded by [AC] Brianna Hammer, OT 09/03/18 1522      Row Name 09/03/18 1401             Hearing Assessment    Hearing Status --  -AC      Recorded by [AC] Brianna Hammer, OT 09/03/18 1522      Row Name 09/03/18 1401             Vision Assessment/Intervention    Visual Impairment/Limitations --  -AC      Recorded by [AC] Brianna Hammer, OT 09/03/18 1522      Row Name 09/03/18 1331             Coping    Observed Emotional State cooperative  -SR      Verbalized Emotional State acceptance  -SR      Recorded by [SR] Xiomara Velez, PT 09/03/18 1636      Row Name 09/03/18 1331             Plan of Care Review    Plan of Care Reviewed With patient  -SR      Recorded by [SR] Xiomara Velez, PT 09/03/18 1636      Row Name 09/03/18 1401             Outcome Summary/Treatment Plan (OT)    Anticipated Discharge Disposition (OT) assisted living facility ([facility]);home with 24/7 care  -AC      Recorded by [AC] Brianna Hammer, OT 09/03/18 1522      Row Name 09/03/18 1331             Outcome Summary/Treatment Plan (PT)    Daily Summary of Progress (PT) progress toward functional goals is good  -SR      Anticipated Discharge Disposition (PT) assisted living facility ([facility])  -SR      Recorded by [SR] Xiomara Velez, PT 09/03/18 1636        User Key  (r) = Recorded  By, (t) = Taken By, (c) = Cosigned By    Initials Name Effective Dates Discipline    AC Brianna Hammer, OT 06/23/15 -  OT    SR Xiomara Velez, PT 06/19/15 -  PT                     Physical Therapy Education     Title: PT OT SLP Therapies (Active)     Topic: Physical Therapy (Active)     Point: Mobility training (Active)    Learning Progress Summary     Learner Status Readiness Method Response Comment Documented by    Patient Active Acceptance E,TB NR   09/03/18 1636     Active Eager E NR   09/02/18 0957          Point: Home exercise program (Active)    Learning Progress Summary     Learner Status Readiness Method Response Comment Documented by    Patient Active Acceptance E,TB NR   09/03/18 1636          Point: Body mechanics (Active)    Learning Progress Summary     Learner Status Readiness Method Response Comment Documented by    Patient Active Acceptance E,TB NR   09/03/18 1636     Active Eager E NR   09/02/18 0957          Point: Precautions (Active)    Learning Progress Summary     Learner Status Readiness Method Response Comment Documented by    Patient Active Acceptance E,TB NR   09/03/18 1636     Active Eager E NR   09/02/18 0957                      User Key     Initials Effective Dates Name Provider Type Discipline     06/19/15 -  Xiomara Velez, PT Physical Therapist PT     04/02/18 -  Ursula Ramsey, PT Physical Therapist PT                    PT Recommendation and Plan  Anticipated Discharge Disposition (PT): assisted living facility ([facility])  Therapy Frequency (PT Clinical Impression): daily  Outcome Summary/Treatment Plan (PT)  Daily Summary of Progress (PT): progress toward functional goals is good  Anticipated Discharge Disposition (PT): assisted living facility ([facility])  Plan of Care Reviewed With: patient  Progress: improving  Outcome Summary: Pt demo improved endurance with gait training, amb x 250 using rw and CGA. Somewhat confused throughout. Recommend [facility].          Outcome  Measures     Row Name 09/03/18 1401 09/03/18 1331 09/02/18 0910       How much help from another person do you currently need...    Turning from your back to your side while in flat bed without using bedrails?  -- 3  -SR 3  -AA    Moving from lying on back to sitting on the side of a flat bed without bedrails?  -- 3  -SR 3  -AA    Moving to and from a bed to a chair (including a wheelchair)?  -- 3  -SR 3  -AA    Standing up from a chair using your arms (e.g., wheelchair, bedside chair)?  -- 3  -SR 3  -AA    Climbing 3-5 steps with a railing?  -- 2  -SR 2  -AA    To walk in hospital room?  -- 3  -SR 3  -AA    AM-PAC 6 Clicks Score  -- 17  -SR 17  -AA       How much help from another is currently needed...    Putting on and taking off regular lower body clothing? 3  -AC  --  --    Bathing (including washing, rinsing, and drying) 3  -AC  --  --    Toileting (which includes using toilet bed pan or urinal) 3  -AC  --  --    Putting on and taking off regular upper body clothing 3  -AC  --  --    Taking care of personal grooming (such as brushing teeth) 3  -AC  --  --    Eating meals 4  -AC  --  --    Score 19  -AC  --  --       Functional Assessment    Outcome Measure Options AM-PAC 6 Clicks Daily Activity (OT)  -AC AM-PAC 6 Clicks Basic Mobility (PT)  -SR AM-PAC 6 Clicks Basic Mobility (PT)  -AA    Row Name 09/02/18 0800             How much help from another is currently needed...    Putting on and taking off regular lower body clothing? 3  -KF      Bathing (including washing, rinsing, and drying) 3  -KF      Toileting (which includes using toilet bed pan or urinal) 3  -KF      Putting on and taking off regular upper body clothing 3  -KF      Taking care of personal grooming (such as brushing teeth) 3  -KF      Eating meals 3  -KF      Score 18  -KF         Functional Assessment    Outcome Measure Options AM-PAC 6 Clicks Daily Activity (OT)  -KF        User Key  (r) = Recorded By, (t) = Taken By, (c) = Cosigned By     Initials Name Provider Type    AC Brianna Hammer, OT Occupational Therapist    SR Xiomara Velez, PT Physical Therapist    Myrna Rodriguez, OT Occupational Therapist    Ursula Rascon, PT Physical Therapist           Time Calculation:         PT Charges     Row Name 09/03/18 1637 09/03/18 1331          Time Calculation    Start Time 1331  -SR  --     PT Received On 09/03/18  -SR  --        Time Calculation- PT    Total Timed Code Minutes- PT 25 minute(s)  -SR  --        Timed Charges    25200 - PT Therapeutic Exercise Minutes  -- 10  -SR     30276 - Gait Training Minutes   -- 15  -SR       User Key  (r) = Recorded By, (t) = Taken By, (c) = Cosigned By    Initials Name Provider Type    SR Xiomara Velez, PT Physical Therapist        Therapy Suggested Charges     Code   Minutes Charges    57310 (CPT®) Hc Pt Neuromusc Re Education Ea 15 Min      37678 (CPT®) Hc Pt Ther Proc Ea 15 Min 10 1    40907 (CPT®) Hc Gait Training Ea 15 Min 15 1    10023 (CPT®) Hc Pt Therapeutic Act Ea 15 Min      34815 (CPT®) Hc Pt Manual Therapy Ea 15 Min      85440 (CPT®) Hc Pt Iontophoresis Ea 15 Min      93601 (CPT®) Hc Pt Elec Stim Ea-Per 15 Min      41349 (CPT®) Hc Pt Ultrasound Ea 15 Min      22475 (CPT®) Hc Pt Self Care/Mgmt/Train Ea 15 Min      72028 (CPT®) Hc Pt Prosthetic (S) Train Initial Encounter, Each 15 Min      80436 (CPT®) Hc Pt Orthotic(S)/Prosthetic(S) Encounter, Each 15 Min      19744 (CPT®) Hc Orthotic(S) Mgmt/Train Initial Encounter, Each 15min      Total  25 2        Therapy Charges for Today     Code Description Service Date Service Provider Modifiers Qty    28050401230 HC PT THER PROC EA 15 MIN 9/3/2018 Xiomara Velez, PT GP 1    15413261337 HC GAIT TRAINING EA 15 MIN 9/3/2018 Xiomara Velez, PT GP 1          PT G-Codes  Outcome Measure Options: AM-PAC 6 Clicks Daily Activity (OT)    Xiomara Velez, PT  9/3/2018

## 2018-09-03 NOTE — PROGRESS NOTES
[facility] Medicine Services  PROGRESS NOTE    Patient Name: Mary Ramirez  : 1934  MRN: [id number]    Date of Admission: 2018  Length of Stay: 2  Primary Care Physician: Nirmal Lundberg MD    Subjective   Subjective     CC:  CP, HTN urgency    HPI:  BP's much improved.  CP less intense butstill present at times, unaffected by rest/exertion.  No radiation of pain, nausea, SOA, edema.     Review of Systems  Otherwise ROS is negative except as mentioned in the HPI.    Objective   Objective     Vital Signs:   Temp:  [98.1 °F (36.7 °C)-99.3 °F (37.4 °C)] 98.1 °F (36.7 °C)  Heart Rate:  [48-79] 51  Resp:  [16-17] 16  BP: (132-191)/(59-91) 132/59        Physical Exam:  Constitutional: No acute distress, awake, alert, nontoxic, normal body habitus  Respiratory: Clear to auscultation bilaterally, good effort, nonlabored respirations   Cardiovascular: RRR, no murmur  Gastrointestinal: Positive bowel sounds, soft, nontender, nondistended  Musculoskeletal: No peripheral edema, normal muscle tone for age  Psychiatric: Appropriate affect, good insight and judgement, cooperative  Neurologic: Oriented x 3 and able to give reliable history, movements symmetric BUE and BLE, Cranial Nerves grossly intact to confrontation, speech clear and fluent  Skin: No rashes, no jaundice, no petechiae, no mottling       Results Reviewed:  I have personally reviewed current lab, radiology, and data and agree.      Results from last 7 days  Lab Units 18  1043   WBC 10*3/mm3 6.68   HEMOGLOBIN g/dL 14.0   HEMATOCRIT % 44.8*   PLATELETS 10*3/mm3 148*       Results from last 7 days  Lab Units 18  0453 18  0446 18  1917 18  1338 18  1042   SODIUM mmol/L 137 139  --   --  135   POTASSIUM mmol/L 3.6 3.9  --   --  4.3   CHLORIDE mmol/L 103 106  --   --  103   CO2 mmol/L 28.0 25.0  --   --  24.0   BUN mg/dL 19 25*  --   --  21   CREATININE mg/dL 1.03 1.37*  --   --  1.32*    GLUCOSE mg/dL 189* 183*  --   --  274*   CALCIUM mg/dL 8.7 8.7  --   --  9.5   ALT (SGPT) U/L  --   --   --   --  40   AST (SGOT) U/L  --   --   --   --  38*   TROPONIN I ng/mL  --  0.008 0.007 0.007  --      Estimated Creatinine Clearance: 31.1 mL/min (by C-G formula based on SCr of 1.03 mg/dL).  BNP   Date Value Ref Range Status   09/01/2018 131.0 (H) 0.0 - 100.0 pg/mL Final     Comment:     Results may be falsely decreased if patient taking Biotin.       Microbiology Results Abnormal     None          Imaging Results (last 24 hours)     ** No results found for the last 24 hours. **        Results for orders placed during the hospital encounter of 09/01/18   Adult Transthoracic Echo Complete W/ Cont if Necessary Per Protocol    Narrative · Left ventricular systolic function is normal. Estimated EF = 60%.  · Left ventricular wall thickness is consistent with hypertrophy.  · Left atrial cavity size is dilated.  · There is calcification of the aortic valve.  · Ao mean PG 5.3 mmHg          I have reviewed the medications.    Assessment/Plan   Assessment / Plan     Active Hospital Problems    Diagnosis Date Noted   • **Hypertensive urgency [I16.0] 09/01/2018   • DEANGELO (acute kidney injury) (CMS/[facility]) [N17.9] 09/01/2018   • Chronic diastolic congestive heart failure (CMS/[facility]) [I50.32] 09/01/2018   • Paroxysmal atrial fibrillation (CMS/[facility]) [I48.0] 04/07/2018   • Anticoagulated [Z79.01] 04/07/2018   • Dementia [F03.90] 02/28/2018   • Type 2 diabetes mellitus with complication, with long-term current use of insulin (CMS/[facility]) [E11.8, Z79.4] 11/24/2017   • Coronary artery disease involving native coronary artery of native heart with angina pectoris (CMS/[facility]) [I25.119] 11/24/2017      Resolved Hospital Problems    Diagnosis Date Noted Date Resolved   • Unstable angina (CMS/[facility]) [I20.0] 09/01/2018 09/01/2018          Brief Hospital Course to date:  Mary Ramirez is a 83 y.o. female with PMHx CAD with remote stents, diastolic  CHF, PAF on chronic Eliquis, mild dementia, and DM 2.  Patient lives at assisted living and has her medications dispense to her and regular blood pressure checks.  Over the past several weeks to a few months it has been noted that she has been having escalated blood pressures despite her home hypertension medication and bothersome midsternal and left chest pain.       In ED, her initial blood pressures were markedly elevated at 200's/150's and she was placed on nitro drip which did not have much effect on her chest pain and only minimal effect on her blood pressure.      Please see above Hospital Problem List which is being actively managed to reflect all current/pertinent diagnoses, the following items may only represent today's acute or active assessments and/or plans of care.      Chest pain  HTN urgency  CAD hx of stents  - stop nitro gtt today  - BP's better on scheduled hydralazine in addition to home meds  --> see what is does OFF the nitro gtt today  - home Coreg dose decreased 9/3/18 from 25mg to 6.25mg due to resting bradycardia  - if DEANGELO remains reliably resolved by tomorrow can consider addition of chlorthalidone to BP regimen  - Cards follows -->  nuclear stress in am    DEANGELO, resolved  - d/c IVF's today, encourage PO  - recheck renal panel am    Chronic DHF  PAF on Eliquis  - home meds    DM2  - SSI    DVT Prophylaxis:  Eliquis    CODE STATUS:   Code Status and Medical Interventions:   Ordered at: 09/01/18 1235     Code Status:    CPR     Medical Interventions (Level of Support Prior to Arrest):    Full       Disposition: I expect the patient to be discharged back to assisted living ~1-2 days.      Electronically signed by Fouzia Green MD, 09/03/18, 12:17 PM.

## 2018-09-03 NOTE — THERAPY TREATMENT NOTE
Acute Care - Occupational Therapy Treatment Note  [facility]     Patient Name: Mary Ramirez  : 1934  MRN: [id number]  Today's Date: 9/3/2018  Onset of Illness/Injury or Date of Surgery: 18  Date of Referral to OT: 18  Referring Physician: MD Peter    Admit Date: 2018       ICD-10-CM ICD-9-CM   1. Chest pain, unspecified type R07.9 786.50   2. Severe uncontrolled hypertension I10 401.9   3. Chronic atrial fibrillation (CMS/[facility]) I48.2 427.31   4. History of coronary artery disease Z86.79 V12.59   5. Type 2 diabetes mellitus with hyperglycemia, unspecified long term insulin use status (CMS/[facility]) E11.65 250.00   6. Hypertensive urgency I16.0 401.9   7. Impaired mobility and ADLs Z74.09 799.89   8. Impaired functional mobility, balance, gait, and endurance Z74.09 V49.89     Patient Active Problem List   Diagnosis   • Fall   • Fracture, intertrochanteric, left femur (CMS/[facility])   • Essential hypertension   • Type 2 diabetes mellitus with complication, with long-term current use of insulin (CMS/[facility])   • Coronary artery disease involving native coronary artery of native heart with angina pectoris (CMS/[facility])   • Dementia   • Paroxysmal atrial fibrillation (CMS/[facility])   • Anticoagulated   • SSS (sick sinus syndrome) (CMS/[facility])   • Chronic diastolic congestive heart failure (CMS/[facility])   • Hypertensive urgency   • DEANGELO (acute kidney injury) (CMS/[facility])     Past Medical History:   Diagnosis Date   • Atrial fibrillation (CMS/[facility])    • CAD (coronary artery disease)    • CHF (congestive heart failure) (CMS/[facility])    • DDD (degenerative disc disease), cervical    • Dementia    • Diabetes mellitus (CMS/[facility])    • Hypertension    • SSS (sick sinus syndrome) (CMS/[facility])      Past Surgical History:   Procedure Laterality Date   • ANKLE SURGERY Left    • ATHRECTOMY ILIAC, FEMORAL, TIBIAL ARTERY     • BREAST LUMPECTOMY Left    • COLON SURGERY     • CORONARY STENT PLACEMENT     • FINGER FRACTURE SURGERY Right    • HIP  TROCANTERIC NAILING WITH INTRAMEDULLARY HIP SCREW Left 11/23/2017   • HYSTERECTOMY         Therapy Treatment          Rehabilitation Treatment Summary     Row Name 09/03/18 1401             Treatment Time/Intention    Discipline occupational therapist  -AC      Document Type therapy note (daily note)  -AC      Subjective Information complains of;weakness;fatigue;pain  -AC      Mode of Treatment individual therapy;occupational therapy  -AC      Patient/Family Observations Pt received in bed.  IV intact  -AC      Patient Effort good  -AC      Existing Precautions/Restrictions fall  -AC      Recorded by [AC] Brianna Hammer, OT 09/03/18 1522      Row Name 09/03/18 1401             Cognitive Assessment/Intervention- PT/OT    Affect/Mental Status (Cognitive) WNL  -AC      Orientation Status (Cognition) oriented x 4  -AC      Follows Commands (Cognition) WNL  -AC      Cognitive Function (Cognitive) memory deficit;safety deficit  -AC      Memory Deficit (Cognitive) mild deficit;working memory  -AC      Recorded by [AC] Brianna Hammer, OT 09/03/18 1522      Row Name 09/03/18 1401             Safety Issues, Functional Mobility    Impairments Affecting Function (Mobility) balance;pain;endurance/activity tolerance  -AC      Recorded by [AC] Brianna Hammer, OT 09/03/18 1522      Row Name 09/03/18 1401             Functional Mobility    Functional Mobility- Ind. Level contact guard assist;verbal cues required  -AC      Functional Mobility- Device rolling walker  -AC      Functional Mobility-Distance (Feet) 20  -AC      Functional Mobility- Comment Reports LLE gives out at times  -AC      Recorded by [AC] Brianna Hammer, OT 09/03/18 1522      Row Name 09/03/18 1401             Transfer Assessment/Treatment    Transfer Assessment/Treatment sit-stand transfer;stand-sit transfer;toilet transfer  -AC      Recorded by [AC] Brianna Hammer, OT 09/03/18 1522      Row Name 09/03/18 1401             Sit-Stand Transfer    Sit-Stand  [place] (Transfers) verbal cues;contact guard  -AC      Assistive Device (Sit-Stand Transfers) walker, front-wheeled  -AC      Recorded by [AC] Brianna Hammer, OT 09/03/18 1522      Row Name 09/03/18 1401             Stand-Sit Transfer    Stand-Sit [place] (Transfers) contact guard;verbal cues  -AC      Assistive Device (Stand-Sit Transfers) walker, front-wheeled  -AC      Recorded by [AC] Brianna Hammer, OT 09/03/18 1522      Row Name 09/03/18 1401             Toilet Transfer    Type (Toilet Transfer) sit-stand;stand-sit  -AC      [place] Level (Toilet Transfer) contact guard;verbal cues  -AC      Assistive Device (Toilet Transfer) commode, bedside without drop arms;walker, front-wheeled  -AC      Recorded by [AC] Brianna Hammer, OT 09/03/18 1522      Row Name 09/03/18 1401             ADL Assessment/Intervention    91308 - OT Self Care/Mgmt Minutes 10  -AC      BADL Assessment/Intervention lower body dressing;toileting  -AC      Recorded by [AC] Brianna Hammer, OT 09/03/18 1522      Row Name 09/03/18 1401             Lower Body Dressing Assessment/Training    Lower Body Dressing [place] Level don;doff;minimum assist (75% patient effort);verbal cues;pants/bottoms  -AC      Lower Body Dressing Position supported sitting;supported standing  -AC      Comment (Lower Body Dressing) doffed larger pants and donned smaller size,  min A to don over feet  -AC      Recorded by [AC] Brianna Hammer, OT 09/03/18 1522      Row Name 09/03/18 1401             Grooming Assessment/Training    [place] Level (Grooming) --  -AC      Grooming Position --  -AC      Recorded by [AC] Brianna Hammer, OT 09/03/18 1522      Row Name 09/03/18 1401             Self-Feeding Assessment/Training    [place] Level (Feeding) --  -AC      Recorded by [AC] Brianna Hammer, OT 09/03/18 1522      Row Name 09/03/18 1401             Toileting Assessment/Training    [place] Level (Toileting) adjust/manage clothing;minimum  assist (75% patient effort);verbal cues;change pad/brief;perform perineal hygiene;supervision  -AC      Assistive Devices (Toileting) commode, bedside without drop arms  -AC      Toileting Position supported sitting;supported standing  -AC      Comment (Toileting) setup hygiene, min A clothing mgmt  -AC      Recorded by [AC] Brianna Hammer, OT 09/03/18 1522      Row Name 09/03/18 1401             Motor Skills Assessment/Interventions    Additional Documentation Therapeutic Exercise (Group)  -AC      Recorded by [AC] Brianna Hammer, OT 09/03/18 1522      Row Name 09/03/18 1401             Therapeutic Exercise    66048 - OT Therapeutic Exercise Minutes 5  -AC      11632 - OT Therapeutic Activity Minutes 10  -AC      Recorded by [AC] Brianna Hammer, OT 09/03/18 1522      Row Name 09/03/18 1401             Therapeutic Exercise    Upper Extremity Range of Motion (Therapeutic Exercise) shoulder flexion/extension, bilateral;shoulder horizontal abduction/adduction, bilateral;elbow flexion/extension, bilateral;forearm supination/pronation, bilateral;wrist flexion/extension, bilateral  -AC      Exercise Type (Therapeutic Exercise) AROM (active range of motion)  -AC      Position (Therapeutic Exercise) seated  -AC      Sets/Reps (Therapeutic Exercise) 1/15  -AC      Recorded by [AC] Brianna Hammer, OT 09/03/18 1522      Row Name 09/03/18 1401             Pain Scale: Numbers Pre/Post-Treatment    Pain Scale: Numbers, Pretreatment 0/10 - no pain   LLE 2/10 in standing  -AC      Pain Scale: Numbers, Post-Treatment 0/10 - no pain  -AC      Pain Intervention(s) Repositioned  -AC      Recorded by [AC] Brianna Hammer, OT 09/03/18 1522      Row Name 09/03/18 1401             Sensory Assessment/Intervention    Sensory General Assessment no sensation deficits identified  -AC      Recorded by [AC] Brianna Hammer, OT 09/03/18 1522      Row Name 09/03/18 1401             Hearing Assessment    Hearing Status --  -AC      Recorded by [AC]  Brianna Hammer, OT 09/03/18 1522      Row Name 09/03/18 1401             Vision Assessment/Intervention    Visual Impairment/Limitations --  -AC      Recorded by [AC] Brianna Hammer, OT 09/03/18 1522      Row Name 09/03/18 1401             Outcome Summary/Treatment Plan (OT)    Anticipated Discharge Disposition (OT) assisted living facility (longterm);home with 24/7 care  -AC      Recorded by [AC] Brianna Hammer, OT 09/03/18 1522        User Key  (r) = Recorded By, (t) = Taken By, (c) = Cosigned By    Initials Name Effective Dates Discipline    AC Brianna Hammer, OT 06/23/15 -  OT               Occupational Therapy Education     Title: PT OT SLP Therapies (Active)     Topic: Occupational Therapy (Active)     Point: ADL training (Done)     Description: Instruct learner(s) on proper safety adaptation and remediation techniques during self care or transfers.   Instruct in proper use of assistive devices.   Learning Progress Summary     Learner Status Readiness Method Response Comment Documented by    Patient Done Acceptance DENNISE DANG ADL training with LBD/toileting  09/03/18 1522     Done Acceptance GLORIA FARRELL DU, NR Purpose of OT services, ADL retraining for LB dressing and toileting, safety awareness and sequencing for functional transfers  09/02/18 0857          Point: Precautions (Done)     Description: Instruct learner(s) on prescribed precautions during self-care and functional transfers.   Learning Progress Summary     Learner Status Readiness Method Response Comment Documented by    Patient Done Acceptance GLORIA FARRELL DU, NR Purpose of OT services, ADL retraining for LB dressing and toileting, safety awareness and sequencing for functional transfers  09/02/18 0857          Point: Body mechanics (Done)     Description: Instruct learner(s) on proper positioning and spine alignment during self-care, functional mobility activities and/or exercises.   Learning Progress Summary     Learner Status Readiness Method Response  Comment Documented by    Patient Done Acceptance E,ANDREE AGUILAR,NR Purpose of OT services, ADL retraining for LB dressing and toileting, safety awareness and sequencing for functional transfers  09/02/18 0857                      User Key     Initials Effective Dates Name Provider Type Discipline     06/23/15 -  Brianna Hammer, OT Occupational Therapist OT     04/03/18 -  Myrna Rodriguez, OT Occupational Therapist OT                OT Recommendation and Plan  Outcome Summary/Treatment Plan (OT)  Anticipated Discharge Disposition (OT): assisted living facility ([facility]), home with 24/7 care  Plan of Care Review  Plan of Care Reviewed With: patient  Plan of Care Reviewed With: patient  Outcome Summary: Pt progressing with LBD requiring Min A to don/doff pants.  Pt setup with toileting hygiene and min A with clothing mgmt.  Pt with good effort and tolerance with UE ther ex.   Pt limited by fatigue and weakness        Outcome Measures     Row Name 09/03/18 1401 09/02/18 0910 09/02/18 0800       How much help from another person do you currently need...    Turning from your back to your side while in flat bed without using bedrails?  -- 3  -AA  --    Moving from lying on back to sitting on the side of a flat bed without bedrails?  -- 3  -AA  --    Moving to and from a bed to a chair (including a wheelchair)?  -- 3  -AA  --    Standing up from a chair using your arms (e.g., wheelchair, bedside chair)?  -- 3  -AA  --    Climbing 3-5 steps with a railing?  -- 2  -AA  --    To walk in hospital room?  -- 3  -AA  --    AM-PAC 6 Clicks Score  -- 17  -AA  --       How much help from another is currently needed...    Putting on and taking off regular lower body clothing? 3  -AC  -- 3  -KF    Bathing (including washing, rinsing, and drying) 3  -AC  -- 3  -KF    Toileting (which includes using toilet bed pan or urinal) 3  -AC  -- 3  -KF    Putting on and taking off regular upper body clothing 3  -AC  -- 3  -KF    Taking care of  personal grooming (such as brushing teeth) 3  -AC  -- 3  -KF    Eating meals 4  -AC  -- 3  -KF    Score 19  -AC  -- 18  -KF       Functional Assessment    Outcome Measure Options AM-PAC 6 Clicks Daily Activity (OT)  -AC AM-PAC 6 Clicks Basic Mobility (PT)  -AA AM-PAC 6 Clicks Daily Activity (OT)  -KF      User Key  (r) = Recorded By, (t) = Taken By, (c) = Cosigned By    Initials Name Provider Type    AC Brianna Hammer, OT Occupational Therapist    Myrna Rodriguez, OT Occupational Therapist    Ursula Rascon, PT Physical Therapist           Time Calculation:         Time Calculation- OT     Row Name 09/03/18 1401             Time Calculation- OT    OT Start Time 0401  -AC      Total Timed Code Minutes- OT 25 minute(s)  -AC      OT Received On 09/03/18  -AC      OT Goal Re-Cert Due Date 09/12/18  -AC         Timed Charges    13976 - OT Therapeutic Exercise Minutes 5  -AC      19275 - OT Therapeutic Activity Minutes 10  -AC      83122 - OT Self Care/Mgmt Minutes 10  -AC        User Key  (r) = Recorded By, (t) = Taken By, (c) = Cosigned By    Initials Name Provider Type    AC Brianna Hammer, OT Occupational Therapist           Therapy Suggested Charges     Code   Minutes Charges    09555 (CPT®) Hc Ot Neuromusc Re Education Ea 15 Min      18989 (CPT®) Hc Ot Ther Proc Ea 15 Min 5     27037 (CPT®) Hc Ot Therapeutic Act Ea 15 Min 10 1    23291 (CPT®) Hc Ot Manual Therapy Ea 15 Min      31683 (CPT®) Hc Ot Iontophoresis Ea 15 Min      39059 (CPT®) Hc Ot Elec Stim Ea-Per 15 Min      73632 (CPT®) Hc Ot Ultrasound Ea 15 Min      39300 (CPT®) Hc Ot Self Care/Mgmt/Train Ea 15 Min 10 1    Total  25 2        Therapy Charges for Today     Code Description Service Date Service Provider Modifiers Qty    73582854615 HC OT THERAPEUTIC ACT EA 15 MIN 9/3/2018 Brianna Hammer, OT GO 1    95678924441 HC OT SELF CARE/MGMT/TRAIN EA 15 MIN 9/3/2018 Brianna Hammer, OT GO 1          OT G-codes  OT Professional Judgement Used?: Yes  OT  Functional Scales Options: AM-PAC 6 Clicks Daily Activity (OT)  Functional Assessment Tool Used: 6 clicks  Score: 18  Functional Limitation: Self care  Self Care Current Status (): At least 40 percent but less than 60 percent impaired, limited or restricted  Self Care Goal Status (): At least 20 percent but less than 40 percent impaired, limited or restricted    Brianna Hammer, OT  9/3/2018

## 2018-09-03 NOTE — PLAN OF CARE
Problem: Patient Care Overview  Goal: Plan of Care Review  Outcome: Ongoing (interventions implemented as appropriate)   09/03/18 1401   Coping/Psychosocial   Plan of Care Reviewed With patient   Plan of Care Review   Progress improving   OTHER   Outcome Summary Pt progressing with LBD requiring Min A to don/doff pants. Pt setup with toileting hygiene and min A with clothing mgmt. Pt with good effort and tolerance with UE ther ex. Pt limited by fatigue and weakness

## 2018-09-03 NOTE — PROGRESS NOTES
"[facility]  Cardiovascular Consultation Note  Mary Ramirez  [id number]  1934    DATE OF ADMISSION: 9/1/2018  DATE OF FOLLOW UP: 09/03/18    Nirmal Lundberg MD    Subjective:     Patient ID: Mary Ramirez is a 83 y.o. female.    Chief Complaint:   Chief Complaint   Patient presents with   • Chest Pain       Allergies:   Allergies   Allergen Reactions   • Penicillins Other (See Comments)     Pt states \"i don't know\"       Current medications:    Current Facility-Administered Medications:   •  acetaminophen (TYLENOL) tablet 650 mg, 650 mg, Oral, Q4H PRN, Fouzia Green MD  •  amiodarone (PACERONE) tablet 200 mg, 200 mg, Oral, Daily, Fouzia Green MD, 200 mg at 09/03/18 0831  •  amLODIPine (NORVASC) tablet 10 mg, 10 mg, Oral, Q24H, Fouzia Green MD, 10 mg at 09/03/18 0832  •  apixaban (ELIQUIS) tablet 2.5 mg, 2.5 mg, Oral, Q12H, Fouzia Green MD, 2.5 mg at 09/03/18 0811  •  atorvastatin (LIPITOR) tablet 40 mg, 40 mg, Oral, Nightly, Fouzia Green MD, 40 mg at 09/02/18 2054  •  bisacodyl (DULCOLAX) EC tablet 5 mg, 5 mg, Oral, Daily PRN, Fouzia Green MD  •  carvedilol (COREG) tablet 25 mg, 25 mg, Oral, BID With Meals, Fouzia Green MD, 25 mg at 09/01/18 1802  •  cetirizine (zyrTEC) tablet 5 mg, 5 mg, Oral, Daily, Fouzia Green MD, 5 mg at 09/03/18 0812  •  citalopram (CeleXA) tablet 20 mg, 20 mg, Oral, Daily, Fouzia Green MD, 20 mg at 09/03/18 0809  •  CloNIDine (CATAPRES) tablet 0.2 mg, 0.2 mg, Oral, Q6H PRN, Fouzia Green MD, 0.2 mg at 09/03/18 0341  •  dextrose (D50W) solution 25 g, 25 g, Intravenous, Q15 Min PRN, Fouzia Green MD  •  dextrose (GLUTOSE) oral gel 15 g, 15 g, Oral, Q15 Min PRN, Fouzia Green MD  •  docusate sodium (COLACE) capsule 100 mg, 100 mg, Oral, BID, Fouzia Green MD, 100 mg at 09/03/18 0812  •  donepezil (ARICEPT) tablet 5 mg, 5 mg, Oral, Nightly, Fouzia Green MD, 5 mg at 09/02/18 2054  •  glucagon (human recombinant) (GLUCAGEN DIAGNOSTIC) injection 1 mg, 1 " mg, Subcutaneous, PRN, Fouzia Green MD  •  hydrALAZINE (APRESOLINE) tablet 50 mg, 50 mg, Oral, Q8H, Gracy Venegas, APRN, 50 mg at 09/03/18 0519  •  HYDROcodone-acetaminophen (NORCO) 5-325 MG per tablet 1 tablet, 1 tablet, Oral, Q6H PRN, Fouzia Green MD  •  insulin lispro (humaLOG) injection 0-9 Units, 0-9 Units, Subcutaneous, 4x Daily With Meals & Nightly, Fouzia Green MD, 4 Units at 09/03/18 0810  •  isosorbide mononitrate (IMDUR) 24 hr tablet 60 mg, 60 mg, Oral, Daily, Fouzia Green MD, 60 mg at 09/03/18 0823  •  Morphine sulfate (PF) injection 1 mg, 1 mg, Intravenous, Q4H PRN **AND** naloxone (NARCAN) injection 0.4 mg, 0.4 mg, Intravenous, Q5 Min PRN, Fouzia Green MD  •  nitroglycerin 50 mg/250 mL (0.2 mg/mL) infusion, 10-50 mcg/min, Intravenous, Titrated, Kamlesh Trammell MD, Last Rate: 4.5 mL/hr at 09/01/18 1719, 15 mcg/min at 09/01/18 1719  •  ondansetron (ZOFRAN) injection 4 mg, 4 mg, Intravenous, Q6H PRN, Fouzia Green MD  •  polyethylene glycol 3350 powder (packet), 17 g, Oral, Daily, Fouzia Green MD, 17 g at 09/03/18 0814  •  sodium chloride 0.9 % flush 1-10 mL, 1-10 mL, Intravenous, PRN, Fouzia Green MD  •  sodium chloride 0.9 % flush 10 mL, 10 mL, Intravenous, PRN, Kamlesh Trammell MD  •  sodium chloride 0.9 % infusion, 100 mL/hr, Intravenous, Continuous, Fouzia Green MD, Last Rate: 100 mL/hr at 09/03/18 0518, 100 mL/hr at 09/03/18 0518    History of Present Illness: Reports feeling okay today. No complaints.     The following portions of the patient's history were reviewed and updated as appropriate: allergies, current medications, past family history, past medical history, past social history, past surgical history and problem list.    Review of Systems   Cardiovascular: Positive for chest pain.   :  A complete ROS obtained and negative except as outlined in problem list, HPI and other parts of the note.    Procedures       Objective:       Vitals:    09/03/18 0831 09/03/18 0832 09/03/18  0900 09/03/18 1000   BP: 143/67 143/67 159/63 132/59   BP Location:       Patient Position:       Pulse:  (!) 49 52 51   Resp:       Temp:       TempSrc:       SpO2:  98% 96% 97%   Weight:       Height:           Intake/Output Summary (Last 24 hours) at 09/03/18 1125  Last data filed at 09/03/18 0600   Gross per 24 hour   Intake          2271.64 ml   Output             3670 ml   Net         -1398.36 ml       Physical Exam:  GENERAL: Well-developed, well-nourished patient in no acute distress.  HEENT: Normocephalic, atraumatic, PERRLA. Moist mucous membranes.  NECK: No JVD present at 30°. No carotid bruits auscultated.  LUNGS: Nonlabored. Clear to auscultation.  CARDIOVASCULAR: Regular rate and rhythm. No murmurs, gallops or rubs noted.   ABDOMEN: Soft, non-tender, non-distended. Normoactive bowel sounds.  MUSCULOSKELETAL: No gross deformities. No clubbing or cyanosis  EXT: pulses intact, no swelling.  SKIN: Pink, warm.   Neuro: No gross neurologic deficits.    The patient's old records including ambulatory rhythm recordings (ECGs, Holter/event monitor) were reviewed and discussed.      Lab Review:     Results from last 7 days  Lab Units 09/03/18  0453 09/02/18  0446 09/01/18  1042   SODIUM mmol/L 137 139 135   POTASSIUM mmol/L 3.6 3.9 4.3   CHLORIDE mmol/L 103 106 103   CO2 mmol/L 28.0 25.0 24.0   BUN mg/dL 19 25* 21   CREATININE mg/dL 1.03 1.37* 1.32*   GLUCOSE mg/dL 189* 183* 274*   CALCIUM mg/dL 8.7 8.7 9.5       Results from last 7 days  Lab Units 09/02/18  0446 09/01/18  1917 09/01/18  1338   TROPONIN I ng/mL 0.008 0.007 0.007       Results from last 7 days  Lab Units 09/01/18  1043   WBC 10*3/mm3 6.68   HEMOGLOBIN g/dL 14.0   HEMATOCRIT % 44.8*   PLATELETS 10*3/mm3 148*                             Telemetry: SB, 50's  Assessment & Plan:     (Principal)Hypertensive urgency      Coronary artery disease involving native coronary artery of native heart with angina pectoris (CMS/HCC)     Overview Addendum 9/1/2018   8:49 PM by Jorge Fonseca IV, MD       · Cardiac cath with PCI data deficit  · Echo (9/1/18): LVEF 60%. LVH. Left atrial dilatation. Aortic calcification with no stenosis.           Paroxysmal atrial fibrillation (CMS/[facility])     Overview Addendum 9/1/2018  8:49 PM by Jorge Fonseca IV, MD       · Chads Vasc 6 (age > 75, female, CAD, DM, HTN)   · Rhythm control strategy with amiodarone  · Maintaining rhythm           DEANGELO (acute kidney injury) (CMS/[facility])     Type 2 diabetes mellitus with complication, with long-term current use of insulin (CMS/[facility])     Dementia     Chronic diastolic congestive heart failure (CMS/[facility])     Overview Signed 9/1/2018  8:49 PM by Jorge Fonseca IV, MD       · Echo (9/1/18): LVEF 60%. LVH. Left atrial dilatation. Aortic calcification with no stenosis.          Plan:  · Titrate NTG to keep SBP < 180 mmHg- wean as tolerated. On NTG now now. Please try to taper off  · Increased Hydralazine 75 mg q 8 hours and continue to increase as needed. BP improved with most recent readings in the 130's-140's  · Continue norvasc 10 mg daily  · Pharmacologic nuclear stress test to be considered once blood pressure controlled. ? Tomorrow or weds.   · Add chlorthalidone 12.5 mg daily once renal function normalizes. Creatinine 1.03 today. Consider adding on if remains stable.   · Continue amio and eliquis 2.5 mg bid  · Coreg held yesterday and today s/t bradycardia. Hr 50s. May need to adjust BB if continue to have to hold.          CR[facility]A [facility]  9/3/2018  11:25 AM    I, Cristian Camargo, have reviewed the note in full and agree with all aspects of the above including physical exam, assessment, labs and plan with changes made accordingly. Face to Face Time was spent with the patient.    Cristian Camargo DO  09/03/18  11:39 AM

## 2018-09-04 ENCOUNTER — APPOINTMENT (OUTPATIENT)
Dept: CARDIOLOGY | Facility: HOSPITAL | Age: 83
End: 2018-09-04
Attending: FAMILY MEDICINE

## 2018-09-04 LAB
ANION GAP SERPL CALCULATED.3IONS-SCNC: 9 MMOL/L (ref 3–11)
BH CV STRESS BP STAGE 2: NORMAL
BH CV STRESS COMMENTS STAGE 1: NORMAL
BH CV STRESS DOSE REGADENOSON STAGE 1: 0.4
BH CV STRESS DURATION MIN STAGE 1: 0
BH CV STRESS DURATION MIN STAGE 2: 1
BH CV STRESS DURATION MIN STAGE 3: 1
BH CV STRESS DURATION MIN STAGE 4: 1
BH CV STRESS DURATION SEC STAGE 1: 10
BH CV STRESS DURATION SEC STAGE 2: 0
BH CV STRESS HR STAGE 1: 62
BH CV STRESS HR STAGE 2: 78
BH CV STRESS HR STAGE 3: 75
BH CV STRESS HR STAGE 4: 70
BH CV STRESS PROTOCOL 1: NORMAL
BH CV STRESS RECOVERY BP: NORMAL MMHG
BH CV STRESS RECOVERY HR: 64 BPM
BH CV STRESS STAGE 1: 1
BH CV STRESS STAGE 2: 2
BH CV STRESS STAGE 3: 3
BH CV STRESS STAGE 4: 4
BUN BLD-MCNC: 26 MG/DL (ref 9–23)
BUN/CREAT SERPL: 23 (ref 7–25)
CALCIUM SPEC-SCNC: 8.8 MG/DL (ref 8.7–10.4)
CHLORIDE SERPL-SCNC: 103 MMOL/L (ref 99–109)
CO2 SERPL-SCNC: 25 MMOL/L (ref 20–31)
CREAT BLD-MCNC: 1.13 MG/DL (ref 0.6–1.3)
GFR SERPL CREATININE-BSD FRML MDRD: 46 ML/MIN/1.73
GLUCOSE BLD-MCNC: 112 MG/DL (ref 70–100)
GLUCOSE BLDC GLUCOMTR-MCNC: 129 MG/DL (ref 70–130)
GLUCOSE BLDC GLUCOMTR-MCNC: 149 MG/DL (ref 70–130)
GLUCOSE BLDC GLUCOMTR-MCNC: 164 MG/DL (ref 70–130)
GLUCOSE BLDC GLUCOMTR-MCNC: 173 MG/DL (ref 70–130)
LV EF NUC BP: 78 %
MAXIMAL PREDICTED HEART RATE: 137 BPM
PERCENT MAX PREDICTED HR: 58.39 %
POTASSIUM BLD-SCNC: 3.9 MMOL/L (ref 3.5–5.5)
SODIUM BLD-SCNC: 137 MMOL/L (ref 132–146)
STRESS BASELINE BP: NORMAL MMHG
STRESS BASELINE HR: 50 BPM
STRESS PERCENT HR: 69 %
STRESS POST PEAK BP: NORMAL MMHG
STRESS POST PEAK HR: 80 BPM
STRESS TARGET HR: 116 BPM

## 2018-09-04 PROCEDURE — 97116 GAIT TRAINING THERAPY: CPT

## 2018-09-04 PROCEDURE — 99232 SBSQ HOSP IP/OBS MODERATE 35: CPT | Performed by: INTERNAL MEDICINE

## 2018-09-04 PROCEDURE — 25010000002 REGADENOSON 0.4 MG/5ML SOLUTION: Performed by: HOSPITALIST

## 2018-09-04 PROCEDURE — 97530 THERAPEUTIC ACTIVITIES: CPT

## 2018-09-04 PROCEDURE — 93017 CV STRESS TEST TRACING ONLY: CPT

## 2018-09-04 PROCEDURE — 78492 MYOCRD IMG PET MLT RST&STRS: CPT | Performed by: INTERNAL MEDICINE

## 2018-09-04 PROCEDURE — 82962 GLUCOSE BLOOD TEST: CPT

## 2018-09-04 PROCEDURE — 80048 BASIC METABOLIC PNL TOTAL CA: CPT | Performed by: FAMILY MEDICINE

## 2018-09-04 PROCEDURE — 0 RUBIDIUM CHLORIDE: Performed by: HOSPITALIST

## 2018-09-04 PROCEDURE — 93018 CV STRESS TEST I&R ONLY: CPT | Performed by: INTERNAL MEDICINE

## 2018-09-04 PROCEDURE — 99232 SBSQ HOSP IP/OBS MODERATE 35: CPT | Performed by: NURSE PRACTITIONER

## 2018-09-04 PROCEDURE — A9555 RB82 RUBIDIUM: HCPCS | Performed by: HOSPITALIST

## 2018-09-04 PROCEDURE — 78492 MYOCRD IMG PET MLT RST&STRS: CPT

## 2018-09-04 RX ADMIN — DONEPEZIL HYDROCHLORIDE 5 MG: 10 TABLET, FILM COATED ORAL at 21:38

## 2018-09-04 RX ADMIN — AMIODARONE HYDROCHLORIDE 200 MG: 200 TABLET ORAL at 10:09

## 2018-09-04 RX ADMIN — AMLODIPINE BESYLATE 10 MG: 10 TABLET ORAL at 10:09

## 2018-09-04 RX ADMIN — ATORVASTATIN CALCIUM 40 MG: 40 TABLET, FILM COATED ORAL at 21:38

## 2018-09-04 RX ADMIN — POLYETHYLENE GLYCOL 3350 17 G: 17 POWDER, FOR SOLUTION ORAL at 10:08

## 2018-09-04 RX ADMIN — DOCUSATE SODIUM 100 MG: 100 CAPSULE, LIQUID FILLED ORAL at 21:38

## 2018-09-04 RX ADMIN — REGADENOSON 0.4 MG: 0.08 INJECTION, SOLUTION INTRAVENOUS at 09:20

## 2018-09-04 RX ADMIN — DOCUSATE SODIUM 100 MG: 100 CAPSULE, LIQUID FILLED ORAL at 10:09

## 2018-09-04 RX ADMIN — APIXABAN 2.5 MG: 2.5 TABLET, FILM COATED ORAL at 10:09

## 2018-09-04 RX ADMIN — CITALOPRAM HYDROBROMIDE 20 MG: 20 TABLET ORAL at 10:09

## 2018-09-04 RX ADMIN — ISOSORBIDE MONONITRATE 60 MG: 60 TABLET, EXTENDED RELEASE ORAL at 10:09

## 2018-09-04 RX ADMIN — APIXABAN 2.5 MG: 2.5 TABLET, FILM COATED ORAL at 21:38

## 2018-09-04 RX ADMIN — HYDRALAZINE HYDROCHLORIDE 75 MG: 25 TABLET ORAL at 06:11

## 2018-09-04 RX ADMIN — CETIRIZINE HYDROCHLORIDE 5 MG: 10 TABLET, FILM COATED ORAL at 10:08

## 2018-09-04 RX ADMIN — HYDRALAZINE HYDROCHLORIDE 75 MG: 25 TABLET ORAL at 21:38

## 2018-09-04 RX ADMIN — ACETAMINOPHEN 650 MG: 325 TABLET ORAL at 21:45

## 2018-09-04 RX ADMIN — RUBIDIUM CHLORIDE RB-82 1 DOSE: 150 INJECTION, SOLUTION INTRAVENOUS at 09:08

## 2018-09-04 RX ADMIN — INSULIN LISPRO 2 UNITS: 100 INJECTION, SOLUTION INTRAVENOUS; SUBCUTANEOUS at 17:42

## 2018-09-04 RX ADMIN — INSULIN LISPRO 2 UNITS: 100 INJECTION, SOLUTION INTRAVENOUS; SUBCUTANEOUS at 11:58

## 2018-09-04 RX ADMIN — HYDRALAZINE HYDROCHLORIDE 75 MG: 25 TABLET ORAL at 15:16

## 2018-09-04 RX ADMIN — RUBIDIUM CHLORIDE RB-82 1 DOSE: 150 INJECTION, SOLUTION INTRAVENOUS at 09:21

## 2018-09-04 NOTE — PROGRESS NOTES
[facility] Medicine Services  PROGRESS NOTE    Patient Name: Mary Ramirez  : 1934  MRN: [id number]    Date of Admission: 2018  Length of Stay: 3  Primary Care Physician: Nirmla Lundberg MD    Subjective   Subjective     CC:  CP, HTN urgency    HPI:  Patient hungry at lunch. Denies any chest pain today, no soa, n/v. Voiding well.   Review of Systems  Otherwise ROS is negative except as mentioned in the HPI.    Objective   Objective     Vital Signs:   Temp:  [98.1 °F (36.7 °C)-98.3 °F (36.8 °C)] 98.3 °F (36.8 °C)  Heart Rate:  [45-67] 67  Resp:  [16-20] 16  BP: (112-184)/(53-73) 142/64        Physical Exam:  Constitutional: No acute distress, awake, alert, nontoxic, normal body habitus  Respiratory: Clear to auscultation bilaterally, good effort, nonlabored respirations   Cardiovascular: RRR, no murmur  Gastrointestinal: Positive bowel sounds, soft, nontender, nondistended  Musculoskeletal: No peripheral edema, normal muscle tone for age  Psychiatric: Appropriate affect, good insight and judgement, cooperative  Neurologic: Oriented x 3 and able to give reliable history, movements symmetric BUE and BLE, Cranial Nerves grossly intact to confrontation, speech clear and fluent  Skin: No rashes, no jaundice, no petechiae, no mottling       Results Reviewed:  I have personally reviewed current lab, radiology, and data and agree.      Results from last 7 days  Lab Units 18  1043   WBC 10*3/mm3 6.68   HEMOGLOBIN g/dL 14.0   HEMATOCRIT % 44.8*   PLATELETS 10*3/mm3 148*       Results from last 7 days  Lab Units 18  0430 18  0453 18  0446 18  1917 18  1338 18  1042   SODIUM mmol/L 137 137 139  --   --  135   POTASSIUM mmol/L 3.9 3.6 3.9  --   --  4.3   CHLORIDE mmol/L 103 103 106  --   --  103   CO2 mmol/L 25.0 28.0 25.0  --   --  24.0   BUN mg/dL 26* 19 25*  --   --  21   CREATININE mg/dL 1.13 1.03 1.37*  --   --  1.32*   GLUCOSE mg/dL  112* 189* 183*  --   --  274*   CALCIUM mg/dL 8.8 8.7 8.7  --   --  9.5   ALT (SGPT) U/L  --   --   --   --   --  40   AST (SGOT) U/L  --   --   --   --   --  38*   TROPONIN I ng/mL  --   --  0.008 0.007 0.007  --      Estimated Creatinine Clearance: 28.3 mL/min (by C-G formula based on SCr of 1.13 mg/dL).  No results found for: BNP    Microbiology Results Abnormal     None          Imaging Results (last 24 hours)     ** No results found for the last 24 hours. **        Results for orders placed during the hospital encounter of 09/01/18   Adult Transthoracic Echo Complete W/ Cont if Necessary Per Protocol    Narrative · Left ventricular systolic function is normal. Estimated EF = 60%.  · Left ventricular wall thickness is consistent with hypertrophy.  · Left atrial cavity size is dilated.  · There is calcification of the aortic valve.  · Ao mean PG 5.3 mmHg          I have reviewed the medications.    Assessment/Plan   Assessment / Plan     Active Hospital Problems    Diagnosis Date Noted   • **Hypertensive urgency [I16.0] 09/01/2018   • DEANGELO (acute kidney injury) (CMS/[facility]) [N17.9] 09/01/2018   • Chronic diastolic congestive heart failure (CMS/[facility]) [I50.32] 09/01/2018   • Paroxysmal atrial fibrillation (CMS/[facility]) [I48.0] 04/07/2018   • Anticoagulated [Z79.01] 04/07/2018   • Dementia [F03.90] 02/28/2018   • Type 2 diabetes mellitus with complication, with long-term current use of insulin (CMS/[facility]) [E11.8, Z79.4] 11/24/2017   • Coronary artery disease involving native coronary artery of native heart with angina pectoris (CMS/[facility]) [I25.119] 11/24/2017      Resolved Hospital Problems    Diagnosis Date Noted Date Resolved   • Unstable angina (CMS/[facility]) [I20.0] 09/01/2018 09/01/2018          Brief Hospital Course to date:  Mary Ramirez is a 83 y.o. female with PMHx CAD with remote stents, diastolic CHF, PAF on chronic Eliquis, mild dementia, and DM 2.  Patient lives at assisted living and has her medications dispense to  her and regular blood pressure checks.  Over the past several weeks to a few months it has been noted that she has been having escalated blood pressures despite her home hypertension medication and bothersome midsternal and left chest pain.       In ED, her initial blood pressures were markedly elevated at 200's/150's and she was placed on nitro drip which did not have much effect on her chest pain and only minimal effect on her blood pressure.      Please see above Hospital Problem List which is being actively managed to reflect all current/pertinent diagnoses, the following items may only represent today's acute or active assessments and/or plans of care.      Chest pain  HTN urgency  CAD hx of stents  - doing better off nitro gtt  - BP's better on scheduled hydralazine in addition to home meds  -- increase hydralazine today.   - home Coreg discontinued due to resting bradycardia  - if DEANGELO remains resolved. Monitor BP on increased hydralazine. consider addition of chlorthalidone to BP regimen  - Cards follows -->  nuclear stress pending    DEANGELO, resolved  - dc'd IVFs, encourage PO    Chronic DHF  PAF on Eliquis  - home meds    DM2  - SSI    DVT Prophylaxis:  Eliquis    CODE STATUS:   Code Status and Medical Interventions:   Ordered at: 09/01/18 1235     Code Status:    CPR     Medical Interventions (Level of Support Prior to Arrest):    Full       Disposition: I expect the patient to be discharged back to assisted living ~1 days.      Electronically signed by CRISTA Mares, 09/04/18, 1:02 PM.

## 2018-09-04 NOTE — PLAN OF CARE
Problem: Patient Care Overview  Goal: Plan of Care Review  Outcome: Ongoing (interventions implemented as appropriate)   09/04/18 0422   Coping/Psychosocial   Plan of Care Reviewed With patient   Plan of Care Review   Progress improving   OTHER   Outcome Summary Pt has slept well throughout the night. VSS. Stress test in AM? Will continue to monitor?     Goal: Individualization and Mutuality  Outcome: Ongoing (interventions implemented as appropriate)    Goal: Discharge Needs Assessment  Outcome: Ongoing (interventions implemented as appropriate)

## 2018-09-04 NOTE — PROGRESS NOTES
"Adult Nutrition  Assessment/PES    Patient Name:  Mary Ramirez  YOB: 1934  MRN: [id number]  Admit Date:  9/1/2018    Assessment Date:  9/4/2018    Comments:            Reason for Assessment     Row Name 09/04/18 0808          Reason for Assessment    Reason For Assessment follow-up protocol   5\"                       Nutrition Prescription Ordered     Row Name 09/04/18 0808          Nutrition Prescription PO    Current PO Diet Regular     Supplement Boost Glucose Control     Supplement Frequency Daily     Common Modifiers Cardiac;Consistent Carbohydrate               Problem/Interventions:        Problem 1     Row Name 09/04/18 0809          Nutrition Diagnoses Problem 1    Problem 1 Nutrition Appropriate for Condition at this Time     Signs/Symptoms (evidenced by) PO Intake     Percent (%) intake recorded 75 %     Over number of meals 3                           Education/Evaluation     Row Name 09/04/18 0809          Monitor/Evaluation    Monitor Per protocol         Electronically signed by:  Karlie Ann RD  09/04/18 8:09 AM  "

## 2018-09-04 NOTE — PLAN OF CARE
Problem: Patient Care Overview  Goal: Plan of Care Review  Outcome: Ongoing (interventions implemented as appropriate)   09/04/18 1433   Coping/Psychosocial   Plan of Care Reviewed With patient;son   Plan of Care Review   Progress improving   OTHER   Outcome Summary Able to amb 300 ft w/ R wx & 2 stand.rests, after much coaxing,but limited by LLE pain,fatigue from stress test w/ myocard.perfus. earlier this am , and elev SBP

## 2018-09-04 NOTE — PROGRESS NOTES
"[facility]  Cardiovascular Consultation Note  Mary Ramirez  S476/1  1934    DATE OF ADMISSION: 9/1/2018  DATE OF FOLLOW UP: 09/04/18    Nirmal Lundberg MD    Subjective:     Patient ID: Mary Ramirez is a 83 y.o. female.    Chief Complaint:   Chief Complaint   Patient presents with   • Chest Pain       Allergies:   Allergies   Allergen Reactions   • Penicillins Other (See Comments)     Pt states \"i don't know\"       Current medications:    Current Facility-Administered Medications:   •  acetaminophen (TYLENOL) tablet 650 mg, 650 mg, Oral, Q4H PRN, Fouzia Green MD  •  amiodarone (PACERONE) tablet 200 mg, 200 mg, Oral, Daily, Fouzia Green MD, 200 mg at 09/03/18 0831  •  amLODIPine (NORVASC) tablet 10 mg, 10 mg, Oral, Q24H, Fouzia Green MD, 10 mg at 09/03/18 0832  •  apixaban (ELIQUIS) tablet 2.5 mg, 2.5 mg, Oral, Q12H, Fouzia Green MD, 2.5 mg at 09/03/18 2028  •  atorvastatin (LIPITOR) tablet 40 mg, 40 mg, Oral, Nightly, Fouzia Green MD, 40 mg at 09/03/18 2028  •  bisacodyl (DULCOLAX) EC tablet 5 mg, 5 mg, Oral, Daily PRN, Fouzia Green MD  •  cetirizine (zyrTEC) tablet 5 mg, 5 mg, Oral, Daily, Fouzia Green MD, 5 mg at 09/03/18 0812  •  citalopram (CeleXA) tablet 20 mg, 20 mg, Oral, Daily, Fouzia Green MD, 20 mg at 09/03/18 0809  •  CloNIDine (CATAPRES) tablet 0.2 mg, 0.2 mg, Oral, Q6H PRN, Fouzia Green MD, 0.2 mg at 09/03/18 0341  •  dextrose (D50W) solution 25 g, 25 g, Intravenous, Q15 Min PRN, Fouzia Green MD  •  dextrose (GLUTOSE) oral gel 15 g, 15 g, Oral, Q15 Min PRN, Fouzia Green MD  •  docusate sodium (COLACE) capsule 100 mg, 100 mg, Oral, BID, Fouzia Green MD, 100 mg at 09/03/18 2028  •  donepezil (ARICEPT) tablet 5 mg, 5 mg, Oral, Nightly, Fouzia Green MD, 5 mg at 09/03/18 2028  •  glucagon (human recombinant) (GLUCAGEN DIAGNOSTIC) injection 1 mg, 1 mg, Subcutaneous, PRN, Fouzia Green MD  •  hydrALAZINE (APRESOLINE) tablet 75 mg, 75 mg, Oral, Q8H, Raman, " Cristian, DO, 75 mg at 09/04/18 0611  •  HYDROcodone-acetaminophen (NORCO) 5-325 MG per tablet 1 tablet, 1 tablet, Oral, Q6H PRN, Fouzia Green MD, 1 tablet at 09/03/18 1850  •  insulin lispro (humaLOG) injection 0-9 Units, 0-9 Units, Subcutaneous, 4x Daily With Meals & Nightly, Fouzia Green MD, 4 Units at 09/03/18 2206  •  isosorbide mononitrate (IMDUR) 24 hr tablet 60 mg, 60 mg, Oral, Daily, Fouzia Green MD, 60 mg at 09/03/18 0823  •  Morphine sulfate (PF) injection 1 mg, 1 mg, Intravenous, Q4H PRN **AND** naloxone (NARCAN) injection 0.4 mg, 0.4 mg, Intravenous, Q5 Min PRN, Fouzia Green MD  •  ondansetron (ZOFRAN) injection 4 mg, 4 mg, Intravenous, Q6H PRN, Fouzia Green MD  •  polyethylene glycol 3350 powder (packet), 17 g, Oral, Daily, Fouzia Green MD, 17 g at 09/03/18 0814  •  sodium chloride 0.9 % flush 1-10 mL, 1-10 mL, Intravenous, PRN, Fouzia Green MD  •  sodium chloride 0.9 % flush 10 mL, 10 mL, Intravenous, PRN, Kamlesh Trammell MD    Chest Pain      Subjective: Brief twinges of left lateral chest wall discomfort.  Denies dyspnea or palpitations.  Overall blood pressure trend gradually improving.  Status post myocardial perfusion imaging this morning.    Review of Systems   Cardiovascular: Positive for chest pain.   :  A complete ROS obtained and negative except as outlined in problem list, HPI and other parts of the note.    Procedures       Objective:       Vitals:    09/04/18 0426 09/04/18 0600 09/04/18 0611 09/04/18 0725   BP: 167/73  167/73 (!) 184/68   BP Location: Right arm   Right arm   Patient Position: Lying   Lying   Pulse: 57 52 55 53   Resp: 20   18   Temp: 98.1 °F (36.7 °C)   98.3 °F (36.8 °C)   TempSrc: Oral   Oral   SpO2: 97% 95%  97%   Weight:       Height:           Intake/Output Summary (Last 24 hours) at 09/04/18 1000  Last data filed at 09/04/18 0900   Gross per 24 hour   Intake              360 ml   Output             1600 ml   Net            -1240 ml       Physical Exam:  GENERAL:  Well-developed, well-nourished patient in no acute distress.  HEENT: Normocephalic, atraumatic, PERRLA. Moist mucous membranes.  NECK: No JVD present at 30°. No carotid bruits auscultated.  LUNGS: Nonlabored. Clear to auscultation.  CARDIOVASCULAR: Regular rate and rhythm, normal S1-S2. No murmurs, gallops or rubs noted.   ABDOMEN: Soft, non-tender, non-distended. Normoactive bowel sounds.  MUSCULOSKELETAL: No gross deformities. No clubbing or cyanosis  EXT: pulses intact, no swelling.  SKIN: Pink, warm.   Neuro: No gross neurologic deficits.    The patient's old records including ambulatory rhythm recordings (ECGs, Holter/event monitor) were reviewed and discussed.      Lab Review:     Results from last 7 days  Lab Units 09/04/18  0430 09/03/18  0453 09/02/18  0446   SODIUM mmol/L 137 137 139   POTASSIUM mmol/L 3.9 3.6 3.9   CHLORIDE mmol/L 103 103 106   CO2 mmol/L 25.0 28.0 25.0   BUN mg/dL 26* 19 25*   CREATININE mg/dL 1.13 1.03 1.37*   GLUCOSE mg/dL 112* 189* 183*   CALCIUM mg/dL 8.8 8.7 8.7       Results from last 7 days  Lab Units 09/02/18  0446 09/01/18  1917 09/01/18  1338   TROPONIN I ng/mL 0.008 0.007 0.007       Results from last 7 days  Lab Units 09/01/18  1043   WBC 10*3/mm3 6.68   HEMOGLOBIN g/dL 14.0   HEMATOCRIT % 44.8*   PLATELETS 10*3/mm3 148*                             Telemetry: SB, 50's  Assessment & Plan:     (Principal)Hypertensive urgency      Coronary artery disease involving native coronary artery of native heart with angina pectoris (CMS/HCC)     Overview Addendum 9/1/2018  8:49 PM by Jorge Fonseca IV, MD       · Cardiac cath with PCI data deficit  · Echo (9/1/18): LVEF 60%. LVH. Left atrial dilatation. Aortic calcification with no stenosis.           Paroxysmal atrial fibrillation (CMS/HCC)     Overview Addendum 9/1/2018  8:49 PM by Jorge Fonseca IV, MD       · Chads Vasc 6 (age > 75, female, CAD, DM, HTN)   · Rhythm control strategy with amiodarone  · Maintaining  rhythm           DEANGELO (acute kidney injury) (CMS/[facility])     Type 2 diabetes mellitus with complication, with long-term current use of insulin (CMS/[facility])     Dementia     Chronic diastolic congestive heart failure (CMS/[facility])     Overview Signed 9/1/2018  8:49 PM by Jorge Fonseca IV, MD       · Echo (9/1/18): LVEF 60%. LVH. Left atrial dilatation. Aortic calcification with no stenosis.          Plan:  · Continue Hydralazine 75 mg q 8 hours   · Continue norvasc 10 mg daily  · Awaiting results of Pharmacologic nuclear stress test  · Continue amio and eliquis 2.5 mg bid  · Discontinuing beta-blockade due to bradycardia         Ta Flores III, MD  09/04/18  10:00 AM

## 2018-09-04 NOTE — PROGRESS NOTES
Discharge Planning Assessment  [facility]     Patient Name: Mary Ramirez  MRN: [id number]  Today's Date: 9/4/2018    Admit Date: 9/1/2018          Discharge Needs Assessment     Row Name 09/04/18 1515       Living Environment    Lives With alone    Current Living Arrangements independent/assisted living facility    Primary Care Provided by other (see comments);self   assisted living    Provides Primary Care For no one    Family Caregiver if Needed none    Quality of Family Relationships helpful;involved;supportive    Able to Return to Prior Arrangements yes       Resource/Environmental Concerns    Transportation Concerns car, none       Transition Planning    Patient/Family Anticipates Transition to home    Transportation Anticipated family or friend will provide       Discharge Needs Assessment    Readmission Within the Last 30 Days no previous admission in last 30 days    Equipment Currently Used at Home glucometer;walker, rolling    Anticipated Changes Related to Illness none    Equipment Needed After Discharge none            Discharge Plan     Row Name 09/04/18 1516       Plan    Plan Back to Assisted Living    Patient/Family in Agreement with Plan yes    Plan Comments Ms. Ramirez lives in an assisted living apt at [facility]. Children live nearby ad assist as needed and will provide transportation home. Facility fills her medications for her and will require hard Rx's for any new medications ordered at discharge. Patient states she uses her walker for mobility and that she goes down to the dining choi for lunch and dinner. Plans upon discharge are to return back to her AL apt. Currently she has no needs nor concerns at this time. CM will continue to follow.     Final Discharge Disposition Code 01 - home or self-care        Destination     No service coordination in this encounter.      Durable Medical Equipment     No service coordination in this encounter.      Dialysis/Infusion     No  service coordination in this encounter.      Home Medical Care     No service coordination in this encounter.      Social Care     No service coordination in this encounter.        Expected Discharge Date and Time     Expected Discharge Date Expected Discharge Time    Sep 6, 2018               Demographic Summary     Row Name 09/04/18 1514       General Information    Admission Type inpatient    Arrived From home    Referral Source admission list    Preferred Language English       Contact Information    Permission Granted to Share Info With     Row Name 09/04/18 1410       General Information    Admission Type inpatient    Arrived From home    Referral Source admission list    Preferred Language English       Contact Information    Permission Granted to Share Info With             Functional Status     Row Name 09/04/18 1515       Functional Status    Usual Activity Tolerance moderate    Current Activity Tolerance moderate       Functional Status, IADL    Medications assistive person    Meal Preparation assistive person    Housekeeping assistive person    Laundry assistive person    Shopping assistive person       Mental Status    General Appearance WDL WDL       Mental Status Summary    Recent Changes in Mental Status/Cognitive Functioning no changes            Psychosocial    No documentation.           Abuse/Neglect    No documentation.           Legal    No documentation.           Substance Abuse    No documentation.           Patient Forms    No documentation.         Sandra Rand RN

## 2018-09-04 NOTE — THERAPY TREATMENT NOTE
"Acute Care - Physical Therapy Treatment Note  [facility]     Patient Name: Mary Ramirez  : 1934  MRN: [id number]  Today's Date: 2018  Onset of Illness/Injury or Date of Surgery: 18  Date of Referral to PT: 18  Referring Physician: MD Peter    Admit Date: 2018    Visit Dx:    ICD-10-CM ICD-9-CM   1. Chest pain, unspecified type R07.9 786.50   2. Severe uncontrolled hypertension I10 401.9   3. Chronic atrial fibrillation (CMS/[facility]) I48.2 427.31   4. History of coronary artery disease Z86.79 V12.59   5. Type 2 diabetes mellitus with hyperglycemia, unspecified long term insulin use status (CMS/[facility]) E11.65 250.00   6. Hypertensive urgency I16.0 401.9   7. Impaired mobility and ADLs Z74.09 799.89   8. Impaired functional mobility, balance, gait, and endurance Z74.09 V49.89     Patient Active Problem List   Diagnosis   • Fall   • Fracture, intertrochanteric, left femur (CMS/[facility])   • Essential hypertension   • Type 2 diabetes mellitus with complication, with long-term current use of insulin (CMS/[facility])   • Coronary artery disease involving native coronary artery of native heart with angina pectoris (CMS/[facility])   • Dementia   • Paroxysmal atrial fibrillation (CMS/[facility])   • Anticoagulated   • SSS (sick sinus syndrome) (CMS/[facility])   • Chronic diastolic congestive heart failure (CMS/[facility])   • Hypertensive urgency   • DEANGELO (acute kidney injury) (CMS/[facility])       Therapy Treatment          Rehabilitation Treatment Summary     Row Name 18 1301             Treatment Time/Intention    Discipline physical therapist  -DM      Document Type therapy note (daily note)  -DM      Subjective Information complains of;weakness;pain   BP elev at 184/68 earlier  -DM      Mode of Treatment individual therapy;physical therapy  -DM      Patient/Family Observations 2 tries;1st:ref.d/t L knee pain('ComeBack later\");2nd: son present;helped P.T. convince pt to coop w/gt (extensive coaxing); req. soft drink prior; on hardwire " tele.,RA,IV hep locked; nsg req. put bed alarm on when son left(off, upon arrival)  -DM      Therapy Frequency (PT Clinical Impression) daily  -DM      Patient Effort good  -DM      Existing Precautions/Restrictions fall;other (see comments)   Dem. ; recent ORIF L hip 11/'17  -DM      Recorded by [DM] Terrie Guzman, PT 09/04/18 1344      Row Name 09/04/18 1301             Vital Signs    Pre Systolic BP Rehab 142  -DM      Pre Treatment Diastolic BP 64  -DM      Post Systolic BP Rehab 156  -DM      Post Treatment Diastolic BP 69  -DM      Pretreatment Heart Rate (beats/min) 53  -DM      Intratreatment Heart Rate (beats/min) 61  -DM      Posttreatment Heart Rate (beats/min) 54  -DM      Pre SpO2 (%) 99  -DM      O2 Delivery Pre Treatment room air  -DM      Intra SpO2 (%) 97  -DM      O2 Delivery Intra Treatment room air  -DM      Post SpO2 (%) 99  -DM      O2 Delivery Post Treatment room air  -DM      Pre Patient Position Supine  -DM      Intra Patient Position Standing  -DM      Post Patient Position Supine  -DM      Recorded by [DM] Terrie Guzman, PT 09/04/18 1344      Row Name 09/04/18 1301             Cognitive Assessment/Intervention    Additional Documentation Cognitive Assessment/Intervention (Group)  -DM      Recorded by [DM] Terrie Guzman, PT 09/04/18 1344      Row Name 09/04/18 1301             Cognitive Assessment/Intervention- PT/OT    Affect/Mental Status (Cognitive) confused  -DM      Orientation Status (Cognition) oriented x 4  -DM      Follows Commands (Cognition) follows one step commands;over 90% accuracy;increased processing time needed  -DM      Cognitive Function (Cognitive) memory deficit;safety deficit  -DM      Memory Deficit (Cognitive) mild deficit  -DM      Safety Deficit (Cognitive) mild deficit  -DM      Personal Safety Interventions fall prevention program maintained;gait belt;nonskid shoes/slippers when out of bed  -DM      Recorded by [DM] Terrie Guzman, PT 09/04/18 1342       Row Name 09/04/18 1301             Safety Issues, Functional Mobility    Impairments Affecting Function (Mobility) balance;cognition;endurance/activity tolerance;pain;postural/trunk control;strength  -DM      Recorded by [DM] Terrie Guzman, PT 09/04/18 1344      Row Name 09/04/18 1301             Bed Mobility Assessment/Treatment    Bed Mobility Assessment/Treatment rolling left;supine-sit;sit-supine  -DM      Rolling Left [place] (Bed Mobility) conditional independence  -DM      Supine-Sit [place] (Bed Mobility) verbal cues;contact guard  -DM      Sit-Supine [place] (Bed Mobility) minimum assist (75% patient effort)  -DM      Bed Mobility, Safety Issues decreased use of legs for bridging/pushing  -DM      Assistive Device (Bed Mobility) bed rails;head of bed elevated  -DM      Comment (Bed Mobility) diffic. lifting LE's into bed  -DM      Recorded by [DM] Terrie Guzman, PT 09/04/18 1344      Row Name 09/04/18 1301             Transfer Assessment/Treatment    Transfer Assessment/Treatment sit-stand transfer;stand-sit transfer  -DM      Comment (Transfers) cues for HP  -DM      Recorded by [DM] Terrie Guzman, PT 09/04/18 1344      Row Name 09/04/18 1301             Sit-Stand Transfer    Sit-Stand [place] (Transfers) verbal cues;contact guard  -DM      Assistive Device (Sit-Stand Transfers) walker, front-wheeled   init. tried pulling up w/ AD;redirected to use L bedrail  -DM      Recorded by [DM] Terrie Guzman, PT 09/04/18 1344      Row Name 09/04/18 1301             Stand-Sit Transfer    Stand-Sit [place] (Transfers) contact guard;verbal cues  -DM      Assistive Device (Stand-Sit Transfers) walker, front-wheeled  -DM      Recorded by [DM] Terrie Guzman, PT 09/04/18 1344      Row Name 09/04/18 1301             Gait/Stairs Assessment/Training    52160 - Gait Training Minutes  16  -DM      Gait/Stairs Assessment/Training gait/ambulation assistive device  -DM      [place]  Level (Gait) contact guard  -DM      Assistive Device (Gait) walker, front-wheeled  -DM      Distance in Feet (Gait) 300  -DM      Pattern (Gait) step-through  -DM      Deviations/Abnormal Patterns (Gait) left sided deviations;bilateral deviations;antalgic;ataxic;trey decreased;stride length decreased   dec. step length;diff. backing up to EOB  -DM      Bilateral Gait Deviations forward flexed posture;heel strike decreased  -DM      Comment (Gait/Stairs) antalgic LLE d/t prev.ORIF;Cues for inc. step length,trunk ext, keeping AD close   -DM      Recorded by [DM] Terrie Guzman, PT 09/04/18 1344      Row Name 09/04/18 1301             Therapeutic Exercise    Upper Extremity Range of Motion (Therapeutic Exercise) shoulder flexion/extension, bilateral;shoulder abduction/adduction, bilateral;shoulder horizontal abduction/adduction, bilateral;elbow flexion/extension, bilateral  -DM      Lower Extremity (Therapeutic Exercise) gluteal sets;hamstring sets, bilateral;heel slides, bilateral;LAQ (long arc quad), bilateral;marching while seated;quad sets, bilateral;SAQ (short arc quad), bilateral;SLR (straight leg raise), bilateral  -DM      Lower Extremity Range of Motion (Therapeutic Exercise) hip abduction/adduction, bilateral;hip internal/external rotation, bilateral;ankle dorsiflexion/plantar flexion, bilateral   abdom sets  -DM      Exercise Type (Therapeutic Exercise) AROM (active range of motion);isometric contraction, static  -DM      Position (Therapeutic Exercise) seated;supine  -DM      Sets/Reps (Therapeutic Exercise) 1/10  -DM      Comment (Therapeutic Exercise) ret. w/ HEP & INSTRUCTED  -DM      Recorded by [DM] Terrie Guzman, PT 09/04/18 1344      Row Name 09/04/18 1301             Static Sitting Balance    Level of [place] (Unsupported Sitting, Static Balance) independent  -DM      Sitting Position (Unsupported Sitting, Static Balance) sitting on edge of bed  -DM      Time Able to Maintain Position  (Unsupported Sitting, Static Balance) 4 to 5 minutes  -DM      Recorded by [DM] Terrie Guzman, PT 09/04/18 1344      Row Name 09/04/18 1301             Dynamic Sitting Balance    Level of [place], Reaches Outside Midline (Sitting, Dynamic Balance) supervision  -DM      Sitting Position, Reaches Outside Midline (Sitting, Dynamic Balance) sitting on edge of bed  -DM      Comment, Reaches Outside Midline (Sitting, Dynamic Balance) recip scooting  -DM      Recorded by [DM] Terrie Guzman, PT 09/04/18 1344      Row Name 09/04/18 1301             Static Standing Balance    Level of [place] (Supported Standing, Static Balance) contact guard assist  -DM      Time Able to Maintain Position (Supported Standing, Static Balance) 45 to 60 seconds  -DM      Assistive Device Utilized (Supported Standing, Static Balance) rolling walker  -DM      Recorded by [DM] Terrie Guzman, PT 09/04/18 1344      Row Name 09/04/18 1301             Dynamic Standing Balance    Level of [place], Reaches Outside Midline (Standing, Dynamic Balance) contact guard assist  -DM      Time Able to Maintain Position, Reaches Outside Midline (Standing, Dynamic Balance) 30 to 45 seconds  -DM      Recorded by [DM] Terrie Guzman, PT 09/04/18 1344      Row Name 09/04/18 1301             Positioning and Restraints    Pre-Treatment Position in bed  -DM      Post Treatment Position bed  -DM      In Bed notified nsg;supine;call light within reach;encouraged to call for assist;exit alarm on   pt.dropped equip.while PT charting;retrieved/disinfected  -DM      Recorded by [DM] Terrie Guzman, PT 09/04/18 1344      Row Name 09/04/18 1301             Pain Assessment    Additional Documentation Pain Scale: Numbers Pre/Post-Treatment (Group)  -DM      Recorded by [DM] Terrie Guzman, PT 09/04/18 1344      Row Name 09/04/18 1301             Pain Scale: Numbers Pre/Post-Treatment    Pain Scale: Numbers, Pretreatment 5/10  -DM      Pain Scale:  Numbers, Post-Treatment 6/10   LLE  -DM      Pain Location - Side Left  -DM      Pain Location - Orientation lower  -DM      Pain Location extremity  -DM      Pain Intervention(s) Repositioned;Rest  -DM      Recorded by [DM] Terrie Guzman, PT 09/04/18 1344      Row Name 09/04/18 1301             Coping    Observed Emotional State cooperative  -DM      Verbalized Emotional State acceptance  -DM      Recorded by [DM] Terrie Guzman, PT 09/04/18 1344      Row Name 09/04/18 1301             Plan of Care Review    Plan of Care Reviewed With patient;son  -DM      Recorded by [DM] Terrie Guzman, PT 09/04/18 1344      Row Name 09/04/18 1301             Outcome Summary/Treatment Plan (PT)    Daily Summary of Progress (PT) progress toward functional goals is good  -DM      Anticipated Discharge Disposition (PT) assisted living facility (care home)  -DM      Recorded by [DM] Terrie Guzman, PT 09/04/18 1344        User Key  (r) = Recorded By, (t) = Taken By, (c) = Cosigned By    Initials Name Effective Dates Discipline    DM Terrie Guzman, PT 06/19/15 -  PT                     Physical Therapy Education     Title: PT OT SLP Therapies (Active)     Topic: Physical Therapy (Active)     Point: Mobility training (Active)    Learning Progress Summary     Learner Status Readiness Method Response Comment Documented by    Patient Active Eager E,D,H NR  DM 09/04/18 1345     Active Acceptance E,TB NR  SR 09/03/18 1636     Active Eager E NR  AA 09/02/18 0957    Family Active Eager E,D,H NR  DM 09/04/18 1345          Point: Home exercise program (Active)    Learning Progress Summary     Learner Status Readiness Method Response Comment Documented by    Patient Active Eager E,D,H NR  DM 09/04/18 1345     Active Acceptance E,TB NR  SR 09/03/18 1636    Family Active Eager E,D,H NR  DM 09/04/18 1345          Point: Body mechanics (Active)    Learning Progress Summary     Learner Status Readiness Method Response Comment Documented by     Patient Active Eager E,D,H NR  DM 09/04/18 1345     Active Acceptance E,TB NR  SR 09/03/18 1636     Active Eager E NR  AA 09/02/18 0957    Family Active Eager E,D,H NR  DM 09/04/18 1345          Point: Precautions (Active)    Learning Progress Summary     Learner Status Readiness Method Response Comment Documented by    Patient Active Eager E,D,H NR   09/04/18 1345     Active Acceptance E,TB NR  SR 09/03/18 1636     Active Eager E NR  AA 09/02/18 0957    Family Active Eager E,D,H NR   09/04/18 1345                      User Key     Initials Effective Dates Name Provider Type Discipline     06/19/15 -  Terrie Guzman, PT Physical Therapist PT     06/19/15 -  Xiomara Velez, PT Physical Therapist PT     04/02/18 -  Ursula Ramsey, PT Physical Therapist PT                    PT Recommendation and Plan  Anticipated Discharge Disposition (PT): assisted living facility ([facility])  Therapy Frequency (PT Clinical Impression): daily  Outcome Summary/Treatment Plan (PT)  Daily Summary of Progress (PT): progress toward functional goals is good  Anticipated Discharge Disposition (PT): assisted living facility (long-term)  Plan of Care Reviewed With: patient, son  Progress: improving  Outcome Summary: Able to amb 300 ft w/ R wx & 2 stand.rests, after much coaxing,but limited by LLE pain,fatigue from stress test w/ myocard.perfus. earlier this am , and elev SBP          Outcome Measures     Row Name 09/04/18 1301 09/03/18 1401 09/03/18 1331       How much help from another person do you currently need...    Turning from your back to your side while in flat bed without using bedrails? 3  -DM  -- 3  -SR    Moving from lying on back to sitting on the side of a flat bed without bedrails? 3  -DM  -- 3  -SR    Moving to and from a bed to a chair (including a wheelchair)? 3  -DM  -- 3  -SR    Standing up from a chair using your arms (e.g., wheelchair, bedside chair)? 3  -DM  -- 3  -SR    Climbing 3-5 steps with a railing? 2  -DM  -- 2   -SR    To walk in hospital room? 3  -DM  -- 3  -SR    AM-PAC 6 Clicks Score 17  -DM  -- 17  -SR       How much help from another is currently needed...    Putting on and taking off regular lower body clothing?  -- 3  -AC  --    Bathing (including washing, rinsing, and drying)  -- 3  -AC  --    Toileting (which includes using toilet bed pan or urinal)  -- 3  -AC  --    Putting on and taking off regular upper body clothing  -- 3  -AC  --    Taking care of personal grooming (such as brushing teeth)  -- 3  -AC  --    Eating meals  -- 4  -AC  --    Score  -- 19  -AC  --       Functional Assessment    Outcome Measure Options AM-PAC 6 Clicks Basic Mobility (PT)  -DM AM-PAC 6 Clicks Daily Activity (OT)  -AC AM-PAC 6 Clicks Basic Mobility (PT)  -SR    Row Name 09/02/18 0910 09/02/18 0800          How much help from another person do you currently need...    Turning from your back to your side while in flat bed without using bedrails? 3  -AA  --     Moving from lying on back to sitting on the side of a flat bed without bedrails? 3  -AA  --     Moving to and from a bed to a chair (including a wheelchair)? 3  -AA  --     Standing up from a chair using your arms (e.g., wheelchair, bedside chair)? 3  -AA  --     Climbing 3-5 steps with a railing? 2  -AA  --     To walk in hospital room? 3  -AA  --     AM-PAC 6 Clicks Score 17  -AA  --        How much help from another is currently needed...    Putting on and taking off regular lower body clothing?  -- 3  -KF     Bathing (including washing, rinsing, and drying)  -- 3  -KF     Toileting (which includes using toilet bed pan or urinal)  -- 3  -KF     Putting on and taking off regular upper body clothing  -- 3  -KF     Taking care of personal grooming (such as brushing teeth)  -- 3  -KF     Eating meals  -- 3  -KF     Score  -- 18  -KF        Functional Assessment    Outcome Measure Options AM-PAC 6 Clicks Basic Mobility (PT)  -AA AM-PAC 6 Clicks Daily Activity (OT)  -KF       User  Key  (r) = Recorded By, (t) = Taken By, (c) = Cosigned By    Initials Name Provider Type    AC Brianna Hammer, OT Occupational Therapist    Terrie Garcia, PT Physical Therapist    Xiomara Duke, PT Physical Therapist    Myrna Rodriguez, OT Occupational Therapist    Ursula Rascon, PT Physical Therapist           Time Calculation:         PT Charges     Row Name 09/04/18 1349 09/04/18 1301          Time Calculation    Start Time 1301  -DM  --     PT Received On 09/04/18  -DM  --     PT Goal Re-Cert Due Date 09/12/18  -DM  --        Time Calculation- PT    Total Timed Code Minutes- PT 39 minute(s)  -DM  --        Timed Charges    93510 - Gait Training Minutes   -- 16  -DM     92887 - PT Therapeutic Activity Minutes  -- 33  -DM       User Key  (r) = Recorded By, (t) = Taken By, (c) = Cosigned By    Initials Name Provider Type    Terrie Garcia, PT Physical Therapist        Therapy Suggested Charges     Code   Minutes Charges    03451 (CPT®) Hc Pt Neuromusc Re Education Ea 15 Min      53232 (CPT®) Hc Pt Ther Proc Ea 15 Min      61608 (CPT®) Hc Gait Training Ea 15 Min 16 1    96548 (CPT®) Hc Pt Therapeutic Act Ea 15 Min 33 2    20222 (CPT®) Hc Pt Manual Therapy Ea 15 Min      80426 (CPT®) Hc Pt Iontophoresis Ea 15 Min      87851 (CPT®) Hc Pt Elec Stim Ea-Per 15 Min      38434 (CPT®) Hc Pt Ultrasound Ea 15 Min      37057 (CPT®) Hc Pt Self Care/Mgmt/Train Ea 15 Min      63063 (CPT®) Hc Pt Prosthetic (S) Train Initial Encounter, Each 15 Min      74942 (CPT®) Hc Pt Orthotic(S)/Prosthetic(S) Encounter, Each 15 Min      33879 (CPT®) Hc Orthotic(S) Mgmt/Train Initial Encounter, Each 15min      Total  49 3        Therapy Charges for Today     Code Description Service Date Service Provider Modifiers Qty    95231497411 HC GAIT TRAINING EA 15 MIN 9/4/2018 Terrie Guzman, PT GP 1    94253665574 HC PT THERAPEUTIC ACT EA 15 MIN 9/4/2018 Terrie Guzman, PT GP 2          PT G-Codes  Outcome Measure Options:  AM-PAC 6 Clicks Basic Mobility (PT)  -[facility] 6 Clicks Score: 17  Score: 19    Terrie Guzman, PT  9/4/2018

## 2018-09-05 VITALS
RESPIRATION RATE: 16 BRPM | BODY MASS INDEX: 20.62 KG/M2 | HEART RATE: 55 BPM | TEMPERATURE: 98.3 F | SYSTOLIC BLOOD PRESSURE: 165 MMHG | OXYGEN SATURATION: 95 % | HEIGHT: 60 IN | DIASTOLIC BLOOD PRESSURE: 64 MMHG | WEIGHT: 105 LBS

## 2018-09-05 LAB
GLUCOSE BLDC GLUCOMTR-MCNC: 151 MG/DL (ref 70–130)
GLUCOSE BLDC GLUCOMTR-MCNC: 159 MG/DL (ref 70–130)
GLUCOSE BLDC GLUCOMTR-MCNC: 200 MG/DL (ref 70–130)

## 2018-09-05 PROCEDURE — 82962 GLUCOSE BLOOD TEST: CPT

## 2018-09-05 PROCEDURE — 97116 GAIT TRAINING THERAPY: CPT | Performed by: PHYSICAL THERAPIST

## 2018-09-05 PROCEDURE — 99239 HOSP IP/OBS DSCHRG MGMT >30: CPT | Performed by: HOSPITALIST

## 2018-09-05 PROCEDURE — 97535 SELF CARE MNGMENT TRAINING: CPT

## 2018-09-05 PROCEDURE — 97110 THERAPEUTIC EXERCISES: CPT | Performed by: PHYSICAL THERAPIST

## 2018-09-05 PROCEDURE — 99232 SBSQ HOSP IP/OBS MODERATE 35: CPT | Performed by: PHYSICIAN ASSISTANT

## 2018-09-05 PROCEDURE — 97530 THERAPEUTIC ACTIVITIES: CPT

## 2018-09-05 RX ORDER — HYDRALAZINE HYDROCHLORIDE 100 MG/1
100 TABLET, FILM COATED ORAL EVERY 8 HOURS SCHEDULED
Qty: 90 TABLET | Refills: 6 | Status: SHIPPED | OUTPATIENT
Start: 2018-09-05 | End: 2018-11-05

## 2018-09-05 RX ORDER — HYDRALAZINE HYDROCHLORIDE 50 MG/1
100 TABLET, FILM COATED ORAL EVERY 8 HOURS SCHEDULED
Status: DISCONTINUED | OUTPATIENT
Start: 2018-09-05 | End: 2018-09-06 | Stop reason: HOSPADM

## 2018-09-05 RX ADMIN — CITALOPRAM HYDROBROMIDE 20 MG: 20 TABLET ORAL at 08:53

## 2018-09-05 RX ADMIN — AMIODARONE HYDROCHLORIDE 200 MG: 200 TABLET ORAL at 08:53

## 2018-09-05 RX ADMIN — HYDRALAZINE HYDROCHLORIDE 75 MG: 25 TABLET ORAL at 05:22

## 2018-09-05 RX ADMIN — AMLODIPINE BESYLATE 10 MG: 10 TABLET ORAL at 08:52

## 2018-09-05 RX ADMIN — INSULIN LISPRO 2 UNITS: 100 INJECTION, SOLUTION INTRAVENOUS; SUBCUTANEOUS at 18:05

## 2018-09-05 RX ADMIN — INSULIN LISPRO 2 UNITS: 100 INJECTION, SOLUTION INTRAVENOUS; SUBCUTANEOUS at 08:53

## 2018-09-05 RX ADMIN — ISOSORBIDE MONONITRATE 60 MG: 60 TABLET, EXTENDED RELEASE ORAL at 08:52

## 2018-09-05 RX ADMIN — CETIRIZINE HYDROCHLORIDE 5 MG: 10 TABLET, FILM COATED ORAL at 08:53

## 2018-09-05 RX ADMIN — APIXABAN 2.5 MG: 2.5 TABLET, FILM COATED ORAL at 08:53

## 2018-09-05 RX ADMIN — DOCUSATE SODIUM 100 MG: 100 CAPSULE, LIQUID FILLED ORAL at 08:53

## 2018-09-05 RX ADMIN — INSULIN LISPRO 4 UNITS: 100 INJECTION, SOLUTION INTRAVENOUS; SUBCUTANEOUS at 12:10

## 2018-09-05 RX ADMIN — HYDRALAZINE HYDROCHLORIDE 100 MG: 50 TABLET, FILM COATED ORAL at 14:21

## 2018-09-05 RX ADMIN — POLYETHYLENE GLYCOL 3350 17 G: 17 POWDER, FOR SOLUTION ORAL at 08:53

## 2018-09-05 NOTE — PROGRESS NOTES
"[facility]  Cardiovascular Consultation Note  Mary Ramirez  [id number]  1934    DATE OF ADMISSION: 9/1/2018  DATE OF FOLLOW UP: 09/05/18    PCP:  Nirmal Lundberg MD    Subjective:     Patient ID: Mary Ramirez is a 83 y.o. female.    Chief Complaint: Following for BP control, atypical chest pain.    Allergies:   Allergies   Allergen Reactions   • Penicillins Other (See Comments)     Pt states \"i don't know\"       Current medications:    Current Facility-Administered Medications:   •  acetaminophen (TYLENOL) tablet 650 mg, 650 mg, Oral, Q4H PRN, Fouzia Green MD, 650 mg at 09/04/18 2145  •  amiodarone (PACERONE) tablet 200 mg, 200 mg, Oral, Daily, Fouzia Green MD, 200 mg at 09/05/18 0853  •  amLODIPine (NORVASC) tablet 10 mg, 10 mg, Oral, Q24H, Fouzia Green MD, 10 mg at 09/05/18 0852  •  apixaban (ELIQUIS) tablet 2.5 mg, 2.5 mg, Oral, Q12H, Fouzia Green MD, 2.5 mg at 09/05/18 0853  •  atorvastatin (LIPITOR) tablet 40 mg, 40 mg, Oral, Nightly, Fouzia Green MD, 40 mg at 09/04/18 2138  •  bisacodyl (DULCOLAX) EC tablet 5 mg, 5 mg, Oral, Daily PRN, Fouzia Green MD  •  cetirizine (zyrTEC) tablet 5 mg, 5 mg, Oral, Daily, Fouzia Green MD, 5 mg at 09/05/18 0853  •  citalopram (CeleXA) tablet 20 mg, 20 mg, Oral, Daily, Fouzia Green MD, 20 mg at 09/05/18 0853  •  CloNIDine (CATAPRES) tablet 0.2 mg, 0.2 mg, Oral, Q6H PRN, Fouzia Green MD, 0.2 mg at 09/03/18 0341  •  dextrose (D50W) solution 25 g, 25 g, Intravenous, Q15 Min PRN, Fouzia Green MD  •  dextrose (GLUTOSE) oral gel 15 g, 15 g, Oral, Q15 Min PRN, Fouzia Green MD  •  docusate sodium (COLACE) capsule 100 mg, 100 mg, Oral, BID, Fouzia Green MD, 100 mg at 09/05/18 0853  •  donepezil (ARICEPT) tablet 5 mg, 5 mg, Oral, Nightly, Fouzia Green MD, 5 mg at 09/04/18 2138  •  glucagon (human recombinant) (GLUCAGEN DIAGNOSTIC) injection 1 mg, 1 mg, Subcutaneous, PRN, Fouzia Green MD  •  hydrALAZINE (APRESOLINE) tablet 75 mg, 75 mg, " "Oral, Q8H, Cristian Camargo, DO, 75 mg at 09/05/18 0522  •  HYDROcodone-acetaminophen (NORCO) 5-325 MG per tablet 1 tablet, 1 tablet, Oral, Q6H PRN, Fouzia Green MD, 1 tablet at 09/03/18 1850  •  insulin lispro (humaLOG) injection 0-9 Units, 0-9 Units, Subcutaneous, 4x Daily With Meals & Nightly, Fouzia Green MD, 2 Units at 09/05/18 0853  •  isosorbide mononitrate (IMDUR) 24 hr tablet 60 mg, 60 mg, Oral, Daily, Fouzia Green MD, 60 mg at 09/05/18 0852  •  Morphine sulfate (PF) injection 1 mg, 1 mg, Intravenous, Q4H PRN **AND** naloxone (NARCAN) injection 0.4 mg, 0.4 mg, Intravenous, Q5 Min PRN, Fouzia Green MD  •  ondansetron (ZOFRAN) injection 4 mg, 4 mg, Intravenous, Q6H PRN, Fouzia Green MD  •  polyethylene glycol 3350 powder (packet), 17 g, Oral, Daily, Fouzia Green MD, 17 g at 09/05/18 0853  •  sodium chloride 0.9 % flush 1-10 mL, 1-10 mL, Intravenous, PRN, Fouzia Green MD  •  sodium chloride 0.9 % flush 10 mL, 10 mL, Intravenous, PRN, Kamlesh Trammell MD    Chest Pain      · Stress Test 9/4/18, ready by Dr. Dipak Fonseca:  Left ventricular ejection fraction is hyperdynamic (Calculated EF > 70%).  Rest EF=70% Stress EF=78%.  Findings consistent with a normal ECG stress test.  Myocardial perfusion imaging indicates a normal myocardial perfusion study with no evidence of ischemia.  · Impressions are consistent with a low risk study.       Subjective:  Sitting up in chair.  Doing pretty well.  Still with occasional \"twinge\" in chest but nothing like it was.  She has walked some - wants to walk more.             Review of Systems   Cardiovascular: Positive for chest pain.     A complete ROS obtained and negative except as outlined in problem list, HPI and other parts of the note.    Procedures       Objective:       Vitals:    09/05/18 0600 09/05/18 0641 09/05/18 0722 09/05/18 0849   BP:  172/66 173/76 145/51   BP Location:  Left arm Left arm Left arm   Patient Position:  Lying  Sitting   Pulse:  51 53 56 "   Resp:  16 16 15   Temp:  98.3 °F (36.8 °C) 98.3 °F (36.8 °C)    TempSrc:  Oral Oral    SpO2: 99% 97% 99% 96%   Weight:       Height:           Intake/Output Summary (Last 24 hours) at 09/05/18 1008  Last data filed at 09/05/18 0500   Gross per 24 hour   Intake                0 ml   Output              650 ml   Net             -650 ml       Physical Exam:  GENERAL: Well-developed, well-nourished patient in no acute distress.  HEENT: Normocephalic, atraumatic, PERRLA. Moist mucous membranes.  NECK: No JVD present at 30°. No carotid bruits auscultated.  LUNGS: Nonlabored. Clear to auscultation.  CARDIOVASCULAR: Regular rate and rhythm, normal S1-S2. No murmurs, gallops or rubs noted.   ABDOMEN: Soft, non-tender, non-distended. Normoactive bowel sounds.  MUSCULOSKELETAL: No gross deformities. No clubbing or cyanosis  EXT: pulses intact, no swelling.  SKIN: Pink, warm.   Neuro: No gross neurologic deficits.    The patient's old records including ambulatory rhythm recordings (ECGs, Holter/event monitor) were reviewed and discussed.      Lab Review:     Results from last 7 days  Lab Units 09/04/18  0430 09/03/18  0453 09/02/18  0446   SODIUM mmol/L 137 137 139   POTASSIUM mmol/L 3.9 3.6 3.9   CHLORIDE mmol/L 103 103 106   CO2 mmol/L 25.0 28.0 25.0   BUN mg/dL 26* 19 25*   CREATININE mg/dL 1.13 1.03 1.37*   GLUCOSE mg/dL 112* 189* 183*   CALCIUM mg/dL 8.8 8.7 8.7       Results from last 7 days  Lab Units 09/02/18  0446 09/01/18  1917 09/01/18  1338   TROPONIN I ng/mL 0.008 0.007 0.007       Results from last 7 days  Lab Units 09/01/18  1043   WBC 10*3/mm3 6.68   HEMOGLOBIN g/dL 14.0   HEMATOCRIT % 44.8*   PLATELETS 10*3/mm3 148*                             Telemetry: SB, 50's    Assessment/Plan:    1.  Hypertensive Urgency   -  Continue Norvasc 10mg    -  Increase Hydralazine to 100mg q 8 hours.   -  At this point, she could return to [facility] at any time from cardiac standpoint.  Will make FU appt with Dr. Flores in 4  weeks for outpatient evaluation of BP, rhythm.        2.  PAF   -  CHADS2 Vasc = 6.  Continue Eliquis 2.5mg BID   -  Continue Amiodarone.  No BB secondary to bradyacardia   -  FU in 4 weeks with EKG, Dr. Flores.  3.  CAD   -  Chest pain this admission.  Remote stenting.   -  Nuclear stress test reviewed with patient.  No evidence of ischemia, low risk study.   -  Normal EF.   -  Continue Lipitor.  Continue blood pressure control.        Gracy Mena PA-C  09/05/18  10:08 AM

## 2018-09-05 NOTE — PLAN OF CARE
Problem: Patient Care Overview  Goal: Plan of Care Review  Outcome: Ongoing (interventions implemented as appropriate)   09/05/18 0843   Coping/Psychosocial   Plan of Care Reviewed With patient   Plan of Care Review   Progress improving   OTHER   Outcome Summary Pt needed slightly more assist for initial stand today being Karina , CGA for functional mobility and dynamic standing balance, stood at sink with SBA improving progressing to supervision at sink in standing at RW. Cont IPOT per POC

## 2018-09-05 NOTE — THERAPY TREATMENT NOTE
Acute Care - Physical Therapy Treatment Note  [facility]     Patient Name: Mary Ramirez  : 1934  MRN: [id number]  Today's Date: 2018  Onset of Illness/Injury or Date of Surgery: 18  Date of Referral to PT: 18  Referring Physician: MD Peter    Admit Date: 2018    Visit Dx:    ICD-10-CM ICD-9-CM   1. Chest pain, unspecified type R07.9 786.50   2. Severe uncontrolled hypertension I10 401.9   3. Chronic atrial fibrillation (CMS/Formerly [facility]) I48.2 427.31   4. History of coronary artery disease Z86.79 V12.59   5. Type 2 diabetes mellitus with hyperglycemia, unspecified long term insulin use status (CMS/Formerly [facility]) E11.65 250.00   6. Hypertensive urgency I16.0 401.9   7. Impaired mobility and ADLs Z74.09 799.89   8. Impaired functional mobility, balance, gait, and endurance Z74.09 V49.89     Patient Active Problem List   Diagnosis   • Fall   • Fracture, intertrochanteric, left femur (CMS/Formerly [facility])   • Essential hypertension   • Type 2 diabetes mellitus with complication, with long-term current use of insulin (CMS/Formerly [facility])   • Coronary artery disease involving native coronary artery of native heart with angina pectoris (CMS/Formerly [facility])   • Dementia   • Paroxysmal atrial fibrillation (CMS/Formerly [facility])   • Anticoagulated   • SSS (sick sinus syndrome) (CMS/Formerly [facility])   • Chronic diastolic congestive heart failure (CMS/[facility]ly [facility])   • Hypertensive urgency   • DEANGELO (acute kidney injury) (CMS/Formerly [facility])       Therapy Treatment          Rehabilitation Treatment Summary     Row Name 18 1044 18 0800          Treatment Time/Intention    Discipline physical therapist  -LM occupational therapist  -KF     Document Type therapy note (daily note)  -LM therapy note (daily note)  -KF     Subjective Information no complaints  -LM complains of;pain  -KF     Mode of Treatment individual therapy;physical therapy  -LM occupational therapy  -KF     Patient/Family Observations  -- min required encouragement in the AM, no family present, Pt riley ALBRECHT EVELINE   -KF     Care Plan Review care plan/treatment goals reviewed;risks/benefits reviewed;patient/other agree to care plan  -LM evaluation/treatment results reviewed;care plan/treatment goals reviewed;risks/benefits reviewed;current/potential barriers reviewed;patient/other agree to care plan  -KF     Care Plan Review, Other Participant(s) father  -LM  --     Therapy Frequency (OT Eval)  -- daily  -KF2     Patient Effort excellent  -LM excellent  -KF     Comment  -- tolerated well, decreased pain with mobility   -KF     Existing Precautions/Restrictions fall  -LM fall;other (see comments)   dem  -KF     Treatment Considerations/Comments  -- mild impulsivity   -KF     Recorded by [LM] Love Fonseca, PT 09/05/18 1202 [KF] Myrna Rodriguez, OT 09/05/18 0841  [KF2] Myrna Rodriguez, OT 09/05/18 0851     Row Name 09/05/18 1044 09/05/18 0800          Vital Signs    Pre Systolic BP Rehab 125  -  -KF     Pre Treatment Diastolic BP 77  -LM 76   RN cleared vitals stable  -KF     Pretreatment Heart Rate (beats/min) 53  -LM 56  -KF     Intratreatment Heart Rate (beats/min)  -- 63  -KF     Posttreatment Heart Rate (beats/min) 55  -LM 59  -KF     Pre SpO2 (%) 96  -LM 98  -KF     O2 Delivery Pre Treatment room air  -LM room air  -KF     Post SpO2 (%) 97  -LM 98  -KF     O2 Delivery Post Treatment room air  -LM room air  -KF     Pre Patient Position Sitting  -LM Sitting  -KF     Intra Patient Position Standing  -LM Standing  -KF     Post Patient Position Sitting  -LM Sitting  -KF     Recorded by [LM] Love Fonseca, PT 09/05/18 1202 [KF] Myrna Rodriguez, OT 09/05/18 0841     Row Name 09/05/18 1044 09/05/18 0800          Cognitive Assessment/Intervention    Additional Documentation Cognitive Assessment/Intervention (Group)  -LM Cognitive Assessment/Intervention (Group)  -KF     Recorded by [LM] Love Fonseca, PT 09/05/18 1202 [KF] Myrna Rodriguez, OT 09/05/18 0841     Row Name 09/05/18 1044 09/05/18 0800          Cognitive  Assessment/Intervention- PT/OT    Affect/Mental Status (Cognitive) confused   intermittenly  -LM WFL;confused  -KF     Orientation Status (Cognition) oriented x 3  -LM oriented x 4  -KF     Follows Commands (Cognition) follows one step commands;over 90% accuracy;verbal cues/prompting required  -LM follows one step commands;over 90% accuracy;verbal cues/prompting required  -KF     Cognitive Function (Cognitive) safety deficit  -LM safety deficit;memory deficit  -KF     Memory Deficit (Cognitive)  -- mild deficit  -KF     Safety Deficit (Cognitive) mild deficit;safety precautions awareness  -LM mild deficit;impulsivity;judgment;safety precautions awareness  -KF     Cognitive Interventions (Cognitive)  -- occupation/activity based interventions;process/task specific training  -KF     Personal Safety Interventions fall prevention program maintained;gait belt;muscle strengthening facilitated;nonskid shoes/slippers when out of bed  -LM muscle strengthening facilitated;fall prevention program maintained;gait belt;nonskid shoes/slippers when out of bed  -KF     Recorded by [LM] Love Fonseca, PT 09/05/18 1202 [KF] Myrna Rodriguez, OT 09/05/18 0841     Row Name 09/05/18 0800             Safety Issues, Functional Mobility    Safety Issues Affecting Function (Mobility) impulsivity;insight into deficits/self awareness;judgment;safety precaution awareness;safety precautions follow-through/compliance;positioning of assistive device  -KF      Impairments Affecting Function (Mobility) balance;pain;endurance/activity tolerance;strength  -KF      Comment, Safety Issues/Impairments (Mobility) impulsive with walking but able to follow commands well    cues for body mechanics and RW positioning  -KF      Recorded by [KF] Myrna Rodriguez OT 09/05/18 0841      Row Name 09/05/18 1044 09/05/18 0800          Bed Mobility Assessment/Treatment    Comment (Bed Mobility) Up in chair  -LM UIC  -KF     Recorded by [LM] Love Fonseca, PT  09/05/18 1202 [KF] Myrna Rodriguez, OT 09/05/18 0841     Row Name 09/05/18 0800             Functional Mobility    Functional Mobility- Ind. Level contact guard assist;verbal cues required  -KF      Functional Mobility- Device rolling walker  -KF      Functional Mobility-Distance (Feet) 82  -KF      Functional Mobility- Safety Issues weight-shifting ability decreased;sequencing ability decreased  -KF      Recorded by [KF] Myrna Rodriguez, OT 09/05/18 0841      Row Name 09/05/18 1044 09/05/18 0800          Transfer Assessment/Treatment    Transfer Assessment/Treatment sit-stand transfer;stand-sit transfer  -LM sit-stand transfer;stand-sit transfer  -KF     Comment (Transfers) Required verbal cues to push up from chair to stand - when done correctly, pt able to stand with CGA.  Pt had spilt coffee down the front of her gown.  Set up assist needed for pt to wash off upper body.  Denys required to don new gown.  -LM cues required for hand placement to reach back to chair but not to push from chair today  -KF     Recorded by [LM] Love Fonseca, PT 09/05/18 1202 [KF] Myrna Rodriguez, OT 09/05/18 0841     Row Name 09/05/18 1044 09/05/18 0800          Sit-Stand Transfer    Sit-Stand [place] (Transfers) contact guard;verbal cues  -LM minimum assist (75% patient effort);verbal cues  -KF     Assistive Device (Sit-Stand Transfers) walker, front-wheeled  -LM walker, front-wheeled  -KF     Recorded by [LM] Love Fonseca, PT 09/05/18 1202 [KF] Myrna Rodriguez, OT 09/05/18 0841     Row Name 09/05/18 1044 09/05/18 0800          Stand-Sit Transfer    Stand-Sit [place] (Transfers) contact guard;verbal cues  -LM contact guard;verbal cues  -KF     Assistive Device (Stand-Sit Transfers) walker, front-wheeled  -LM walker, front-wheeled  -KF     Recorded by [LM] Love Fonseca, PT 09/05/18 1202 [KF] Myrna Rodriguez, OT 09/05/18 0841     Row Name 09/05/18 1044             Gait/Stairs Assessment/Training    83843 -  Gait Training Minutes  15  -LM      Gait/Stairs Assessment/Training gait/ambulation independence;gait/ambulation assistive device;distance ambulated  -LM      [place] Level (Gait) contact guard;verbal cues  -LM      Assistive Device (Gait) walker, front-wheeled  -LM      Distance in Feet (Gait) 300 feet  -LM      Deviations/Abnormal Patterns (Gait) antalgic;trey decreased  -LM      Bilateral Gait Deviations weight shift ability decreased  -LM      Comment (Gait/Stairs) Vc's for even weight shift and cues to stay inside walker.  -LM      Recorded by [LM] Love Fonseca, PT 09/05/18 1202      Row Name 09/05/18 1044 09/05/18 0800          ADL Assessment/Intervention    64162 - PT Self Care/Mgmt Minutes 5  -LM  --     BADL Assessment/Intervention --  -LM lower body dressing;grooming  -KF     Recorded by [LM] Love Fonseca, PT 09/05/18 1202 [KF] Myrna Rodriguez, OT 09/05/18 0841     Row Name 09/05/18 0800             Lower Body Dressing Assessment/Training    Lower Body Dressing [place] Level doff;don;socks;supervision;verbal cues  -KF      Lower Body Dressing Position supported sitting  -KF      Comment (Lower Body Dressing) able to cross legs to doff and ariana socks (B)  -KF      Recorded by [KF] Myrna Rodriguez, OT 09/05/18 0841      Row Name 09/05/18 0800             Grooming Assessment/Training    [place] Level (Grooming) hair care, combing/brushing;wash face, hands;supervision;verbal cues  -KF      Grooming Position sink side  -KF      Comment (Grooming) supervision for safety while standing at sink to wash face, brush teeth and no LOB with reaching activities at sink  -KF      Recorded by [KF] Myrna Rodriguez, OT 09/05/18 0841      Row Name 09/05/18 0800             BADL Safety/Performance    Impairments, BADL Safety/Performance balance;endurance/activity tolerance;strength;pain;range of motion  -KF      Cognitive Impairments, BADL Safety/Performance safety precaution  awareness;judgment;insight into deficits/self awareness;impulsivity  -KF      Skilled BADL Treatment/Intervention BADL process/adaptation training;cognitive/safety deficit modifications  -KF      Recorded by [KF] Myrna Rodriguez, OT 09/05/18 0841      Row Name 09/05/18 0800             Motor Skills Assessment/Interventions    Additional Documentation Balance (Group)  -KF      Recorded by [KF] Myrna Rodriguez, OT 09/05/18 0841      Row Name 09/05/18 1044             Therapeutic Exercise    Therapeutic Exercise supine, lower extremities  -LM      Additional Documentation Therapeutic Exercise (Row)  -LM      63395 - PT Therapeutic Exercise Minutes 9  -LM      Recorded by [LM] Love Fonseca, PT 09/05/18 1202      Row Name 09/05/18 1044             Lower Extremity Supine Therapeutic Exercise    Performed, Supine Lower Extremity (Therapeutic Exercise) hip abduction/adduction;SLR (straight leg raise);ankle pumps;heel slides  -LM      Exercise Type, Supine Lower Extremity (Therapeutic Exercise) AROM (active range of motion)  -LM      Sets/Reps Detail, Supine Lower Extremity (Therapeutic Exercise) x15 bilaterally  -LM      Recorded by [LM] Love Fonseca, PT 09/05/18 1202      Row Name 09/05/18 0800             Balance    Balance static standing balance;static sitting balance;dynamic sitting balance;dynamic standing balance  -KF      Recorded by [KF] Myrna Rodriguez, OT 09/05/18 0841      Row Name 09/05/18 0800             Static Sitting Balance    Level of [place] (Unsupported Sitting, Static Balance) independent  -KF      Sitting Position (Unsupported Sitting, Static Balance) sitting in chair  -KF      Time Able to Maintain Position (Unsupported Sitting, Static Balance) 3 to 4 minutes  -KF      Recorded by [KF] Myrna Rodriguez, OT 09/05/18 0841      Row Name 09/05/18 0800             Dynamic Sitting Balance    Level of [place], Reaches Outside Midline (Sitting, Dynamic Balance) standby assist  -KF       Sitting Position, Reaches Outside Midline (Sitting, Dynamic Balance) sitting in chair  -KF      Comment, Reaches Outside Midline (Sitting, Dynamic Balance) with LB dressing challenges  -KF      Recorded by [KF] Myrna Rodriguez, OT 09/05/18 0841      Row Name 09/05/18 0800             Static Standing Balance    Level of [place] (Supported Standing, Static Balance) standby assist  -KF      Time Able to Maintain Position (Supported Standing, Static Balance) 1 to 2 minutes  -KF      Assistive Device Utilized (Supported Standing, Static Balance) rolling walker  -KF      Comment (Supported Standing, Static Balance) SBA at sink no LOB  -KF      Recorded by [KF] Myrna Rodriguez, OT 09/05/18 0841      Row Name 09/05/18 0800             Dynamic Standing Balance    Level of [place], Reaches Outside Midline (Standing, Dynamic Balance) contact guard assist  -KF      Time Able to Maintain Position, Reaches Outside Midline (Standing, Dynamic Balance) 1 to 2 minutes  -KF      Comment, Reaches Outside Midline (Standing, Dynamic Balance) at RW  -KF      Recorded by [KF] Myrna Rodriguez, OT 09/05/18 0841      Row Name 09/05/18 1044 09/05/18 0800          Positioning and Restraints    Pre-Treatment Position sitting in chair/recliner  -LM sitting in chair/recliner  -KF     Post Treatment Position chair  -LM chair  -KF     In Chair reclined;call light within reach;encouraged to call for assist;exit alarm on;waffle cushion;notified nsg  -LM notified nsg;reclined;sitting;call light within reach;encouraged to call for assist;exit alarm on;legs elevated;waffle cushion  -KF     Recorded by [LM] Love Fonseca, PT 09/05/18 1202 [KF] Myrna Rodriguez, OT 09/05/18 0841     Row Name 09/05/18 0800             Pain Assessment    Additional Documentation Pain Scale: Numbers Pre/Post-Treatment (Group)  -KF      Recorded by [KF] Myrna Rodriguez, OT 09/05/18 0841      Row Name 09/05/18 1044 09/05/18 0800          Pain Scale:  Numbers Pre/Post-Treatment    Pain Scale: Numbers, Pretreatment 0/10 - no pain  -LM 6/10  -KF     Pain Scale: Numbers, Post-Treatment 0/10 - no pain  -LM 4/10  -KF     Pain Location - Side  -- Left  -KF     Pain Location - Orientation  -- lower  -KF     Pain Location  -- extremity  -KF     Pre/Post Treatment Pain Comment  -- decreased pain with functional mobility   -KF     Pain Intervention(s)  -- Repositioned;Ambulation/increased activity  -KF     Recorded by [LM] Love Fonseca, PT 09/05/18 1202 [KF] Myrna Rodriguez, OT 09/05/18 0841     Row Name 09/05/18 1044 09/05/18 0800          Plan of Care Review    Plan of Care Reviewed With patient  -LM patient  -KF     Recorded by [LM] Love Fonseca, PT 09/05/18 1202 [KF] Myrna Rodriguez, OT 09/05/18 0841     Row Name 09/05/18 1044             Outcome Summary/Treatment Plan (PT)    Daily Summary of Progress (PT) progress toward functional goals is good  -LM      Recorded by [LM] Love Fonseca, PT 09/05/18 1202        User Key  (r) = Recorded By, (t) = Taken By, (c) = Cosigned By    Initials Name Effective Dates Discipline    LM Love Fonseca, PT 06/15/16 -  PT    KF Myrna Rodriguez, OT 04/03/18 -  OT                     Physical Therapy Education     Title: PT OT SLP Therapies (Active)     Topic: Physical Therapy (Active)     Point: Mobility training (Active)    Learning Progress Summary     Learner Status Readiness Method Response Comment Documented by    Patient Active Eager E,GLORIA,H NR  DM 09/04/18 1345     Active Acceptance E,TB NR  SR 09/03/18 1636     Active Eager E NR  AA 09/02/18 0957    Family Active Eager ED,H NR  DM 09/04/18 1345          Point: Home exercise program (Active)    Learning Progress Summary     Learner Status Readiness Method Response Comment Documented by    Patient Active Eager E,D,H NR  DM 09/04/18 1345     Active Acceptance E,TB NR  SR 09/03/18 1636    Family Active Eager BUDD,H NR  DM 09/04/18 1345          Point: Body mechanics  (Active)    Learning Progress Summary     Learner Status Readiness Method Response Comment Documented by    Patient Active Eager E,D,H NR  DM 09/04/18 1345     Active Acceptance E,TB NR  SR 09/03/18 1636     Active Eager E NR  AA 09/02/18 0957    Family Active Eager E,D,H NR  DM 09/04/18 1345          Point: Precautions (Active)    Learning Progress Summary     Learner Status Readiness Method Response Comment Documented by    Patient Active Eager E,D,H NR  DM 09/04/18 1345     Active Acceptance E,TB NR  SR 09/03/18 1636     Active Eager E NR  AA 09/02/18 0957    Family Active Eager E,D,H NR  DM 09/04/18 1345                      User Key     Initials Effective Dates Name Provider Type Discipline     06/19/15 -  Terrie Guzman, PT Physical Therapist PT     06/19/15 -  Xiomara Velez, PT Physical Therapist PT     04/02/18 -  Ursula Ramsey, PT Physical Therapist PT                    PT Recommendation and Plan     Outcome Summary/Treatment Plan (PT)  Daily Summary of Progress (PT): progress toward functional goals is good  Plan of Care Reviewed With: patient  Outcome Summary: Pt progressing towards skilled PT goals.  Pt stood with CGA (when using correct hand placement), and ambulated 300 feet using rw with CGA.  Tolerated reclined BLE ther ex well without pain.  Continue with skilled PT to improve mobility and safety prior to d/c.          Outcome Measures     Row Name 09/05/18 1044 09/05/18 0800 09/04/18 1301       How much help from another person do you currently need...    Turning from your back to your side while in flat bed without using bedrails? 3  -LM  -- 3  -DM    Moving from lying on back to sitting on the side of a flat bed without bedrails? 3  -LM  -- 3  -DM    Moving to and from a bed to a chair (including a wheelchair)? 3  -LM  -- 3  -DM    Standing up from a chair using your arms (e.g., wheelchair, bedside chair)? 3  -LM  -- 3  -DM    Climbing 3-5 steps with a railing? 2  -LM  -- 2  -DM    To  walk in hospital room? 3  -LM  -- 3  -DM    AM-PAC 6 Clicks Score 17  -LM  -- 17  -DM       How much help from another is currently needed...    Putting on and taking off regular lower body clothing?  -- 3  -KF  --    Bathing (including washing, rinsing, and drying)  -- 3  -KF  --    Toileting (which includes using toilet bed pan or urinal)  -- 3  -KF  --    Putting on and taking off regular upper body clothing  -- 4  -KF  --    Taking care of personal grooming (such as brushing teeth)  -- 3  -KF  --    Eating meals  -- 4  -KF  --    Score  -- 20  -KF  --       Functional Assessment    Outcome Measure Options AM-PAC 6 Clicks Basic Mobility (PT)  -LM AM-PAC 6 Clicks Daily Activity (OT)  -KF AM-PAC 6 Clicks Basic Mobility (PT)  -DM    Row Name 09/03/18 1401 09/03/18 1331          How much help from another person do you currently need...    Turning from your back to your side while in flat bed without using bedrails?  -- 3  -SR     Moving from lying on back to sitting on the side of a flat bed without bedrails?  -- 3  -SR     Moving to and from a bed to a chair (including a wheelchair)?  -- 3  -SR     Standing up from a chair using your arms (e.g., wheelchair, bedside chair)?  -- 3  -SR     Climbing 3-5 steps with a railing?  -- 2  -SR     To walk in hospital room?  -- 3  -SR     AM-PAC 6 Clicks Score  -- 17  -SR        How much help from another is currently needed...    Putting on and taking off regular lower body clothing? 3  -AC  --     Bathing (including washing, rinsing, and drying) 3  -AC  --     Toileting (which includes using toilet bed pan or urinal) 3  -AC  --     Putting on and taking off regular upper body clothing 3  -AC  --     Taking care of personal grooming (such as brushing teeth) 3  -AC  --     Eating meals 4  -AC  --     Score 19  -AC  --        Functional Assessment    Outcome Measure Options AM-PAC 6 Clicks Daily Activity (OT)  -AC AM-PAC 6 Clicks Basic Mobility (PT)  -SR       User Key  (r) =  Recorded By, (t) = Taken By, (c) = Cosigned By    Initials Name Provider Type    AC Brianna Hammer, OT Occupational Therapist    Terrie Garcia, PT Physical Therapist    SR Xiomara Velez, PT Physical Therapist    LM Love Fonseca, PT Physical Therapist    Myrna Rodriguez, OT Occupational Therapist           Time Calculation:         PT Charges     Row Name 09/05/18 1044             Time Calculation    Start Time 1044  -LM      PT Received On 09/05/18  -LM      PT Goal Re-Cert Due Date 09/12/18  -LM         Timed Charges    20510 - PT Therapeutic Exercise Minutes 9  -LM      44125 - Gait Training Minutes  15  -LM      29515 - PT Self Care/Mgmt Minutes 5  -LM        User Key  (r) = Recorded By, (t) = Taken By, (c) = Cosigned By    Initials Name Provider Type    LM Love Fonseca, PT Physical Therapist        Therapy Suggested Charges     Code   Minutes Charges    51959 (CPT®) Hc Pt Neuromusc Re Education Ea 15 Min      89828 (CPT®) Hc Pt Ther Proc Ea 15 Min 9 1    31676 (CPT®) Hc Gait Training Ea 15 Min 15 1    71069 (CPT®) Hc Pt Therapeutic Act Ea 15 Min      00753 (CPT®) Hc Pt Manual Therapy Ea 15 Min      54797 (CPT®) Hc Pt Iontophoresis Ea 15 Min      23437 (CPT®) Hc Pt Elec Stim Ea-Per 15 Min      78555 (CPT®) Hc Pt Ultrasound Ea 15 Min      31903 (CPT®) Hc Pt Self Care/Mgmt/Train Ea 15 Min 5     23641 (CPT®) Hc Pt Prosthetic (S) Train Initial Encounter, Each 15 Min      25242 (CPT®) Hc Pt Orthotic(S)/Prosthetic(S) Encounter, Each 15 Min      07239 (CPT®) Hc Orthotic(S) Mgmt/Train Initial Encounter, Each 15min      Total  29 2        Therapy Charges for Today     Code Description Service Date Service Provider Modifiers Qty    66373003759 HC PT THER PROC EA 15 MIN 9/5/2018 Love Fonseca, PT GP 1    43078774560 HC GAIT TRAINING EA 15 MIN 9/5/2018 Love Fonseca, PT GP 1          PT G-Codes  Outcome Measure Options: AM-PAC 6 Clicks Basic Mobility (PT)  AM-PAC 6 Clicks Score: 17  Score: 20    Love Fonseca  PT  9/5/2018

## 2018-09-05 NOTE — THERAPY TREATMENT NOTE
Acute Care - Occupational Therapy Treatment Note  [facility]     Patient Name: Mary Ramirez  : 1934  MRN: [id number]  Today's Date: 2018  Onset of Illness/Injury or Date of Surgery: 18  Date of Referral to OT: 18  Referring Physician: MD Peter    Admit Date: 2018       ICD-10-CM ICD-9-CM   1. Chest pain, unspecified type R07.9 786.50   2. Severe uncontrolled hypertension I10 401.9   3. Chronic atrial fibrillation (CMS/[facility]) I48.2 427.31   4. History of coronary artery disease Z86.79 V12.59   5. Type 2 diabetes mellitus with hyperglycemia, unspecified long term insulin use status (CMS/[facility]) E11.65 250.00   6. Hypertensive urgency I16.0 401.9   7. Impaired mobility and ADLs Z74.09 799.89   8. Impaired functional mobility, balance, gait, and endurance Z74.09 V49.89     Patient Active Problem List   Diagnosis   • Fall   • Fracture, intertrochanteric, left femur (CMS/[facility])   • Essential hypertension   • Type 2 diabetes mellitus with complication, with long-term current use of insulin (CMS/[facility])   • Coronary artery disease involving native coronary artery of native heart with angina pectoris (CMS/[facility])   • Dementia   • Paroxysmal atrial fibrillation (CMS/[facility])   • Anticoagulated   • SSS (sick sinus syndrome) (CMS/[facility])   • Chronic diastolic congestive heart failure (CMS/[facility])   • Hypertensive urgency   • DEANGELO (acute kidney injury) (CMS/[facility])     Past Medical History:   Diagnosis Date   • Atrial fibrillation (CMS/[facility])    • CAD (coronary artery disease)    • CHF (congestive heart failure) (CMS/[facility])    • DDD (degenerative disc disease), cervical    • Dementia    • Diabetes mellitus (CMS/[facility])    • Hypertension    • SSS (sick sinus syndrome) (CMS/[facility])      Past Surgical History:   Procedure Laterality Date   • ANKLE SURGERY Left    • ATHRECTOMY ILIAC, FEMORAL, TIBIAL ARTERY     • BREAST LUMPECTOMY Left    • COLON SURGERY     • CORONARY STENT PLACEMENT     • FINGER FRACTURE SURGERY Right    • HIP  TROCANTERIC NAILING WITH INTRAMEDULLARY HIP SCREW Left 11/23/2017   • HYSTERECTOMY         Therapy Treatment          Rehabilitation Treatment Summary     Row Name 09/05/18 0800             Treatment Time/Intention    Discipline occupational therapist  -KF      Document Type therapy note (daily note)  -KF      Subjective Information complains of;pain  -KF      Mode of Treatment occupational therapy  -KF      Patient/Family Observations min required encouragement in the AM, no family present, Pt RA, riley, JAYDEC  -KF      Care Plan Review evaluation/treatment results reviewed;care plan/treatment goals reviewed;risks/benefits reviewed;current/potential barriers reviewed;patient/other agree to care plan  -KF      Patient Effort excellent  -KF      Comment tolerated well, decreased pain with mobility   -KF      Existing Precautions/Restrictions fall;other (see comments)   dem  -KF      Treatment Considerations/Comments mild impulsivity   -KF      Recorded by [KF] Myrna Rodriguez OT 09/05/18 0841      Row Name 09/05/18 0800             Vital Signs    Pre Systolic BP Rehab 173  -KF      Pre Treatment Diastolic BP 76   RN cleared vitals stable  -KF      Pretreatment Heart Rate (beats/min) 56  -KF      Intratreatment Heart Rate (beats/min) 63  -KF      Posttreatment Heart Rate (beats/min) 59  -KF      Pre SpO2 (%) 98  -KF      O2 Delivery Pre Treatment room air  -KF      Post SpO2 (%) 98  -KF      O2 Delivery Post Treatment room air  -KF      Pre Patient Position Sitting  -KF      Intra Patient Position Standing  -KF      Post Patient Position Sitting  -KF      Recorded by [KF] Myrna Rodriguez, OT 09/05/18 0841      Row Name 09/05/18 0800             Cognitive Assessment/Intervention    Additional Documentation Cognitive Assessment/Intervention (Group)  -KF      Recorded by [KF] Myrna Rodriguez OT 09/05/18 0841      Row Name 09/05/18 0800             Cognitive Assessment/Intervention- PT/OT    Affect/Mental  Status (Cognitive) WFL;confused  -KF      Orientation Status (Cognition) oriented x 4  -KF      Follows Commands (Cognition) follows one step commands;over 90% accuracy;verbal cues/prompting required  -KF      Cognitive Function (Cognitive) safety deficit;memory deficit  -KF      Memory Deficit (Cognitive) mild deficit  -KF      Safety Deficit (Cognitive) mild deficit;impulsivity;judgment;safety precautions awareness  -KF      Cognitive Interventions (Cognitive) occupation/activity based interventions;process/task specific training  -KF      Personal Safety Interventions muscle strengthening facilitated;fall prevention program maintained;gait belt;nonskid shoes/slippers when out of bed  -KF      Recorded by [KF] Myrna Rodriguez, OT 09/05/18 0841      Row Name 09/05/18 0800             Safety Issues, Functional Mobility    Safety Issues Affecting Function (Mobility) impulsivity;insight into deficits/self awareness;judgment;safety precaution awareness;safety precautions follow-through/compliance;positioning of assistive device  -KF      Impairments Affecting Function (Mobility) balance;pain;endurance/activity tolerance;strength  -KF      Comment, Safety Issues/Impairments (Mobility) impulsive with walking but able to follow commands well    cues for body mechanics and RW positioning  -KF      Recorded by [KF] Myrna Rodriguez, OT 09/05/18 0841      Row Name 09/05/18 0800             Bed Mobility Assessment/Treatment    Comment (Bed Mobility) UIC  -KF      Recorded by [KF] Myrna Rodriguez, OT 09/05/18 0841      Row Name 09/05/18 0800             Functional Mobility    Functional Mobility- Ind. Level contact guard assist;verbal cues required  -KF      Functional Mobility- Device rolling walker  -KF      Functional Mobility-Distance (Feet) 82  -KF      Functional Mobility- Safety Issues weight-shifting ability decreased;sequencing ability decreased  -KF      Recorded by [KF] Myrna Rodriguez, OT 09/05/18 0841       Row Name 09/05/18 0800             Transfer Assessment/Treatment    Transfer Assessment/Treatment sit-stand transfer;stand-sit transfer  -KF      Comment (Transfers) cues required for hand placement to reach back to chair but not to push from chair today  -KF      Recorded by [KF] Myrna Rodriguez, OT 09/05/18 0841      Row Name 09/05/18 0800             Sit-Stand Transfer    Sit-Stand [place] (Transfers) minimum assist (75% patient effort);verbal cues  -KF      Assistive Device (Sit-Stand Transfers) walker, front-wheeled  -KF      Recorded by [KF] Myrna Rodriguez, OT 09/05/18 0841      Row Name 09/05/18 0800             Stand-Sit Transfer    Stand-Sit [place] (Transfers) contact guard;verbal cues  -KF      Assistive Device (Stand-Sit Transfers) walker, front-wheeled  -KF      Recorded by [KF] Myrna Rodriguez, OT 09/05/18 0841      Row Name 09/05/18 0800             ADL Assessment/Intervention    BADL Assessment/Intervention lower body dressing;grooming  -KF      Recorded by [KF] Myrna Rodriguez, OT 09/05/18 0841      Row Name 09/05/18 0800             Lower Body Dressing Assessment/Training    Lower Body Dressing [place] Level doff;don;socks;supervision;verbal cues  -KF      Lower Body Dressing Position supported sitting  -KF      Comment (Lower Body Dressing) able to cross legs to doff and ariana socks (B)  -KF      Recorded by [KF] Myrna Rodriguez, OT 09/05/18 0841      Row Name 09/05/18 0800             Grooming Assessment/Training    [place] Level (Grooming) hair care, combing/brushing;wash face, hands;supervision;verbal cues  -KF      Grooming Position sink side  -KF      Comment (Grooming) supervision for safety while standing at sink to wash face, brush teeth and no LOB with reaching activities at sink  -KF      Recorded by [KF] Myrna Rodriguez, OT 09/05/18 0841      Row Name 09/05/18 0800             BADL Safety/Performance    Impairments, BADL Safety/Performance  balance;endurance/activity tolerance;strength;pain;range of motion  -KF      Cognitive Impairments, BADL Safety/Performance safety precaution awareness;judgment;insight into deficits/self awareness;impulsivity  -KF      Skilled BADL Treatment/Intervention BADL process/adaptation training;cognitive/safety deficit modifications  -KF      Recorded by [KF] Myrna Rodriguez, OT 09/05/18 0841      Row Name 09/05/18 0800             Motor Skills Assessment/Interventions    Additional Documentation Balance (Group)  -KF      Recorded by [KF] Myrna Rodriguez, OT 09/05/18 0841      Row Name 09/05/18 0800             Balance    Balance static standing balance;static sitting balance;dynamic sitting balance;dynamic standing balance  -KF      Recorded by [KF] Myrna Rodriguez, OT 09/05/18 0841      Row Name 09/05/18 0800             Static Sitting Balance    Level of [place] (Unsupported Sitting, Static Balance) independent  -KF      Sitting Position (Unsupported Sitting, Static Balance) sitting in chair  -KF      Time Able to Maintain Position (Unsupported Sitting, Static Balance) 3 to 4 minutes  -KF      Recorded by [KF] Myrna Rodriguez, OT 09/05/18 0841      Row Name 09/05/18 0800             Dynamic Sitting Balance    Level of [place], Reaches Outside Midline (Sitting, Dynamic Balance) standby assist  -KF      Sitting Position, Reaches Outside Midline (Sitting, Dynamic Balance) sitting in chair  -KF      Comment, Reaches Outside Midline (Sitting, Dynamic Balance) with LB dressing challenges  -KF      Recorded by [KF] Myrna Rodriguez, OT 09/05/18 0841      Row Name 09/05/18 0800             Static Standing Balance    Level of [place] (Supported Standing, Static Balance) standby assist  -KF      Time Able to Maintain Position (Supported Standing, Static Balance) 1 to 2 minutes  -KF      Assistive Device Utilized (Supported Standing, Static Balance) rolling walker  -KF      Comment (Supported Standing,  Static Balance) SBA at sink no LOB  -KF      Recorded by [KF] Myrna Rodriguez, OT 09/05/18 0841      Row Name 09/05/18 0800             Dynamic Standing Balance    Level of [place], Reaches Outside Midline (Standing, Dynamic Balance) contact guard assist  -KF      Time Able to Maintain Position, Reaches Outside Midline (Standing, Dynamic Balance) 1 to 2 minutes  -KF      Comment, Reaches Outside Midline (Standing, Dynamic Balance) at RW  -KF      Recorded by [KF] Myrna Rodriguez, OT 09/05/18 0841      Row Name 09/05/18 0800             Positioning and Restraints    Pre-Treatment Position sitting in chair/recliner  -KF      Post Treatment Position chair  -KF      In Chair notified nsg;reclined;sitting;call light within reach;encouraged to call for assist;exit alarm on;legs elevated;waffle cushion  -KF      Recorded by [] Myrna Rodriguez, OT 09/05/18 0841      Row Name 09/05/18 0800             Pain Assessment    Additional Documentation Pain Scale: Numbers Pre/Post-Treatment (Group)  -KF      Recorded by [] Myrna Rodriguez, OT 09/05/18 0841      Row Name 09/05/18 0800             Pain Scale: Numbers Pre/Post-Treatment    Pain Scale: Numbers, Pretreatment 6/10  -KF      Pain Scale: Numbers, Post-Treatment 4/10  -KF      Pain Location - Side Left  -KF      Pain Location - Orientation lower  -KF      Pain Location extremity  -KF      Pre/Post Treatment Pain Comment decreased pain with functional mobility   -KF      Pain Intervention(s) Repositioned;Ambulation/increased activity  -KF      Recorded by [KF] Myrna Rodriguez, OT 09/05/18 0841      Row Name 09/05/18 0800             Plan of Care Review    Plan of Care Reviewed With patient  -KF      Recorded by [] Myrna Rodriguez, OT 09/05/18 0841        User Key  (r) = Recorded By, (t) = Taken By, (c) = Cosigned By    Initials Name Effective Dates Discipline    Myrna Rodriguez, OT 04/03/18 -  OT                   OT Rehab Goals     Row Name  09/05/18 0800             Bed Mobility Goal 1 (OT)    Progress/Outcomes (Bed Mobility Goal 1, OT) goal ongoing  -KF         Transfer Goal 1 (OT)    Progress/Outcome (Transfer Goal 1, OT) goal ongoing  -KF         Toileting Goal 1 (OT)    Progress/Outcome (Toileting Goal 1, OT) goal ongoing   declined voiding today  -KF         Strength Goal 1 (OT)    Progress/Outcome (Strength Goal 1, OT) goal ongoing  -KF        User Key  (r) = Recorded By, (t) = Taken By, (c) = Cosigned By    Initials Name Provider Type Discipline    KF Myrna Rodriguez, OT Occupational Therapist OT        Occupational Therapy Education     Title: PT OT SLP Therapies (Active)     Topic: Occupational Therapy (Active)     Point: ADL training (Done)     Description: Instruct learner(s) on proper safety adaptation and remediation techniques during self care or transfers.   Instruct in proper use of assistive devices.   Learning Progress Summary     Learner Status Readiness Method Response Comment Documented by    Patient Done Acceptance E VU Safety awareness and sequencing of functional transfers at RW, RW positioning during ADL and functional mobility for ADL safety  09/05/18 0842     Done Acceptance E BIANCA,NR ADL training with LBD/toileting  09/03/18 1522     Done Acceptance E,D ANDREE VALENZUELA,NR Purpose of OT services, ADL retraining for LB dressing and toileting, safety awareness and sequencing for functional transfers  09/02/18 0857          Point: Precautions (Done)     Description: Instruct learner(s) on prescribed precautions during self-care and functional transfers.   Learning Progress Summary     Learner Status Readiness Method Response Comment Documented by    Patient Done Acceptance E VU Safety awareness and sequencing of functional transfers at RW, RW positioning during ADL and functional mobility for ADL safety  09/05/18 0842     Done Acceptance E,D BIANCA,ANDREE,NR Purpose of OT services, ADL retraining for LB dressing and toileting, safety  awareness and sequencing for functional transfers  09/02/18 0857          Point: Body mechanics (Done)     Description: Instruct learner(s) on proper positioning and spine alignment during self-care, functional mobility activities and/or exercises.   Learning Progress Summary     Learner Status Readiness Method Response Comment Documented by    Patient Done Acceptance E VU Safety awareness and sequencing of functional transfers at RW, RW positioning during ADL and functional mobility for ADL safety  09/05/18 0842     Done Acceptance E,D VU,DU,NR Purpose of OT services, ADL retraining for LB dressing and toileting, safety awareness and sequencing for functional transfers  09/02/18 0857                      User Key     Initials Effective Dates Name Provider Type Discipline     06/23/15 -  Brianna Hammer, OT Occupational Therapist OT     04/03/18 -  Myrna Rodriguez, OT Occupational Therapist OT                OT Recommendation and Plan  Outcome Summary/Treatment Plan (OT)  Anticipated Discharge Disposition (OT): assisted living facility ([facility]), home with 24/7 care  Planned Therapy Interventions (OT Eval): activity tolerance training, BADL retraining, adaptive equipment training, cognitive/visual perception retraining, functional balance retraining, IADL retraining, neuromuscular control/coordination retraining, occupation/activity based interventions, patient/caregiver education/training, ROM/therapeutic exercise, strengthening exercise, transfer/mobility retraining  Plan of Care Review  Plan of Care Reviewed With: patient  Plan of Care Reviewed With: patient  Outcome Summary: Pt needed slightly more assist for initial stand today being Kar[place] , CGA for functional mobility and dynamic standing balance, stood at sink with SBA improving progressing to supervision at sink in standing at RW. Cont IPOT per POC        Outcome Measures     Row Name 09/05/18 0800 09/04/18 1301 09/03/18 1401       How much help from  another person do you currently need...    Turning from your back to your side while in flat bed without using bedrails?  -- 3  -DM  --    Moving from lying on back to sitting on the side of a flat bed without bedrails?  -- 3  -DM  --    Moving to and from a bed to a chair (including a wheelchair)?  -- 3  -DM  --    Standing up from a chair using your arms (e.g., wheelchair, bedside chair)?  -- 3  -DM  --    Climbing 3-5 steps with a railing?  -- 2  -DM  --    To walk in hospital room?  -- 3  -DM  --    AM-PAC 6 Clicks Score  -- 17  -DM  --       How much help from another is currently needed...    Putting on and taking off regular lower body clothing? 3  -KF  -- 3  -AC    Bathing (including washing, rinsing, and drying) 3  -KF  -- 3  -AC    Toileting (which includes using toilet bed pan or urinal) 3  -KF  -- 3  -AC    Putting on and taking off regular upper body clothing 4  -KF  -- 3  -AC    Taking care of personal grooming (such as brushing teeth) 3  -KF  -- 3  -AC    Eating meals 4  -KF  -- 4  -AC    Score 20  -KF  -- 19  -AC       Functional Assessment    Outcome Measure Options AM-PAC 6 Clicks Daily Activity (OT)  -KF AM-PAC 6 Clicks Basic Mobility (PT)  -DM AM-PAC 6 Clicks Daily Activity (OT)  -AC    Row Name 09/03/18 1331 09/02/18 0910          How much help from another person do you currently need...    Turning from your back to your side while in flat bed without using bedrails? 3  -SR 3  -AA     Moving from lying on back to sitting on the side of a flat bed without bedrails? 3  -SR 3  -AA     Moving to and from a bed to a chair (including a wheelchair)? 3  -SR 3  -AA     Standing up from a chair using your arms (e.g., wheelchair, bedside chair)? 3  -SR 3  -AA     Climbing 3-5 steps with a railing? 2  -SR 2  -AA     To walk in hospital room? 3  -SR 3  -AA     AM-PAC 6 Clicks Score 17  -SR 17  -AA        Functional Assessment    Outcome Measure Options AM-PAC 6 Clicks Basic Mobility (PT)  -SR AM-PAC 6  Clicks Basic Mobility (PT)  -AA       User Key  (r) = Recorded By, (t) = Taken By, (c) = Cosigned By    Initials Name Provider Type    AC Brianna Hammer, OT Occupational Therapist    DM Terrie Guzman, PT Physical Therapist    SR Xiomara Velez, PT Physical Therapist    KF Myrna Rodriguez, OT Occupational Therapist    Ursula Rascon L, PT Physical Therapist           Time Calculation:         Time Calculation- OT     Row Name 09/05/18 0800             Time Calculation- OT    OT Start Time 0800  -KF      Total Timed Code Minutes- OT 23 minute(s)  -KF      OT Received On 09/05/18  -KF      OT Goal Re-Cert Due Date 09/12/18  -KF         Timed Charges    44634 - OT Therapeutic Activity Minutes 5  -KF      12406 - OT Self Care/Mgmt Minutes 18  -KF        User Key  (r) = Recorded By, (t) = Taken By, (c) = Cosigned By    Initials Name Provider Type    Myrna Rodriguez, OT Occupational Therapist           Therapy Suggested Charges     Code   Minutes Charges    22260 (CPT®) Hc Ot Neuromusc Re Education Ea 15 Min      10075 (CPT®) Hc Ot Ther Proc Ea 15 Min      82899 (CPT®) Hc Ot Therapeutic Act Ea 15 Min 5 1    18931 (CPT®) Hc Ot Manual Therapy Ea 15 Min      99717 (CPT®) Hc Ot Iontophoresis Ea 15 Min      01905 (CPT®) Hc Ot Elec Stim Ea-Per 15 Min      65331 (CPT®) Hc Ot Ultrasound Ea 15 Min      46679 (CPT®) Hc Ot Self Care/Mgmt/Train Ea 15 Min 18 1    Total  23 2        Therapy Charges for Today     Code Description Service Date Service Provider Modifiers Qty    31721802708 HC OT SELF CARE/MGMT/TRAIN EA 15 MIN 9/5/2018 Myrna Rodriguez OT GO 1    00657426581 HC OT THERAPEUTIC ACT EA 15 MIN 9/5/2018 Myrna Rodriguez, OT GO 1          OT G-codes  OT Professional Judgement Used?: Yes  OT Functional Scales Options: AM-PAC 6 Clicks Daily Activity (OT)  Functional Assessment Tool Used: 6 clicks  Score: 18  Functional Limitation: Self care  Self Care Current Status (): At least 40 percent but less than 60  percent impaired, limited or restricted  Self Care Goal Status (): At least 20 percent but less than 40 percent impaired, limited or restricted    Myrna Rodriguez, OT  9/5/2018

## 2018-09-05 NOTE — PLAN OF CARE
Problem: Patient Care Overview  Goal: Plan of Care Review  Outcome: Ongoing (interventions implemented as appropriate)   09/05/18 1044   Coping/Psychosocial   Plan of Care Reviewed With patient   OTHER   Outcome Summary Pt progressing towards skilled PT goals. Pt stood with CGA (when using correct hand placement), and ambulated 300 feet using rw with CGA. Tolerated reclined BLE ther ex well without pain. Continue with skilled PT to improve mobility and safety prior to d/c.

## 2018-09-05 NOTE — DISCHARGE SUMMARY
[facility]  DISCHARGE SUMMARY    Patient Name: Mary Ramirez  : 1934  MRN: [id number]    Date of Admission: 2018  Date of Discharge:  2018 (Wednesday)  Primary Care Physician: Nirmal Lundberg MD    Consults     Date and Time Order Name Status Description    2018 1324 Inpatient Cardiology Consult Completed         Dipak Fonseca - Cardiology  Cristianlizzy Camargo - Cardiology  Ta Madison - Cardiology    Hospital Course     Presenting Problem:   Unstable angina (CMS/HCC) [I20.0]    Active Hospital Problems    Diagnosis Date Noted   • **Hypertensive urgency [I16.0] 2018   • DEANGELO (acute kidney injury) (CMS/HCC) [N17.9] 2018   • Chronic diastolic congestive heart failure (CMS/HCC) [I50.32] 2018   • Paroxysmal atrial fibrillation (CMS/HCC) [I48.0] 2018   • Anticoagulated [Z79.01] 2018   • Dementia [F03.90] 2018   • Type 2 diabetes mellitus with complication, with long-term current use of insulin (CMS/HCC) [E11.8, Z79.4] 2017   • Coronary artery disease involving native coronary artery of native heart with angina pectoris (CMS/HCC) [I25.119] 2017      Resolved Hospital Problems    Diagnosis Date Noted Date Resolved   • Unstable angina (CMS/HCC) [I20.0] 2018      Hospital Course:  Mary Ramirez is a 83 y.o. female with history of coronary artery disease and history of PCI with stenting who presented with chest pain, hypertensive crisis and DEANGELO.  She was anticoagulated with Eliquis due to atrial fibrillation and was seen by Cardiology team.   She had an echocardiogram and her medications were adjusted.  Hydralazine was added and titrated up to 100 mg TID.  She had a stress test on  and it showed no evidence of ischemia and was interpreted as a low risk study.  She will go home today with follow up with Dr. Flores in 4 weeks.    Day of Discharge     HPI:   Wishing to go home today.  No chest  pain.  No shortness of breath.  Appropriate blood pressure.    Review of Systems    Gen- No fevers, chills  CV- No chest pain, palpitations  Resp- No cough, dyspnea  GI- No N/V/D, abd pain    Otherwise ROS is negative except as mentioned in the HPI.    Vital Signs:   Temp:  [97.8 °F (36.6 °C)-98.8 °F (37.1 °C)] 98.3 °F (36.8 °C)  Heart Rate:  [50-63] 56  Resp:  [14-18] 15  BP: (132-173)/(51-76) 145/51     Physical Exam:    Constitutional: No acute distress, awake, alert  HENT: NCAT, mucous membranes moist  Respiratory: Clear to auscultation bilaterally, respiratory effort normal   Cardiovascular: RRR, s1 and s2  Gastrointestinal: Positive bowel sounds, soft, nontender, nondistended  Musculoskeletal: No bilateral ankle edema  Psychiatric: Appropriate affect, cooperative  Neurologic: Oriented x 3, strength symmetric in all extremities, Cranial Nerves grossly intact to confrontation, speech clear  Skin: No rashes      Pertinent  and/or Most Recent Results       Results from last 7 days  Lab Units 09/04/18  0430 09/03/18  0453 09/02/18  0446 09/01/18  1043 09/01/18  1042   WBC 10*3/mm3  --   --   --  6.68  --    HEMOGLOBIN g/dL  --   --   --  14.0  --    HEMATOCRIT %  --   --   --  44.8*  --    PLATELETS 10*3/mm3  --   --   --  148*  --    SODIUM mmol/L 137 137 139  --  135   POTASSIUM mmol/L 3.9 3.6 3.9  --  4.3   CHLORIDE mmol/L 103 103 106  --  103   CO2 mmol/L 25.0 28.0 25.0  --  24.0   BUN mg/dL 26* 19 25*  --  21   CREATININE mg/dL 1.13 1.03 1.37*  --  1.32*   GLUCOSE mg/dL 112* 189* 183*  --  274*   CALCIUM mg/dL 8.8 8.7 8.7  --  9.5       Results from last 7 days  Lab Units 09/01/18  1042   BILIRUBIN mg/dL 0.5   ALK PHOS U/L 115*   ALT (SGPT) U/L 40   AST (SGOT) U/L 38*           Invalid input(s): TG, LDLCALC, LDLREALC    Results from last 7 days  Lab Units 09/02/18  0446 09/01/18  1917 09/01/18  1338 09/01/18  1043   BNP pg/mL  --   --   --  131.0*   TROPONIN I ng/mL 0.008 0.007 0.007  --      Brief Urine Lab  Results  (Last result in the past 365 days)      Color   Clarity   Blood   Leuk Est   Nitrite   Protein   CREAT   Urine HCG        04/07/18 0839 Yellow Clear Negative Negative Negative Negative             Microbiology Results Abnormal     None        Imaging Results (all)     Procedure Component Value Units Date/Time    XR Chest 1 View [[id number]] Collected:  09/01/18 1440     Updated:  09/01/18 1638    Narrative:       EXAMINATION: XR CHEST 1 VW - 9/1/2018     INDICATION: R07.9-Chest pain, unspecified; I10-Essential (primary)  hypertension; I48.2-Chronic atrial fibrillation; Z86.79-Personal history  of other diseases of the circulatory system; E11.65-Type 2 diabetes  mellitus with hyperglycemia.     TECHNIQUE:  Single view frontal chest.     COMPARISONS: 8/21/2018.     FINDINGS:  Unchanged right pleural effusion and adjacent atelectasis.  Atherosclerosis. No pneumothorax.       Impression:       Stable right pleural effusion.     DICTATED:   9/1/2018  EDITED/ls :   9/1/2018      This report was finalized on 9/1/2018 4:36 PM by Enrique Wood.                   Results for orders placed during the hospital encounter of 09/01/18   Adult Transthoracic Echo Complete W/ Cont if Necessary Per Protocol    Narrative · Left ventricular systolic function is normal. Estimated EF = 60%.  · Left ventricular wall thickness is consistent with hypertrophy.  · Left atrial cavity size is dilated.  · There is calcification of the aortic valve.  · Ao mean PG 5.3 mmHg          Discharge Details        Discharge Medications      New Medications      Instructions Start Date   hydrALAZINE 100 MG tablet  Commonly known as:  APRESOLINE   100 mg, Oral, Every 8 Hours Scheduled         Continue These Medications      Instructions Start Date   acetaminophen 325 MG tablet  Commonly known as:  TYLENOL   650 mg, Oral, Every 6 Hours PRN      amiodarone 200 MG tablet  Commonly known as:  PACERONE   200 mg, Oral, Daily      amLODIPine 10 MG  tablet  Commonly known as:  NORVASC   10 mg, Oral, Every 24 Hours Scheduled      apixaban 2.5 MG tablet tablet  Commonly known as:  ELIQUIS   2.5 mg, Oral, Every 12 Hours Scheduled      aspirin 81 MG chewable tablet   81 mg, Oral, Daily      atorvastatin 40 MG tablet  Commonly known as:  LIPITOR   40 mg, Oral, Daily      bisacodyl 5 MG EC tablet  Commonly known as:  DULCOLAX   5 mg, Oral, Daily PRN      citalopram 20 MG tablet  Commonly known as:  CeleXA   20 mg, Oral, Daily      docusate sodium 100 MG capsule   100 mg, Oral, 2 Times Daily      donepezil 5 MG tablet  Commonly known as:  ARICEPT   5 mg, Oral, Nightly      fluticasone 50 MCG/ACT nasal spray  Commonly known as:  FLONASE   2 sprays, Nasal, Daily      insulin aspart 100 UNIT/ML solution pen-injector sc pen  Commonly known as:  novoLOG FLEXPEN   2-5 Units, Subcutaneous, 4 Times Daily      isosorbide mononitrate 60 MG 24 hr tablet  Commonly known as:  IMDUR   60 mg, Oral, Daily      loratadine 10 MG tablet  Commonly known as:  CLARITIN   10 mg, Oral, Daily      metFORMIN 500 MG tablet  Commonly known as:  GLUCOPHAGE   500 mg, Oral, Daily With Breakfast      MULTIVITAMINS PO   1 tablet, Oral, Daily      polyethylene glycol pack packet  Commonly known as:  MIRALAX   17 g, Oral, Daily         Stop These Medications    carvedilol 25 MG tablet  Commonly known as:  COREG     HYDROcodone-acetaminophen 5-325 MG per tablet  Commonly known as:  NORCO     losartan 100 MG tablet  Commonly known as:  COZAAR     nitroglycerin 0.4 MG/SPRAY spray  Commonly known as:  NITROLINGUAL          Discharge Disposition:  Home or Self Care    Discharge Diet:  cardiac    Discharge Activity:  As tolerated    Special Instructions:  Follow up with Dr. Flores in 4 weeks    Code Status/Level of Support:  Code Status and Medical Interventions:   Ordered at: 09/01/18 1235     Code Status:    CPR     Medical Interventions (Level of Support Prior to Arrest):    Full     No future  appointments.    Additional Instructions for the Follow-ups that You Need to Schedule     Discharge Follow-up with PCP    As directed      Currently Documented PCP:  Nirmal Lundberg MD  PCP Phone Number:  [phone]    Follow Up Details:  Nirmal Lundberg MD - Primary Care Physician - 1 week         Discharge Follow-up with Specialty: Dr. Flores - Cardiology; 1 Month    As directed      Specialty:  Dr. Flores - Cardiology    Follow Up:  1 Month             Transfer to [facility].  Stable cardiac status and controlled blood pressure today.  Patient feels well for discharge.    Time Spent on Discharge:  39 minutes    Electronically signed by Vish Andrade MD, 09/05/18, 2:22 PM.

## 2018-09-05 NOTE — PLAN OF CARE
Problem: Patient Care Overview  Goal: Plan of Care Review  Outcome: Ongoing (interventions implemented as appropriate)   09/05/18 0628   Coping/Psychosocial   Plan of Care Reviewed With patient   Plan of Care Review   Progress improving   OTHER   Outcome Summary pt rested through the night. up with one assist. hopefully back to assisted living today.      Goal: Individualization and Mutuality  Outcome: Ongoing (interventions implemented as appropriate)    Goal: Discharge Needs Assessment  Outcome: Ongoing (interventions implemented as appropriate)      Problem: Fall Risk (Adult)  Goal: Identify Related Risk Factors and Signs and Symptoms  Outcome: Ongoing (interventions implemented as appropriate)    Goal: Absence of Fall  Outcome: Ongoing (interventions implemented as appropriate)

## 2018-09-06 ENCOUNTER — READMISSION MANAGEMENT (OUTPATIENT)
Dept: CALL CENTER | Facility: HOSPITAL | Age: 83
End: 2018-09-06

## 2018-09-06 NOTE — OUTREACH NOTE
Prep Survey      Responses   Facility patient discharged from?  [place]   Is patient eligible?  Yes   Discharge diagnosis  Hypertensive urgency   Does the patient have one of the following disease processes/diagnoses(primary or secondary)?  Other   Does the patient have Home health ordered?  No   Is there a DME ordered?  No   General alerts for this patient   [facility] [place] [facility] assisted living   Prep survey completed?  Yes          Urszula Nuñez RN

## 2018-09-10 ENCOUNTER — HOSPITAL ENCOUNTER (EMERGENCY)
Facility: HOSPITAL | Age: 83
Discharge: HOME | End: 2018-09-10
Attending: EMERGENCY MEDICINE | Admitting: EMERGENCY MEDICINE

## 2018-09-10 ENCOUNTER — APPOINTMENT (OUTPATIENT)
Dept: CT IMAGING | Facility: HOSPITAL | Age: 83
End: 2018-09-10

## 2018-09-10 VITALS
BODY MASS INDEX: 21.2 KG/M2 | RESPIRATION RATE: 16 BRPM | WEIGHT: 108 LBS | HEIGHT: 60 IN | SYSTOLIC BLOOD PRESSURE: 177 MMHG | OXYGEN SATURATION: 97 % | HEART RATE: 92 BPM | DIASTOLIC BLOOD PRESSURE: 89 MMHG | TEMPERATURE: 98.3 F

## 2018-09-10 DIAGNOSIS — S16.1XXA ACUTE CERVICAL MYOFASCIAL STRAIN, INITIAL ENCOUNTER: ICD-10-CM

## 2018-09-10 DIAGNOSIS — Z86.79 HISTORY OF CORONARY ARTERY DISEASE: ICD-10-CM
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
## 2018-09-10 DIAGNOSIS — S09.90XA INJURY OF HEAD, INITIAL ENCOUNTER: ICD-10-CM

## 2018-09-10 LAB
ALBUMIN SERPL-MCNC: 4.35 G/DL (ref 3.2–4.8)
ALBUMIN/GLOB SERPL: 1.7 G/DL (ref 1.5–2.5)
ALP SERPL-CCNC: 103 U/L (ref 25–100)
ALT SERPL W P-5'-P-CCNC: 153 U/L (ref 7–40)
ANION GAP SERPL CALCULATED.3IONS-SCNC: 10 MMOL/L (ref 3–11)
AST SERPL-CCNC: 139 U/L (ref 0–33)
BASOPHILS # BLD AUTO: 0.04 10*3/MM3 (ref 0–0.2)
BASOPHILS NFR BLD AUTO: 0.6 % (ref 0–1)
BILIRUB SERPL-MCNC: 0.6 MG/DL (ref 0.3–1.2)
BUN BLD-MCNC: 17 MG/DL (ref 9–23)
BUN/CREAT SERPL: 15.6 (ref 7–25)
CALCIUM SPEC-SCNC: 9.3 MG/DL (ref 8.7–10.4)
CHLORIDE SERPL-SCNC: 101 MMOL/L (ref 99–109)
CO2 SERPL-SCNC: 28 MMOL/L (ref 20–31)
CREAT BLD-MCNC: 1.09 MG/DL (ref 0.6–1.3)
DEPRECATED RDW RBC AUTO: 53.2 FL (ref 37–54)
EOSINOPHIL # BLD AUTO: 0.06 10*3/MM3 (ref 0–0.3)
EOSINOPHIL NFR BLD AUTO: 0.9 % (ref 0–3)
ERYTHROCYTE [DISTWIDTH] IN BLOOD BY AUTOMATED COUNT: 17.2 % (ref 11.3–14.5)
GFR SERPL CREATININE-BSD FRML MDRD: 48 ML/MIN/1.73
GLOBULIN UR ELPH-MCNC: 2.6 GM/DL
GLUCOSE BLD-MCNC: 159 MG/DL (ref 70–100)
HCT VFR BLD AUTO: 41 % (ref 34.5–44)
HGB BLD-MCNC: 12.9 G/DL (ref 11.5–15.5)
IMM GRANULOCYTES # BLD: 0.02 10*3/MM3 (ref 0–0.03)
IMM GRANULOCYTES NFR BLD: 0.3 % (ref 0–0.6)
INR PPP: 1.14 (ref 0.91–1.09)
LYMPHOCYTES # BLD AUTO: 0.93 10*3/MM3 (ref 0.6–4.8)
LYMPHOCYTES NFR BLD AUTO: 13.7 % (ref 24–44)
MCH RBC QN AUTO: 26.6 PG (ref 27–31)
MCHC RBC AUTO-ENTMCNC: 31.5 G/DL (ref 32–36)
MCV RBC AUTO: 84.5 FL (ref 80–99)
MONOCYTES # BLD AUTO: 0.68 10*3/MM3 (ref 0–1)
MONOCYTES NFR BLD AUTO: 10 % (ref 0–12)
NEUTROPHILS # BLD AUTO: 5.06 10*3/MM3 (ref 1.5–8.3)
NEUTROPHILS NFR BLD AUTO: 74.8 % (ref 41–71)
PLATELET # BLD AUTO: 230 10*3/MM3 (ref 150–450)
PMV BLD AUTO: 11.4 FL (ref 6–12)
POTASSIUM BLD-SCNC: 3.7 MMOL/L (ref 3.5–5.5)
PROT SERPL-MCNC: 6.9 G/DL (ref 5.7–8.2)
PROTHROMBIN TIME: 12 SECONDS (ref 9.6–11.5)
RBC # BLD AUTO: 4.85 10*6/MM3 (ref 3.89–5.14)
SODIUM BLD-SCNC: 139 MMOL/L (ref 132–146)
TROPONIN I SERPL-MCNC: 0 NG/ML (ref 0–0.07)
WBC NRBC COR # BLD: 6.77 10*3/MM3 (ref 3.5–10.8)

## 2018-09-10 PROCEDURE — 84484 ASSAY OF TROPONIN QUANT: CPT

## 2018-09-10 PROCEDURE — 93005 ELECTROCARDIOGRAM TRACING: CPT | Performed by: PHYSICIAN ASSISTANT

## 2018-09-10 PROCEDURE — 72125 CT NECK SPINE W/O DYE: CPT

## 2018-09-10 PROCEDURE — 99284 EMERGENCY DEPT VISIT MOD MDM: CPT

## 2018-09-10 PROCEDURE — 80053 COMPREHEN METABOLIC PANEL: CPT | Performed by: PHYSICIAN ASSISTANT

## 2018-09-10 PROCEDURE — 70450 CT HEAD/BRAIN W/O DYE: CPT

## 2018-09-10 PROCEDURE — 85025 COMPLETE CBC W/AUTO DIFF WBC: CPT | Performed by: PHYSICIAN ASSISTANT

## 2018-09-10 PROCEDURE — 85610 PROTHROMBIN TIME: CPT | Performed by: PHYSICIAN ASSISTANT

## 2018-09-10 NOTE — ED PROVIDER NOTES
Subjective   Patient states that she got up was fixing getting ready for the day.  She would normally abuse dictating but did not have it with her lost her balance and fell backwards.  She struck a table and hit her head, chest or questionable loss of consciousness for a few seconds.  Patient reports she did not pass out she recalls the entire fall remembers losing her balance.  She is on Eliquis and was just recently discharged from the hospital for having a MI.  Patient states that she has a little bit of a headache she has a little bit of neck pain EMS and put her in a cervical collar and she states the cervical collar is more uncomfortable than either the above complaints.  She is not having blood from her ears or nose.  She did not lose any teeth.  She has no other complaints other than the fall with head injury.  Patient is had no vomiting since the incident.  Specifically denies chest pain shortness of breath diaphoresis or syncope.  Nothing seems to exacerbate or alleviate the headache.  It is maybe a 2/10.  She is unable to tell she has neck pain due to the cervical collar being so uncomfortable.  She's not tried anything to alleviate her discomfort.  The only thing exacerbating her discomfort is the cervical collar.  Patient denies any blurred vision double vision blood from her ears or nose.  Patient also specifically denies back hip or lower extremity pain.            Review of Systems   Constitutional: Negative for chills and fever.   Respiratory: Negative for chest tightness, shortness of breath and wheezing.    Cardiovascular: Negative for chest pain, palpitations and leg swelling.   Gastrointestinal: Negative for abdominal pain, diarrhea, nausea and vomiting.   Musculoskeletal: Positive for neck pain. Negative for back pain.   Skin: Negative for rash.   Neurological: Negative for dizziness and weakness.   Hematological: Negative for adenopathy.   All other systems reviewed and are negative.      Past  "Medical History:   Diagnosis Date   • Atrial fibrillation (CMS/HCC)    • CAD (coronary artery disease)    • CHF (congestive heart failure) (CMS/HCC)    • DDD (degenerative disc disease), cervical    • Dementia    • Diabetes mellitus (CMS/HCC)    • Hypertension    • SSS (sick sinus syndrome) (CMS/formerly [facility])        Allergies   Allergen Reactions   • Penicillins Other (See Comments)     Pt states \"i don't know\"       Past Surgical History:   Procedure Laterality Date   • ANKLE SURGERY Left 1996   • ATHRECTOMY ILIAC, FEMORAL, TIBIAL ARTERY     • BREAST LUMPECTOMY Left 1981   • COLON SURGERY     • CORONARY STENT PLACEMENT     • FINGER FRACTURE SURGERY Right    • HIP TROCANTERIC NAILING WITH INTRAMEDULLARY HIP SCREW Left 11/23/2017   • HYSTERECTOMY         Family History   Problem Relation Age of Onset   • No Known Problems Mother    • No Known Problems Father        Social History     Social History   • Marital status:      Spouse name: N/A   • Number of children: 2   • Years of education: H.S     Occupational History   •  Retired     Social History Main Topics   • Smoking status: Never Smoker   • Smokeless tobacco: Never Used   • Alcohol use No   • Drug use: No   • Sexual activity: No     Other Topics Concern   • Not on file           Objective   Physical Exam   Constitutional: She is oriented to person, place, and time. She appears well-developed and well-nourished.   Alert oriented to person place and time nontoxic, currently in a cervical collar lying on her stretcher in the emergency department.   HENT:   Head: Normocephalic and atraumatic.   Right Ear: External ear normal.   Left Ear: External ear normal.   Nose: Nose normal.   Mouth/Throat: Oropharynx is clear and moist.   No ecchymosis abrasions or open wounds were noted.   Eyes: Conjunctivae and EOM are normal. Right eye exhibits no discharge. Left eye exhibits no discharge. No scleral icterus.   Neck: Normal range of motion. No thyromegaly " present.     Lid was diffusely tender across the entire C-spine is no step-off no crepitus cervical collar was not removed until cleared by CAT scan.   Cardiovascular: Normal rate, regular rhythm and normal heart sounds.  Exam reveals no gallop and no friction rub.    No murmur heard.  Pulmonary/Chest: Effort normal and breath sounds normal. No respiratory distress. She has no wheezes. She has no rales. She exhibits no tenderness.   Abdominal: Soft. Bowel sounds are normal. She exhibits no distension. There is no tenderness. There is no rebound and no guarding. No hernia.   Musculoskeletal: Normal range of motion.   Lymphadenopathy:     She has no cervical adenopathy.   Neurological: She is alert and oriented to person, place, and time. She has normal reflexes. She displays normal reflexes. No cranial nerve deficit. Coordination normal.   Skin: Skin is warm and dry.   Psychiatric: She has a normal mood and affect. Her behavior is normal. Judgment and thought content normal.   Nursing note and vitals reviewed.      Procedures           ED Course  ED Course as of Sep 11 2335   Mon Sep 10, 2018   1051 Cervical collar was removed patient's been up she's been ambulatory she feels much better after getting the collar off.  Reexamination of the lumbar spine and thoracic spine revealed no tenderness.  Should full range of motion of all 4 extremities.  [MARÍA]   1051 Discussed the findings with the patient.  She'll be discharged back to her facility.  [MARÍA]      ED Course User Index  [MARÍA] aT Rose PA      No results found for this or any previous visit (from the past 24 hour(s)).  Note: In addition to lab results from this visit, the labs listed above may include labs taken at another facility or during a different encounter within the last 24 hours. Please correlate lab times with ED admission and discharge times for further clarification of the services performed during this visit.    CT Head Without Contrast  "  Final Result   Atrophy identified of the brain with chronic changes   present. No acute intracranial abnormality is identified.       D:  09/10/2018   E:  09/10/2018           This report was finalized on 9/10/2018 3:52 PM by Dr. Renetta Alvarenga MD.          CT Cervical Spine Without Contrast   Final Result   Multilevel degenerative changes identified throughout the   cervical spine. No evidence of acute fracture or dislocation.       D:  09/10/2018   E:  09/10/2018           This report was finalized on 9/10/2018 3:51 PM by Dr. Renetta Alvarenga MD.            Vitals:    09/10/18 0805 09/10/18 0900 09/10/18 1000 09/10/18 1100   BP: (!) 200/84 (!) 190/87 175/87 177/89   BP Location: Right arm      Patient Position: Lying      Pulse: 96 91 92    Resp: 18 16 16    Temp: 98.3 °F (36.8 °C)      TempSrc: Oral      SpO2: 99% 98% 99% 97%   Weight: 49 kg (108 lb)      Height: 152.4 cm (60\")        Medications - No data to display  ECG/EMG Results (last 24 hours)     Procedure Component Value Units Date/Time    ECG 12 Lead [[id number]] Collected:  09/10/18 0919     Updated:  09/10/18 1031    Narrative:       Test Reason : fall  Blood Pressure : **/** mmHG  Vent. Rate : 091 BPM     Atrial Rate : 093 BPM     P-R Int : 000 ms          QRS Dur : 086 ms      QT Int : 396 ms       P-R-T Axes : 000 -31 037 degrees     QTc Int : 487 ms    Normal sinus rhythm  Left axis deviation  Abnormal ECG  When compared with ECG of 01-SEP-2018 14:05,  Vent. rate has increased BY  40 BPM  Confirmed by VINCENZO YANEZ MD ([id number]) on 9/10/2018 10:30:58 AM    Referred By:  BEAU           Confirmed By:VINCENZO YANEZ MD                    MDM  Number of Diagnoses or Management Options  Acute cervical myofascial strain, initial encounter: new and requires workup  Fall, initial encounter: new and requires workup  History of coronary artery disease: established and improving  Injury of head, initial encounter: new and requires workup     Amount " and/or Complexity of Data Reviewed  Clinical lab tests: reviewed and ordered  Tests in the radiology section of CPT®: reviewed and ordered  Tests in the medicine section of CPT®: ordered and reviewed  Decide to obtain previous medical records or to obtain history from someone other than the patient: yes    Patient Progress  Patient progress: stable        Final diagnoses:   Fall, initial encounter   Injury of head, initial encounter   Acute cervical myofascial strain, initial encounter   History of coronary artery disease            Ta Rose PA  09/11/18 0703

## 2018-09-11 ENCOUNTER — READMISSION MANAGEMENT (OUTPATIENT)
Dept: CALL CENTER | Facility: HOSPITAL | Age: 83
End: 2018-09-11

## 2018-09-11 NOTE — OUTREACH NOTE
Medical Week 1 Survey      Responses   Facility patient discharged from?  [place]   Does the patient have one of the following disease processes/diagnoses(primary or secondary)?  Other   Is there a successful TCM telephone encounter documented?  No   Week 1 attempt successful?  No   Unsuccessful attempts  Attempt 1          Yadira Rivera RN

## 2018-09-13 ENCOUNTER — READMISSION MANAGEMENT (OUTPATIENT)
Dept: CALL CENTER | Facility: HOSPITAL | Age: 83
End: 2018-09-13

## 2018-09-13 NOTE — OUTREACH NOTE
Medical Week 1 Survey      Responses   Facility patient discharged from?  [place]   Does the patient have one of the following disease processes/diagnoses(primary or secondary)?  Other   Is there a successful TCM telephone encounter documented?  No   Week 1 attempt successful?  Yes   Call start time  1249   Call end time  1254   General alerts for this patient   [facility] [place] [facility]   Discharge diagnosis  Hypertensive urgency   List who call center can speak with  Luis Moreno is POA   Person spoke with today (if not patient) and relationship  SonJosh her Power of    Meds reviewed with patient/caregiver?  Yes   Is the patient having any side effects they believe may be caused by any medication additions or changes?  No   Does the patient have all medications ordered at discharge?  Yes   Is the patient taking all medications as directed (includes completed medication regime)?  Yes   Medication comments  Assisted living takes care of her meds   Does the patient have a primary care provider?   Yes   Does the patient have an appointment with their PCP within 7 days of discharge?  Yes   Has the patient kept scheduled appointments due by today?  Yes   Comments  She is seeing doctor today   DME comments  Patient already has walker and wheelchair, does not need anything at this time   Did the patient receive a copy of their discharge instructions?  Yes   Nursing interventions  Reviewed instructions with patient   What is the patient's perception of their health status since discharge?  Improving   Is the patient/caregiver able to teach back signs and symptoms related to disease process for when to call PCP?  Yes   Is the patient/caregiver able to teach back signs and symptoms related to disease process for when to call 911?  Yes   Is the patient/caregiver able to teach back the hierarchy of who to call/visit for symptoms/problems? PCP, Specialist, Home health nurse, Urgent Care, ED, 911  Yes   Week  1 call completed?  Yes   Wrap up additional comments  Pt is at the doctor today. Spoke with son, Josh. She can also be reached for future calls at [phone] but is ok to call him.           Milena Rachel RN

## 2018-09-21 ENCOUNTER — TRANSCRIBE ORDERS (OUTPATIENT)
Dept: ADMINISTRATIVE | Facility: HOSPITAL | Age: 83
End: 2018-09-21

## 2018-09-21 DIAGNOSIS — R79.89 ABNORMAL LFTS: Primary | ICD-10-CM

## 2018-09-22 ENCOUNTER — APPOINTMENT (OUTPATIENT)
Dept: ULTRASOUND IMAGING | Facility: HOSPITAL | Age: 83
End: 2018-09-22

## 2018-09-22 ENCOUNTER — READMISSION MANAGEMENT (OUTPATIENT)
Dept: CALL CENTER | Facility: HOSPITAL | Age: 83
End: 2018-09-22

## 2018-09-22 NOTE — OUTREACH NOTE
Medical Week 2 Survey      Responses   Facility patient discharged from?  [place]   Does the patient have one of the following disease processes/diagnoses(primary or secondary)?  Other   Week 2 attempt successful?  Yes   Call start time  1605   General alerts for this patient   [facility] [place] [facility] assisted living   Discharge diagnosis  Hypertensive urgency   Revoke  Decline to participate   Call end time  1606          Yadira Rivera RN

## 2018-11-05 ENCOUNTER — OFFICE VISIT (OUTPATIENT)
Dept: CARDIOLOGY | Facility: CLINIC | Age: 83
End: 2018-11-05

## 2018-11-05 VITALS
SYSTOLIC BLOOD PRESSURE: 110 MMHG | DIASTOLIC BLOOD PRESSURE: 72 MMHG | HEART RATE: 97 BPM | HEIGHT: 60 IN | WEIGHT: 107 LBS | BODY MASS INDEX: 21.01 KG/M2

## 2018-11-05 DIAGNOSIS — I50.32 CHRONIC DIASTOLIC CONGESTIVE HEART FAILURE (HCC): ICD-10-CM

## 2018-11-05 DIAGNOSIS — I25.119 CORONARY ARTERY DISEASE INVOLVING NATIVE CORONARY ARTERY OF NATIVE HEART WITH ANGINA PECTORIS (HCC): Primary | ICD-10-CM

## 2018-11-05 DIAGNOSIS — I49.5 SSS (SICK SINUS SYNDROME) (HCC): ICD-10-CM

## 2018-11-05 DIAGNOSIS — I48.0 PAROXYSMAL ATRIAL FIBRILLATION (HCC): ICD-10-CM

## 2018-11-05 DIAGNOSIS — I48.0 PAROXYSMAL ATRIAL FIBRILLATION (HCC): Primary | ICD-10-CM

## 2018-11-05 PROCEDURE — 93000 ELECTROCARDIOGRAM COMPLETE: CPT | Performed by: INTERNAL MEDICINE

## 2018-11-05 PROCEDURE — 99214 OFFICE O/P EST MOD 30 MIN: CPT | Performed by: INTERNAL MEDICINE

## 2018-11-05 RX ORDER — ONDANSETRON 4 MG/1
4 TABLET, FILM COATED ORAL EVERY 8 HOURS PRN
COMMUNITY
End: 2018-12-12

## 2018-11-05 NOTE — PROGRESS NOTES
[facility] at [facility]  Office visit  Mary Ramirez  1934    [phone] (work)    VISIT DATE:  11/05/2018    PCP: Nirmal Lundberg MD  [address] [id number]    CC:  Chief Complaint   Patient presents with   • Hypertension       PROBLEM LIST:  Coronary artery disease involving native coronary artery of native heart with angina pectoris (CMS/HCC)      Overview Addendum 9/1/2018  8:49 PM by Jorge Fonseca IV, MD       · Cardiac cath with PCI data deficit  · Echo (9/1/18): LVEF 60%. LVH. Left atrial dilatation. Aortic calcification with no stenosis.           Paroxysmal atrial fibrillation (CMS/HCC)     Overview Addendum 9/1/2018  8:49 PM by Jorge Fonseca IV, MD       · Chads Vasc 6 (age > 75, female, CAD, DM, HTN)   · Rhythm control strategy with amiodarone  · Maintaining rhythm           DEANGELO (acute kidney injury) (CMS/[facility])     Type 2 diabetes mellitus with complication, with long-term current use of insulin (CMS/[facility])     Dementia     Chronic diastolic congestive heart failure (CMS/HCC)     Overview Signed 9/1/2018  8:49 PM by Jorge Fonseca IV, MD       · Echo (9/1/18): LVEF 60%. LVH. Left atrial dilatation. Aortic calcification with no stenosis.          ASSESSMENT:   Diagnosis Plan   1. Coronary artery disease involving native coronary artery of native heart with angina pectoris (CMS/HCC)     2. Paroxysmal atrial fibrillation (CMS/HCC)     3. SSS (sick sinus syndrome) (CMS/HCC)     4. Chronic diastolic congestive heart failure (CMS/HCC)         PLAN:  Coronary artery disease: Currently stable and asymptomatic.  Continue aspirin and afterload reduction.    Hypertension: Goal less than 140/90 mmHg.  Continue current medical therapy.    Paroxysmal atrial fibrillation and atrial flutter: 48 hour Holter monitor to assess for adequate rate control.  Continue amiodarone 2 mg by mouth daily.    Subjective  Blood pressures at the  "[place]lee had been running higher than 140/90 mmHg however that is well-controlled today.  She denies palpitations or chest pain.  Had an episode in which she felt shaky last week.  This is resolved.  We have down titrated her amiodarone.  She is compliant with medical therapy.  Denies easy bruising or bleeding complications on Eliquis.  Twelve-lead EKG today revealed rate controlled atrial flutter.    PHYSICAL EXAMINATION:  Vitals:    11/05/18 1119   BP: 110/72   BP Location: Left arm   Patient Position: Sitting   Pulse: 97   Weight: 48.5 kg (107 lb)   Height: 152.4 cm (60\")     General Appearance:    Alert, cooperative, no distress, appears stated age   Head:    Normocephalic, without obvious abnormality, atraumatic   Eyes:    conjunctiva/corneas clear   Nose:   Nares normal, septum midline, mucosa normal, no drainage   Throat:   Lips, teeth and gums normal   Neck:   Supple, symmetrical, trachea midline, no carotid    bruit or JVD   Lungs:     Clear to auscultation bilaterally, respirations unlabored   Chest Wall:    No tenderness or deformity    Heart:    Regular rate and rhythm, S1 and S2 normal, no murmur, rub   or gallop, normal carotid impulse bilaterally without bruit.   Abdomen:     Soft, non-tender   Extremities:   Extremities normal, atraumatic, no cyanosis or edema   Pulses:   2+ and symmetric all extremities   Skin:   Skin color, texture, turgor normal, no rashes or lesions       Diagnostic Data:    ECG 12 Lead  Date/Time: 11/5/2018 11:30 AM  Performed by: MADI CASEY III  Authorized by: MADI CASEY III   Comparison: compared with previous ECG   Comparison to previous ECG: Atrial flutter has replaced sinus rhythm  Rhythm: atrial flutter  Other findings comments: Nonspecific ST and T-wave abnormalities  Clinical impression: abnormal ECG          Lab Results   Component Value Date    CHLPL 123 01/24/2016    TRIG 149 01/24/2016    HDL 32 (L) 01/24/2016     Lab Results   Component Value Date    GLUCOSE " "159 (H) 09/10/2018    BUN 17 09/10/2018    CREATININE 1.09 09/10/2018     09/10/2018    K 3.7 09/10/2018     09/10/2018    CO2 28.0 09/10/2018     Lab Results   Component Value Date    HGBA1C 6.80 (H) 04/08/2018     Lab Results   Component Value Date    WBC 6.77 09/10/2018    HGB 12.9 09/10/2018    HCT 41.0 09/10/2018     09/10/2018       Allergies  Allergies   Allergen Reactions   • Penicillins Other (See Comments)     Pt states \"i don't know\"       Current Medications    Current Outpatient Prescriptions:   •  acetaminophen (TYLENOL) 325 MG tablet, Take 2 tablets by mouth Every 6 (Six) Hours As Needed for Mild Pain  or Moderate Pain ., Disp: , Rfl:   •  amiodarone (PACERONE) 200 MG tablet, Take 1 tablet by mouth Daily., Disp: , Rfl:   •  amLODIPine (NORVASC) 10 MG tablet, Take 1 tablet by mouth Daily. (Patient taking differently: Take 5 mg by mouth Daily.), Disp: , Rfl:   •  apixaban (ELIQUIS) 2.5 MG tablet tablet, Take 1 tablet by mouth Every 12 (Twelve) Hours., Disp: 60 tablet, Rfl:   •  aspirin 81 MG chewable tablet, Chew 81 mg Daily., Disp: , Rfl:   •  bisacodyl (DULCOLAX) 5 MG EC tablet, Take 1 tablet by mouth Daily As Needed for Constipation., Disp: , Rfl:   •  citalopram (CeleXA) 20 MG tablet, Take 20 mg by mouth Daily., Disp: , Rfl:   •  docusate sodium 100 MG capsule, Take 100 mg by mouth 2 (Two) Times a Day., Disp: , Rfl:   •  fluticasone (FLONASE) 50 MCG/ACT nasal spray, 2 sprays into the nostril(s) as directed by provider Daily., Disp: , Rfl:   •  insulin aspart (novoLOG FLEXPEN) 100 UNIT/ML solution pen-injector sc pen, Inject 2-5 Units under the skin into the appropriate area as directed 4 (Four) Times a Day., Disp: , Rfl:   •  isosorbide mononitrate (IMDUR) 60 MG 24 hr tablet, Take 60 mg by mouth Daily., Disp: , Rfl:   •  loratadine (CLARITIN) 10 MG tablet, Take 10 mg by mouth Daily., Disp: , Rfl:   •  metFORMIN (GLUCOPHAGE) 500 MG tablet, Take 1 tablet by mouth Daily With " Breakfast., Disp: , Rfl:   •  Multiple Vitamin (MULTIVITAMINS PO), Take 1 tablet by mouth Daily., Disp: , Rfl:   •  ondansetron (ZOFRAN) 4 MG tablet, Take 4 mg by mouth Every 8 (Eight) Hours As Needed for Nausea or Vomiting., Disp: , Rfl:   •  polyethylene glycol (MIRALAX) pack packet, Take 17 g by mouth Daily., Disp: , Rfl:           ROS  Review of Systems   Cardiovascular: Positive for irregular heartbeat.   Respiratory: Positive for shortness of breath and snoring.    Neurological: Positive for dizziness.       SOCIAL HX  Social History     Social History   • Marital status:      Spouse name: N/A   • Number of children: 2   • Years of education: H.S     Occupational History   •  Retired     Social History Main Topics   • Smoking status: Former Smoker     Types: Cigarettes     Start date: 11/5/1975     Quit date: 11/5/1978   • Smokeless tobacco: Never Used   • Alcohol use No   • Drug use: No   • Sexual activity: No     Other Topics Concern   • Not on file     Social History Narrative   • No narrative on file       FAMILY HX  Family History   Problem Relation Age of Onset   • No Known Problems Mother    • No Known Problems Father              Ta Flores III, MD, FACC

## 2018-12-12 ENCOUNTER — OFFICE VISIT (OUTPATIENT)
Dept: CARDIOLOGY | Facility: CLINIC | Age: 83
End: 2018-12-12

## 2018-12-12 VITALS
HEART RATE: 97 BPM | DIASTOLIC BLOOD PRESSURE: 70 MMHG | HEIGHT: 61 IN | OXYGEN SATURATION: 98 % | WEIGHT: 109 LBS | BODY MASS INDEX: 20.58 KG/M2 | SYSTOLIC BLOOD PRESSURE: 142 MMHG

## 2018-12-12 DIAGNOSIS — I25.119 CORONARY ARTERY DISEASE INVOLVING NATIVE CORONARY ARTERY OF NATIVE HEART WITH ANGINA PECTORIS (HCC): Primary | ICD-10-CM

## 2018-12-12 DIAGNOSIS — I10 ESSENTIAL HYPERTENSION: ICD-10-CM

## 2018-12-12 DIAGNOSIS — I48.0 PAROXYSMAL ATRIAL FIBRILLATION (HCC): ICD-10-CM

## 2018-12-12 PROCEDURE — 99214 OFFICE O/P EST MOD 30 MIN: CPT | Performed by: INTERNAL MEDICINE

## 2018-12-12 RX ORDER — DONEPEZIL HYDROCHLORIDE 5 MG/1
5 TABLET, FILM COATED ORAL NIGHTLY
COMMUNITY

## 2018-12-12 RX ORDER — ONDANSETRON 4 MG/1
4 TABLET, FILM COATED ORAL EVERY 8 HOURS PRN
COMMUNITY

## 2018-12-12 RX ORDER — DILTIAZEM HYDROCHLORIDE 120 MG/1
120 CAPSULE, EXTENDED RELEASE ORAL DAILY
Qty: 30 CAPSULE | Refills: 11 | Status: SHIPPED | OUTPATIENT
Start: 2018-12-12 | End: 2019-03-05 | Stop reason: HOSPADM

## 2018-12-12 NOTE — PROGRESS NOTES
[facility] at [facility]  Office visit  Mary Ramirez  1934    [phone] (work)    VISIT DATE:  11/05/2018    PCP: Nirmal Lundberg MD  [address] [phone]    CC:  Chief Complaint   Patient presents with   • Coronary Artery Disease       PROBLEM LIST:  Coronary artery disease involving native coronary artery of native heart with angina pectoris (CMS/HCC)             · Cardiac cath with PCI data deficit  · Echo (9/1/18): LVEF 60%. LVH. Left atrial dilatation. Aortic calcification with no stenosis.  · September 2018 myocardial PET: Normal perfusion.           Paroxysmal atrial fibrillation (CMS/HCC)            · Chads Vasc 6 (age > 75, female, CAD, DM, HTN)   · Rhythm control strategy with amiodarone  · Maintaining rhythm           DEANGELO (acute kidney injury) (CMS/HCC)     Type 2 diabetes mellitus with complication, with long-term current use of insulin (CMS/HCC)     Dementia     Chronic diastolic congestive heart failure (CMS/HCC)            · Echo (9/1/18): LVEF 60%. LVH. Left atrial dilatation. Aortic calcification with no stenosis.          ASSESSMENT:   Diagnosis Plan   1. Coronary artery disease involving native coronary artery of native heart with angina pectoris (CMS/HCC)     2. Paroxysmal atrial fibrillation (CMS/HCC)     3. Essential hypertension         PLAN:  Coronary artery disease: Currently stable and asymptomatic.  Continue aspirin and afterload reduction.    Hypertension: Goal less than 140/90 mmHg.  switching amlodipine to diltiazem.    Paroxysmal atrial fibrillation and atrial flutter: Continue amiodarone 200 mg by mouth daily.  We'll need biannual liver function test and thyroid function tests.  Annual chest x-ray.  Continue Eliquis 2.5 mg by mouth daily for stroke prophylaxis.  Switching amlodipine to diltiazem for better rate control of paroxysmal A. fib.    Subjective  Intermittent tremors diminished with down titration of amiodarone.   "Still having intermittent applications which feel like an irregular heartbeat in her chest with associated sharp chest pain.  Once every other week, lasting about 30 minutes.  Attempted Holter monitor was complicated by artifact.  She is compliant with medical therapy.  Denies easy bruising or bleeding complications on Eliquis.  Using a wheeled walker for ambulation, resides at the [place].      PHYSICAL EXAMINATION:  There were no vitals filed for this visit.  General Appearance:    Alert, cooperative, no distress, appears stated age   Head:    Normocephalic, without obvious abnormality, atraumatic   Eyes:    conjunctiva/corneas clear   Nose:   Nares normal, septum midline, mucosa normal, no drainage   Throat:   Lips, teeth and gums normal   Neck:   Supple, symmetrical, trachea midline, no carotid    bruit or JVD   Lungs:     Clear to auscultation bilaterally, respirations unlabored   Chest Wall:    No tenderness or deformity    Heart:    Regular rate and rhythm, S1 and S2 normal, no murmur, rub   or gallop, normal carotid impulse bilaterally without bruit.   Abdomen:     Soft, non-tender   Extremities:   Extremities normal, atraumatic, no cyanosis or edema   Pulses:   2+ and symmetric all extremities   Skin:   Skin color, texture, turgor normal, no rashes or lesions       Diagnostic Data:  Procedures  Lab Results   Component Value Date    CHLPL 123 01/24/2016    TRIG 149 01/24/2016    HDL 32 (L) 01/24/2016     Lab Results   Component Value Date    GLUCOSE 159 (H) 09/10/2018    BUN 17 09/10/2018    CREATININE 1.09 09/10/2018     09/10/2018    K 3.7 09/10/2018     09/10/2018    CO2 28.0 09/10/2018     Lab Results   Component Value Date    HGBA1C 6.80 (H) 04/08/2018     Lab Results   Component Value Date    WBC 6.77 09/10/2018    HGB 12.9 09/10/2018    HCT 41.0 09/10/2018     09/10/2018       Allergies  Allergies   Allergen Reactions   • Penicillins Other (See Comments)     Pt states \"i don't know\" "       Current Medications    Current Outpatient Medications:   •  acetaminophen (TYLENOL) 325 MG tablet, Take 2 tablets by mouth Every 6 (Six) Hours As Needed for Mild Pain  or Moderate Pain ., Disp: , Rfl:   •  amiodarone (PACERONE) 200 MG tablet, Take 1 tablet by mouth Daily., Disp: , Rfl:   •  amLODIPine (NORVASC) 10 MG tablet, Take 1 tablet by mouth Daily. (Patient taking differently: Take 5 mg by mouth Daily.), Disp: , Rfl:   •  apixaban (ELIQUIS) 2.5 MG tablet tablet, Take 1 tablet by mouth Every 12 (Twelve) Hours., Disp: 60 tablet, Rfl:   •  aspirin 81 MG chewable tablet, Chew 81 mg Daily., Disp: , Rfl:   •  bisacodyl (DULCOLAX) 5 MG EC tablet, Take 1 tablet by mouth Daily As Needed for Constipation., Disp: , Rfl:   •  citalopram (CeleXA) 20 MG tablet, Take 20 mg by mouth Daily., Disp: , Rfl:   •  docusate sodium 100 MG capsule, Take 100 mg by mouth 2 (Two) Times a Day., Disp: , Rfl:   •  fluticasone (FLONASE) 50 MCG/ACT nasal spray, 2 sprays into the nostril(s) as directed by provider Daily., Disp: , Rfl:   •  insulin aspart (novoLOG FLEXPEN) 100 UNIT/ML solution pen-injector sc pen, Inject 2-5 Units under the skin into the appropriate area as directed 4 (Four) Times a Day., Disp: , Rfl:   •  isosorbide mononitrate (IMDUR) 60 MG 24 hr tablet, Take 60 mg by mouth Daily., Disp: , Rfl:   •  loratadine (CLARITIN) 10 MG tablet, Take 10 mg by mouth Daily., Disp: , Rfl:   •  metFORMIN (GLUCOPHAGE) 500 MG tablet, Take 1 tablet by mouth Daily With Breakfast., Disp: , Rfl:   •  Multiple Vitamin (MULTIVITAMINS PO), Take 1 tablet by mouth Daily., Disp: , Rfl:   •  ondansetron (ZOFRAN) 4 MG tablet, Take 4 mg by mouth Every 8 (Eight) Hours As Needed for Nausea or Vomiting., Disp: , Rfl:   •  polyethylene glycol (MIRALAX) pack packet, Take 17 g by mouth Daily., Disp: , Rfl:           ROS  Review of Systems   Cardiovascular: Positive for chest pain, irregular heartbeat and palpitations.   Respiratory: Negative for shortness  of breath and snoring.    Neurological: Positive for dizziness.       SOCIAL HX  Social History     Socioeconomic History   • Marital status:      Spouse name: N/A   • Number of children: 2   • Years of education: H.S   • Highest education level: Not on file   Social Needs   • Financial resource strain: Not on file   • Food insecurity - worry: Not on file   • Food insecurity - inability: Not on file   • Transportation needs - medical: Not on file   • Transportation needs - non-medical: Not on file   Occupational History   • Occupation: Cafeteria Manager     Employer: RETIRED   Tobacco Use   • Smoking status: Former Smoker     Types: Cigarettes     Start date: 1975     Last attempt to quit: 1978     Years since quittin.1   • Smokeless tobacco: Never Used   Substance and Sexual Activity   • Alcohol use: No   • Drug use: No   • Sexual activity: No   Other Topics Concern   • Not on file   Social History Narrative   • Not on file       FAMILY HX  Family History   Problem Relation Age of Onset   • No Known Problems Mother    • No Known Problems Father              Ta Flores III, MD, FACC

## 2019-01-10 ENCOUNTER — APPOINTMENT (OUTPATIENT)
Dept: GENERAL RADIOLOGY | Facility: HOSPITAL | Age: 84
End: 2019-01-10

## 2019-01-10 ENCOUNTER — HOSPITAL ENCOUNTER (EMERGENCY)
Facility: HOSPITAL | Age: 84
Discharge: HOME | End: 2019-01-10
Attending: EMERGENCY MEDICINE | Admitting: EMERGENCY MEDICINE

## 2019-01-10 VITALS
HEIGHT: 61 IN | SYSTOLIC BLOOD PRESSURE: 195 MMHG | DIASTOLIC BLOOD PRESSURE: 91 MMHG | OXYGEN SATURATION: 98 % | WEIGHT: 107 LBS | TEMPERATURE: 98.9 F | BODY MASS INDEX: 20.2 KG/M2 | HEART RATE: 63 BPM | RESPIRATION RATE: 20 BRPM

## 2019-01-10 DIAGNOSIS — R74.8 ELEVATED CREATINE KINASE: ICD-10-CM

## 2019-01-10 DIAGNOSIS — R07.89 CHEST WALL PAIN: Primary | ICD-10-CM

## 2019-01-10 LAB
ALBUMIN SERPL-MCNC: 4.51 G/DL (ref 3.2–4.8)
ALBUMIN/GLOB SERPL: 1.8 G/DL (ref 1.5–2.5)
ALP SERPL-CCNC: 119 U/L (ref 25–100)
ALT SERPL W P-5'-P-CCNC: 20 U/L (ref 7–40)
ANION GAP SERPL CALCULATED.3IONS-SCNC: 5 MMOL/L (ref 3–11)
AST SERPL-CCNC: 26 U/L (ref 0–33)
BASOPHILS # BLD AUTO: 0.03 10*3/MM3 (ref 0–0.2)
BASOPHILS NFR BLD AUTO: 0.5 % (ref 0–1)
BILIRUB SERPL-MCNC: 0.4 MG/DL (ref 0.3–1.2)
BNP SERPL-MCNC: 82 PG/ML (ref 0–100)
BUN BLD-MCNC: 27 MG/DL (ref 9–23)
BUN/CREAT SERPL: 19.4 (ref 7–25)
CALCIUM SPEC-SCNC: 9.5 MG/DL (ref 8.7–10.4)
CHLORIDE SERPL-SCNC: 100 MMOL/L (ref 99–109)
CO2 SERPL-SCNC: 30 MMOL/L (ref 20–31)
CREAT BLD-MCNC: 1.39 MG/DL (ref 0.6–1.3)
DEPRECATED RDW RBC AUTO: 51.6 FL (ref 37–54)
EOSINOPHIL # BLD AUTO: 0.11 10*3/MM3 (ref 0–0.3)
EOSINOPHIL NFR BLD AUTO: 1.8 % (ref 0–3)
ERYTHROCYTE [DISTWIDTH] IN BLOOD BY AUTOMATED COUNT: 16.7 % (ref 11.3–14.5)
GFR SERPL CREATININE-BSD FRML MDRD: 36 ML/MIN/1.73
GLOBULIN UR ELPH-MCNC: 2.5 GM/DL
GLUCOSE BLD-MCNC: 204 MG/DL (ref 70–100)
HCT VFR BLD AUTO: 41.4 % (ref 34.5–44)
HGB BLD-MCNC: 12.6 G/DL (ref 11.5–15.5)
HOLD SPECIMEN: NORMAL
HOLD SPECIMEN: NORMAL
IMM GRANULOCYTES # BLD AUTO: 0.02 10*3/MM3 (ref 0–0.03)
IMM GRANULOCYTES NFR BLD AUTO: 0.3 % (ref 0–0.6)
LIPASE SERPL-CCNC: 43 U/L (ref 6–51)
LYMPHOCYTES # BLD AUTO: 1.24 10*3/MM3 (ref 0.6–4.8)
LYMPHOCYTES NFR BLD AUTO: 20.3 % (ref 24–44)
MCH RBC QN AUTO: 25.7 PG (ref 27–31)
MCHC RBC AUTO-ENTMCNC: 30.4 G/DL (ref 32–36)
MCV RBC AUTO: 84.5 FL (ref 80–99)
MONOCYTES # BLD AUTO: 0.6 10*3/MM3 (ref 0–1)
MONOCYTES NFR BLD AUTO: 9.8 % (ref 0–12)
NEUTROPHILS # BLD AUTO: 4.12 10*3/MM3 (ref 1.5–8.3)
NEUTROPHILS NFR BLD AUTO: 67.6 % (ref 41–71)
PLATELET # BLD AUTO: 214 10*3/MM3 (ref 150–450)
PMV BLD AUTO: 11.8 FL (ref 6–12)
POTASSIUM BLD-SCNC: 3.8 MMOL/L (ref 3.5–5.5)
PROT SERPL-MCNC: 7 G/DL (ref 5.7–8.2)
RBC # BLD AUTO: 4.9 10*6/MM3 (ref 3.89–5.14)
SODIUM BLD-SCNC: 135 MMOL/L (ref 132–146)
TROPONIN I SERPL-MCNC: 0 NG/ML (ref 0–0.07)
TROPONIN I SERPL-MCNC: 0.01 NG/ML
WBC NRBC COR # BLD: 6.1 10*3/MM3 (ref 3.5–10.8)
WHOLE BLOOD HOLD SPECIMEN: NORMAL
WHOLE BLOOD HOLD SPECIMEN: NORMAL

## 2019-01-10 PROCEDURE — 99285 EMERGENCY DEPT VISIT HI MDM: CPT

## 2019-01-10 PROCEDURE — 71045 X-RAY EXAM CHEST 1 VIEW: CPT

## 2019-01-10 PROCEDURE — 93005 ELECTROCARDIOGRAM TRACING: CPT | Performed by: EMERGENCY MEDICINE

## 2019-01-10 PROCEDURE — 83690 ASSAY OF LIPASE: CPT | Performed by: EMERGENCY MEDICINE

## 2019-01-10 PROCEDURE — 80053 COMPREHEN METABOLIC PANEL: CPT | Performed by: EMERGENCY MEDICINE

## 2019-01-10 PROCEDURE — 84484 ASSAY OF TROPONIN QUANT: CPT

## 2019-01-10 PROCEDURE — 85025 COMPLETE CBC W/AUTO DIFF WBC: CPT | Performed by: EMERGENCY MEDICINE

## 2019-01-10 PROCEDURE — 96360 HYDRATION IV INFUSION INIT: CPT

## 2019-01-10 PROCEDURE — 84484 ASSAY OF TROPONIN QUANT: CPT | Performed by: EMERGENCY MEDICINE

## 2019-01-10 PROCEDURE — 83880 ASSAY OF NATRIURETIC PEPTIDE: CPT | Performed by: EMERGENCY MEDICINE

## 2019-01-10 RX ORDER — ASPIRIN 81 MG/1
324 TABLET, CHEWABLE ORAL ONCE
Status: DISCONTINUED | OUTPATIENT
Start: 2019-01-10 | End: 2019-01-10 | Stop reason: HOSPADM

## 2019-01-10 RX ORDER — SODIUM CHLORIDE 0.9 % (FLUSH) 0.9 %
10 SYRINGE (ML) INJECTION AS NEEDED
Status: DISCONTINUED | OUTPATIENT
Start: 2019-01-10 | End: 2019-01-10 | Stop reason: HOSPADM

## 2019-01-10 RX ADMIN — SODIUM CHLORIDE 1000 ML: 9 INJECTION, SOLUTION INTRAVENOUS at 16:00

## 2019-01-10 NOTE — ED PROVIDER NOTES
"Subjective   Mary Ramirez is an 84 y.o.female who presents to the ED with complaints of left chest pain. The patient reports her pain has been improving since it onset this morning. Prior to her pain onsetting, she states she was moving furniture around the house. She has taken NTG, but she did not experience any relief. She denies any abdominal pain, LE edema, or diarrhea. Additionally, she had a normal stress test performed in September. She has also had a heart catheterization in the past. There are no other complaints at this time.         History provided by:  Patient  Chest Pain   Pain location:  L chest  Pain quality: aching    Pain radiates to:  Does not radiate  Pain severity:  Moderate  Onset quality:  Sudden  Duration:  1 day  Timing:  Constant  Progression:  Improving  Chronicity:  New  Context comment:  Pt was moving furniture around the house prior to her pain  Relieved by:  Nothing  Worsened by:  Nothing  Ineffective treatments:  Nitroglycerin  Associated symptoms: no abdominal pain and no lower extremity edema    Risk factors: not male        Review of Systems   Cardiovascular: Positive for chest pain. Negative for leg swelling.   Gastrointestinal: Negative for abdominal pain and diarrhea.   All other systems reviewed and are negative.      Past Medical History:   Diagnosis Date   • Atrial fibrillation (CMS/HCC)    • CAD (coronary artery disease)    • CHF (congestive heart failure) (CMS/HCC)    • DDD (degenerative disc disease), cervical    • Dementia    • Diabetes mellitus (CMS/HCC)    • Hypertension    • SSS (sick sinus syndrome) (CMS/HCC)        Allergies   Allergen Reactions   • Penicillins Other (See Comments)     Pt states \"i don't know\"       Past Surgical History:   Procedure Laterality Date   • ANKLE SURGERY Left 1996   • ATHRECTOMY ILIAC, FEMORAL, TIBIAL ARTERY     • BREAST LUMPECTOMY Left 1981   • COLON SURGERY     • CORONARY STENT PLACEMENT     • FINGER FRACTURE SURGERY Right    • HIP " TROCANTERIC NAILING WITH INTRAMEDULLARY HIP SCREW Left 2017   • HYSTERECTOMY         Family History   Problem Relation Age of Onset   • No Known Problems Mother    • No Known Problems Father        Social History     Socioeconomic History   • Marital status:      Spouse name: N/A   • Number of children: 2   • Years of education: H.S   • Highest education level: Not on file   Occupational History   • Occupation:      Employer: RETIRED   Tobacco Use   • Smoking status: Former Smoker     Types: Cigarettes     Start date: 1975     Last attempt to quit: 1978     Years since quittin.2   • Smokeless tobacco: Never Used   Substance and Sexual Activity   • Alcohol use: No   • Drug use: No   • Sexual activity: No         Objective   Physical Exam   Constitutional: She is oriented to person, place, and time. She appears well-developed and well-nourished. No distress.   HENT:   Head: Normocephalic and atraumatic.   Nose: Nose normal.   Eyes: Conjunctivae are normal. No scleral icterus.   Neck: Normal range of motion. Neck supple.   Cardiovascular: Normal rate, regular rhythm, normal heart sounds and intact distal pulses.   No murmur heard.  Pulmonary/Chest: Effort normal and breath sounds normal. No respiratory distress. She exhibits tenderness.   Tenderness to left costochondral margin. No crepitus or redness.    Abdominal: Soft. Bowel sounds are normal. There is no tenderness.   Musculoskeletal: Normal range of motion. She exhibits no edema.   Neurological: She is alert and oriented to person, place, and time.   Skin: Skin is warm and dry.   Well healed scar on left lower malleolus.    Psychiatric: She has a normal mood and affect. Her behavior is normal.   Nursing note and vitals reviewed.      Procedures         ED Course     Recent Results (from the past 24 hour(s))   Comprehensive Metabolic Panel    Collection Time: 01/10/19 12:06 PM   Result Value Ref Range    Glucose 204 (H)  70 - 100 mg/dL    BUN 27 (H) 9 - 23 mg/dL    Creatinine 1.39 (H) 0.60 - 1.30 mg/dL    Sodium 135 132 - 146 mmol/L    Potassium 3.8 3.5 - 5.5 mmol/L    Chloride 100 99 - 109 mmol/L    CO2 30.0 20.0 - 31.0 mmol/L    Calcium 9.5 8.7 - 10.4 mg/dL    Total Protein 7.0 5.7 - 8.2 g/dL    Albumin 4.51 3.20 - 4.80 g/dL    ALT (SGPT) 20 7 - 40 U/L    AST (SGOT) 26 0 - 33 U/L    Alkaline Phosphatase 119 (H) 25 - 100 U/L    Total Bilirubin 0.4 0.3 - 1.2 mg/dL    eGFR Non African Amer 36 (L) >60 mL/min/1.73    Globulin 2.5 gm/dL    A/G Ratio 1.8 1.5 - 2.5 g/dL    BUN/Creatinine Ratio 19.4 7.0 - 25.0    Anion Gap 5.0 3.0 - 11.0 mmol/L   Lipase    Collection Time: 01/10/19 12:06 PM   Result Value Ref Range    Lipase 43 6 - 51 U/L   BNP    Collection Time: 01/10/19 12:06 PM   Result Value Ref Range    BNP 82.0 0.0 - 100.0 pg/mL   Light Blue Top    Collection Time: 01/10/19 12:06 PM   Result Value Ref Range    Extra Tube hold for add-on    Green Top (Gel)    Collection Time: 01/10/19 12:06 PM   Result Value Ref Range    Extra Tube Hold for add-ons.    Lavender Top    Collection Time: 01/10/19 12:06 PM   Result Value Ref Range    Extra Tube hold for add-on    Gold Top - SST    Collection Time: 01/10/19 12:06 PM   Result Value Ref Range    Extra Tube Hold for add-ons.    CBC Auto Differential    Collection Time: 01/10/19 12:06 PM   Result Value Ref Range    WBC 6.10 3.50 - 10.80 10*3/mm3    RBC 4.90 3.89 - 5.14 10*6/mm3    Hemoglobin 12.6 11.5 - 15.5 g/dL    Hematocrit 41.4 34.5 - 44.0 %    MCV 84.5 80.0 - 99.0 fL    MCH 25.7 (L) 27.0 - 31.0 pg    MCHC 30.4 (L) 32.0 - 36.0 g/dL    RDW 16.7 (H) 11.3 - 14.5 %    RDW-SD 51.6 37.0 - 54.0 fl    MPV 11.8 6.0 - 12.0 fL    Platelets 214 150 - 450 10*3/mm3    Neutrophil % 67.6 41.0 - 71.0 %    Lymphocyte % 20.3 (L) 24.0 - 44.0 %    Monocyte % 9.8 0.0 - 12.0 %    Eosinophil % 1.8 0.0 - 3.0 %    Basophil % 0.5 0.0 - 1.0 %    Immature Grans % 0.3 0.0 - 0.6 %    Neutrophils, Absolute 4.12 1.50 -  8.30 10*3/mm3    Lymphocytes, Absolute 1.24 0.60 - 4.80 10*3/mm3    Monocytes, Absolute 0.60 0.00 - 1.00 10*3/mm3    Eosinophils, Absolute 0.11 0.00 - 0.30 10*3/mm3    Basophils, Absolute 0.03 0.00 - 0.20 10*3/mm3    Immature Grans, Absolute 0.02 0.00 - 0.03 10*3/mm3   POC Troponin, Rapid    Collection Time: 01/10/19 12:09 PM   Result Value Ref Range    Troponin I 0.00 0.00 - 0.07 ng/mL   Troponin    Collection Time: 01/10/19  2:36 PM   Result Value Ref Range    Troponin I 0.008 <=0.039 ng/mL     Note: In addition to lab results from this visit, the labs listed above may include labs taken at another facility or during a different encounter within the last 24 hours. Please correlate lab times with ED admission and discharge times for further clarification of the services performed during this visit.    XR Chest 1 View   Final Result   No significant interval change in right lower lobe loculated   effusion component with adjacent opacifications of atelectasis versus   airspace disease from prior comparison  09/01/2018.        D:  01/10/2019   E:  01/10/2019       This report was finalized on 1/10/2019 3:41 PM by Dr. Cj Obando.            Vitals:    01/10/19 1445 01/10/19 1545 01/10/19 1600 01/10/19 1645   BP: 172/81 167/79 164/82 (!) 185/86   Pulse: 62 61 65 59   Resp:       Temp:       TempSrc:       SpO2: 96% 95% 97% 97%   Weight:       Height:         Medications   sodium chloride 0.9 % flush 10 mL (not administered)   aspirin chewable tablet 324 mg (324 mg Oral Not Given 1/10/19 1136)   sodium chloride 0.9 % bolus 1,000 mL (1,000 mL Intravenous New Bag 1/10/19 1600)     ECG/EMG Results (last 24 hours)     Procedure Component Value Units Date/Time    ECG 12 Lead [[id number]] Collected:  01/10/19 1135     Updated:  01/10/19 1138                    Recent Results (from the past 24 hour(s))   Comprehensive Metabolic Panel    Collection Time: 01/10/19 12:06 PM   Result Value Ref Range    Glucose 204 (H) 70 - 100  mg/dL    BUN 27 (H) 9 - 23 mg/dL    Creatinine 1.39 (H) 0.60 - 1.30 mg/dL    Sodium 135 132 - 146 mmol/L    Potassium 3.8 3.5 - 5.5 mmol/L    Chloride 100 99 - 109 mmol/L    CO2 30.0 20.0 - 31.0 mmol/L    Calcium 9.5 8.7 - 10.4 mg/dL    Total Protein 7.0 5.7 - 8.2 g/dL    Albumin 4.51 3.20 - 4.80 g/dL    ALT (SGPT) 20 7 - 40 U/L    AST (SGOT) 26 0 - 33 U/L    Alkaline Phosphatase 119 (H) 25 - 100 U/L    Total Bilirubin 0.4 0.3 - 1.2 mg/dL    eGFR Non African Amer 36 (L) >60 mL/min/1.73    Globulin 2.5 gm/dL    A/G Ratio 1.8 1.5 - 2.5 g/dL    BUN/Creatinine Ratio 19.4 7.0 - 25.0    Anion Gap 5.0 3.0 - 11.0 mmol/L   Lipase    Collection Time: 01/10/19 12:06 PM   Result Value Ref Range    Lipase 43 6 - 51 U/L   BNP    Collection Time: 01/10/19 12:06 PM   Result Value Ref Range    BNP 82.0 0.0 - 100.0 pg/mL   Light Blue Top    Collection Time: 01/10/19 12:06 PM   Result Value Ref Range    Extra Tube hold for add-on    Green Top (Gel)    Collection Time: 01/10/19 12:06 PM   Result Value Ref Range    Extra Tube Hold for add-ons.    Lavender Top    Collection Time: 01/10/19 12:06 PM   Result Value Ref Range    Extra Tube hold for add-on    Gold Top - SST    Collection Time: 01/10/19 12:06 PM   Result Value Ref Range    Extra Tube Hold for add-ons.    CBC Auto Differential    Collection Time: 01/10/19 12:06 PM   Result Value Ref Range    WBC 6.10 3.50 - 10.80 10*3/mm3    RBC 4.90 3.89 - 5.14 10*6/mm3    Hemoglobin 12.6 11.5 - 15.5 g/dL    Hematocrit 41.4 34.5 - 44.0 %    MCV 84.5 80.0 - 99.0 fL    MCH 25.7 (L) 27.0 - 31.0 pg    MCHC 30.4 (L) 32.0 - 36.0 g/dL    RDW 16.7 (H) 11.3 - 14.5 %    RDW-SD 51.6 37.0 - 54.0 fl    MPV 11.8 6.0 - 12.0 fL    Platelets 214 150 - 450 10*3/mm3    Neutrophil % 67.6 41.0 - 71.0 %    Lymphocyte % 20.3 (L) 24.0 - 44.0 %    Monocyte % 9.8 0.0 - 12.0 %    Eosinophil % 1.8 0.0 - 3.0 %    Basophil % 0.5 0.0 - 1.0 %    Immature Grans % 0.3 0.0 - 0.6 %    Neutrophils, Absolute 4.12 1.50 - 8.30  10*3/mm3    Lymphocytes, Absolute 1.24 0.60 - 4.80 10*3/mm3    Monocytes, Absolute 0.60 0.00 - 1.00 10*3/mm3    Eosinophils, Absolute 0.11 0.00 - 0.30 10*3/mm3    Basophils, Absolute 0.03 0.00 - 0.20 10*3/mm3    Immature Grans, Absolute 0.02 0.00 - 0.03 10*3/mm3   POC Troponin, Rapid    Collection Time: 01/10/19 12:09 PM   Result Value Ref Range    Troponin I 0.00 0.00 - 0.07 ng/mL   Troponin    Collection Time: 01/10/19  2:36 PM   Result Value Ref Range    Troponin I 0.008 <=0.039 ng/mL     Note: In addition to lab results from this visit, the labs listed above may include labs taken at another facility or during a different encounter within the last 24 hours. Please correlate lab times with ED admission and discharge times for further clarification of the services performed during this visit.    XR Chest 1 View   Final Result   No significant interval change in right lower lobe loculated   effusion component with adjacent opacifications of atelectasis versus   airspace disease from prior comparison  09/01/2018.        D:  01/10/2019   E:  01/10/2019       This report was finalized on 1/10/2019 3:41 PM by Dr. Cj Obando.            Vitals:    01/10/19 1445 01/10/19 1545 01/10/19 1600 01/10/19 1645   BP: 172/81 167/79 164/82 (!) 185/86   Pulse: 62 61 65 59   Resp:       Temp:       TempSrc:       SpO2: 96% 95% 97% 97%   Weight:       Height:         Medications   sodium chloride 0.9 % flush 10 mL (not administered)   aspirin chewable tablet 324 mg (324 mg Oral Not Given 1/10/19 1136)   sodium chloride 0.9 % bolus 1,000 mL (1,000 mL Intravenous New Bag 1/10/19 1600)     ECG/EMG Results (last 24 hours)     Procedure Component Value Units Date/Time    ECG 12 Lead [[id number]] Collected:  01/10/19 1135     Updated:  01/10/19 1404    ECG 12 Lead [[id number]] Collected:  01/10/19 1416     Updated:  01/10/19 1519          MDM  Number of Diagnoses or Management Options  Chest wall pain: new and requires workup  Elevated  creatine kinase: new and requires workup     Amount and/or Complexity of Data Reviewed  Clinical lab tests: reviewed and ordered  Tests in the radiology section of CPT®: reviewed and ordered  Tests in the medicine section of CPT®: ordered and reviewed  Decide to obtain previous medical records or to obtain history from someone other than the patient: yes  Discuss the patient with other providers: yes    Patient Progress  Patient progress: stable      Final diagnoses:   Chest wall pain   Elevated creatine kinase       Documentation assistance provided by drew Meyer.  Information recorded by the drew was done at my direction and has been verified and validated by me.     Juanjo Meyer  01/10/19 2629       Ta Rose PA  01/11/19 3871

## 2019-01-12 ENCOUNTER — LAB REQUISITION (OUTPATIENT)
Dept: LAB | Facility: HOSPITAL | Age: 84
End: 2019-01-12

## 2019-01-12 DIAGNOSIS — Z00.00 ROUTINE GENERAL MEDICAL EXAMINATION AT A HEALTH CARE FACILITY: ICD-10-CM

## 2019-01-12 LAB
CREAT BLD-MCNC: 1.36 MG/DL (ref 0.6–1.3)
GFR SERPL CREATININE-BSD FRML MDRD: 37 ML/MIN/1.73

## 2019-01-12 PROCEDURE — 36415 COLL VENOUS BLD VENIPUNCTURE: CPT

## 2019-01-12 PROCEDURE — 82565 ASSAY OF CREATININE: CPT

## 2019-01-15 ENCOUNTER — OFFICE VISIT (OUTPATIENT)
Dept: INTERNAL MEDICINE | Facility: CLINIC | Age: 84
End: 2019-01-15

## 2019-01-15 ENCOUNTER — LAB (OUTPATIENT)
Dept: LAB | Facility: HOSPITAL | Age: 84
End: 2019-01-15

## 2019-01-15 VITALS
SYSTOLIC BLOOD PRESSURE: 140 MMHG | HEART RATE: 72 BPM | DIASTOLIC BLOOD PRESSURE: 70 MMHG | BODY MASS INDEX: 21.52 KG/M2 | HEIGHT: 61 IN | TEMPERATURE: 97.2 F | WEIGHT: 114 LBS

## 2019-01-15 DIAGNOSIS — I50.32 CHRONIC DIASTOLIC CONGESTIVE HEART FAILURE (HCC): ICD-10-CM

## 2019-01-15 DIAGNOSIS — Z79.4 TYPE 2 DIABETES MELLITUS WITH DIABETIC POLYNEUROPATHY, WITH LONG-TERM CURRENT USE OF INSULIN (HCC): Primary | ICD-10-CM

## 2019-01-15 DIAGNOSIS — I25.119 CORONARY ARTERY DISEASE INVOLVING NATIVE CORONARY ARTERY OF NATIVE HEART WITH ANGINA PECTORIS (HCC): ICD-10-CM

## 2019-01-15 DIAGNOSIS — F32.0 MAJOR DEPRESSIVE DISORDER, SINGLE EPISODE, MILD (HCC): ICD-10-CM

## 2019-01-15 DIAGNOSIS — Z79.4 TYPE 2 DIABETES MELLITUS WITH DIABETIC POLYNEUROPATHY, WITH LONG-TERM CURRENT USE OF INSULIN (HCC): ICD-10-CM

## 2019-01-15 DIAGNOSIS — E11.42 TYPE 2 DIABETES MELLITUS WITH DIABETIC POLYNEUROPATHY, WITH LONG-TERM CURRENT USE OF INSULIN (HCC): ICD-10-CM

## 2019-01-15 DIAGNOSIS — I10 ESSENTIAL HYPERTENSION: ICD-10-CM

## 2019-01-15 DIAGNOSIS — E11.42 TYPE 2 DIABETES MELLITUS WITH DIABETIC POLYNEUROPATHY, WITH LONG-TERM CURRENT USE OF INSULIN (HCC): Primary | ICD-10-CM

## 2019-01-15 DIAGNOSIS — I48.0 PAROXYSMAL ATRIAL FIBRILLATION (HCC): ICD-10-CM

## 2019-01-15 LAB
ARTICHOKE IGE QN: 164 MG/DL (ref 0–130)
CHOLEST SERPL-MCNC: 252 MG/DL (ref 0–200)
HBA1C MFR BLD: 7.6 % (ref 4.8–5.6)
HDLC SERPL-MCNC: 67 MG/DL (ref 40–60)
TRIGL SERPL-MCNC: 202 MG/DL (ref 0–150)

## 2019-01-15 PROCEDURE — 80061 LIPID PANEL: CPT

## 2019-01-15 PROCEDURE — 36415 COLL VENOUS BLD VENIPUNCTURE: CPT

## 2019-01-15 PROCEDURE — 83036 HEMOGLOBIN GLYCOSYLATED A1C: CPT

## 2019-01-15 PROCEDURE — 99214 OFFICE O/P EST MOD 30 MIN: CPT | Performed by: INTERNAL MEDICINE

## 2019-01-15 RX ORDER — ACETAMINOPHEN 500 MG
500 TABLET ORAL EVERY 6 HOURS PRN
COMMUNITY

## 2019-01-15 RX ORDER — ACETAMINOPHEN 500 MG
500 TABLET ORAL EVERY 6 HOURS PRN
COMMUNITY
End: 2019-03-05 | Stop reason: HOSPADM

## 2019-01-15 RX ORDER — LORATADINE 10 MG/1
10 TABLET ORAL DAILY
COMMUNITY
End: 2019-01-15

## 2019-01-15 RX ORDER — CLONIDINE HYDROCHLORIDE 0.1 MG/1
0.1 TABLET ORAL 2 TIMES DAILY
COMMUNITY
End: 2019-01-31 | Stop reason: SDUPTHER

## 2019-01-15 RX ORDER — NITROGLYCERIN 400 UG/1
1 SPRAY ORAL
COMMUNITY
End: 2019-03-29

## 2019-01-15 NOTE — PATIENT INSTRUCTIONS
Continue current medications.  Don't forget to have the late night snack so your blood sugar doesn't drop.

## 2019-01-31 ENCOUNTER — TELEPHONE (OUTPATIENT)
Dept: INTERNAL MEDICINE | Facility: CLINIC | Age: 84
End: 2019-01-31

## 2019-01-31 RX ORDER — CLONIDINE HYDROCHLORIDE 0.1 MG/1
0.1 TABLET ORAL 2 TIMES DAILY
Qty: 60 TABLET | Refills: 5 | Status: SHIPPED | OUTPATIENT
Start: 2019-01-31 | End: 2019-01-31 | Stop reason: SDUPTHER

## 2019-01-31 RX ORDER — CLONIDINE HYDROCHLORIDE 0.1 MG/1
0.1 TABLET ORAL 2 TIMES DAILY
Qty: 60 TABLET | Refills: 5 | Status: SHIPPED | OUTPATIENT
Start: 2019-01-31 | End: 2019-03-05 | Stop reason: HOSPADM

## 2019-01-31 NOTE — TELEPHONE ENCOUNTER
Reason for Call: VERIFY MED    Symptoms:     Onset (when it began):    Have they tried anything?    Other pertinent info: [facility] CALLED TO VERIFY AND GET DIRECTIONS ON HOW TO TAKE MEDS  FOR [facility].      CLONIDINE .01MG

## 2019-01-31 NOTE — TELEPHONE ENCOUNTER
See message below. This med is not in the active med list in [facility] and it doesn't look like we have sent it in the [facility] system.

## 2019-01-31 NOTE — TELEPHONE ENCOUNTER
Rec'd request for med refill, but we do not have a pharmacy listed. Called [facility] [phone] and spoke to Catina. They use [facility] in [place]. Added to chart. Dr. Lundberg notified

## 2019-02-04 ENCOUNTER — TELEPHONE (OUTPATIENT)
Dept: INTERNAL MEDICINE | Facility: CLINIC | Age: 84
End: 2019-02-04

## 2019-02-04 NOTE — TELEPHONE ENCOUNTER
I believe I sent it to pharmacy.  Please find out what her blood pressures have been without clonidine.

## 2019-02-04 NOTE — TELEPHONE ENCOUNTER
THEY HAVE NOT BEEN GIVING HER KLONODINE, BUT THEY JUST NOTICED IT WAS ON HER MED LIST.  THEY WANT TO MAKE SURE SHE IS SUPPOSED TO BE TAKING IT.

## 2019-02-05 ENCOUNTER — TELEPHONE (OUTPATIENT)
Dept: INTERNAL MEDICINE | Facility: CLINIC | Age: 84
End: 2019-02-05

## 2019-02-05 NOTE — TELEPHONE ENCOUNTER
Need to clarify an order that was faxed yesterday says Clonadine .01 mg 1 by mouth 2 times a day - then says 2 tablets daily.    BP is very high in AM was 215 over something today. (her exact words).

## 2019-02-05 NOTE — TELEPHONE ENCOUNTER
Spoke to Kelsey, nurse with her now. B/P running 199/79, 213/83, 215/85. She is currently taking amiodarone 200 mg qd, diltiazem 120mg qd and isosorbide 60 mg qd. She is not taking clonidine. Clarification needed for clonidine if she is to take it - 2 qd or 1 bid

## 2019-02-13 ENCOUNTER — TELEPHONE (OUTPATIENT)
Dept: CARDIOLOGY | Facility: CLINIC | Age: 84
End: 2019-02-13

## 2019-02-13 NOTE — TELEPHONE ENCOUNTER
Mary -nurse from the [facility] called and requested appt for elevated b/p readings. B/P reading reported were in the am before her B/P medications.B/P log  2/13/19 201/67 -54  2/12/19  173/77 -60  2/11/19  162/70- 68  2/10/19  148/73 -55  2/9/19  202/79 -72  2/8/19  170/72 -60   Discussed with  in clinic. Instructed her to keep b/p log for the next week and fax to our office. Told her to ck b/p one hour after she had her b/p medications. She verbalized understanding. Fax number provided.

## 2019-02-25 NOTE — TELEPHONE ENCOUNTER
New log received and reviewed by Dr. Flores.  Clonidine increased to 0.2mg BID.  Nurse at the [facility] notified.  BP log to be scanned.

## 2019-03-03 ENCOUNTER — APPOINTMENT (OUTPATIENT)
Dept: GENERAL RADIOLOGY | Facility: HOSPITAL | Age: 84
End: 2019-03-03

## 2019-03-03 ENCOUNTER — HOSPITAL ENCOUNTER (OUTPATIENT)
Facility: HOSPITAL | Age: 84
Setting detail: OBSERVATION
Discharge: HOME | End: 2019-03-05
Attending: EMERGENCY MEDICINE | Admitting: EMERGENCY MEDICINE

## 2019-03-03 DIAGNOSIS — I16.0 HYPERTENSIVE URGENCY: Primary | ICD-10-CM

## 2019-03-03 DIAGNOSIS — I48.20 CHRONIC ATRIAL FIBRILLATION (HCC): ICD-10-CM

## 2019-03-03 DIAGNOSIS — Z79.4 TYPE 2 DIABETES MELLITUS WITHOUT COMPLICATION, WITH LONG-TERM CURRENT USE OF INSULIN (HCC): ICD-10-CM

## 2019-03-03 DIAGNOSIS — Z86.79 HISTORY OF CHF (CONGESTIVE HEART FAILURE): ICD-10-CM

## 2019-03-03 DIAGNOSIS — R00.1 BRADYCARDIA: ICD-10-CM

## 2019-03-03 DIAGNOSIS — Z79.01 CHRONIC ANTICOAGULATION: ICD-10-CM

## 2019-03-03 DIAGNOSIS — N18.9 CHRONIC KIDNEY DISEASE, UNSPECIFIED CKD STAGE: ICD-10-CM

## 2019-03-03 DIAGNOSIS — Z86.79 HISTORY OF HYPERTENSION: ICD-10-CM

## 2019-03-03 DIAGNOSIS — E11.9 TYPE 2 DIABETES MELLITUS WITHOUT COMPLICATION, WITH LONG-TERM CURRENT USE OF INSULIN (HCC): ICD-10-CM

## 2019-03-03 DIAGNOSIS — R07.89 ATYPICAL CHEST PAIN: ICD-10-CM

## 2019-03-03 LAB
ALBUMIN SERPL-MCNC: 4.27 G/DL (ref 3.2–4.8)
ALBUMIN/GLOB SERPL: 1.8 G/DL (ref 1.5–2.5)
ALP SERPL-CCNC: 116 U/L (ref 25–100)
ALT SERPL W P-5'-P-CCNC: 22 U/L (ref 7–40)
ANION GAP SERPL CALCULATED.3IONS-SCNC: 8 MMOL/L (ref 3–11)
AST SERPL-CCNC: 26 U/L (ref 0–33)
BASOPHILS # BLD AUTO: 0.05 10*3/MM3 (ref 0–0.2)
BASOPHILS NFR BLD AUTO: 0.8 % (ref 0–1)
BILIRUB SERPL-MCNC: 0.3 MG/DL (ref 0.3–1.2)
BNP SERPL-MCNC: 123 PG/ML (ref 0–100)
BUN BLD-MCNC: 37 MG/DL (ref 9–23)
BUN/CREAT SERPL: 24.2 (ref 7–25)
CALCIUM SPEC-SCNC: 9.4 MG/DL (ref 8.7–10.4)
CHLORIDE SERPL-SCNC: 96 MMOL/L (ref 99–109)
CO2 SERPL-SCNC: 28 MMOL/L (ref 20–31)
CREAT BLD-MCNC: 1.53 MG/DL (ref 0.6–1.3)
DEPRECATED RDW RBC AUTO: 48.9 FL (ref 37–54)
EOSINOPHIL # BLD AUTO: 0.17 10*3/MM3 (ref 0–0.3)
EOSINOPHIL NFR BLD AUTO: 2.6 % (ref 0–3)
ERYTHROCYTE [DISTWIDTH] IN BLOOD BY AUTOMATED COUNT: 16.1 % (ref 11.3–14.5)
GFR SERPL CREATININE-BSD FRML MDRD: 32 ML/MIN/1.73
GLOBULIN UR ELPH-MCNC: 2.4 GM/DL
GLUCOSE BLD-MCNC: 135 MG/DL (ref 70–100)
HCT VFR BLD AUTO: 37.4 % (ref 34.5–44)
HGB BLD-MCNC: 11.8 G/DL (ref 11.5–15.5)
HOLD SPECIMEN: NORMAL
HOLD SPECIMEN: NORMAL
IMM GRANULOCYTES # BLD AUTO: 0.01 10*3/MM3 (ref 0–0.05)
IMM GRANULOCYTES NFR BLD AUTO: 0.2 % (ref 0–0.6)
LIPASE SERPL-CCNC: 41 U/L (ref 6–51)
LYMPHOCYTES # BLD AUTO: 1.45 10*3/MM3 (ref 0.6–4.8)
LYMPHOCYTES NFR BLD AUTO: 22.2 % (ref 24–44)
MCH RBC QN AUTO: 26.2 PG (ref 27–31)
MCHC RBC AUTO-ENTMCNC: 31.6 G/DL (ref 32–36)
MCV RBC AUTO: 82.9 FL (ref 80–99)
MONOCYTES # BLD AUTO: 0.69 10*3/MM3 (ref 0–1)
MONOCYTES NFR BLD AUTO: 10.6 % (ref 0–12)
NEUTROPHILS # BLD AUTO: 4.17 10*3/MM3 (ref 1.5–8.3)
NEUTROPHILS NFR BLD AUTO: 63.6 % (ref 41–71)
PLATELET # BLD AUTO: 285 10*3/MM3 (ref 150–450)
PMV BLD AUTO: 10.5 FL (ref 6–12)
POTASSIUM BLD-SCNC: 3.8 MMOL/L (ref 3.5–5.5)
PROT SERPL-MCNC: 6.7 G/DL (ref 5.7–8.2)
RBC # BLD AUTO: 4.51 10*6/MM3 (ref 3.89–5.14)
SODIUM BLD-SCNC: 132 MMOL/L (ref 132–146)
TROPONIN I SERPL-MCNC: 0 NG/ML (ref 0–0.07)
TROPONIN I SERPL-MCNC: 0 NG/ML (ref 0–0.07)
WBC NRBC COR # BLD: 6.54 10*3/MM3 (ref 3.5–10.8)
WHOLE BLOOD HOLD SPECIMEN: NORMAL
WHOLE BLOOD HOLD SPECIMEN: NORMAL

## 2019-03-03 PROCEDURE — 83880 ASSAY OF NATRIURETIC PEPTIDE: CPT | Performed by: EMERGENCY MEDICINE

## 2019-03-03 PROCEDURE — 80053 COMPREHEN METABOLIC PANEL: CPT | Performed by: EMERGENCY MEDICINE

## 2019-03-03 PROCEDURE — 93005 ELECTROCARDIOGRAM TRACING: CPT | Performed by: PHYSICIAN ASSISTANT

## 2019-03-03 PROCEDURE — 84484 ASSAY OF TROPONIN QUANT: CPT

## 2019-03-03 PROCEDURE — 85025 COMPLETE CBC W/AUTO DIFF WBC: CPT

## 2019-03-03 PROCEDURE — 83690 ASSAY OF LIPASE: CPT | Performed by: EMERGENCY MEDICINE

## 2019-03-03 PROCEDURE — 93005 ELECTROCARDIOGRAM TRACING: CPT | Performed by: EMERGENCY MEDICINE

## 2019-03-03 PROCEDURE — 71045 X-RAY EXAM CHEST 1 VIEW: CPT

## 2019-03-03 PROCEDURE — 99285 EMERGENCY DEPT VISIT HI MDM: CPT

## 2019-03-03 RX ORDER — ASPIRIN 81 MG/1
324 TABLET, CHEWABLE ORAL ONCE
Status: DISCONTINUED | OUTPATIENT
Start: 2019-03-03 | End: 2019-03-03

## 2019-03-03 RX ORDER — AMLODIPINE BESYLATE 10 MG/1
10 TABLET ORAL
Status: DISCONTINUED | OUTPATIENT
Start: 2019-03-03 | End: 2019-03-05 | Stop reason: HOSPADM

## 2019-03-03 RX ORDER — CLONIDINE HYDROCHLORIDE 0.1 MG/1
0.1 TABLET ORAL ONCE
Status: COMPLETED | OUTPATIENT
Start: 2019-03-03 | End: 2019-03-03

## 2019-03-03 RX ORDER — DOCUSATE SODIUM 100 MG/1
100 CAPSULE, LIQUID FILLED ORAL 2 TIMES DAILY
COMMUNITY

## 2019-03-03 RX ORDER — SODIUM CHLORIDE 0.9 % (FLUSH) 0.9 %
10 SYRINGE (ML) INJECTION AS NEEDED
Status: DISCONTINUED | OUTPATIENT
Start: 2019-03-03 | End: 2019-03-05 | Stop reason: HOSPADM

## 2019-03-03 RX ADMIN — CLONIDINE HYDROCHLORIDE 0.1 MG: 0.1 TABLET ORAL at 21:38

## 2019-03-03 RX ADMIN — AMLODIPINE BESYLATE 10 MG: 10 TABLET ORAL at 22:59

## 2019-03-03 RX ADMIN — CLONIDINE HYDROCHLORIDE 0.1 MG: 0.1 TABLET ORAL at 19:28

## 2019-03-04 ENCOUNTER — APPOINTMENT (OUTPATIENT)
Dept: CARDIOLOGY | Facility: HOSPITAL | Age: 84
End: 2019-03-04

## 2019-03-04 PROBLEM — R00.1 BRADYCARDIA: Status: ACTIVE | Noted: 2019-03-04

## 2019-03-04 LAB
ANION GAP SERPL CALCULATED.3IONS-SCNC: 9 MMOL/L (ref 3–11)
BH CV ECHO MEAS - AO MAX PG (FULL): 10.4 MMHG
BH CV ECHO MEAS - AO MAX PG: 12.5 MMHG
BH CV ECHO MEAS - AO MEAN PG (FULL): 5 MMHG
BH CV ECHO MEAS - AO MEAN PG: 6 MMHG
BH CV ECHO MEAS - AO ROOT AREA (BSA CORRECTED): 1.9
BH CV ECHO MEAS - AO ROOT AREA: 6.2 CM^2
BH CV ECHO MEAS - AO ROOT DIAM: 2.8 CM
BH CV ECHO MEAS - AO V2 MAX: 177 CM/SEC
BH CV ECHO MEAS - AO V2 MEAN: 116 CM/SEC
BH CV ECHO MEAS - AO V2 VTI: 47.8 CM
BH CV ECHO MEAS - ASC AORTA: 3 CM
BH CV ECHO MEAS - AVA(I,A): 1.3 CM^2
BH CV ECHO MEAS - AVA(I,D): 1.3 CM^2
BH CV ECHO MEAS - AVA(V,A): 1.2 CM^2
BH CV ECHO MEAS - AVA(V,D): 1.2 CM^2
BH CV ECHO MEAS - BSA(HAYCOCK): 1.4 M^2
BH CV ECHO MEAS - BSA: 1.4 M^2
BH CV ECHO MEAS - BZI_BMI: 20.2 KILOGRAMS/M^2
BH CV ECHO MEAS - BZI_METRIC_HEIGHT: 154.9 CM
BH CV ECHO MEAS - BZI_METRIC_WEIGHT: 48.5 KG
BH CV ECHO MEAS - EDV(CUBED): 39 ML
BH CV ECHO MEAS - EDV(MOD-SP2): 48 ML
BH CV ECHO MEAS - EDV(MOD-SP4): 66 ML
BH CV ECHO MEAS - EDV(TEICH): 47.1 ML
BH CV ECHO MEAS - EF(CUBED): 86.9 %
BH CV ECHO MEAS - EF(MOD-BP): 66 %
BH CV ECHO MEAS - EF(MOD-SP2): 70.8 %
BH CV ECHO MEAS - EF(MOD-SP4): 62.1 %
BH CV ECHO MEAS - EF(TEICH): 81.6 %
BH CV ECHO MEAS - ESV(CUBED): 5.1 ML
BH CV ECHO MEAS - ESV(MOD-SP2): 14 ML
BH CV ECHO MEAS - ESV(MOD-SP4): 25 ML
BH CV ECHO MEAS - ESV(TEICH): 8.6 ML
BH CV ECHO MEAS - FS: 49.3 %
BH CV ECHO MEAS - IVS/LVPW: 0.9
BH CV ECHO MEAS - IVSD: 1.4 CM
BH CV ECHO MEAS - LA DIMENSION: 4.1 CM
BH CV ECHO MEAS - LA/AO: 1.4
BH CV ECHO MEAS - LAD MAJOR: 5.8 CM
BH CV ECHO MEAS - LAT PEAK E' VEL: 6 CM/SEC
BH CV ECHO MEAS - LATERAL E/E' RATIO: 14.7
BH CV ECHO MEAS - LV DIASTOLIC VOL/BSA (35-75): 45.6 ML/M^2
BH CV ECHO MEAS - LV MASS(C)D: 184.3 GRAMS
BH CV ECHO MEAS - LV MASS(C)DI: 127.2 GRAMS/M^2
BH CV ECHO MEAS - LV MAX PG: 2.1 MMHG
BH CV ECHO MEAS - LV MEAN PG: 1 MMHG
BH CV ECHO MEAS - LV SYSTOLIC VOL/BSA (12-30): 17.3 ML/M^2
BH CV ECHO MEAS - LV V1 MAX: 72.9 CM/SEC
BH CV ECHO MEAS - LV V1 MEAN: 51 CM/SEC
BH CV ECHO MEAS - LV V1 VTI: 21.1 CM
BH CV ECHO MEAS - LVIDD: 3.4 CM
BH CV ECHO MEAS - LVIDS: 1.7 CM
BH CV ECHO MEAS - LVLD AP2: 5.9 CM
BH CV ECHO MEAS - LVLD AP4: 6.6 CM
BH CV ECHO MEAS - LVLS AP2: 4.9 CM
BH CV ECHO MEAS - LVLS AP4: 5.5 CM
BH CV ECHO MEAS - LVOT AREA (M): 2.8 CM^2
BH CV ECHO MEAS - LVOT AREA: 2.8 CM^2
BH CV ECHO MEAS - LVOT DIAM: 1.9 CM
BH CV ECHO MEAS - LVPWD: 1.6 CM
BH CV ECHO MEAS - MED PEAK E' VEL: 4.6 CM/SEC
BH CV ECHO MEAS - MEDIAL E/E' RATIO: 19.3
BH CV ECHO MEAS - MR ALIAS VEL: 25.2 CM/SEC
BH CV ECHO MEAS - MR ERO: 0.12 CM^2
BH CV ECHO MEAS - MR FLOW RATE: 57 CM^3/SEC
BH CV ECHO MEAS - MR MAX PG: 94 MMHG
BH CV ECHO MEAS - MR MAX VEL: 486 CM/SEC
BH CV ECHO MEAS - MR PISA RADIUS: 0.6 CM
BH CV ECHO MEAS - MR PISA: 2.3 CM^2
BH CV ECHO MEAS - MV A MAX VEL: 99.7 CM/SEC
BH CV ECHO MEAS - MV AREA (1 DIAM): 7.1 CM^2
BH CV ECHO MEAS - MV DEC TIME: 0.43 SEC
BH CV ECHO MEAS - MV DIAM: 3 CM
BH CV ECHO MEAS - MV E MAX VEL: 88.8 CM/SEC
BH CV ECHO MEAS - MV E/A: 0.89
BH CV ECHO MEAS - MV FLOW AREA(1DIAM): 7.1 CM^2
BH CV ECHO MEAS - MV MAX PG: 6.3 MMHG
BH CV ECHO MEAS - MV MEAN PG: 2 MMHG
BH CV ECHO MEAS - MV V2 MAX: 125 CM/SEC
BH CV ECHO MEAS - MV V2 MEAN: 61.2 CM/SEC
BH CV ECHO MEAS - MV V2 VTI: 49.3 CM
BH CV ECHO MEAS - MVA(VTI): 1.2 CM^2
BH CV ECHO MEAS - PA ACC SLOPE: 597 CM/SEC^2
BH CV ECHO MEAS - PA ACC TIME: 0.12 SEC
BH CV ECHO MEAS - PA MAX PG: 3.1 MMHG
BH CV ECHO MEAS - PA PR(ACCEL): 24.3 MMHG
BH CV ECHO MEAS - PA V2 MAX: 87.6 CM/SEC
BH CV ECHO MEAS - RAP SYSTOLE: 8 MMHG
BH CV ECHO MEAS - RF(MV,AO)(1 DIAM): 0.16
BH CV ECHO MEAS - RF(MV,LVOT)(1DIAM): 0.83
BH CV ECHO MEAS - RVSP: 34 MMHG
BH CV ECHO MEAS - SI(AO): 203.2 ML/M^2
BH CV ECHO MEAS - SI(CUBED): 23.4 ML/M^2
BH CV ECHO MEAS - SI(LVOT): 41.3 ML/M^2
BH CV ECHO MEAS - SI(MOD-SP2): 23.5 ML/M^2
BH CV ECHO MEAS - SI(MOD-SP4): 28.3 ML/M^2
BH CV ECHO MEAS - SI(MV 1 DIAM): 240.6 ML/M^2
BH CV ECHO MEAS - SI(TEICH): 26.6 ML/M^2
BH CV ECHO MEAS - SV(AO): 294.3 ML
BH CV ECHO MEAS - SV(CUBED): 33.9 ML
BH CV ECHO MEAS - SV(LVOT): 59.8 ML
BH CV ECHO MEAS - SV(MOD-SP2): 34 ML
BH CV ECHO MEAS - SV(MOD-SP4): 41 ML
BH CV ECHO MEAS - SV(MV 1 DIAM): 348.5 ML
BH CV ECHO MEAS - SV(TEICH): 38.5 ML
BH CV ECHO MEAS - TAPSE (>1.6): 2.3 CM2
BH CV ECHO MEAS - TR MAX PG: 26 MMHG
BH CV ECHO MEAS - TR MAX VEL: 254.2 CM/SEC
BH CV ECHO MEAS - TV MAX PG: 0.71 MMHG
BH CV ECHO MEAS - TV V2 MAX: 42 CM/SEC
BH CV ECHO MEASUREMENTS AVERAGE E/E' RATIO: 16.75
BH CV XLRA - RV BASE: 2.5 CM
BH CV XLRA - RV LENGTH: 5.4 CM
BH CV XLRA - RV MID: 2.2 CM
BH CV XLRA - TDI S': 17.2 CM/SEC
BUN BLD-MCNC: 27 MG/DL (ref 9–23)
BUN/CREAT SERPL: 22.5 (ref 7–25)
CALCIUM SPEC-SCNC: 9.3 MG/DL (ref 8.7–10.4)
CHLORIDE SERPL-SCNC: 99 MMOL/L (ref 99–109)
CO2 SERPL-SCNC: 27 MMOL/L (ref 20–31)
CREAT BLD-MCNC: 1.2 MG/DL (ref 0.6–1.3)
DEPRECATED RDW RBC AUTO: 49.7 FL (ref 37–54)
ERYTHROCYTE [DISTWIDTH] IN BLOOD BY AUTOMATED COUNT: 16.2 % (ref 11.3–14.5)
GFR SERPL CREATININE-BSD FRML MDRD: 43 ML/MIN/1.73
GLUCOSE BLD-MCNC: 206 MG/DL (ref 70–100)
GLUCOSE BLDC GLUCOMTR-MCNC: 105 MG/DL (ref 70–130)
GLUCOSE BLDC GLUCOMTR-MCNC: 159 MG/DL (ref 70–130)
GLUCOSE BLDC GLUCOMTR-MCNC: 191 MG/DL (ref 70–130)
GLUCOSE BLDC GLUCOMTR-MCNC: 216 MG/DL (ref 70–130)
HCT VFR BLD AUTO: 40.1 % (ref 34.5–44)
HGB BLD-MCNC: 12.2 G/DL (ref 11.5–15.5)
MCH RBC QN AUTO: 25.6 PG (ref 27–31)
MCHC RBC AUTO-ENTMCNC: 30.4 G/DL (ref 32–36)
MCV RBC AUTO: 84.2 FL (ref 80–99)
PLATELET # BLD AUTO: 295 10*3/MM3 (ref 150–450)
PMV BLD AUTO: 11.6 FL (ref 6–12)
POTASSIUM BLD-SCNC: 3.4 MMOL/L (ref 3.5–5.5)
POTASSIUM BLD-SCNC: 4.4 MMOL/L (ref 3.5–5.5)
RBC # BLD AUTO: 4.76 10*6/MM3 (ref 3.89–5.14)
SODIUM BLD-SCNC: 135 MMOL/L (ref 132–146)
TROPONIN I SERPL-MCNC: 0.01 NG/ML
TSH SERPL DL<=0.05 MIU/L-ACNC: 2.96 MIU/ML (ref 0.35–5.35)
WBC NRBC COR # BLD: 7.94 10*3/MM3 (ref 3.5–10.8)

## 2019-03-04 PROCEDURE — 99220 PR INITIAL OBSERVATION CARE/DAY 70 MINUTES: CPT | Performed by: INTERNAL MEDICINE

## 2019-03-04 PROCEDURE — G0378 HOSPITAL OBSERVATION PER HR: HCPCS

## 2019-03-04 PROCEDURE — 80048 BASIC METABOLIC PNL TOTAL CA: CPT | Performed by: PHYSICIAN ASSISTANT

## 2019-03-04 PROCEDURE — 25010000002 HYDRALAZINE PER 20 MG: Performed by: PHYSICIAN ASSISTANT

## 2019-03-04 PROCEDURE — 84443 ASSAY THYROID STIM HORMONE: CPT | Performed by: PHYSICIAN ASSISTANT

## 2019-03-04 PROCEDURE — 93306 TTE W/DOPPLER COMPLETE: CPT

## 2019-03-04 PROCEDURE — 82962 GLUCOSE BLOOD TEST: CPT

## 2019-03-04 PROCEDURE — 84132 ASSAY OF SERUM POTASSIUM: CPT | Performed by: INTERNAL MEDICINE

## 2019-03-04 PROCEDURE — 63710000001 INSULIN LISPRO (HUMAN) PER 5 UNITS: Performed by: PHYSICIAN ASSISTANT

## 2019-03-04 PROCEDURE — 85027 COMPLETE CBC AUTOMATED: CPT | Performed by: PHYSICIAN ASSISTANT

## 2019-03-04 PROCEDURE — 84484 ASSAY OF TROPONIN QUANT: CPT | Performed by: PHYSICIAN ASSISTANT

## 2019-03-04 PROCEDURE — 96374 THER/PROPH/DIAG INJ IV PUSH: CPT

## 2019-03-04 PROCEDURE — 99214 OFFICE O/P EST MOD 30 MIN: CPT | Performed by: INTERNAL MEDICINE

## 2019-03-04 PROCEDURE — 93306 TTE W/DOPPLER COMPLETE: CPT | Performed by: INTERNAL MEDICINE

## 2019-03-04 RX ORDER — CLONIDINE HYDROCHLORIDE 0.1 MG/1
0.1 TABLET ORAL 2 TIMES DAILY
Status: DISCONTINUED | OUTPATIENT
Start: 2019-03-04 | End: 2019-03-04

## 2019-03-04 RX ORDER — ACETAMINOPHEN 325 MG/1
650 TABLET ORAL EVERY 4 HOURS PRN
Status: DISCONTINUED | OUTPATIENT
Start: 2019-03-04 | End: 2019-03-05 | Stop reason: HOSPADM

## 2019-03-04 RX ORDER — ASPIRIN 81 MG/1
81 TABLET, CHEWABLE ORAL DAILY
Status: DISCONTINUED | OUTPATIENT
Start: 2019-03-04 | End: 2019-03-05 | Stop reason: HOSPADM

## 2019-03-04 RX ORDER — DONEPEZIL HYDROCHLORIDE 5 MG/1
5 TABLET, FILM COATED ORAL NIGHTLY
Status: DISCONTINUED | OUTPATIENT
Start: 2019-03-04 | End: 2019-03-05 | Stop reason: HOSPADM

## 2019-03-04 RX ORDER — SODIUM CHLORIDE 0.9 % (FLUSH) 0.9 %
3 SYRINGE (ML) INJECTION EVERY 12 HOURS SCHEDULED
Status: DISCONTINUED | OUTPATIENT
Start: 2019-03-04 | End: 2019-03-05 | Stop reason: HOSPADM

## 2019-03-04 RX ORDER — DOCUSATE SODIUM 100 MG/1
100 CAPSULE, LIQUID FILLED ORAL 2 TIMES DAILY PRN
Status: DISCONTINUED | OUTPATIENT
Start: 2019-03-04 | End: 2019-03-05 | Stop reason: HOSPADM

## 2019-03-04 RX ORDER — HYDRALAZINE HYDROCHLORIDE 20 MG/ML
10 INJECTION INTRAMUSCULAR; INTRAVENOUS ONCE
Status: COMPLETED | OUTPATIENT
Start: 2019-03-04 | End: 2019-03-04

## 2019-03-04 RX ORDER — ISOSORBIDE MONONITRATE 60 MG/1
60 TABLET, EXTENDED RELEASE ORAL DAILY
Status: DISCONTINUED | OUTPATIENT
Start: 2019-03-04 | End: 2019-03-05 | Stop reason: HOSPADM

## 2019-03-04 RX ORDER — DILTIAZEM HYDROCHLORIDE 120 MG/1
120 CAPSULE, COATED, EXTENDED RELEASE ORAL
Status: DISCONTINUED | OUTPATIENT
Start: 2019-03-04 | End: 2019-03-04

## 2019-03-04 RX ORDER — HYDRALAZINE HYDROCHLORIDE 25 MG/1
25 TABLET, FILM COATED ORAL EVERY 8 HOURS SCHEDULED
Status: DISCONTINUED | OUTPATIENT
Start: 2019-03-04 | End: 2019-03-05 | Stop reason: HOSPADM

## 2019-03-04 RX ORDER — DEXTROSE MONOHYDRATE 25 G/50ML
25 INJECTION, SOLUTION INTRAVENOUS
Status: DISCONTINUED | OUTPATIENT
Start: 2019-03-04 | End: 2019-03-05 | Stop reason: HOSPADM

## 2019-03-04 RX ORDER — SODIUM CHLORIDE 0.9 % (FLUSH) 0.9 %
3-10 SYRINGE (ML) INJECTION AS NEEDED
Status: DISCONTINUED | OUTPATIENT
Start: 2019-03-04 | End: 2019-03-05 | Stop reason: HOSPADM

## 2019-03-04 RX ORDER — NICOTINE POLACRILEX 4 MG
15 LOZENGE BUCCAL
Status: DISCONTINUED | OUTPATIENT
Start: 2019-03-04 | End: 2019-03-05 | Stop reason: HOSPADM

## 2019-03-04 RX ORDER — SODIUM CHLORIDE 9 MG/ML
50 INJECTION, SOLUTION INTRAVENOUS CONTINUOUS
Status: DISCONTINUED | OUTPATIENT
Start: 2019-03-04 | End: 2019-03-04

## 2019-03-04 RX ORDER — CETIRIZINE HYDROCHLORIDE 10 MG/1
5 TABLET ORAL DAILY
Status: DISCONTINUED | OUTPATIENT
Start: 2019-03-04 | End: 2019-03-05 | Stop reason: HOSPADM

## 2019-03-04 RX ORDER — CLONIDINE HYDROCHLORIDE 0.2 MG/1
0.2 TABLET ORAL 2 TIMES DAILY
Status: DISCONTINUED | OUTPATIENT
Start: 2019-03-04 | End: 2019-03-05 | Stop reason: HOSPADM

## 2019-03-04 RX ORDER — AMIODARONE HYDROCHLORIDE 200 MG/1
200 TABLET ORAL DAILY
Status: DISCONTINUED | OUTPATIENT
Start: 2019-03-04 | End: 2019-03-05 | Stop reason: HOSPADM

## 2019-03-04 RX ORDER — POTASSIUM CHLORIDE 750 MG/1
40 CAPSULE, EXTENDED RELEASE ORAL ONCE
Status: COMPLETED | OUTPATIENT
Start: 2019-03-04 | End: 2019-03-04

## 2019-03-04 RX ORDER — CITALOPRAM 20 MG/1
20 TABLET ORAL DAILY
Status: DISCONTINUED | OUTPATIENT
Start: 2019-03-04 | End: 2019-03-05 | Stop reason: HOSPADM

## 2019-03-04 RX ORDER — HYDRALAZINE HYDROCHLORIDE 50 MG/1
50 TABLET, FILM COATED ORAL EVERY 8 HOURS SCHEDULED
Status: DISCONTINUED | OUTPATIENT
Start: 2019-03-04 | End: 2019-03-04

## 2019-03-04 RX ADMIN — INSULIN LISPRO 2 UNITS: 100 INJECTION, SOLUTION INTRAVENOUS; SUBCUTANEOUS at 08:31

## 2019-03-04 RX ADMIN — AMLODIPINE BESYLATE 10 MG: 10 TABLET ORAL at 22:03

## 2019-03-04 RX ADMIN — POTASSIUM CHLORIDE 40 MEQ: 750 CAPSULE, EXTENDED RELEASE ORAL at 08:32

## 2019-03-04 RX ADMIN — INSULIN LISPRO 2 UNITS: 100 INJECTION, SOLUTION INTRAVENOUS; SUBCUTANEOUS at 12:40

## 2019-03-04 RX ADMIN — CETIRIZINE HYDROCHLORIDE 5 MG: 10 TABLET, FILM COATED ORAL at 08:32

## 2019-03-04 RX ADMIN — INSULIN LISPRO 3 UNITS: 100 INJECTION, SOLUTION INTRAVENOUS; SUBCUTANEOUS at 17:30

## 2019-03-04 RX ADMIN — AMIODARONE HYDROCHLORIDE 200 MG: 200 TABLET ORAL at 08:33

## 2019-03-04 RX ADMIN — ASPIRIN 81 MG CHEWABLE TABLET 81 MG: 81 TABLET CHEWABLE at 08:32

## 2019-03-04 RX ADMIN — POLYETHYLENE GLYCOL 3350 17 G: 17 POWDER, FOR SOLUTION ORAL at 08:32

## 2019-03-04 RX ADMIN — APIXABAN 2.5 MG: 2.5 TABLET, FILM COATED ORAL at 22:04

## 2019-03-04 RX ADMIN — SODIUM CHLORIDE, PRESERVATIVE FREE 3 ML: 5 INJECTION INTRAVENOUS at 22:03

## 2019-03-04 RX ADMIN — HYDRALAZINE HYDROCHLORIDE 25 MG: 25 TABLET ORAL at 22:04

## 2019-03-04 RX ADMIN — APIXABAN 2.5 MG: 2.5 TABLET, FILM COATED ORAL at 08:32

## 2019-03-04 RX ADMIN — ISOSORBIDE MONONITRATE 60 MG: 60 TABLET, EXTENDED RELEASE ORAL at 08:32

## 2019-03-04 RX ADMIN — CITALOPRAM HYDROBROMIDE 20 MG: 20 TABLET ORAL at 08:33

## 2019-03-04 RX ADMIN — HYDRALAZINE HYDROCHLORIDE 10 MG: 20 INJECTION INTRAMUSCULAR; INTRAVENOUS at 00:33

## 2019-03-04 RX ADMIN — DONEPEZIL HYDROCHLORIDE 5 MG: 5 TABLET ORAL at 22:04

## 2019-03-04 RX ADMIN — DILTIAZEM HYDROCHLORIDE 120 MG: 120 CAPSULE, COATED, EXTENDED RELEASE ORAL at 08:32

## 2019-03-04 RX ADMIN — SODIUM CHLORIDE 50 ML/HR: 9 INJECTION, SOLUTION INTRAVENOUS at 05:04

## 2019-03-04 RX ADMIN — CLONIDINE HYDROCHLORIDE 0.2 MG: 0.2 TABLET ORAL at 22:04

## 2019-03-04 RX ADMIN — HYDRALAZINE HYDROCHLORIDE 50 MG: 50 TABLET, FILM COATED ORAL at 14:04

## 2019-03-04 RX ADMIN — CLONIDINE HYDROCHLORIDE 0.1 MG: 0.1 TABLET ORAL at 08:32

## 2019-03-04 NOTE — CONSULTS
"[facility]   Consult     Mary Ramirez  1934    There is no work phone number on file.      03/04/19    DATE OF ADMISSION: 3/3/2019  [facility] 6A    Nirmal Lundberg MD  [address] [facility] [address] / [facility] [address]    Chief Complaint/Reason for Consultation: Hypertensive urgency, CP    Problem List:  1. Coronary artery disease  a. Cardiac cath with PCI - data deficit  b. Echo (9/1/18): EF 60%. LVH. LA dilation. Aortic calcification with no stenosis  c. September 2018 myocardial PET: Normal perfusion  2. Paroxysmal atrial fibrillation  a. CHADSVASc = 6 (age >75, female, CAD, DM, HTN), on Eliquis 2.5 mg bid (age, weight)  b. Rhythm control strategy with amiodarone  3. Essential hypertension  4. Chronic diastolic congestive heart failure  a. Echo (9/1/19): EF 60%. LVH. LA dilation. Aortic calcification with no stenosis  5. DM II  6. CKD, stage III  7. Dementia  8. Major depressive disorder  9. Surgical history:  a. Hysterectomy  b. Colon surgery  c. Colostomy  d. Ankle surgery  e. Breast lumpectomy  f. Hip surgery      History of Present Illness:   Patient is an 84 year old  female with a past medical history significant for CAD s/p PCI in the past, paroxysmal atrial fibrillation on amiodarone and low-dose Eliquis, HTN, DMII, CKD stage III, dementia, resides at the [place], who presented to the [facility] ED last night with c/o worsening left sided chest pain.  She does have underlying dementia and currently no family at bedside but she reports intermittent left sided chest pains \"for the last year.\"  States she fell on her left side a few years ago and unsure if this is related to her fall.  Does have pain reproducible with palpitation and with raising her left arm above her head.  Unsure of what exactly prompted the visit to the ED.  She denies any c/o shortness of breath, notes mild dizziness with position changes but no syncope.  No recent " "diaphoresis or N/V.  Upon arrival to the ED, she was noted to be hypertensive with systolic readings >200.  She was administered amlodipine, clonidine, and IV hydralazine in the ED.  BP readings currently in the 180's systolic.  Troponin has been negative x 3.  EKG demonstrating sinus bradycardia at a rate of 49 bpm.  Echocardiogram is currently pending.  Cardiology has subsequently been consulted for further management of chest pain and hypertensive urgency.  Of note, she contacted our cardiology office approximately 3 weeks ago due to elevated BP readings (140's-200's systolic) at the [place] and she was instructed to increase her Clonidine to 0.2mg bid.     Allergies   Allergen Reactions   • Penicillins Other (See Comments)     Pt states \"i don't know\"       Prior to Admission Medications     Prescriptions Last Dose Informant Patient Reported? Taking?    acetaminophen (TYLENOL) 500 MG tablet  Self Yes Yes    Take 500 mg by mouth Every 6 (Six) Hours As Needed for Mild Pain . Take one tablet every 6 hours prn    acetaminophen (TYLENOL) 500 MG tablet  Self Yes Yes    Take 500 mg by mouth Every 6 (Six) Hours As Needed for Mild Pain . Take one tablet twice a day for leg pain    amiodarone (PACERONE) 200 MG tablet  Nursing Home No Yes    Take 1 tablet by mouth Daily.    apixaban (ELIQUIS) 2.5 MG tablet tablet  Nursing Home No Yes    Take 1 tablet by mouth Every 12 (Twelve) Hours.    aspirin 81 MG chewable tablet  Nursing Home Yes Yes    Chew 81 mg Daily.    bisacodyl (DULCOLAX) 5 MG EC tablet   No Yes    Take 1 tablet by mouth Daily As Needed for Constipation.    citalopram (CeleXA) 20 MG tablet  Nursing Home Yes Yes    Take 20 mg by mouth Daily.    CloNIDine (CATAPRES) 0.1 MG tablet   No Yes    Take 1 tablet by mouth 2 (Two) Times a Day. Take one tablet daily    diltiazem XR (DILACOR XR) 120 MG 24 hr capsule   No Yes    Take 1 capsule by mouth Daily.    docusate sodium (COLACE) 100 MG capsule   Yes Yes    Take 100 mg " by mouth 2 (Two) Times a Day.    donepezil (ARICEPT) 5 MG tablet   Yes Yes    Take 5 mg by mouth Every Night.    insulin aspart (novoLOG FLEXPEN) 100 UNIT/ML solution pen-injector sc pen   Yes Yes    Inject 2-5 Units under the skin into the appropriate area as directed 4 (Four) Times a Day.    isosorbide mononitrate (IMDUR) 60 MG 24 hr tablet  Nursing Home Yes Yes    Take 60 mg by mouth Daily.    loratadine (CLARITIN) 10 MG tablet   Yes Yes    Take 10 mg by mouth Daily.    metFORMIN (GLUCOPHAGE) 500 MG tablet   No Yes    Take 1 tablet by mouth Daily With Breakfast.    Multiple Vitamin (MULTIVITAMINS PO)  Nursing Home Yes Yes    Take 1 tablet by mouth Daily.    nitroglycerin (NITROLINGUAL) 0.4 MG/SPRAY spray  Self Yes Yes    Place 1 spray under the tongue Every 5 (Five) Minutes As Needed for Chest Pain.    ondansetron (ZOFRAN) 4 MG tablet   Yes Yes    Take 4 mg by mouth Every 8 (Eight) Hours As Needed for Nausea or Vomiting.    polyethylene glycol (MIRALAX) pack packet   No Yes    Take 17 g by mouth Daily.    fluticasone (FLONASE) 50 MCG/ACT nasal spray   Yes No    2 sprays into the nostril(s) as directed by provider Daily.            Current Facility-Administered Medications:   •  acetaminophen (TYLENOL) tablet 650 mg, 650 mg, Oral, Q4H PRN, Nataly Jhaveri PA-C  •  amiodarone (PACERONE) tablet 200 mg, 200 mg, Oral, Daily, Nataly Jhaveri PA-C, 200 mg at 03/04/19 0833  •  amLODIPine (NORVASC) tablet 10 mg, 10 mg, Oral, Q24H, Apryl Cuadra PA-C, 10 mg at 03/03/19 2259  •  apixaban (ELIQUIS) tablet 2.5 mg, 2.5 mg, Oral, Q12H, Nataly Jhaveri PA-C, 2.5 mg at 03/04/19 0832  •  aspirin chewable tablet 81 mg, 81 mg, Oral, Daily, Nataly Jhaveri PA-C, 81 mg at 03/04/19 0832  •  cetirizine (zyrTEC) tablet 5 mg, 5 mg, Oral, Daily, Nataly Jhaveri PA-C, 5 mg at 03/04/19 0832  •  citalopram (CeleXA) tablet 20 mg, 20 mg, Oral, Daily, Nataly Jhaveri PA-C, 20 mg at 03/04/19 0833  •  CloNIDine (CATAPRES)  tablet 0.1 mg, 0.1 mg, Oral, BID, Nataly Jhaveri, PA-C, 0.1 mg at 19 0832  •  dextrose (D50W) 25 g/ 50mL Intravenous Solution 25 g, 25 g, Intravenous, Q15 Min PRN, Nataly Jhaveri PA-C  •  dextrose (GLUTOSE) oral gel 15 g, 15 g, Oral, Q15 Min PRN, Nataly Jhaveri PA-C  •  diltiaZEM CD (CARDIZEM CD) 24 hr capsule 120 mg, 120 mg, Oral, Q24H, Nataly Jhaveri, PA-C, 120 mg at 19 0832  •  docusate sodium (COLACE) capsule 100 mg, 100 mg, Oral, BID PRN, Nataly Jhaveri PA-EVELINE  •  donepezil (ARICEPT) tablet 5 mg, 5 mg, Oral, Nightly, Nataly Jhaveri PA-C  •  glucagon (human recombinant) (GLUCAGEN DIAGNOSTIC) injection 1 mg, 1 mg, Subcutaneous, PRN, Nataly Jhaveri PA-C  •  insulin lispro (humaLOG) injection 0-7 Units, 0-7 Units, Subcutaneous, 4x Daily With Meals & Nightly, Nataly Jhaveri PA-C, 2 Units at 19 0831  •  isosorbide mononitrate (IMDUR) 24 hr tablet 60 mg, 60 mg, Oral, Daily, Nataly Jhaveri, PA-C, 60 mg at 19 0832  •  polyethylene glycol 3350 powder (packet), 17 g, Oral, Daily, Nataly Jhaveri PA-C, 17 g at 19 0832  •  sodium chloride 0.9 % flush 10 mL, 10 mL, Intravenous, PRN, Vinicio Booker MD  •  sodium chloride 0.9 % flush 3 mL, 3 mL, Intravenous, Q12H, Nataly Jhaveri PA-EVELINE  •  sodium chloride 0.9 % flush 3-10 mL, 3-10 mL, Intravenous, PRN, Nataly Jhaveri PA-C  •  sodium chloride 0.9 % infusion, 50 mL/hr, Intravenous, Continuous, Nataly Jhaveri PA-EVELINE, Last Rate: 50 mL/hr at 19, 50 mL/hr at 19    Social History     Socioeconomic History   • Marital status:      Spouse name: N/A   • Number of children: 2   • Years of education: H.S   • Highest education level: Not on file   Occupational History   • Occupation:      Employer: RETIRED   Tobacco Use   • Smoking status: Former Smoker     Types: Cigarettes     Start date: 1975     Last attempt to quit: 1978     Years since quittin.3  "  • Smokeless tobacco: Never Used   Substance and Sexual Activity   • Alcohol use: No   • Drug use: No   • Sexual activity: No       Family History   Problem Relation Age of Onset   • Hypertension Mother    • Coronary artery disease Mother    • Lung cancer Father    • Hypertension Father        REVIEW OF SYSTEMS:   CONST:  No weight loss, fever, chills, weakness + fatigue.   HEENT:  No visual loss, blurred vision, double vision, yellow sclerae.                   No hearing loss, congestion, sore throat.   SKIN:      No rashes, urticaria, ulcers, sores.     RESP:     No shortness of breath, hemoptysis, cough, sputum.   GI:           No anorexia, nausea, vomiting, diarrhea. No abdominal pain, melena.   :         No burning on urination, hematuria or increased frequency.  ENDO:    No diaphoresis, cold or heat intolerance. No polyuria or polydipsia.   NEURO:  No headache, dizziness, syncope, paralysis, ataxia, or parasthesias.                  No change in bowel or bladder control. No history of CVA/TIA  MUSC:    + left sided chest pain  HEME:    No anemia, bleeding, bruising. No history of DVT/PE.  PSYCH:  + history of depression    OBJECTIVE:    Vitals:    03/04/19 0233 03/04/19 0238 03/04/19 0500 03/04/19 0707   BP:    (!) 184/64   BP Location:    Left arm   Patient Position:    Lying   Pulse:   (!) 49 53   Resp:    18   Temp: 97.3 °F (36.3 °C)   97.6 °F (36.4 °C)   TempSrc: Oral   Oral   SpO2:   96% 94%   Weight:  48.5 kg (107 lb)     Height:  154.9 cm (61\")           Vital Sign Min/Max for last 24 hours  Temp  Min: 97.3 °F (36.3 °C)  Max: 98.6 °F (37 °C)   BP  Min: 182/61  Max: 256/89   Pulse  Min: 48  Max: 56   Resp  Min: 18  Max: 18   SpO2  Min: 94 %  Max: 99 %   No Data Recorded      Intake/Output Summary (Last 24 hours) at 3/4/2019 0907  Last data filed at 3/4/2019 0622  Gross per 24 hour   Intake 400 ml   Output 500 ml   Net -100 ml             Physical Exam:  GEN: Frail elderly female, no acute " distress  HEENT: Normocephalic, atraumatic, PERRLA, moist mucous membranes  NECK: Supple, No JVD, no thyromegaly, no lymphadenopathy  CARD: S1S2, regular rhythm, bradycardic, 2/6 systolic ejection murmur, no gallop, or rub  LUNGS: Clear to auscultation, normal respiratory effort  ABDOMEN: Soft, nontender, normal bowel sounds  EXTREMITIES: No gross deformities, no clubbing, cyanosis, or edema  MUSCULOSKELETAL: Left sided chest pain reproducible with palpitation and with raising of left arm  SKIN: Warm, dry  NEURO: No focal deficits, alert and oriented x 3  PSYCHIATRIC: Normal affect and mood      Data:   Results from last 7 days   Lab Units 03/04/19  0641 03/03/19  1843   WBC 10*3/mm3 7.94 6.54   HEMOGLOBIN g/dL 12.2 11.8   HEMATOCRIT % 40.1 37.4   PLATELETS 10*3/mm3 295 285     Results from last 7 days   Lab Units 03/04/19  0641 03/03/19  1843   SODIUM mmol/L 135 132   POTASSIUM mmol/L 3.4* 3.8   CHLORIDE mmol/L 99 96*   CO2 mmol/L 27.0 28.0   BUN mg/dL 27* 37*   CREATININE mg/dL 1.20 1.53*   GLUCOSE mg/dL 206* 135*          Results from last 7 days   Lab Units 03/04/19  0641   TSH mIU/mL 2.961             Results from last 7 days   Lab Units 03/04/19  0301   TROPONIN I ng/mL 0.014               Intake/Output Summary (Last 24 hours) at 3/4/2019 0907  Last data filed at 3/4/2019 0622  Gross per 24 hour   Intake 400 ml   Output 500 ml   Net -100 ml       Chest X-Ray:  Imaging Results (last 24 hours)     Procedure Component Value Units Date/Time    XR Chest 1 View [[id number]] Collected:  03/03/19 1839     Updated:  03/03/19 2028    Narrative:       EXAM:    XR Chest, 1 View     EXAM DATE/TIME:    3/3/2019 6:39 PM     CLINICAL HISTORY:    84 years old, female; Pain; Other: See notes; Additional info: Chest pain   triage protocol     TECHNIQUE:    XR of the chest, 1 view.     COMPARISON:    CR XR CHEST 1 VW 1/10/2019 11:44 AM     FINDINGS:    Lungs:  Persistent right basilar ground glass and linear opacities, likely    small right pleural effusion with associated atelectasis and/or scarring,   similar to comparison study. Subsegmental atelectasis and/or scarring within   the periphery of the left midlung zone.    Pleural space:  See Lungs Finding.    Heart/Mediastinum: Normal.    Bones/joints:  Mild degenerative changes of the glenohumeral and   acromioclavicular joints.       Impression:       Persistent right basilar ground glass and linear opacities, likely small right   pleural effusion with associated atelectasis and/or scarring, similar to   comparison study.     THIS DOCUMENT HAS BEEN ELECTRONICALLY SIGNED BY NANO CORTES MD          Telemetry: Sinus bradycardia, HR 48-56 bpm  EKG: Sinus bradycardia, HR 49 bpm, prominent U waves present    Assessment and Plan:   1. Hypertensive urgency:  - BP readings >200mmHg systolic upon arrival.  Amlodipine restarted per primary team.  - Increase Clonidine back to 0.2 mg bid  - Add hydralazine 50 mg tid    2. Atypical chest pain:  - Atypical features, pain reproducible with palpation  - Biomarkers negative x 3, EKG with no acute ischemic changes  - Repeat echocardiogram pending    3. Coronary artery disease:  - Remote PCI - data deficit  - Most recent stress test 09/2018 demonstrating no evidence of ischemia  - On ASA, Imdur    4. Paroxysmal atrial fibrillation:  - Maintaining NSR on amiodarone 200 mg daily.   - Discontinue diltiazem given bradycardia  - CHADSVASc = 6, on Eliquis 2.5 mg bid (age, weight, CrCl)    5. Chronic diastolic CHF:  - Stable, well compensated    6. DEANGELO on CKD:  - Improved after IVF    Dr. Flores to resume care tomorrow.  Potential discharge home tomorrow if BP's improved.    Electronically signed by CRISTA Gamino, 03/04/19, 9:07 AM.    ___________________________________________________________  The patient was seen and examined by me.  Agree and verified/discussed key components of E/M as outlined by the Nurse practitioner/PA.    /54 (BP  "Location: Left arm, Patient Position: Lying)   Pulse 53   Temp 98.3 °F (36.8 °C) (Oral)   Resp 18   Ht 154.9 cm (61\")   Wt 48.5 kg (107 lb)   SpO2 94%   BMI 20.22 kg/m²     General Appearance:   · well developed  · well nourished  Neck:  · thyroid not enlarged  · supple  Respiratory:  · no respiratory distress  · normal breath sounds  · no rales  Cardiovascular:  · no jugular venous distention  · regular rhythm  · apical impulse normal  · S1 normal, S2 normal  · no S3, no S4   · no murmur  · no rub, no thrill  · carotid pulses normal; no bruit  · pedal pulses normal  · lower extremity edema: none  .   Skin:   · warm, dry        Plan:    HTN crisis  Non-cardiac chest pain  Add hydralazine scheduled  Will avoid ace/arb/aa in light of ckd  Increase clonidine  D/c diltiazem in light of bradycardia  Continue amlodipine  No need for ischemic workup  Check echo.    Reagan Stanley MD, FACC  [facility]             "

## 2019-03-04 NOTE — PROGRESS NOTES
Discharge Planning Assessment  [facility]     Patient Name: Mary Ramirez  MRN: [id number]  Today's Date: 3/4/2019    Admit Date: 3/3/2019    Discharge Needs Assessment     Row Name 03/04/19 1304       Living Environment    Lives With  alone    Current Living Arrangements  independent/assisted living facility Lives at the [facility].     Primary Care Provided by  self;other (see comments)    Provides Primary Care For  no one, unable/limited ability to care for self    Family Caregiver if Needed  other (see comments) [facility]    Quality of Family Relationships  involved;helpful    Able to Return to Prior Arrangements  yes    Living Arrangement Comments  Pt may return to A.L. as long as she is 1 person assist and back to baseline.        Resource/Environmental Concerns    Resource/Environmental Concerns  none    Transportation Concerns  car, none       Transition Planning    Patient/Family Anticipates Transition to  home    Patient/Family Anticipated Services at Transition      Transportation Anticipated  family or friend will provide Pt states son could transport.        Discharge Needs Assessment    Concerns to be Addressed  denies needs/concerns at this time    Equipment Currently Used at Home  grab bar;walker, rolling    Anticipated Changes Related to Illness  none    Equipment Needed After Discharge  none    Outpatient/Agency/Support Group Needs  assisted living facility    Discharge Facility/Level of Care Needs  assisted living facility    Current Discharge Risk  dependent with mobility/activities of daily living        Discharge Plan     Row Name 03/04/19 1308       Plan    Plan  Back to the [facility]    Patient/Family in Agreement with Plan  yes    Plan Comments  Met with pt at . She lives at the [facility] unit in [facility]. She is able to sponge bath herself. They transport her to doctor appt's but her son can transport  home if needed. Spoke to Lisbeth at the [facility] and they can accept back as long as pt is 1 person assist. Per nursing pt is up with 1. Per rounds, pt may d/c tomorrow if BP is down. CM will cont to follow for d/c needs.     Final Discharge Disposition Code  01 - home or self-care        Destination      No service coordination in this encounter.      Durable Medical Equipment      No service coordination in this encounter.      Dialysis/Infusion      No service coordination in this encounter.      Home Medical Care      No service coordination in this encounter.      Community Resources      No service coordination in this encounter.        Expected Discharge Date and Time     Expected Discharge Date Expected Discharge Time    Mar 7, 2019         Demographic Summary     Row Name 03/04/19 1302       General Information    Referral Source  admission list    Preferred Language  English     Used During This Interaction  no    General Information Comments  PCP is Nirmal Lundberg        Contact Information    Permission Granted to Share Info With  ;family/designee;facility  son    Contact Information Comments  Pt lives at the [place] [facility]        Functional Status     Row Name 03/04/19 1303       Functional Status    Usual Activity Tolerance  moderate    Current Activity Tolerance  moderate       Functional Status, IADL    Medications  assistive person    Meal Preparation  completely dependent    Housekeeping  completely dependent    Laundry  completely dependent    Shopping  completely dependent    IADL Comments  Goes to [facility] [employer] for lunch and supper, they bring her breakfast to room       Employment/    Employment/ Comments  Has [employer] medicare with no concerns. Pays for meds at the [place] and they provide them for her.         Psychosocial    No documentation.       Abuse/Neglect    No documentation.       Legal    No documentation.        Substance Abuse    No documentation.       Patient Forms    No documentation.           Tiffanie Foutnain

## 2019-03-04 NOTE — ED PROVIDER NOTES
Subjective   Ms. Mary Ramirez is a 84 y.o. female who presents to the ED with complaints of chest pain onset approximately one week ago. Patient reports for the past week she has been experiencing daily intermittent episodes of left sided chest pain with an associated sharp sensation. Each episode has a duration of approximately two minutes and she notes three episodes while in ED now. She also complains of mild abdominal pain, productive cough, fatigue, and mild left upper extremity pain, however she denies fever, shortness of breath, chills, diaphoresis, nausea, vomiting,  headache, weakness, and any other acute symptoms at this time. Patient's son notes patient has been recently complaining of lumbar back pain and back X-ray was performed last week at assisted living facility, results benign. Past medical history significant for dementia, hypertension, A-fib, CHF, DDD, CAD with sent, sick sinus syndrome, CKD, major depressive disorder, DM, athrectomy, hysterectomy, colon surgery, colostomy. At baseline patient ambulates with a walker. Patient has difficulty giving details secondary to her dementia, therefore she is a poor historian.         History provided by:  Patient and relative  History limited by:  Dementia  Chest Pain   Pain location:  L chest  Pain quality: sharp    Pain radiates to:  Does not radiate  Pain severity:  Moderate  Onset quality:  Sudden  Duration:  1 week  Timing:  Intermittent  Progression:  Unchanged  Chronicity:  New  Relieved by:  None tried  Worsened by:  Nothing  Ineffective treatments:  None tried  Associated symptoms: abdominal pain and cough    Associated symptoms: no diaphoresis, no fever, no headache, no nausea, no shortness of breath, no vomiting and no weakness    Risk factors: coronary artery disease, diabetes mellitus and hypertension    Risk factors: not male        Review of Systems   Unable to perform ROS: Dementia   Constitutional: Negative for diaphoresis and fever.  "  Respiratory: Positive for cough. Negative for shortness of breath.    Cardiovascular: Positive for chest pain.        Left sided CP.  History of CAD with previous stent.  Also history of A Fib and CHF.   Gastrointestinal: Positive for abdominal pain. Negative for nausea and vomiting.   Musculoskeletal: Positive for arthralgias (mild left arm pain ).   Neurological: Negative for weakness and headaches.        History of dementia.   All other systems reviewed and are negative.      Past Medical History:   Diagnosis Date   • Atrial fibrillation (CMS/HCC)    • Benign essential hypertension    • CAD (coronary artery disease)    • CHF (congestive heart failure) (CMS/[facility])    • Chronic allergic rhinitis    • CKD (chronic kidney disease), stage III (CMS/HCC)    • DDD (degenerative disc disease), cervical    • Dementia    • Diabetes mellitus (CMS/[facility])    • Hypertension    • Major depressive disorder, single episode, mild (CMS/HCC)    • Memory loss    • Mixed hyperlipidemia    • SSS (sick sinus syndrome) (CMS/[facility])        Allergies   Allergen Reactions   • Penicillins Other (See Comments)     Pt states \"i don't know\"       Past Surgical History:   Procedure Laterality Date   • ANKLE SURGERY Left 1996   • ATHRECTOMY ILIAC, FEMORAL, TIBIAL ARTERY     • BREAST LUMPECTOMY Left 1981   • COLON SURGERY     • COLOSTOMY  1983   • CORONARY STENT PLACEMENT     • FINGER FRACTURE SURGERY Right    • HIP TROCANTERIC NAILING WITH INTRAMEDULLARY HIP SCREW Left 11/23/2017   • HYSTERECTOMY  1984       Family History   Problem Relation Age of Onset   • Hypertension Mother    • Coronary artery disease Mother    • Lung cancer Father    • Hypertension Father        Social History     Socioeconomic History   • Marital status:      Spouse name: N/A   • Number of children: 2   • Years of education: H.S   • Highest education level: Not on file   Occupational History   • Occupation:      Employer: RETIRED   Tobacco Use   • " Smoking status: Former Smoker     Types: Cigarettes     Start date: 1975     Last attempt to quit: 1978     Years since quittin.3   • Smokeless tobacco: Never Used   Substance and Sexual Activity   • Alcohol use: No   • Drug use: No   • Sexual activity: No         Objective   Physical Exam   Constitutional: She appears well-developed and well-nourished. No distress.   Pleasantly demented female. She is a poor historian and has trouble answering questions, however her son helped.     HENT:   Head: Normocephalic and atraumatic.   Right Ear: External ear normal.   Left Ear: External ear normal.   Nose: Nose normal.   Mouth/Throat: Oropharynx is clear and moist.   Eyes: Conjunctivae are normal. No scleral icterus.   Neck: Normal range of motion. Neck supple.   Cardiovascular: Regular rhythm and normal heart sounds. Bradycardia present. Exam reveals no gallop and no friction rub.   No murmur heard.  No ectopy   Pulmonary/Chest: Effort normal and breath sounds normal. No stridor. She has no wheezes. She has no rales. She exhibits no tenderness (no anterior chest wall tenderness to palpation ).   Lungs clear to auscultation   Abdominal: Soft. Bowel sounds are normal. She exhibits no distension. There is no tenderness. There is no rebound and no guarding.   Musculoskeletal: Normal range of motion. She exhibits no edema (no pedal edema).   Neurological: She is alert. She is disoriented.   No focal deficits appreciated. Positive dementia.    Skin: Skin is warm and dry.   Psychiatric: She has a normal mood and affect. Her behavior is normal.   Nursing note and vitals reviewed.      Procedures         ED Course  ED Course as of Mar 04 0032   Sun Mar 03, 2019   1921 Patient given ASA and NTG prior to arrival.  Can't tell if it has helped.  [FC]    EKG showed sinus bradycardia.  There was no acute ST-T wave changes consistent with ischemia.  BNP was 123.  CBC was within normal limits.  Chemistries reveal BUN  and creatinine of 37 and 1.53.  Patient does have history of chronic kidney disease.  Last creatinine was 1.36 approximately 1 month ago.  Initial troponin was 0.00.  Chest x-ray showed persistent right basilar groundglass and linear opacities, likely small right pleural effusion with associated atelectasis and/or scarring.  Similar to comparison study.  [FC]   2211 Repeat troponin is 0.00.  Updated patient's son at the bedside.  He said that patient has been doing some increased physical therapy for back pain.  He questions whether she has strained her chest wall with doing these exercises.  Patient also has some bradycardia with heart rate in the high 40s.  I reviewed patient's medications and she is not on any type of beta-blocker that would slow her heart rate.  We have given second dose of clonidine and will observe her blood pressure closely.  Patient has not had any of her other usual nightly medications.  [FC]   2247 Patient is resting comfortably and denies any chest pain at present.  Blood pressure is still 234/89 after clonidine 0.2 mg by mouth.  Discussed the case with Dr. Booker.  We will give Norvasc 10 mg by mouth.  After reviewing previous records, patient's blood pressure ran systolic of 140 and recent office visit.  If oral medications do not work, we will initiate IV antihypertensive medications.  [FC]   2334 [facility] updated patient on all results. She discussed plan to decrease blood pressure and possible admission.   [AT]   Mon Mar 04, 2019   0005 Repeat blood pressure was 220/100 after oral Norvasc.  We will initiate IV hydralazine at 10 mg IV.  I will page the hospitalist for admission due to hypertensive urgency, atypical chest pain, and bradycardia.  [FC]   0019 [facility] discussed case in detail with hospitalist, Dr. Palma, who will admit.   [AT]   0021 Discussed admission with patient and son.  They are agreeable.  Patient denies any chest pain at present.  She is resting comfortably.  [FC]      ED  Course User Index  [AT] Poncho Hercules  [FC] Apryl Cuadra, JOI     Recent Results (from the past 24 hour(s))   Comprehensive Metabolic Panel    Collection Time: 03/03/19  6:43 PM   Result Value Ref Range    Glucose 135 (H) 70 - 100 mg/dL    BUN 37 (H) 9 - 23 mg/dL    Creatinine 1.53 (H) 0.60 - 1.30 mg/dL    Sodium 132 132 - 146 mmol/L    Potassium 3.8 3.5 - 5.5 mmol/L    Chloride 96 (L) 99 - 109 mmol/L    CO2 28.0 20.0 - 31.0 mmol/L    Calcium 9.4 8.7 - 10.4 mg/dL    Total Protein 6.7 5.7 - 8.2 g/dL    Albumin 4.27 3.20 - 4.80 g/dL    ALT (SGPT) 22 7 - 40 U/L    AST (SGOT) 26 0 - 33 U/L    Alkaline Phosphatase 116 (H) 25 - 100 U/L    Total Bilirubin 0.3 0.3 - 1.2 mg/dL    eGFR Non African Amer 32 (L) >60 mL/min/1.73    Globulin 2.4 gm/dL    A/G Ratio 1.8 1.5 - 2.5 g/dL    BUN/Creatinine Ratio 24.2 7.0 - 25.0    Anion Gap 8.0 3.0 - 11.0 mmol/L   Lipase    Collection Time: 03/03/19  6:43 PM   Result Value Ref Range    Lipase 41 6 - 51 U/L   BNP    Collection Time: 03/03/19  6:43 PM   Result Value Ref Range    .0 (H) 0.0 - 100.0 pg/mL   Light Blue Top    Collection Time: 03/03/19  6:43 PM   Result Value Ref Range    Extra Tube hold for add-on    Green Top (Gel)    Collection Time: 03/03/19  6:43 PM   Result Value Ref Range    Extra Tube Hold for add-ons.    Lavender Top    Collection Time: 03/03/19  6:43 PM   Result Value Ref Range    Extra Tube hold for add-on    Gold Top - SST    Collection Time: 03/03/19  6:43 PM   Result Value Ref Range    Extra Tube Hold for add-ons.    CBC Auto Differential    Collection Time: 03/03/19  6:43 PM   Result Value Ref Range    WBC 6.54 3.50 - 10.80 10*3/mm3    RBC 4.51 3.89 - 5.14 10*6/mm3    Hemoglobin 11.8 11.5 - 15.5 g/dL    Hematocrit 37.4 34.5 - 44.0 %    MCV 82.9 80.0 - 99.0 fL    MCH 26.2 (L) 27.0 - 31.0 pg    MCHC 31.6 (L) 32.0 - 36.0 g/dL    RDW 16.1 (H) 11.3 - 14.5 %    RDW-SD 48.9 37.0 - 54.0 fl    MPV 10.5 6.0 - 12.0 fL    Platelets 285 150 - 450 10*3/mm3     Neutrophil % 63.6 41.0 - 71.0 %    Lymphocyte % 22.2 (L) 24.0 - 44.0 %    Monocyte % 10.6 0.0 - 12.0 %    Eosinophil % 2.6 0.0 - 3.0 %    Basophil % 0.8 0.0 - 1.0 %    Immature Grans % 0.2 0.0 - 0.6 %    Neutrophils, Absolute 4.17 1.50 - 8.30 10*3/mm3    Lymphocytes, Absolute 1.45 0.60 - 4.80 10*3/mm3    Monocytes, Absolute 0.69 0.00 - 1.00 10*3/mm3    Eosinophils, Absolute 0.17 0.00 - 0.30 10*3/mm3    Basophils, Absolute 0.05 0.00 - 0.20 10*3/mm3    Immature Grans, Absolute 0.01 0.00 - 0.05 10*3/mm3   POC Troponin, Rapid    Collection Time: 03/03/19  6:49 PM   Result Value Ref Range    Troponin I 0.00 0.00 - 0.07 ng/mL   POC Troponin, Rapid    Collection Time: 03/03/19  9:44 PM   Result Value Ref Range    Troponin I 0.00 0.00 - 0.07 ng/mL     Note: In addition to lab results from this visit, the labs listed above may include labs taken at another facility or during a different encounter within the last 24 hours. Please correlate lab times with ED admission and discharge times for further clarification of the services performed during this visit.    XR Chest 1 View   Final Result   Persistent right basilar ground glass and linear opacities, likely small right    pleural effusion with associated atelectasis and/or scarring, similar to    comparison study.       THIS DOCUMENT HAS BEEN ELECTRONICALLY SIGNED BY NANO CORTES MD        Vitals:    03/03/19 2300 03/03/19 2330 03/03/19 2333 03/04/19 0000   BP: (!) 219/102 (!) 242/87 (!) 254/95 (!) 220/100   Pulse: (!) 49 50 (!) 49 (!) 49   Resp:       Temp:       SpO2: 97% 97% 96% 97%   Weight:       Height:         Medications   sodium chloride 0.9 % flush 10 mL (not administered)   amLODIPine (NORVASC) tablet 10 mg (10 mg Oral Given 3/3/19 2259)   hydrALAZINE (APRESOLINE) injection 10 mg (not administered)   CloNIDine (CATAPRES) tablet 0.1 mg (0.1 mg Oral Given 3/3/19 1928)   CloNIDine (CATAPRES) tablet 0.1 mg (0.1 mg Oral Given 3/3/19 2138)     ECG/EMG Results (last 24  hours)     Procedure Component Value Units Date/Time    ECG 12 Lead [[id number]] Collected:  03/03/19 1842     Updated:  03/03/19 1843        ECG 12 Lead         ECG 12 Lead                         [id number] Score (for atrial fibrillation stroke risk) reviewed and/or performed as part of the patient evaluation and treatment planning process.  The result associated with this review/performance is: 7    HEART Score (for prediction of 6-week risk of major adverse cardiac event) reviewed and/or performed as part of the patient evaluation and treatment planning process.  The result associated with this review/performance is: 4           MDM    Final diagnoses:   Hypertensive urgency   Atypical chest pain   Bradycardia   History of hypertension   Chronic atrial fibrillation (CMS/HCC)   Chronic anticoagulation   Type 2 diabetes mellitus without complication, with long-term current use of insulin (CMS/HCC)   Chronic kidney disease, unspecified CKD stage   History of CHF (congestive heart failure)       Documentation assistance provided by drew Hercules.  Information recorded by the scribe was done at my direction and has been verified and validated by me.     Poncho Hercules  03/03/19 1944       Apryl Cuadra PA-C  03/04/19 0032

## 2019-03-04 NOTE — H&P
[facility] Medicine Services  HISTORY AND PHYSICAL    Patient Name: Mray Ramirez  : 1934  MRN: [id number]  Primary Care Physician: Nirmal Lundberg MD  Date of admission: 3/3/2019      Subjective   Subjective     Chief Complaint:  Chest pain    HPI:  Mary Ramirez is an 84 y.o. female with a past medical history significant for CAD, s/p stent placement, PAF on Eliquis, CHF, SSS, HTN, HLD, CKD III, and dementia who presents with complaints of left chest wall pain with acute onset PTA. Son at bedside reports however that this has been going on for about 1 week. Pain is without radiation and characterized as a sharp/aching sensation. No modifying factors. There is associated dry cough, intermittent dyspnea, and generalized weakness. Patient also noting weeks long lumbar back pain for which she underwent imaging at [facility]. This reportedly demonstrated no acute process. She is set to undergo physical therapy soon. Patient is followed per cardiology by Dr. Flores. Last heart cath is data deficient. She underwent cardiac stress test 2018 which demonstrated normal myocardial perfusion. EF > 70%. Echocardiogram at this time noted some aortic calcification, otherwise unremarkable. Patient currently denies fever, congestion, syncope, or peripheral edema. Will admit for further evaluation and treatment.    Emergency Department Evaluation; BP elevated to 256/89. HR 48. Vitals otherwise stable. Troponin negative X2. .0 Creatinine 1.53. BUN 37. CXR with no acute process. EKG with sinus bradycardia.    Review of Systems   Constitutional: Positive for fatigue. Negative for chills and fever.   HENT: Negative for congestion and trouble swallowing.    Eyes: Negative for photophobia and visual disturbance.   Respiratory: Positive for cough and shortness of breath.    Cardiovascular: Positive for chest pain. Negative for leg swelling.   Gastrointestinal: Negative for abdominal  pain, diarrhea, nausea and vomiting.   Endocrine: Negative for cold intolerance and heat intolerance.   Genitourinary: Negative for dysuria and frequency.   Musculoskeletal: Negative for back pain and gait problem.   Skin: Negative for pallor and rash.   Allergic/Immunologic: Negative for immunocompromised state.   Neurological: Positive for weakness. Negative for dizziness and headaches.   Hematological: Negative for adenopathy.   Psychiatric/Behavioral: Negative for agitation and confusion.          Otherwise complete ROS reviewed and is negative except as mentioned in the HPI.    Personal History     Past Medical History:   Diagnosis Date   • Atrial fibrillation (CMS/HCC)    • Benign essential hypertension    • CAD (coronary artery disease)    • CHF (congestive heart failure) (CMS/HCC)    • Chronic allergic rhinitis    • CKD (chronic kidney disease), stage III (CMS/HCC)    • DDD (degenerative disc disease), cervical    • Dementia    • Diabetes mellitus (CMS/HCC)    • Hypertension    • Major depressive disorder, single episode, mild (CMS/HCC)    • Memory loss    • Mixed hyperlipidemia    • SSS (sick sinus syndrome) (CMS/HCC)        Past Surgical History:   Procedure Laterality Date   • ANKLE SURGERY Left 1996   • ATHRECTOMY ILIAC, FEMORAL, TIBIAL ARTERY     • BREAST LUMPECTOMY Left 1981   • COLON SURGERY     • COLOSTOMY  1983   • CORONARY STENT PLACEMENT     • FINGER FRACTURE SURGERY Right    • HIP TROCANTERIC NAILING WITH INTRAMEDULLARY HIP SCREW Left 11/23/2017   • HYSTERECTOMY  1984       Family History: family history includes Coronary artery disease in her mother; Hypertension in her father and mother; Lung cancer in her father. Otherwise pertinent FHx was reviewed and unremarkable.     Social History:  reports that she quit smoking about 40 years ago. Her smoking use included cigarettes. She started smoking about 43 years ago. she has never used smokeless tobacco. She reports that she does not drink alcohol  "or use drugs.  Social History     Social History Narrative   • Not on file       Medications:    Available home medication information reviewed.    (Not in a hospital admission)    Allergies   Allergen Reactions   • Penicillins Other (See Comments)     Pt states \"i don't know\"       Objective   Objective     Vital Signs:   Temp:  [98.6 °F (37 °C)] 98.6 °F (37 °C)  Heart Rate:  [48-56] 51  Resp:  [18] 18  BP: (198-256)/() 211/69        Physical Exam   Constitutional: No acute distress, awake, alert, thin and frail  Eyes: PERRLA, sclerae anicteric, no conjunctival injection  HENT: NCAT, mucous membranes moist  Neck: Supple, no thyromegaly, no lymphadenopathy, trachea midline  Respiratory: Clear to auscultation bilaterally, nonlabored respirations   Cardiovascular: RRR, no murmurs, rubs, or gallops, palpable pedal pulses bilaterally  - pain reproducible with palpation to left chest wall  Gastrointestinal: Positive bowel sounds, soft, nontender, nondistended  Musculoskeletal: No bilateral ankle edema, no clubbing or cyanosis to extremities  Psychiatric: Appropriate affect, cooperative  Neurologic: Oriented x 3, strength symmetric in all extremities, Cranial Nerves grossly intact to confrontation, speech clear  Skin: No rashes      Results Reviewed:  I have personally reviewed current lab, radiology, and data and agree.    Results from last 7 days   Lab Units 03/03/19  1843   WBC 10*3/mm3 6.54   HEMOGLOBIN g/dL 11.8   HEMATOCRIT % 37.4   PLATELETS 10*3/mm3 285     Results from last 7 days   Lab Units 03/03/19  2144  03/03/19  1843   SODIUM mmol/L  --   --  132   POTASSIUM mmol/L  --   --  3.8   CHLORIDE mmol/L  --   --  96*   CO2 mmol/L  --   --  28.0   BUN mg/dL  --   --  37*   CREATININE mg/dL  --   --  1.53*   GLUCOSE mg/dL  --   --  135*   CALCIUM mg/dL  --   --  9.4   ALT (SGPT) U/L  --   --  22   AST (SGOT) U/L  --   --  26   TROPONIN I ng/mL 0.00   < >  --     < > = values in this interval not displayed. "     Estimated Creatinine Clearance: 21.7 mL/min (A) (by C-G formula based on SCr of 1.53 mg/dL (H)).  Brief Urine Lab Results  (Last result in the past 365 days)      Color   Clarity   Blood   Leuk Est   Nitrite   Protein   CREAT   Urine HCG        04/07/18 0839 Yellow Clear Negative Negative Negative Negative             BNP   Date Value Ref Range Status   03/03/2019 123.0 (H) 0.0 - 100.0 pg/mL Final     Comment:     Results may be falsely decreased if patient taking Biotin.     Imaging Results (last 24 hours)     Procedure Component Value Units Date/Time    XR Chest 1 View [[id number]] Collected:  03/03/19 1839     Updated:  03/03/19 2028    Narrative:       EXAM:    XR Chest, 1 View     EXAM DATE/TIME:    3/3/2019 6:39 PM     CLINICAL HISTORY:    84 years old, female; Pain; Other: See notes; Additional info: Chest pain   triage protocol     TECHNIQUE:    XR of the chest, 1 view.     COMPARISON:    CR XR CHEST 1 VW 1/10/2019 11:44 AM     FINDINGS:    Lungs:  Persistent right basilar ground glass and linear opacities, likely   small right pleural effusion with associated atelectasis and/or scarring,   similar to comparison study. Subsegmental atelectasis and/or scarring within   the periphery of the left midlung zone.    Pleural space:  See Lungs Finding.    Heart/Mediastinum: Normal.    Bones/joints:  Mild degenerative changes of the glenohumeral and   acromioclavicular joints.       Impression:       Persistent right basilar ground glass and linear opacities, likely small right   pleural effusion with associated atelectasis and/or scarring, similar to   comparison study.     THIS DOCUMENT HAS BEEN ELECTRONICALLY SIGNED BY NANO CORTES MD        Results for orders placed during the hospital encounter of 09/01/18   Adult Transthoracic Echo Complete W/ Cont if Necessary Per Protocol    Narrative · Left ventricular systolic function is normal. Estimated EF = 60%.  · Left ventricular wall thickness is consistent with  hypertrophy.  · Left atrial cavity size is dilated.  · There is calcification of the aortic valve.  · Ao mean PG 5.3 mmHg          Assessment/Plan   Assessment / Plan     Active Hospital Problems    Diagnosis Date Noted   • **Hypertensive urgency [I16.0] 09/01/2018     Priority: High   • Bradycardia [R00.1] 03/04/2019     Priority: High   • CKD (chronic kidney disease), stage III (CMS/[facility]) [N18.3]      Priority: Medium   • Chronic diastolic congestive heart failure (CMS/[facility]) [I50.32] 09/01/2018     Priority: Medium     · Echo (9/1/18): LVEF 60%. LVH. Left atrial dilatation. Aortic calcification with no stenosis.     • Paroxysmal atrial fibrillation (CMS/[facility]) [I48.0] 04/07/2018     Priority: Medium     · Chads Vasc 6 (age > 75, female, CAD, DM, HTN)   · Rhythm control strategy with amiodarone     • SSS (sick sinus syndrome) (CMS/[facility]) [I49.5] 04/07/2018     Priority: Medium   • Anticoagulated [Z79.01] 04/07/2018     Priority: Medium   • Essential hypertension [I10] 11/24/2017     Priority: Medium   • Type 2 diabetes mellitus with complication, with long-term current use of insulin (CMS/[facility]) [E11.8, Z79.4] 11/24/2017     Priority: Medium   • Mixed hyperlipidemia [E78.2]      Priority: Low   • Dementia [F03.90] 02/28/2018     Priority: Low   • Coronary artery disease involving native coronary artery of native heart with angina pectoris (CMS/[facility]) [I25.119] 11/24/2017     Priority: Low     · Cardiac cath with PCI data deficit  · Echo (9/1/18): LVEF 60%. LVH. Left atrial dilatation. Aortic calcification with no stenosis.           1. Atypical chest pain:  - ? Secondary to accelerated hypertension  - patient administered ASA and NTG in ED. Unclear if this was efficacious  - negative troponin X2. Continue to trend cardiac enzymes. Repeat EKG in am  - consult to cardiology. With extensive cardiac history, appreciate input  -  Am labs    2. Hypertensive urgency:  - improved and stable after PO clonidine X2, norvasc, and  hydralazine in ED  - should pressure maintain persistent elevation. Initiate cardene gtt  - obtain TSH, new echocardiogram    3. PAF:  - [id number] = 6. On Eliquis, amiodarone, and cardizem  - stable and rate controlled  - followed by Dr. Flores    4. Chronic CHF:  - diastolic dysfunction. Appears stable and compensated. Monitor.    5. DEANGELO:  - acute on chronic stage III. Baseline around 1.3.  - administer gentle hydration with saline 50 cc/hr x 8 hours    6. DM2:  - on metformin and low dose sliding scale. Hold for now and start inpatient SSI with scheduled accu checks    DVT prophylaxis:  Eliquis    CODE STATUS:  Full code  Code Status and Medical Interventions:   Ordered at: 03/04/19 0152     Level Of Support Discussed With:    Patient     Code Status:    CPR     Medical Interventions (Level of Support Prior to Arrest):    Full       Admission Status:  I believe this patient meets OBSERVATION status, however if further evaluation or treatment plans warrant, status may change.  Based upon current information, I predict patient's care encounter to be less than or equal to 2 midnights.    Electronically signed by Nataly Jhaveri PA-C, 03/04/19, 1:58 AM.    Brief Attending Admission Attestation     I have seen and examined the patient, performing an independent face-to-face diagnostic evaluation with plan of care reviewed and developed with the advanced practice clinician (APC).      Brief Summary Statement:   Mary Ramirez is a 84 y.o. female with multiple chronic medical conditions including CAD status post stent placement, paroxysmal atrial fibrillation on Eliquis, diastolic congestive CHF, hypertension, chronic kidney disease stage III and dementia.  Patient presents to ER with complaint of intermittent substernal chest pain for about 1 week.  In addition, patient reports nonproductive cough, shortness of breath and generalized weakness.  Patient lives in an assisted living facility and was treated with  sublingual nitroglycerin prior to presentation.  Currently, patient does not complain of chest pain.  On presentation to the ER however, patient was found to be hypertensive with systolic of 220.  She was treated with multiple antihypertensives in the ER.  Blood pressure is still very elevated and as a result, I have recommended for observation.  Initial troponin was negative.  Will consult cardiology for further evaluation.    Remainder of detailed HPI is as noted above and has been reviewed and/or edited by me for completeness.      Attending Physical Exam:  Constitutional: No acute distress, awake, alert  Eyes: PERRLA, sclerae anicteric, no conjunctival injection  HENT: NCAT, mucous membranes moist  Neck: Supple, no thyromegaly, no lymphadenopathy, trachea midline  Respiratory: Clear to auscultation bilaterally, nonlabored respirations   Cardiovascular: RRR, no murmurs, rubs, or gallops, palpable pedal pulses bilaterally  Gastrointestinal: Positive bowel sounds, soft, nontender, nondistended  Musculoskeletal: No bilateral ankle edema, no clubbing or cyanosis to extremities  Psychiatric: Appropriate affect, cooperative  Neurologic: Oriented x 3, strength symmetric in all extremities, Cranial Nerves grossly intact, speech clear  Skin: No rashes      Brief Assessment/Plan :  See above for further detailed assessment and plan developed with APC which I have reviewed and/or edited for completeness.      Electronically signed by Gregor Palma MD, 03/04/19, 2:35 AM.

## 2019-03-04 NOTE — PLAN OF CARE
Problem: Patient Care Overview  Goal: Plan of Care Review  Outcome: Ongoing (interventions implemented as appropriate)   03/04/19 0510   Coping/Psychosocial   Plan of Care Reviewed With patient   Plan of Care Review   Progress no change   OTHER   Outcome Summary P arrived from ED. Hypertensive w/ SBP in 180s. Sinus marva. RA. No reports of pain. Ambulated to bathroom with walker.

## 2019-03-04 NOTE — PLAN OF CARE
Problem: Fall Risk (Adult)  Goal: Identify Related Risk Factors and Signs and Symptoms  Outcome: Outcome(s) achieved Date Met: 03/04/19    Goal: Absence of Fall  Outcome: Ongoing (interventions implemented as appropriate)      Problem: Hypertensive Disease/Crisis (Arterial) (Adult)  Goal: Signs and Symptoms of Listed Potential Problems Will be Absent, Minimized or Managed (Hypertensive Disease/Crisis)  Outcome: Ongoing (interventions implemented as appropriate)      Problem: Patient Care Overview  Goal: Plan of Care Review  Outcome: Ongoing (interventions implemented as appropriate)   03/04/19 9259   Coping/Psychosocial   Plan of Care Reviewed With patient   Plan of Care Review   Progress improving   OTHER   Outcome Summary this am sbp's in 202's after bp meds sbp betweem 117-130's. SB on monitor. started on hydralzine today. may be transferred back to [place] tomorrow. will continue to monitor.

## 2019-03-04 NOTE — PROGRESS NOTES
84-year-old female with past medical history of essential hypertension who presents from her facility and is placed in observation this morning with chest pain and accelerated hypertension.  Case discussed with cardiology team who feel this could be either related to blood pressure or musculoskeletal pain.  Plan to follow-up on echocardiogram.  Cardiology is assisting with titration of blood pressure medication.  Plan to monitor overnight and consider discharge from observation status if clinically improved.  Patient has no current chest pain at this time or other current events.    Constitutional:Awake, alert, elderly  HENT: NCAT, mucous membranes moist, neck supple  Respiratory: Clear to auscultation bilaterally, respiratory effort normal, nonlabored breathing   Cardiovascular: RRR, S1, S2, normal radial pulses  Gastrointestinal: Positive bowel sounds, soft, nontender, nondistended  Musculoskeletal: Normal musculature for age, no lower extremity edema, BMI 20  Psychiatric: Poor historian, cooperative, conversational  Neurologic: No slurred speech or facial droop, follows commands  Skin: No rashes or jaundice, warm

## 2019-03-05 VITALS
DIASTOLIC BLOOD PRESSURE: 55 MMHG | BODY MASS INDEX: 20.2 KG/M2 | HEIGHT: 61 IN | HEART RATE: 56 BPM | RESPIRATION RATE: 16 BRPM | OXYGEN SATURATION: 94 % | SYSTOLIC BLOOD PRESSURE: 115 MMHG | WEIGHT: 107 LBS | TEMPERATURE: 98.7 F

## 2019-03-05 PROBLEM — I16.0 HYPERTENSIVE URGENCY: Status: RESOLVED | Noted: 2018-09-01 | Resolved: 2019-03-05

## 2019-03-05 LAB
GLUCOSE BLDC GLUCOMTR-MCNC: 191 MG/DL (ref 70–130)
GLUCOSE BLDC GLUCOMTR-MCNC: 205 MG/DL (ref 70–130)

## 2019-03-05 PROCEDURE — 99217 PR OBSERVATION CARE DISCHARGE MANAGEMENT: CPT | Performed by: INTERNAL MEDICINE

## 2019-03-05 PROCEDURE — G0378 HOSPITAL OBSERVATION PER HR: HCPCS

## 2019-03-05 PROCEDURE — 82962 GLUCOSE BLOOD TEST: CPT

## 2019-03-05 RX ORDER — CLONIDINE HYDROCHLORIDE 0.2 MG/1
0.2 TABLET ORAL 2 TIMES DAILY
Qty: 60 TABLET | Refills: 0 | Status: ON HOLD | OUTPATIENT
Start: 2019-03-05 | End: 2019-04-05

## 2019-03-05 RX ORDER — HYDRALAZINE HYDROCHLORIDE 25 MG/1
25 TABLET, FILM COATED ORAL EVERY 8 HOURS SCHEDULED
Qty: 90 TABLET | Refills: 0 | Status: SHIPPED | OUTPATIENT
Start: 2019-03-05 | End: 2019-04-05 | Stop reason: HOSPADM

## 2019-03-05 RX ORDER — AMLODIPINE BESYLATE 10 MG/1
10 TABLET ORAL
Qty: 30 TABLET | Refills: 0 | Status: ON HOLD | OUTPATIENT
Start: 2019-03-05 | End: 2019-04-05

## 2019-03-05 RX ORDER — SPIRONOLACTONE 25 MG/1
25 TABLET ORAL DAILY
Status: DISCONTINUED | OUTPATIENT
Start: 2019-03-05 | End: 2019-03-05 | Stop reason: HOSPADM

## 2019-03-05 RX ORDER — SPIRONOLACTONE 25 MG/1
25 TABLET ORAL DAILY
Qty: 30 TABLET | Refills: 0 | Status: SHIPPED | OUTPATIENT
Start: 2019-03-06 | End: 2019-04-05 | Stop reason: HOSPADM

## 2019-03-05 RX ADMIN — SPIRONOLACTONE 25 MG: 25 TABLET ORAL at 08:21

## 2019-03-05 RX ADMIN — HYDRALAZINE HYDROCHLORIDE 25 MG: 25 TABLET ORAL at 06:28

## 2019-03-05 RX ADMIN — ISOSORBIDE MONONITRATE 60 MG: 60 TABLET, EXTENDED RELEASE ORAL at 08:16

## 2019-03-05 RX ADMIN — INSULIN LISPRO 2 UNITS: 100 INJECTION, SOLUTION INTRAVENOUS; SUBCUTANEOUS at 08:16

## 2019-03-05 RX ADMIN — CLONIDINE HYDROCHLORIDE 0.2 MG: 0.2 TABLET ORAL at 08:16

## 2019-03-05 RX ADMIN — SODIUM CHLORIDE, PRESERVATIVE FREE 3 ML: 5 INJECTION INTRAVENOUS at 08:17

## 2019-03-05 RX ADMIN — APIXABAN 2.5 MG: 2.5 TABLET, FILM COATED ORAL at 08:16

## 2019-03-05 RX ADMIN — ASPIRIN 81 MG CHEWABLE TABLET 81 MG: 81 TABLET CHEWABLE at 08:16

## 2019-03-05 RX ADMIN — INSULIN LISPRO 2 UNITS: 100 INJECTION, SOLUTION INTRAVENOUS; SUBCUTANEOUS at 12:53

## 2019-03-05 RX ADMIN — AMIODARONE HYDROCHLORIDE 200 MG: 200 TABLET ORAL at 08:16

## 2019-03-05 RX ADMIN — CITALOPRAM HYDROBROMIDE 20 MG: 20 TABLET ORAL at 08:16

## 2019-03-05 RX ADMIN — CETIRIZINE HYDROCHLORIDE 5 MG: 10 TABLET, FILM COATED ORAL at 08:16

## 2019-03-05 NOTE — DISCHARGE SUMMARY
[facility] Medicine Services  DISCHARGE SUMMARY    Patient Name: Mary Ramirez  : 1934  MRN: [id number]    Date of Admission: 3/3/2019  Date of Discharge: 3/5/2019  Primary Care Physician: Nirmal Lundberg MD    Consults     Date and Time Order Name Status Description    3/4/2019 0237 Inpatient Cardiology Consult Completed           Hospital Course     Presenting Problem:   Hypertensive urgency [I16.0]    Active Hospital Problems    Diagnosis  POA   • Bradycardia [R00.1]  Yes   • CKD (chronic kidney disease), stage III (CMS/[facility]) [N18.3]  Yes   • Mixed hyperlipidemia [E78.2]  Yes   • Chronic diastolic congestive heart failure (CMS/[facility]) [I50.32]  Yes   • Paroxysmal atrial fibrillation (CMS/[facility]) [I48.0]  Yes   • SSS (sick sinus syndrome) (CMS/[facility]) [I49.5]  Yes   • Anticoagulated [Z79.01]  Not Applicable   • Dementia [F03.90]  Yes   • Essential hypertension [I10]  Yes   • Type 2 diabetes mellitus with complication, with long-term current use of insulin (CMS/[facility]) [E11.8, Z79.4]  Not Applicable   • Coronary artery disease involving native coronary artery of native heart with angina pectoris (CMS/[facility]) [I25.119]  Yes      Resolved Hospital Problems    Diagnosis Date Resolved POA   • **Hypertensive urgency [I16.0] 2019 Yes          Hospital Course:  Mary Ramirez is a 84 y.o. female past medical history of essential hypertension who presents from her facility and is placed in observation this morning with chest pain and hypertensive urgency.  Case discussed with cardiology team who feel this could be either related to blood pressure or musculoskeletal pain.    Chest pain was reproducible on exam and son now notes that she has had some chronic issues with costochondritis.  Studies were negative for myocardial infarction.    Echocardiogram was checked and showed normal EF and some diastolic dysfunction.  Patient was not felt to be in congestive heart failure.  Cardiology team  adjusted her medications.  She seems somewhat sensitive to hydralazine and ultimately seems to be tolerating 25 mg 3 times daily per cardiology recommendations.  They recommended to stop diltiazem due to bradycardia and transition her to amlodipine.  They recommended to increase clonidine and started Aldactone.  Blood pressure is currently in the 150s range which is where cardiology prefers it.  At her advanced age they want to avoid overly aggressive blood pressure control.  Patient has been cleared for discharge home after long conversation over the phone with her cardiologist Dr. Flores today.  I also spoke with her son who feels comfortable with the plan.  He agrees to make sure she follows up with her primary care in 1 week for blood pressure check and labs.      At the time of discharge patient was told to take all medications as prescribed, keep all follow-up appointments, and call their doctor or return to the hospital with any worsening or concerning symptoms.    Please note that dragon voice recognition software was used to create this note and that transcription errors are possible.        Discharge Follow Up Recommendations for labs/diagnostics:  Recommend primary care follow-up within approximately 1 week for blood pressure check and consideration for repeat lab work such as BMP as Aldactone is new.    Recommend cardiology follow-up in approximately 1 month    Day of Discharge     HPI:   Patient states she feels very well.  She has no chest pain at rest but does note her chest hurts when she pushes on it.  She is eager to go home.  She is glad that her blood pressure is better.  Case was discussed with her son over the phone who is also in agreement with the plan.  She agrees to follow-up with primary care provider.    Review of Systems  No current fevers or chills  No current shortness of breath or cough  No current nausea, vomiting, or diarrhea  Denies palpitation, notes some reproducible chest wall  pain upon palpation but improved from yesterday      Vital Signs:   Temp:  [97.8 °F (36.6 °C)-98.6 °F (37 °C)] 98.6 °F (37 °C)  Heart Rate:  [53-70] 54  Resp:  [16-18] 16  BP: (113-202)/(54-75) 156/61     Physical Exam:    Constitutional:Awake, alert, elderly  HENT: NCAT, mucous membranes moist, neck supple  Respiratory: Clear to auscultation bilaterally, respiratory effort normal, nonlabored breathing   Cardiovascular: RRR, S1, S2, normal radial pulses  Gastrointestinal: Positive bowel sounds, soft, nontender, nondistended  Musculoskeletal: Reproducible chest wall pain to palpation,  normal musculature for age, no lower extremity edema, BMI 20  Psychiatric: Poor historian, cooperative, conversational  Neurologic: No slurred speech or facial droop, follows commands  Skin: No rashes or jaundice, warm        Pertinent  and/or Most Recent Results     Results from last 7 days   Lab Units 03/04/19  1426 03/04/19  0641 03/03/19  1843   WBC 10*3/mm3  --  7.94 6.54   HEMOGLOBIN g/dL  --  12.2 11.8   HEMATOCRIT %  --  40.1 37.4   PLATELETS 10*3/mm3  --  295 285   SODIUM mmol/L  --  135 132   POTASSIUM mmol/L 4.4 3.4* 3.8   CHLORIDE mmol/L  --  99 96*   CO2 mmol/L  --  27.0 28.0   BUN mg/dL  --  27* 37*   CREATININE mg/dL  --  1.20 1.53*   GLUCOSE mg/dL  --  206* 135*   CALCIUM mg/dL  --  9.3 9.4     Results from last 7 days   Lab Units 03/03/19  1843   BILIRUBIN mg/dL 0.3   ALK PHOS U/L 116*   ALT (SGPT) U/L 22   AST (SGOT) U/L 26           Invalid input(s): TG, LDLCALC, LDLREALC  Results from last 7 days   Lab Units 03/04/19  0641 03/04/19  0301 03/03/19  2144  03/03/19  1843   TSH mIU/mL 2.961  --   --   --   --    BNP pg/mL  --   --   --   --  123.0*   TROPONIN I ng/mL  --  0.014 0.00   < >  --     < > = values in this interval not displayed.       Brief Urine Lab Results  (Last result in the past 365 days)      Color   Clarity   Blood   Leuk Est   Nitrite   Protein   CREAT   Urine HCG        04/07/18 0839 Yellow Clear  Negative Negative Negative Negative               Microbiology Results Abnormal     None          Imaging Results (all)     Procedure Component Value Units Date/Time    XR Chest 1 View [[id number]] Collected:  03/03/19 1839     Updated:  03/03/19 2028    Narrative:       EXAM:    XR Chest, 1 View     EXAM DATE/TIME:    3/3/2019 6:39 PM     CLINICAL HISTORY:    84 years old, female; Pain; Other: See notes; Additional info: Chest pain   triage protocol     TECHNIQUE:    XR of the chest, 1 view.     COMPARISON:    CR XR CHEST 1 VW 1/10/2019 11:44 AM     FINDINGS:    Lungs:  Persistent right basilar ground glass and linear opacities, likely   small right pleural effusion with associated atelectasis and/or scarring,   similar to comparison study. Subsegmental atelectasis and/or scarring within   the periphery of the left midlung zone.    Pleural space:  See Lungs Finding.    Heart/Mediastinum: Normal.    Bones/joints:  Mild degenerative changes of the glenohumeral and   acromioclavicular joints.       Impression:       Persistent right basilar ground glass and linear opacities, likely small right   pleural effusion with associated atelectasis and/or scarring, similar to   comparison study.     THIS DOCUMENT HAS BEEN ELECTRONICALLY SIGNED BY NANO CORTES MD                    Results for orders placed during the hospital encounter of 03/03/19   Transthoracic Echo Complete With Contrast if Necessary Per Protocol    Narrative · Calculated EF = 66.0%  · Left ventricular systolic function is normal.  · Left ventricular wall thickness is consistent with moderate concentric   hypertrophy.  · Left atrial cavity size is severely dilated.  · Left ventricular diastolic dysfunction (grade I) consistent with   impaired relaxation.  · Mild tricuspid valve regurgitation is present.  · Mild-to-moderate mitral valve regurgitation is present  · There is calcification of the aortic valve.            Discharge Details        Discharge  Medications      New Medications      Instructions Start Date   amLODIPine 10 MG tablet  Commonly known as:  NORVASC   10 mg, Oral, Every 24 Hours Scheduled      hydrALAZINE 25 MG tablet  Commonly known as:  APRESOLINE   25 mg, Oral, Every 8 Hours Scheduled      spironolactone 25 MG tablet  Commonly known as:  ALDACTONE   25 mg, Oral, Daily         Changes to Medications      Instructions Start Date   acetaminophen 500 MG tablet  Commonly known as:  TYLENOL  What changed:  Another medication with the same name was removed. Continue taking this medication, and follow the directions you see here.   500 mg, Oral, Every 6 Hours PRN, Take one tablet twice a day for leg pain       CloNIDine 0.2 MG tablet  Commonly known as:  CATAPRES  What changed:    · medication strength  · how much to take  · additional instructions   0.2 mg, Oral, 2 Times Daily         Continue These Medications      Instructions Start Date   amiodarone 200 MG tablet  Commonly known as:  PACERONE   200 mg, Oral, Daily      apixaban 2.5 MG tablet tablet  Commonly known as:  ELIQUIS   2.5 mg, Oral, Every 12 Hours Scheduled      aspirin 81 MG chewable tablet   81 mg, Oral, Daily      bisacodyl 5 MG EC tablet  Commonly known as:  DULCOLAX   5 mg, Oral, Daily PRN      citalopram 20 MG tablet  Commonly known as:  CeleXA   20 mg, Oral, Daily      docusate sodium 100 MG capsule  Commonly known as:  COLACE   100 mg, Oral, 2 Times Daily      donepezil 5 MG tablet  Commonly known as:  ARICEPT   5 mg, Oral, Nightly      fluticasone 50 MCG/ACT nasal spray  Commonly known as:  FLONASE   2 sprays, Nasal, Daily      insulin aspart 100 UNIT/ML solution pen-injector sc pen  Commonly known as:  novoLOG FLEXPEN   2-5 Units, Subcutaneous, 4 Times Daily      isosorbide mononitrate 60 MG 24 hr tablet  Commonly known as:  IMDUR   60 mg, Oral, Daily      loratadine 10 MG tablet  Commonly known as:  CLARITIN   10 mg, Oral, Daily      metFORMIN 500 MG tablet  Commonly known as:  " GLUCOPHAGE   500 mg, Oral, Daily With Breakfast      MULTIVITAMINS PO   1 tablet, Oral, Daily      nitroglycerin 0.4 MG/SPRAY spray  Commonly known as:  NITROLINGUAL   1 spray, Sublingual, Every 5 Minutes PRN      ondansetron 4 MG tablet  Commonly known as:  ZOFRAN   4 mg, Oral, Every 8 Hours PRN      polyethylene glycol pack packet  Commonly known as:  MIRALAX   17 g, Oral, Daily         Stop These Medications    diltiazem  MG 24 hr capsule  Commonly known as:  DILACOR XR            Allergies   Allergen Reactions   • Penicillins Other (See Comments)     Pt states \"i don't know\"         Discharge Disposition:  Home or Self Care    Discharge Diet:  Diet Order   Procedures   • Diet Regular; Cardiac, Consistent Carbohydrate, Renal         Discharge Activity:   Activity Instructions     Activity as Tolerated              CODE STATUS:    Code Status and Medical Interventions:   Ordered at: 03/04/19 0152     Level Of Support Discussed With:    Patient     Code Status:    CPR     Medical Interventions (Level of Support Prior to Arrest):    Full         Future Appointments   Date Time Provider Department Center   4/16/2019  9:15 AM Nirmal Lundberg MD [facility]   6/19/2019 10:30 AM Ta Flores III, MD [facility] None       Additional Instructions for the Follow-ups that You Need to Schedule     Discharge Follow-up with PCP   As directed       Currently Documented PCP:    Nirmal Lundberg MD    PCP Phone Number:    [phone]     Follow Up Details:  Primary care follow-up within 1 week for blood pressure check and recommend consideration for lab studies.         Discharge Follow-up with Specified Provider: Cardiology follow-up with Dr. Flores recommended within 1 month.; 1 Month   As directed      To:  Cardiology follow-up with Dr. Flores recommended within 1 month.    Follow Up:  1 Month               Time Spent on Discharge:  38 minutes.  Case discussed with her son over the phone at length and " with Dr. Flores her cardiologist today.    Electronically signed by Reed Richardson MD, 03/05/19, 11:22 AM.

## 2019-03-05 NOTE — PLAN OF CARE
Problem: Patient Care Overview  Goal: Plan of Care Review   03/05/19 0411   Coping/Psychosocial   Plan of Care Reviewed With patient   Plan of Care Review   Progress improving   OTHER   Outcome Summary Pt asleep most of HS on RA. -180. Up to BR w/walker and standby assist.

## 2019-03-05 NOTE — PROGRESS NOTES
Continued Stay Note  [facility]     Patient Name: Mary Ramirez  MRN: [id number]  Today's Date: 3/5/2019    Admit Date: 3/3/2019    Discharge Plan     Row Name 03/05/19 1120       Plan    Plan  Back to [facility]    Patient/Family in Agreement with Plan  yes    Plan Comments  Pt may return today to her assisted living apartment if medically ready. The van from [facility] will be here to pick her up at 14:30 pm. Please have patient down at the maternity exit at the [address] [place] [address] prior to 14:30. Nurse to call report to [phone] (ask for the personal care or assisted living unit) and fax summary to [phone]        Discharge Codes    No documentation.       Expected Discharge Date and Time     Expected Discharge Date Expected Discharge Time    Mar 5, 2019             Tiffanie Fountain

## 2019-03-06 ENCOUNTER — TRANSITIONAL CARE MANAGEMENT TELEPHONE ENCOUNTER (OUTPATIENT)
Dept: INTERNAL MEDICINE | Facility: CLINIC | Age: 84
End: 2019-03-06

## 2019-03-06 ENCOUNTER — READMISSION MANAGEMENT (OUTPATIENT)
Dept: CALL CENTER | Facility: HOSPITAL | Age: 84
End: 2019-03-06

## 2019-03-06 PROBLEM — Z00.00 ANNUAL PHYSICAL EXAM: Status: ACTIVE | Noted: 2019-03-06

## 2019-03-06 PROBLEM — S72.90XA CLOSED FRACTURE OF FEMUR (HCC): Status: ACTIVE | Noted: 2019-03-06

## 2019-03-06 PROBLEM — Z91.81 AT RISK FOR FALLS: Status: ACTIVE | Noted: 2019-03-06

## 2019-03-06 PROBLEM — I25.10 CORONARY ARTERIOSCLEROSIS AFTER PERCUTANEOUS TRANSLUMINAL CORONARY ANGIOPLASTY (PTCA): Status: ACTIVE | Noted: 2019-03-06

## 2019-03-06 PROBLEM — M79.673 FOOT PAIN: Status: ACTIVE | Noted: 2019-03-06

## 2019-03-06 PROBLEM — E11.42 DIABETIC POLYNEUROPATHY (HCC): Status: ACTIVE | Noted: 2019-03-06

## 2019-03-06 PROBLEM — Z98.61 CORONARY ARTERIOSCLEROSIS AFTER PERCUTANEOUS TRANSLUMINAL CORONARY ANGIOPLASTY (PTCA): Status: ACTIVE | Noted: 2019-03-06

## 2019-03-06 PROBLEM — K57.30 DIVERTICULAR DISEASE OF COLON: Status: ACTIVE | Noted: 2019-03-06

## 2019-03-06 PROBLEM — K21.9 GERD (GASTROESOPHAGEAL REFLUX DISEASE): Status: ACTIVE | Noted: 2019-03-06

## 2019-03-06 PROBLEM — I73.9 PERIPHERAL ARTERY INSUFFICIENCY (HCC): Status: ACTIVE | Noted: 2019-03-06

## 2019-03-06 PROBLEM — N28.9 RENAL DISORDER: Status: ACTIVE | Noted: 2019-03-06

## 2019-03-06 PROBLEM — E55.9 VITAMIN D DEFICIENCY: Status: ACTIVE | Noted: 2019-03-06

## 2019-03-06 NOTE — OUTREACH NOTE
EDGAR call completed.  Please refer to TCM call flowsheet for call documentation.  Mr. Ramirez reports that he has not seen patient since yesterday but she was doing well at that time.  He denies n/v/d/c and questions regarding meds and d/c instructions from hospital.  He reports that she has chronic pain in her legs.  Appointment confirmed.

## 2019-03-07 NOTE — OUTREACH NOTE
Prep Survey      Responses   Facility patient discharged from?  [facility]   Is patient eligible?  Yes   Discharge diagnosis  Hypertensive urgency   Does the patient have one of the following disease processes/diagnoses(primary or secondary)?  Other   Does the patient have Home health ordered?  No   Is there a DME ordered?  No   Prep survey completed?  Yes          Yun Martin RN

## 2019-03-08 NOTE — PROGRESS NOTES
"[facility]  Heart and Valve Center      Encounter Date:03/11/2019     Mary Ramirez  [address] [place] [address]  [unfilled]    1934    Nirmal Lundberg MD    Mary WADE James is a 84 y.o. female.      Subjective:     Chief Complaint:  Hospital follow up- CHF     HPI   Patient is a 84-year-old female with past medical history significant for chronic diastolic heart failure, paroxysmal atrial fibrillation, dementia, hypertension, diabetes, coronary artery disease with remote PCI, chronic kidney disease stage III who presents to the [facility] to follow-up after recent hospitalization.  Patient was admitted 3/3/2019 to 3/5/2019 from her residence at [facility] with chest pain and hypertension urgency.  Echocardiogram showed normal EF with some diastolic dysfunction.  Patient was not felt to be in congestive heart failure.  Diltiazem was stopped due to bradycardia and she was changed amlodipine.  She was sensitive to hydralazine but seemed to tolerate 25 mg 3 times a day.  Her clonidine was increased and she was started on Aldactone.  Upon discharge her blood pressure was in the 150s. She reports ongoing daily \"sharp\" chest pains. She has had this for years. Normal stress in September 2018. Lasts for less than a minute. She points to left chest wall and tells me it is tender to touch.  She is accompanied by her son today and does not bring any paperwork from the [facility].  They are both uncertain about what her blood pressure has been running since discharge.  She denies orthopnea, shortness of breath, edema or palpitations. Patient notes significant anxiety regarding her family history of CAD and worried that the same is going to happen to her    Patient Active Problem List   Diagnosis   • Fall   • Fracture, intertrochanteric, left femur (CMS/HCC)   • Essential hypertension   • Type 2 diabetes mellitus with complication, with long-term current use of insulin (CMS/HCC) "   • Coronary artery disease involving native coronary artery of native heart with angina pectoris (CMS/[facility])   • Dementia   • Paroxysmal atrial fibrillation (CMS/[facility])   • Anticoagulated   • SSS (sick sinus syndrome) (CMS/[facility])   • Chronic diastolic congestive heart failure (CMS/[facility])   • DEANGELO (acute kidney injury) (CMS/[facility])   • Benign essential hypertension   • CKD (chronic kidney disease), stage III (CMS/[facility])   • Mixed hyperlipidemia   • Major depressive disorder, single episode, mild (CMS/[facility])   • Diabetes mellitus (CMS/[facility])   • Chronic allergic rhinitis   • Memory loss   • Bradycardia   • At risk for falls   • Closed fracture of femur (CMS/[facility])   • Coronary arteriosclerosis after percutaneous transluminal coronary angioplasty (PTCA)   • Diabetic polyneuropathy (CMS/[facility])   • Diverticular disease of colon   • Foot pain   • GERD (gastroesophageal reflux disease)   • Peripheral artery insufficiency (CMS/[facility])   • Annual physical exam   • Vitamin D deficiency   • Renal disorder       Past Medical History:   Diagnosis Date   • Atrial fibrillation (CMS/[facility])    • Benign essential hypertension    • CAD (coronary artery disease)    • Carotid artery disease (CMS/[facility])    • CHF (congestive heart failure) (CMS/[facility])    • Chronic allergic rhinitis    • CKD (chronic kidney disease), stage III (CMS/[facility])    • DDD (degenerative disc disease), cervical    • Dementia    • Diabetes mellitus (CMS/[facility])    • Diverticulosis     WITH PERFORATION    • Hypertension    • Major depressive disorder, single episode, mild (CMS/[facility])    • Memory loss    • Mixed hyperlipidemia    • SSS (sick sinus syndrome) (CMS/[facility])    • TIA (transient ischemic attack) 01/2016       Past Surgical History:   Procedure Laterality Date   • ANKLE SURGERY Left 1996   • ATHERECTOMY  1995   • ATHRECTOMY ILIAC, FEMORAL, TIBIAL ARTERY     • BREAST LUMPECTOMY Left 1981   • COLON SURGERY     • COLOSTOMY  1983   • CORONARY STENT PLACEMENT  2001   • FINGER FRACTURE SURGERY Right    •  "HIP TROCANTERIC NAILING WITH INTRAMEDULLARY HIP SCREW Left 2017   • HYSTERECTOMY     • OTHER SURGICAL HISTORY      REANASTAMOSIS        Family History   Problem Relation Age of Onset   • Hypertension Mother    • Coronary artery disease Mother 56        PREMATURE    • Lung cancer Father    • Hypertension Father    • Osteoporosis Sister    • Lung cancer Brother        Social History     Socioeconomic History   • Marital status:      Spouse name: N/A   • Number of children: 2   • Years of education: H.S   • Highest education level: Not on file   Social Needs   • Financial resource strain: Not on file   • Food insecurity - worry: Not on file   • Food insecurity - inability: Not on file   • Transportation needs - medical: Not on file   • Transportation needs - non-medical: Not on file   Occupational History   • Occupation:      Employer: RETIRED   Tobacco Use   • Smoking status: Former Smoker     Years: 2.00     Types: Cigarettes     Start date: 1975     Last attempt to quit: 1978     Years since quittin.3   • Smokeless tobacco: Never Used   • Tobacco comment: QUIT DATE 1984   Substance and Sexual Activity   • Alcohol use: No   • Drug use: No   • Sexual activity: No   Other Topics Concern   • Not on file   Social History Narrative    [facility]    Patient drinks 2 cups of caffeine per day.,       Allergies   Allergen Reactions   • Penicillins Other (See Comments)     Pt states \"i don't know\"         Current Outpatient Medications:   •  acetaminophen (TYLENOL) 500 MG tablet, Take 500 mg by mouth Every 6 (Six) Hours As Needed for Mild Pain . Take one tablet twice a day for leg pain, Disp: , Rfl:   •  amiodarone (PACERONE) 200 MG tablet, Take 1 tablet by mouth Daily., Disp: , Rfl:   •  amLODIPine (NORVASC) 10 MG tablet, Take 1 tablet by mouth Daily for 30 days., Disp: 30 tablet, Rfl: 0  •  apixaban (ELIQUIS) 2.5 MG tablet tablet, Take 1 " tablet by mouth Every 12 (Twelve) Hours., Disp: 60 tablet, Rfl:   •  aspirin 81 MG chewable tablet, Chew 81 mg Daily., Disp: , Rfl:   •  bisacodyl (DULCOLAX) 5 MG EC tablet, Take 1 tablet by mouth Daily As Needed for Constipation., Disp: , Rfl:   •  citalopram (CeleXA) 20 MG tablet, Take 20 mg by mouth Daily., Disp: , Rfl:   •  CloNIDine (CATAPRES) 0.2 MG tablet, Take 1 tablet by mouth 2 (Two) Times a Day for 30 days., Disp: 60 tablet, Rfl: 0  •  docusate sodium (COLACE) 100 MG capsule, Take 100 mg by mouth 2 (Two) Times a Day., Disp: , Rfl:   •  donepezil (ARICEPT) 5 MG tablet, Take 5 mg by mouth Every Night., Disp: , Rfl:   •  fluticasone (FLONASE) 50 MCG/ACT nasal spray, 2 sprays into the nostril(s) as directed by provider Daily., Disp: , Rfl:   •  hydrALAZINE (APRESOLINE) 25 MG tablet, Take 1 tablet by mouth Every 8 (Eight) Hours for 30 days., Disp: 90 tablet, Rfl: 0  •  insulin aspart (novoLOG FLEXPEN) 100 UNIT/ML solution pen-injector sc pen, Inject 2-5 Units under the skin into the appropriate area as directed 4 (Four) Times a Day., Disp: , Rfl:   •  isosorbide mononitrate (IMDUR) 60 MG 24 hr tablet, Take 60 mg by mouth Daily., Disp: , Rfl:   •  loratadine (CLARITIN) 10 MG tablet, Take 10 mg by mouth Daily., Disp: , Rfl:   •  metFORMIN (GLUCOPHAGE) 500 MG tablet, Take 1 tablet by mouth Daily With Breakfast., Disp: , Rfl:   •  Multiple Vitamin (MULTIVITAMINS PO), Take 1 tablet by mouth Daily., Disp: , Rfl:   •  nitroglycerin (NITROLINGUAL) 0.4 MG/SPRAY spray, Place 1 spray under the tongue Every 5 (Five) Minutes As Needed for Chest Pain., Disp: , Rfl:   •  ondansetron (ZOFRAN) 4 MG tablet, Take 4 mg by mouth Every 8 (Eight) Hours As Needed for Nausea or Vomiting., Disp: , Rfl:   •  polyethylene glycol (MIRALAX) pack packet, Take 17 g by mouth Daily., Disp: , Rfl:   •  spironolactone (ALDACTONE) 25 MG tablet, Take 1 tablet by mouth Daily for 30 days., Disp: 30 tablet, Rfl: 0    The following portions of the  "patient's history were reviewed and updated as appropriate: allergies, current medications, past family history, past medical history, past social history, past surgical history and problem list.    Review of Systems   Constitution: Negative for chills and fever.   HENT: Negative.    Eyes: Negative.    Cardiovascular: Positive for chest pain and irregular heartbeat. Negative for claudication, cyanosis, dyspnea on exertion, leg swelling, near-syncope, orthopnea, palpitations and syncope.   Respiratory: Negative for cough, shortness of breath and snoring.    Endocrine: Negative.    Hematologic/Lymphatic: Bruises/bleeds easily.   Skin: Negative for poor wound healing.   Musculoskeletal: Negative.    Gastrointestinal: Negative for abdominal pain, heartburn, hematemesis, melena, nausea and vomiting.   Genitourinary: Negative.  Negative for hematuria.   Neurological: Negative.    Psychiatric/Behavioral: Negative.    Allergic/Immunologic: Negative.        Objective:     Vitals:    03/11/19 1015 03/11/19 1022 03/11/19 1023   BP: 142/66 130/61 125/54   BP Location: Left arm Right arm Right arm   Patient Position: Sitting Sitting Standing   Cuff Size: Adult Adult Adult   Pulse: 65 66 68   Resp: 16     Temp: 97.2 °F (36.2 °C)     TempSrc: Temporal     SpO2: 98%     Weight: 49.7 kg (109 lb 8 oz)     Height: 162.6 cm (64\")         Physical Exam   Constitutional: She is oriented to person, place, and time. She appears well-developed and well-nourished. No distress.   HENT:   Head: Normocephalic.   Eyes: Conjunctivae are normal. Pupils are equal, round, and reactive to light.   Neck: Neck supple. No JVD present. No thyromegaly present.   Cardiovascular: Normal rate, regular rhythm, normal heart sounds and intact distal pulses. Exam reveals no gallop and no friction rub.   No murmur heard.  Pulmonary/Chest: Effort normal and breath sounds normal. No respiratory distress. She has no wheezes. She has no rales. She exhibits tenderness " (chest pain is reproducible with palpation).   Abdominal: Soft. Bowel sounds are normal.   Musculoskeletal: Normal range of motion. She exhibits no edema.   Neurological: She is alert and oriented to person, place, and time.   Skin: Skin is warm and dry.   Psychiatric: She has a normal mood and affect. Her behavior is normal. Thought content normal.   Vitals reviewed.      Lab and Diagnostic Review:  Echo 3/4/19  · Calculated EF = 66.0%  · Left ventricular systolic function is normal.  · Left ventricular wall thickness is consistent with moderate concentric hypertrophy.  · Left atrial cavity size is severely dilated.  · Left ventricular diastolic dysfunction (grade I) consistent with impaired relaxation.  · Mild tricuspid valve regurgitation is present.  · Mild-to-moderate mitral valve regurgitation is present  · There is calcification of the aortic valve.         Assessment and Plan:   1. Chronic diastolic congestive heart failure (CMS/HCC)  - Euvolemic  - On no lasix with no recent CHF exacerbation  - Heart failure education provided today including signs and symptoms, causes of heart failure, medications, role of Heart Failure Center and when to call    2. Precordial pain  - Atypical for ischemia. Normal stress in 9/2018    3. Paroxysmal atrial fibrillation (CMS/HCC)  - Maintaining NSR on amiodarone and eliquis    4. Coronary artery disease involving native coronary artery of native heart without angina pectoris  - Stable on imdur, ASA, nitro PRN    5. Essential hypertension  - Controlled  - HTN Education provided today including signs and symptoms, medication management, daily blood pressure monitoring. Patient encouraged to call the Heart and Valve center with any blood pressure consistently greater than 150/90. Avoid aggressive BP control due to age and frailty   - Repeat BMP with PCP this week after starting aldactone      Keep follow up with PCP this week and Dr. Flores in April FU with C PRN    It has  been a pleasure to participate in the care of this patient.  Patient was instructed to call the Heart and Valve Center with any questions, concerns, or worsening symptoms.    *Please note that portions of this note were completed with a voice recognition program. Efforts were made to edit the dictations, but occasionally words are mistranscribed.

## 2019-03-11 ENCOUNTER — OFFICE VISIT (OUTPATIENT)
Dept: CARDIOLOGY | Facility: HOSPITAL | Age: 84
End: 2019-03-11

## 2019-03-11 ENCOUNTER — READMISSION MANAGEMENT (OUTPATIENT)
Dept: CALL CENTER | Facility: HOSPITAL | Age: 84
End: 2019-03-11

## 2019-03-11 VITALS
SYSTOLIC BLOOD PRESSURE: 125 MMHG | RESPIRATION RATE: 16 BRPM | HEART RATE: 68 BPM | BODY MASS INDEX: 18.69 KG/M2 | OXYGEN SATURATION: 98 % | DIASTOLIC BLOOD PRESSURE: 54 MMHG | HEIGHT: 64 IN | TEMPERATURE: 97.2 F | WEIGHT: 109.5 LBS

## 2019-03-11 DIAGNOSIS — I25.10 CORONARY ARTERY DISEASE INVOLVING NATIVE CORONARY ARTERY OF NATIVE HEART WITHOUT ANGINA PECTORIS: ICD-10-CM

## 2019-03-11 DIAGNOSIS — I50.32 CHRONIC DIASTOLIC CONGESTIVE HEART FAILURE (HCC): Primary | ICD-10-CM

## 2019-03-11 DIAGNOSIS — I10 ESSENTIAL HYPERTENSION: ICD-10-CM

## 2019-03-11 DIAGNOSIS — I48.0 PAROXYSMAL ATRIAL FIBRILLATION (HCC): ICD-10-CM

## 2019-03-11 DIAGNOSIS — R07.2 PRECORDIAL PAIN: ICD-10-CM

## 2019-03-11 PROCEDURE — 99214 OFFICE O/P EST MOD 30 MIN: CPT | Performed by: NURSE PRACTITIONER

## 2019-03-11 NOTE — OUTREACH NOTE
Medical Week 1 Survey      Responses   Facility patient discharged from?  [facility]   Does the patient have one of the following disease processes/diagnoses(primary or secondary)?  Other   Is there a successful TCM telephone encounter documented?  Yes          Kim Ellis RN

## 2019-03-13 ENCOUNTER — LAB REQUISITION (OUTPATIENT)
Dept: LAB | Facility: HOSPITAL | Age: 84
End: 2019-03-13

## 2019-03-13 ENCOUNTER — OFFICE VISIT (OUTPATIENT)
Dept: INTERNAL MEDICINE | Facility: CLINIC | Age: 84
End: 2019-03-13

## 2019-03-13 VITALS
SYSTOLIC BLOOD PRESSURE: 118 MMHG | DIASTOLIC BLOOD PRESSURE: 50 MMHG | HEIGHT: 64 IN | BODY MASS INDEX: 19.29 KG/M2 | WEIGHT: 113 LBS | TEMPERATURE: 97.4 F | HEART RATE: 62 BPM

## 2019-03-13 DIAGNOSIS — I10 ESSENTIAL HYPERTENSION: ICD-10-CM

## 2019-03-13 DIAGNOSIS — I50.32 CHRONIC DIASTOLIC CONGESTIVE HEART FAILURE (HCC): ICD-10-CM

## 2019-03-13 DIAGNOSIS — I10 BENIGN ESSENTIAL HYPERTENSION: Primary | ICD-10-CM

## 2019-03-13 DIAGNOSIS — I48.0 PAROXYSMAL ATRIAL FIBRILLATION (HCC): ICD-10-CM

## 2019-03-13 DIAGNOSIS — E11.42 DIABETIC POLYNEUROPATHY ASSOCIATED WITH TYPE 2 DIABETES MELLITUS (HCC): ICD-10-CM

## 2019-03-13 DIAGNOSIS — Z00.00 ROUTINE GENERAL MEDICAL EXAMINATION AT A HEALTH CARE FACILITY: ICD-10-CM

## 2019-03-13 DIAGNOSIS — N18.30 CKD (CHRONIC KIDNEY DISEASE), STAGE III (HCC): ICD-10-CM

## 2019-03-13 LAB
ANION GAP SERPL CALCULATED.3IONS-SCNC: 11 MMOL/L (ref 3–11)
BUN BLD-MCNC: 37 MG/DL (ref 9–23)
BUN/CREAT SERPL: 20 (ref 7–25)
CALCIUM SPEC-SCNC: 9.4 MG/DL (ref 8.7–10.4)
CHLORIDE SERPL-SCNC: 98 MMOL/L (ref 99–109)
CO2 SERPL-SCNC: 27 MMOL/L (ref 20–31)
CREAT BLD-MCNC: 1.85 MG/DL (ref 0.6–1.3)
GFR SERPL CREATININE-BSD FRML MDRD: 26 ML/MIN/1.73
GLUCOSE BLD-MCNC: 164 MG/DL (ref 70–100)
POTASSIUM BLD-SCNC: 6 MMOL/L (ref 3.5–5.5)
SODIUM BLD-SCNC: 136 MMOL/L (ref 132–146)

## 2019-03-13 PROCEDURE — 99495 TRANSJ CARE MGMT MOD F2F 14D: CPT | Performed by: INTERNAL MEDICINE

## 2019-03-13 PROCEDURE — 80048 BASIC METABOLIC PNL TOTAL CA: CPT | Performed by: INTERNAL MEDICINE

## 2019-03-13 PROCEDURE — 36415 COLL VENOUS BLD VENIPUNCTURE: CPT | Performed by: INTERNAL MEDICINE

## 2019-03-13 NOTE — PROGRESS NOTES
Transitional Care Follow Up Visit  Subjective     Mary Ramirez is a 84 y.o. female who presents for a transitional care management visit.    Within 48 business hours after discharge our office contacted her via telephone to coordinate her care and needs.      I reviewed and discussed the details of that call along with the discharge summary, hospital problems, inpatient lab results, inpatient diagnostic studies, and consultation reports with Mary.     Current outpatient and discharge medications have been reconciled for the patient.    Date of TCM Phone Call 3/6/2019   [facility]   Date of Discharge 3/5/2019     Risk for Readmission (LACE) Score: 10 (3/5/2019  6:00 AM)      History of Present Illness   Course During Hospital Stay:  Ms Ramirez comes in for follow-up of her recent hospitalization for hypertensive urgency.  On the fourth she was taken by ambulance from the [facility] to the hospital with a systolic blood pressure over 200.  She was admitted to the hospital she underwent medication changes including transition from diltiazem to amlodipine, increasing clonidine, adding hydralazine and Aldactone.  Blood pressure today is 118/50.  Her systolic 2 days ago was 125 at valve clinic.  She has cognitive impairment, so she has some difficulty remembering events, but says 2-3 times a week she feels lightheaded when she gets up.     The following portions of the patient's history were reviewed and updated as appropriate: allergies, current medications, past family history, past medical history, past social history, past surgical history and problem list.    Review of Systems   Constitutional: Positive for fatigue.   HENT: Positive for rhinorrhea.    Respiratory: Positive for cough.    Cardiovascular: Positive for palpitations.   Allergic/Immunologic: Positive for environmental allergies.   Neurological: Positive for dizziness. Negative for speech difficulty.       Objective   Physical Exam    Constitutional:   Thin, elderly   Cardiovascular: Normal rate, regular rhythm, normal heart sounds and intact distal pulses.   Pulmonary/Chest: Effort normal and breath sounds normal.   Musculoskeletal: She exhibits no edema.       Assessment/Plan   Mary was seen today for follow-up.    Diagnoses and all orders for this visit:    Benign essential hypertension  -     Basic metabolic panel; Future  -     Basic metabolic panel    Blood pressure now seems to be on the low side, particularly for this patient and with her complaints of lightheadedness and dizziness.  She uses a walker to ambulate at the [facility], and certainly is at increased risk of falls.  Her granddaughter will ask the [facility] staff to send me readings of her blood pressure since she returned from the hospital, and will make further adjustments when data is available.    Chronic diastolic congestive heart failure compensated    Chronic kidney disease stage III, labs to monitor today    Paroxysmal atrial fibrillation stable on Eliquis    Dementia, continue donepezil    Diabetic neuropathy currently stable.    Low BMI, improved with weight up to 113 and BMI 19.

## 2019-03-15 ENCOUNTER — READMISSION MANAGEMENT (OUTPATIENT)
Dept: CALL CENTER | Facility: HOSPITAL | Age: 84
End: 2019-03-15

## 2019-03-15 NOTE — OUTREACH NOTE
Medical Week 2 Survey      Responses   Facility patient discharged from?  [facility]   Does the patient have one of the following disease processes/diagnoses(primary or secondary)?  Other   Week 2 attempt successful?  Yes   Call start time  1529   Discharge diagnosis  Hypertensive urgency   Call end time  1533   Meds reviewed with patient/caregiver?  Yes   Is the patient taking all medications as directed (includes completed medication regime)?  Yes   Has the patient kept scheduled appointments due by today?  Yes   What is the patient's perception of their health status since discharge?  Improving   Week 2 Call Completed?  Yes   Graduated  Yes   Did the patient feel the follow up calls were helpful during their recovery period?  Yes   Was the number of calls appropriate?  Yes          Guera Barnett, RN

## 2019-03-29 ENCOUNTER — APPOINTMENT (OUTPATIENT)
Dept: GENERAL RADIOLOGY | Facility: HOSPITAL | Age: 84
End: 2019-03-29

## 2019-03-29 ENCOUNTER — ANESTHESIA EVENT (OUTPATIENT)
Dept: PERIOP | Facility: HOSPITAL | Age: 84
End: 2019-03-29

## 2019-03-29 ENCOUNTER — ANESTHESIA (OUTPATIENT)
Dept: EMERGENCY DEPT | Facility: HOSPITAL | Age: 84
End: 2019-03-29

## 2019-03-29 ENCOUNTER — ANESTHESIA (OUTPATIENT)
Dept: PERIOP | Facility: HOSPITAL | Age: 84
End: 2019-03-29

## 2019-03-29 ENCOUNTER — ANESTHESIA EVENT (OUTPATIENT)
Dept: EMERGENCY DEPT | Facility: HOSPITAL | Age: 84
End: 2019-03-29

## 2019-03-29 ENCOUNTER — HOSPITAL ENCOUNTER (INPATIENT)
Facility: HOSPITAL | Age: 84
LOS: 7 days | Discharge: NURSING, REHAB OR LONG-TERM CARE | End: 2019-04-05
Attending: EMERGENCY MEDICINE | Admitting: ORTHOPAEDIC SURGERY

## 2019-03-29 DIAGNOSIS — I10 HYPERTENSION, UNSPECIFIED TYPE: ICD-10-CM

## 2019-03-29 DIAGNOSIS — Z78.9 IMPAIRED MOBILITY AND ADLS: ICD-10-CM

## 2019-03-29 DIAGNOSIS — Z74.09 IMPAIRED MOBILITY AND ADLS: ICD-10-CM

## 2019-03-29 DIAGNOSIS — S72.141A CLOSED DISPLACED INTERTROCHANTERIC FRACTURE OF RIGHT FEMUR, INITIAL ENCOUNTER (HCC): ICD-10-CM

## 2019-03-29 DIAGNOSIS — S72.001A CLOSED FRACTURE OF RIGHT HIP, INITIAL ENCOUNTER (HCC): Primary | ICD-10-CM

## 2019-03-29 LAB
ALBUMIN SERPL-MCNC: 4.3 G/DL (ref 3.2–4.8)
ALBUMIN/GLOB SERPL: 1.7 G/DL (ref 1.5–2.5)
ALP SERPL-CCNC: 123 U/L (ref 25–100)
ALT SERPL W P-5'-P-CCNC: 38 U/L (ref 7–40)
ANION GAP SERPL CALCULATED.3IONS-SCNC: 10 MMOL/L (ref 3–11)
AST SERPL-CCNC: 35 U/L (ref 0–33)
BASOPHILS # BLD AUTO: 0.04 10*3/MM3 (ref 0–0.2)
BASOPHILS NFR BLD AUTO: 0.4 % (ref 0–1)
BILIRUB SERPL-MCNC: 0.4 MG/DL (ref 0.3–1.2)
BUN BLD-MCNC: 30 MG/DL (ref 9–23)
BUN/CREAT SERPL: 25 (ref 7–25)
CALCIUM SPEC-SCNC: 9.2 MG/DL (ref 8.7–10.4)
CHLORIDE SERPL-SCNC: 101 MMOL/L (ref 99–109)
CO2 SERPL-SCNC: 25 MMOL/L (ref 20–31)
CREAT BLD-MCNC: 1.2 MG/DL (ref 0.6–1.3)
DEPRECATED RDW RBC AUTO: 48.5 FL (ref 37–54)
EOSINOPHIL # BLD AUTO: 0.1 10*3/MM3 (ref 0–0.3)
EOSINOPHIL NFR BLD AUTO: 0.9 % (ref 0–3)
ERYTHROCYTE [DISTWIDTH] IN BLOOD BY AUTOMATED COUNT: 15.9 % (ref 11.3–14.5)
GFR SERPL CREATININE-BSD FRML MDRD: 43 ML/MIN/1.73
GLOBULIN UR ELPH-MCNC: 2.6 GM/DL
GLUCOSE BLD-MCNC: 219 MG/DL (ref 70–100)
GLUCOSE BLDC GLUCOMTR-MCNC: 169 MG/DL (ref 70–130)
GLUCOSE BLDC GLUCOMTR-MCNC: 262 MG/DL (ref 70–130)
HCT VFR BLD AUTO: 37.6 % (ref 34.5–44)
HGB BLD-MCNC: 11.9 G/DL (ref 11.5–15.5)
IMM GRANULOCYTES # BLD AUTO: 0.02 10*3/MM3 (ref 0–0.05)
IMM GRANULOCYTES NFR BLD AUTO: 0.2 % (ref 0–0.6)
LYMPHOCYTES # BLD AUTO: 1.34 10*3/MM3 (ref 0.6–4.8)
LYMPHOCYTES NFR BLD AUTO: 12.6 % (ref 24–44)
MCH RBC QN AUTO: 26.3 PG (ref 27–31)
MCHC RBC AUTO-ENTMCNC: 31.6 G/DL (ref 32–36)
MCV RBC AUTO: 83.2 FL (ref 80–99)
MONOCYTES # BLD AUTO: 0.66 10*3/MM3 (ref 0–1)
MONOCYTES NFR BLD AUTO: 6.2 % (ref 0–12)
NEUTROPHILS # BLD AUTO: 8.49 10*3/MM3 (ref 1.5–8.3)
NEUTROPHILS NFR BLD AUTO: 79.7 % (ref 41–71)
PLATELET # BLD AUTO: 267 10*3/MM3 (ref 150–450)
PMV BLD AUTO: 11.6 FL (ref 6–12)
POTASSIUM BLD-SCNC: 3.7 MMOL/L (ref 3.5–5.5)
PROT SERPL-MCNC: 6.9 G/DL (ref 5.7–8.2)
RBC # BLD AUTO: 4.52 10*6/MM3 (ref 3.89–5.14)
SODIUM BLD-SCNC: 136 MMOL/L (ref 132–146)
WBC NRBC COR # BLD: 10.65 10*3/MM3 (ref 3.5–10.8)

## 2019-03-29 PROCEDURE — 25010000002 FENTANYL CITRATE (PF) 100 MCG/2ML SOLUTION: Performed by: EMERGENCY MEDICINE

## 2019-03-29 PROCEDURE — 73552 X-RAY EXAM OF FEMUR 2/>: CPT

## 2019-03-29 PROCEDURE — 99285 EMERGENCY DEPT VISIT HI MDM: CPT

## 2019-03-29 PROCEDURE — 80053 COMPREHEN METABOLIC PANEL: CPT | Performed by: EMERGENCY MEDICINE

## 2019-03-29 PROCEDURE — 93005 ELECTROCARDIOGRAM TRACING: CPT | Performed by: EMERGENCY MEDICINE

## 2019-03-29 PROCEDURE — 25010000002 HYDROMORPHONE 1 MG/ML SOLUTION: Performed by: INTERNAL MEDICINE

## 2019-03-29 PROCEDURE — 82962 GLUCOSE BLOOD TEST: CPT

## 2019-03-29 PROCEDURE — 85025 COMPLETE CBC W/AUTO DIFF WBC: CPT | Performed by: EMERGENCY MEDICINE

## 2019-03-29 PROCEDURE — 72170 X-RAY EXAM OF PELVIS: CPT

## 2019-03-29 PROCEDURE — 99222 1ST HOSP IP/OBS MODERATE 55: CPT | Performed by: ORTHOPAEDIC SURGERY

## 2019-03-29 PROCEDURE — 25010000002 ROPIVACAINE PER 1 MG: Performed by: NURSE ANESTHETIST, CERTIFIED REGISTERED

## 2019-03-29 PROCEDURE — 99223 1ST HOSP IP/OBS HIGH 75: CPT | Performed by: INTERNAL MEDICINE

## 2019-03-29 PROCEDURE — 71045 X-RAY EXAM CHEST 1 VIEW: CPT

## 2019-03-29 PROCEDURE — 25010000002 HYDROMORPHONE PER 4 MG: Performed by: EMERGENCY MEDICINE

## 2019-03-29 PROCEDURE — 63710000001 INSULIN LISPRO (HUMAN) PER 5 UNITS: Performed by: INTERNAL MEDICINE

## 2019-03-29 RX ORDER — ROPIVACAINE HYDROCHLORIDE 2 MG/ML
8 INJECTION, SOLUTION EPIDURAL; INFILTRATION CONTINUOUS
Status: DISCONTINUED | OUTPATIENT
Start: 2019-03-29 | End: 2019-04-05 | Stop reason: HOSPADM

## 2019-03-29 RX ORDER — NICOTINE POLACRILEX 4 MG
15 LOZENGE BUCCAL
Status: DISCONTINUED | OUTPATIENT
Start: 2019-03-29 | End: 2019-04-05 | Stop reason: HOSPADM

## 2019-03-29 RX ORDER — BISACODYL 5 MG/1
5 TABLET, DELAYED RELEASE ORAL DAILY PRN
Status: DISCONTINUED | OUTPATIENT
Start: 2019-03-29 | End: 2019-04-05 | Stop reason: HOSPADM

## 2019-03-29 RX ORDER — HYDRALAZINE HYDROCHLORIDE 20 MG/ML
10 INJECTION INTRAMUSCULAR; INTRAVENOUS ONCE
Status: COMPLETED | OUTPATIENT
Start: 2019-03-30 | End: 2019-03-29

## 2019-03-29 RX ORDER — FENTANYL CITRATE 50 UG/ML
50 INJECTION, SOLUTION INTRAMUSCULAR; INTRAVENOUS ONCE
Status: COMPLETED | OUTPATIENT
Start: 2019-03-29 | End: 2019-03-29

## 2019-03-29 RX ORDER — SODIUM CHLORIDE 0.9 % (FLUSH) 0.9 %
3-10 SYRINGE (ML) INJECTION AS NEEDED
Status: DISCONTINUED | OUTPATIENT
Start: 2019-03-29 | End: 2019-04-05 | Stop reason: HOSPADM

## 2019-03-29 RX ORDER — HYDRALAZINE HYDROCHLORIDE 25 MG/1
25 TABLET, FILM COATED ORAL EVERY 8 HOURS SCHEDULED
Status: DISPENSED | OUTPATIENT
Start: 2019-03-29 | End: 2019-04-04

## 2019-03-29 RX ORDER — CITALOPRAM 20 MG/1
20 TABLET ORAL DAILY
Status: DISCONTINUED | OUTPATIENT
Start: 2019-03-29 | End: 2019-04-05 | Stop reason: HOSPADM

## 2019-03-29 RX ORDER — FAMOTIDINE 20 MG/1
20 TABLET, FILM COATED ORAL ONCE
Status: DISCONTINUED | OUTPATIENT
Start: 2019-03-29 | End: 2019-03-30

## 2019-03-29 RX ORDER — DONEPEZIL HYDROCHLORIDE 5 MG/1
5 TABLET, FILM COATED ORAL NIGHTLY
Status: DISCONTINUED | OUTPATIENT
Start: 2019-03-29 | End: 2019-04-05 | Stop reason: HOSPADM

## 2019-03-29 RX ORDER — FLUTICASONE PROPIONATE 50 MCG
2 SPRAY, SUSPENSION (ML) NASAL DAILY
Status: DISCONTINUED | OUTPATIENT
Start: 2019-03-29 | End: 2019-04-05 | Stop reason: HOSPADM

## 2019-03-29 RX ORDER — DEXTROSE MONOHYDRATE 25 G/50ML
25 INJECTION, SOLUTION INTRAVENOUS
Status: DISCONTINUED | OUTPATIENT
Start: 2019-03-29 | End: 2019-04-05 | Stop reason: HOSPADM

## 2019-03-29 RX ORDER — AMLODIPINE BESYLATE 10 MG/1
10 TABLET ORAL
Status: COMPLETED | OUTPATIENT
Start: 2019-03-29 | End: 2019-04-03

## 2019-03-29 RX ORDER — CLONIDINE HYDROCHLORIDE 0.2 MG/1
0.2 TABLET ORAL 2 TIMES DAILY
Status: COMPLETED | OUTPATIENT
Start: 2019-03-29 | End: 2019-04-04

## 2019-03-29 RX ORDER — ROPIVACAINE HYDROCHLORIDE 5 MG/ML
INJECTION, SOLUTION EPIDURAL; INFILTRATION; PERINEURAL
Status: COMPLETED | OUTPATIENT
Start: 2019-03-29 | End: 2019-03-29

## 2019-03-29 RX ORDER — ACETAMINOPHEN 500 MG
500 TABLET ORAL EVERY 6 HOURS PRN
Status: DISCONTINUED | OUTPATIENT
Start: 2019-03-29 | End: 2019-04-05 | Stop reason: HOSPADM

## 2019-03-29 RX ORDER — ONDANSETRON 4 MG/1
4 TABLET, FILM COATED ORAL EVERY 8 HOURS PRN
Status: DISCONTINUED | OUTPATIENT
Start: 2019-03-29 | End: 2019-04-05 | Stop reason: HOSPADM

## 2019-03-29 RX ORDER — SODIUM CHLORIDE 0.9 % (FLUSH) 0.9 %
3 SYRINGE (ML) INJECTION EVERY 12 HOURS SCHEDULED
Status: DISCONTINUED | OUTPATIENT
Start: 2019-03-29 | End: 2019-04-05 | Stop reason: HOSPADM

## 2019-03-29 RX ORDER — HYDROMORPHONE HYDROCHLORIDE 1 MG/ML
0.5 INJECTION, SOLUTION INTRAMUSCULAR; INTRAVENOUS; SUBCUTANEOUS
Status: DISCONTINUED | OUTPATIENT
Start: 2019-03-29 | End: 2019-03-29

## 2019-03-29 RX ADMIN — HYDRALAZINE HYDROCHLORIDE 25 MG: 25 TABLET ORAL at 20:36

## 2019-03-29 RX ADMIN — HYDROMORPHONE HYDROCHLORIDE 0.5 MG: 1 INJECTION, SOLUTION INTRAMUSCULAR; INTRAVENOUS; SUBCUTANEOUS at 08:28

## 2019-03-29 RX ADMIN — SODIUM CHLORIDE, PRESERVATIVE FREE 3 ML: 5 INJECTION INTRAVENOUS at 20:37

## 2019-03-29 RX ADMIN — DONEPEZIL HYDROCHLORIDE 5 MG: 5 TABLET ORAL at 20:36

## 2019-03-29 RX ADMIN — INSULIN LISPRO 4 UNITS: 100 INJECTION, SOLUTION INTRAVENOUS; SUBCUTANEOUS at 17:43

## 2019-03-29 RX ADMIN — AMLODIPINE BESYLATE 10 MG: 5 TABLET ORAL at 14:45

## 2019-03-29 RX ADMIN — INSULIN LISPRO 2 UNITS: 100 INJECTION, SOLUTION INTRAVENOUS; SUBCUTANEOUS at 20:43

## 2019-03-29 RX ADMIN — ROPIVACAINE HYDROCHLORIDE 15 ML: 5 INJECTION, SOLUTION EPIDURAL; INFILTRATION; PERINEURAL at 09:30

## 2019-03-29 RX ADMIN — HYDRALAZINE HYDROCHLORIDE 10 MG: 20 INJECTION INTRAMUSCULAR; INTRAVENOUS at 23:59

## 2019-03-29 RX ADMIN — HYDRALAZINE HYDROCHLORIDE 25 MG: 25 TABLET ORAL at 14:45

## 2019-03-29 RX ADMIN — CLONIDINE HYDROCHLORIDE 0.2 MG: 0.2 TABLET ORAL at 20:37

## 2019-03-29 RX ADMIN — HYDROMORPHONE HYDROCHLORIDE 1 MG: 1 INJECTION, SOLUTION INTRAMUSCULAR; INTRAVENOUS; SUBCUTANEOUS at 14:45

## 2019-03-29 RX ADMIN — FENTANYL CITRATE 50 MCG: 50 INJECTION INTRAMUSCULAR; INTRAVENOUS at 06:38

## 2019-03-30 ENCOUNTER — APPOINTMENT (OUTPATIENT)
Dept: GENERAL RADIOLOGY | Facility: HOSPITAL | Age: 84
End: 2019-03-30

## 2019-03-30 LAB
ANION GAP SERPL CALCULATED.3IONS-SCNC: 11 MMOL/L (ref 3–11)
BASOPHILS # BLD AUTO: 0.03 10*3/MM3 (ref 0–0.2)
BASOPHILS NFR BLD AUTO: 0.2 % (ref 0–1)
BUN BLD-MCNC: 33 MG/DL (ref 9–23)
BUN/CREAT SERPL: 24.3 (ref 7–25)
CALCIUM SPEC-SCNC: 9.7 MG/DL (ref 8.7–10.4)
CHLORIDE SERPL-SCNC: 102 MMOL/L (ref 99–109)
CO2 SERPL-SCNC: 27 MMOL/L (ref 20–31)
CREAT BLD-MCNC: 1.36 MG/DL (ref 0.6–1.3)
DEPRECATED RDW RBC AUTO: 52.3 FL (ref 37–54)
EOSINOPHIL # BLD AUTO: 0 10*3/MM3 (ref 0–0.3)
EOSINOPHIL NFR BLD AUTO: 0 % (ref 0–3)
ERYTHROCYTE [DISTWIDTH] IN BLOOD BY AUTOMATED COUNT: 16.6 % (ref 11.3–14.5)
GFR SERPL CREATININE-BSD FRML MDRD: 37 ML/MIN/1.73
GLUCOSE BLD-MCNC: 264 MG/DL (ref 70–100)
GLUCOSE BLDC GLUCOMTR-MCNC: 220 MG/DL (ref 70–130)
GLUCOSE BLDC GLUCOMTR-MCNC: 265 MG/DL (ref 70–130)
GLUCOSE BLDC GLUCOMTR-MCNC: 275 MG/DL (ref 70–130)
GLUCOSE BLDC GLUCOMTR-MCNC: 311 MG/DL (ref 70–130)
HCT VFR BLD AUTO: 35.4 % (ref 34.5–44)
HGB BLD-MCNC: 10.5 G/DL (ref 11.5–15.5)
IMM GRANULOCYTES # BLD AUTO: 0.03 10*3/MM3 (ref 0–0.05)
IMM GRANULOCYTES NFR BLD AUTO: 0.2 % (ref 0–0.6)
LYMPHOCYTES # BLD AUTO: 0.89 10*3/MM3 (ref 0.6–4.8)
LYMPHOCYTES NFR BLD AUTO: 6.9 % (ref 24–44)
MCH RBC QN AUTO: 25.5 PG (ref 27–31)
MCHC RBC AUTO-ENTMCNC: 29.7 G/DL (ref 32–36)
MCV RBC AUTO: 85.9 FL (ref 80–99)
MONOCYTES # BLD AUTO: 1.38 10*3/MM3 (ref 0–1)
MONOCYTES NFR BLD AUTO: 10.6 % (ref 0–12)
NEUTROPHILS # BLD AUTO: 10.68 10*3/MM3 (ref 1.5–8.3)
NEUTROPHILS NFR BLD AUTO: 82.3 % (ref 41–71)
PLATELET # BLD AUTO: 303 10*3/MM3 (ref 150–450)
PMV BLD AUTO: 12.3 FL (ref 6–12)
POTASSIUM BLD-SCNC: 4 MMOL/L (ref 3.5–5.5)
RBC # BLD AUTO: 4.12 10*6/MM3 (ref 3.89–5.14)
SODIUM BLD-SCNC: 140 MMOL/L (ref 132–146)
WBC NRBC COR # BLD: 12.98 10*3/MM3 (ref 3.5–10.8)

## 2019-03-30 PROCEDURE — 25010000002 FENTANYL CITRATE (PF) 100 MCG/2ML SOLUTION: Performed by: ANESTHESIOLOGY

## 2019-03-30 PROCEDURE — 99024 POSTOP FOLLOW-UP VISIT: CPT | Performed by: ORTHOPAEDIC SURGERY

## 2019-03-30 PROCEDURE — 80048 BASIC METABOLIC PNL TOTAL CA: CPT | Performed by: INTERNAL MEDICINE

## 2019-03-30 PROCEDURE — C1713 ANCHOR/SCREW BN/BN,TIS/BN: HCPCS | Performed by: ORTHOPAEDIC SURGERY

## 2019-03-30 PROCEDURE — 25010000002 ONDANSETRON PER 1 MG: Performed by: ANESTHESIOLOGY

## 2019-03-30 PROCEDURE — 25010000002 HYDROMORPHONE PER 4 MG: Performed by: INTERNAL MEDICINE

## 2019-03-30 PROCEDURE — 25010000002 HYDRALAZINE PER 20 MG: Performed by: NURSE PRACTITIONER

## 2019-03-30 PROCEDURE — 99232 SBSQ HOSP IP/OBS MODERATE 35: CPT | Performed by: INTERNAL MEDICINE

## 2019-03-30 PROCEDURE — 25010000002 HYDRALAZINE PER 20 MG: Performed by: ANESTHESIOLOGY

## 2019-03-30 PROCEDURE — 25010000002 PROPOFOL 10 MG/ML EMULSION: Performed by: ANESTHESIOLOGY

## 2019-03-30 PROCEDURE — 73502 X-RAY EXAM HIP UNI 2-3 VIEWS: CPT

## 2019-03-30 PROCEDURE — 85025 COMPLETE CBC W/AUTO DIFF WBC: CPT | Performed by: INTERNAL MEDICINE

## 2019-03-30 PROCEDURE — 25010000003 CEFAZOLIN IN DEXTROSE 2-4 GM/100ML-% SOLUTION: Performed by: ANESTHESIOLOGY

## 2019-03-30 PROCEDURE — 25010000003 CEFAZOLIN IN DEXTROSE 2-4 GM/100ML-% SOLUTION: Performed by: ORTHOPAEDIC SURGERY

## 2019-03-30 PROCEDURE — 27245 TREAT THIGH FRACTURE: CPT | Performed by: ORTHOPAEDIC SURGERY

## 2019-03-30 PROCEDURE — 0QS604Z REPOSITION RIGHT UPPER FEMUR WITH INTERNAL FIXATION DEVICE, OPEN APPROACH: ICD-10-PCS | Performed by: ORTHOPAEDIC SURGERY

## 2019-03-30 PROCEDURE — 76000 FLUOROSCOPY <1 HR PHYS/QHP: CPT

## 2019-03-30 PROCEDURE — C1769 GUIDE WIRE: HCPCS | Performed by: ORTHOPAEDIC SURGERY

## 2019-03-30 PROCEDURE — P9612 CATHETERIZE FOR URINE SPEC: HCPCS

## 2019-03-30 PROCEDURE — 25010000002 HYDROMORPHONE 1 MG/ML SOLUTION: Performed by: INTERNAL MEDICINE

## 2019-03-30 PROCEDURE — 82962 GLUCOSE BLOOD TEST: CPT

## 2019-03-30 DEVICE — LONG NAIL KIT R1.5, TI, RIGHT
Type: IMPLANTABLE DEVICE | Site: HIP | Status: FUNCTIONAL
Brand: GAMMA

## 2019-03-30 DEVICE — LOCKING SCREW, FULLY THREADED: Type: IMPLANTABLE DEVICE | Site: HIP | Status: FUNCTIONAL

## 2019-03-30 DEVICE — LAG SCREW, TI
Type: IMPLANTABLE DEVICE | Site: HIP | Status: FUNCTIONAL
Brand: GAMMA

## 2019-03-30 RX ORDER — LIDOCAINE HYDROCHLORIDE 20 MG/ML
INJECTION, SOLUTION INFILTRATION; PERINEURAL AS NEEDED
Status: DISCONTINUED | OUTPATIENT
Start: 2019-03-30 | End: 2019-03-30 | Stop reason: SURG

## 2019-03-30 RX ORDER — CEFAZOLIN SODIUM 2 G/100ML
INJECTION, SOLUTION INTRAVENOUS AS NEEDED
Status: DISCONTINUED | OUTPATIENT
Start: 2019-03-30 | End: 2019-03-30 | Stop reason: SURG

## 2019-03-30 RX ORDER — SODIUM CHLORIDE 0.9 % (FLUSH) 0.9 %
3 SYRINGE (ML) INJECTION EVERY 12 HOURS SCHEDULED
Status: DISCONTINUED | OUTPATIENT
Start: 2019-03-30 | End: 2019-03-30 | Stop reason: HOSPADM

## 2019-03-30 RX ORDER — MAGNESIUM HYDROXIDE 1200 MG/15ML
LIQUID ORAL AS NEEDED
Status: DISCONTINUED | OUTPATIENT
Start: 2019-03-30 | End: 2019-03-30 | Stop reason: HOSPADM

## 2019-03-30 RX ORDER — ISOSORBIDE MONONITRATE 60 MG/1
60 TABLET, EXTENDED RELEASE ORAL
Status: DISCONTINUED | OUTPATIENT
Start: 2019-03-30 | End: 2019-04-05 | Stop reason: HOSPADM

## 2019-03-30 RX ORDER — OXYCODONE HYDROCHLORIDE 5 MG/1
5 TABLET ORAL EVERY 4 HOURS PRN
Status: DISCONTINUED | OUTPATIENT
Start: 2019-03-30 | End: 2019-04-05 | Stop reason: HOSPADM

## 2019-03-30 RX ORDER — HYDROMORPHONE HYDROCHLORIDE 1 MG/ML
0.5 INJECTION, SOLUTION INTRAMUSCULAR; INTRAVENOUS; SUBCUTANEOUS
Status: DISCONTINUED | OUTPATIENT
Start: 2019-03-30 | End: 2019-04-05 | Stop reason: HOSPADM

## 2019-03-30 RX ORDER — FENTANYL CITRATE 50 UG/ML
25 INJECTION, SOLUTION INTRAMUSCULAR; INTRAVENOUS
Status: DISCONTINUED | OUTPATIENT
Start: 2019-03-30 | End: 2019-03-30 | Stop reason: HOSPADM

## 2019-03-30 RX ORDER — CEFAZOLIN SODIUM 2 G/100ML
2 INJECTION, SOLUTION INTRAVENOUS EVERY 8 HOURS
Status: COMPLETED | OUTPATIENT
Start: 2019-03-30 | End: 2019-03-31

## 2019-03-30 RX ORDER — SODIUM CHLORIDE, SODIUM LACTATE, POTASSIUM CHLORIDE, CALCIUM CHLORIDE 600; 310; 30; 20 MG/100ML; MG/100ML; MG/100ML; MG/100ML
9 INJECTION, SOLUTION INTRAVENOUS CONTINUOUS
Status: DISCONTINUED | OUTPATIENT
Start: 2019-03-30 | End: 2019-04-05 | Stop reason: HOSPADM

## 2019-03-30 RX ORDER — ASPIRIN 325 MG
325 TABLET, DELAYED RELEASE (ENTERIC COATED) ORAL EVERY 12 HOURS SCHEDULED
Status: DISCONTINUED | OUTPATIENT
Start: 2019-03-31 | End: 2019-04-01

## 2019-03-30 RX ORDER — SODIUM CHLORIDE 0.9 % (FLUSH) 0.9 %
3-10 SYRINGE (ML) INJECTION AS NEEDED
Status: DISCONTINUED | OUTPATIENT
Start: 2019-03-30 | End: 2019-03-30 | Stop reason: HOSPADM

## 2019-03-30 RX ORDER — LIDOCAINE HYDROCHLORIDE 10 MG/ML
0.5 INJECTION, SOLUTION EPIDURAL; INFILTRATION; INTRACAUDAL; PERINEURAL ONCE AS NEEDED
Status: DISCONTINUED | OUTPATIENT
Start: 2019-03-30 | End: 2019-03-30 | Stop reason: HOSPADM

## 2019-03-30 RX ORDER — SODIUM CHLORIDE, SODIUM LACTATE, POTASSIUM CHLORIDE, CALCIUM CHLORIDE 600; 310; 30; 20 MG/100ML; MG/100ML; MG/100ML; MG/100ML
INJECTION, SOLUTION INTRAVENOUS CONTINUOUS PRN
Status: DISCONTINUED | OUTPATIENT
Start: 2019-03-30 | End: 2019-03-30 | Stop reason: SURG

## 2019-03-30 RX ORDER — CEFAZOLIN SODIUM 2 G/100ML
2 INJECTION, SOLUTION INTRAVENOUS ONCE
Status: DISCONTINUED | OUTPATIENT
Start: 2019-03-30 | End: 2019-03-30 | Stop reason: HOSPADM

## 2019-03-30 RX ORDER — ONDANSETRON 2 MG/ML
4 INJECTION INTRAMUSCULAR; INTRAVENOUS ONCE AS NEEDED
Status: COMPLETED | OUTPATIENT
Start: 2019-03-30 | End: 2019-03-30

## 2019-03-30 RX ORDER — SODIUM CHLORIDE 0.9 % (FLUSH) 0.9 %
3 SYRINGE (ML) INJECTION EVERY 12 HOURS SCHEDULED
Status: DISCONTINUED | OUTPATIENT
Start: 2019-03-30 | End: 2019-04-05 | Stop reason: HOSPADM

## 2019-03-30 RX ORDER — HYDRALAZINE HYDROCHLORIDE 20 MG/ML
INJECTION INTRAMUSCULAR; INTRAVENOUS AS NEEDED
Status: DISCONTINUED | OUTPATIENT
Start: 2019-03-30 | End: 2019-03-30 | Stop reason: SURG

## 2019-03-30 RX ORDER — OXYCODONE HYDROCHLORIDE 5 MG/1
10 TABLET ORAL EVERY 4 HOURS PRN
Status: DISCONTINUED | OUTPATIENT
Start: 2019-03-30 | End: 2019-04-05 | Stop reason: HOSPADM

## 2019-03-30 RX ORDER — SODIUM CHLORIDE 0.9 % (FLUSH) 0.9 %
3-10 SYRINGE (ML) INJECTION AS NEEDED
Status: DISCONTINUED | OUTPATIENT
Start: 2019-03-30 | End: 2019-04-05 | Stop reason: HOSPADM

## 2019-03-30 RX ORDER — FENTANYL CITRATE 50 UG/ML
INJECTION, SOLUTION INTRAMUSCULAR; INTRAVENOUS AS NEEDED
Status: DISCONTINUED | OUTPATIENT
Start: 2019-03-30 | End: 2019-03-30 | Stop reason: SURG

## 2019-03-30 RX ORDER — ACETAMINOPHEN 325 MG/1
650 TABLET ORAL EVERY 4 HOURS PRN
Status: DISCONTINUED | OUTPATIENT
Start: 2019-03-30 | End: 2019-04-05 | Stop reason: HOSPADM

## 2019-03-30 RX ORDER — PROPOFOL 10 MG/ML
VIAL (ML) INTRAVENOUS AS NEEDED
Status: DISCONTINUED | OUTPATIENT
Start: 2019-03-30 | End: 2019-03-30 | Stop reason: SURG

## 2019-03-30 RX ORDER — AMIODARONE HYDROCHLORIDE 200 MG/1
200 TABLET ORAL
Status: DISCONTINUED | OUTPATIENT
Start: 2019-03-30 | End: 2019-04-05 | Stop reason: HOSPADM

## 2019-03-30 RX ADMIN — OXYCODONE HYDROCHLORIDE 10 MG: 5 TABLET ORAL at 15:12

## 2019-03-30 RX ADMIN — AMLODIPINE BESYLATE 10 MG: 5 TABLET ORAL at 11:23

## 2019-03-30 RX ADMIN — FENTANYL CITRATE 75 MCG: 50 INJECTION, SOLUTION INTRAMUSCULAR; INTRAVENOUS at 09:40

## 2019-03-30 RX ADMIN — HYDROMORPHONE HYDROCHLORIDE 0.5 MG: 1 INJECTION, SOLUTION INTRAMUSCULAR; INTRAVENOUS; SUBCUTANEOUS at 18:53

## 2019-03-30 RX ADMIN — CEFAZOLIN SODIUM 2 G: 2 INJECTION, SOLUTION INTRAVENOUS at 23:45

## 2019-03-30 RX ADMIN — PROPOFOL 100 MG: 10 INJECTION, EMULSION INTRAVENOUS at 08:14

## 2019-03-30 RX ADMIN — LIDOCAINE HYDROCHLORIDE 50 MG: 20 INJECTION, SOLUTION INFILTRATION; PERINEURAL at 08:14

## 2019-03-30 RX ADMIN — INSULIN LISPRO 4 UNITS: 100 INJECTION, SOLUTION INTRAVENOUS; SUBCUTANEOUS at 17:20

## 2019-03-30 RX ADMIN — HYDROMORPHONE HYDROCHLORIDE 1 MG: 1 INJECTION, SOLUTION INTRAMUSCULAR; INTRAVENOUS; SUBCUTANEOUS at 00:51

## 2019-03-30 RX ADMIN — CLONIDINE HYDROCHLORIDE 0.2 MG: 0.2 TABLET ORAL at 20:34

## 2019-03-30 RX ADMIN — HYDRALAZINE HYDROCHLORIDE 5 MG: 20 INJECTION, SOLUTION INTRAMUSCULAR; INTRAVENOUS at 08:59

## 2019-03-30 RX ADMIN — ONDANSETRON HYDROCHLORIDE 4 MG: 2 SOLUTION INTRAMUSCULAR; INTRAVENOUS at 10:15

## 2019-03-30 RX ADMIN — CEFAZOLIN SODIUM 2 G: 2 INJECTION, SOLUTION INTRAVENOUS at 16:21

## 2019-03-30 RX ADMIN — AMIODARONE HYDROCHLORIDE 200 MG: 200 TABLET ORAL at 16:31

## 2019-03-30 RX ADMIN — HYDRALAZINE HYDROCHLORIDE 25 MG: 25 TABLET ORAL at 20:34

## 2019-03-30 RX ADMIN — SODIUM CHLORIDE, POTASSIUM CHLORIDE, SODIUM LACTATE AND CALCIUM CHLORIDE: 600; 310; 30; 20 INJECTION, SOLUTION INTRAVENOUS at 08:14

## 2019-03-30 RX ADMIN — INSULIN LISPRO 3 UNITS: 100 INJECTION, SOLUTION INTRAVENOUS; SUBCUTANEOUS at 20:35

## 2019-03-30 RX ADMIN — TRANEXAMIC ACID 1000 MG: 100 INJECTION, SOLUTION INTRAVENOUS at 08:44

## 2019-03-30 RX ADMIN — SODIUM CHLORIDE, PRESERVATIVE FREE 3 ML: 5 INJECTION INTRAVENOUS at 20:36

## 2019-03-30 RX ADMIN — SODIUM CHLORIDE, PRESERVATIVE FREE 3 ML: 5 INJECTION INTRAVENOUS at 20:35

## 2019-03-30 RX ADMIN — HYDRALAZINE HYDROCHLORIDE 25 MG: 25 TABLET ORAL at 14:08

## 2019-03-30 RX ADMIN — CITALOPRAM HYDROBROMIDE 20 MG: 20 TABLET ORAL at 11:23

## 2019-03-30 RX ADMIN — TRANEXAMIC ACID 1000 MG: 100 INJECTION, SOLUTION INTRAVENOUS at 09:27

## 2019-03-30 RX ADMIN — CEFAZOLIN SODIUM 2 G: 2 INJECTION, SOLUTION INTRAVENOUS at 08:20

## 2019-03-30 RX ADMIN — INSULIN LISPRO 5 UNITS: 100 INJECTION, SOLUTION INTRAVENOUS; SUBCUTANEOUS at 11:46

## 2019-03-30 RX ADMIN — CLONIDINE HYDROCHLORIDE 0.2 MG: 0.2 TABLET ORAL at 11:23

## 2019-03-30 RX ADMIN — FENTANYL CITRATE 25 MCG: 50 INJECTION, SOLUTION INTRAMUSCULAR; INTRAVENOUS at 08:19

## 2019-03-30 RX ADMIN — DONEPEZIL HYDROCHLORIDE 5 MG: 5 TABLET ORAL at 20:34

## 2019-03-30 RX ADMIN — ISOSORBIDE MONONITRATE 60 MG: 60 TABLET, EXTENDED RELEASE ORAL at 16:31

## 2019-03-30 RX ADMIN — POLYETHYLENE GLYCOL (3350) 17 G: 17 POWDER, FOR SOLUTION ORAL at 11:25

## 2019-03-31 LAB
ANION GAP SERPL CALCULATED.3IONS-SCNC: 9 MMOL/L (ref 3–11)
BUN BLD-MCNC: 40 MG/DL (ref 9–23)
BUN/CREAT SERPL: 26.3 (ref 7–25)
CALCIUM SPEC-SCNC: 9 MG/DL (ref 8.7–10.4)
CHLORIDE SERPL-SCNC: 98 MMOL/L (ref 99–109)
CO2 SERPL-SCNC: 26 MMOL/L (ref 20–31)
CREAT BLD-MCNC: 1.52 MG/DL (ref 0.6–1.3)
GFR SERPL CREATININE-BSD FRML MDRD: 33 ML/MIN/1.73
GLUCOSE BLD-MCNC: 219 MG/DL (ref 70–100)
GLUCOSE BLDC GLUCOMTR-MCNC: 228 MG/DL (ref 70–130)
GLUCOSE BLDC GLUCOMTR-MCNC: 232 MG/DL (ref 70–130)
GLUCOSE BLDC GLUCOMTR-MCNC: 239 MG/DL (ref 70–130)
GLUCOSE BLDC GLUCOMTR-MCNC: 263 MG/DL (ref 70–130)
GLUCOSE BLDC GLUCOMTR-MCNC: 346 MG/DL (ref 70–130)
HCT VFR BLD AUTO: 24.4 % (ref 34.5–44)
HGB BLD-MCNC: 7.7 G/DL (ref 11.5–15.5)
POTASSIUM BLD-SCNC: 3.8 MMOL/L (ref 3.5–5.5)
SODIUM BLD-SCNC: 133 MMOL/L (ref 132–146)

## 2019-03-31 PROCEDURE — 63710000001 INSULIN LISPRO (HUMAN) PER 5 UNITS: Performed by: NURSE PRACTITIONER

## 2019-03-31 PROCEDURE — 80048 BASIC METABOLIC PNL TOTAL CA: CPT | Performed by: ORTHOPAEDIC SURGERY

## 2019-03-31 PROCEDURE — 97163 PT EVAL HIGH COMPLEX 45 MIN: CPT

## 2019-03-31 PROCEDURE — 97116 GAIT TRAINING THERAPY: CPT

## 2019-03-31 PROCEDURE — 99232 SBSQ HOSP IP/OBS MODERATE 35: CPT | Performed by: NURSE PRACTITIONER

## 2019-03-31 PROCEDURE — 85014 HEMATOCRIT: CPT | Performed by: ORTHOPAEDIC SURGERY

## 2019-03-31 PROCEDURE — 85018 HEMOGLOBIN: CPT | Performed by: ORTHOPAEDIC SURGERY

## 2019-03-31 PROCEDURE — 99024 POSTOP FOLLOW-UP VISIT: CPT | Performed by: ORTHOPAEDIC SURGERY

## 2019-03-31 PROCEDURE — 82962 GLUCOSE BLOOD TEST: CPT

## 2019-03-31 PROCEDURE — 97166 OT EVAL MOD COMPLEX 45 MIN: CPT | Performed by: OCCUPATIONAL THERAPIST

## 2019-03-31 RX ADMIN — CLONIDINE HYDROCHLORIDE 0.2 MG: 0.2 TABLET ORAL at 21:14

## 2019-03-31 RX ADMIN — ASPIRIN 325 MG: 325 TABLET, COATED ORAL at 21:14

## 2019-03-31 RX ADMIN — AMLODIPINE BESYLATE 10 MG: 5 TABLET ORAL at 08:34

## 2019-03-31 RX ADMIN — AMIODARONE HYDROCHLORIDE 200 MG: 200 TABLET ORAL at 08:34

## 2019-03-31 RX ADMIN — FLUTICASONE PROPIONATE 2 SPRAY: 50 SPRAY, METERED NASAL at 08:35

## 2019-03-31 RX ADMIN — CLONIDINE HYDROCHLORIDE 0.2 MG: 0.2 TABLET ORAL at 08:34

## 2019-03-31 RX ADMIN — HYDRALAZINE HYDROCHLORIDE 25 MG: 25 TABLET ORAL at 21:14

## 2019-03-31 RX ADMIN — HYDRALAZINE HYDROCHLORIDE 25 MG: 25 TABLET ORAL at 14:29

## 2019-03-31 RX ADMIN — INSULIN LISPRO 6 UNITS: 100 INJECTION, SOLUTION INTRAVENOUS; SUBCUTANEOUS at 21:14

## 2019-03-31 RX ADMIN — ISOSORBIDE MONONITRATE 60 MG: 60 TABLET, EXTENDED RELEASE ORAL at 08:34

## 2019-03-31 RX ADMIN — INSULIN LISPRO 7 UNITS: 100 INJECTION, SOLUTION INTRAVENOUS; SUBCUTANEOUS at 12:18

## 2019-03-31 RX ADMIN — CITALOPRAM HYDROBROMIDE 20 MG: 20 TABLET ORAL at 08:34

## 2019-03-31 RX ADMIN — INSULIN LISPRO 3 UNITS: 100 INJECTION, SOLUTION INTRAVENOUS; SUBCUTANEOUS at 08:35

## 2019-03-31 RX ADMIN — POLYETHYLENE GLYCOL (3350) 17 G: 17 POWDER, FOR SOLUTION ORAL at 08:34

## 2019-03-31 RX ADMIN — ASPIRIN 325 MG: 325 TABLET, COATED ORAL at 08:34

## 2019-03-31 RX ADMIN — INSULIN LISPRO 4 UNITS: 100 INJECTION, SOLUTION INTRAVENOUS; SUBCUTANEOUS at 17:41

## 2019-03-31 RX ADMIN — DONEPEZIL HYDROCHLORIDE 5 MG: 5 TABLET ORAL at 21:14

## 2019-03-31 RX ADMIN — BISACODYL 5 MG: 5 TABLET, COATED ORAL at 05:51

## 2019-03-31 RX ADMIN — HYDRALAZINE HYDROCHLORIDE 25 MG: 25 TABLET ORAL at 05:51

## 2019-04-01 LAB
ANION GAP SERPL CALCULATED.3IONS-SCNC: 9 MMOL/L (ref 3–11)
BASOPHILS # BLD AUTO: 0.01 10*3/MM3 (ref 0–0.2)
BASOPHILS NFR BLD AUTO: 0.1 % (ref 0–1)
BUN BLD-MCNC: 35 MG/DL (ref 9–23)
BUN/CREAT SERPL: 26.9 (ref 7–25)
CALCIUM SPEC-SCNC: 9 MG/DL (ref 8.7–10.4)
CHLORIDE SERPL-SCNC: 97 MMOL/L (ref 99–109)
CO2 SERPL-SCNC: 25 MMOL/L (ref 20–31)
CREAT BLD-MCNC: 1.3 MG/DL (ref 0.6–1.3)
DEPRECATED RDW RBC AUTO: 50 FL (ref 37–54)
DEPRECATED RDW RBC AUTO: 50 FL (ref 37–54)
EOSINOPHIL # BLD AUTO: 0.01 10*3/MM3 (ref 0–0.3)
EOSINOPHIL NFR BLD AUTO: 0.1 % (ref 0–3)
ERYTHROCYTE [DISTWIDTH] IN BLOOD BY AUTOMATED COUNT: 16.3 % (ref 11.3–14.5)
ERYTHROCYTE [DISTWIDTH] IN BLOOD BY AUTOMATED COUNT: 16.4 % (ref 11.3–14.5)
GFR SERPL CREATININE-BSD FRML MDRD: 39 ML/MIN/1.73
GLUCOSE BLD-MCNC: 209 MG/DL (ref 70–100)
GLUCOSE BLDC GLUCOMTR-MCNC: 129 MG/DL (ref 70–130)
GLUCOSE BLDC GLUCOMTR-MCNC: 183 MG/DL (ref 70–130)
GLUCOSE BLDC GLUCOMTR-MCNC: 233 MG/DL (ref 70–130)
GLUCOSE BLDC GLUCOMTR-MCNC: 288 MG/DL (ref 70–130)
HCT VFR BLD AUTO: 23.5 % (ref 34.5–44)
HCT VFR BLD AUTO: 24.6 % (ref 34.5–44)
HGB BLD-MCNC: 7.2 G/DL (ref 11.5–15.5)
HGB BLD-MCNC: 7.5 G/DL (ref 11.5–15.5)
IMM GRANULOCYTES # BLD AUTO: 0.07 10*3/MM3 (ref 0–0.05)
IMM GRANULOCYTES NFR BLD AUTO: 0.5 % (ref 0–0.6)
LYMPHOCYTES # BLD AUTO: 0.83 10*3/MM3 (ref 0.6–4.8)
LYMPHOCYTES NFR BLD AUTO: 5.7 % (ref 24–44)
MAGNESIUM SERPL-MCNC: 2 MG/DL (ref 1.3–2.7)
MCH RBC QN AUTO: 25.9 PG (ref 27–31)
MCH RBC QN AUTO: 26 PG (ref 27–31)
MCHC RBC AUTO-ENTMCNC: 30.5 G/DL (ref 32–36)
MCHC RBC AUTO-ENTMCNC: 30.6 G/DL (ref 32–36)
MCV RBC AUTO: 84.8 FL (ref 80–99)
MCV RBC AUTO: 84.8 FL (ref 80–99)
MONOCYTES # BLD AUTO: 1.51 10*3/MM3 (ref 0–1)
MONOCYTES NFR BLD AUTO: 10.4 % (ref 0–12)
NEUTROPHILS # BLD AUTO: 12.09 10*3/MM3 (ref 1.5–8.3)
NEUTROPHILS NFR BLD AUTO: 83.7 % (ref 41–71)
PLATELET # BLD AUTO: 240 10*3/MM3 (ref 150–450)
PLATELET # BLD AUTO: 251 10*3/MM3 (ref 150–450)
PMV BLD AUTO: 12.1 FL (ref 6–12)
PMV BLD AUTO: 13.2 FL (ref 6–12)
POTASSIUM BLD-SCNC: 4.3 MMOL/L (ref 3.5–5.5)
RBC # BLD AUTO: 2.77 10*6/MM3 (ref 3.89–5.14)
RBC # BLD AUTO: 2.9 10*6/MM3 (ref 3.89–5.14)
SODIUM BLD-SCNC: 131 MMOL/L (ref 132–146)
WBC NRBC COR # BLD: 12.42 10*3/MM3 (ref 3.5–10.8)
WBC NRBC COR # BLD: 14.45 10*3/MM3 (ref 3.5–10.8)

## 2019-04-01 PROCEDURE — 25010000002 ROPIVACAINE PER 1 MG: Performed by: NURSE ANESTHETIST, CERTIFIED REGISTERED

## 2019-04-01 PROCEDURE — 80048 BASIC METABOLIC PNL TOTAL CA: CPT | Performed by: NURSE PRACTITIONER

## 2019-04-01 PROCEDURE — 63710000001 INSULIN LISPRO (HUMAN) PER 5 UNITS: Performed by: NURSE PRACTITIONER

## 2019-04-01 PROCEDURE — 85025 COMPLETE CBC W/AUTO DIFF WBC: CPT | Performed by: NURSE PRACTITIONER

## 2019-04-01 PROCEDURE — 85027 COMPLETE CBC AUTOMATED: CPT | Performed by: NURSE PRACTITIONER

## 2019-04-01 PROCEDURE — 63710000001 INSULIN DETEMIR PER 5 UNITS: Performed by: NURSE PRACTITIONER

## 2019-04-01 PROCEDURE — 97116 GAIT TRAINING THERAPY: CPT

## 2019-04-01 PROCEDURE — 99232 SBSQ HOSP IP/OBS MODERATE 35: CPT | Performed by: NURSE PRACTITIONER

## 2019-04-01 PROCEDURE — 99024 POSTOP FOLLOW-UP VISIT: CPT | Performed by: ORTHOPAEDIC SURGERY

## 2019-04-01 PROCEDURE — 82962 GLUCOSE BLOOD TEST: CPT

## 2019-04-01 PROCEDURE — 97530 THERAPEUTIC ACTIVITIES: CPT | Performed by: OCCUPATIONAL THERAPIST

## 2019-04-01 PROCEDURE — 83735 ASSAY OF MAGNESIUM: CPT

## 2019-04-01 RX ORDER — ASPIRIN 325 MG
325 TABLET, DELAYED RELEASE (ENTERIC COATED) ORAL EVERY 12 HOURS SCHEDULED
Status: DISCONTINUED | OUTPATIENT
Start: 2019-04-01 | End: 2019-04-05 | Stop reason: HOSPADM

## 2019-04-01 RX ORDER — SODIUM CHLORIDE 9 MG/ML
75 INJECTION, SOLUTION INTRAVENOUS CONTINUOUS
Status: DISCONTINUED | OUTPATIENT
Start: 2019-04-01 | End: 2019-04-03

## 2019-04-01 RX ADMIN — HYDRALAZINE HYDROCHLORIDE 25 MG: 25 TABLET ORAL at 21:16

## 2019-04-01 RX ADMIN — ASPIRIN 325 MG: 325 TABLET, COATED ORAL at 08:40

## 2019-04-01 RX ADMIN — SODIUM CHLORIDE 250 ML: 9 INJECTION, SOLUTION INTRAVENOUS at 09:34

## 2019-04-01 RX ADMIN — POLYETHYLENE GLYCOL (3350) 17 G: 17 POWDER, FOR SOLUTION ORAL at 08:40

## 2019-04-01 RX ADMIN — CLONIDINE HYDROCHLORIDE 0.2 MG: 0.2 TABLET ORAL at 08:40

## 2019-04-01 RX ADMIN — HYDRALAZINE HYDROCHLORIDE 25 MG: 25 TABLET ORAL at 14:58

## 2019-04-01 RX ADMIN — INSULIN LISPRO 4 UNITS: 100 INJECTION, SOLUTION INTRAVENOUS; SUBCUTANEOUS at 08:41

## 2019-04-01 RX ADMIN — ROPIVACAINE HYDROCHLORIDE 8 ML/HR: 2 INJECTION, SOLUTION EPIDURAL; INFILTRATION at 06:01

## 2019-04-01 RX ADMIN — INSULIN LISPRO 2 UNITS: 100 INJECTION, SOLUTION INTRAVENOUS; SUBCUTANEOUS at 21:16

## 2019-04-01 RX ADMIN — FLUTICASONE PROPIONATE 2 SPRAY: 50 SPRAY, METERED NASAL at 08:42

## 2019-04-01 RX ADMIN — CITALOPRAM HYDROBROMIDE 20 MG: 20 TABLET ORAL at 08:40

## 2019-04-01 RX ADMIN — HYDRALAZINE HYDROCHLORIDE 25 MG: 25 TABLET ORAL at 05:40

## 2019-04-01 RX ADMIN — AMLODIPINE BESYLATE 10 MG: 5 TABLET ORAL at 08:40

## 2019-04-01 RX ADMIN — SODIUM CHLORIDE, PRESERVATIVE FREE 3 ML: 5 INJECTION INTRAVENOUS at 08:41

## 2019-04-01 RX ADMIN — INSULIN LISPRO 2 UNITS: 100 INJECTION, SOLUTION INTRAVENOUS; SUBCUTANEOUS at 13:17

## 2019-04-01 RX ADMIN — ASPIRIN 325 MG: 325 TABLET, COATED ORAL at 21:16

## 2019-04-01 RX ADMIN — INSULIN LISPRO 6 UNITS: 100 INJECTION, SOLUTION INTRAVENOUS; SUBCUTANEOUS at 13:18

## 2019-04-01 RX ADMIN — INSULIN LISPRO 2 UNITS: 100 INJECTION, SOLUTION INTRAVENOUS; SUBCUTANEOUS at 17:37

## 2019-04-01 RX ADMIN — CLONIDINE HYDROCHLORIDE 0.2 MG: 0.2 TABLET ORAL at 21:16

## 2019-04-01 RX ADMIN — INSULIN DETEMIR 6 UNITS: 100 INJECTION, SOLUTION SUBCUTANEOUS at 09:34

## 2019-04-01 RX ADMIN — AMIODARONE HYDROCHLORIDE 200 MG: 200 TABLET ORAL at 08:40

## 2019-04-01 RX ADMIN — ISOSORBIDE MONONITRATE 60 MG: 60 TABLET, EXTENDED RELEASE ORAL at 08:40

## 2019-04-01 NOTE — THERAPY TREATMENT NOTE
Acute Care - Physical Therapy Treatment Note  [facility]     Patient Name: Mary Ramirez  : 1934  MRN: [id number]  Today's Date: 2019  Onset of Illness/Injury or Date of Surgery: 19  Date of Referral to PT: 19  Referring Physician: Toño    Admit Date: 3/29/2019    Visit Dx:    ICD-10-CM ICD-9-CM   1. Closed fracture of right hip, initial encounter (CMS/HCC) S72.001A 820.8   2. Hypertension, unspecified type I10 401.9   3. Closed displaced intertrochanteric fracture of right femur, initial encounter (CMS/HCC) S72.141A 820.21   4. Impaired mobility and ADLs Z74.09 799.89     Patient Active Problem List   Diagnosis   • Fall   • Fracture, intertrochanteric, left femur (CMS/[facility])   • Essential hypertension   • Coronary artery disease involving native coronary artery of native heart with angina pectoris (CMS/[facility])   • Dementia   • Paroxysmal atrial fibrillation (CMS/[facility])   • Anticoagulated   • SSS (sick sinus syndrome) (CMS/[facility])   • Chronic diastolic congestive heart failure (CMS/HCC)   • DEANGELO (acute kidney injury) (CMS/HCC)   • Benign essential hypertension   • CKD (chronic kidney disease), stage III (CMS/[facility])   • Mixed hyperlipidemia   • Major depressive disorder, single episode, mild (CMS/[facility])   • Chronic allergic rhinitis   • Memory loss   • Bradycardia   • At risk for falls   • Closed fracture of femur (CMS/[facility])   • Coronary arteriosclerosis after percutaneous transluminal coronary angioplasty (PTCA)   • Diabetic polyneuropathy (CMS/[facility])   • Diverticular disease of colon   • Foot pain   • GERD (gastroesophageal reflux disease)   • Peripheral artery insufficiency (CMS/[facility])   • Annual physical exam   • Vitamin D deficiency   • Renal disorder   • Closed fracture of right hip (CMS/[facility])       Therapy Treatment    Rehabilitation Treatment Summary     Row Name 19 0958             Treatment Time/Intention    Discipline  physical therapist  -      Document Type  therapy note (daily note)   -LR      Subjective Information  no complaints  -LR      Mode of Treatment  physical therapy;co-treatment  -LR      Patient/Family Observations  Patient supine in bed upon arrival. IV, R fascia iliaca nerve catheter, exit alarms, waffle boots, SCDs, pulse ox, tele.   -LR      Care Plan Review  care plan/treatment goals reviewed;risks/benefits reviewed;current/potential barriers reviewed;patient/other agree to care plan  -LR      Care Plan Review, Other Participant(s)  daughter  -LR      Patient Effort  adequate  -LR      Existing Precautions/Restrictions  fall;brace worn when out of bed;other (see comments)  (Significant)  R fascia iliaca nerve cath,knee immob when up, confusion  -LR      Treatment Considerations/Comments  Patient more lethargic today.   -LR      Recorded by [LR] Alta Ochoa, PT 04/01/19 1159      Row Name 04/01/19 0958             Cognitive Assessment/Intervention- PT/OT    Affect/Mental Status (Cognitive)  confused  -LR      Orientation Status (Cognition)  oriented to;person;disoriented to;place;time  -LR      Follows Commands (Cognition)  follows one step commands;verbal cues/prompting required;repetition of directions required;physical/tactile prompts required;25-49% accuracy  -LR      Safety Deficit (Cognitive)  moderate deficit;ability to follow commands;at risk behavior observed;awareness of need for assistance;insight into deficits/self awareness;judgment;problem solving;safety precautions awareness;safety precautions follow-through/compliance  -LR      Recorded by [LR] Alta Ochoa, PT 04/01/19 1159      Row Name 04/01/19 0958             Safety Issues, Functional Mobility    Safety Issues Affecting Function (Mobility)  ability to follow commands;at risk behavior observed;awareness of need for assistance;insight into deficits/self awareness;judgment;positioning of assistive device;problem solving;safety precaution awareness;safety precautions  follow-through/compliance;sequencing abilities  -LR      Recorded by [LR] Alta Ochoa, PT 04/01/19 1159      Row Name 04/01/19 0958             Mobility Assessment/Intervention    Extremity Weight-bearing Status  right lower extremity  -LR      Right Lower Extremity (Weight-bearing Status)  weight-bearing as tolerated (WBAT)  -LR      Recorded by [LR] Alta Ochoa, PT 04/01/19 1159      Row Name 04/01/19 0958             Bed Mobility Assessment/Treatment    Bed Mobility Assessment/Treatment  supine-sit  -LR      Supine-Sit [place] (Bed Mobility)  verbal cues;dependent (less than 25% patient effort);2 person assist  -LR      Bed Mobility, Safety Issues  cognitive deficits limit understanding;decreased use of arms for pushing/pulling;decreased use of legs for bridging/pushing;impaired trunk control for bed mobility  -LR      Assistive Device (Bed Mobility)  head of bed elevated;draw sheet;bed rails  -LR      Comment (Bed Mobility)  Donned knee immobilizer while supine in bed. Verbal cues to move LEs towards EOB and to push up from bed to raise trunk into sitting. Little to no assist noted from patient. Required assist via draw sheet to scoot hips out to get feet on floor and to raise trunk into sitting. Patient pushing posteriorly upon sitting up. Able to improve slightly with cues to lean forward.   -LR      Recorded by [LR] Alta Ochoa, PT 04/01/19 1159      Row Name 04/01/19 0958             Transfer Assessment/Treatment    Transfer Assessment/Treatment  sit-stand transfer;stand-sit transfer  -LR      Comment (Transfers)  Verbal cues to push up from bed to stand and to reach back for chair to lower into sitting. Verbal cues to step R LE out before t/f for comfort. Stood with very forward flexed posture. Verbal cues for upright posture, to shift hips forward and shoulders back.   -LR      Recorded by [LR] Alta Ochoa, PT 04/01/19 1159      Row Name 04/01/19 0962              Sit-Stand Transfer    Sit-Stand [place] (Transfers)  verbal cues;maximum assist (25% patient effort);2 person assist  -LR      Assistive Device (Sit-Stand Transfers)  walker, front-wheeled  -LR      Recorded by [LR] Alta Ochoa, PT 04/01/19 1159      Row Name 04/01/19 0958             Stand-Sit Transfer    Stand-Sit [place] (Transfers)  verbal cues;2 person assist;moderate assist (50% patient effort)  -LR      Assistive Device (Stand-Sit Transfers)  walker, front-wheeled  -LR      Recorded by [LR] Alta Ochoa, PT 04/01/19 1159      Row Name 04/01/19 0958             Gait/Stairs Assessment/Training    82658 - Gait Training Minutes   8  -LR      [place] Level (Gait)  verbal cues;moderate assist (50% patient effort);2 person assist  -LR      Assistive Device (Gait)  walker, front-wheeled  -LR      Distance in Feet (Gait)  6  -LR      Pattern (Gait)  step-to  -LR      Deviations/Abnormal Patterns (Gait)  right sided deviations;antalgic;bilateral deviations;trey decreased;gait speed decreased;stride length decreased  -LR      Bilateral Gait Deviations  forward flexed posture;heel strike decreased  -LR      Right Sided Gait Deviations  weight shift ability decreased  -LR      Comment (Gait/Stairs)  Patient ambulated with step to gait pattern at slow pace. Patient remained forward flexed througout gait training despite cuse for correction. Verbal cues for correct sequencing of steps, increased R LE weight bearing/stance phase, decreased UE weight bearing, and for correct management of RW. Little improvement with cues for correction. Chair brought up behind patient to return to sitting once too fatigued to progress.   -LR      Recorded by [LR] Alta Ochoa, PT 04/01/19 1159      Row Name 04/01/19 0958             Motor Skills Assessment/Interventions    Additional Documentation  Therapeutic Exercise (Group);Therapeutic Exercise Interventions (Group)  -LR      Recorded  by [LR] Alta Ochoa, PT 04/01/19 1159      Row Name 04/01/19 0958             Therapeutic Exercise    37060 - PT Therapeutic Exercise Minutes  17  -LR      Recorded by [LR] Alta Ochoa, PT 04/01/19 1159      Row Name 04/01/19 0958             Therapeutic Exercise    Lower Extremity (Therapeutic Exercise)  gluteal sets;heel slides, left;quad sets, left;SAQ (short arc quad), left;SLR (straight leg raise), left  -LR      Lower Extremity Range of Motion (Therapeutic Exercise)  hip abduction/adduction, left;ankle dorsiflexion/plantar flexion, left  -LR      Exercise Type (Therapeutic Exercise)  isometric contraction, static;isotonic contraction, concentric;AAROM (active assistive range of motion)  -LR      Position (Therapeutic Exercise)  supine  -LR      Sets/Reps (Therapeutic Exercise)  x15 reps each  -LR      Comment (Therapeutic Exercise)  cues for technique; mod assist with ther ex required.   -LR      Recorded by [LR] Alta Ochoa, PT 04/01/19 1159      Row Name 04/01/19 0958             Balance    Balance  static sitting balance;static standing balance  -LR      Recorded by [LR] Alta Ochoa, PT 04/01/19 1159      Row Name 04/01/19 0958             Static Sitting Balance    Level of Riddle (Unsupported Sitting, Static Balance)  maximal assist, 25 to 49% patient effort  -LR      Sitting Position (Unsupported Sitting, Static Balance)  sitting on edge of bed  -LR      Time Able to Maintain Position (Unsupported Sitting, Static Balance)  1 to 2 minutes  -LR      Comment (Unsupported Sitting, Static Balance)  posterior lean, improved with cues to lean forward, progressed to Min assist.   -LR      Recorded by [LR] Alta Ochoa, PT 04/01/19 1159      Row Name 04/01/19 0958             Static Standing Balance    Level of R[place] (Supported Standing, Static Balance)  moderate assist, 50 to 74% patient effort;2 person assist  -LR      Time Able to Maintain  Position (Supported Standing, Static Balance)  30 to 45 seconds  -LR      Assistive Device Utilized (Supported Standing, Static Balance)  walker, rolling  -LR      Comment (Supported Standing, Static Balance)  forward flexed posture, leaning posteriorly  -LR      Recorded by [LR] Alta Ochoa, PT 04/01/19 1159      Row Name 04/01/19 0958             Positioning and Restraints    Pre-Treatment Position  in bed  -LR      Post Treatment Position  chair  -LR      In Chair  notified nsg;reclined;sitting;call light within reach;encouraged to call for assist;exit alarm on;with family/caregiver;compression device;waffle cushion;on mechanical lift sling;legs elevated;waffle boot/both  -LR      Recorded by [LR] Alta Ochoa, PT 04/01/19 1159      Row Name 04/01/19 0958             Pain Assessment    Additional Documentation  Pain Scale: FACES Pre/Post-Treatment (Group)  -LR      Recorded by [LR] Alta Ochoa, PT 04/01/19 1159      Row Name 04/01/19 0966             Pain Scale: Numbers Pre/Post-Treatment    Pain Location - Side  Right  -LR      Pain Location  hip  -LR      Pain Intervention(s)  Repositioned;Ambulation/increased activity  -LR      Recorded by [LR] Alta Ochoa, PT 04/01/19 1159      Row Name 04/01/19 0947             Pain Scale: FACES Pre/Post-Treatment    Pain: FACES Scale, Pretreatment  2-->hurts little bit  -LR      Pain: FACES Scale, Post-Treatment  4-->hurts little more  -LR      Recorded by [LR] Alta Ochoa, PT 04/01/19 1159      Row Name                Wound 03/30/19 0907 Right hip incision    Wound - Properties Group Date first assessed: 03/30/19 [JS] Time first assessed: 0907 [JS] Side: Right [JS] Location: hip [JS] Type: incision [JS] Recorded by:  [JS] Lilia Xiao RN 03/30/19 0907    Row Name                Wound 03/31/19 1500 Left medial heel other (see comments)    Wound - Properties Group Date first assessed: 03/31/19 [CP] Time first  assessed: 1500 [CP] Present On Admission : no;picture taken [CP] Side: Left [CP] Orientation: medial [CP] Location: heel [CP] Type: other (see comments) [CP] Additional Comments: bruise vs DTPI [CP] Recorded by:  [CP] Abby Kirby APRN 03/31/19 1535    Row Name                Wound 03/31/19 1500 Right lateral heel other (see comments)    Wound - Properties Group Date first assessed: 03/31/19 [CP] Time first assessed: 1500 [CP] Present On Admission : no;picture taken [CP] Side: Right [CP] Orientation: lateral [CP] Location: heel [CP] Type: other (see comments) [CP] Additional Comments: bruise vs DTPI [CP] Recorded by:  [CP] Abby Kirby APRN 03/31/19 1537    Row Name 04/01/19 0958             Coping    Observed Emotional State  cooperative;agitated became slightly agitated with gait training  -LR      Verbalized Emotional State  acceptance  -LR      Recorded by [LR] Alta Ochoa, PT 04/01/19 1159      Row Name 04/01/19 0958             Plan of Care Review    Plan of Care Reviewed With  patient;daughter  -LR      Recorded by [LR] Alta Ochoa, PT 04/01/19 1159      Row Name 04/01/19 0958             Outcome Summary/Treatment Plan (PT)    Daily Summary of Progress (PT)  progress toward functional goals is gradual  -LR      Recorded by [LR] Alta Ochoa, PT 04/01/19 1159        User Key  (r) = Recorded By, (t) = Taken By, (c) = Cosigned By    Initials Name Effective Dates Discipline    CP Abby Kirby, APRN 02/05/19 -  Nurse    LR Alta Ochoa, PT 06/19/15 -  PT    Lilia Pagan RN 01/23/17 -  Nurse          Wound 03/30/19 0907 Right hip incision (Active)   Dressing Appearance dry;intact;no drainage 4/1/2019  8:45 AM   Drainage Amount none 4/1/2019  8:45 AM       Wound 03/31/19 1500 Left medial heel other (see comments) (Active)   Wound Image   3/31/2019  3:05 PM   Dressing Appearance dry;intact;no drainage 4/1/2019  8:45 AM   Base  maroon/purple 4/1/2019  8:45 AM   Periwound intact;dry;pink;blanchable 3/31/2019  3:05 PM   Edges irregular 3/31/2019  3:05 PM   Wound Length (cm) 2.2 cm 3/31/2019  3:05 PM   Wound Width (cm) 1.5 cm 3/31/2019  3:05 PM   Wound Depth (cm) 0 cm 3/31/2019  3:05 PM   Drainage Amount none 4/1/2019  8:45 AM   Care, Wound cleansed with;sterile normal saline 3/31/2019  3:05 PM   Dressing Care, Wound dressing applied;foam;low-adherent 3/31/2019  3:05 PM       Wound 03/31/19 1500 Right lateral heel other (see comments) (Active)   Wound Image   3/31/2019  3:05 PM   Dressing Appearance dry;intact;no drainage 4/1/2019  8:45 AM   Base purple 4/1/2019  8:45 AM   Periwound intact;dry;pink;blanchable 3/31/2019  3:05 PM   Edges irregular 3/31/2019  3:05 PM   Wound Length (cm) 1 cm 3/31/2019  3:05 PM   Wound Width (cm) 0.5 cm 3/31/2019  3:05 PM   Wound Depth (cm) 0 cm 3/31/2019  3:05 PM   Drainage Amount none 4/1/2019  8:45 AM   Care, Wound cleansed with;barrier applied 3/31/2019  3:05 PM   Dressing Care, Wound dressing applied;foam;low-adherent 3/31/2019  3:05 PM           Physical Therapy Education     Title: PT OT SLP Therapies (In Progress)     Topic: Physical Therapy (In Progress)     Point: Mobility training (In Progress)     Learning Progress Summary           Patient Acceptance, D,E, NR by LR at 4/1/2019  9:58 AM    Comment:  Educated on precautions, weight bearing status, correct supine to sit t/f technique, correct sit<->stand t/f technique, correct gait mechanics, HEP, indications for use of knee immobilizer, and progression of POC.    Acceptance, E,TB,D, NR by CT at 3/31/2019 11:40 AM   Family Acceptance, D,E, NR by LR at 4/1/2019  9:58 AM    Comment:  Educated on precautions, weight bearing status, correct supine to sit t/f technique, correct sit<->stand t/f technique, correct gait mechanics, HEP, indications for use of knee immobilizer, and progression of POC.   Significant Other Acceptance, E,TB,D, NR by CT at 3/31/2019  11:40 AM                   Point: Home exercise program (In Progress)     Learning Progress Summary           Patient Acceptance, D,E, NR by LR at 4/1/2019  9:58 AM    Comment:  Educated on precautions, weight bearing status, correct supine to sit t/f technique, correct sit<->stand t/f technique, correct gait mechanics, HEP, indications for use of knee immobilizer, and progression of POC.    Acceptance, E,TB,D, NR by CT at 3/31/2019 11:40 AM   Family Acceptance, D,E, NR by LR at 4/1/2019  9:58 AM    Comment:  Educated on precautions, weight bearing status, correct supine to sit t/f technique, correct sit<->stand t/f technique, correct gait mechanics, HEP, indications for use of knee immobilizer, and progression of POC.   Significant Other Acceptance, E,TB,D, NR by CT at 3/31/2019 11:40 AM                   Point: Body mechanics (In Progress)     Learning Progress Summary           Patient Acceptance, D,E, NR by LR at 4/1/2019  9:58 AM    Comment:  Educated on precautions, weight bearing status, correct supine to sit t/f technique, correct sit<->stand t/f technique, correct gait mechanics, HEP, indications for use of knee immobilizer, and progression of POC.    Acceptance, E,TB,D, NR by CT at 3/31/2019 11:40 AM   Family Acceptance, D,E, NR by LR at 4/1/2019  9:58 AM    Comment:  Educated on precautions, weight bearing status, correct supine to sit t/f technique, correct sit<->stand t/f technique, correct gait mechanics, HEP, indications for use of knee immobilizer, and progression of POC.   Significant Other Acceptance, E,TB,D, NR by CT at 3/31/2019 11:40 AM                   Point: Precautions (In Progress)     Learning Progress Summary           Patient Acceptance, D,E, NR by LR at 4/1/2019  9:58 AM    Comment:  Educated on precautions, weight bearing status, correct supine to sit t/f technique, correct sit<->stand t/f technique, correct gait mechanics, HEP, indications for use of knee immobilizer, and progression  of POC.    Acceptance, E,TB,D, NR by CT at 3/31/2019 11:40 AM   Family Acceptance, D,E, NR by LR at 4/1/2019  9:58 AM    Comment:  Educated on precautions, weight bearing status, correct supine to sit t/f technique, correct sit<->stand t/f technique, correct gait mechanics, HEP, indications for use of knee immobilizer, and progression of POC.   Significant Other Acceptance, E,TB,D, NR by CT at 3/31/2019 11:40 AM                               User Key     Initials Effective Dates Name Provider Type Discipline    LR 06/19/15 -  Alta Ochoa, PT Physical Therapist PT    CT 06/19/15 -  Woo Bermudez, PT Physical Therapist PT                PT Recommendation and Plan     Outcome Summary/Treatment Plan (PT)  Daily Summary of Progress (PT): progress toward functional goals is gradual  Plan of Care Reviewed With: patient, daughter  Progress: improving  Outcome Summary: Patient ambulated 6 feet with RW and mod assist x2, limited by pain and fatigue. Patient remained forward flexed throughout mobility training despite cues for correction and demonstrated posterior lean sitting EOB and upon initiating gait training. Patient more lethargic this AM compared to yesterday. Will continue to progress mobility training and strengthening as able.   Outcome Measures     Row Name 04/01/19 0958 03/31/19 1055 03/31/19 1018       How much help from another person do you currently need...    Turning from your back to your side while in flat bed without using bedrails?  1  -LR  2  -CT  --    Moving from lying on back to sitting on the side of a flat bed without bedrails?  1  -LR  2  -CT  --    Moving to and from a bed to a chair (including a wheelchair)?  2  -LR  3  -CT  --    Standing up from a chair using your arms (e.g., wheelchair, bedside chair)?  2  -LR  3  -CT  --    Climbing 3-5 steps with a railing?  1  -LR  1  -CT  --    To walk in hospital room?  2  -LR  3  -CT  --    AM-PAC 6 Clicks Score  9  -LR  14  -CT  --        How much help from another is currently needed...    Putting on and taking off regular lower body clothing?  --  --  1  -ST    Bathing (including washing, rinsing, and drying)  --  --  2  -ST    Toileting (which includes using toilet bed pan or urinal)  --  --  1  -ST    Putting on and taking off regular upper body clothing  --  --  3  -ST    Taking care of personal grooming (such as brushing teeth)  --  --  3  -ST    Eating meals  --  --  3  -ST    Score  --  --  13  -ST       Functional Assessment    Outcome Measure Options  AM-PAC 6 Clicks Basic Mobility (PT)  -LR  AM-PAC 6 Clicks Basic Mobility (PT)  -CT  AM-PAC 6 Clicks Daily Activity (OT)  -ST      User Key  (r) = Recorded By, (t) = Taken By, (c) = Cosigned By    Initials Name Provider Type    ST Trini Leung, OTR Occupational Therapist    LR Alta Ochoa, PT Physical Therapist    CT Woo Bermudez, PT Physical Therapist         Time Calculation:   PT Charges     Row Name 04/01/19 0958             Time Calculation    Start Time  0958  -LR      PT Received On  04/01/19  -LR      PT Goal Re-Cert Due Date  04/10/19  -LR         Time Calculation- PT    Total Timed Code Minutes- PT  20 minute(s)  -LR         Timed Charges    39296 - PT Therapeutic Exercise Minutes  17  -LR      67550 - Gait Training Minutes   8  -LR        User Key  (r) = Recorded By, (t) = Taken By, (c) = Cosigned By    Initials Name Provider Type    LR Alta Ochoa, PT Physical Therapist        Therapy Charges for Today     Code Description Service Date Service Provider Modifiers Qty    [id number] HC GAIT TRAINING EA 15 MIN 4/1/2019 Alta Ochoa, PT GP 1          PT G-Codes  Outcome Measure Options: AM-PAC 6 Clicks Basic Mobility (PT)  AM-PAC 6 Clicks Score: 9  Score: 13    Alta Ochoa, PT  4/1/2019

## 2019-04-01 NOTE — PROGRESS NOTES
[facility]    Acute pain service Inpatient Progress Note    Patient Name: Mary Ramirez  :  1934  MRN:  [id number]        Acute Pain  Service Inpatient Progress Note:    Analgesia:Good  LOC: alert and awake  Resp Status: room air  Cardiac: VS stable  Side Effects:None  Catheter Site:clean and dry  Cath type: peripheral nerve cath(MOOG pump)  Infusion rate: 8ml/hr  Catheter Plan:RN to remove catheter  Comments: No complaints , to be dc'd today

## 2019-04-01 NOTE — PLAN OF CARE
Problem: Patient Care Overview  Goal: Plan of Care Review  Outcome: Ongoing (interventions implemented as appropriate)   04/01/19 1628   Coping/Psychosocial   Plan of Care Reviewed With patient;daughter   Plan of Care Review   Progress improving   OTHER   Outcome Summary Patient ambulated 6 feet with RW and mod assist x2, limited by pain and fatigue. Patient remained forward flexed throughout mobility training despite cues for correction and demonstrated posterior lean sitting EOB and upon initiating gait training. Patient more lethargic this AM compared to yesterday. Will continue to progress mobility training and strengthening as able.

## 2019-04-01 NOTE — PROGRESS NOTES
Continued Stay Note  [facility]     Patient Name: Mary Ramirez  MRN: [id number]  Today's Date: 4/1/2019    Admit Date: 3/29/2019    Discharge Plan     Row Name 04/01/19 1449       Plan    Plan  Short term rehab at [facility]    Patient/Family in Agreement with Plan  yes    Plan Comments  Spoke with Dona at [facility].  Patient lives in independent living at [facility].  Plans is to transfer to short term rehab when medically ready.  Patient continues to be pleasantly confused.  Ambulating with PT up to 30 feet.  Continuing to monitor labs.  [facility] nurse seeing patient for blanchable areas to heels.  Case managment will continue to follow for dishcarge planning.       Final Discharge Disposition Code  03 - skilled nursing facility (SNF)        Discharge Codes    No documentation.             Dona Crews RN

## 2019-04-01 NOTE — NURSING NOTE
PT wound care ordered for MIST therapy evaluation r/t left heel and right foot DTPI. Offloading heel boots and Optifoam dressings in place at this time.

## 2019-04-01 NOTE — PLAN OF CARE
Problem: Patient Care Overview  Goal: Plan of Care Review  Outcome: Ongoing (interventions implemented as appropriate)   04/01/19 0935   Coping/Psychosocial   Plan of Care Reviewed With patient   OTHER   Outcome Summary Pt continues to have significant confusion and lethargy impacting command-following along with participating in purposeful activity, bossman. related to AE training and ADL engagement. Pt max/mod A X2 for all bed mobility, transfers and taking steps. Pt requiring AROM/AAROM for BUE HEP but not following commands for ADL tasks. Will continue to progress as tolerated.        Problem: Hip Arthroplasty (Total, Partial) (Adult)  Goal: Signs and Symptoms of Listed Potential Problems Will be Absent, Minimized or Managed (Hip Arthroplasty)  Outcome: Ongoing (interventions implemented as appropriate)   04/01/19 3485   Goal/Outcome Evaluation   Problems Assessed (Hip Arthroplasty) functional deficit   Problems Present (Hip Arthroplasty) functional deficit

## 2019-04-01 NOTE — PLAN OF CARE
Problem: Patient Care Overview  Goal: Plan of Care Review  Outcome: Ongoing (interventions implemented as appropriate)   04/01/19 1800   Coping/Psychosocial   Plan of Care Reviewed With patient   Plan of Care Review   Progress improving   OTHER   Outcome Summary Patient has been alert today, only oriented to person. She has slept off and on throughout the day. 20g iv started in RFA NS at 75ml/hr. Patient voided on bedpan x 1 with 150 ml output, bladder scan prior to voiding was 690, i/o cath'd patient emptied 400ml of urine. This evenings bladder scan 244, will continue to monitor. No complaints of pain, turning q 2 hrs. Heels elevated in offloading boots. D/C/'d nerve cath per order.        Problem: Skin Injury Risk (Adult)  Goal: Identify Related Risk Factors and Signs and Symptoms  Outcome: Ongoing (interventions implemented as appropriate)      Problem: Fall Risk (Adult)  Goal: Identify Related Risk Factors and Signs and Symptoms  Outcome: Ongoing (interventions implemented as appropriate)    Goal: Absence of Fall  Outcome: Ongoing (interventions implemented as appropriate)      Problem: Pain, Acute (Adult)  Goal: Identify Related Risk Factors and Signs and Symptoms  Outcome: Ongoing (interventions implemented as appropriate)    Goal: Acceptable Pain Control/Comfort Level  Outcome: Ongoing (interventions implemented as appropriate)

## 2019-04-01 NOTE — PROGRESS NOTES
"    [facility] Medicine Services  PROGRESS NOTE    Patient Name: Mary Ramirez  : 1934  MRN: [id number]    Date of Admission: 3/29/2019  Length of Stay: 3  Primary Care Physician: Nirmal Lundberg MD    Subjective   Subjective     CC:  Fall     HPI:  Pt is seen resting up in chair in NAD.  Dozing but awakes to stimuli.  Daughter at bs.  Daughter and nurse states pt was awake with PT earlier and is just very sleepy today.  Tolerating diet.  Daughter states she ate \"really good for breakfast\".  No n/v, abd pain, soa reported to da/staff.  Pt remains pleasantly confused.  No new issues.     Review of Systems  Gen- No fevers, chills  CV- No chest pain, palpitations  Resp- No cough, dyspnea  GI- No N/V/D, abd pain    Otherwise ROS is negative except as mentioned in the HPI.    Objective   Objective     Vital Signs:   Temp:  [97.8 °F (36.6 °C)-98.8 °F (37.1 °C)] 97.8 °F (36.6 °C)  Heart Rate:  [82-93] 91  Resp:  [16-17] 16  BP: (127-158)/(58-83) 158/82        Physical Exam:  Constitutional: No acute distress, sleeping mostly.  Sitting upright in bed with daughter at bedside.  HENT: NCAT, mucous membranes moist  Respiratory: Clear to auscultation bilaterally A/P, respiratory effort normal   Cardiovascular: RRR, II/VI murmur, no rubs, or gallops, palpable pedal pulses bilaterally  Gastrointestinal: Positive bowel sounds, soft, nontender, nondistended  Musculoskeletal: No bilateral ankle edema. ARTEAGA spont but decreased mvmt of RLE due to pain.  Good sensation.  PPP  Psychiatric: Appropriate affect, cooperative calm.  Neurologic: Sleepy. Oriented x 2, confused at baseline.  Nonfocal.  Speech clear.  Follows commands.    Skin: No rashes. RLE hip drsg c/d/i.  Mild edema to thigh.  Pain cath in place to rt groin.  Rt heel with mild erythema and is blanchable. Heels are in boots and drsg in place.  WOC following.      Results Reviewed:  I have personally reviewed current lab, radiology, and " data and agree.    Results from last 7 days   Lab Units 04/01/19  0658 03/31/19  0705 03/30/19  0720 03/29/19  0739   WBC 10*3/mm3 14.45*  --  12.98* 10.65   HEMOGLOBIN g/dL 7.5* 7.7* 10.5* 11.9   HEMATOCRIT % 24.6* 24.4* 35.4 37.6   PLATELETS 10*3/mm3 251  --  303 267     Results from last 7 days   Lab Units 04/01/19  0658 03/31/19  0705 03/30/19  0720 03/29/19  0739   SODIUM mmol/L 131* 133 140 136   POTASSIUM mmol/L 4.3 3.8 4.0 3.7   CHLORIDE mmol/L 97* 98* 102 101   CO2 mmol/L 25.0 26.0 27.0 25.0   BUN mg/dL 35* 40* 33* 30*   CREATININE mg/dL 1.30 1.52* 1.36* 1.20   GLUCOSE mg/dL 209* 219* 264* 219*   CALCIUM mg/dL 9.0 9.0 9.7 9.2   ALT (SGPT) U/L  --   --   --  38   AST (SGOT) U/L  --   --   --  35*     Estimated Creatinine Clearance: 25.4 mL/min (by C-G formula based on SCr of 1.3 mg/dL).    No results found for: BNP    Microbiology Results Abnormal     None          Imaging Results (last 24 hours)     ** No results found for the last 24 hours. **          Results for orders placed during the hospital encounter of 03/03/19   Transthoracic Echo Complete With Contrast if Necessary Per Protocol    Narrative · Calculated EF = 66.0%  · Left ventricular systolic function is normal.  · Left ventricular wall thickness is consistent with moderate concentric   hypertrophy.  · Left atrial cavity size is severely dilated.  · Left ventricular diastolic dysfunction (grade I) consistent with   impaired relaxation.  · Mild tricuspid valve regurgitation is present.  · Mild-to-moderate mitral valve regurgitation is present  · There is calcification of the aortic valve.          I have reviewed the medications:    Current Facility-Administered Medications:   •  acetaminophen (TYLENOL) tablet 500 mg, 500 mg, Oral, Q6H PRN, Pennie Perez MD  •  acetaminophen (TYLENOL) tablet 650 mg, 650 mg, Oral, Q4H PRN, Theo Figueroa MD  •  amiodarone (PACERONE) tablet 200 mg, 200 mg, Oral, Q24H, Marian Langston DO, 200 mg at 04/01/19  0840  •  amLODIPine (NORVASC) tablet 10 mg, 10 mg, Oral, Q24H, Pennie Perez MD, 10 mg at 04/01/19 0840  •  aspirin EC tablet 325 mg, 325 mg, Oral, Q12H, Alie Kruger MD  •  bisacodyl (DULCOLAX) EC tablet 5 mg, 5 mg, Oral, Daily PRN, Pennie Perez MD, 5 mg at 03/31/19 0551  •  citalopram (CeleXA) tablet 20 mg, 20 mg, Oral, Daily, Pennie Perez MD, 20 mg at 04/01/19 0840  •  CloNIDine (CATAPRES) tablet 0.2 mg, 0.2 mg, Oral, BID, Pennie Perez MD, 0.2 mg at 04/01/19 0840  •  dextrose (D50W) 25 g/ 50mL Intravenous Solution 25 g, 25 g, Intravenous, Q15 Min PRN, Pennie Perez MD  •  dextrose (GLUTOSE) oral gel 15 g, 15 g, Oral, Q15 Min PRN, Pennie Perez MD  •  donepezil (ARICEPT) tablet 5 mg, 5 mg, Oral, Nightly, Pennie Perez MD, 5 mg at 03/31/19 2114  •  fluticasone (FLONASE) 50 MCG/ACT nasal spray 2 spray, 2 spray, Nasal, Daily, Pennie Perez MD, 2 spray at 04/01/19 0842  •  glucagon (human recombinant) (GLUCAGEN DIAGNOSTIC) injection 1 mg, 1 mg, Subcutaneous, PRN, Pennie Perez MD  •  hydrALAZINE (APRESOLINE) tablet 25 mg, 25 mg, Oral, Q8H, Pennie Perez MD, 25 mg at 04/01/19 0540  •  HYDROmorphone (DILAUDID) injection 0.5 mg, 0.5 mg, Intravenous, Q2H PRN, Marian Langston DO, 0.5 mg at 03/30/19 1853  •  insulin detemir (LEVEMIR) injection 6 Units, 6 Units, Subcutaneous, Nathan GUALLPA Belinda Sue, CRISTA, 6 Units at 04/01/19 0934  •  insulin lispro (humaLOG) injection 0-9 Units, 0-9 Units, Subcutaneous, 4x Daily With Meals & Nightly, Shanta Evans APRN, 6 Units at 04/01/19 1318  •  insulin lispro (humaLOG) injection 2 Units, 2 Units, Subcutaneous, TID With Meals, Shanta Evans APRN, 2 Units at 04/01/19 1317  •  isosorbide mononitrate (IMDUR) 24 hr tablet 60 mg, 60 mg, Oral, Q24H, Marian Langston DO, 60 mg at 04/01/19 0840  •  lactated ringers infusion, 9 mL/hr, Intravenous, Continuous, Vish Cheek MD, Last Rate: 9 mL/hr at 03/30/19 1126, 9 mL/hr  at 03/30/19 1126  •  ondansetron (ZOFRAN) tablet 4 mg, 4 mg, Oral, Q8H PRN, Pennie Perez MD  •  oxyCODONE (ROXICODONE) immediate release tablet 10 mg, 10 mg, Oral, Q4H PRN, Theo Figueroa MD, 10 mg at 03/30/19 1512  •  oxyCODONE (ROXICODONE) immediate release tablet 5 mg, 5 mg, Oral, Q4H PRN, Theo Figueroa MD  •  polyethylene glycol 3350 powder (packet), 17 g, Oral, Daily, Pennie Perez MD, 17 g at 04/01/19 0840  •  ropivacaine (NAROPIN) 0.2% peripheral nerve cath (moog), 8 mL/hr, Peripheral Nerve, Continuous, Dollar, Chanelle, CRNA, Last Rate: 8 mL/hr at 04/01/19 0601, 8 mL/hr at 04/01/19 0601  •  sodium chloride 0.9 % flush 3 mL, 3 mL, Intravenous, Q12H, Pennie Perez MD, 3 mL at 04/01/19 0841  •  sodium chloride 0.9 % flush 3 mL, 3 mL, Intravenous, Q12H, Theo Figueroa MD, 3 mL at 03/30/19 2035  •  sodium chloride 0.9 % flush 3-10 mL, 3-10 mL, Intravenous, PRN, Pennie Perez MD  •  sodium chloride 0.9 % flush 3-10 mL, 3-10 mL, Intravenous, PRN, Theo Figueroa MD  •  sodium chloride 0.9 % infusion, 75 mL/hr, Intravenous, Continuous, Shanta Evans, APRN, Last Rate: 75 mL/hr at 04/01/19 1240, 75 mL/hr at 04/01/19 1240      Assessment/Plan   Assessment / Plan     Active Hospital Problems    Diagnosis POA   • Closed fracture of right hip (CMS/HCC) [S72.001A] Yes   • CKD (chronic kidney disease), stage III (CMS/HCC) [N18.3] Yes   • Chronic diastolic congestive heart failure (CMS/HCC) [I50.32] Yes     · Echo (9/1/18): LVEF 60%. LVH. Left atrial dilatation. Aortic calcification with no stenosis.     • Paroxysmal atrial fibrillation (CMS/HCC) [I48.0] Yes     · Chads Vasc 6 (age > 75, female, CAD, DM, HTN)   · Rhythm control strategy with amiodarone     • SSS (sick sinus syndrome) (CMS/HCC) [I49.5] Yes   • Dementia [F03.90] Yes   • Essential hypertension [I10] Yes   • Coronary artery disease involving native coronary artery of native heart with angina pectoris (CMS/HCC) [I25.119]  Yes     · Cardiac cath with PCI data deficit  · Echo (9/1/18): LVEF 60%. LVH. Left atrial dilatation. Aortic calcification with no stenosis.     • Fall [W19.XXXA] Yes          Brief Hospital Course to date:  Mary Ramirez is a 84 y.o. female with history of HTN, paroxysmal afib, DM, CKD 3 and left hip fracture s/p repair who presents with fall and fracture of right hip. OR 3/30.     Right hip fracture   - s/p repair 3/30   -  mg BID   - dressing clean and dry until post op day 7   --Dr. Figueroa, orthopedic recs:  Protected weight bearing as tolerated right lower extremity, hip range of motion as tolerated  23 hours perioperative antibiotic prophylaxis   SCD's bilateral lower extremities   Aspirin for DVT prophylaxis   Social work for discharge planning.   Dressing to remain in place for 7 days. May remove on POD#7. If no drainage, may shower on POD#10. No submerging wound in water. If drainage is noted, sterile dressing should be placed and wound checked daily. No showering until wound has remained dry for 72 consecutive hours.   Follow up in 2 weeks for re-assessment.      HTN   - improvement in BP after surgery; pain likely contributing  - Continue amlodipine, clonidine, hydralazine and imdur  - Currently aldactone on hold   - Recent PCP visit and BP on low end; will continue to monitor and adjust as needed   --BP remains stable.  Asymptomatic.  Improving.  Will continue current medication regimen. Monitor.  Adjust meds as needed.      Paroxysmal afib   - resume home amiodarone   - Previously on eliquis;  mg BID resumed by ortho  - discuss with family with recent falls and risk of bleeding.  Discussed with pharm D today.  Likely may need to come off eliquis indefinitely due to advanced dementia and falls.      DM   - Hold orals; SSI   --Glucose running 209-263 last 24 hours.  Pt eating or drinking better today.  Still running high however.  Increased sliding scale to MDSSI yesterday. Eating better  and still running in 200's today.  Will add lev 8 units daily and 2 units humalog tid with hold parameter.      Post op anemia  - monitor; consider iron  --Hemoglobin 10.5 preop yesterday, 7.7 postop.  No signs of active bleeding.  --A.m. labs and continue to monitor.   --Consider blood if below 7 hemoglobin or significantly symptomatic.  --dry and hydrating today.  Will homero labs in am.      Lt heel wound  --area of appearing nonblanchable tissue to lt heel.  --elevated heels off bed, waffle boots as needed  --woc consulted and following    CKD 3   - Cr around baseline; monitor   --Creatinine improved today but pt still not drinking much and Na/cl decreased.  Will restart IVFs today and monitor.   --am labs      DVT Prophylaxis:   mg BID     Disposition: I expect the patient to be discharged rehab TBD     CODE STATUS:   Code Status and Medical Interventions:   Ordered at: 03/29/19 0828     Level Of Support Discussed With:    Patient     Code Status:    CPR     Medical Interventions (Level of Support Prior to Arrest):    Full         Electronically signed by CRISTA Castillo, 04/01/19, 2:30 PM.

## 2019-04-01 NOTE — PROGRESS NOTES
"  SUBJECTIVE  Patient resting comfortably.  Pain controlled.  No events overnight.  Remains pleasantly confused.  Glucose control to 60    OBJECTIVE  Temp (24hrs), Av.3 °F (36.8 °C), Min:97.9 °F (36.6 °C), Max:98.8 °F (37.1 °C)    Blood pressure 143/65, pulse 90, temperature 98.5 °F (36.9 °C), temperature source Oral, resp. rate 16, height 154.9 cm (61\"), weight 49.9 kg (110 lb), SpO2 92 %.    Lab Results (last 24 hours)     Procedure Component Value Units Date/Time    POC Glucose Once []  (Abnormal) Collected:  19    Specimen:  Blood Updated:  19     Glucose 263 mg/dL     POC Glucose Once []  (Abnormal) Collected:  19 170    Specimen:  Blood Updated:  19 1720     Glucose 232 mg/dL     POC Glucose Once []  (Abnormal) Collected:  19 1523    Specimen:  Blood Updated:  19 1535     Glucose 228 mg/dL     POC Glucose Once []  (Abnormal) Collected:  19 1143    Specimen:  Blood Updated:  19 1145     Glucose 346 mg/dL     Basic Metabolic Panel [[id number]]  (Abnormal) Collected:  19    Specimen:  Blood Updated:  19     Glucose 219 mg/dL      BUN 40 mg/dL      Creatinine 1.52 mg/dL      Sodium 133 mmol/L      Potassium 3.8 mmol/L      Chloride 98 mmol/L      CO2 26.0 mmol/L      Calcium 9.0 mg/dL      eGFR Non African Amer 33 mL/min/1.73      BUN/Creatinine Ratio 26.3     Anion Gap 9.0 mmol/L     Narrative:       [employer] Guidelines    Stage     Description        GFR  1         Normal or High     90+  2         Mild decrease      60-89  3         Moderate decrease  30-59  4         Severe decrease    15-29  5         Kidney failure     <15    The MDRD GFR formula is only valid for adults with stable renal function between ages 18 and 70.    Hemoglobin & Hematocrit, Blood [[id number]]  (Abnormal) Collected:  19    Specimen:  Blood Updated:  19     Hemoglobin 7.7 g/dL  "     Hematocrit 24.4 %     Narrative:       Verified by repeat analysis.    POC Glucose Once [[id number]]  (Abnormal) Collected:  03/31/19 0712    Specimen:  Blood Updated:  03/31/19 0723     Glucose 239 mg/dL             PHYSICAL EXAM  Right lower extremity: Dressing clean, dry and intact.  Minimal pain with logroll of hip.  Intact EHL, FHL, tibialis anterior, and gastrocsoleus. Sensation intact to light touch to deep peroneal, superficial peroneal, sural, saphenous, tibial nerves. 2+ palpable DP and PT pulses.         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 pectoris (CMS/[facility])    Dementia    Paroxysmal atrial fibrillation (CMS/[facility])    SSS (sick sinus syndrome) (CMS/[facility])    Chronic diastolic congestive heart failure (CMS/[facility])    CKD (chronic kidney disease), stage III (CMS/[facility])    Closed fracture of right hip (CMS/[facility])      PLAN / DISPOSITION:  2 Day Post-Op fixation right intertrochanteric hip fracture    Protected weight bearing as tolerated right lower extremity, hip range of motion as tolerated  Pain control  PT/OT for post op mobilization and medical equipment needs   Attempt to keep glucose levels below 200 to minimize infection risk.  SCD's bilateral lower extremities   Aspirin for DVT prophylaxis   Social work for discharge planning.   Dressing to remain in place for 7 days. May remove on POD#7. If no drainage, may shower on POD#10. No submerging wound in water. If drainage is noted, sterile dressing should be placed and wound checked daily. No showering until wound has remained dry for 72 consecutive hours.   Follow up in 2 weeks for re-assessment.      Theo Figueroa MD  04/01/19  7:07 AM

## 2019-04-01 NOTE — PLAN OF CARE
Problem: Patient Care Overview  Goal: Plan of Care Review  Outcome: Ongoing (interventions implemented as appropriate)   04/01/19 0528   Coping/Psychosocial   Plan of Care Reviewed With patient   Plan of Care Review   Progress no change   OTHER   Outcome Summary pt continue to be confused at night, VSS, heel boots in place, and meplexes on heels, will continue to monitor for changes.        Problem: Skin Injury Risk (Adult)  Goal: Identify Related Risk Factors and Signs and Symptoms  Outcome: Ongoing (interventions implemented as appropriate)      Problem: Fall Risk (Adult)  Goal: Identify Related Risk Factors and Signs and Symptoms  Outcome: Ongoing (interventions implemented as appropriate)      Problem: Pain, Acute (Adult)  Goal: Identify Related Risk Factors and Signs and Symptoms  Outcome: Ongoing (interventions implemented as appropriate)      Problem: Hip Arthroplasty (Total, Partial) (Adult)  Goal: Signs and Symptoms of Listed Potential Problems Will be Absent, Minimized or Managed (Hip Arthroplasty)  Outcome: Ongoing (interventions implemented as appropriate)

## 2019-04-01 NOTE — THERAPY TREATMENT NOTE
Acute Care - Occupational Therapy Treatment Note  [facility]     Patient Name: Mary Ramirez  : 1934  MRN: [id number]  Today's Date: 2019  Onset of Illness/Injury or Date of Surgery: 19  Date of Referral to OT: 19  Referring Physician: Toño    Admit Date: 3/29/2019       ICD-10-CM ICD-9-CM   1. Closed fracture of right hip, initial encounter (CMS/HCC) S72.001A 820.8   2. Hypertension, unspecified type I10 401.9   3. Closed displaced intertrochanteric fracture of right femur, initial encounter (CMS/HCC) S72.141A 820.21   4. Impaired mobility and ADLs Z74.09 799.89     Patient Active Problem List   Diagnosis   • Fall   • Fracture, intertrochanteric, left femur (CMS/[facility])   • Essential hypertension   • Coronary artery disease involving native coronary artery of native heart with angina pectoris (CMS/[facility])   • Dementia   • Paroxysmal atrial fibrillation (CMS/HCC)   • Anticoagulated   • SSS (sick sinus syndrome) (CMS/HCC)   • Chronic diastolic congestive heart failure (CMS/HCC)   • DEANGELO (acute kidney injury) (CMS/HCC)   • Benign essential hypertension   • CKD (chronic kidney disease), stage III (CMS/HCC)   • Mixed hyperlipidemia   • Major depressive disorder, single episode, mild (CMS/HCC)   • Chronic allergic rhinitis   • Memory loss   • Bradycardia   • At risk for falls   • Closed fracture of femur (CMS/[facility])   • Coronary arteriosclerosis after percutaneous transluminal coronary angioplasty (PTCA)   • Diabetic polyneuropathy (CMS/[facility])   • Diverticular disease of colon   • Foot pain   • GERD (gastroesophageal reflux disease)   • Peripheral artery insufficiency (CMS/[facility])   • Annual physical exam   • Vitamin D deficiency   • Renal disorder   • Closed fracture of right hip (CMS/[facility])     Past Medical History:   Diagnosis Date   • Atrial fibrillation (CMS/[facility])    • Benign essential hypertension    • CAD (coronary artery disease)    • Carotid artery disease (CMS/[facility])    • CHF (congestive heart  failure) (CMS/[facility])    • Chronic allergic rhinitis    • CKD (chronic kidney disease), stage III (CMS/[facility])    • DDD (degenerative disc disease), cervical    • Dementia    • Diabetes mellitus (CMS/[facility])    • Diverticulosis     WITH PERFORATION    • Hypertension    • Major depressive disorder, single episode, mild (CMS/[facility])    • Memory loss    • Mixed hyperlipidemia    • SSS (sick sinus syndrome) (CMS/[facility])    • TIA (transient ischemic attack) 01/2016     Past Surgical History:   Procedure Laterality Date   • ANKLE SURGERY Left 1996   • ATHERECTOMY  1995   • ATHRECTOMY ILIAC, FEMORAL, TIBIAL ARTERY     • BREAST LUMPECTOMY Left 1981   • COLON SURGERY     • COLOSTOMY  1983   • CORONARY STENT PLACEMENT  2001   • FINGER FRACTURE SURGERY Right    • HIP TROCANTERIC NAILING WITH INTRAMEDULLARY HIP SCREW Left 11/23/2017   • HIP TROCANTERIC NAILING WITH INTRAMEDULLARY HIP SCREW Right 3/30/2019    Procedure: HIP TROCANTERIC NAILING WITH INTRAMEDULLARY HIP SCREW RIGHT;  Surgeon: Theo Figueroa MD;  Location: [facility];  Service: Orthopedics   • HYSTERECTOMY  1984   • OTHER SURGICAL HISTORY  1984    REANASTAMOSIS        Therapy Treatment    Rehabilitation Treatment Summary     Row Name 04/01/19 0959 04/01/19 0958          Treatment Time/Intention    Discipline  occupational therapist  -ST  physical therapist  -LR     Document Type  therapy note (daily note)  -ST  therapy note (daily note)  -LR     Subjective Information  no complaints  -ST  no complaints  -LR     Mode of Treatment  occupational therapy;co-treatment  -ST  physical therapy;co-treatment  -LR     Patient/Family Observations  pt supine asleep, SCUDS, nerve cath intact  -ST  Patient supine in bed upon arrival. IV, R fascia iliaca nerve catheter, exit alarms, waffle boots, SCDs, pulse ox, tele.   -LR     Care Plan Review  care plan/treatment goals reviewed;risks/benefits reviewed;current/potential barriers reviewed;patient/other agree to care plan  -ST  care  plan/treatment goals reviewed;risks/benefits reviewed;current/potential barriers reviewed;patient/other agree to care plan  -LR     Care Plan Review, Other Participant(s)  daughter  -ST  daughter  -LR     Therapy Frequency (OT Eval)  daily  -ST  --     Patient Effort  adequate  -ST  adequate  -LR     Existing Precautions/Restrictions  fall;brace worn when out of bed;other (see comments)  (Significant)  KI d/t FICB and weak quad, confusion, dec. safety  -ST  fall;brace worn when out of bed;other (see comments)  (Significant)  R fascia iliaca nerve cath,knee immob when up, confusion  -LR     Treatment Considerations/Comments  pt with increased confusion today over yesterday's session  -ST  Patient more lethargic today.   -LR     Recorded by [ST] Trini Leung, OTR 04/01/19 1515 [LR] Alta Ochoa, PT 04/01/19 1159     Row Name 04/01/19 0959 04/01/19 0958          Cognitive Assessment/Intervention- PT/OT    Affect/Mental Status (Cognitive)  confused  -ST  confused  -LR     Behavioral Issues (Cognitive)  withdrawn  -ST  --     Orientation Status (Cognition)  oriented to;person  -ST  oriented to;person;disoriented to;place;time  -LR     Follows Commands (Cognition)  25-49% accuracy;follows one step commands;delayed response/completion;increased processing time needed;initiation impaired;physical/tactile prompts required;repetition of directions required;verbal cues/prompting required pt extremely difficult to arouse, making progress difficult   -ST  follows one step commands;verbal cues/prompting required;repetition of directions required;physical/tactile prompts required;25-49% accuracy  -LR     Cognitive Function (Cognitive)  attention deficit;executive function deficit;memory deficit;safety deficit  -ST  --     Attention Deficit (Cognitive)  moderate deficit  -ST  --     Executive Function Deficit (Cognition)  severe deficit  -ST  --     Memory Deficit (Cognitive)  severe deficit  -ST  --     Safety  Deficit (Cognitive)  moderate deficit  -ST  moderate deficit;ability to follow commands;at risk behavior observed;awareness of need for assistance;insight into deficits/self awareness;judgment;problem solving;safety precautions awareness;safety precautions follow-through/compliance  -LR     Personal Safety Interventions  fall prevention program maintained;gait belt;nonskid shoes/slippers when out of bed  -ST  --     Recorded by [ST] Trini Leung, OTR 04/01/19 1515 [LR] Alta Ochoa, PT 04/01/19 1159     Row Name 04/01/19 0959 04/01/19 0958          Safety Issues, Functional Mobility    Safety Issues Affecting Function (Mobility)  ability to follow commands;at risk behavior observed;awareness of need for assistance;insight into deficits/self awareness;judgment;positioning of assistive device;problem solving;safety precaution awareness;safety precautions follow-through/compliance  -ST  ability to follow commands;at risk behavior observed;awareness of need for assistance;insight into deficits/self awareness;judgment;positioning of assistive device;problem solving;safety precaution awareness;safety precautions follow-through/compliance;sequencing abilities  -LR     Impairments Affecting Function (Mobility)  balance;cognition;endurance/activity tolerance;pain;postural/trunk control;strength  -ST  --     Recorded by [ST] Trini Leung, OTR 04/01/19 1515 [LR] Alta Ochoa, PT 04/01/19 1159     Row Name 04/01/19 0959 04/01/19 0958          Mobility Assessment/Intervention    Extremity Weight-bearing Status  right lower extremity  -ST  right lower extremity  -LR     Right Lower Extremity (Weight-bearing Status)  weight-bearing as tolerated (WBAT)  -ST  weight-bearing as tolerated (WBAT)  -LR     Recorded by [ST] Trini Leung, OTR 04/01/19 1515 [LR] Alta Ochoa, PT 04/01/19 1159     Row Name 04/01/19 0959 04/01/19 0958          Bed Mobility Assessment/Treatment    Bed Mobility  Assessment/Treatment  --  supine-sit  -LR     Supine-Sit [place] (Bed Mobility)  dependent (less than 25% patient effort);2 person assist  -ST  verbal cues;dependent (less than 25% patient effort);2 person assist  -LR     Bed Mobility, Safety Issues  cognitive deficits limit understanding;decreased use of arms for pushing/pulling;decreased use of legs for bridging/pushing;impaired trunk control for bed mobility  -ST  cognitive deficits limit understanding;decreased use of arms for pushing/pulling;decreased use of legs for bridging/pushing;impaired trunk control for bed mobility  -LR     Assistive Device (Bed Mobility)  head of bed elevated;draw sheet  -ST  head of bed elevated;draw sheet;bed rails  -LR     Comment (Bed Mobility)  donned KI in supine, pt not making effort to move toward EOB d/t mentation and lethargy   -ST  Donned knee immobilizer while supine in bed. Verbal cues to move LEs towards EOB and to push up from bed to raise trunk into sitting. Little to no assist noted from patient. Required assist via draw sheet to scoot hips out to get feet on floor and to raise trunk into sitting. Patient pushing posteriorly upon sitting up. Able to improve slightly with cues to lean forward.   -LR     Recorded by [ST] Trini Leung, OTR 04/01/19 1515 [LR] Alta Ochoa, PT 04/01/19 1159     Row Name 04/01/19 0979             Functional Mobility    Functional Mobility- Ind. Level  moderate assist (50% patient effort);2 person assist required  -ST      Functional Mobility- Device  rolling walker  -ST      Functional Mobility-Distance (Feet)  5  -ST      Functional Mobility- Safety Issues  sequencing ability decreased;step length decreased;weight-shifting ability decreased  -ST      Functional Mobility- Comment  pt with extreme forward flexed balance issue, chair required to be pulled up behind pt   -ST      Recorded by [ST] Trini Leung, OTR 04/01/19 1515      Row Name 04/01/19 0959 04/01/19 0996           Transfer Assessment/Treatment    Transfer Assessment/Treatment  --  sit-stand transfer;stand-sit transfer  -LR     Comment (Transfers)  cuing for hand placement  -ST  Verbal cues to push up from bed to stand and to reach back for chair to lower into sitting. Verbal cues to step R LE out before t/f for comfort. Stood with very forward flexed posture. Verbal cues for upright posture, to shift hips forward and shoulders back.   -LR     Recorded by [ST] Trini Leung, OTR 04/01/19 1515 [LR] Alta Ochoa, PT 04/01/19 1159     Row Name 04/01/19 0959 04/01/19 0958          Sit-Stand Transfer    Sit-Stand [place] (Transfers)  maximum assist (25% patient effort);2 person assist  -ST  verbal cues;maximum assist (25% patient effort);2 person assist  -LR     Assistive Device (Sit-Stand Transfers)  walker, front-wheeled  -ST  walker, front-wheeled  -LR     Recorded by [ST] Trini Leung, OTR 04/01/19 1515 [LR] Alta Ochoa, PT 04/01/19 1159     Row Name 04/01/19 0959 04/01/19 0958          Stand-Sit Transfer    Stand-Sit [place] (Transfers)  maximum assist (25% patient effort);2 person assist  -ST  verbal cues;2 person assist;moderate assist (50% patient effort)  -LR     Assistive Device (Stand-Sit Transfers)  walker, front-wheeled  -ST  walker, front-wheeled  -LR     Recorded by [ST] Trini Leung, OTR 04/01/19 1515 [LR] Alta Ochoa, PT 04/01/19 1159     Row Name 04/01/19 0958             Gait/Stairs Assessment/Training    93468 - Gait Training Minutes   8  -LR      [place] Level (Gait)  verbal cues;moderate assist (50% patient effort);2 person assist  -LR      Assistive Device (Gait)  walker, front-wheeled  -LR      Distance in Feet (Gait)  6  -LR      Pattern (Gait)  step-to  -LR      Deviations/Abnormal Patterns (Gait)  right sided deviations;antalgic;bilateral deviations;trey decreased;gait speed decreased;stride length decreased  -LR      Bilateral Gait  Deviations  forward flexed posture;heel strike decreased  -LR      Right Sided Gait Deviations  weight shift ability decreased  -LR      Comment (Gait/Stairs)  Patient ambulated with step to gait pattern at slow pace. Patient remained forward flexed througout gait training despite cuse for correction. Verbal cues for correct sequencing of steps, increased R LE weight bearing/stance phase, decreased UE weight bearing, and for correct management of RW. Little improvement with cues for correction. Chair brought up behind patient to return to sitting once too fatigued to progress.   -LR      Recorded by [LR] Alta Ochoa, PT 04/01/19 1159      Row Name 04/01/19 0959             Lower Body Dressing Assessment/Training    Lower Body Dressing [place] Level  don;socks;dependent (less than 25% patient effort);other (see comments) KI  -ST      Lower Body Dressing Position  supine  -ST      Recorded by [ST] Trini Leung, OTR 04/01/19 1515      Row Name 04/01/19 0959             BADL Safety/Performance    Impairments, BADL Safety/Performance  balance;cognition;range of motion;strength  -ST      Cognitive Impairments, BADL Safety/Performance  attention;insight into deficits/self awareness;judgment;problem solving/reasoning;safety precaution awareness;safety precaution follow-through  -ST      Skilled BADL Treatment/Intervention  -- pt u/a to be educated on compensatory training/AE  -ST      Recorded by [ST] Trini Leung, OTR 04/01/19 1515      Row Name 04/01/19 0958             Motor Skills Assessment/Interventions    Additional Documentation  Therapeutic Exercise (Group);Therapeutic Exercise Interventions (Group)  -LR      Recorded by [LR] Alta Ochoa, PT 04/01/19 1159      Row Name 04/01/19 0959 04/01/19 0958          Therapeutic Exercise    95078 - PT Therapeutic Exercise Minutes  --  17  -LR     16808 - OT Therapeutic Activity Minutes  18  -ST  --     Recorded by [ST] Trini Leung,  OTR 04/01/19 1515 [LR] Alta Ochoa, PT 04/01/19 1159     Row Name 04/01/19 0959 04/01/19 0958          Therapeutic Exercise    Upper Extremity Range of Motion (Therapeutic Exercise)  elbow flexion/extension, bilateral;shoulder flexion/extension, bilateral;shoulder abduction/adduction, bilateral  -ST  --     Lower Extremity (Therapeutic Exercise)  --  gluteal sets;heel slides, left;quad sets, left;SAQ (short arc quad), left;SLR (straight leg raise), left  -LR     Lower Extremity Range of Motion (Therapeutic Exercise)  --  hip abduction/adduction, left;ankle dorsiflexion/plantar flexion, left  -LR     Exercise Type (Therapeutic Exercise)  AROM (active range of motion);AAROM (active assistive range of motion)  -ST  isometric contraction, static;isotonic contraction, concentric;AAROM (active assistive range of motion)  -LR     Position (Therapeutic Exercise)  seated  -ST  supine  -LR     Sets/Reps (Therapeutic Exercise)  1X10  -ST  x15 reps each  -LR     Comment (Therapeutic Exercise)  pt with cuing and demo to attempt correct technique, pt requiring AAROM at times for follow thru  -ST  cues for technique; mod assist with ther ex required.   -LR     Recorded by [ST] Trini Leung, OTR 04/01/19 1515 [LR] Alta Ochoa, PT 04/01/19 1159     Row Name 04/01/19 0958             Balance    Balance  static sitting balance;static standing balance  -LR      Recorded by [LR] Alta Ochoa, PT 04/01/19 1159      Row Name 04/01/19 0959 04/01/19 0958          Static Sitting Balance    Level of [place] (Unsupported Sitting, Static Balance)  maximal assist, 25 to 49% patient effort;2 person assist  -ST  maximal assist, 25 to 49% patient effort  -LR     Sitting Position (Unsupported Sitting, Static Balance)  sitting edge of mat  -ST  sitting on edge of bed  -LR     Time Able to Maintain Position (Unsupported Sitting, Static Balance)  1 to 2 minutes  -ST  1 to 2 minutes  -LR     Comment (Unsupported  Sitting, Static Balance)  posterior lean  -ST  posterior lean, improved with cues to lean forward, progressed to Min assist.   -LR     Comment (Supported Sitting, Static Balance)  pt unable to progress toward ADL training or HEP unsupported EOB d/t weakness, lethargy and mentation   -ST  --     Recorded by [ST] Trini Leung, OTR 04/01/19 1515 [LR] Alta Ochoa, PT 04/01/19 1159     Row Name 04/01/19 0959 04/01/19 0958          Static Standing Balance    Level of [place] (Supported Standing, Static Balance)  moderate assist, 50 to 74% patient effort;2 person assist  -ST  moderate assist, 50 to 74% patient effort;2 person assist  -LR     Time Able to Maintain Position (Supported Standing, Static Balance)  30 to 45 seconds  -ST  30 to 45 seconds  -LR     Assistive Device Utilized (Supported Standing, Static Balance)  walker, rolling  -ST  walker, rolling  -LR     Comment (Supported Standing, Static Balance)  --  forward flexed posture, leaning posteriorly  -LR     Comment (Unsupported Standing, Static Balance)  forward flexion, cuing for improving posture to encourage improved balance and stability for carry over in ADL tasks   -ST  --     Recorded by [ST] Trini Leung, OTR 04/01/19 1515 [LR] Alta Ochoa, PT 04/01/19 1159     Row Name 04/01/19 0959 04/01/19 0958          Positioning and Restraints    Pre-Treatment Position  in bed  -ST  in bed  -LR     Post Treatment Position  chair  -ST  chair  -LR     In Chair  notified nsg;reclined;call light within reach;encouraged to call for assist;exit alarm on;RUE elevated;waffle cushion;on mechanical lift sling;heels elevated;waffle boot/both  -ST  notified nsg;reclined;sitting;call light within reach;encouraged to call for assist;exit alarm on;with family/caregiver;compression device;waffle cushion;on mechanical lift sling;legs elevated;waffle boot/both  -LR     Recorded by [ST] Trini Leung, OTR 04/01/19 1515 [LR] Alta Ochoa  Alie, PT 04/01/19 1159     Row Name 04/01/19 0958             Pain Assessment    Additional Documentation  Pain Scale: FACES Pre/Post-Treatment (Group)  -LR      Recorded by [LR] Alta Ochoa, PT 04/01/19 1159      Row Name 04/01/19 0959 04/01/19 0958          Pain Scale: Numbers Pre/Post-Treatment    Pain Scale: Numbers, Pretreatment  0/10 - no pain  -ST  --     Pain Scale: Numbers, Post-Treatment  0/10 - no pain  -ST  --     Pain Location - Side  --  Right  -LR     Pain Location  --  hip  -LR     Pain Intervention(s)  --  Repositioned;Ambulation/increased activity  -LR     Recorded by [ST] Trini Leung OTR 04/01/19 1515 [LR] Alta Ochoa, PT 04/01/19 1159     Row Name 04/01/19 0958             Pain Scale: FACES Pre/Post-Treatment    Pain: FACES Scale, Pretreatment  2-->hurts little bit  -LR      Pain: FACES Scale, Post-Treatment  4-->hurts little more  -LR      Recorded by [LR] Alta Ochoa, PT 04/01/19 1159      Row Name                Wound 03/30/19 0907 Right hip incision    Wound - Properties Group Date first assessed: 03/30/19 [JS] Time first assessed: 0907 [JS] Side: Right [JS] Location: hip [JS] Type: incision [JS] Recorded by:  [JS] Lilia Xiao RN 03/30/19 0907    Row Name                Wound 03/31/19 1500 Left medial heel other (see comments)    Wound - Properties Group Date first assessed: 03/31/19 [CP] Time first assessed: 1500 [CP] Present On Admission : no;picture taken [CP] Side: Left [CP] Orientation: medial [CP] Location: heel [CP] Type: other (see comments) [CP] Additional Comments: bruise vs DTPI [CP] Recorded by:  [CP] Abby Kirby APRN 03/31/19 1535    Row Name                Wound 03/31/19 1500 Right lateral heel other (see comments)    Wound - Properties Group Date first assessed: 03/31/19 [CP] Time first assessed: 1500 [CP] Present On Admission : no;picture taken [CP] Side: Right [CP] Orientation: lateral [CP] Location: heel [CP]  Type: other (see comments) [CP] Additional Comments: bruise vs DTPI [CP] Recorded by:  [CP] Abby Kirby, APRN 03/31/19 1537    Row Name 04/01/19 0958             Coping    Observed Emotional State  cooperative;agitated became slightly agitated with gait training  -LR      Verbalized Emotional State  acceptance  -LR      Recorded by [LR] Alta Ochoa, PT 04/01/19 1159      Row Name 04/01/19 0958             Plan of Care Review    Plan of Care Reviewed With  patient;daughter  -LR      Recorded by [LR] Alta Ochoa, PT 04/01/19 1159      Row Name 04/01/19 0958             Outcome Summary/Treatment Plan (PT)    Daily Summary of Progress (PT)  progress toward functional goals is gradual  -LR      Recorded by [LR] Alta Ochoa, PT 04/01/19 1159        User Key  (r) = Recorded By, (t) = Taken By, (c) = Cosigned By    Initials Name Effective Dates Discipline    Trini Nuno, OTR 06/10/18 -  OT    CP Abby Kirby, APRN 02/05/19 -  Nurse    LR Alta Ochoa, PT 06/19/15 -  PT    Lilia Pagan, RN 01/23/17 -  Nurse        Wound 03/30/19 0907 Right hip incision (Active)   Dressing Appearance dry;intact;no drainage 4/1/2019  8:45 AM   Drainage Amount none 4/1/2019  8:45 AM       Wound 03/31/19 1500 Left medial heel other (see comments) (Active)   Dressing Appearance dry;intact;no drainage 4/1/2019  8:45 AM   Base maroon/purple 4/1/2019  8:45 AM   Drainage Amount none 4/1/2019  8:45 AM       Wound 03/31/19 1500 Right lateral heel other (see comments) (Active)   Dressing Appearance dry;intact;no drainage 4/1/2019  8:45 AM   Base purple 4/1/2019  8:45 AM   Drainage Amount none 4/1/2019  8:45 AM       Occupational Therapy Education     Title: PT OT SLP Therapies (In Progress)     Topic: Occupational Therapy (In Progress)     Point: ADL training (In Progress)     Description: Instruct learner(s) on proper safety adaptation and remediation techniques during self  care or transfers.   Instruct in proper use of assistive devices.    Learning Progress Summary           Patient Acceptance, E,D, NR,NL by ST at 4/1/2019  9:59 AM    Comment:  see flow sheet    Acceptance, E,D, NR by ST at 3/31/2019 10:18 AM    Comment:  see flow sheet                   Point: Home exercise program (In Progress)     Description: Instruct learner(s) on appropriate technique for monitoring, assisting and/or progressing therapeutic exercises/activities.    Learning Progress Summary           Patient Acceptance, E,D, NR,NL by ST at 4/1/2019  9:59 AM    Comment:  see flow sheet    Acceptance, E,D, NR by ST at 3/31/2019 10:18 AM    Comment:  see flow sheet                   Point: Precautions (In Progress)     Description: Instruct learner(s) on prescribed precautions during self-care and functional transfers.    Learning Progress Summary           Patient Acceptance, E,D, NR,NL by ST at 4/1/2019  9:59 AM    Comment:  see flow sheet    Acceptance, E,D, NR by ST at 3/31/2019 10:18 AM    Comment:  see flow sheet                   Point: Body mechanics (In Progress)     Description: Instruct learner(s) on proper positioning and spine alignment during self-care, functional mobility activities and/or exercises.    Learning Progress Summary           Patient Acceptance, E,D, NR,NL by ST at 4/1/2019  9:59 AM    Comment:  see flow sheet    Acceptance, E,D, NR by ST at 3/31/2019 10:18 AM    Comment:  see flow sheet                               User Key     Initials Effective Dates Name Provider Type Discipline     06/10/18 -  Trini Leung OTR Occupational Therapist OT                OT Recommendation and Plan  Outcome Summary/Treatment Plan (OT)  Anticipated Discharge Disposition (OT): skilled nursing facility, inpatient rehabilitation facility  Planned Therapy Interventions (OT Eval): activity tolerance training, adaptive equipment training, functional balance retraining, occupation/activity based  interventions, patient/caregiver education/training, strengthening exercise, transfer/mobility retraining  Therapy Frequency (OT Eval): daily  Plan of Care Review  Plan of Care Reviewed With: patient  Plan of Care Reviewed With: patient  Outcome Summary: Pt continues to have significant confusion and lethargy impacting command-following along with participating in purposeful activity, bossman. related to AE training and ADL engagement. Pt max/mod A X2 for all bed mobility, transfers and taking steps. Pt requiring AROM/AAROM for BUE HEP but not following commands for ADL tasks. Will continue to progress as tolerated.   Outcome Measures     Row Name 04/01/19 0959 04/01/19 0958 03/31/19 1055       How much help from another person do you currently need...    Turning from your back to your side while in flat bed without using bedrails?  --  1  -LR  2  -CT    Moving from lying on back to sitting on the side of a flat bed without bedrails?  --  1  -LR  2  -CT    Moving to and from a bed to a chair (including a wheelchair)?  --  2  -LR  3  -CT    Standing up from a chair using your arms (e.g., wheelchair, bedside chair)?  --  2  -LR  3  -CT    Climbing 3-5 steps with a railing?  --  1  -LR  1  -CT    To walk in hospital room?  --  2  -LR  3  -CT    AM-PAC 6 Clicks Score  --  9  -LR  14  -CT       How much help from another is currently needed...    Putting on and taking off regular lower body clothing?  1  -ST  --  --    Bathing (including washing, rinsing, and drying)  2  -ST  --  --    Toileting (which includes using toilet bed pan or urinal)  1  -ST  --  --    Putting on and taking off regular upper body clothing  2  -ST  --  --    Taking care of personal grooming (such as brushing teeth)  2  -ST  --  --    Eating meals  2  -ST  --  --    Score  10  -ST  --  --       Functional Assessment    Outcome Measure Options  --  AM-PAC 6 Clicks Basic Mobility (PT)  -LR  AM-PAC 6 Clicks Basic Mobility (PT)  -CT    Row Name 03/31/19  1018             How much help from another is currently needed...    Putting on and taking off regular lower body clothing?  1  -ST      Bathing (including washing, rinsing, and drying)  2  -ST      Toileting (which includes using toilet bed pan or urinal)  1  -ST      Putting on and taking off regular upper body clothing  3  -ST      Taking care of personal grooming (such as brushing teeth)  3  -ST      Eating meals  3  -ST      Score  13  -ST         Functional Assessment    Outcome Measure Options  AM-PAC 6 Clicks Daily Activity (OT)  -ST        User Key  (r) = Recorded By, (t) = Taken By, (c) = Cosigned By    Initials Name Provider Type    Trini Nuno OTR Occupational Therapist    Alta Mcdonnell, PT Physical Therapist    Woo Díaz, PT Physical Therapist           Time Calculation:   Time Calculation- OT     Row Name 04/01/19 0959 04/01/19 0958          Time Calculation- OT    OT Start Time  0959 -ST  --     OT Received On  04/01/19 -ST  --     OT Goal Re-Cert Due Date  04/10/19  -ST  --        Timed Charges    46138 - Gait Training Minutes   --  8  -LR     07009 - OT Therapeutic Activity Minutes  18  -ST  --       User Key  (r) = Recorded By, (t) = Taken By, (c) = Cosigned By    Initials Name Provider Type    Trini Nuno OTR Occupational Therapist    LR Alta Ochoa, PT Physical Therapist        Therapy Charges for Today     Code Description Service Date Service Provider Modifiers Qty    [id number]  OT EVAL MOD COMPLEXITY 4 3/31/2019 Trini Leung OTR GO 1    [id number]  OT THERAPEUTIC ACT EA 15 MIN 4/1/2019 Trini Leung OTR GO 1               Trini MONK. VINICIUS Leung  4/1/2019

## 2019-04-02 LAB
ABO GROUP BLD: NORMAL
ANION GAP SERPL CALCULATED.3IONS-SCNC: 8 MMOL/L (ref 3–11)
BASOPHILS # BLD AUTO: 0.01 10*3/MM3 (ref 0–0.2)
BASOPHILS NFR BLD AUTO: 0.1 % (ref 0–1)
BLD GP AB SCN SERPL QL: NEGATIVE
BUN BLD-MCNC: 27 MG/DL (ref 9–23)
BUN/CREAT SERPL: 28.1 (ref 7–25)
CALCIUM SPEC-SCNC: 8.3 MG/DL (ref 8.7–10.4)
CHLORIDE SERPL-SCNC: 102 MMOL/L (ref 99–109)
CO2 SERPL-SCNC: 23 MMOL/L (ref 20–31)
CREAT BLD-MCNC: 0.96 MG/DL (ref 0.6–1.3)
DEPRECATED RDW RBC AUTO: 50.9 FL (ref 37–54)
EOSINOPHIL # BLD AUTO: 0.01 10*3/MM3 (ref 0–0.3)
EOSINOPHIL NFR BLD AUTO: 0.1 % (ref 0–3)
ERYTHROCYTE [DISTWIDTH] IN BLOOD BY AUTOMATED COUNT: 16.5 % (ref 11.3–14.5)
GFR SERPL CREATININE-BSD FRML MDRD: 55 ML/MIN/1.73
GLUCOSE BLD-MCNC: 184 MG/DL (ref 70–100)
GLUCOSE BLDC GLUCOMTR-MCNC: 179 MG/DL (ref 70–130)
GLUCOSE BLDC GLUCOMTR-MCNC: 205 MG/DL (ref 70–130)
GLUCOSE BLDC GLUCOMTR-MCNC: 247 MG/DL (ref 70–130)
GLUCOSE BLDC GLUCOMTR-MCNC: 93 MG/DL (ref 70–130)
HCT VFR BLD AUTO: 23.1 % (ref 34.5–44)
HGB BLD-MCNC: 6.9 G/DL (ref 11.5–15.5)
IMM GRANULOCYTES # BLD AUTO: 0.09 10*3/MM3 (ref 0–0.05)
IMM GRANULOCYTES NFR BLD AUTO: 0.7 % (ref 0–0.6)
LYMPHOCYTES # BLD AUTO: 0.83 10*3/MM3 (ref 0.6–4.8)
LYMPHOCYTES NFR BLD AUTO: 6.3 % (ref 24–44)
MCH RBC QN AUTO: 25.7 PG (ref 27–31)
MCHC RBC AUTO-ENTMCNC: 29.9 G/DL (ref 32–36)
MCV RBC AUTO: 85.9 FL (ref 80–99)
MONOCYTES # BLD AUTO: 1.29 10*3/MM3 (ref 0–1)
MONOCYTES NFR BLD AUTO: 9.9 % (ref 0–12)
NEUTROPHILS # BLD AUTO: 10.94 10*3/MM3 (ref 1.5–8.3)
NEUTROPHILS NFR BLD AUTO: 83.6 % (ref 41–71)
PLATELET # BLD AUTO: 253 10*3/MM3 (ref 150–450)
PMV BLD AUTO: 12.1 FL (ref 6–12)
POTASSIUM BLD-SCNC: 3.9 MMOL/L (ref 3.5–5.5)
RBC # BLD AUTO: 2.69 10*6/MM3 (ref 3.89–5.14)
RH BLD: NEGATIVE
SODIUM BLD-SCNC: 133 MMOL/L (ref 132–146)
T&S EXPIRATION DATE: NORMAL
WBC NRBC COR # BLD: 13.08 10*3/MM3 (ref 3.5–10.8)

## 2019-04-02 PROCEDURE — 80048 BASIC METABOLIC PNL TOTAL CA: CPT | Performed by: NURSE PRACTITIONER

## 2019-04-02 PROCEDURE — 36430 TRANSFUSION BLD/BLD COMPNT: CPT

## 2019-04-02 PROCEDURE — 82962 GLUCOSE BLOOD TEST: CPT

## 2019-04-02 PROCEDURE — 86923 COMPATIBILITY TEST ELECTRIC: CPT

## 2019-04-02 PROCEDURE — 86901 BLOOD TYPING SEROLOGIC RH(D): CPT | Performed by: INTERNAL MEDICINE

## 2019-04-02 PROCEDURE — 86900 BLOOD TYPING SEROLOGIC ABO: CPT | Performed by: INTERNAL MEDICINE

## 2019-04-02 PROCEDURE — 86900 BLOOD TYPING SEROLOGIC ABO: CPT

## 2019-04-02 PROCEDURE — 85025 COMPLETE CBC W/AUTO DIFF WBC: CPT | Performed by: NURSE PRACTITIONER

## 2019-04-02 PROCEDURE — P9016 RBC LEUKOCYTES REDUCED: HCPCS

## 2019-04-02 PROCEDURE — 86850 RBC ANTIBODY SCREEN: CPT | Performed by: INTERNAL MEDICINE

## 2019-04-02 PROCEDURE — 99024 POSTOP FOLLOW-UP VISIT: CPT | Performed by: ORTHOPAEDIC SURGERY

## 2019-04-02 PROCEDURE — 99232 SBSQ HOSP IP/OBS MODERATE 35: CPT | Performed by: INTERNAL MEDICINE

## 2019-04-02 PROCEDURE — 63710000001 INSULIN DETEMIR PER 5 UNITS: Performed by: NURSE PRACTITIONER

## 2019-04-02 RX ADMIN — SODIUM CHLORIDE 75 ML/HR: 9 INJECTION, SOLUTION INTRAVENOUS at 10:54

## 2019-04-02 RX ADMIN — CITALOPRAM HYDROBROMIDE 20 MG: 20 TABLET ORAL at 09:12

## 2019-04-02 RX ADMIN — ISOSORBIDE MONONITRATE 60 MG: 60 TABLET, EXTENDED RELEASE ORAL at 09:12

## 2019-04-02 RX ADMIN — INSULIN LISPRO 2 UNITS: 100 INJECTION, SOLUTION INTRAVENOUS; SUBCUTANEOUS at 21:06

## 2019-04-02 RX ADMIN — INSULIN DETEMIR 6 UNITS: 100 INJECTION, SOLUTION SUBCUTANEOUS at 09:09

## 2019-04-02 RX ADMIN — CLONIDINE HYDROCHLORIDE 0.2 MG: 0.2 TABLET ORAL at 20:19

## 2019-04-02 RX ADMIN — FLUTICASONE PROPIONATE 2 SPRAY: 50 SPRAY, METERED NASAL at 09:14

## 2019-04-02 RX ADMIN — ASPIRIN 325 MG: 325 TABLET, COATED ORAL at 20:19

## 2019-04-02 RX ADMIN — ASPIRIN 325 MG: 325 TABLET, COATED ORAL at 09:12

## 2019-04-02 RX ADMIN — INSULIN DETEMIR 6 UNITS: 100 INJECTION, SOLUTION SUBCUTANEOUS at 20:18

## 2019-04-02 RX ADMIN — AMIODARONE HYDROCHLORIDE 200 MG: 200 TABLET ORAL at 09:12

## 2019-04-02 RX ADMIN — INSULIN LISPRO 2 UNITS: 100 INJECTION, SOLUTION INTRAVENOUS; SUBCUTANEOUS at 09:13

## 2019-04-02 RX ADMIN — INSULIN LISPRO 2 UNITS: 100 INJECTION, SOLUTION INTRAVENOUS; SUBCUTANEOUS at 11:23

## 2019-04-02 RX ADMIN — HYDRALAZINE HYDROCHLORIDE 25 MG: 25 TABLET ORAL at 20:19

## 2019-04-02 RX ADMIN — CLONIDINE HYDROCHLORIDE 0.2 MG: 0.2 TABLET ORAL at 09:12

## 2019-04-02 RX ADMIN — INSULIN LISPRO 4 UNITS: 100 INJECTION, SOLUTION INTRAVENOUS; SUBCUTANEOUS at 11:23

## 2019-04-02 RX ADMIN — INSULIN LISPRO 4 UNITS: 100 INJECTION, SOLUTION INTRAVENOUS; SUBCUTANEOUS at 09:13

## 2019-04-02 RX ADMIN — DONEPEZIL HYDROCHLORIDE 5 MG: 5 TABLET ORAL at 20:19

## 2019-04-02 RX ADMIN — POLYETHYLENE GLYCOL (3350) 17 G: 17 POWDER, FOR SOLUTION ORAL at 09:12

## 2019-04-02 RX ADMIN — HYDRALAZINE HYDROCHLORIDE 25 MG: 25 TABLET ORAL at 13:49

## 2019-04-02 RX ADMIN — HYDRALAZINE HYDROCHLORIDE 25 MG: 25 TABLET ORAL at 05:18

## 2019-04-02 RX ADMIN — AMLODIPINE BESYLATE 10 MG: 5 TABLET ORAL at 09:12

## 2019-04-02 NOTE — PLAN OF CARE
Problem: Patient Care Overview  Goal: Plan of Care Review  Outcome: Ongoing (interventions implemented as appropriate)   04/02/19 1920   Coping/Psychosocial   Plan of Care Reviewed With patient   Plan of Care Review   Progress no change   OTHER   Outcome Summary Patient has been drowsy today, sleeping off and on but easily arousable. Patient voididng 1 x with 200 PVR, this evening in/out cath'd patient emptying 500 ml, patient tolerated well. Transfusing 1 unit prbc for hgb 6.9. Turning patient q 2 hrs, heels offloaded onto heel boots, applied new mepilex dressings to heels and to right elbow.        Problem: Skin Injury Risk (Adult)  Goal: Identify Related Risk Factors and Signs and Symptoms  Outcome: Ongoing (interventions implemented as appropriate)    Goal: Skin Health and Integrity  Outcome: Ongoing (interventions implemented as appropriate)      Problem: Fall Risk (Adult)  Goal: Identify Related Risk Factors and Signs and Symptoms  Outcome: Ongoing (interventions implemented as appropriate)    Goal: Absence of Fall  Outcome: Ongoing (interventions implemented as appropriate)      Problem: Pain, Acute (Adult)  Goal: Identify Related Risk Factors and Signs and Symptoms  Outcome: Ongoing (interventions implemented as appropriate)

## 2019-04-02 NOTE — PROGRESS NOTES
"  SUBJECTIVE  Patient resting comfortably.  Pain controlled.  No events overnight.  Remains pleasantly confused.  Ambulated 6 feet with therapy yesterday.    OBJECTIVE  Temp (24hrs), Av.2 °F (36.8 °C), Min:97 °F (36.1 °C), Max:99 °F (37.2 °C)    Blood pressure 140/91, pulse 87, temperature 98.9 °F (37.2 °C), temperature source Temporal, resp. rate 18, height 154.9 cm (61\"), weight 49.9 kg (110 lb), SpO2 98 %.    Lab Results (last 24 hours)     Procedure Component Value Units Date/Time    POC Glucose Once []  (Abnormal) Collected:  19    Specimen:  Blood Updated:  19     Glucose 183 mg/dL     CBC (No Diff) []  (Abnormal) Collected:  19 164    Specimen:  Blood Updated:  19 1723     WBC 12.42 10*3/mm3      RBC 2.77 10*6/mm3      Hemoglobin 7.2 g/dL      Hematocrit 23.5 %      MCV 84.8 fL      MCH 26.0 pg      MCHC 30.6 g/dL      RDW 16.4 %      RDW-SD 50.0 fl      MPV 12.1 fL      Platelets 240 10*3/mm3     POC Glucose Once []  (Normal) Collected:  19 1659    Specimen:  Blood Updated:  19 1711     Glucose 129 mg/dL     POC Glucose Once []  (Abnormal) Collected:  19 1223    Specimen:  Blood Updated:  19 1230     Glucose 288 mg/dL     Basic Metabolic Panel [[id number]]  (Abnormal) Collected:  19 0658    Specimen:  Blood Updated:  19 0810     Glucose 209 mg/dL      BUN 35 mg/dL      Creatinine 1.30 mg/dL      Sodium 131 mmol/L      Potassium 4.3 mmol/L      Chloride 97 mmol/L      CO2 25.0 mmol/L      Calcium 9.0 mg/dL      eGFR Non African Amer 39 mL/min/1.73      BUN/Creatinine Ratio 26.9     Anion Gap 9.0 mmol/L     Narrative:       [employer] Guidelines    Stage     Description        GFR  1         Normal or High     90+  2         Mild decrease      60-89  3         Moderate decrease  30-59  4         Severe decrease    15-29  5         Kidney failure     <15    The MDRD GFR formula is only " valid for adults with stable renal function between ages 18 and 70.    Magnesium [[id number]]  (Normal) Collected:  04/01/19 0658    Specimen:  Blood Updated:  04/01/19 0810     Magnesium 2.0 mg/dL     POC Glucose Once [[id number]]  (Abnormal) Collected:  04/01/19 0748    Specimen:  Blood Updated:  04/01/19 0801     Glucose 233 mg/dL     CBC & Differential [[id number]] Collected:  04/01/19 0658    Specimen:  Blood Updated:  04/01/19 0755    Narrative:       The following orders were created for panel order CBC & Differential.  Procedure                               Abnormality         Status                     ---------                               -----------         ------                     CBC Auto Differential[[id number]]        Abnormal            Final result                 Please view results for these tests on the individual orders.    CBC Auto Differential [[id number]]  (Abnormal) Collected:  04/01/19 0658    Specimen:  Blood Updated:  04/01/19 0755     WBC 14.45 10*3/mm3      RBC 2.90 10*6/mm3      Hemoglobin 7.5 g/dL      Hematocrit 24.6 %      MCV 84.8 fL      MCH 25.9 pg      MCHC 30.5 g/dL      RDW 16.3 %      RDW-SD 50.0 fl      MPV 13.2 fL      Platelets 251 10*3/mm3      Neutrophil % 83.7 %      Lymphocyte % 5.7 %      Monocyte % 10.4 %      Eosinophil % 0.1 %      Basophil % 0.1 %      Immature Grans % 0.5 %      Neutrophils, Absolute 12.09 10*3/mm3      Lymphocytes, Absolute 0.83 10*3/mm3      Monocytes, Absolute 1.51 10*3/mm3      Eosinophils, Absolute 0.01 10*3/mm3      Basophils, Absolute 0.01 10*3/mm3      Immature Grans, Absolute 0.07 10*3/mm3             PHYSICAL EXAM  Right lower extremity: Dressing clean, dry and intact.  Minimal pain with logroll of hip.  Intact EHL, FHL, tibialis anterior, and gastrocsoleus. Sensation intact to light touch to deep peroneal, superficial peroneal, sural, saphenous, tibial nerves. 2+ palpable DP and PT pulses.         Fall    Essential hypertension     Coronary artery disease involving native coronary artery of native heart with angina pectoris (CMS/[facility])    Dementia    Paroxysmal atrial fibrillation (CMS/[facility])    SSS (sick sinus syndrome) (CMS/[facility])    Chronic diastolic congestive heart failure (CMS/[facility])    CKD (chronic kidney disease), stage III (CMS/[facility])    Closed fracture of right hip (CMS/[facility])      PLAN / DISPOSITION:  3 Day Post-Op fixation right intertrochanteric hip fracture    Protected weight bearing as tolerated right lower extremity, hip range of motion as tolerated  Pain control  PT/OT for post op mobilization and medical equipment needs   Attempt to keep glucose levels below 200 to minimize infection risk.  SCD's bilateral lower extremities   Aspirin for DVT prophylaxis   Social work for discharge planning.  Okay to discharge from orthopedic standpoint.  Dressing to remain in place for 7 days. May remove on POD#7. If no drainage, may shower on POD#10. No submerging wound in water. If drainage is noted, sterile dressing should be placed and wound checked daily. No showering until wound has remained dry for 72 consecutive hours.   Follow up in 2 weeks for re-assessment.      Theo Figueroa MD  04/02/19  7:25 AM

## 2019-04-02 NOTE — PROGRESS NOTES
[facility] Medicine Services  PROGRESS NOTE    Patient Name: Mary Ramirez  : 1934  MRN: [id number]    Date of Admission: 3/29/2019  Length of Stay: 4  Primary Care Physician: Nirmal Lundberg MD    Subjective   Subjective     CC:  Fall     HPI:  Patient doing well this morning. No complaints. Denies any pain. No events overnight     Review of Systems  Gen- No fevers, chills  CV- No chest pain, palpitations  Resp- No cough, dyspnea  GI- No N/V/D, abd pain    Otherwise ROS is negative except as mentioned in the HPI.    Objective   Objective     Vital Signs:   Temp:  [97 °F (36.1 °C)-99 °F (37.2 °C)] 98 °F (36.7 °C)  Heart Rate:  [] 86  Resp:  [18] 18  BP: (138-170)/(54-91) 160/69        Physical Exam:  Constitutional: Sleeping but easily awoken   Respiratory: Clear to auscultation bilaterally A/P, respiratory effort normal   Cardiovascular: RRR, II/VI murmur, no rubs, or gallops, palpable pedal pulses bilaterally  Gastrointestinal: Positive bowel sounds, soft, nontender, nondistended  Musculoskeletal: No bilateral ankle edema.   Psychiatric: Appropriate affect, cooperative calm.  Neurologic: Sleepy. Oriented x 2, confused at baseline.  Speech clear.  Follows commands.    Skin: No rashes. RLE hip drsg c/d/i.  Mild edema to thigh.  Pain cath in place to rt groin.        Results Reviewed:  I have personally reviewed current lab, radiology, and data and agree.    Results from last 7 days   Lab Units 19  0850 19  1642 19  0658   WBC 10*3/mm3 13.08* 12.42* 14.45*   HEMOGLOBIN g/dL 6.9* 7.2* 7.5*   HEMATOCRIT % 23.1* 23.5* 24.6*   PLATELETS 10*3/mm3 253 240 251     Results from last 7 days   Lab Units 19  0850 19  0658 19  0705  19  0739   SODIUM mmol/L 133 131* 133   < > 136   POTASSIUM mmol/L 3.9 4.3 3.8   < > 3.7   CHLORIDE mmol/L 102 97* 98*   < > 101   CO2 mmol/L 23.0 25.0 26.0   < > 25.0   BUN mg/dL 27* 35* 40*   < > 30*    CREATININE mg/dL 0.96 1.30 1.52*   < > 1.20   GLUCOSE mg/dL 184* 209* 219*   < > 219*   CALCIUM mg/dL 8.3* 9.0 9.0   < > 9.2   ALT (SGPT) U/L  --   --   --   --  38   AST (SGOT) U/L  --   --   --   --  35*    < > = values in this interval not displayed.     Estimated Creatinine Clearance: 34.4 mL/min (by C-G formula based on SCr of 0.96 mg/dL).    No results found for: BNP    Microbiology Results Abnormal     None          Imaging Results (last 24 hours)     ** No results found for the last 24 hours. **          Results for orders placed during the hospital encounter of 03/03/19   Transthoracic Echo Complete With Contrast if Necessary Per Protocol    Narrative · Calculated EF = 66.0%  · Left ventricular systolic function is normal.  · Left ventricular wall thickness is consistent with moderate concentric   hypertrophy.  · Left atrial cavity size is severely dilated.  · Left ventricular diastolic dysfunction (grade I) consistent with   impaired relaxation.  · Mild tricuspid valve regurgitation is present.  · Mild-to-moderate mitral valve regurgitation is present  · There is calcification of the aortic valve.          I have reviewed the medications:    Current Facility-Administered Medications:   •  acetaminophen (TYLENOL) tablet 500 mg, 500 mg, Oral, Q6H PRN, Pennie Perez MD  •  acetaminophen (TYLENOL) tablet 650 mg, 650 mg, Oral, Q4H PRN, Theo Figueroa MD  •  amiodarone (PACERONE) tablet 200 mg, 200 mg, Oral, Q24H, Marian Langston DO, 200 mg at 04/02/19 0912  •  amLODIPine (NORVASC) tablet 10 mg, 10 mg, Oral, Q24H, Pennie Perez MD, 10 mg at 04/02/19 0912  •  aspirin EC tablet 325 mg, 325 mg, Oral, Q12H, Alie Kruger MD, 325 mg at 04/02/19 0912  •  bisacodyl (DULCOLAX) EC tablet 5 mg, 5 mg, Oral, Daily PRN, Pennie Perez MD, 5 mg at 03/31/19 0551  •  citalopram (CeleXA) tablet 20 mg, 20 mg, Oral, Daily, Pennie Perez MD, 20 mg at 04/02/19 0912  •  CloNIDine (CATAPRES) tablet 0.2 mg, 0.2  mg, Oral, BID, Pennie Perez MD, 0.2 mg at 04/02/19 0912  •  dextrose (D50W) 25 g/ 50mL Intravenous Solution 25 g, 25 g, Intravenous, Q15 Min PRN, Pennie Perez MD  •  dextrose (GLUTOSE) oral gel 15 g, 15 g, Oral, Q15 Min PRN, Pennie Perez MD  •  donepezil (ARICEPT) tablet 5 mg, 5 mg, Oral, Nightly, Pennie Perez MD, 5 mg at 03/31/19 2114  •  fluticasone (FLONASE) 50 MCG/ACT nasal spray 2 spray, 2 spray, Nasal, Daily, Pennie Perez MD, 2 spray at 04/02/19 0914  •  glucagon (human recombinant) (GLUCAGEN DIAGNOSTIC) injection 1 mg, 1 mg, Subcutaneous, PRN, Pennie Perez MD  •  hydrALAZINE (APRESOLINE) tablet 25 mg, 25 mg, Oral, Q8H, Pennie Perez MD, 25 mg at 04/02/19 0518  •  HYDROmorphone (DILAUDID) injection 0.5 mg, 0.5 mg, Intravenous, Q2H PRN, Marian Langston DO, 0.5 mg at 03/30/19 1853  •  insulin detemir (LEVEMIR) injection 6 Units, 6 Units, Subcutaneous, QAM, Shanta Evans, APRN, 6 Units at 04/02/19 0909  •  insulin lispro (humaLOG) injection 0-9 Units, 0-9 Units, Subcutaneous, 4x Daily With Meals & Nightly, Shanta Evans APRN, 4 Units at 04/02/19 1123  •  insulin lispro (humaLOG) injection 2 Units, 2 Units, Subcutaneous, TID With Meals, Shanta Evans APRN, 2 Units at 04/02/19 1123  •  isosorbide mononitrate (IMDUR) 24 hr tablet 60 mg, 60 mg, Oral, Q24H, Marian Langston DO, 60 mg at 04/02/19 0912  •  lactated ringers infusion, 9 mL/hr, Intravenous, Continuous, Vsih Cheek MD, Last Rate: 9 mL/hr at 03/30/19 1126, 9 mL/hr at 03/30/19 1126  •  ondansetron (ZOFRAN) tablet 4 mg, 4 mg, Oral, Q8H PRN, Pennie Perez MD  •  oxyCODONE (ROXICODONE) immediate release tablet 10 mg, 10 mg, Oral, Q4H PRN, Theo Figueroa MD, 10 mg at 03/30/19 1512  •  oxyCODONE (ROXICODONE) immediate release tablet 5 mg, 5 mg, Oral, Q4H PRN, Theo Figueroa MD  •  polyethylene glycol 3350 powder (packet), 17 g, Oral, Daily, Pennie Perez MD, 17 g at 04/02/19  0912  •  ropivacaine (NAROPIN) 0.2% peripheral nerve cath (moog), 8 mL/hr, Peripheral Nerve, Continuous, Dollar, Chanelle, CRNA, Last Rate: 8 mL/hr at 04/01/19 0601, 8 mL/hr at 04/01/19 0601  •  sodium chloride 0.9 % flush 3 mL, 3 mL, Intravenous, Q12H, Pennie Perez MD, 3 mL at 04/01/19 0841  •  sodium chloride 0.9 % flush 3 mL, 3 mL, Intravenous, Q12H, Theo Figueroa MD, 3 mL at 03/30/19 2035  •  sodium chloride 0.9 % flush 3-10 mL, 3-10 mL, Intravenous, PRN, Pennie Perez MD  •  sodium chloride 0.9 % flush 3-10 mL, 3-10 mL, Intravenous, PRN, Theo Figueroa MD  •  sodium chloride 0.9 % infusion, 75 mL/hr, Intravenous, Continuous, Shanta Evans APRN, Last Rate: 75 mL/hr at 04/02/19 1054, 75 mL/hr at 04/02/19 1054      Assessment/Plan   Assessment / Plan     Active Hospital Problems    Diagnosis POA   • Closed fracture of right hip (CMS/[facility]) [S72.001A] Yes   • CKD (chronic kidney disease), stage III (CMS/[facility]) [N18.3] Yes   • Chronic diastolic congestive heart failure (CMS/[facility]) [I50.32] Yes     · Echo (9/1/18): LVEF 60%. LVH. Left atrial dilatation. Aortic calcification with no stenosis.     • Paroxysmal atrial fibrillation (CMS/[facility]) [I48.0] Yes     · Chads Vasc 6 (age > 75, female, CAD, DM, HTN)   · Rhythm control strategy with amiodarone     • SSS (sick sinus syndrome) (CMS/[facility]) [I49.5] Yes   • Dementia [F03.90] Yes   • Essential hypertension [I10] Yes   • Coronary artery disease involving native coronary artery of native heart with angina pectoris (CMS/[facility]) [I25.119] Yes     · Cardiac cath with PCI data deficit  · Echo (9/1/18): LVEF 60%. LVH. Left atrial dilatation. Aortic calcification with no stenosis.     • Fall [W19.XXXA] Yes          Brief Hospital Course to date:  Mary Ramirez is a 84 y.o. female with history of HTN, paroxysmal afib, DM, CKD 3 and left hip fracture s/p repair who presents with fall and fracture of right hip. OR 3/30.     Right hip fracture   - s/p repair 3/30   -   mg BID   - dressing clean and dry until post op day 7   --Dr. Figueroa, orthopedic recs:  Protected weight bearing as tolerated right lower extremity, hip range of motion as tolerated  23 hours perioperative antibiotic prophylaxis   SCD's bilateral lower extremities   Aspirin for DVT prophylaxis   Social work for discharge planning.   Dressing to remain in place for 7 days. May remove on POD#7. If no drainage, may shower on POD#10. No submerging wound in water. If drainage is noted, sterile dressing should be placed and wound checked daily. No showering until wound has remained dry for 72 consecutive hours.   Follow up in 2 weeks for re-assessment.    HTN   - improvement in BP after surgery; pain likely contributing  - Continue amlodipine, clonidine, hydralazine and imdur  - Currently aldactone on hold   - Recent PCP visit and BP on low end; will continue to monitor and adjust as needed   --BP remains stable.  Asymptomatic.  Improving.  Will continue current medication regimen. Monitor.  Adjust meds as needed.      Paroxysmal afib   - resume home amiodarone   - Previously on eliquis;  mg BID resumed by ortho  - discuss with family with recent falls and risk of bleeding.  Discussed with pharm D today.  Likely may need to come off eliquis indefinitely due to advanced dementia and falls.      DM   - Hold orals; SSI   --Glucose running 209-263 last 24 hours.  Pt eating or drinking better today.  Still running high however.  Increased sliding scale to MDSSI yesterday. Eating better and still running in 200's today.  Will add lev 8 units daily and 2 units humalog tid with hold parameter.      Post op anemia  --Hemoglobin trending down today. Suspect postoperative as patient stable with no signs of active bleeding.   -- will give 1u PRBCs today.   --A.m. labs and continue to monitor.     Lt heel wound  --area of appearing nonblanchable tissue to lt heel.  --elevated heels off bed, waffle boots as  needed  --woc consulted and following    CKD 3   - Cr around baseline; monitor   --Creatinine improved today but pt still not drinking much and Na/cl decreased.  Continue VFs today and encourage fluid intake.      DVT Prophylaxis:   mg BID     Disposition: I expect the patient to be discharged rehab TBD     CODE STATUS:   Code Status and Medical Interventions:   Ordered at: 03/29/19 0828     Level Of Support Discussed With:    Patient     Code Status:    CPR     Medical Interventions (Level of Support Prior to Arrest):    Full         Electronically signed by Alie Kruger MD, 04/02/19, 1:03 PM.

## 2019-04-02 NOTE — PROGRESS NOTES
Continued Stay Note   [facility]     Patient Name: Mary Ramirez  MRN: [id number]  Today's Date: 4/2/2019    Admit Date: 3/29/2019    Discharge Plan     Row Name 04/02/19 1458       Plan    Plan  Short term rehab at [facility]    Patient/Family in Agreement with Plan  yes    Plan Comments  Spoke with Dona dc [facility].  Patient can return for short term rehab when medically ready.  Attempted to see patient at bedside.  Patient remains confused.  IV fluids continue for hydration, and continuing to monitor labs.  Patient continuing to work with PT and OT.  Case management will continue to follow for discharge planning.      Final Discharge Disposition Code  03 - skilled nursing facility (SNF)        Discharge Codes    No documentation.             Dona Crews RN

## 2019-04-03 LAB
ABO + RH BLD: NORMAL
BH BB BLOOD EXPIRATION DATE: NORMAL
BH BB BLOOD TYPE BARCODE: 600
BH BB DISPENSE STATUS: NORMAL
BH BB PRODUCT CODE: NORMAL
BH BB UNIT NUMBER: NORMAL
DEPRECATED RDW RBC AUTO: 47.3 FL (ref 37–54)
ERYTHROCYTE [DISTWIDTH] IN BLOOD BY AUTOMATED COUNT: 16 % (ref 11.3–14.5)
GLUCOSE BLDC GLUCOMTR-MCNC: 156 MG/DL (ref 70–130)
GLUCOSE BLDC GLUCOMTR-MCNC: 198 MG/DL (ref 70–130)
GLUCOSE BLDC GLUCOMTR-MCNC: 215 MG/DL (ref 70–130)
GLUCOSE BLDC GLUCOMTR-MCNC: 89 MG/DL (ref 70–130)
HCT VFR BLD AUTO: 28.9 % (ref 34.5–44)
HGB BLD-MCNC: 9.3 G/DL (ref 11.5–15.5)
MCH RBC QN AUTO: 26.6 PG (ref 27–31)
MCHC RBC AUTO-ENTMCNC: 32.2 G/DL (ref 32–36)
MCV RBC AUTO: 82.6 FL (ref 80–99)
PLATELET # BLD AUTO: 243 10*3/MM3 (ref 150–450)
PMV BLD AUTO: 11.2 FL (ref 6–12)
RBC # BLD AUTO: 3.5 10*6/MM3 (ref 3.89–5.14)
UNIT  ABO: NORMAL
UNIT  RH: NORMAL
WBC NRBC COR # BLD: 10.05 10*3/MM3 (ref 3.5–10.8)

## 2019-04-03 PROCEDURE — 82962 GLUCOSE BLOOD TEST: CPT

## 2019-04-03 PROCEDURE — 99232 SBSQ HOSP IP/OBS MODERATE 35: CPT | Performed by: INTERNAL MEDICINE

## 2019-04-03 PROCEDURE — 97116 GAIT TRAINING THERAPY: CPT

## 2019-04-03 PROCEDURE — 99024 POSTOP FOLLOW-UP VISIT: CPT | Performed by: ORTHOPAEDIC SURGERY

## 2019-04-03 PROCEDURE — 85027 COMPLETE CBC AUTOMATED: CPT | Performed by: INTERNAL MEDICINE

## 2019-04-03 PROCEDURE — 97110 THERAPEUTIC EXERCISES: CPT

## 2019-04-03 RX ADMIN — AMLODIPINE BESYLATE 10 MG: 5 TABLET ORAL at 08:50

## 2019-04-03 RX ADMIN — ISOSORBIDE MONONITRATE 60 MG: 60 TABLET, EXTENDED RELEASE ORAL at 08:51

## 2019-04-03 RX ADMIN — ASPIRIN 325 MG: 325 TABLET, COATED ORAL at 08:50

## 2019-04-03 RX ADMIN — INSULIN LISPRO 4 UNITS: 100 INJECTION, SOLUTION INTRAVENOUS; SUBCUTANEOUS at 21:22

## 2019-04-03 RX ADMIN — CLONIDINE HYDROCHLORIDE 0.2 MG: 0.2 TABLET ORAL at 08:50

## 2019-04-03 RX ADMIN — HYDRALAZINE HYDROCHLORIDE 25 MG: 25 TABLET ORAL at 05:55

## 2019-04-03 RX ADMIN — INSULIN LISPRO 2 UNITS: 100 INJECTION, SOLUTION INTRAVENOUS; SUBCUTANEOUS at 08:51

## 2019-04-03 RX ADMIN — ASPIRIN 325 MG: 325 TABLET, COATED ORAL at 21:22

## 2019-04-03 RX ADMIN — INSULIN LISPRO 2 UNITS: 100 INJECTION, SOLUTION INTRAVENOUS; SUBCUTANEOUS at 17:47

## 2019-04-03 RX ADMIN — INSULIN LISPRO 2 UNITS: 100 INJECTION, SOLUTION INTRAVENOUS; SUBCUTANEOUS at 13:02

## 2019-04-03 RX ADMIN — HYDRALAZINE HYDROCHLORIDE 25 MG: 25 TABLET ORAL at 14:52

## 2019-04-03 RX ADMIN — INSULIN LISPRO 2 UNITS: 100 INJECTION, SOLUTION INTRAVENOUS; SUBCUTANEOUS at 13:01

## 2019-04-03 RX ADMIN — CITALOPRAM HYDROBROMIDE 20 MG: 20 TABLET ORAL at 08:50

## 2019-04-03 RX ADMIN — DONEPEZIL HYDROCHLORIDE 5 MG: 5 TABLET ORAL at 21:22

## 2019-04-03 RX ADMIN — AMIODARONE HYDROCHLORIDE 200 MG: 200 TABLET ORAL at 08:50

## 2019-04-03 RX ADMIN — FLUTICASONE PROPIONATE 2 SPRAY: 50 SPRAY, METERED NASAL at 08:52

## 2019-04-03 RX ADMIN — POLYETHYLENE GLYCOL (3350) 17 G: 17 POWDER, FOR SOLUTION ORAL at 08:50

## 2019-04-03 RX ADMIN — HYDRALAZINE HYDROCHLORIDE 25 MG: 25 TABLET ORAL at 21:22

## 2019-04-03 RX ADMIN — CLONIDINE HYDROCHLORIDE 0.2 MG: 0.2 TABLET ORAL at 21:22

## 2019-04-03 NOTE — ED PROVIDER NOTES
"Subjective    Pt is a pleasant 85 yo female who presents today with left hip pain. She states that prior to arrival the fell at home, injuring her right hip.  She has had significant pain in the right hip since the fall and has also been unable to ambulate since that time.            Lower Extremity Issue   Location:  Hip  Injury: yes    Mechanism of injury: fall    Fall:     Fall occurred:  Walking    Height of fall:  Standing    Point of impact:  Unable to specify    Entrapped after fall: no    Hip location:  R hip  Pain details:     Quality:  Sharp    Radiates to:  Does not radiate    Severity:  Severe    Onset quality:  Sudden    Timing:  Constant    Progression:  Unchanged  Chronicity:  New  Tetanus status:  Out of date  Prior injury to area:  No  Relieved by:  Nothing  Exacerbated by: movement.  Ineffective treatments:  None tried  Associated symptoms: decreased ROM    Associated symptoms: no back pain, no fever and no neck pain    Risk factors: no concern for non-accidental trauma        Review of Systems   Constitutional: Negative for fever.   Musculoskeletal: Negative for back pain and neck pain.   All other systems reviewed and are negative.      Past Medical History:   Diagnosis Date   • Atrial fibrillation (CMS/HCC)    • Benign essential hypertension    • CAD (coronary artery disease)    • Carotid artery disease (CMS/HCC)    • CHF (congestive heart failure) (CMS/HCC)    • Chronic allergic rhinitis    • CKD (chronic kidney disease), stage III (CMS/HCC)    • DDD (degenerative disc disease), cervical    • Dementia    • Diabetes mellitus (CMS/HCC)    • Diverticulosis     WITH PERFORATION    • Hypertension    • Major depressive disorder, single episode, mild (CMS/HCC)    • Memory loss    • Mixed hyperlipidemia    • SSS (sick sinus syndrome) (CMS/HCC)    • TIA (transient ischemic attack) 01/2016       Allergies   Allergen Reactions   • Penicillins Other (See Comments)     Pt states \"i don't know\"       Past " Surgical History:   Procedure Laterality Date   • ANKLE SURGERY Left    • ATHERECTOMY     • ATHRECTOMY ILIAC, FEMORAL, TIBIAL ARTERY     • BREAST LUMPECTOMY Left    • COLON SURGERY     • COLOSTOMY     • CORONARY STENT PLACEMENT     • FINGER FRACTURE SURGERY Right    • HIP TROCANTERIC NAILING WITH INTRAMEDULLARY HIP SCREW Left 2017   • HIP TROCANTERIC NAILING WITH INTRAMEDULLARY HIP SCREW Right 3/30/2019    Procedure: HIP TROCANTERIC NAILING WITH INTRAMEDULLARY HIP SCREW RIGHT;  Surgeon: Theo Figueroa MD;  [facility];  Service: Orthopedics   • HYSTERECTOMY     • OTHER SURGICAL HISTORY      REANASTAMOSIS        Family History   Problem Relation Age of Onset   • Hypertension Mother    • Coronary artery disease Mother 56        PREMATURE    • Lung cancer Father    • Hypertension Father    • Osteoporosis Sister    • Lung cancer Brother        Social History     Socioeconomic History   • Marital status:      Spouse name: N/A   • Number of children: 2   • Years of education: H.S   • Highest education level: Not on file   Occupational History   • Occupation:      Employer: RETIRED   Tobacco Use   • Smoking status: Former Smoker     Years: 2.00     Types: Cigarettes     Start date: 1975     Last attempt to quit: 1978     Years since quittin.4   • Smokeless tobacco: Never Used   • Tobacco comment: QUIT DATE 1984   Substance and Sexual Activity   • Alcohol use: No   • Drug use: No   • Sexual activity: No   [facility] History Narrative    [facility]    Patient drinks 2 cups of caffeine per day.,           Objective   Physical Exam   Constitutional: She is oriented to person, place, and time. She appears well-developed and well-nourished. No distress.   HENT:   Head: Normocephalic and atraumatic.   Eyes: Conjunctivae and EOM are normal. Pupils are equal, round, and reactive to light.   Neck: Normal range of  motion. Neck supple.   Cardiovascular: Normal rate, regular rhythm and normal heart sounds. Exam reveals no gallop and no friction rub.   No murmur heard.  Pulmonary/Chest: Effort normal and breath sounds normal. No respiratory distress.   Abdominal: Soft. Bowel sounds are normal. There is no tenderness.   Musculoskeletal: Normal range of motion.   RLE is slightly shortened and externally rotated.   Neurological: She is alert and oriented to person, place, and time.   Skin: Skin is warm and dry.   Psychiatric: She has a normal mood and affect.   Nursing note and vitals reviewed.      Procedures           ED Course      Results for DON MEADE (MRN [id number]) as of 4/3/2019 01:12   Ref. Range 3/29/2019 07:39   Glucose Latest Ref Range: 70 - 100 mg/dL 219 (H)   Sodium Latest Ref Range: 132 - 146 mmol/L 136   Potassium Latest Ref Range: 3.5 - 5.5 mmol/L 3.7   CO2 Latest Ref Range: 20.0 - 31.0 mmol/L 25.0   Chloride Latest Ref Range: 99 - 109 mmol/L 101   Anion Gap Latest Ref Range: 3.0 - 11.0 mmol/L 10.0   Creatinine Latest Ref Range: 0.60 - 1.30 mg/dL 1.20   BUN Latest Ref Range: 9 - 23 mg/dL 30 (H)   BUN/Creatinine Ratio Latest Ref Range: 7.0 - 25.0  25.0   Calcium Latest Ref Range: 8.7 - 10.4 mg/dL 9.2   eGFR Non  Am Latest Ref Range: >60 mL/min/1.73 43 (L)   Alkaline Phosphatase Latest Ref Range: 25 - 100 U/L 123 (H)   Total Protein Latest Ref Range: 5.7 - 8.2 g/dL 6.9   ALT (SGPT) Latest Ref Range: 7 - 40 U/L 38   AST (SGOT) Latest Ref Range: 0 - 33 U/L 35 (H)   Total Bilirubin Latest Ref Range: 0.3 - 1.2 mg/dL 0.4   Albumin Latest Ref Range: 3.20 - 4.80 g/dL 4.30   Globulin Latest Units: gm/dL 2.6   A/G Ratio Latest Ref Range: 1.5 - 2.5 g/dL 1.7   WBC Latest Ref Range: 3.50 - 10.80 10*3/mm3 10.65   RBC Latest Ref Range: 3.89 - 5.14 10*6/mm3 4.52   Hemoglobin Latest Ref Range: 11.5 - 15.5 g/dL 11.9   Hematocrit Latest Ref Range: 34.5 - 44.0 % 37.6   RDW Latest Ref Range: 11.3 - 14.5 % 15.9 (H)   MCV  Latest Ref Range: 80.0 - 99.0 fL 83.2   MCH Latest Ref Range: 27.0 - 31.0 pg 26.3 (L)   MCHC Latest Ref Range: 32.0 - 36.0 g/dL 31.6 (L)   MPV Latest Ref Range: 6.0 - 12.0 fL 11.6   Platelets Latest Ref Range: 150 - 450 10*3/mm3 267   RDW-SD Latest Ref Range: 37.0 - 54.0 fl 48.5   Neutrophil Rel % Latest Ref Range: 41.0 - 71.0 % 79.7 (H)   Lymphocyte Rel % Latest Ref Range: 24.0 - 44.0 % 12.6 (L)   Monocyte Rel % Latest Ref Range: 0.0 - 12.0 % 6.2   Eosinophil Rel % Latest Ref Range: 0.0 - 3.0 % 0.9   Basophil Rel % Latest Ref Range: 0.0 - 1.0 % 0.4   Immature Granulocyte Rel % Latest Ref Range: 0.0 - 0.6 % 0.2   Neutrophils Absolute Latest Ref Range: 1.50 - 8.30 10*3/mm3 8.49 (H)   Lymphocytes Absolute Latest Ref Range: 0.60 - 4.80 10*3/mm3 1.34   Monocytes Absolute Latest Ref Range: 0.00 - 1.00 10*3/mm3 0.66   Eosinophils Absolute Latest Ref Range: 0.00 - 0.30 10*3/mm3 0.10   Basophils Absolute Latest Ref Range: 0.00 - 0.20 10*3/mm3 0.04   Immature Grans, Absolute Latest Ref Range: 0.00 - 0.05 10*3/mm3 0.02       X-ray R hip:  IMPRESSION:  Comminuted, displaced intertrochanteric ACUTE FRACTURE of the RIGHT femur.        Note: In addition to lab results from this visit, the labs listed above may include labs taken at another facility or during a different encounter within the last 24 hours. Please correlate lab times with ED admission and discharge times for further clarification of the services performed during this visit.          ECG/EMG Results (last 24 hours)     ** No results found for the last 24 hours. **        I discussed the case with Dr. Figueroa, orthopedics.  Pt will be admitted to further evaluation and management.              [facility]      Final diagnoses:   Closed fracture of right hip, initial encounter (CMS/[facility])   Hypertension, unspecified type           [email or web address] DO  04/03/19 0115

## 2019-04-03 NOTE — THERAPY TREATMENT NOTE
Acute Care - Physical Therapy Treatment Note  [facility]     Patient Name: Mary Ramirez  : 1934  MRN: [id number]  Today's Date: 4/3/2019  Onset of Illness/Injury or Date of Surgery: 19  Date of Referral to PT: 19  Referring Physician: Toño    Admit Date: 3/29/2019    Visit Dx:    ICD-10-CM ICD-9-CM   1. Closed fracture of right hip, initial encounter (CMS/HCC) S72.001A 820.8   2. Hypertension, unspecified type I10 401.9   3. Closed displaced intertrochanteric fracture of right femur, initial encounter (CMS/HCC) S72.141A 820.21   4. Impaired mobility and ADLs Z74.09 799.89     Patient Active Problem List   Diagnosis   • Fall   • Fracture, intertrochanteric, left femur (CMS/[facility])   • Essential hypertension   • Coronary artery disease involving native coronary artery of native heart with angina pectoris (CMS/[facility])   • Dementia   • Paroxysmal atrial fibrillation (CMS/[facility])   • Anticoagulated   • SSS (sick sinus syndrome) (CMS/[facility])   • Chronic diastolic congestive heart failure (CMS/HCC)   • DEANGELO (acute kidney injury) (CMS/HCC)   • Benign essential hypertension   • CKD (chronic kidney disease), stage III (CMS/[facility])   • Mixed hyperlipidemia   • Major depressive disorder, single episode, mild (CMS/[facility])   • Chronic allergic rhinitis   • Memory loss   • Bradycardia   • At risk for falls   • Closed fracture of femur (CMS/[facility])   • Coronary arteriosclerosis after percutaneous transluminal coronary angioplasty (PTCA)   • Diabetic polyneuropathy (CMS/[facility])   • Diverticular disease of colon   • Foot pain   • GERD (gastroesophageal reflux disease)   • Peripheral artery insufficiency (CMS/[facility])   • Annual physical exam   • Vitamin D deficiency   • Renal disorder   • Closed fracture of right hip (CMS/[facility])       Therapy Treatment    Rehabilitation Treatment Summary     Row Name 19 0945             Treatment Time/Intention    Discipline  physical therapist  -      Document Type  therapy note (daily note)   -MJ      Subjective Information  complains of;pain  -MJ      Mode of Treatment  physical therapy  -MJ      Patient/Family Observations  Pt supine in bed  -MJ      Care Plan Review  patient/other agree to care plan  -MJ      Therapy Frequency (PT Clinical Impression)  2 times/day  -MJ      Patient Effort  good  -MJ      Existing Precautions/Restrictions  fall;other (see comments)  (Significant)  nerve catheter and KI discontinued; confusion  -MJ      Recorded by [MJ] Jerry Echavarria, PT 04/03/19 1202      Row Name 04/03/19 0945             Cognitive Assessment/Intervention- PT/OT    Affect/Mental Status (Cognitive)  confused  -MJ      Orientation Status (Cognition)  oriented to;person;disoriented to;place;time  -MJ      Follows Commands (Cognition)  follows one step commands;50-74% accuracy;verbal cues/prompting required;repetition of directions required  -MJ      Recorded by [MJ] Jerry Echavarria, PT 04/03/19 1202      Row Name 04/03/19 0945             Safety Issues, Functional Mobility    Impairments Affecting Function (Mobility)  balance;cognition;pain;range of motion (ROM);strength;postural/trunk control  -MJ      Recorded by [MJ] Jerry Echavarria, PT 04/03/19 1202      Row Name 04/03/19 0945             Mobility Assessment/Intervention    Extremity Weight-bearing Status  right lower extremity  -MJ      Right Lower Extremity (Weight-bearing Status)  weight-bearing as tolerated (WBAT)  -MJ      Recorded by [MJ] Jerry Echavarria, PT 04/03/19 1202      Row Name 04/03/19 0945             Bed Mobility Assessment/Treatment    Supine-Sit Butts (Bed Mobility)  maximum assist (25% patient effort);verbal cues  -MJ      Bed Mobility, Safety Issues  cognitive deficits limit understanding;decreased use of legs for bridging/pushing  -MJ      Assistive Device (Bed Mobility)  bed rails;head of bed elevated;draw sheet  -MJ      Comment (Bed Mobility)  Verbal cues for sequencing, assist with legs and trunk  -MJ      Recorded by [MJ]  Jerry Echavraria, PT 04/03/19 1202      Row Name 04/03/19 0945             Transfer Assessment/Treatment    Transfer Assessment/Treatment  sit-stand transfer;stand-sit transfer;toilet transfer  -MJ      Comment (Transfers)  Verbal cues for correct hand placement and for upright posture in static standing as pt tends to lean posteriorly. Assisted pt to BSC, pt dependent with hygiene after toileting  -MJ      Recorded by [MJ] Jerry Echavarria, PT 04/03/19 1202      Row Name 04/03/19 0945             Sit-Stand Transfer    Sit-Stand [place] (Transfers)  maximum assist (25% patient effort);2 person assist;verbal cues  -MJ      Assistive Device (Sit-Stand Transfers)  walker, front-wheeled  -MJ      Recorded by [MJ] Jerry Echavarria, PT 04/03/19 1202      Row Name 04/03/19 0945             Stand-Sit Transfer    Stand-Sit [place] (Transfers)  maximum assist (25% patient effort);2 person assist;verbal cues  -MJ      Assistive Device (Stand-Sit Transfers)  walker, front-wheeled  -MJ      Recorded by [MJ] Jerry Echavarria, PT 04/03/19 1202      Row Name 04/03/19 0945             Toilet Transfer    Type (Toilet Transfer)  sit-stand;stand-sit  -MJ      [place] Level (Toilet Transfer)  moderate assist (50% patient effort);2 person assist;verbal cues  -MJ      Assistive Device (Toilet Transfer)  commode, bedside without drop arms;walker, front-wheeled  -MJ      Recorded by [MJ] Jerry Echavarria, PT 04/03/19 1202      Row Name 04/03/19 0945             Gait/Stairs Assessment/Training    81769 - Gait Training Minutes   9  -MJ      [place] Level (Gait)  moderate assist (50% patient effort);2 person assist;verbal cues  -MJ      Assistive Device (Gait)  walker, front-wheeled  -MJ      Distance in Feet (Gait)  8  -MJ      Pattern (Gait)  step-to  -MJ      Deviations/Abnormal Patterns (Gait)  right sided deviations;antalgic;bilateral deviations;trey decreased;stride length decreased  -MJ      Bilateral Gait Deviations  forward flexed  posture;heel strike decreased;weight shift ability decreased  -MJ      Comment (Gait/Stairs)  Pt demo step to gait pattern at slow pace. Pt tends to walk on R toe due to pain despite cues. Cues for upright posture and to stay in middle of RW. No knee buckling noted without use of knee immobilizer. Gait limited by fatigue  -MJ      Recorded by [MJ] Jerry Echavarria, PT 04/03/19 1202      Row Name 04/03/19 0945             Therapeutic Exercise    20875 - PT Therapeutic Exercise Minutes  8  -MJ      03793 - PT Therapeutic Activity Minutes  6  -MJ      Recorded by [MJ] Jerry Echavarria, PT 04/03/19 1202      Row Name 04/03/19 0945             Therapeutic Exercise    Lower Extremity (Therapeutic Exercise)  gluteal sets;heel slides, right;quad sets, right;SAQ (short arc quad), right;SLR (straight leg raise), right  -MJ      Lower Extremity Range of Motion (Therapeutic Exercise)  hip abduction/adduction, right;ankle dorsiflexion/plantar flexion, right  -MJ      Exercise Type (Therapeutic Exercise)  AAROM (active assistive range of motion)  -MJ      Position (Therapeutic Exercise)  seated  -MJ      Sets/Reps (Therapeutic Exercise)  10x each  -MJ      Comment (Therapeutic Exercise)  Cues for technique. Denys w/ SLR, hip abd, heel slides    -MJ      Recorded by [MJ] Jerry Echavarria, PT 04/03/19 1202      Row Name 04/03/19 0945             Positioning and Restraints    Pre-Treatment Position  in bed  -MJ      Post Treatment Position  chair  -MJ      In Chair  notified nsg;reclined;call light within reach;encouraged to call for assist;exit alarm on;on mechanical lift sling  -MJ      Recorded by [MJ] Jerry Echavarria, PT 04/03/19 1202      Row Name 04/03/19 0945             Pain Scale: Numbers Pre/Post-Treatment    Pain Scale: Numbers, Pretreatment  2/10  -MJ      Pain Scale: Numbers, Post-Treatment  3/10  -MJ      Pain Location - Side  Right  -MJ      Pain Location  hip  -MJ      Pain Intervention(s)  Repositioned;Ambulation/increased activity   -MJ      Recorded by [MJ] Jerry Echavarria, PT 04/03/19 1202      Row Name                Wound 03/30/19 0907 Right hip incision    Wound - Properties Group Date first assessed: 03/30/19 [JS] Time first assessed: 0907 [JS] Side: Right [JS] Location: hip [JS] Type: incision [JS] Recorded by:  [JS] Lilia Xiao RN 03/30/19 0907    Row Name                Wound 03/31/19 1500 Left medial heel other (see comments)    Wound - Properties Group Date first assessed: 03/31/19 [CP] Time first assessed: 1500 [CP] Present On Admission : no;picture taken [CP] Side: Left [CP] Orientation: medial [CP] Location: heel [CP] Type: other (see comments) [CP] Additional Comments: bruise vs DTPI [CP] Recorded by:  [CP] Abby Kirby APRN 03/31/19 1535    Row Name                Wound 03/31/19 1500 Right lateral heel other (see comments)    Wound - Properties Group Date first assessed: 03/31/19 [CP] Time first assessed: 1500 [CP] Present On Admission : no;picture taken [CP] Side: Right [CP] Orientation: lateral [CP] Location: heel [CP] Type: other (see comments) [CP] Additional Comments: bruise vs DTPI [CP] Recorded by:  [CP] Abby Kirby APRN 03/31/19 1537    Row Name 04/03/19 0945             Plan of Care Review    Plan of Care Reviewed With  patient  -MJ      Recorded by [MJ] Jerry Echavarria, PT 04/03/19 1202      Row Name 04/03/19 0945             Outcome Summary/Treatment Plan (PT)    Daily Summary of Progress (PT)  progress toward functional goals is gradual  -MJ      Recorded by [MJ] Jerry Echavarria, PT 04/03/19 1202        User Key  (r) = Recorded By, (t) = Taken By, (c) = Cosigned By    Initials Name Effective Dates Discipline    CP Abby Kirby APRN 02/05/19 -  Nurse    Jerry Mcmahon, PT 04/03/18 -  PT    Lilia Pagan RN 01/23/17 -  Nurse          Wound 03/30/19 0907 Right hip incision (Active)   Dressing Appearance dry;intact;no drainage 4/3/2019  6:00 AM   Drainage Amount none 4/3/2019   6:00 AM       Wound 03/31/19 1500 Left medial heel other (see comments) (Active)   Dressing Appearance dry;intact;no drainage 4/3/2019  6:00 AM   Base maroon/purple 4/3/2019  6:00 AM   Periwound intact;dry;pink;blanchable 4/3/2019  6:00 AM   Drainage Amount none 4/2/2019  8:00 PM       Wound 03/31/19 1500 Right lateral heel other (see comments) (Active)   Dressing Appearance dry;intact;no drainage 4/3/2019  6:00 AM   Base purple 4/3/2019  6:00 AM   Periwound intact;dry;pink;blanchable 4/3/2019  6:00 AM   Drainage Amount none 4/2/2019  8:00 PM           Physical Therapy Education     Title: PT OT SLP Therapies (In Progress)     Topic: Physical Therapy (In Progress)     Point: Mobility training (In Progress)     Learning Progress Summary           Patient Acceptance, E,D, NR by MJ at 4/3/2019  9:45 AM    Comment:  Reviewed indications to discontinue knee immobilizer, HEP, gait mechanics, benefits of mobility    Acceptance, D,E, NR by LR at 4/1/2019  9:58 AM    Comment:  Educated on precautions, weight bearing status, correct supine to sit t/f technique, correct sit<->stand t/f technique, correct gait mechanics, HEP, indications for use of knee immobilizer, and progression of POC.    Acceptance, E,TB,D, NR by CT at 3/31/2019 11:40 AM   Family Acceptance, D,E, NR by LR at 4/1/2019  9:58 AM    Comment:  Educated on precautions, weight bearing status, correct supine to sit t/f technique, correct sit<->stand t/f technique, correct gait mechanics, HEP, indications for use of knee immobilizer, and progression of POC.   Significant Other Acceptance, E,TB,D, NR by CT at 3/31/2019 11:40 AM                   Point: Home exercise program (In Progress)     Learning Progress Summary           Patient Acceptance, E,D, NR by MJ at 4/3/2019  9:45 AM    Comment:  Reviewed indications to discontinue knee immobilizer, HEP, gait mechanics, benefits of mobility    Acceptance, D,E, NR by LR at 4/1/2019  9:58 AM    Comment:  Educated on  precautions, weight bearing status, correct supine to sit t/f technique, correct sit<->stand t/f technique, correct gait mechanics, HEP, indications for use of knee immobilizer, and progression of POC.    Acceptance, E,TB,D, NR by CT at 3/31/2019 11:40 AM   Family Acceptance, D,E, NR by LR at 4/1/2019  9:58 AM    Comment:  Educated on precautions, weight bearing status, correct supine to sit t/f technique, correct sit<->stand t/f technique, correct gait mechanics, HEP, indications for use of knee immobilizer, and progression of POC.   Significant Other Acceptance, E,TB,D, NR by CT at 3/31/2019 11:40 AM                   Point: Body mechanics (In Progress)     Learning Progress Summary           Patient Acceptance, E,D, NR by MJ at 4/3/2019  9:45 AM    Comment:  Reviewed indications to discontinue knee immobilizer, HEP, gait mechanics, benefits of mobility    Acceptance, D,E, NR by LR at 4/1/2019  9:58 AM    Comment:  Educated on precautions, weight bearing status, correct supine to sit t/f technique, correct sit<->stand t/f technique, correct gait mechanics, HEP, indications for use of knee immobilizer, and progression of POC.    Acceptance, E,TB,D, NR by CT at 3/31/2019 11:40 AM   Family Acceptance, D,E, NR by LR at 4/1/2019  9:58 AM    Comment:  Educated on precautions, weight bearing status, correct supine to sit t/f technique, correct sit<->stand t/f technique, correct gait mechanics, HEP, indications for use of knee immobilizer, and progression of POC.   Significant Other Acceptance, E,TB,D, NR by CT at 3/31/2019 11:40 AM                   Point: Precautions (In Progress)     Learning Progress Summary           Patient Acceptance, E,D, NR by MJ at 4/3/2019  9:45 AM    Comment:  Reviewed indications to discontinue knee immobilizer, HEP, gait mechanics, benefits of mobility    Acceptance, D,E, NR by LR at 4/1/2019  9:58 AM    Comment:  Educated on precautions, weight bearing status, correct supine to sit t/f  technique, correct sit<->stand t/f technique, correct gait mechanics, HEP, indications for use of knee immobilizer, and progression of POC.    Acceptance, E,TB,D, NR by CT at 3/31/2019 11:40 AM   Family Acceptance, D,E, NR by LR at 4/1/2019  9:58 AM    Comment:  Educated on precautions, weight bearing status, correct supine to sit t/f technique, correct sit<->stand t/f technique, correct gait mechanics, HEP, indications for use of knee immobilizer, and progression of POC.   Significant Other Acceptance, E,TB,D, NR by CT at 3/31/2019 11:40 AM                               User Key     Initials Effective Dates Name Provider Type Discipline    LR 06/19/15 -  Alta Ochoa, PT Physical Therapist PT    CT 06/19/15 -  Woo Bermudez, PT Physical Therapist PT     04/03/18 -  Jerry Echavarria, PT Physical Therapist PT                PT Recommendation and Plan  Therapy Frequency (PT Clinical Impression): 2 times/day  Outcome Summary/Treatment Plan (PT)  Daily Summary of Progress (PT): progress toward functional goals is gradual  Plan of Care Reviewed With: patient  Progress: improving  Outcome Summary: Pt ambulated 8 feet with ModAx2 and RW, limited by fatigue. Pt required less assist with functional mobility and demo good participation with ther ex. Increase frequency to BID and will continue to progress mobility as able.   Outcome Measures     Row Name 04/03/19 0945 04/01/19 0959 04/01/19 0958       How much help from another person do you currently need...    Turning from your back to your side while in flat bed without using bedrails?  2  -MJ  --  1  -LR    Moving from lying on back to sitting on the side of a flat bed without bedrails?  2  -MJ  --  1  -LR    Moving to and from a bed to a chair (including a wheelchair)?  2  -MJ  --  2  -LR    Standing up from a chair using your arms (e.g., wheelchair, bedside chair)?  2  -MJ  --  2  -LR    Climbing 3-5 steps with a railing?  1  -MJ  --  1  -LR    To walk  in hospital room?  2  -MJ  --  2  -LR    AM-PAC 6 Clicks Score  11  -MJ  --  9  -LR       How much help from another is currently needed...    Putting on and taking off regular lower body clothing?  --  1  -ST  --    Bathing (including washing, rinsing, and drying)  --  2  -ST  --    Toileting (which includes using toilet bed pan or urinal)  --  1  -ST  --    Putting on and taking off regular upper body clothing  --  2  -ST  --    Taking care of personal grooming (such as brushing teeth)  --  2  -ST  --    Eating meals  --  2  -ST  --    Score  --  10  -ST  --       Functional Assessment    Outcome Measure Options  AM-PAC 6 Clicks Basic Mobility (PT)  -MJ  --  AM-[facility] 6 Clicks Basic Mobility (PT)  -LR      User Key  (r) = Recorded By, (t) = Taken By, (c) = Cosigned By    Initials Name Provider Type    ST Trini Leung OTR Occupational Therapist    Alta Mcdonnell, PT Physical Therapist    Jerry Mcmahon, PT Physical Therapist         Time Calculation:   PT Charges     Row Name 04/03/19 0945             Time Calculation    Start Time  0945  -MJ      PT Received On  04/03/19  -MJ      PT Goal Re-Cert Due Date  04/10/19  -MJ         Time Calculation- PT    Total Timed Code Minutes- PT  23 minute(s)  -MJ         Timed Charges    09178 - PT Therapeutic Exercise Minutes  8  -MJ      84153 - Gait Training Minutes   9  -MJ      31749 - PT Therapeutic Activity Minutes  6  -MJ        User Key  (r) = Recorded By, (t) = Taken By, (c) = Cosigned By    Initials Name Provider Type    Jerry Mcmahon, PT Physical Therapist        Therapy Charges for Today     Code Description Service Date Service Provider Modifiers Qty    20295774735 HC PT THER PROC EA 15 MIN 4/3/2019 Jerry Echavarria, PT GP 1    66667566530 HC GAIT TRAINING EA 15 MIN 4/3/2019 Jerry Echavarria, PT GP 1    14818867990 HC PT THER SUPP EA 15 MIN 4/3/2019 Jerry Echavarria, PT GP 2          PT G-Codes  Outcome Measure Options: AM-PAC 6 Clicks Basic Mobility  (PT)  AM-PAC 6 Clicks Score: 11  Score: 10    Jerry Echavarria, PT  4/3/2019

## 2019-04-03 NOTE — THERAPY TREATMENT NOTE
Acute Care - Physical Therapy Treatment Note  [facility]     Patient Name: Mary Ramirez  : 1934  MRN: [id number]  Today's Date: 4/3/2019  Onset of Illness/Injury or Date of Surgery: 19  Date of Referral to PT: 19  Referring Physician: Toño    Admit Date: 3/29/2019    Visit Dx:    ICD-10-CM ICD-9-CM   1. Closed fracture of right hip, initial encounter (CMS/HCC) S72.001A 820.8   2. Hypertension, unspecified type I10 401.9   3. Closed displaced intertrochanteric fracture of right femur, initial encounter (CMS/HCC) S72.141A 820.21   4. Impaired mobility and ADLs Z74.09 799.89     Patient Active Problem List   Diagnosis   • Fall   • Fracture, intertrochanteric, left femur (CMS/[facility])   • Essential hypertension   • Coronary artery disease involving native coronary artery of native heart with angina pectoris (CMS/[facility]ly [facility])   • Dementia   • Paroxysmal atrial fibrillation (CMS/[facility])   • Anticoagulated   • SSS (sick sinus syndrome) (CMS/[facility]ly [facility])   • Chronic diastolic congestive heart failure (CMS/[facility])   • DEANGELO (acute kidney injury) (CMS/HCC)   • Benign essential hypertension   • CKD (chronic kidney disease), stage III (CMS/[facility])   • Mixed hyperlipidemia   • Major depressive disorder, single episode, mild (CMS/[facility])   • Chronic allergic rhinitis   • Memory loss   • Bradycardia   • At risk for falls   • Closed fracture of femur (CMS/[facility])   • Coronary arteriosclerosis after percutaneous transluminal coronary angioplasty (PTCA)   • Diabetic polyneuropathy (CMS/[facility])   • Diverticular disease of colon   • Foot pain   • GERD (gastroesophageal reflux disease)   • Peripheral artery insufficiency (CMS/[facility])   • Annual physical exam   • Vitamin D deficiency   • Renal disorder   • Closed fracture of right hip (CMS/[facility])       Therapy Treatment    Rehabilitation Treatment Summary     Row Name 19 1324 19 0945          Treatment Time/Intention    Discipline  physical therapist  -MJ  physical therapist  -MJ      Document Type  therapy note (daily note)  -MJ  therapy note (daily note)  -MJ     Subjective Information  complains of;pain  -MJ  complains of;pain  -MJ     Mode of Treatment  physical therapy  -MJ  physical therapy  -MJ     Patient/Family Observations  Pt sitting UIC  -MJ  Pt supine in bed  -MJ     Care Plan Review  patient/other agree to care plan  -MJ  patient/other agree to care plan  -MJ     Therapy Frequency (PT Clinical Impression)  --  2 times/day  -MJ     Patient Effort  good  -MJ  good  -MJ     Existing Precautions/Restrictions  fall;other (see comments) confusion  -MJ  fall;other (see comments)  (Significant)  nerve catheter and KI discontinued; confusion  -MJ     Recorded by [MJ] Jerry Echavarria, PT 04/03/19 1354 [MJ] Jerry Echavarria, PT 04/03/19 1202     Row Name 04/03/19 1324 04/03/19 0945          Cognitive Assessment/Intervention- PT/OT    Affect/Mental Status (Cognitive)  confused  -MJ  confused  -MJ     Orientation Status (Cognition)  oriented to;person;disoriented to;place;time  -MJ  oriented to;person;disoriented to;place;time  -MJ     Follows Commands (Cognition)  follows one step commands;75-90% accuracy;verbal cues/prompting required;repetition of directions required  -MJ  follows one step commands;50-74% accuracy;verbal cues/prompting required;repetition of directions required  -MJ     Recorded by [MJ] Jerry Echavarria, PT 04/03/19 1354 [MJ] Jerry Echavarria, PT 04/03/19 1202     Row Name 04/03/19 1324 04/03/19 0945          Safety Issues, Functional Mobility    Impairments Affecting Function (Mobility)  balance;pain;strength;cognition  -MJ  balance;cognition;pain;range of motion (ROM);strength;postural/trunk control  -MJ     Recorded by [MJ] Jerry Echavarria, PT 04/03/19 1354 [MJ] Jerry Echavarria, PT 04/03/19 1202     Row Name 04/03/19 1324 04/03/19 0945          Mobility Assessment/Intervention    Extremity Weight-bearing Status  right lower extremity  -MJ  right lower extremity  -MJ     Right Lower Extremity  (Weight-bearing Status)  weight-bearing as tolerated (WBAT)  -MJ  weight-bearing as tolerated (WBAT)  -MJ     Recorded by [MJ] Jerry Echavarria, PT 04/03/19 1354 [MJ] Jerry Echavarria, PT 04/03/19 1202     Row Name 04/03/19 1324 04/03/19 0945          Bed Mobility Assessment/Treatment    Bed Mobility Assessment/Treatment  sit-supine  -MJ  --     Supine-Sit [place] (Bed Mobility)  not tested Pt UIC  -MJ  maximum assist (25% patient effort);verbal cues  -MJ     Sit-Supine [place] (Bed Mobility)  maximum assist (25% patient effort);2 person assist;verbal cues  -MJ  --     Bed Mobility, Safety Issues  decreased use of legs for bridging/pushing  -MJ  cognitive deficits limit understanding;decreased use of legs for bridging/pushing  -MJ     Assistive Device (Bed Mobility)  head of bed elevated  -MJ  bed rails;head of bed elevated;draw sheet  -MJ     Comment (Bed Mobility)  Verbal cues for sequencing  -MJ  Verbal cues for sequencing, assist with legs and trunk  -MJ     Recorded by [MJ] Jerry Echavarria, PT 04/03/19 1354 [MJ] Jerry Echavarria, PT 04/03/19 1202     Row Name 04/03/19 1324 04/03/19 0945          Transfer Assessment/Treatment    Transfer Assessment/Treatment  sit-stand transfer;stand-sit transfer;chair-bed transfer  -MJ  sit-stand transfer;stand-sit transfer;toilet transfer  -MJ     Comment (Transfers)  Verbal cues for correct hand placement and to step R LE out prior to t/f. Performed x2 reps. Assisted pt back to bed from chair after gait  -MJ  Verbal cues for correct hand placement and for upright posture in static standing as pt tends to lean posteriorly. Assisted pt to BSC, pt dependent with hygiene after toileting  -MJ     Recorded by [MJ] Jerry Echavarria, PT 04/03/19 1354 [MJ] Jerry Echavarria, PT 04/03/19 1202     Row Name 04/03/19 1324             Chair-Bed Transfer    Chair-Bed [place] (Transfers)  minimum assist (75% patient effort);2 person assist;verbal cues  -MJ      Assistive Device (Chair-Bed Transfers)   walker, front-wheeled  -MJ      Recorded by [MJ] Jerry Echavarria, PT 04/03/19 1354      Row Name 04/03/19 1324 04/03/19 0945          Sit-Stand Transfer    Sit-Stand [place] (Transfers)  moderate assist (50% patient effort);2 person assist;verbal cues  -MJ  maximum assist (25% patient effort);2 person assist;verbal cues  -MJ     Assistive Device (Sit-Stand Transfers)  walker, front-wheeled  -MJ  walker, front-wheeled  -MJ     Recorded by [MJ] Jerry Echavarria, PT 04/03/19 1354 [MJ] Jerry Echavarria, PT 04/03/19 1202     Row Name 04/03/19 1324 04/03/19 0945          Stand-Sit Transfer    Stand-Sit [place] (Transfers)  moderate assist (50% patient effort);2 person assist;verbal cues  -MJ  maximum assist (25% patient effort);2 person assist;verbal cues  -MJ     Assistive Device (Stand-Sit Transfers)  walker, front-wheeled  -MJ  walker, front-wheeled  -MJ     Recorded by [MJ] Jerry Echavarria, PT 04/03/19 1354 [MJ] Jerry Echavarria, PT 04/03/19 1202     Row Name 04/03/19 0945             Toilet Transfer    Type (Toilet Transfer)  sit-stand;stand-sit  -MJ      [place] Level (Toilet Transfer)  moderate assist (50% patient effort);2 person assist;verbal cues  -MJ      Assistive Device (Toilet Transfer)  commode, bedside without drop arms;walker, front-wheeled  -MJ      Recorded by [MJ] Jerry Echavarria, PT 04/03/19 1202      Row Name 04/03/19 1324 04/03/19 0945          Gait/Stairs Assessment/Training    11849 - Gait Training Minutes   10  -MJ  9  -MJ     [place] Level (Gait)  minimum assist (75% patient effort);2 person assist;verbal cues  -MJ  moderate assist (50% patient effort);2 person assist;verbal cues  -MJ     Assistive Device (Gait)  walker, front-wheeled  -MJ  walker, front-wheeled  -MJ     Distance in Feet (Gait)  15  -MJ  8  -MJ     Pattern (Gait)  step-to  -MJ  step-to  -MJ     Deviations/Abnormal Patterns (Gait)  right sided deviations;antalgic;bilateral deviations;trey decreased;stride length decreased  -MJ   right sided deviations;antalgic;bilateral deviations;trey decreased;stride length decreased  -MJ     Bilateral Gait Deviations  forward flexed posture;heel strike decreased;weight shift ability decreased  -MJ  forward flexed posture;heel strike decreased;weight shift ability decreased  -MJ     Comment (Gait/Stairs)  Pt demo step to gait pattern at slow pace. Verbal cues for upright posture and to stay in middle of RW, mild improvement. Chair follow for safety. Gait limitd by pain and fatigue  -MJ  Pt demo step to gait pattern at slow pace. Pt tends to walk on R toe due to pain despite cues. Cues for upright posture and to stay in middle of RW. No knee buckling noted without use of knee immobilizer. Gait limited by fatigue  -MJ     Recorded by [MJ] Jerry Echavarria, PT 04/03/19 1354 [MJ] Jerry Echavarria, PT 04/03/19 1202     Row Name 04/03/19 1324 04/03/19 0945          Therapeutic Exercise    89483 - PT Therapeutic Exercise Minutes  10  -MJ  8  -MJ     05004 - PT Therapeutic Activity Minutes  --  6  -MJ     Recorded by [MJ] Jerry Echavarria, PT 04/03/19 1354 [MJ] Jerry Echavarria, PT 04/03/19 1202     Row Name 04/03/19 1324 04/03/19 0945          Therapeutic Exercise    Lower Extremity (Therapeutic Exercise)  gluteal sets;heel slides, right;LAQ (long arc quad), right;quad sets, right;SLR (straight leg raise), right  -MJ  gluteal sets;heel slides, right;quad sets, right;SAQ (short arc quad), right;SLR (straight leg raise), right  -MJ     Lower Extremity Range of Motion (Therapeutic Exercise)  hip flexion/extension, right;hip abduction/adduction, right;ankle dorsiflexion/plantar flexion, right  -MJ  hip abduction/adduction, right;ankle dorsiflexion/plantar flexion, right  -MJ     Exercise Type (Therapeutic Exercise)  AAROM (active assistive range of motion)  -MJ  AAROM (active assistive range of motion)  -     Position (Therapeutic Exercise)  seated;supine  -MJ  seated  -MJ     Sets/Reps (Therapeutic Exercise)  15x each  -   10x each  -MJ     Comment (Therapeutic Exercise)  Cues for technique. Denys w/ SLR  -MJ  Cues for technique. Denys w/ SLR, hip abd, heel slides    -MJ     Recorded by [MJ] Jerry Echavarria, PT 04/03/19 1354 [MJ] Jerry Echavarria, PT 04/03/19 1202     Row Name 04/03/19 1324 04/03/19 0945          Positioning and Restraints    Pre-Treatment Position  sitting in chair/recliner  -MJ  in bed  -MJ     Post Treatment Position  bed  -MJ  chair  -MJ     In Bed  notified nsg;supine;call light within reach;encouraged to call for assist;exit alarm on  -MJ  --     In Chair  --  notified nsg;reclined;call light within reach;encouraged to call for assist;exit alarm on;on mechanical lift sling  -MJ     Recorded by [MJ] Jerry Echavarria, PT 04/03/19 1354 [MJ] Jerry Echavarria, PT 04/03/19 1202     Row Name 04/03/19 1324 04/03/19 0945          Pain Scale: Numbers Pre/Post-Treatment    Pain Scale: Numbers, Pretreatment  3/10  -MJ  2/10  -MJ     Pain Scale: Numbers, Post-Treatment  5/10  -MJ  3/10  -MJ     Pain Location - Side  Right  -MJ  Right  -MJ     Pain Location  hip  -MJ  hip  -MJ     Pain Intervention(s)  Repositioned;Ambulation/increased activity  -MJ  Repositioned;Ambulation/increased activity  -MJ     Recorded by [MJ] Jerry Echavarria, PT 04/03/19 1354 [MJ] Jerry Echavarria, PT 04/03/19 1202     Row Name                Wound 03/30/19 0907 Right hip incision    Wound - Properties Group Date first assessed: 03/30/19 [JS] Time first assessed: 0907 [JS] Side: Right [JS] Location: hip [JS] Type: incision [JS] Recorded by:  [JS] Lilia Xiao RN 03/30/19 0907    Row Name                Wound 03/31/19 1500 Left medial heel other (see comments)    Wound - Properties Group Date first assessed: 03/31/19 [CP] Time first assessed: 1500 [CP] Present On Admission : no;picture taken [CP] Side: Left [CP] Orientation: medial [CP] Location: heel [CP] Type: other (see comments) [CP] Additional Comments: bruise vs DTPI [CP] Recorded by:  [CP] Abby Kirby  PAMELA, APRN 03/31/19 1535    Row Name                Wound 03/31/19 1500 Right lateral heel other (see comments)    Wound - Properties Group Date first assessed: 03/31/19 [CP] Time first assessed: 1500 [CP] Present On Admission : no;picture taken [CP] Side: Right [CP] Orientation: lateral [CP] Location: heel [CP] Type: other (see comments) [CP] Additional Comments: bruise vs DTPI [CP] Recorded by:  [CP] Kofi Abby SANTIAGO, CRISTA 03/31/19 1537    Row Name 04/03/19 1324 04/03/19 0945          Plan of Care Review    Plan of Care Reviewed With  patient  -MJ  patient  -MJ     Recorded by [MJ] Jrery Echavarria, PT 04/03/19 1354 [MJ] Jerry Echavarria, PT 04/03/19 1202     Row Name 04/03/19 1324 04/03/19 0945          Outcome Summary/Treatment Plan (PT)    Daily Summary of Progress (PT)  progress toward functional goals is gradual  -MJ  progress toward functional goals is gradual  -MJ     Recorded by [MJ] Jerry Echavarria, PT 04/03/19 1354 [MJ] Jerry Echavarria, PT 04/03/19 1202       User Key  (r) = Recorded By, (t) = Taken By, (c) = Cosigned By    Initials Name Effective Dates Discipline    CP Beena Kirbyprasanna SANTIAGO, APRN 02/05/19 -  Nurse    MJ Jerry Echavarria, PT 04/03/18 -  PT    Lilia Pagan RN 01/23/17 -  Nurse          Wound 03/30/19 0907 Right hip incision (Active)   Dressing Appearance dry;intact;no drainage 4/3/2019  8:15 AM   Drainage Amount none 4/3/2019  6:00 AM   Dressing Care, Wound other (see comments) 4/3/2019  8:15 AM       Wound 03/31/19 1500 Left medial heel other (see comments) (Active)   Dressing Appearance dry;intact;no drainage 4/3/2019  8:15 AM   Base maroon/purple 4/3/2019  6:00 AM   Periwound intact;dry;pink;blanchable 4/3/2019  6:00 AM   Drainage Amount none 4/2/2019  8:00 PM   Dressing Care, Wound low-adherent 4/3/2019  8:15 AM       Wound 03/31/19 1500 Right lateral heel other (see comments) (Active)   Dressing Appearance dry;intact;no drainage 4/3/2019  8:15 AM   Base purple 4/3/2019  6:00 AM   Periwound  intact;dry;pink;blanchable 4/3/2019  6:00 AM   Drainage Amount none 4/2/2019  8:00 PM   Dressing Care, Wound low-adherent 4/3/2019  8:15 AM           Physical Therapy Education     Title: PT OT SLP Therapies (In Progress)     Topic: Physical Therapy (In Progress)     Point: Mobility training (In Progress)     Learning Progress Summary           Patient Acceptance, E,D, NR by MJ at 4/3/2019  1:24 PM    Comment:  Reviewed HEP, gait mechanics, benefits of mobility    Acceptance, E,D, NR by MJ at 4/3/2019  9:45 AM    Comment:  Reviewed indications to discontinue knee immobilizer, HEP, gait mechanics, benefits of mobility    Acceptance, D,E, NR by LR at 4/1/2019  9:58 AM    Comment:  Educated on precautions, weight bearing status, correct supine to sit t/f technique, correct sit<->stand t/f technique, correct gait mechanics, HEP, indications for use of knee immobilizer, and progression of POC.    Acceptance, E,TB,D, NR by CT at 3/31/2019 11:40 AM   Family Acceptance, D,E, NR by LR at 4/1/2019  9:58 AM    Comment:  Educated on precautions, weight bearing status, correct supine to sit t/f technique, correct sit<->stand t/f technique, correct gait mechanics, HEP, indications for use of knee immobilizer, and progression of POC.   Significant Other Acceptance, E,TB,D, NR by CT at 3/31/2019 11:40 AM                   Point: Home exercise program (In Progress)     Learning Progress Summary           Patient Acceptance, E,D, NR by MJ at 4/3/2019  1:24 PM    Comment:  Reviewed HEP, gait mechanics, benefits of mobility    Acceptance, E,D, NR by MJ at 4/3/2019  9:45 AM    Comment:  Reviewed indications to discontinue knee immobilizer, HEP, gait mechanics, benefits of mobility    Acceptance, D,E, NR by LR at 4/1/2019  9:58 AM    Comment:  Educated on precautions, weight bearing status, correct supine to sit t/f technique, correct sit<->stand t/f technique, correct gait mechanics, HEP, indications for use of knee immobilizer, and  progression of POC.    Acceptance, E,TB,D, NR by CT at 3/31/2019 11:40 AM   Family Acceptance, D,E, NR by LR at 4/1/2019  9:58 AM    Comment:  Educated on precautions, weight bearing status, correct supine to sit t/f technique, correct sit<->stand t/f technique, correct gait mechanics, HEP, indications for use of knee immobilizer, and progression of POC.   Significant Other Acceptance, E,TB,D, NR by CT at 3/31/2019 11:40 AM                   Point: Body mechanics (In Progress)     Learning Progress Summary           Patient Acceptance, E,D, NR by MJ at 4/3/2019  1:24 PM    Comment:  Reviewed HEP, gait mechanics, benefits of mobility    Acceptance, E,D, NR by MJ at 4/3/2019  9:45 AM    Comment:  Reviewed indications to discontinue knee immobilizer, HEP, gait mechanics, benefits of mobility    Acceptance, D,E, NR by LR at 4/1/2019  9:58 AM    Comment:  Educated on precautions, weight bearing status, correct supine to sit t/f technique, correct sit<->stand t/f technique, correct gait mechanics, HEP, indications for use of knee immobilizer, and progression of POC.    Acceptance, E,TB,D, NR by CT at 3/31/2019 11:40 AM   Family Acceptance, D,E, NR by LR at 4/1/2019  9:58 AM    Comment:  Educated on precautions, weight bearing status, correct supine to sit t/f technique, correct sit<->stand t/f technique, correct gait mechanics, HEP, indications for use of knee immobilizer, and progression of POC.   Significant Other Acceptance, E,TB,D, NR by CT at 3/31/2019 11:40 AM                   Point: Precautions (In Progress)     Learning Progress Summary           Patient Acceptance, E,D, NR by MJ at 4/3/2019  1:24 PM    Comment:  Reviewed HEP, gait mechanics, benefits of mobility    Acceptance, E,D, NR by MJ at 4/3/2019  9:45 AM    Comment:  Reviewed indications to discontinue knee immobilizer, HEP, gait mechanics, benefits of mobility    Acceptance, D,E, NR by LR at 4/1/2019  9:58 AM    Comment:  Educated on precautions, weight  bearing status, correct supine to sit t/f technique, correct sit<->stand t/f technique, correct gait mechanics, HEP, indications for use of knee immobilizer, and progression of POC.    Acceptance, E,TB,D, NR by CT at 3/31/2019 11:40 AM   Family Acceptance, D,E, NR by LR at 4/1/2019  9:58 AM    Comment:  Educated on precautions, weight bearing status, correct supine to sit t/f technique, correct sit<->stand t/f technique, correct gait mechanics, HEP, indications for use of knee immobilizer, and progression of POC.   Significant Other Acceptance, E,TB,D, NR by CT at 3/31/2019 11:40 AM                               User Key     Initials Effective Dates Name Provider Type Discipline    LR 06/19/15 -  Alta Ochoa, PT Physical Therapist PT    CT 06/19/15 -  Woo Bermudez, PT Physical Therapist PT     04/03/18 -  Jerry Echavarria, PT Physical Therapist PT                PT Recommendation and Plan  Therapy Frequency (PT Clinical Impression): 2 times/day  Outcome Summary/Treatment Plan (PT)  Daily Summary of Progress (PT): progress toward functional goals is gradual  Plan of Care Reviewed With: patient  Progress: improving  Outcome Summary: Pt increased gait distance to 15 feet with MinAx2 and RW, limited by fatigue and pain. Pt required less assist with transfers and gait and increased ther ex reps this session. Will continue to progress mobility as able.   Outcome Measures     Row Name 04/03/19 1324 04/03/19 0945 04/01/19 0959       How much help from another person do you currently need...    Turning from your back to your side while in flat bed without using bedrails?  2  -MJ  2  -MJ  --    Moving from lying on back to sitting on the side of a flat bed without bedrails?  2  -MJ  2  -MJ  --    Moving to and from a bed to a chair (including a wheelchair)?  2  -MJ  2  -MJ  --    Standing up from a chair using your arms (e.g., wheelchair, bedside chair)?  2  -MJ  2  -MJ  --    Climbing 3-5 steps with a  railing?  1  -MJ  1  -MJ  --    To walk in hospital room?  2  -MJ  2  -MJ  --    AM-PAC 6 Clicks Score  11  -MJ  11  -MJ  --       How much help from another is currently needed...    Putting on and taking off regular lower body clothing?  --  --  1  -ST    Bathing (including washing, rinsing, and drying)  --  --  2  -ST    Toileting (which includes using toilet bed pan or urinal)  --  --  1  -ST    Putting on and taking off regular upper body clothing  --  --  2  -ST    Taking care of personal grooming (such as brushing teeth)  --  --  2  -ST    Eating meals  --  --  2  -ST    Score  --  --  10  -ST       Functional Assessment    Outcome Measure Options  AM-PAC 6 Clicks Basic Mobility (PT)  -MJ  AM-[facility] 6 Clicks Basic Mobility (PT)  -  --    Row Name 04/01/19 0958             How much help from another person do you currently need...    Turning from your back to your side while in flat bed without using bedrails?  1  -LR      Moving from lying on back to sitting on the side of a flat bed without bedrails?  1  -LR      Moving to and from a bed to a chair (including a wheelchair)?  2  -LR      Standing up from a chair using your arms (e.g., wheelchair, bedside chair)?  2  -LR      Climbing 3-5 steps with a railing?  1  -LR      To walk in hospital room?  2  -LR      AM-PAC 6 Clicks Score  9  -LR         Functional Assessment    Outcome Measure Options  AM-[facility] 6 Clicks Basic Mobility (PT)  -LR        User Key  (r) = Recorded By, (t) = Taken By, (c) = Cosigned By    Initials Name Provider Type    Trini Nuno, OTR Occupational Therapist    LR Alta Ochoa, PT Physical Therapist    Jerry Mcmahon, PT Physical Therapist         Time Calculation:   PT Charges     Row Name 04/03/19 1324 04/03/19 0945          Time Calculation    Start Time  1324  -MJ  0945  -MJ     PT Received On  04/03/19  -MJ  04/03/19  -MJ     PT Goal Re-Cert Due Date  04/10/19  -MJ  04/10/19  -MJ        Time Calculation- PT    Total  Timed Code Minutes- PT  20 minute(s)  -MJ  23 minute(s)  -MJ        Timed Charges    18817 - PT Therapeutic Exercise Minutes  10  -MJ  8  -MJ     26982 - Gait Training Minutes   10  -MJ  9  -MJ     36407 - PT Therapeutic Activity Minutes  --  6  -MJ       User Key  (r) = Recorded By, (t) = Taken By, (c) = Cosigned By    Initials Name Provider Type    Jerry Mcmahon, PT Physical Therapist        Therapy Charges for Today     Code Description Service Date Service Provider Modifiers Qty    90999350763 HC PT THER PROC EA 15 MIN 4/3/2019 Jerry Echavarria, PT GP 1    57794617111 HC GAIT TRAINING EA 15 MIN 4/3/2019 Jerry Echavarria, PT GP 1    71616533393 HC PT THER SUPP EA 15 MIN 4/3/2019 Jerry Echavarria, PT GP 2    47512577028 HC GAIT TRAINING EA 15 MIN 4/3/2019 Jerry Echavarria, PT GP 1    99476471577 HC PT THER SUPP EA 15 MIN 4/3/2019 Jerry Echavarria, PT GP 2          PT G-Codes  Outcome Measure Options: AM-PAC 6 Clicks Basic Mobility (PT)  AM-PAC 6 Clicks Score: 11  Score: 10    Jerry Echavarria PT  4/3/2019

## 2019-04-03 NOTE — PLAN OF CARE
Problem: Patient Care Overview  Goal: Plan of Care Review  Outcome: Ongoing (interventions implemented as appropriate)   04/03/19 7008   Coping/Psychosocial   Plan of Care Reviewed With patient   Plan of Care Review   Progress improving   OTHER   Outcome Summary Pt increased gait distance to 15 feet with MinAx2 and RW, limited by fatigue and pain. Pt required less assist with transfers and gait and increased ther ex reps this session. Will continue to progress mobility as able.

## 2019-04-03 NOTE — PROGRESS NOTES
"  SUBJECTIVE  Patient resting comfortably.  Pain controlled.  No events overnight.  Remains pleasantly confused.      OBJECTIVE  Temp (24hrs), Av.2 °F (36.8 °C), Min:97.9 °F (36.6 °C), Max:98.6 °F (37 °C)    Blood pressure 130/57, pulse 64, temperature 97.9 °F (36.6 °C), temperature source Temporal, resp. rate 14, height 154.9 cm (61\"), weight 49.9 kg (110 lb), SpO2 97 %.    Lab Results (last 24 hours)     Procedure Component Value Units Date/Time    POC Glucose Once []  (Abnormal) Collected:  19    Specimen:  Blood Updated:  19     Glucose 179 mg/dL     POC Glucose Once []  (Normal) Collected:  19 1609    Specimen:  Blood Updated:  19 1612     Glucose 93 mg/dL     POC Glucose Once [[id number]]  (Abnormal) Collected:  19 1051    Specimen:  Blood Updated:  19 1053     Glucose 247 mg/dL     Basic Metabolic Panel [[id number]]  (Abnormal) Collected:  19 0850    Specimen:  Blood Updated:  19 0920     Glucose 184 mg/dL      BUN 27 mg/dL      Creatinine 0.96 mg/dL      Sodium 133 mmol/L      Potassium 3.9 mmol/L      Chloride 102 mmol/L      CO2 23.0 mmol/L      Calcium 8.3 mg/dL      eGFR Non African Amer 55 mL/min/1.73      BUN/Creatinine Ratio 28.1     Anion Gap 8.0 mmol/L     Narrative:       [employer] Guidelines    Stage     Description        GFR  1         Normal or High     90+  2         Mild decrease      60-89  3         Moderate decrease  30-59  4         Severe decrease    15-29  5         Kidney failure     <15    The MDRD GFR formula is only valid for adults with stable renal function between ages 18 and 70.    CBC & Differential [] Collected:  19 0850    Specimen:  Blood Updated:  19 0905    Narrative:       The following orders were created for panel order CBC & Differential.  Procedure                               Abnormality         Status                     ---------                      "          -----------         ------                     CBC Auto Differential[[id number]]        Abnormal            Final result                 Please view results for these tests on the individual orders.    CBC Auto Differential [[id number]]  (Abnormal) Collected:  04/02/19 0850    Specimen:  Blood Updated:  04/02/19 0905     WBC 13.08 10*3/mm3      RBC 2.69 10*6/mm3      Hemoglobin 6.9 g/dL      Hematocrit 23.1 %      MCV 85.9 fL      MCH 25.7 pg      MCHC 29.9 g/dL      RDW 16.5 %      RDW-SD 50.9 fl      MPV 12.1 fL      Platelets 253 10*3/mm3      Neutrophil % 83.6 %      Lymphocyte % 6.3 %      Monocyte % 9.9 %      Eosinophil % 0.1 %      Basophil % 0.1 %      Immature Grans % 0.7 %      Neutrophils, Absolute 10.94 10*3/mm3      Lymphocytes, Absolute 0.83 10*3/mm3      Monocytes, Absolute 1.29 10*3/mm3      Eosinophils, Absolute 0.01 10*3/mm3      Basophils, Absolute 0.01 10*3/mm3      Immature Grans, Absolute 0.09 10*3/mm3     POC Glucose Once [[id number]]  (Abnormal) Collected:  04/02/19 0734    Specimen:  Blood Updated:  04/02/19 0735     Glucose 205 mg/dL             PHYSICAL EXAM  Right lower extremity: Dressing clean, dry and intact.  Minimal pain with logroll of hip.  Intact EHL, FHL, tibialis anterior, and gastrocsoleus. Sensation intact to light touch to deep peroneal, superficial peroneal, sural, saphenous, tibial nerves. 2+ palpable DP and PT pulses.         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 pectoris (CMS/[facility])    Dementia    Paroxysmal atrial fibrillation (CMS/[facility])    SSS (sick sinus syndrome) (CMS/[facility])    Chronic diastolic congestive heart failure (CMS/[facility])    CKD (chronic kidney disease), stage III (CMS/[facility])    Closed fracture of right hip (CMS/[facility])      PLAN / DISPOSITION:  4 Day Post-Op fixation right intertrochanteric hip fracture    Protected weight bearing as tolerated right lower extremity, hip range of  motion as tolerated  Pain control  PT/OT for post op mobilization and medical equipment needs   Attempt to keep glucose levels below 200 to minimize infection risk.  SCD's bilateral lower extremities   Aspirin for DVT prophylaxis   Social work for discharge planning.  Okay to discharge from orthopedic standpoint.  Dressing to remain in place for 7 days. May remove on POD#7. If no drainage, may shower on POD#10. No submerging wound in water. If drainage is noted, sterile dressing should be placed and wound checked daily. No showering until wound has remained dry for 72 consecutive hours.   Follow up in 2 weeks for re-assessment.      Theo Figueroa MD  04/03/19  7:01 AM

## 2019-04-03 NOTE — PLAN OF CARE
Problem: Patient Care Overview  Goal: Plan of Care Review  Outcome: Ongoing (interventions implemented as appropriate)   04/03/19 0569   Coping/Psychosocial   Plan of Care Reviewed With patient   Plan of Care Review   Progress no change   OTHER   Outcome Summary pt rested well, still remains slightly confused, VSS, still being cathed, 1 unit of blood transfused, will continue to monitor for changes.        Problem: Skin Injury Risk (Adult)  Goal: Identify Related Risk Factors and Signs and Symptoms  Outcome: Ongoing (interventions implemented as appropriate)      Problem: Fall Risk (Adult)  Goal: Identify Related Risk Factors and Signs and Symptoms  Outcome: Ongoing (interventions implemented as appropriate)      Problem: Hip Arthroplasty (Total, Partial) (Adult)  Goal: Signs and Symptoms of Listed Potential Problems Will be Absent, Minimized or Managed (Hip Arthroplasty)  Outcome: Ongoing (interventions implemented as appropriate)

## 2019-04-03 NOTE — PROGRESS NOTES
[facility] Medicine Services  PROGRESS NOTE    Patient Name: Mary Ramirez  : 1934  MRN: [id number]    Date of Admission: 3/29/2019  Length of Stay: 5  Primary Care Physician: Nirmal Lundberg MD    Subjective   Subjective     CC:  Fall     HPI:  Patient doing well this morning. No complaints. Denies any pain. No events overnight     Review of Systems  Gen- No fevers, chills  CV- No chest pain, palpitations  Resp- No cough, dyspnea  GI- No N/V/D, abd pain    Otherwise ROS is negative except as mentioned in the HPI.    Objective   Objective     Vital Signs:   Temp:  [97.5 °F (36.4 °C)-98.6 °F (37 °C)] 97.5 °F (36.4 °C)  Heart Rate:  [64-89] 69  Resp:  [12-18] 15  BP: (125-155)/(53-72) 155/72        Physical Exam:  Constitutional: Sleeping but easily awoken   Respiratory: Clear to auscultation bilaterally A/P, respiratory effort normal   Cardiovascular: RRR, II/VI murmur, no rubs, or gallops, palpable pedal pulses bilaterally  Gastrointestinal: Positive bowel sounds, soft, nontender, nondistended  Musculoskeletal: No bilateral ankle edema.   Psychiatric: Appropriate affect, cooperative calm.  Neurologic: Sleepy. Oriented x 2, confused at baseline.  Speech clear.  Follows commands.    Skin: No rashes. RLE hip drsg c/d/i.  Mild edema to thigh.  Pain cath in place to rt groin.        Results Reviewed:  I have personally reviewed current lab, radiology, and data and agree.    Results from last 7 days   Lab Units 19  0742 19  0850 19  1642   WBC 10*3/mm3 10.05 13.08* 12.42*   HEMOGLOBIN g/dL 9.3* 6.9* 7.2*   HEMATOCRIT % 28.9* 23.1* 23.5*   PLATELETS 10*3/mm3 243 253 240     Results from last 7 days   Lab Units 19  0850 19  0658 19  0705  19  0739   SODIUM mmol/L 133 131* 133   < > 136   POTASSIUM mmol/L 3.9 4.3 3.8   < > 3.7   CHLORIDE mmol/L 102 97* 98*   < > 101   CO2 mmol/L 23.0 25.0 26.0   < > 25.0   BUN mg/dL 27* 35* 40*   < > 30*    CREATININE mg/dL 0.96 1.30 1.52*   < > 1.20   GLUCOSE mg/dL 184* 209* 219*   < > 219*   CALCIUM mg/dL 8.3* 9.0 9.0   < > 9.2   ALT (SGPT) U/L  --   --   --   --  38   AST (SGOT) U/L  --   --   --   --  35*    < > = values in this interval not displayed.     Estimated Creatinine Clearance: 34.4 mL/min (by C-G formula based on SCr of 0.96 mg/dL).    No results found for: BNP    Microbiology Results Abnormal     None          Imaging Results (last 24 hours)     ** No results found for the last 24 hours. **          Results for orders placed during the hospital encounter of 03/03/19   Transthoracic Echo Complete With Contrast if Necessary Per Protocol    Narrative · Calculated EF = 66.0%  · Left ventricular systolic function is normal.  · Left ventricular wall thickness is consistent with moderate concentric   hypertrophy.  · Left atrial cavity size is severely dilated.  · Left ventricular diastolic dysfunction (grade I) consistent with   impaired relaxation.  · Mild tricuspid valve regurgitation is present.  · Mild-to-moderate mitral valve regurgitation is present  · There is calcification of the aortic valve.          I have reviewed the medications:    Current Facility-Administered Medications:   •  acetaminophen (TYLENOL) tablet 500 mg, 500 mg, Oral, Q6H PRN, Pennie Perez MD  •  acetaminophen (TYLENOL) tablet 650 mg, 650 mg, Oral, Q4H PRN, Theo Figueroa MD  •  amiodarone (PACERONE) tablet 200 mg, 200 mg, Oral, Q24H, Marian Langston DO, 200 mg at 04/03/19 0850  •  aspirin EC tablet 325 mg, 325 mg, Oral, Q12H, Alie Kruger MD, 325 mg at 04/03/19 0850  •  bisacodyl (DULCOLAX) EC tablet 5 mg, 5 mg, Oral, Daily PRN, Pennie Perez MD, 5 mg at 03/31/19 0551  •  citalopram (CeleXA) tablet 20 mg, 20 mg, Oral, Daily, Pennie Perez MD, 20 mg at 04/03/19 0850  •  CloNIDine (CATAPRES) tablet 0.2 mg, 0.2 mg, Oral, BID, Pennie Perez MD, 0.2 mg at 04/03/19 0850  •  dextrose (D50W) 25 g/ 50mL Intravenous  Solution 25 g, 25 g, Intravenous, Q15 Min PRN, Pennie Perez MD  •  dextrose (GLUTOSE) oral gel 15 g, 15 g, Oral, Q15 Min PRN, Pennie Perez MD  •  donepezil (ARICEPT) tablet 5 mg, 5 mg, Oral, Nightly, Pennie Perez MD, 5 mg at 04/02/19 2019  •  fluticasone (FLONASE) 50 MCG/ACT nasal spray 2 spray, 2 spray, Nasal, Daily, Pennie Perez MD, 2 spray at 04/03/19 0852  •  glucagon (human recombinant) (GLUCAGEN DIAGNOSTIC) injection 1 mg, 1 mg, Subcutaneous, PRN, Pennie Perez MD  •  hydrALAZINE (APRESOLINE) tablet 25 mg, 25 mg, Oral, Q8H, Pennie Perez MD, 25 mg at 04/03/19 0555  •  HYDROmorphone (DILAUDID) injection 0.5 mg, 0.5 mg, Intravenous, Q2H PRN, Marian Langston DO, 0.5 mg at 03/30/19 1853  •  insulin detemir (LEVEMIR) injection 6 Units, 6 Units, Subcutaneous, QAM, Shanta Evans APRN, 6 Units at 04/02/19 2018  •  insulin lispro (humaLOG) injection 0-9 Units, 0-9 Units, Subcutaneous, 4x Daily With Meals & Nightly, Shanta Evans APRN, 2 Units at 04/03/19 1302  •  insulin lispro (humaLOG) injection 2 Units, 2 Units, Subcutaneous, TID With Meals, Shanta Evans APRN, 2 Units at 04/03/19 1301  •  isosorbide mononitrate (IMDUR) 24 hr tablet 60 mg, 60 mg, Oral, Q24H, Marian Langston DO, 60 mg at 04/03/19 0851  •  lactated ringers infusion, 9 mL/hr, Intravenous, Continuous, Vish Cheek MD, Last Rate: 9 mL/hr at 03/30/19 1126, 9 mL/hr at 03/30/19 1126  •  ondansetron (ZOFRAN) tablet 4 mg, 4 mg, Oral, Q8H PRN, Pennie Perez MD  •  oxyCODONE (ROXICODONE) immediate release tablet 10 mg, 10 mg, Oral, Q4H PRN, Theo Figueroa MD, 10 mg at 03/30/19 1512  •  oxyCODONE (ROXICODONE) immediate release tablet 5 mg, 5 mg, Oral, Q4H PRN, Theo Figueroa MD  •  polyethylene glycol 3350 powder (packet), 17 g, Oral, Daily, Pennie Perez MD, 17 g at 04/03/19 0850  •  ropivacaine (NAROPIN) 0.2% peripheral nerve cath (moog), 8 mL/hr, Peripheral Nerve, Continuous,  Dollar, Chanelle, CRNA, Last Rate: 8 mL/hr at 04/01/19 0601, 8 mL/hr at 04/01/19 0601  •  sodium chloride 0.9 % flush 3 mL, 3 mL, Intravenous, Q12H, Pennie Perez MD, 3 mL at 04/01/19 0841  •  sodium chloride 0.9 % flush 3 mL, 3 mL, Intravenous, Q12H, Theo Figueroa MD, 3 mL at 03/30/19 2035  •  sodium chloride 0.9 % flush 3-10 mL, 3-10 mL, Intravenous, PRN, Pennie Perez MD  •  sodium chloride 0.9 % flush 3-10 mL, 3-10 mL, Intravenous, PRN, Theo Figueroa MD  •  sodium chloride 0.9 % infusion, 75 mL/hr, Intravenous, Continuous, Shanta Evans, APRZEESHAN, Last Rate: 75 mL/hr at 04/02/19 1054, 75 mL/hr at 04/02/19 1054      Assessment/Plan   Assessment / Plan     Active Hospital Problems    Diagnosis POA   • Closed fracture of right hip (CMS/HCC) [S72.001A] Yes   • CKD (chronic kidney disease), stage III (CMS/[facility]) [N18.3] Yes   • Chronic diastolic congestive heart failure (CMS/HCC) [I50.32] Yes     · Echo (9/1/18): LVEF 60%. LVH. Left atrial dilatation. Aortic calcification with no stenosis.     • Paroxysmal atrial fibrillation (CMS/[facility]) [I48.0] Yes     · Chads Vasc 6 (age > 75, female, CAD, DM, HTN)   · Rhythm control strategy with amiodarone     • SSS (sick sinus syndrome) (CMS/[facility]) [I49.5] Yes   • Dementia [F03.90] Yes   • Essential hypertension [I10] Yes   • Coronary artery disease involving native coronary artery of native heart with angina pectoris (CMS/[facility]) [I25.119] Yes     · Cardiac cath with PCI data deficit  · Echo (9/1/18): LVEF 60%. LVH. Left atrial dilatation. Aortic calcification with no stenosis.     • Fall [W19.XXXA] Yes          Brief Hospital Course to date:  Mary Ramirez is a 84 y.o. female with history of HTN, paroxysmal afib, DM, CKD 3 and left hip fracture s/p repair who presents with fall and fracture of right hip. OR 3/30.     Right hip fracture   - s/p repair 3/30   -  mg BID   - dressing clean and dry until post op day 7   --Dr. Figueroa, orthopedic  recs:  Protected weight bearing as tolerated right lower extremity, hip range of motion as tolerated  23 hours perioperative antibiotic prophylaxis   SCD's bilateral lower extremities   Aspirin for DVT prophylaxis   Social work for discharge planning.   Dressing to remain in place for 7 days. May remove on POD#7. If no drainage, may shower on POD#10. No submerging wound in water. If drainage is noted, sterile dressing should be placed and wound checked daily. No showering until wound has remained dry for 72 consecutive hours.   Follow up in 2 weeks for re-assessment.    HTN   - improvement in BP after surgery; pain likely contributing  - Continue amlodipine, clonidine, hydralazine and imdur  - Currently aldactone on hold   - Recent PCP visit and BP on low end; will continue to monitor and adjust as needed   --BP remains stable.  Asymptomatic.  Improving.  Will continue current medication regimen. Monitor.  Adjust meds as needed.      Paroxysmal afib   - resume home amiodarone   - Previously on eliquis;  mg BID resumed by ortho  - discuss with family with recent falls and risk of bleeding.  Discussed with pharm D today.  Likely may need to come off eliquis indefinitely due to advanced dementia and falls.      DM   - Hold orals; SSI   - continue Levemir    Post op anemia  --Hemoglobin trending down today. Suspect postoperative as patient stable with no signs of active bleeding.   -- responded to 1u PRBCs    Lt heel wound  --area of appearing nonblanchable tissue to lt heel.  --elevated heels off bed, waffle boots as needed  --woc consulted and following    CKD 3   - Cr around baseline; monitor   --Creatinine improved today but pt still not drinking much and Na/cl decreased.  Continue VFs today and encourage fluid intake.      DVT Prophylaxis:   mg BID     Disposition: I expect the patient to be discharged rehab TBD     CODE STATUS:   Code Status and Medical Interventions:   Ordered at: 03/29/19 0828      Level Of Support Discussed With:    Patient     Code Status:    CPR     Medical Interventions (Level of Support Prior to Arrest):    Full         Electronically signed by Alie Kruger MD, 04/03/19, 1:20 PM.

## 2019-04-03 NOTE — PLAN OF CARE
Problem: Patient Care Overview  Goal: Plan of Care Review  Outcome: Ongoing (interventions implemented as appropriate)   04/03/19 9421   Coping/Psychosocial   Plan of Care Reviewed With patient   Plan of Care Review   Progress improving   OTHER   Outcome Summary Patient alert at the beginning of the shift but her confusion worsened as the day progressed. Patient will void on the bedpan if prompted. No need to in and out cath. VSS

## 2019-04-03 NOTE — PROGRESS NOTES
Continued Stay Note  [facility]     Patient Name: Mary Ramirez  MRN: [id number]  Today's Date: 4/3/2019    Admit Date: 3/29/2019    Discharge Plan     Row Name 04/03/19 1553       Plan    Plan  Short term rehab at [facility]    Patient/Family in Agreement with Plan  yes    Plan Comments  Patient received unit of blood overnight.  Remains confused and incontinent.  Still requiring in and out cath for urinary retention.  IV fluids continue.  Spoke with Dona at [facility].  Plans for transfer to short term rehab when medically ready.  Case management will continue to follow for discharge planning     Final Discharge Disposition Code  03 - skilled nursing facility (SNF)        Discharge Codes    No documentation.             Dona Crews RN

## 2019-04-03 NOTE — PLAN OF CARE
Problem: Patient Care Overview  Goal: Plan of Care Review  Outcome: Ongoing (interventions implemented as appropriate)   04/03/19 8297   Coping/Psychosocial   Plan of Care Reviewed With patient   Plan of Care Review   Progress improving   OTHER   Outcome Summary Pt ambulated 8 feet with ModAx2 and RW, limited by fatigue. Pt required less assist with functional mobility and demo good participation with ther ex. Increase frequency to BID and will continue to progress mobility as able.

## 2019-04-04 LAB
GLUCOSE BLDC GLUCOMTR-MCNC: 177 MG/DL (ref 70–130)
GLUCOSE BLDC GLUCOMTR-MCNC: 224 MG/DL (ref 70–130)
GLUCOSE BLDC GLUCOMTR-MCNC: 349 MG/DL (ref 70–130)
GLUCOSE BLDC GLUCOMTR-MCNC: 74 MG/DL (ref 70–130)

## 2019-04-04 PROCEDURE — 97610 LOW FREQUENCY NON-THERMAL US: CPT

## 2019-04-04 PROCEDURE — 63710000001 INSULIN DETEMIR PER 5 UNITS: Performed by: NURSE PRACTITIONER

## 2019-04-04 PROCEDURE — 82962 GLUCOSE BLOOD TEST: CPT

## 2019-04-04 PROCEDURE — 99232 SBSQ HOSP IP/OBS MODERATE 35: CPT | Performed by: INTERNAL MEDICINE

## 2019-04-04 PROCEDURE — 97164 PT RE-EVAL EST PLAN CARE: CPT

## 2019-04-04 PROCEDURE — 97110 THERAPEUTIC EXERCISES: CPT

## 2019-04-04 PROCEDURE — 97116 GAIT TRAINING THERAPY: CPT

## 2019-04-04 RX ORDER — SPIRONOLACTONE 25 MG/1
25 TABLET ORAL DAILY
Status: COMPLETED | OUTPATIENT
Start: 2019-04-04 | End: 2019-04-04

## 2019-04-04 RX ADMIN — INSULIN LISPRO 2 UNITS: 100 INJECTION, SOLUTION INTRAVENOUS; SUBCUTANEOUS at 08:45

## 2019-04-04 RX ADMIN — ASPIRIN 325 MG: 325 TABLET, COATED ORAL at 08:44

## 2019-04-04 RX ADMIN — SPIRONOLACTONE 25 MG: 25 TABLET ORAL at 15:58

## 2019-04-04 RX ADMIN — CLONIDINE HYDROCHLORIDE 0.2 MG: 0.2 TABLET ORAL at 08:44

## 2019-04-04 RX ADMIN — INSULIN LISPRO 7 UNITS: 100 INJECTION, SOLUTION INTRAVENOUS; SUBCUTANEOUS at 12:35

## 2019-04-04 RX ADMIN — HYDRALAZINE HYDROCHLORIDE 25 MG: 25 TABLET ORAL at 05:32

## 2019-04-04 RX ADMIN — INSULIN DETEMIR 6 UNITS: 100 INJECTION, SOLUTION SUBCUTANEOUS at 08:44

## 2019-04-04 RX ADMIN — ACETAMINOPHEN 500 MG: 325 TABLET, FILM COATED ORAL at 22:02

## 2019-04-04 RX ADMIN — ACETAMINOPHEN 500 MG: 325 TABLET, FILM COATED ORAL at 09:49

## 2019-04-04 RX ADMIN — INSULIN LISPRO 2 UNITS: 100 INJECTION, SOLUTION INTRAVENOUS; SUBCUTANEOUS at 12:34

## 2019-04-04 RX ADMIN — INSULIN LISPRO 4 UNITS: 100 INJECTION, SOLUTION INTRAVENOUS; SUBCUTANEOUS at 08:45

## 2019-04-04 RX ADMIN — INSULIN LISPRO 2 UNITS: 100 INJECTION, SOLUTION INTRAVENOUS; SUBCUTANEOUS at 22:03

## 2019-04-04 RX ADMIN — POLYETHYLENE GLYCOL (3350) 17 G: 17 POWDER, FOR SOLUTION ORAL at 08:46

## 2019-04-04 RX ADMIN — CITALOPRAM HYDROBROMIDE 20 MG: 20 TABLET ORAL at 08:44

## 2019-04-04 RX ADMIN — DONEPEZIL HYDROCHLORIDE 5 MG: 5 TABLET ORAL at 22:03

## 2019-04-04 RX ADMIN — ASPIRIN 325 MG: 325 TABLET, COATED ORAL at 22:03

## 2019-04-04 RX ADMIN — ISOSORBIDE MONONITRATE 60 MG: 60 TABLET, EXTENDED RELEASE ORAL at 08:44

## 2019-04-04 RX ADMIN — AMIODARONE HYDROCHLORIDE 200 MG: 200 TABLET ORAL at 08:44

## 2019-04-04 RX ADMIN — FLUTICASONE PROPIONATE 2 SPRAY: 50 SPRAY, METERED NASAL at 08:46

## 2019-04-04 NOTE — PLAN OF CARE
Problem: Patient Care Overview  Goal: Plan of Care Review  Outcome: Ongoing (interventions implemented as appropriate)   04/04/19 1030   Coping/Psychosocial   Plan of Care Reviewed With patient   OTHER   Outcome Summary Pt presents with B heel DTPIs and will benefit from mist therapy to help improve periwound circulation and increase healing potential to limit progression of DTPI

## 2019-04-04 NOTE — THERAPY TREATMENT NOTE
Acute Care - Physical Therapy Treatment Note  [facility]     Patient Name: Mary Ramirez  : 1934  MRN: [id number]  Today's Date: 2019  Onset of Illness/Injury or Date of Surgery: 19  Date of Referral to PT: 19  Referring Physician: Toño    Admit Date: 3/29/2019    Visit Dx:    ICD-10-CM ICD-9-CM   1. Closed fracture of right hip, initial encounter (CMS/HCC) S72.001A 820.8   2. Hypertension, unspecified type I10 401.9   3. Closed displaced intertrochanteric fracture of right femur, initial encounter (CMS/HCC) S72.141A 820.21   4. Impaired mobility and ADLs Z74.09 799.89     Patient Active Problem List   Diagnosis   • Fall   • Fracture, intertrochanteric, left femur (CMS/[facility])   • Essential hypertension   • Coronary artery disease involving native coronary artery of native heart with angina pectoris (CMS/[facility])   • Dementia   • Paroxysmal atrial fibrillation (CMS/[facility])   • Anticoagulated   • SSS (sick sinus syndrome) (CMS/[facility])   • Chronic diastolic congestive heart failure (CMS/HCC)   • DEANGELO (acute kidney injury) (CMS/HCC)   • Benign essential hypertension   • CKD (chronic kidney disease), stage III (CMS/[facility])   • Mixed hyperlipidemia   • Major depressive disorder, single episode, mild (CMS/[facility])   • Chronic allergic rhinitis   • Memory loss   • Bradycardia   • At risk for falls   • Closed fracture of femur (CMS/[facility])   • Coronary arteriosclerosis after percutaneous transluminal coronary angioplasty (PTCA)   • Diabetic polyneuropathy (CMS/[facility])   • Diverticular disease of colon   • Foot pain   • GERD (gastroesophageal reflux disease)   • Peripheral artery insufficiency (CMS/[facility])   • Annual physical exam   • Vitamin D deficiency   • Renal disorder   • Closed fracture of right hip (CMS/[facility])       Therapy Treatment    Rehabilitation Treatment Summary     Row Name 19 0838             Treatment Time/Intention    Discipline  physical therapist  -      Document Type  therapy note (daily note)   -LR      Subjective Information  no complaints  -LR      Mode of Treatment  physical therapy;individual therapy  -LR      Patient/Family Observations  Patient supine in bed upon arrival. SCDs, waffle boots, exti alarm.   -LR      Care Plan Review  care plan/treatment goals reviewed;risks/benefits reviewed;current/potential barriers reviewed;patient/other agree to care plan  -LR      Patient Effort  good  -LR      Existing Precautions/Restrictions  fall;other (see comments) confusion, exit alarms,   -LR      Recorded by [LR] Alta Ochoa, PT 04/04/19 0955      Row Name 04/04/19 0838             Cognitive Assessment/Intervention- PT/OT    Affect/Mental Status (Cognitive)  confused  -LR      Orientation Status (Cognition)  oriented to;person;disoriented to;place;situation  -LR      Follows Commands (Cognition)  follows one step commands;75-90% accuracy;verbal cues/prompting required;repetition of directions required;physical/tactile prompts required  -LR      Safety Deficit (Cognitive)  moderate deficit;ability to follow commands;at risk behavior observed;awareness of need for assistance;insight into deficits/self awareness;judgment;problem solving;safety precautions awareness;safety precautions follow-through/compliance  -LR      Recorded by [LR] Alta Ochoa, PT 04/04/19 0955      Row Name 04/04/19 0838             Safety Issues, Functional Mobility    Safety Issues Affecting Function (Mobility)  safety precaution awareness;safety precautions follow-through/compliance;sequencing abilities;positioning of assistive device;problem solving;judgment;insight into deficits/self awareness;awareness of need for assistance;at risk behavior observed;ability to follow commands  -LR      Recorded by [LR] Alta Ochoa, PT 04/04/19 0955      Row Name 04/04/19 0838             Mobility Assessment/Intervention    Extremity Weight-bearing Status  right lower extremity  -LR      Right Lower Extremity  (Weight-bearing Status)  weight-bearing as tolerated (WBAT)  -LR      Recorded by [LR] Alta Ochoa, PT 04/04/19 0955      Row Name 04/04/19 0838             Bed Mobility Assessment/Treatment    Supine-Sit [place] (Bed Mobility)  verbal cues;moderate assist (50% patient effort);2 person assist  -LR      Sit-Supine [place] (Bed Mobility)  not tested [facility] at end of treatment.   -LR      Bed Mobility, Safety Issues  decreased use of legs for bridging/pushing  -LR      Assistive Device (Bed Mobility)  head of bed elevated;bed rails;draw sheet  -LR      Comment (Bed Mobility)  Verbal cues to move LEs towards EOB and to push up from bed to raise trunk into sitting and to scoot hips out to get feet on floor. Required increased time to perform. Assist of draw sheet to scoot hips out to get feet on floor. Denied dizziness upon sitting up.   -LR      Recorded by [LR] Alta Ochoa, PT 04/04/19 0955      Row Name 04/04/19 0838             Transfer Assessment/Treatment    Transfer Assessment/Treatment  sit-stand transfer;stand-sit transfer;toilet transfer  -LR      Comment (Transfers)  Verbal cues to push up from bed to stand and to reach back for chair to lower into sitting. Verbal cues to step R LE out before t/f for comfort. Assisted patient to and from bathroom. Verbal cues to use grab bars for UE support. Dependent with toileting hygiene.   -LR      Recorded by [LR] Alta Ochoa, PT 04/04/19 0955      Row Name 04/04/19 0838             Sit-Stand Transfer    Sit-Stand [place] (Transfers)  verbal cues;moderate assist (50% patient effort);2 person assist  -LR      Assistive Device (Sit-Stand Transfers)  walker, front-wheeled  -LR      Recorded by [LR] Alta Ochoa, PT 04/04/19 0955      Row Name 04/04/19 0838             Stand-Sit Transfer    Stand-Sit [place] (Transfers)  verbal cues;moderate assist (50% patient effort);2 person assist  -LR      Assistive Device  (Stand-Sit Transfers)  walker, front-wheeled  -LR      Recorded by [LR] Alta Ochoa, PT 04/04/19 0955      Row Name 04/04/19 0838             Toilet Transfer    Type (Toilet Transfer)  sit-stand;stand-sit  -LR      [place] Level (Toilet Transfer)  verbal cues;moderate assist (50% patient effort);2 person assist  -LR      Assistive Device (Toilet Transfer)  walker, front-wheeled;grab bars/safety frame;raised toilet seat  -LR      Recorded by [LR] Alta Ochoa, PT 04/04/19 0955      Row Name 04/04/19 0838             Gait/Stairs Assessment/Training    62767 - Gait Training Minutes   10  -LR      [place] Level (Gait)  verbal cues;minimum assist (75% patient effort);2 person assist  -LR      Assistive Device (Gait)  walker, front-wheeled  -LR      Distance in Feet (Gait)  15  -LR      Pattern (Gait)  step-to  -LR      Deviations/Abnormal Patterns (Gait)  right sided deviations;antalgic;bilateral deviations;trey decreased;gait speed decreased;stride length decreased  -LR      Bilateral Gait Deviations  forward flexed posture;heel strike decreased  -LR      Right Sided Gait Deviations  weight shift ability decreased  -LR      Comment (Gait/Stairs)  Patient ambulated with step to gait pattern at slow pace. Verbal cues for correct sequencing of steps, upright posture, to shift hips forward and shoulders back, and for increased R LE weight bearing, decreased UE weight bearing. Slight improvement with cues for correction. Required a few brief standing rest breaks. Gait limited by pain and fatigue.   -LR      Recorded by [LR] Alta Ochoa, PT 04/04/19 0955      Row Name 04/04/19 0838             Therapeutic Exercise    09398 - PT Therapeutic Exercise Minutes  10  -LR      00680 - PT Therapeutic Activity Minutes  8  -LR      Recorded by [LR] Alta Ochoa, PT 04/04/19 0955      Row Name 04/04/19 0838             Therapeutic Exercise    Lower Extremity (Therapeutic Exercise)   heel slides, right;LAQ (long arc quad), right;SAQ (short arc quad), right;SLR (straight leg raise), right  -LR      Lower Extremity Range of Motion (Therapeutic Exercise)  hip abduction/adduction, right;ankle dorsiflexion/plantar flexion, right  -LR      Exercise Type (Therapeutic Exercise)  AROM (active range of motion);AAROM (active assistive range of motion);isometric contraction, static;isotonic contraction, concentric  -LR      Position (Therapeutic Exercise)  seated  -LR      Sets/Reps (Therapeutic Exercise)  x10 reps each  -LR      Comment (Therapeutic Exercise)  cues for technique; min assist SLR, heel slides, hip abd, SAQ; patient could not comprehend quad sets and resistive to movement of R LE d/t pain  -LR      Recorded by [LR] Alta Ochoa, PT 04/04/19 0955      Row Name 04/04/19 0838             Positioning and Restraints    Pre-Treatment Position  in bed  -LR      Post Treatment Position  chair  -LR      In Chair  notified nsg;reclined;sitting;call light within reach;encouraged to call for assist;exit alarm on;compression device;on mechanical lift sling;waffle cushion;legs elevated;waffle boot/both  -LR      Recorded by [LR] Alta Ochoa, PT 04/04/19 0955      Row Name 04/04/19 0838             Pain Assessment    Additional Documentation  Pain Scale: Numbers Pre/Post-Treatment (Group)  -LR      Recorded by [LR] Alta Ochoa, PT 04/04/19 0955      Row Name 04/04/19 0838             Pain Scale: Numbers Pre/Post-Treatment    Pain Scale: Numbers, Pretreatment  0/10 - no pain  -LR      Pain Scale: Numbers, Post-Treatment  8/10  -LR      Pain Location - Side  Right  -LR      Pain Location  hip  -LR      Pain Intervention(s)  Repositioned;Ambulation/increased activity  -LR      Recorded by [LR] Alta Ochoa, PT 04/04/19 0955      Row Name                Wound 03/30/19 0907 Right hip incision    Wound - Properties Group Date first assessed: 03/30/19 [JS] Time first  assessed: 0907 [JS] Side: Right [JS] Location: hip [JS] Type: incision [JS] Recorded by:  [JS] Lilia Xiao RN 03/30/19 0907    Row Name                Wound 03/31/19 1500 Left medial heel other (see comments)    Wound - Properties Group Date first assessed: 03/31/19 [CP] Time first assessed: 1500 [CP] Present On Admission : no;picture taken [CP] Side: Left [CP] Orientation: medial [CP] Location: heel [CP] Type: other (see comments) [CP] Additional Comments: bruise vs DTPI [CP] Recorded by:  [CP] Abby Kirby, APRN 03/31/19 1535    Row Name                Wound 03/31/19 1500 Right lateral heel other (see comments)    Wound - Properties Group Date first assessed: 03/31/19 [CP] Time first assessed: 1500 [CP] Present On Admission : no;picture taken [CP] Side: Right [CP] Orientation: lateral [CP] Location: heel [CP] Type: other (see comments) [CP] Additional Comments: bruise vs DTPI [CP] Recorded by:  [CP] Abby Kirby APRZEESHAN 03/31/19 1537    Row Name 04/04/19 0838             Coping    Observed Emotional State  accepting;cooperative  -LR      Verbalized Emotional State  acceptance  -LR      Recorded by [LR] Alta Ochoa, PT 04/04/19 0955      Row Name 04/04/19 0838             Plan of Care Review    Plan of Care Reviewed With  patient  -LR      Recorded by [LR] Alat Ochoa, PT 04/04/19 0955      Row Name 04/04/19 0838             Outcome Summary/Treatment Plan (PT)    Daily Summary of Progress (PT)  progress towards functional goals is fair  -LR      Recorded by [LR] Alta Ochoa, PT 04/04/19 0955        User Key  (r) = Recorded By, (t) = Taken By, (c) = Cosigned By    Initials Name Effective Dates Discipline    CP Abby Kirby, APRN 02/05/19 -  Nurse    LR Alta Ochoa, PT 06/19/15 -  PT    Lilia Pagan RN 01/23/17 -  Nurse          Wound 03/30/19 0907 Right hip incision (Active)   Dressing Appearance dry;intact;no drainage 4/4/2019   6:00 AM   Drainage Amount none 4/4/2019  6:00 AM       Wound 03/31/19 1500 Left medial heel other (see comments) (Active)   Dressing Appearance dry;intact;no drainage 4/4/2019  6:00 AM   Base maroon/purple 4/4/2019  6:00 AM   Periwound intact;dry;pink;blanchable 4/4/2019  6:00 AM   Dressing Care, Wound low-adherent;dressing changed 4/4/2019  4:00 AM       Wound 03/31/19 1500 Right lateral heel other (see comments) (Active)   Dressing Appearance dry;intact;no drainage 4/4/2019  6:00 AM   Base purple 4/4/2019  6:00 AM   Drainage Amount none 4/4/2019  6:00 AM   Dressing Care, Wound dressing changed;low-adherent 4/4/2019  4:00 AM           Physical Therapy Education     Title: PT OT SLP Therapies (In Progress)     Topic: Physical Therapy (In Progress)     Point: Mobility training (In Progress)     Learning Progress Summary           Patient Acceptance, E,D, VU,NR by LR at 4/4/2019  8:38 AM    Comment:  Educated on precautions, weight bearing status, correct supine to sit t/f technique, correct gait mechanics, correct sit<->stand t/f technique, HEP, and progression of POC.    Acceptance, E,D, NR by MJ at 4/3/2019  1:24 PM    Comment:  Reviewed HEP, gait mechanics, benefits of mobility    Acceptance, E,D, NR by MJ at 4/3/2019  9:45 AM    Comment:  Reviewed indications to discontinue knee immobilizer, HEP, gait mechanics, benefits of mobility    Acceptance, D,E, NR by LR at 4/1/2019  9:58 AM    Comment:  Educated on precautions, weight bearing status, correct supine to sit t/f technique, correct sit<->stand t/f technique, correct gait mechanics, HEP, indications for use of knee immobilizer, and progression of POC.    Acceptance, E,TB,D, NR by CT at 3/31/2019 11:40 AM   Family Acceptance, D,E, NR by LR at 4/1/2019  9:58 AM    Comment:  Educated on precautions, weight bearing status, correct supine to sit t/f technique, correct sit<->stand t/f technique, correct gait mechanics, HEP, indications for use of knee immobilizer,  and progression of POC.   Significant Other Acceptance, E,TB,D, NR by CT at 3/31/2019 11:40 AM                   Point: Home exercise program (In Progress)     Learning Progress Summary           Patient Acceptance, E,D, VU,NR by LR at 4/4/2019  8:38 AM    Comment:  Educated on precautions, weight bearing status, correct supine to sit t/f technique, correct gait mechanics, correct sit<->stand t/f technique, HEP, and progression of POC.    Acceptance, E,D, NR by MJ at 4/3/2019  1:24 PM    Comment:  Reviewed HEP, gait mechanics, benefits of mobility    Acceptance, E,D, NR by MJ at 4/3/2019  9:45 AM    Comment:  Reviewed indications to discontinue knee immobilizer, HEP, gait mechanics, benefits of mobility    Acceptance, D,E, NR by LR at 4/1/2019  9:58 AM    Comment:  Educated on precautions, weight bearing status, correct supine to sit t/f technique, correct sit<->stand t/f technique, correct gait mechanics, HEP, indications for use of knee immobilizer, and progression of POC.    Acceptance, E,TB,D, NR by CT at 3/31/2019 11:40 AM   Family Acceptance, D,E, NR by LR at 4/1/2019  9:58 AM    Comment:  Educated on precautions, weight bearing status, correct supine to sit t/f technique, correct sit<->stand t/f technique, correct gait mechanics, HEP, indications for use of knee immobilizer, and progression of POC.   Significant Other Acceptance, E,TB,D, NR by CT at 3/31/2019 11:40 AM                   Point: Body mechanics (In Progress)     Learning Progress Summary           Patient Acceptance, E,D, VU,NR by LR at 4/4/2019  8:38 AM    Comment:  Educated on precautions, weight bearing status, correct supine to sit t/f technique, correct gait mechanics, correct sit<->stand t/f technique, HEP, and progression of POC.    Acceptance, E,D, NR by MJ at 4/3/2019  1:24 PM    Comment:  Reviewed HEP, gait mechanics, benefits of mobility    Acceptance, E,D, NR by MJ at 4/3/2019  9:45 AM    Comment:  Reviewed indications to discontinue  knee immobilizer, HEP, gait mechanics, benefits of mobility    Acceptance, D,E, NR by LR at 4/1/2019  9:58 AM    Comment:  Educated on precautions, weight bearing status, correct supine to sit t/f technique, correct sit<->stand t/f technique, correct gait mechanics, HEP, indications for use of knee immobilizer, and progression of POC.    Acceptance, E,TB,D, NR by CT at 3/31/2019 11:40 AM   Family Acceptance, D,E, NR by LR at 4/1/2019  9:58 AM    Comment:  Educated on precautions, weight bearing status, correct supine to sit t/f technique, correct sit<->stand t/f technique, correct gait mechanics, HEP, indications for use of knee immobilizer, and progression of POC.   Significant Other Acceptance, E,TB,D, NR by CT at 3/31/2019 11:40 AM                   Point: Precautions (In Progress)     Learning Progress Summary           Patient Acceptance, E,D, VU,NR by LR at 4/4/2019  8:38 AM    Comment:  Educated on precautions, weight bearing status, correct supine to sit t/f technique, correct gait mechanics, correct sit<->stand t/f technique, HEP, and progression of POC.    Acceptance, E,D, NR by MJ at 4/3/2019  1:24 PM    Comment:  Reviewed HEP, gait mechanics, benefits of mobility    Acceptance, E,D, NR by MJ at 4/3/2019  9:45 AM    Comment:  Reviewed indications to discontinue knee immobilizer, HEP, gait mechanics, benefits of mobility    Acceptance, D,E, NR by LR at 4/1/2019  9:58 AM    Comment:  Educated on precautions, weight bearing status, correct supine to sit t/f technique, correct sit<->stand t/f technique, correct gait mechanics, HEP, indications for use of knee immobilizer, and progression of POC.    Acceptance, E,TB,D, NR by CT at 3/31/2019 11:40 AM   Family Acceptance, D,E, NR by LR at 4/1/2019  9:58 AM    Comment:  Educated on precautions, weight bearing status, correct supine to sit t/f technique, correct sit<->stand t/f technique, correct gait mechanics, HEP, indications for use of knee immobilizer, and  progression of POC.   Significant Other Acceptance, E,TB,D, NR by CT at 3/31/2019 11:40 AM                               User Key     Initials Effective Dates Name Provider Type Discipline    LR 06/19/15 -  Alta Ochoa, PT Physical Therapist PT    CT 06/19/15 -  Woo Bermudez, PT Physical Therapist PT    MJ 04/03/18 -  Jerry Echavarria, PT Physical Therapist PT                PT Recommendation and Plan     Outcome Summary/Treatment Plan (PT)  Daily Summary of Progress (PT): progress towards functional goals is fair  Plan of Care Reviewed With: patient  Progress: improving  Outcome Summary: Patient ambulated 15 feet with RW and min assist x2, limited by pain. Patient c/o increased pain in both LEs with mobility and movement today. Continues to require mod assist x2 for sit<->stand t/f and to t/f OOB. Will continue to progress mobility training and strengthening as able.   Outcome Measures     Row Name 04/04/19 0838 04/03/19 1324 04/03/19 0945       How much help from another person do you currently need...    Turning from your back to your side while in flat bed without using bedrails?  2  -LR  2  -MJ  2  -MJ    Moving from lying on back to sitting on the side of a flat bed without bedrails?  2  -LR  2  -MJ  2  -MJ    Moving to and from a bed to a chair (including a wheelchair)?  2  -LR  2  -MJ  2  -MJ    Standing up from a chair using your arms (e.g., wheelchair, bedside chair)?  2  -LR  2  -MJ  2  -MJ    Climbing 3-5 steps with a railing?  1  -LR  1  -MJ  1  -MJ    To walk in hospital room?  2  -LR  2  -MJ  2  -MJ    AM-PAC 6 Clicks Score  11  -LR  11  -MJ  11  -MJ       Functional Assessment    Outcome Measure Options  AM-PAC 6 Clicks Basic Mobility (PT)  -LR  AM-PAC 6 Clicks Basic Mobility (PT)  -MJ  AM-PAC 6 Clicks Basic Mobility (PT)  -MJ    Row Name 04/01/19 0959             How much help from another is currently needed...    Putting on and taking off regular lower body clothing?  1  -ST       Bathing (including washing, rinsing, and drying)  2  -ST      Toileting (which includes using toilet bed pan or urinal)  1  -ST      Putting on and taking off regular upper body clothing  2  -ST      Taking care of personal grooming (such as brushing teeth)  2  -ST      Eating meals  2  -ST      Score  10  -ST        User Key  (r) = Recorded By, (t) = Taken By, (c) = Cosigned By    Initials Name Provider Type    ST Trini Leung, OTR Occupational Therapist    LR Alta Ochoa, PT Physical Therapist    Jerry Mcmahon, PT Physical Therapist         Time Calculation:   PT Charges     Row Name 04/04/19 0838             Time Calculation    Start Time  0838  -LR      PT Received On  04/04/19  -LR      PT Goal Re-Cert Due Date  04/10/19  -LR         Time Calculation- PT    Total Timed Code Minutes- PT  28 minute(s)  -LR         Timed Charges    51532 - PT Therapeutic Exercise Minutes  10  -LR      02642 - Gait Training Minutes   10  -LR      71982 - PT Therapeutic Activity Minutes  8  -LR        User Key  (r) = Recorded By, (t) = Taken By, (c) = Cosigned By    Initials Name Provider Type    Alta Mcdonnell, PT Physical Therapist        Therapy Charges for Today     Code Description Service Date Service Provider Modifiers Qty    51459109096 HC PT THER PROC EA 15 MIN 4/4/2019 Alta Ochoa, PT GP 1    93895708649 HC GAIT TRAINING EA 15 MIN 4/4/2019 Alta Ochoa, PT GP 1    14887613834 HC PT THER SUPP EA 15 MIN 4/4/2019 Alta Ochoa, PT GP 2          PT G-Codes  Outcome Measure Options: AM-PAC 6 Clicks Basic Mobility (PT)  AM-PAC 6 Clicks Score: 11  Score: 10    Alta Ochoa, PT  4/4/2019

## 2019-04-04 NOTE — THERAPY TREATMENT NOTE
Acute Care - Wound/Debridement Treatment Note  [facility]     Patient Name: Mary Ramirez  : 1934  MRN: [id number]  Today's Date: 2019  Onset of Illness/Injury or Date of Surgery: 19   Date of Referral to PT: 19   Referring Physician: hospitalist       Admit Date: 3/29/2019    Visit Dx:    ICD-10-CM ICD-9-CM   1. Closed fracture of right hip, initial encounter (CMS/HCC) S72.001A 820.8   2. Hypertension, unspecified type I10 401.9   3. Closed displaced intertrochanteric fracture of right femur, initial encounter (CMS/HCC) S72.141A 820.21   4. Impaired mobility and ADLs Z74.09 799.89       Patient Active Problem List   Diagnosis   • Fall   • Fracture, intertrochanteric, left femur (CMS/[facility])   • Essential hypertension   • Coronary artery disease involving native coronary artery of native heart with angina pectoris (CMS/HCC)   • Dementia   • Paroxysmal atrial fibrillation (CMS/HCC)   • Anticoagulated   • SSS (sick sinus syndrome) (CMS/HCC)   • Chronic diastolic congestive heart failure (CMS/HCC)   • DEANGELO (acute kidney injury) (CMS/HCC)   • Benign essential hypertension   • CKD (chronic kidney disease), stage III (CMS/HCC)   • Mixed hyperlipidemia   • Major depressive disorder, single episode, mild (CMS/HCC)   • Chronic allergic rhinitis   • Memory loss   • Bradycardia   • At risk for falls   • Closed fracture of femur (CMS/[facility])   • Coronary arteriosclerosis after percutaneous transluminal coronary angioplasty (PTCA)   • Diabetic polyneuropathy (CMS/HCC)   • Diverticular disease of colon   • Foot pain   • GERD (gastroesophageal reflux disease)   • Peripheral artery insufficiency (CMS/[facility])   • Annual physical exam   • Vitamin D deficiency   • Renal disorder   • Closed fracture of right hip (CMS/[facility])           Wound 19 0907 Right hip incision (Active)   Dressing Appearance dry;intact;no drainage 2019  8:00 AM   Drainage Amount none 2019  6:00 AM       Wound 19 1500 Left medial  heel other (see comments) (Active)   Dressing Appearance dry;intact;no drainage 4/4/2019 10:30 AM   Base maroon/purple 4/4/2019 10:30 AM   Periwound intact;dry;pink;blanchable 4/4/2019 10:30 AM   Edges irregular 4/4/2019 10:30 AM   Wound Length (cm) 2.2 cm 4/4/2019 10:30 AM   Wound Width (cm) 1.5 cm 4/4/2019 10:30 AM   Wound Depth (cm) 0 cm 4/4/2019 10:30 AM   Drainage Amount none 4/4/2019 10:30 AM   Care, Wound irrigated with;sterile normal saline;ultrasound therapy, non contact low frequency 4/4/2019 10:30 AM   Dressing Care, Wound foam;low-adherent 4/4/2019 10:30 AM       Wound 03/31/19 1500 Right lateral heel other (see comments) (Active)   Dressing Appearance dry;intact;no drainage 4/4/2019 10:30 AM   Base maroon/purple 4/4/2019 10:30 AM   Periwound intact;dry;pink;blanchable 4/4/2019 10:30 AM   Edges irregular 4/4/2019 10:30 AM   Wound Length (cm) 1 cm 4/4/2019 10:30 AM   Wound Width (cm) 1 cm 4/4/2019 10:30 AM   Wound Depth (cm) 0 cm 4/4/2019 10:30 AM   Drainage Amount none 4/4/2019 10:30 AM   Care, Wound irrigated with;sterile normal saline;ultrasound therapy, non contact low frequency 4/4/2019 10:30 AM   Dressing Care, Wound foam;low-adherent 4/4/2019 10:30 AM         WOUND DEBRIDEMENT                  Therapy Treatment    Rehabilitation Treatment Summary     Row Name 04/04/19 0838             Treatment Time/Intention    Discipline  physical therapist  -LR      Document Type  therapy note (daily note)  -LR      Subjective Information  no complaints  -LR      Mode of Treatment  physical therapy;individual therapy  -LR      Patient/Family Observations  Patient supine in bed upon arrival. SCDs, waffle boots, exti alarm.   -LR      Care Plan Review  care plan/treatment goals reviewed;risks/benefits reviewed;current/potential barriers reviewed;patient/other agree to care plan  -LR      Patient Effort  good  -LR      Existing Precautions/Restrictions  fall;other (see comments) confusion, exit alarms,   -LR       Recorded by [LR] Alta Ochoa, PT 04/04/19 0955      Row Name 04/04/19 0838             Cognitive Assessment/Intervention- PT/OT    Affect/Mental Status (Cognitive)  confused  -LR      Orientation Status (Cognition)  oriented to;person;disoriented to;place;situation  -LR      Follows Commands (Cognition)  follows one step commands;75-90% accuracy;verbal cues/prompting required;repetition of directions required;physical/tactile prompts required  -LR      Safety Deficit (Cognitive)  moderate deficit;ability to follow commands;at risk behavior observed;awareness of need for assistance;insight into deficits/self awareness;judgment;problem solving;safety precautions awareness;safety precautions follow-through/compliance  -LR      Recorded by [LR] Alta Ochoa, PT 04/04/19 0955      Row Name 04/04/19 0838             Safety Issues, Functional Mobility    Safety Issues Affecting Function (Mobility)  safety precaution awareness;safety precautions follow-through/compliance;sequencing abilities;positioning of assistive device;problem solving;judgment;insight into deficits/self awareness;awareness of need for assistance;at risk behavior observed;ability to follow commands  -LR      Recorded by [LR] Alta Ochoa, PT 04/04/19 0955      Row Name 04/04/19 0838             Mobility Assessment/Intervention    Extremity Weight-bearing Status  right lower extremity  -LR      Right Lower Extremity (Weight-bearing Status)  weight-bearing as tolerated (WBAT)  -LR      Recorded by [LR] Alta Ochoa, PT 04/04/19 0955      Row Name 04/04/19 0838             Bed Mobility Assessment/Treatment    Supine-Sit [place] (Bed Mobility)  verbal cues;moderate assist (50% patient effort);2 person assist  -LR      Sit-Supine [place] (Bed Mobility)  not tested [facility] at end of treatment.   -LR      Bed Mobility, Safety Issues  decreased use of legs for bridging/pushing  -LR      Assistive Device (Bed  Mobility)  head of bed elevated;bed rails;draw sheet  -LR      Comment (Bed Mobility)  Verbal cues to move LEs towards EOB and to push up from bed to raise trunk into sitting and to scoot hips out to get feet on floor. Required increased time to perform. Assist of draw sheet to scoot hips out to get feet on floor. Denied dizziness upon sitting up.   -LR      Recorded by [LR] Alta Ochoa, PT 04/04/19 0955      Row Name 04/04/19 0838             Transfer Assessment/Treatment    Transfer Assessment/Treatment  sit-stand transfer;stand-sit transfer;toilet transfer  -LR      Comment (Transfers)  Verbal cues to push up from bed to stand and to reach back for chair to lower into sitting. Verbal cues to step R LE out before t/f for comfort. Assisted patient to and from bathroom. Verbal cues to use grab bars for UE support. Dependent with toileting hygiene.   -LR      Recorded by [LR] Alta Ochoa, PT 04/04/19 0955      Row Name 04/04/19 0838             Sit-Stand Transfer    Sit-Stand [place] (Transfers)  verbal cues;moderate assist (50% patient effort);2 person assist  -LR      Assistive Device (Sit-Stand Transfers)  walker, front-wheeled  -LR      Recorded by [LR] Alta Ochoa, PT 04/04/19 0955      Row Name 04/04/19 0838             Stand-Sit Transfer    Stand-Sit [place] (Transfers)  verbal cues;moderate assist (50% patient effort);2 person assist  -LR      Assistive Device (Stand-Sit Transfers)  walker, front-wheeled  -LR      Recorded by [LR] Alta Ochoa, PT 04/04/19 0955      Row Name 04/04/19 0838             Toilet Transfer    Type (Toilet Transfer)  sit-stand;stand-sit  -LR      [place] Level (Toilet Transfer)  verbal cues;moderate assist (50% patient effort);2 person assist  -LR      Assistive Device (Toilet Transfer)  walker, front-wheeled;grab bars/safety frame;raised toilet seat  -LR      Recorded by [LR] Alta Ochoa, PT 04/04/19 0955      Row  Name 04/04/19 0838             Gait/Stairs Assessment/Training    29442 - Gait Training Minutes   10  -LR      [place] Level (Gait)  verbal cues;minimum assist (75% patient effort);2 person assist  -LR      Assistive Device (Gait)  walker, front-wheeled  -LR      Distance in Feet (Gait)  15  -LR      Pattern (Gait)  step-to  -LR      Deviations/Abnormal Patterns (Gait)  right sided deviations;antalgic;bilateral deviations;trey decreased;gait speed decreased;stride length decreased  -LR      Bilateral Gait Deviations  forward flexed posture;heel strike decreased  -LR      Right Sided Gait Deviations  weight shift ability decreased  -LR      Comment (Gait/Stairs)  Patient ambulated with step to gait pattern at slow pace. Verbal cues for correct sequencing of steps, upright posture, to shift hips forward and shoulders back, and for increased R LE weight bearing, decreased UE weight bearing. Slight improvement with cues for correction. Required a few brief standing rest breaks. Gait limited by pain and fatigue.   -LR      Recorded by [LR] Alta Ochoa, PT 04/04/19 0955      Row Name 04/04/19 0838             Therapeutic Exercise    82237 - PT Therapeutic Exercise Minutes  10  -LR      97951 - PT Therapeutic Activity Minutes  8  -LR      Recorded by [LR] Alta Ochoa, PT 04/04/19 0955      Row Name 04/04/19 0838             Therapeutic Exercise    Lower Extremity (Therapeutic Exercise)  heel slides, right;LAQ (long arc quad), right;SAQ (short arc quad), right;SLR (straight leg raise), right  -LR      Lower Extremity Range of Motion (Therapeutic Exercise)  hip abduction/adduction, right;ankle dorsiflexion/plantar flexion, right  -LR      Exercise Type (Therapeutic Exercise)  AROM (active range of motion);AAROM (active assistive range of motion);isometric contraction, static;isotonic contraction, concentric  -LR      Position (Therapeutic Exercise)  seated  -LR      Sets/Reps (Therapeutic  Exercise)  x10 reps each  -LR      Comment (Therapeutic Exercise)  cues for technique; min assist SLR, heel slides, hip abd, SAQ; patient could not comprehend quad sets and resistive to movement of R LE d/t pain  -LR      Recorded by [LR] Alta Ochoa, PT 04/04/19 0955      Row Name 04/04/19 0838             Positioning and Restraints    Pre-Treatment Position  in bed  -LR      Post Treatment Position  chair  -LR      In Chair  notified nsg;reclined;sitting;call light within reach;encouraged to call for assist;exit alarm on;compression device;on mechanical lift sling;waffle cushion;legs elevated;waffle boot/both  -LR      Recorded by [LR] Alta Ochoa, PT 04/04/19 0955      Row Name 04/04/19 0838             Pain Assessment    Additional Documentation  Pain Scale: Numbers Pre/Post-Treatment (Group)  -LR      Recorded by [LR] Alta Ochoa, PT 04/04/19 0955      Row Name 04/04/19 0838             Pain Scale: Numbers Pre/Post-Treatment    Pain Scale: Numbers, Pretreatment  0/10 - no pain  -LR      Pain Scale: Numbers, Post-Treatment  8/10  -LR      Pain Location - Side  Right  -LR      Pain Location  hip  -LR      Pain Intervention(s)  Repositioned;Ambulation/increased activity  -LR      Recorded by [LR] Alta Ochoa, PT 04/04/19 0955      Row Name                Wound 03/30/19 0907 Right hip incision    Wound - Properties Group Date first assessed: 03/30/19 [JS] Time first assessed: 0907 [JS] Side: Right [JS] Location: hip [JS] Type: incision [JS] Recorded by:  [JS] Lilia Xiao RN 03/30/19 0907    Row Name                Wound 03/31/19 1500 Left medial heel other (see comments)    Wound - Properties Group Date first assessed: 03/31/19 [CP] Time first assessed: 1500 [CP] Present On Admission : no;picture taken [CP] Side: Left [CP] Orientation: medial [CP] Location: heel [CP] Type: other (see comments) [CP] Additional Comments: bruise vs DTPI [CP] Recorded by:  [CP]  Abby Kirby, APRN 03/31/19 1535    Row Name                Wound 03/31/19 1500 Right lateral heel other (see comments)    Wound - Properties Group Date first assessed: 03/31/19 [CP] Time first assessed: 1500 [CP] Present On Admission : no;picture taken [CP] Side: Right [CP] Orientation: lateral [CP] Location: heel [CP] Type: other (see comments) [CP] Additional Comments: bruise vs DTPI [CP] Recorded by:  [CP] Abby Kirby, APRN 03/31/19 1537    Row Name 04/04/19 0838             Coping    Observed Emotional State  accepting;cooperative  -LR      Verbalized Emotional State  acceptance  -LR      Recorded by [LR] Alta Ochoa, PT 04/04/19 0955      Row Name 04/04/19 0838             Plan of Care Review    Plan of Care Reviewed With  patient  -LR      Recorded by [LR] Alta Ochoa, PT 04/04/19 0955      Row Name 04/04/19 0838             Outcome Summary/Treatment Plan (PT)    Daily Summary of Progress (PT)  progress towards functional goals is fair  -LR      Recorded by [LR] Alta Ochoa, PT 04/04/19 0955        User Key  (r) = Recorded By, (t) = Taken By, (c) = Cosigned By    Initials Name Effective Dates Discipline    CP Abby Kirby, APRN 02/05/19 -  Nurse    LR Alta Ochoa, PT 06/19/15 -  PT    Lilia Pagan RN 01/23/17 -  Nurse        Rehab Goal Summary     Row Name 04/04/19 1030             Physical Therapy Goals    Wound Care Goal Selection (PT)  wound care, PT goal 1  -MF         Wound Care Goal 1 (PT)    Wound Care Goal 1 (PT)  Decrease all DTPI areas by 10% for improved skin integrity  -MF      Time Frame (Wound Care Goal 1, PT)  10 days  -MF        User Key  (r) = Recorded By, (t) = Taken By, (c) = Cosigned By    Initials Name Provider Type Discipline    MF Sebastian Ochoa, PT Physical Therapist PT        Physical Therapy Education     Title: PT OT SLP Therapies (In Progress)     Topic: Physical Therapy (In Progress)     Point:  Mobility training (In Progress)     Learning Progress Summary           Patient Acceptance, E,D, VU,NR by LR at 4/4/2019  8:38 AM    Comment:  Educated on precautions, weight bearing status, correct supine to sit t/f technique, correct gait mechanics, correct sit<->stand t/f technique, HEP, and progression of POC.    Acceptance, E,D, NR by MJ at 4/3/2019  1:24 PM    Comment:  Reviewed HEP, gait mechanics, benefits of mobility    Acceptance, E,D, NR by MJ at 4/3/2019  9:45 AM    Comment:  Reviewed indications to discontinue knee immobilizer, HEP, gait mechanics, benefits of mobility    Acceptance, D,E, NR by LR at 4/1/2019  9:58 AM    Comment:  Educated on precautions, weight bearing status, correct supine to sit t/f technique, correct sit<->stand t/f technique, correct gait mechanics, HEP, indications for use of knee immobilizer, and progression of POC.    Acceptance, E,TB,D, NR by CT at 3/31/2019 11:40 AM   Family Acceptance, D,E, NR by LR at 4/1/2019  9:58 AM    Comment:  Educated on precautions, weight bearing status, correct supine to sit t/f technique, correct sit<->stand t/f technique, correct gait mechanics, HEP, indications for use of knee immobilizer, and progression of POC.   Significant Other Acceptance, E,TB,D, NR by CT at 3/31/2019 11:40 AM                   Point: Home exercise program (In Progress)     Learning Progress Summary           Patient Acceptance, E,D, VU,NR by LR at 4/4/2019  8:38 AM    Comment:  Educated on precautions, weight bearing status, correct supine to sit t/f technique, correct gait mechanics, correct sit<->stand t/f technique, HEP, and progression of POC.    Acceptance, E,D, NR by MJ at 4/3/2019  1:24 PM    Comment:  Reviewed HEP, gait mechanics, benefits of mobility    Acceptance, E,D, NR by MJ at 4/3/2019  9:45 AM    Comment:  Reviewed indications to discontinue knee immobilizer, HEP, gait mechanics, benefits of mobility    Acceptance, D,E, NR by LR at 4/1/2019  9:58 AM     Comment:  Educated on precautions, weight bearing status, correct supine to sit t/f technique, correct sit<->stand t/f technique, correct gait mechanics, HEP, indications for use of knee immobilizer, and progression of POC.    Acceptance, E,TB,D, NR by CT at 3/31/2019 11:40 AM   Family Acceptance, D,E, NR by LR at 4/1/2019  9:58 AM    Comment:  Educated on precautions, weight bearing status, correct supine to sit t/f technique, correct sit<->stand t/f technique, correct gait mechanics, HEP, indications for use of knee immobilizer, and progression of POC.   Significant Other Acceptance, E,TB,D, NR by CT at 3/31/2019 11:40 AM                   Point: Body mechanics (In Progress)     Learning Progress Summary           Patient Acceptance, E,D, VU,NR by LR at 4/4/2019  8:38 AM    Comment:  Educated on precautions, weight bearing status, correct supine to sit t/f technique, correct gait mechanics, correct sit<->stand t/f technique, HEP, and progression of POC.    Acceptance, E,D, NR by MJ at 4/3/2019  1:24 PM    Comment:  Reviewed HEP, gait mechanics, benefits of mobility    Acceptance, E,D, NR by MJ at 4/3/2019  9:45 AM    Comment:  Reviewed indications to discontinue knee immobilizer, HEP, gait mechanics, benefits of mobility    Acceptance, D,E, NR by LR at 4/1/2019  9:58 AM    Comment:  Educated on precautions, weight bearing status, correct supine to sit t/f technique, correct sit<->stand t/f technique, correct gait mechanics, HEP, indications for use of knee immobilizer, and progression of POC.    Acceptance, E,TB,D, NR by CT at 3/31/2019 11:40 AM   Family Acceptance, D,E, NR by LR at 4/1/2019  9:58 AM    Comment:  Educated on precautions, weight bearing status, correct supine to sit t/f technique, correct sit<->stand t/f technique, correct gait mechanics, HEP, indications for use of knee immobilizer, and progression of POC.   Significant Other Acceptance, E,TB,D, NR by CT at 3/31/2019 11:40 AM                    Point: Precautions (In Progress)     Learning Progress Summary           Patient Acceptance, E,D, VU,NR by LR at 4/4/2019  8:38 AM    Comment:  Educated on precautions, weight bearing status, correct supine to sit t/f technique, correct gait mechanics, correct sit<->stand t/f technique, HEP, and progression of POC.    Acceptance, E,D, NR by MJ at 4/3/2019  1:24 PM    Comment:  Reviewed HEP, gait mechanics, benefits of mobility    Acceptance, E,D, NR by MJ at 4/3/2019  9:45 AM    Comment:  Reviewed indications to discontinue knee immobilizer, HEP, gait mechanics, benefits of mobility    Acceptance, D,E, NR by LR at 4/1/2019  9:58 AM    Comment:  Educated on precautions, weight bearing status, correct supine to sit t/f technique, correct sit<->stand t/f technique, correct gait mechanics, HEP, indications for use of knee immobilizer, and progression of POC.    Acceptance, E,TB,D, NR by CT at 3/31/2019 11:40 AM   Family Acceptance, D,E, NR by LR at 4/1/2019  9:58 AM    Comment:  Educated on precautions, weight bearing status, correct supine to sit t/f technique, correct sit<->stand t/f technique, correct gait mechanics, HEP, indications for use of knee immobilizer, and progression of POC.   Significant Other Acceptance, E,TB,D, NR by CT at 3/31/2019 11:40 AM                               User Key     Initials Effective Dates Name Provider Type Discipline     06/19/15 -  Alta Ochoa, PT Physical Therapist PT    CT 06/19/15 -  Woo Bermudez, PT Physical Therapist PT     04/03/18 -  Jerry Echavarria, PT Physical Therapist PT                  PT Recommendation and Plan  Planned Therapy Interventions (PT Eval): wound care, patient/family education  Therapy Frequency (PT Clinical Impression): 2 times/day               Outcome Summary: Pt presents with B heel DTPIs and will benefit from mist therapy to help improve periwound circulation and increase healing potential to limit progression of DTPI   Plan of  Care Reviewed With: patient    Outcome Measures     Row Name 04/04/19 0838 04/03/19 1324 04/03/19 0945       How much help from another person do you currently need...    Turning from your back to your side while in flat bed without using bedrails?  2  -LR  2  -MJ  2  -MJ    Moving from lying on back to sitting on the side of a flat bed without bedrails?  2  -LR  2  -MJ  2  -MJ    Moving to and from a bed to a chair (including a wheelchair)?  2  -LR  2  -MJ  2  -MJ    Standing up from a chair using your arms (e.g., wheelchair, bedside chair)?  2  -LR  2  -MJ  2  -MJ    Climbing 3-5 steps with a railing?  1  -LR  1  -MJ  1  -MJ    To walk in hospital room?  2  -LR  2  -MJ  2  -MJ    AM-PAC 6 Clicks Score  11  -LR  11  -MJ  11  -MJ       Functional Assessment    Outcome Measure Options  AM-PAC 6 Clicks Basic Mobility (PT)  -LR  AM-PAC 6 Clicks Basic Mobility (PT)  -MJ  AM-PAC 6 Clicks Basic Mobility (PT)  -MJ      User Key  (r) = Recorded By, (t) = Taken By, (c) = Cosigned By    Initials Name Provider Type    Alta Mcdonnell, PT Physical Therapist     Jerry Echavarria, PT Physical Therapist              Time Calculation  PT Charges     Row Name 04/04/19 1030 04/04/19 0838          Time Calculation    Start Time  1030  -  0838  -     PT Received On  --  04/04/19  -     PT Goal Re-Cert Due Date  04/10/19  -  04/10/19  -        Time Calculation- PT    Total Timed Code Minutes- PT  --  28 minute(s)  -LR        Timed Charges    56387 - PT Therapeutic Exercise Minutes  --  10  -LR     06532 - Gait Training Minutes   --  10  -LR     89530 - PT Therapeutic Activity Minutes  --  8  -LR       User Key  (r) = Recorded By, (t) = Taken By, (c) = Cosigned By    Initials Name Provider Type    Sebastian Kellogg, PT Physical Therapist    LR Alta Ochoa, PT Physical Therapist           Therapy Charges for Today     Code Description Service Date Service Provider Modifiers Qty    [id number]  PT RE-EVAL  ESTABLISHED PLAN 2 4/4/2019 Sebastian Ochoa, PT GP 1    [id number] HC PT [facility] MIST 4/4/2019 Sebastian Ochoa, PT GP 1            PT G-Codes  Outcome Measure Options: AM-PAC 6 Clicks Basic Mobility (PT)  AM-PAC 6 Clicks Score: 11  Score: 10        Sebastian Ochoa, PT  4/4/2019

## 2019-04-04 NOTE — PROGRESS NOTES
[facility] Medicine Services  PROGRESS NOTE    Patient Name: Mary Ramirez  : 1934  MRN: [id number]    Date of Admission: 3/29/2019  Length of Stay: 6  Primary Care Physician: Nirmal Lundberg MD    Subjective   Subjective     CC:  Fall     HPI:  Patient doing well this morning. No complaints. Denies any pain. No events overnight     Review of Systems  Gen- No fevers, chills  CV- No chest pain, palpitations  Resp- No cough, dyspnea  GI- No N/V/D, abd pain    Otherwise ROS is negative except as mentioned in the HPI.    Objective   Objective     Vital Signs:   Temp:  [98.5 °F (36.9 °C)-99.2 °F (37.3 °C)] 99.2 °F (37.3 °C)  Heart Rate:  [72-80] 77  Resp:  [14-17] 16  BP: (147-184)/(57-71) 160/69        Physical Exam:  Constitutional: Sleeping but easily awoken   Respiratory: Clear to auscultation bilaterally A/P, respiratory effort normal   Cardiovascular: RRR, II/VI murmur, no rubs, or gallops, palpable pedal pulses bilaterally  Gastrointestinal: Positive bowel sounds, soft, nontender, nondistended  Musculoskeletal: No bilateral ankle edema.   Psychiatric: Appropriate affect, cooperative calm.  Neurologic: Sleepy. Oriented x 2, confused at baseline.  Speech clear.  Follows commands.    Skin: No rashes. RLE hip drsg c/d/i.  Mild edema to thigh.  Pain cath in place to rt groin.        Results Reviewed:  I have personally reviewed current lab, radiology, and data and agree.    Results from last 7 days   Lab Units 19  0742 19  0850 19  1642   WBC 10*3/mm3 10.05 13.08* 12.42*   HEMOGLOBIN g/dL 9.3* 6.9* 7.2*   HEMATOCRIT % 28.9* 23.1* 23.5*   PLATELETS 10*3/mm3 243 253 240     Results from last 7 days   Lab Units 19  0850 19  0658 19  0705  19  0739   SODIUM mmol/L 133 131* 133   < > 136   POTASSIUM mmol/L 3.9 4.3 3.8   < > 3.7   CHLORIDE mmol/L 102 97* 98*   < > 101   CO2 mmol/L 23.0 25.0 26.0   < > 25.0   BUN mg/dL 27* 35* 40*   < > 30*    CREATININE mg/dL 0.96 1.30 1.52*   < > 1.20   GLUCOSE mg/dL 184* 209* 219*   < > 219*   CALCIUM mg/dL 8.3* 9.0 9.0   < > 9.2   ALT (SGPT) U/L  --   --   --   --  38   AST (SGOT) U/L  --   --   --   --  35*    < > = values in this interval not displayed.     Estimated Creatinine Clearance: 34.4 mL/min (by C-G formula based on SCr of 0.96 mg/dL).    No results found for: BNP    Microbiology Results Abnormal     None          Imaging Results (last 24 hours)     ** No results found for the last 24 hours. **          Results for orders placed during the hospital encounter of 03/03/19   Transthoracic Echo Complete With Contrast if Necessary Per Protocol    Narrative · Calculated EF = 66.0%  · Left ventricular systolic function is normal.  · Left ventricular wall thickness is consistent with moderate concentric   hypertrophy.  · Left atrial cavity size is severely dilated.  · Left ventricular diastolic dysfunction (grade I) consistent with   impaired relaxation.  · Mild tricuspid valve regurgitation is present.  · Mild-to-moderate mitral valve regurgitation is present  · There is calcification of the aortic valve.          I have reviewed the medications:    Current Facility-Administered Medications:   •  acetaminophen (TYLENOL) tablet 500 mg, 500 mg, Oral, Q6H PRN, Pennie Perez MD, 500 mg at 04/04/19 0949  •  acetaminophen (TYLENOL) tablet 650 mg, 650 mg, Oral, Q4H PRN, Theo Figueroa MD  •  amiodarone (PACERONE) tablet 200 mg, 200 mg, Oral, Q24H, Marian Langston DO, 200 mg at 04/04/19 0844  •  aspirin EC tablet 325 mg, 325 mg, Oral, Q12H, Alie Kruger MD, 325 mg at 04/04/19 0844  •  bisacodyl (DULCOLAX) EC tablet 5 mg, 5 mg, Oral, Daily PRN, Pennie Perez MD, 5 mg at 03/31/19 0551  •  citalopram (CeleXA) tablet 20 mg, 20 mg, Oral, Daily, Pennie Perez MD, 20 mg at 04/04/19 0844  •  dextrose (D50W) 25 g/ 50mL Intravenous Solution 25 g, 25 g, Intravenous, Q15 Min PRN, Pennie Perez MD  •   dextrose (GLUTOSE) oral gel 15 g, 15 g, Oral, Q15 Min PRN, Pennie Perez MD  •  donepezil (ARICEPT) tablet 5 mg, 5 mg, Oral, Nightly, Pennie Perez MD, 5 mg at 04/03/19 2122  •  fluticasone (FLONASE) 50 MCG/ACT nasal spray 2 spray, 2 spray, Nasal, Daily, Pennie Perez MD, 2 spray at 04/04/19 0846  •  glucagon (human recombinant) (GLUCAGEN DIAGNOSTIC) injection 1 mg, 1 mg, Subcutaneous, PRN, Pennie Perez MD  •  hydrALAZINE (APRESOLINE) tablet 25 mg, 25 mg, Oral, Q8H, Pennie Perez MD, 25 mg at 04/04/19 0532  •  HYDROmorphone (DILAUDID) injection 0.5 mg, 0.5 mg, Intravenous, Q2H PRN, Marian Langston DO, 0.5 mg at 03/30/19 1853  •  insulin detemir (LEVEMIR) injection 6 Units, 6 Units, Subcutaneous, QAM, Shanta Evans, APRN, 6 Units at 04/04/19 0844  •  insulin lispro (humaLOG) injection 0-9 Units, 0-9 Units, Subcutaneous, 4x Daily With Meals & Nightly, Shanta Evans, APRN, 7 Units at 04/04/19 1235  •  insulin lispro (humaLOG) injection 2 Units, 2 Units, Subcutaneous, TID With Meals, Shanta Evans, CRISTA, 2 Units at 04/04/19 1234  •  isosorbide mononitrate (IMDUR) 24 hr tablet 60 mg, 60 mg, Oral, Q24H, Marian Langston DO, 60 mg at 04/04/19 0844  •  lactated ringers infusion, 9 mL/hr, Intravenous, Continuous, Vish Cheek MD, Last Rate: 9 mL/hr at 03/30/19 1126, 9 mL/hr at 03/30/19 1126  •  ondansetron (ZOFRAN) tablet 4 mg, 4 mg, Oral, Q8H PRN, Pennie Perez MD  •  oxyCODONE (ROXICODONE) immediate release tablet 10 mg, 10 mg, Oral, Q4H PRN, Theo Figueroa MD, 10 mg at 03/30/19 1512  •  oxyCODONE (ROXICODONE) immediate release tablet 5 mg, 5 mg, Oral, Q4H PRN, Theo Figueroa MD  •  polyethylene glycol 3350 powder (packet), 17 g, Oral, Daily, Pennie Perez MD, 17 g at 04/04/19 0846  •  ropivacaine (NAROPIN) 0.2% peripheral nerve cath (moog), 8 mL/hr, Peripheral Nerve, Continuous, Dollar, Chanelle, CRNA, Last Rate: 8 mL/hr at 04/01/19 0601, 8 mL/hr at  04/01/19 0601  •  sodium chloride 0.9 % flush 3 mL, 3 mL, Intravenous, Q12H, Pennie Perez MD, 3 mL at 04/01/19 0841  •  sodium chloride 0.9 % flush 3 mL, 3 mL, Intravenous, Q12H, Theo Figueroa MD, 3 mL at 03/30/19 2035  •  sodium chloride 0.9 % flush 3-10 mL, 3-10 mL, Intravenous, PRN, Pennie Perez MD  •  sodium chloride 0.9 % flush 3-10 mL, 3-10 mL, Intravenous, PRN, Theo Figueroa MD      Assessment/Plan   Assessment / Plan     Active Hospital Problems    Diagnosis POA   • Closed fracture of right hip (CMS/HCC) [S72.001A] Yes   • CKD (chronic kidney disease), stage III (CMS/[facility]) [N18.3] Yes   • Chronic diastolic congestive heart failure (CMS/[facility]) [I50.32] Yes     · Echo (9/1/18): LVEF 60%. LVH. Left atrial dilatation. Aortic calcification with no stenosis.     • Paroxysmal atrial fibrillation (CMS/[facility]) [I48.0] Yes     · Chads Vasc 6 (age > 75, female, CAD, DM, HTN)   · Rhythm control strategy with amiodarone     • SSS (sick sinus syndrome) (CMS/[facility]) [I49.5] Yes   • Dementia [F03.90] Yes   • Essential hypertension [I10] Yes   • Coronary artery disease involving native coronary artery of native heart with angina pectoris (CMS/[facility]) [I25.119] Yes     · Cardiac cath with PCI data deficit  · Echo (9/1/18): LVEF 60%. LVH. Left atrial dilatation. Aortic calcification with no stenosis.     • Fall [W19.XXXA] Yes          Brief Hospital Course to date:  Mary Ramirez is a 84 y.o. female with history of HTN, paroxysmal afib, DM, CKD 3 and left hip fracture s/p repair who presents with fall and fracture of right hip. OR 3/30.     Right hip fracture   - s/p repair 3/30   -  mg BID   - dressing clean and dry until post op day 7   --Dr. Figueroa, orthopedic recs:  Protected weight bearing as tolerated right lower extremity, hip range of motion as tolerated  23 hours perioperative antibiotic prophylaxis   SCD's bilateral lower extremities   Aspirin for DVT prophylaxis   Social work for discharge planning.    Dressing to remain in place for 7 days. May remove on POD#7. If no drainage, may shower on POD#10. No submerging wound in water. If drainage is noted, sterile dressing should be placed and wound checked daily. No showering until wound has remained dry for 72 consecutive hours.   Follow up in 2 weeks for re-assessment.    HTN   - Uncontrolled  - Continue amlodipine, clonidine, hydralazine and imdur  - Resume home aldactone.     Paroxysmal afib   - resume home amiodarone   - Previously on eliquis;  mg BID resumed by ortho  - discuss with family with recent falls and risk of bleeding.  Discussed with pharm D today.  Likely may need to come off eliquis indefinitely due to advanced dementia and falls.      DM   - Uncontrolled.   - Hold orals; SSI   - Increase Levemir.     Post op anemia  --Hemoglobin trending down today. Suspect postoperative as patient stable with no signs of active bleeding.   -- responded to 1u PRBCs    Lt heel wound  --area of appearing nonblanchable tissue to lt heel.  --elevated heels off bed, waffle boots as needed  --woc consulted and following    CKD 3   - Cr around baseline; monitor   --Creatinine improved today but pt still not drinking much and Na/cl decreased.  Continue VFs today and encourage fluid intake.      DVT Prophylaxis:   mg BID     Disposition: I expect the patient to be discharged rehab TBD     CODE STATUS:   Code Status and Medical Interventions:   Ordered at: 03/29/19 0828     Level Of Support Discussed With:    Patient     Code Status:    CPR     Medical Interventions (Level of Support Prior to Arrest):    Full         Electronically signed by Alie Kruger MD, 04/04/19, 1:55 PM.

## 2019-04-04 NOTE — PLAN OF CARE
Problem: Patient Care Overview  Goal: Plan of Care Review  Outcome: Ongoing (interventions implemented as appropriate)   04/04/19 0642   Coping/Psychosocial   Plan of Care Reviewed With patient   Plan of Care Review   Progress no change   OTHER   Outcome Summary pt seems more alert than previous shifts, VSS, pt voiding seems to be improving slightly, will continue to monitor for changrs.        Problem: Skin Injury Risk (Adult)  Goal: Identify Related Risk Factors and Signs and Symptoms  Outcome: Ongoing (interventions implemented as appropriate)      Problem: Fall Risk (Adult)  Goal: Identify Related Risk Factors and Signs and Symptoms  Outcome: Ongoing (interventions implemented as appropriate)      Problem: Pain, Acute (Adult)  Goal: Identify Related Risk Factors and Signs and Symptoms  Outcome: Ongoing (interventions implemented as appropriate)      Problem: Hip Arthroplasty (Total, Partial) (Adult)  Goal: Signs and Symptoms of Listed Potential Problems Will be Absent, Minimized or Managed (Hip Arthroplasty)  Outcome: Ongoing (interventions implemented as appropriate)

## 2019-04-04 NOTE — PLAN OF CARE
Problem: Patient Care Overview  Goal: Plan of Care Review  Outcome: Ongoing (interventions implemented as appropriate)   04/04/19 1554   Coping/Psychosocial   Plan of Care Reviewed With patient   Plan of Care Review   Progress improving   OTHER   Outcome Summary Patient ambulated 15 feet with RW and min assist x2, limited by pain. Patient c/o increased pain in both LEs with mobility and movement today. Continues to require mod assist x2 for sit<->stand t/f and to t/f OOB. Will continue to progress mobility training and strengthening as able.

## 2019-04-04 NOTE — PROGRESS NOTES
"Continued Stay Note  [facility]     Patient Name: Mary Ramirez  MRN: [id number]  Today's Date: 4/4/2019    Admit Date: 3/29/2019    Discharge Plan     Row Name 04/04/19 1319       Plan    Plan  Short term rehab at [facility]    Patient/Family in Agreement with Plan  yes    Plan Comments  Spoke with patient at bedside.  Patient less confused today.  Sitting up in chair.  Patient states that she \"feels good and is ready to go home.\"  Spoke with Dona at [facility].  Precert initiated for patient to return to [place] for short term rehab with [facility].  Spoke with patient's son Josh.  Son states that he is out of state and will not be able to transport patient to rehab.  Patient will need transportation to facility when precert completed.  Patient has been added to [facility] wait list for 4/5 at 1330.  If there are no cancelations for [facility], patient will need [facility] transportation arranged.  Case management will continue to follow for discharge planning.      Final Discharge Disposition Code  03 - skilled nursing facility (SNF)        Discharge Codes    No documentation.             Dona Crews RN    "

## 2019-04-05 VITALS
OXYGEN SATURATION: 98 % | HEIGHT: 61 IN | TEMPERATURE: 99 F | SYSTOLIC BLOOD PRESSURE: 151 MMHG | BODY MASS INDEX: 20.77 KG/M2 | RESPIRATION RATE: 16 BRPM | WEIGHT: 110 LBS | HEART RATE: 90 BPM | DIASTOLIC BLOOD PRESSURE: 71 MMHG

## 2019-04-05 PROBLEM — D62 POSTOPERATIVE ANEMIA DUE TO ACUTE BLOOD LOSS: Status: ACTIVE | Noted: 2019-04-05

## 2019-04-05 LAB
GLUCOSE BLDC GLUCOMTR-MCNC: 207 MG/DL (ref 70–130)
GLUCOSE BLDC GLUCOMTR-MCNC: 361 MG/DL (ref 70–130)
GLUCOSE BLDC GLUCOMTR-MCNC: 86 MG/DL (ref 70–130)

## 2019-04-05 PROCEDURE — 97116 GAIT TRAINING THERAPY: CPT

## 2019-04-05 PROCEDURE — 82962 GLUCOSE BLOOD TEST: CPT

## 2019-04-05 PROCEDURE — 99239 HOSP IP/OBS DSCHRG MGMT >30: CPT | Performed by: INTERNAL MEDICINE

## 2019-04-05 PROCEDURE — 97535 SELF CARE MNGMENT TRAINING: CPT

## 2019-04-05 RX ORDER — NIFEDIPINE 30 MG/1
30 TABLET, EXTENDED RELEASE ORAL
Status: DISCONTINUED | OUTPATIENT
Start: 2019-04-05 | End: 2019-04-05 | Stop reason: HOSPADM

## 2019-04-05 RX ORDER — HYDRALAZINE HYDROCHLORIDE 10 MG/1
10 TABLET, FILM COATED ORAL ONCE
Status: COMPLETED | OUTPATIENT
Start: 2019-04-05 | End: 2019-04-05

## 2019-04-05 RX ORDER — AMLODIPINE BESYLATE 10 MG/1
10 TABLET ORAL
Qty: 30 TABLET | Refills: 0 | Status: SHIPPED | OUTPATIENT
Start: 2019-04-05 | End: 2019-05-05

## 2019-04-05 RX ORDER — CLONIDINE HYDROCHLORIDE 0.2 MG/1
0.2 TABLET ORAL 2 TIMES DAILY
Qty: 60 TABLET | Refills: 0 | Status: SHIPPED | OUTPATIENT
Start: 2019-04-05 | End: 2019-05-05

## 2019-04-05 RX ADMIN — FLUTICASONE PROPIONATE 2 SPRAY: 50 SPRAY, METERED NASAL at 09:45

## 2019-04-05 RX ADMIN — HYDRALAZINE HYDROCHLORIDE 10 MG: 10 TABLET ORAL at 04:51

## 2019-04-05 RX ADMIN — INSULIN LISPRO 8 UNITS: 100 INJECTION, SOLUTION INTRAVENOUS; SUBCUTANEOUS at 13:49

## 2019-04-05 RX ADMIN — ASPIRIN 325 MG: 325 TABLET, COATED ORAL at 09:51

## 2019-04-05 RX ADMIN — CITALOPRAM HYDROBROMIDE 20 MG: 20 TABLET ORAL at 09:51

## 2019-04-05 RX ADMIN — AMIODARONE HYDROCHLORIDE 200 MG: 200 TABLET ORAL at 09:50

## 2019-04-05 RX ADMIN — INSULIN LISPRO 2 UNITS: 100 INJECTION, SOLUTION INTRAVENOUS; SUBCUTANEOUS at 09:51

## 2019-04-05 RX ADMIN — NIFEDIPINE 30 MG: 30 TABLET, FILM COATED, EXTENDED RELEASE ORAL at 09:50

## 2019-04-05 RX ADMIN — ISOSORBIDE MONONITRATE 60 MG: 60 TABLET, EXTENDED RELEASE ORAL at 09:50

## 2019-04-05 RX ADMIN — INSULIN LISPRO 4 UNITS: 100 INJECTION, SOLUTION INTRAVENOUS; SUBCUTANEOUS at 09:51

## 2019-04-05 RX ADMIN — INSULIN LISPRO 2 UNITS: 100 INJECTION, SOLUTION INTRAVENOUS; SUBCUTANEOUS at 13:48

## 2019-04-05 NOTE — DISCHARGE SUMMARY
[facility] Medicine Services  DISCHARGE SUMMARY    Patient Name: Mary Ramirez  : 1934  MRN: [id number]    Date of Admission: 3/29/2019  Date of Discharge:  2019  Primary Care Physician: Nirmal Lundberg MD    Consults     Date and Time Order Name Status Description    3/4/2019 0237 Inpatient Cardiology Consult Completed           Hospital Course     Presenting Problem:   Closed fracture of right hip, initial encounter (CMS/[facility]) [S72.001A]    Active Hospital Problems    Diagnosis  POA   • Postoperative anemia due to acute blood loss [D62]  Unknown   • Closed fracture of right hip (CMS/[facility]) [S72.001A]  Yes   • CKD (chronic kidney disease), stage III (CMS/[facility]) [N18.3]  Yes   • Chronic diastolic congestive heart failure (CMS/[facility]) [I50.32]  Yes   • Paroxysmal atrial fibrillation (CMS/[facility]) [I48.0]  Yes   • SSS (sick sinus syndrome) (CMS/[facility]) [I49.5]  Yes   • Dementia [F03.90]  Yes   • Essential hypertension [I10]  Yes   • Coronary artery disease involving native coronary artery of native heart with angina pectoris (CMS/[facility]) [I25.119]  Yes   • Fall [W19.XXXA]  Yes      Resolved Hospital Problems   No resolved problems to display.          Hospital Course:     Lorna Ramirez is an 84yoF with h/o HTN, pAF on anticoagulation, T2DM, previous left hip fracture s/p repair who presents after mechanical fall at home. In the ED, imaging noted right closed hip fracture.  Orthopedics was consulted and Dr. Figueroa on 3/30 performed a right hip trochanteric nailing with intramedullary hip screw. Pain was treated with IV and PO pain medications as well as nerve block. Post operative course was complicated by postoperative anemia requiring 1u PRBCs with good response. PT/OT evaluated and recommended rehab.     Patient will need to continue ASA for DVT ppx. She has previously been on Eliquis for her afib but this was discontinued due to falls and bleeding risk. This was discussed with family.  Will defer additional management on anticoagulation to PCP.     She was resumed on her home antihypertensives. These had recently been adjusted during a recent admission. She was resumed on her home amlodipine, clonidine and hydralazine. She was recently seen by her PCP for borderline low BP with dizziness. She had been on spironolactone which was discontinued due to falls and dizziness. Bps are well controlled on amlodipine, clonidine and hydralazine. She does have an indication for a beta blocker but will defer restarting this to PCP as she was not on this prior to admission so it is unclear if she tolerated this in the past.     She will need to follow-up with Dr. Figueroa in 2 weeks for post operative evaluation. Patient can have dressings removed on post operative day 7 (5/7).  She should also follow-up with PCP in 1 week for hypertension management.     Discharge Follow Up Recommendations for labs/diagnostics:  -- follow-up with PCP in 1 week  -- Follow-up with Dr. iFgueroa in 2 weeks.      Day of Discharge     HPI:   Patient mildly confused this morning and asking for coat so that she can wash dishes. Otherwise pleasant with no pain.     Review of Systems  General: denies fevers or chills  CV: denies chest pain  Resp: denies shortness of breath  Abd: denies abd pain, nausea      Otherwise ROS is negative except as mentioned in the HPI.    Vital Signs:   Temp:  [98.2 °F (36.8 °C)-99.1 °F (37.3 °C)] 98.2 °F (36.8 °C)  Heart Rate:  [71-86] 86  Resp:  [16] 16  BP: (142-190)/(64-79) 155/74     Physical Exam:  Constitutional: Awake and in bedside chair  Respiratory: Clear to auscultation bilaterally A/P, respiratory effort normal   Cardiovascular: RRR, II/VI murmur, no rubs, or gallops, palpable pedal pulses bilaterally  Gastrointestinal: Positive bowel sounds, soft, nontender, nondistended  Musculoskeletal: No bilateral ankle edema.   Psychiatric: Appropriate affect, cooperative calm.  Neurologic: Sleepy. Oriented x  2, confused at baseline.  Speech clear.  Follows commands.    Skin: No rashes. RLE hip drsg c/d/i.          Pertinent  and/or Most Recent Results     Results from last 7 days   Lab Units 04/03/19  0742 04/02/19  0850 04/01/19  1642 04/01/19  0658 03/31/19  0705 03/30/19  0720   WBC 10*3/mm3 10.05 13.08* 12.42* 14.45*  --  12.98*   HEMOGLOBIN g/dL 9.3* 6.9* 7.2* 7.5* 7.7* 10.5*   HEMATOCRIT % 28.9* 23.1* 23.5* 24.6* 24.4* 35.4   PLATELETS 10*3/mm3 243 253 240 251  --  303   SODIUM mmol/L  --  133  --  131* 133 140   POTASSIUM mmol/L  --  3.9  --  4.3 3.8 4.0   CHLORIDE mmol/L  --  102  --  97* 98* 102   CO2 mmol/L  --  23.0  --  25.0 26.0 27.0   BUN mg/dL  --  27*  --  35* 40* 33*   CREATININE mg/dL  --  0.96  --  1.30 1.52* 1.36*   GLUCOSE mg/dL  --  184*  --  209* 219* 264*   CALCIUM mg/dL  --  8.3*  --  9.0 9.0 9.7           Invalid input(s): PROT, LABALBU        Invalid input(s): TG, LDLCALC, LDLREALC        Brief Urine Lab Results  (Last result in the past 365 days)      Color   Clarity   Blood   Leuk Est   Nitrite   Protein   CREAT   Urine HCG        04/07/18 0839 Yellow Clear Negative Negative Negative Negative               Microbiology Results Abnormal     None          Imaging Results (all)     Procedure Component Value Units Date/Time    XR Hip With or Without Pelvis 2 - 3 View Right [[id number]] Collected:  03/30/19 1405     Updated:  03/31/19 1122    Narrative:          EXAMINATION: XR RIGHT HIP W OR WO PELVIS, 2-3 VIEWS - 03/30/2019      INDICATION: S72.001A-Fracture of unspecified part of neck of right  femur, initial encounter for closed fracture; I10-Essential (primary)  hypertension; S72.141A-Displaced intertrochanteric fracture of right  femur, initial encounter for closed fracture.      COMPARISON: 03/29/2019     FINDINGS: Status post right hip intramedullary narrowing spanning the  intertrochanteric fracture right hip in excellent anatomic alignment.  Expected postsurgical changes in the  adjacent  soft tissues.           Impression:       Status post right hip nailing spanning the previous noted  intratrochanteric right hip intratrochanteric fracture.     DICTATED:   03/30/2019  EDITED/ls :   03/30/2019      This report was finalized on 3/31/2019 11:19 AM by Dr. Cj Obando.       FL C Arm During Surgery [[id number]] Collected:  03/30/19 1352     Updated:  03/30/19 1846    Narrative:       EXAMINATION: FL C ARM DURING SURGERY - 03/30/2019     INDICATION: S72.001A-Fracture of unspecified part of neck of right  femur, initial encounter for closed fracture; I10-Essential (primary)  hypertension; S72.141A-Displaced intertrochanteric fracture of right  femur, initial encounter for closed fracture.      TECHNIQUE: Intraoperative fluoroscopy for improved localization and  treatment planning.     COMPARISON: NONE     FINDINGS: Intraoperative fluoroscopy with total fluoroscopic time usage  2 minutes 3 seconds and 5 representative images saved during right hip  trochanteric nailing.       Impression:       Intraoperative fluoroscopy utilized during right  trochanteric nailing.     DICTATED:   03/30/2019  EDITED/ls :   03/30/2019          This report was finalized on 3/30/2019 6:43 PM by Dr. Cj Obando.       XR Chest 1 View [[id number]] Collected:  03/29/19 0653     Updated:  03/29/19 0946    Narrative:       EXAM:    XR Chest, 1 View     EXAM DATE/TIME:    3/29/2019 6:53 AM     CLINICAL HISTORY:    84 years old, female; Signs and symptoms; Other: Pre op     TECHNIQUE:    Imaging protocol: XR of the chest, 1 view.     COMPARISON:    CR XR CHEST 1 VW 3/3/2019 6:44 PM     FINDINGS:     Small RIGHT pleural effusion, pleural thickening, RIGHT base atelectasis and   consolidation without significant change compared to a prior study.   Cardiomegaly with normal lung vascularity. Tortuous calcific aorta. Chronic   degenerative changes in the visualized spine and shoulder joints.       Impression:       Small RIGHT pleural  effusion, pleural thickening, RIGHT base atelectasis and   consolidation without significant change compared to a prior study.     THIS DOCUMENT HAS BEEN ELECTRONICALLY SIGNED BY PATRICIA KATZ MD    XR Pelvis 1 or 2 View [[id number]] Collected:  03/29/19 0644     Updated:  03/29/19 0945    Narrative:       EXAM:    XR Pelvis, 1 or 2 Views     EXAM DATE/TIME:    3/29/2019 6:44 AM     CLINICAL HISTORY:    84 years old, female; Pain; Hip pain; Right hip; Additional info: Trauma     TECHNIQUE:    Imaging protocol: XR pelvis, 1 or 2 views     COMPARISON:    CR XR HIP W OR WO PELVIS 2-3 VIEW LEFT 12/12/2017 8:21 AM     FINDINGS:     Comminuted, displaced intertrochanteric fracture of the RIGHT femur.     Coarse bony trabecular pattern suggestive of osteopenia, chronic degenerative   changes in the lower lumbar spine, sacroiliac joints. Internal fixation of   proximal LEFT femur.       Impression:       Comminuted, displaced intertrochanteric ACUTE FRACTURE of the RIGHT femur.     THIS DOCUMENT HAS BEEN ELECTRONICALLY SIGNED BY PATRICIA KATZ MD    XR Femur 2 View Right [[id number]] Collected:  03/29/19 0644     Updated:  03/29/19 0945    Narrative:       EXAM:    XR Right Femur, 2 Views     EXAM DATE/TIME:    3/29/2019 6:44 AM     CLINICAL HISTORY:    84 years old, female; Pain; Thigh; Right; Additional info: Trauma     TECHNIQUE:    Imaging protocol: XR Right femur, 2 views     COMPARISON:    No relevant prior studies available.     FINDINGS:     Comminuted, displaced intertrochanteric fracture of the RIGHT femur.     Chronic degenerative changes in the RIGHT sacroiliac, hip and knee joints.       Impression:       Comminuted, displaced intertrochanteric ACUTE FRACTURE of the RIGHT femur.     THIS DOCUMENT HAS BEEN ELECTRONICALLY SIGNED BY PATRICIA KATZ MD                    Results for orders placed during the hospital encounter of 03/03/19   Transthoracic Echo Complete With Contrast if Necessary Per Protocol    Narrative ·  Calculated EF = 66.0%  · Left ventricular systolic function is normal.  · Left ventricular wall thickness is consistent with moderate concentric   hypertrophy.  · Left atrial cavity size is severely dilated.  · Left ventricular diastolic dysfunction (grade I) consistent with   impaired relaxation.  · Mild tricuspid valve regurgitation is present.  · Mild-to-moderate mitral valve regurgitation is present  · There is calcification of the aortic valve.            Discharge Details        Discharge Medications      New Medications      Instructions Start Date   aspirin 325 MG EC tablet  Replaces:  aspirin 81 MG chewable tablet   325 mg, Oral, Daily         Continue These Medications      Instructions Start Date   acetaminophen 500 MG tablet  Commonly known as:  TYLENOL   500 mg, Oral, Every 6 Hours PRN, Take one tablet twice a day for leg pain       amiodarone 200 MG tablet  Commonly known as:  PACERONE   200 mg, Oral, Daily      amLODIPine 10 MG tablet  Commonly known as:  NORVASC   10 mg, Oral, Every 24 Hours Scheduled      bisacodyl 5 MG EC tablet  Commonly known as:  DULCOLAX   5 mg, Oral, Daily PRN      citalopram 20 MG tablet  Commonly known as:  CeleXA   20 mg, Oral, Daily      CloNIDine 0.2 MG tablet  Commonly known as:  CATAPRES   0.2 mg, Oral, 2 Times Daily      docusate sodium 100 MG capsule  Commonly known as:  COLACE   100 mg, Oral, 2 Times Daily      donepezil 5 MG tablet  Commonly known as:  ARICEPT   5 mg, Oral, Nightly      fluticasone 50 MCG/ACT nasal spray  Commonly known as:  FLONASE   2 sprays, Nasal, Daily      insulin aspart 100 UNIT/ML solution pen-injector sc pen  Commonly known as:  novoLOG FLEXPEN   2-5 Units, Subcutaneous, 4 Times Daily      isosorbide mononitrate 60 MG 24 hr tablet  Commonly known as:  IMDUR   60 mg, Oral, Daily      metFORMIN 500 MG tablet  Commonly known as:  GLUCOPHAGE   500 mg, Oral, Daily With Breakfast      MULTIVITAMINS PO   1 tablet, Oral, Daily      ondansetron 4 MG  "tablet  Commonly known as:  ZOFRAN   4 mg, Oral, Every 8 Hours PRN      polyethylene glycol pack packet  Commonly known as:  MIRALAX   17 g, Oral, Daily         Stop These Medications    apixaban 2.5 MG tablet tablet  Commonly known as:  ELIQUIS     aspirin 81 MG chewable tablet  Replaced by:  aspirin 325 MG EC tablet     hydrALAZINE 25 MG tablet  Commonly known as:  APRESOLINE     spironolactone 25 MG tablet  Commonly known as:  ALDACTONE            Allergies   Allergen Reactions   • Penicillins Other (See Comments)     Pt states \"i don't know\"         Discharge Disposition:  Skilled Nursing Facility (DC - External)    Discharge Diet:  Diet Order   Procedures   • Diet Regular         Discharge Activity:         CODE STATUS:    Code Status and Medical Interventions:   Ordered at: 03/29/19 0828     Level Of Support Discussed With:    Patient     Code Status:    CPR     Medical Interventions (Level of Support Prior to Arrest):    Full         Future Appointments   Date Time Provider Department Center   4/15/2019 10:00 AM Ta Flores III, MD [facility] None   4/16/2019  9:15 AM Nirmal Lundberg MD [facility]   4/18/2019  8:20 AM Theo Figueroa MD [facility] None       Additional Instructions for the Follow-ups that You Need to Schedule     Discharge Follow-up with Specialty: [facility]; 2 Weeks   As directed      Specialty:  [facility]    Follow Up:  2 Weeks               Time Spent on Discharge:  40 minutes    Electronically signed by Alie Kruger MD, 04/05/19, 12:23 PM.    "

## 2019-04-05 NOTE — PLAN OF CARE
Problem: Patient Care Overview  Goal: Plan of Care Review  Outcome: Ongoing (interventions implemented as appropriate)   04/05/19 0816   Coping/Psychosocial   Plan of Care Reviewed With patient   Plan of Care Review   Progress improving   OTHER   Outcome Summary Pt. intiating more independence with supine to sit and with wiping self post toileting. Pt. mod of 2 overall transfers. Plans to rehab today.

## 2019-04-05 NOTE — THERAPY TREATMENT NOTE
Acute Care - Physical Therapy Treatment Note  [facility]     Patient Name: Mary Ramirez  : 1934  MRN: [id number]  Today's Date: 2019  Onset of Illness/Injury or Date of Surgery: 19  Date of Referral to PT: 19  Referring Physician: hospitalist    Admit Date: 3/29/2019    Visit Dx:    ICD-10-CM ICD-9-CM   1. Closed fracture of right hip, initial encounter (CMS/HCC) S72.001A 820.8   2. Hypertension, unspecified type I10 401.9   3. Closed displaced intertrochanteric fracture of right femur, initial encounter (CMS/HCC) S72.141A 820.21   4. Impaired mobility and ADLs Z74.09 799.89     Patient Active Problem List   Diagnosis   • Fall   • Fracture, intertrochanteric, left femur (CMS/Formerly [facility])   • Essential hypertension   • Coronary artery disease involving native coronary artery of native heart with angina pectoris (CMS/Formerly [facility])   • Dementia   • Paroxysmal atrial fibrillation (CMS/HCC)   • Anticoagulated   • SSS (sick sinus syndrome) (CMS/HCC)   • Chronic diastolic congestive heart failure (CMS/HCC)   • DEANGELO (acute kidney injury) (CMS/HCC)   • Benign essential hypertension   • CKD (chronic kidney disease), stage III (CMS/HCC)   • Mixed hyperlipidemia   • Major depressive disorder, single episode, mild (CMS/HCC)   • Chronic allergic rhinitis   • Memory loss   • Bradycardia   • At risk for falls   • Closed fracture of femur (CMS/[facility])   • Coronary arteriosclerosis after percutaneous transluminal coronary angioplasty (PTCA)   • Diabetic polyneuropathy (CMS/[facility])   • Diverticular disease of colon   • Foot pain   • GERD (gastroesophageal reflux disease)   • Peripheral artery insufficiency (CMS/[facility])   • Annual physical exam   • Vitamin D deficiency   • Renal disorder   • Closed fracture of right hip (CMS/[facility])       Therapy Treatment    Rehabilitation Treatment Summary     Row Name 19 0914             Treatment Time/Intention    Discipline  physical therapist  -      Document Type  therapy note (daily  note)  -MJ      Subjective Information  complains of;fatigue  -MJ      Mode of Treatment  physical therapy  -MJ      Patient/Family Observations  Pt sitting UIC  -MJ      Care Plan Review  patient/other agree to care plan  -MJ      Patient Effort  good  -MJ      Existing Precautions/Restrictions  fall;other (see comments) confusion/exit alarm  -MJ      Recorded by [MJ] Jerry Echavarria, PT 04/05/19 0937      Row Name 04/05/19 0914             Cognitive Assessment/Intervention- PT/OT    Affect/Mental Status (Cognitive)  confused  -MJ      Orientation Status (Cognition)  oriented to;person;disoriented to;place;time  -MJ      Follows Commands (Cognition)  follows one step commands;75-90% accuracy;verbal cues/prompting required;repetition of directions required  -MJ      Safety Deficit (Cognitive)  mild deficit  -MJ      Recorded by [MJ] Jerry Echavarria, PT 04/05/19 0937      Row Name 04/05/19 0914             Safety Issues, Functional Mobility    Impairments Affecting Function (Mobility)  balance;pain;strength;cognition  -MJ      Recorded by [MJ] Jerry Echavarria, PT 04/05/19 0937      Row Name 04/05/19 0914             Mobility Assessment/Intervention    Extremity Weight-bearing Status  right lower extremity  -MJ      Right Lower Extremity (Weight-bearing Status)  weight-bearing as tolerated (WBAT)  -MJ      Recorded by [MJ] Jerry Echavarria, PT 04/05/19 0937      Row Name 04/05/19 0914             Bed Mobility Assessment/Treatment    Comment (Bed Mobility)  NT- pt UIC  -MJ      Recorded by [MJ] Jerry Echavarria, PT 04/05/19 0937      Row Name 04/05/19 0914             Transfer Assessment/Treatment    Transfer Assessment/Treatment  sit-stand transfer;stand-sit transfer  -MJ      Comment (Transfers)  Verbal cues for correct hand placement. Pt with decreased safety for stand to sit, trying to sit before chair pulled up behind her  -MJ      Recorded by [MJ] Jerry Echavarria, PT 04/05/19 0937      Row Name 04/05/19 0914             Sit-Stand  Transfer    Sit-Stand [place] (Transfers)  moderate assist (50% patient effort);2 person assist;verbal cues  -MJ      Assistive Device (Sit-Stand Transfers)  walker, front-wheeled  -MJ      Recorded by [MJ] Jerry Echavarria, PT 04/05/19 0937      Row Name 04/05/19 0914             Stand-Sit Transfer    Stand-Sit [place] (Transfers)  minimum assist (75% patient effort);verbal cues  -MJ      Assistive Device (Stand-Sit Transfers)  walker, front-wheeled  -MJ      Recorded by [MJ] Jerry Echavarria, PT 04/05/19 0937      Row Name 04/05/19 0914             Gait/Stairs Assessment/Training    61007 - Gait Training Minutes   8  -MJ      [place] Level (Gait)  minimum assist (75% patient effort);1 person to manage equipment;verbal cues  -      Assistive Device (Gait)  walker, front-wheeled  -MJ      Distance in Feet (Gait)  10  -MJ      Pattern (Gait)  step-to  -MJ      Deviations/Abnormal Patterns (Gait)  right sided deviations;antalgic;bilateral deviations;trey decreased;stride length decreased  -MJ      Bilateral Gait Deviations  forward flexed posture;heel strike decreased;weight shift ability decreased  -MJ      Comment (Gait/Stairs)  Pt demo step to gait pattern at slow pace. Verbal cues for upright posture and to stay in middle of RW. Chair follow for safety. Gait limited by fatigue  -MJ      Recorded by [MJ] Jerry Echavarria, PT 04/05/19 0937      Row Name 04/05/19 0914             Therapeutic Exercise    67635 - PT Therapeutic Exercise Minutes  7  -MJ      Recorded by [MJ] Jerry Echavarria, PT 04/05/19 0937      Row Name 04/05/19 0914             Therapeutic Exercise    Lower Extremity (Therapeutic Exercise)  gluteal sets;heel slides, right;quad sets, right;SLR (straight leg raise), right  -MJ      Lower Extremity Range of Motion (Therapeutic Exercise)  hip abduction/adduction, right;ankle dorsiflexion/plantar flexion, right  -MJ      Exercise Type (Therapeutic Exercise)  AROM (active range of motion)  -       Position (Therapeutic Exercise)  seated  -MJ      Sets/Reps (Therapeutic Exercise)  15x each  -MJ      Comment (Therapeutic Exercise)  Cues for technique. Denys to initiate heel slides  -MJ      Recorded by [MJ] Jerry Echavarria, PT 04/05/19 0937      Row Name 04/05/19 0914             Positioning and Restraints    Pre-Treatment Position  sitting in chair/recliner  -MJ      Post Treatment Position  chair  -MJ      In Chair  notified nsg;reclined;call light within reach;encouraged to call for assist;exit alarm on;on mechanical lift sling  -MJ      Recorded by [MJ] Jerry Echavarria, PT 04/05/19 0937      Row Name 04/05/19 0914             Pain Scale: Numbers Pre/Post-Treatment    Pain Scale: Numbers, Pretreatment  0/10 - no pain  -MJ      Pain Scale: Numbers, Post-Treatment  2/10  -MJ      Pain Location - Side  Right  -MJ      Pain Location  hip  -MJ      Pain Intervention(s)  Repositioned;Ambulation/increased activity  -MJ      Recorded by [MJ] Jerry Echavarria, PT 04/05/19 0937      Row Name                Wound 03/30/19 0907 Right hip incision    Wound - Properties Group Date first assessed: 03/30/19 [JS] Time first assessed: 0907 [JS] Side: Right [JS] Location: hip [JS] Type: incision [JS] Recorded by:  [JS] Lilia Xiao RN 03/30/19 0907    Row Name                Wound 03/31/19 1500 Left medial heel other (see comments)    Wound - Properties Group Date first assessed: 03/31/19 [CP] Time first assessed: 1500 [CP] Present On Admission : no;picture taken [CP] Side: Left [CP] Orientation: medial [CP] Location: heel [CP] Type: other (see comments) [CP] Additional Comments: bruise vs DTPI [CP] Recorded by:  [CP] Abby Kirby APRN 03/31/19 1535    Row Name                Wound 03/31/19 1500 Right lateral heel other (see comments)    Wound - Properties Group Date first assessed: 03/31/19 [CP] Time first assessed: 1500 [CP] Present On Admission : no;picture taken [CP] Side: Right [CP] Orientation: lateral [CP]  Location: heel [CP] Type: other (see comments) [CP] Additional Comments: bruise vs DTPI [CP] Recorded by:  [CP] Kofi Abby L, APRN 03/31/19 1537    Row Name 04/05/19 0914             Plan of Care Review    Plan of Care Reviewed With  patient  -MJ      Recorded by [MJ] Jerry Echavarria, PT 04/05/19 0937      Row Name 04/05/19 0914             Outcome Summary/Treatment Plan (PT)    Daily Summary of Progress (PT)  progress toward functional goals is gradual  -MJ      Recorded by [MJ] Jerry Echavarria, PT 04/05/19 0937        User Key  (r) = Recorded By, (t) = Taken By, (c) = Cosigned By    Initials Name Effective Dates Discipline    CP Abby Kirby, APRN 02/05/19 -  Nurse    eJrry Mcmahon, PT 04/03/18 -  PT    Lilia Pagan RN 01/23/17 -  Nurse          Wound 03/30/19 0907 Right hip incision (Active)   Dressing Appearance dry;intact 4/4/2019 10:00 PM   Dressing Care, Wound dressing changed 4/5/2019 12:30 AM       Wound 03/31/19 1500 Left medial heel other (see comments) (Active)   Dressing Appearance dry;intact;no drainage 4/4/2019 10:00 PM   Base maroon/purple 4/4/2019 10:30 AM   Periwound intact;dry;pink;blanchable 4/4/2019 10:30 AM   Edges irregular 4/4/2019 10:30 AM   Wound Length (cm) 2.2 cm 4/4/2019 10:30 AM   Wound Width (cm) 1.5 cm 4/4/2019 10:30 AM   Wound Depth (cm) 0 cm 4/4/2019 10:30 AM   Drainage Amount none 4/4/2019 10:30 AM   Care, Wound irrigated with;sterile normal saline;ultrasound therapy, non contact low frequency 4/4/2019 10:30 AM   Dressing Care, Wound low-adherent 4/4/2019 10:00 PM       Wound 03/31/19 1500 Right lateral heel other (see comments) (Active)   Dressing Appearance dry;intact;no drainage 4/4/2019 10:00 PM   Base maroon/purple 4/4/2019 10:30 AM   Periwound intact;dry;pink;blanchable 4/4/2019 10:30 AM   Edges irregular 4/4/2019 10:30 AM   Wound Length (cm) 1 cm 4/4/2019 10:30 AM   Wound Width (cm) 1 cm 4/4/2019 10:30 AM   Wound Depth (cm) 0 cm 4/4/2019 10:30 AM    Drainage Amount none 4/4/2019 10:30 AM   Care, Wound irrigated with;sterile normal saline;ultrasound therapy, non contact low frequency 4/4/2019 10:30 AM   Dressing Care, Wound low-adherent 4/4/2019 10:00 PM           Physical Therapy Education     Title: PT OT SLP Therapies (In Progress)     Topic: Physical Therapy (In Progress)     Point: Mobility training (In Progress)     Learning Progress Summary           Patient Acceptance, E,D, NR by MJ at 4/5/2019  9:14 AM    Comment:  Reviewed HEP, gait mechanics, safety with mobility    Acceptance, E,D, VU,NR by LR at 4/4/2019  8:38 AM    Comment:  Educated on precautions, weight bearing status, correct supine to sit t/f technique, correct gait mechanics, correct sit<->stand t/f technique, HEP, and progression of POC.    Acceptance, E,D, NR by MJ at 4/3/2019  1:24 PM    Comment:  Reviewed HEP, gait mechanics, benefits of mobility    Acceptance, E,D, NR by MJ at 4/3/2019  9:45 AM    Comment:  Reviewed indications to discontinue knee immobilizer, HEP, gait mechanics, benefits of mobility    Acceptance, D,E, NR by LR at 4/1/2019  9:58 AM    Comment:  Educated on precautions, weight bearing status, correct supine to sit t/f technique, correct sit<->stand t/f technique, correct gait mechanics, HEP, indications for use of knee immobilizer, and progression of POC.    Acceptance, E,TB,D, NR by CT at 3/31/2019 11:40 AM   Family Acceptance, D,E, NR by LR at 4/1/2019  9:58 AM    Comment:  Educated on precautions, weight bearing status, correct supine to sit t/f technique, correct sit<->stand t/f technique, correct gait mechanics, HEP, indications for use of knee immobilizer, and progression of POC.   Significant Other Acceptance, E,TB,D, NR by CT at 3/31/2019 11:40 AM                   Point: Home exercise program (In Progress)     Learning Progress Summary           Patient Acceptance, E,D, NR by MJ at 4/5/2019  9:14 AM    Comment:  Reviewed HEP, gait mechanics, safety with  mobility    Acceptance, E,D, VU,NR by LR at 4/4/2019  8:38 AM    Comment:  Educated on precautions, weight bearing status, correct supine to sit t/f technique, correct gait mechanics, correct sit<->stand t/f technique, HEP, and progression of POC.    Acceptance, E,D, NR by MJ at 4/3/2019  1:24 PM    Comment:  Reviewed HEP, gait mechanics, benefits of mobility    Acceptance, E,D, NR by MJ at 4/3/2019  9:45 AM    Comment:  Reviewed indications to discontinue knee immobilizer, HEP, gait mechanics, benefits of mobility    Acceptance, D,E, NR by LR at 4/1/2019  9:58 AM    Comment:  Educated on precautions, weight bearing status, correct supine to sit t/f technique, correct sit<->stand t/f technique, correct gait mechanics, HEP, indications for use of knee immobilizer, and progression of POC.    Acceptance, E,TB,D, NR by CT at 3/31/2019 11:40 AM   Family Acceptance, D,E, NR by LR at 4/1/2019  9:58 AM    Comment:  Educated on precautions, weight bearing status, correct supine to sit t/f technique, correct sit<->stand t/f technique, correct gait mechanics, HEP, indications for use of knee immobilizer, and progression of POC.   Significant Other Acceptance, E,TB,D, NR by CT at 3/31/2019 11:40 AM                   Point: Body mechanics (In Progress)     Learning Progress Summary           Patient Acceptance, E,D, NR by MJ at 4/5/2019  9:14 AM    Comment:  Reviewed HEP, gait mechanics, safety with mobility    Acceptance, E,D, VU,NR by LR at 4/4/2019  8:38 AM    Comment:  Educated on precautions, weight bearing status, correct supine to sit t/f technique, correct gait mechanics, correct sit<->stand t/f technique, HEP, and progression of POC.    Acceptance, E,D, NR by MJ at 4/3/2019  1:24 PM    Comment:  Reviewed HEP, gait mechanics, benefits of mobility    Acceptance, E,D, NR by MJ at 4/3/2019  9:45 AM    Comment:  Reviewed indications to discontinue knee immobilizer, HEP, gait mechanics, benefits of mobility    Acceptance,  D,E, NR by LR at 4/1/2019  9:58 AM    Comment:  Educated on precautions, weight bearing status, correct supine to sit t/f technique, correct sit<->stand t/f technique, correct gait mechanics, HEP, indications for use of knee immobilizer, and progression of POC.    Acceptance, E,TB,D, NR by CT at 3/31/2019 11:40 AM   Family Acceptance, D,E, NR by LR at 4/1/2019  9:58 AM    Comment:  Educated on precautions, weight bearing status, correct supine to sit t/f technique, correct sit<->stand t/f technique, correct gait mechanics, HEP, indications for use of knee immobilizer, and progression of POC.   Significant Other Acceptance, E,TB,D, NR by CT at 3/31/2019 11:40 AM                   Point: Precautions (In Progress)     Learning Progress Summary           Patient Acceptance, E,D, NR by MJ at 4/5/2019  9:14 AM    Comment:  Reviewed HEP, gait mechanics, safety with mobility    Acceptance, E,D, VU,NR by LR at 4/4/2019  8:38 AM    Comment:  Educated on precautions, weight bearing status, correct supine to sit t/f technique, correct gait mechanics, correct sit<->stand t/f technique, HEP, and progression of POC.    Acceptance, E,D, NR by MJ at 4/3/2019  1:24 PM    Comment:  Reviewed HEP, gait mechanics, benefits of mobility    Acceptance, E,D, NR by MJ at 4/3/2019  9:45 AM    Comment:  Reviewed indications to discontinue knee immobilizer, HEP, gait mechanics, benefits of mobility    Acceptance, D,E, NR by LR at 4/1/2019  9:58 AM    Comment:  Educated on precautions, weight bearing status, correct supine to sit t/f technique, correct sit<->stand t/f technique, correct gait mechanics, HEP, indications for use of knee immobilizer, and progression of POC.    Acceptance, E,TB,D, NR by CT at 3/31/2019 11:40 AM   Family Acceptance, D,E, NR by LR at 4/1/2019  9:58 AM    Comment:  Educated on precautions, weight bearing status, correct supine to sit t/f technique, correct sit<->stand t/f technique, correct gait mechanics, HEP,  indications for use of knee immobilizer, and progression of POC.   Significant Other Acceptance, E,TB,D, NR by CT at 3/31/2019 11:40 AM                               User Key     Initials Effective Dates Name Provider Type Discipline    LR 06/19/15 -  Alta Ochoa, PT Physical Therapist PT    CT 06/19/15 -  Woo Bermudez, PT Physical Therapist PT     04/03/18 -  Jerry Echavarria, PT Physical Therapist PT                PT Recommendation and Plan  Therapy Frequency (PT Clinical Impression): 2 times/day  Outcome Summary/Treatment Plan (PT)  Daily Summary of Progress (PT): progress toward functional goals is gradual  Plan of Care Reviewed With: patient  Progress: improving  Outcome Summary: Pt ambulated 10 feet with Denys and RW, limited by fatigue. Pt required less assist with gait and ther ex. Pt anticipates discharge to rehab today, otherwise will continue to progress mobility as able.   Outcome Measures     Row Name 04/05/19 0914 04/04/19 0838 04/03/19 1324       How much help from another person do you currently need...    Turning from your back to your side while in flat bed without using bedrails?  2  -MJ  2  -LR  2  -MJ    Moving from lying on back to sitting on the side of a flat bed without bedrails?  2  -MJ  2  -LR  2  -MJ    Moving to and from a bed to a chair (including a wheelchair)?  2  -MJ  2  -LR  2  -MJ    Standing up from a chair using your arms (e.g., wheelchair, bedside chair)?  2  -MJ  2  -LR  2  -MJ    Climbing 3-5 steps with a railing?  1  -MJ  1  -LR  1  -MJ    To walk in hospital room?  3  -MJ  2  -LR  2  -MJ    AM-PAC 6 Clicks Score  12  -MJ  11  -LR  11  -MJ       Functional Assessment    Outcome Measure Options  AM-PAC 6 Clicks Basic Mobility (PT)  -MJ  AM-PAC 6 Clicks Basic Mobility (PT)  -LR  AM-PAC 6 Clicks Basic Mobility (PT)  -MJ    Row Name 04/03/19 0945             How much help from another person do you currently need...    Turning from your back to your side while  in flat bed without using bedrails?  2  -MJ      Moving from lying on back to sitting on the side of a flat bed without bedrails?  2  -MJ      Moving to and from a bed to a chair (including a wheelchair)?  2  -MJ      Standing up from a chair using your arms (e.g., wheelchair, bedside chair)?  2  -MJ      Climbing 3-5 steps with a railing?  1  -MJ      To walk in hospital room?  2  -MJ      AM-PAC 6 Clicks Score  11  -MJ         Functional Assessment    Outcome Measure Options  AM-PAC 6 Clicks Basic Mobility (PT)  -MJ        User Key  (r) = Recorded By, (t) = Taken By, (c) = Cosigned By    Initials Name Provider Type    Alta Mcdonnell, PT Physical Therapist    Jerry Mcmahon, PT Physical Therapist         Time Calculation:   PT Charges     Row Name 04/05/19 0914             Time Calculation    Start Time  0914  -MJ      PT Received On  04/05/19  -      PT Goal Re-Cert Due Date  04/10/19  -         Time Calculation- PT    Total Timed Code Minutes- PT  15 minute(s)  -         Timed Charges    50050 - PT Therapeutic Exercise Minutes  7  -MJ      55196 - Gait Training Minutes   8  -MJ        User Key  (r) = Recorded By, (t) = Taken By, (c) = Cosigned By    Initials Name Provider Type    Jerry Mcmahon, PT Physical Therapist        Therapy Charges for Today     Code Description Service Date Service Provider Modifiers Qty    25586949199 HC GAIT TRAINING EA 15 MIN 4/5/2019 Jerry Echavarria, PT GP 1    61848686868 HC PT THER SUPP EA 15 MIN 4/5/2019 Jerry Echavarria, PT GP 1          PT G-Codes  Outcome Measure Options: AM-PAC 6 Clicks Basic Mobility (PT)  AM-PAC 6 Clicks Score: 12  Score: 10    Jerry Echavarria PT  4/5/2019

## 2019-04-05 NOTE — PLAN OF CARE
Problem: Patient Care Overview  Goal: Plan of Care Review  Outcome: Ongoing (interventions implemented as appropriate)   04/05/19 0502   Coping/Psychosocial   Plan of Care Reviewed With patient   Plan of Care Review   Progress no change   OTHER   Outcome Summary Pt VSS but BP high this a.m., prn dose of Hydralzine givrn. Pt given Tylenol for pain, discomfort with movement and repositioning. Pt confused at night, restless, removing tele, gown, removes coverderm dressings, and pulled out IV. Pt incontinent and had 2 BMs. Pt will kick off waffle boots when restless, mepilex to bilateral heels intact. Pt NSR on tele. Pt awake most of the shift.        Problem: Skin Injury Risk (Adult)  Goal: Identify Related Risk Factors and Signs and Symptoms  Outcome: Ongoing (interventions implemented as appropriate)    Goal: Skin Health and Integrity  Outcome: Ongoing (interventions implemented as appropriate)      Problem: Fall Risk (Adult)  Goal: Identify Related Risk Factors and Signs and Symptoms  Outcome: Ongoing (interventions implemented as appropriate)    Goal: Absence of Fall  Outcome: Ongoing (interventions implemented as appropriate)      Problem: Pain, Acute (Adult)  Goal: Identify Related Risk Factors and Signs and Symptoms  Outcome: Ongoing (interventions implemented as appropriate)    Goal: Acceptable Pain Control/Comfort Level  Outcome: Ongoing (interventions implemented as appropriate)      Problem: Hip Arthroplasty (Total, Partial) (Adult)  Goal: Signs and Symptoms of Listed Potential Problems Will be Absent, Minimized or Managed (Hip Arthroplasty)  Outcome: Ongoing (interventions implemented as appropriate)

## 2019-04-05 NOTE — DISCHARGE PLACEMENT REQUEST
"Dona Crews  [phone]  Short term rehab bed    Don Meade (84 y.o. Female)     Date of Birth Social Security Number Address Home Phone MRN    1934  [id number]   [facility] [address] [phone] [id number]    Jehovah's witness Marital Status          Amish        Admission Date Admission Type Admitting Provider Attending Provider Department, Room/Bed    3/29/19 Emergency Aile Kruger MD Seward, Kathryn L, MD [facility] 3H, S369/1    Discharge Date Discharge Disposition Discharge Destination                       Attending Provider:  Alie Kruger MD    Allergies:  Penicillins    Isolation:  None   Infection:  None   Code Status:  CPR    Ht:  154.9 cm (61\")   Wt:  49.9 kg (110 lb)    Admission Cmt:  None   Principal Problem:  None                Active Insurance as of 3/29/2019     Primary Coverage     Payor Plan Insurance Group Employer/Plan Group    HUMANA MEDICARE REPLACEMENT HUMANA MEDICARE REPL [id number]     Payor Plan Address Payor Plan Phone Number Payor Plan Fax Number Effective Dates    [address] [address] [phone]  2018 - None Entered    [facility] [address]       Subscriber Name Subscriber Birth Date Member ID       DON MEADE 1934 [id number]                 Emergency Contacts      (Rel.) Home Phone Work Phone Mobile Phone    Josh Meade (Son) -- -- [phone]    Gloria Giron (Daughter) [phone] -- --               History & Physical      Pennie Perez MD at 3/29/2019 11:17 AM              [facility] Medicine Services  HISTORY AND PHYSICAL    Patient Name: Don Meade  : 1934  MRN: [id number]  Primary Care Physician: Nirmal Lundberg MD  Date of admission: 3/29/2019      Subjective   Subjective     Chief Complaint:  Mechanical fall     HPI:    Lorna Meade is an 84yoF with h/o HTN, pAF on anticoagulation, T2DM, previous left hip fracture s/p repair who presents after " mechanical fall at home. Patient reports that she was getting up to go to the restroom around midnight, does recall falling but not much after that. The next thing she remembers is being in the hospital. Now complaining of significant hip pain. In the ED, imaging noted right closed hip fracture. Orthopedics consulted and planning for operative repair 3/29.     Review of Systems       Otherwise complete ROS reviewed and is negative except as mentioned in the HPI.    Personal History     Past Medical History:   Diagnosis Date   • Atrial fibrillation (CMS/HCC)    • Benign essential hypertension    • CAD (coronary artery disease)    • Carotid artery disease (CMS/HCC)    • CHF (congestive heart failure) (CMS/HCC)    • Chronic allergic rhinitis    • CKD (chronic kidney disease), stage III (CMS/HCC)    • DDD (degenerative disc disease), cervical    • Dementia    • Diabetes mellitus (CMS/HCC)    • Diverticulosis     WITH PERFORATION    • Hypertension    • Major depressive disorder, single episode, mild (CMS/HCC)    • Memory loss    • Mixed hyperlipidemia    • SSS (sick sinus syndrome) (CMS/HCC)    • TIA (transient ischemic attack) 01/2016       Past Surgical History:   Procedure Laterality Date   • ANKLE SURGERY Left 1996   • ATHERECTOMY  1995   • ATHRECTOMY ILIAC, FEMORAL, TIBIAL ARTERY     • BREAST LUMPECTOMY Left 1981   • COLON SURGERY     • COLOSTOMY  1983   • CORONARY STENT PLACEMENT  2001   • FINGER FRACTURE SURGERY Right    • HIP TROCANTERIC NAILING WITH INTRAMEDULLARY HIP SCREW Left 11/23/2017   • HYSTERECTOMY  1984   • OTHER SURGICAL HISTORY  1984    REANASTAMOSIS        Family History: family history includes Coronary artery disease (age of onset: 56) in her mother; Hypertension in her father and mother; Lung cancer in her brother and father; Osteoporosis in her sister. Otherwise pertinent FHx was reviewed and unremarkable.     Social History:  reports that she quit smoking about 40 years ago. Her smoking use  "included cigarettes. She started smoking about 43 years ago. She quit after 2.00 years of use. She has never used smokeless tobacco. She reports that she does not drink alcohol or use drugs.  Social History     Social History Narrative    Patient lives in [facility]    Patient drinks 2 cups of caffeine per day.,       Medications:    Available home medication information reviewed.    (Not in a hospital admission)    Allergies   Allergen Reactions   • Penicillins Other (See Comments)     Pt states \"i don't know\"       Objective   Objective     Vital Signs:   Temp:  [98.2 °F (36.8 °C)] 98.2 °F (36.8 °C)  Heart Rate:  [62-69] 69  Resp:  [16-18] 16  BP: (197-220)/(77-95) 199/78        Physical Exam   GEN- moaning in pain, laying in bed, son at bedside  HEENT- atraumatic, normocephlic, eomi  NECK- supple, trachea midline, no masses  RESP: ctab, normal effort  CV: no murmurs, s1/s2, rrr  MSK: no edema noted, RLE shortened and internally rotated  NEURO: alert, oriented, no focal deficits  SKIN: no rashes  PSYCH: appropriate mood and affect     Results Reviewed:  I have personally reviewed current lab, radiology, and data and agree.    Results from last 7 days   Lab Units 03/29/19  0739   WBC 10*3/mm3 10.65   HEMOGLOBIN g/dL 11.9   HEMATOCRIT % 37.6   PLATELETS 10*3/mm3 267     Results from last 7 days   Lab Units 03/29/19  0739   SODIUM mmol/L 136   POTASSIUM mmol/L 3.7   CHLORIDE mmol/L 101   CO2 mmol/L 25.0   BUN mg/dL 30*   CREATININE mg/dL 1.20   GLUCOSE mg/dL 219*   CALCIUM mg/dL 9.2   ALT (SGPT) U/L 38   AST (SGOT) U/L 35*     Estimated Creatinine Clearance: 27.5 mL/min (by C-G formula based on SCr of 1.2 mg/dL).  Brief Urine Lab Results  (Last result in the past 365 days)      Color   Clarity   Blood   Leuk Est   Nitrite   Protein   CREAT   Urine HCG        04/07/18 0839 Yellow Clear Negative Negative Negative Negative             No results found for: BNP  Imaging Results (last 24 hours)     Procedure " Component Value Units Date/Time    XR Chest 1 View [[id number]] Collected:  03/29/19 0653     Updated:  03/29/19 0946    Narrative:       EXAM:    XR Chest, 1 View     EXAM DATE/TIME:    3/29/2019 6:53 AM     CLINICAL HISTORY:    84 years old, female; Signs and symptoms; Other: Pre op     TECHNIQUE:    Imaging protocol: XR of the chest, 1 view.     COMPARISON:    CR XR CHEST 1 VW 3/3/2019 6:44 PM     FINDINGS:     Small RIGHT pleural effusion, pleural thickening, RIGHT base atelectasis and   consolidation without significant change compared to a prior study.   Cardiomegaly with normal lung vascularity. Tortuous calcific aorta. Chronic   degenerative changes in the visualized spine and shoulder joints.       Impression:       Small RIGHT pleural effusion, pleural thickening, RIGHT base atelectasis and   consolidation without significant change compared to a prior study.     THIS DOCUMENT HAS BEEN ELECTRONICALLY SIGNED BY PATRICIA KATZ MD    XR Pelvis 1 or 2 View [[id number]] Collected:  03/29/19 0644     Updated:  03/29/19 0945    Narrative:       EXAM:    XR Pelvis, 1 or 2 Views     EXAM DATE/TIME:    3/29/2019 6:44 AM     CLINICAL HISTORY:    84 years old, female; Pain; Hip pain; Right hip; Additional info: Trauma     TECHNIQUE:    Imaging protocol: XR pelvis, 1 or 2 views     COMPARISON:    CR XR HIP W OR WO PELVIS 2-3 VIEW LEFT 12/12/2017 8:21 AM     FINDINGS:     Comminuted, displaced intertrochanteric fracture of the RIGHT femur.     Coarse bony trabecular pattern suggestive of osteopenia, chronic degenerative   changes in the lower lumbar spine, sacroiliac joints. Internal fixation of   proximal LEFT femur.       Impression:       Comminuted, displaced intertrochanteric ACUTE FRACTURE of the RIGHT femur.     THIS DOCUMENT HAS BEEN ELECTRONICALLY SIGNED BY PATRICIA KATZ MD    XR Femur 2 View Right [[id number]] Collected:  03/29/19 0644     Updated:  03/29/19 0945    Narrative:       EXAM:    XR Right Femur, 2 Views      EXAM DATE/TIME:    3/29/2019 6:44 AM     CLINICAL HISTORY:    84 years old, female; Pain; Thigh; Right; Additional info: Trauma     TECHNIQUE:    Imaging protocol: XR Right femur, 2 views     COMPARISON:    No relevant prior studies available.     FINDINGS:     Comminuted, displaced intertrochanteric fracture of the RIGHT femur.     Chronic degenerative changes in the RIGHT sacroiliac, hip and knee joints.       Impression:       Comminuted, displaced intertrochanteric ACUTE FRACTURE of the RIGHT femur.     THIS DOCUMENT HAS BEEN ELECTRONICALLY SIGNED BY PATRICIA KATZ MD        Results for orders placed during the hospital encounter of 03/03/19   Transthoracic Echo Complete With Contrast if Necessary Per Protocol    Narrative · Calculated EF = 66.0%  · Left ventricular systolic function is normal.  · Left ventricular wall thickness is consistent with moderate concentric   hypertrophy.  · Left atrial cavity size is severely dilated.  · Left ventricular diastolic dysfunction (grade I) consistent with   impaired relaxation.  · Mild tricuspid valve regurgitation is present.  · Mild-to-moderate mitral valve regurgitation is present  · There is calcification of the aortic valve.          Assessment/Plan   Assessment / Plan     Active Hospital Problems    Diagnosis POA   • Closed fracture of right hip (CMS/[facility]) [S72.001A] Yes         Lorna Ramirez is an 84yoF with h/o HTN, pAF on anticoagulation, T2DM, previous left hip fracture s/p repair who presents after mechanical fall at home, found to have close fracture of right hip. Orthopedics consulted and planning for operative repair 3/29. Admitted to hospitalists.    Plan  --continue pain control as needed  --NPO for OR at this time, orthopedics aware and planning this afternoon  --SSI as needed  --anticoagulation on hold  --BP very uncontrolled, will add back a few of patient's home medications for BP, though feel much of HTN driven by pain, will watch closely      DVT  prophylaxis: [facility]    CODE STATUS:    Code Status and Medical Interventions:   Ordered at: 19 0828     Level Of Support Discussed With:    Patient     Code Status:    CPR     Medical Interventions (Level of Support Prior to Arrest):    Full       Admission Status:  I believe this patient meets INPATIENT status due to the need for care which can only be reasonably provided in an hospital setting such as possible need for aggressive/expedited ancillary services and/or consultation services, IV medications, close physician monitoring, and/or procedures.  In such, I feel patient’s risk for adverse outcomes and need for care warrant INPATIENT evaluation and predict the patient’s care encounter to likely last beyond 2 midnights.      Electronically signed by Pennie Perez MD, 19, 11:17 AM.          Electronically signed by Pennie Perez MD at 3/29/2019 11:32 AM          Physician Progress Notes (most recent note)      Alie Kruger MD at 2019  1:55 PM              [facility] Medicine Services  PROGRESS NOTE    Patient Name: Mary Ramirez  : 1934  MRN: [id number]    Date of Admission: 3/29/2019  Length of Stay: 6  Primary Care Physician: Nirmal Lundberg MD    Subjective   Subjective     CC:  Fall     HPI:  Patient doing well this morning. No complaints. Denies any pain. No events overnight     Review of Systems  Gen- No fevers, chills  CV- No chest pain, palpitations  Resp- No cough, dyspnea  GI- No N/V/D, abd pain    Otherwise ROS is negative except as mentioned in the HPI.    Objective   Objective     Vital Signs:   Temp:  [98.5 °F (36.9 °C)-99.2 °F (37.3 °C)] 99.2 °F (37.3 °C)  Heart Rate:  [72-80] 77  Resp:  [14-17] 16  BP: (147-184)/(57-71) 160/69        Physical Exam:  Constitutional: Sleeping but easily awoken   Respiratory: Clear to auscultation bilaterally A/P, respiratory effort normal   Cardiovascular: RRR, II/VI murmur, no rubs, or gallops,  palpable pedal pulses bilaterally  Gastrointestinal: Positive bowel sounds, soft, nontender, nondistended  Musculoskeletal: No bilateral ankle edema.   Psychiatric: Appropriate affect, cooperative calm.  Neurologic: Sleepy. Oriented x 2, confused at baseline.  Speech clear.  Follows commands.    Skin: No rashes. RLE hip drsg c/d/i.  Mild edema to thigh.  Pain cath in place to rt groin.        Results Reviewed:  I have personally reviewed current lab, radiology, and data and agree.    Results from last 7 days   Lab Units 04/03/19  0742 04/02/19  0850 04/01/19  1642   WBC 10*3/mm3 10.05 13.08* 12.42*   HEMOGLOBIN g/dL 9.3* 6.9* 7.2*   HEMATOCRIT % 28.9* 23.1* 23.5*   PLATELETS 10*3/mm3 243 253 240     Results from last 7 days   Lab Units 04/02/19  0850 04/01/19  0658 03/31/19  0705  03/29/19  0739   SODIUM mmol/L 133 131* 133   < > 136   POTASSIUM mmol/L 3.9 4.3 3.8   < > 3.7   CHLORIDE mmol/L 102 97* 98*   < > 101   CO2 mmol/L 23.0 25.0 26.0   < > 25.0   BUN mg/dL 27* 35* 40*   < > 30*   CREATININE mg/dL 0.96 1.30 1.52*   < > 1.20   GLUCOSE mg/dL 184* 209* 219*   < > 219*   CALCIUM mg/dL 8.3* 9.0 9.0   < > 9.2   ALT (SGPT) U/L  --   --   --   --  38   AST (SGOT) U/L  --   --   --   --  35*    < > = values in this interval not displayed.     Estimated Creatinine Clearance: 34.4 mL/min (by C-G formula based on SCr of 0.96 mg/dL).    No results found for: BNP    Microbiology Results Abnormal     None          Imaging Results (last 24 hours)     ** No results found for the last 24 hours. **          Results for orders placed during the hospital encounter of 03/03/19   Transthoracic Echo Complete With Contrast if Necessary Per Protocol    Narrative · Calculated EF = 66.0%  · Left ventricular systolic function is normal.  · Left ventricular wall thickness is consistent with moderate concentric   hypertrophy.  · Left atrial cavity size is severely dilated.  · Left ventricular diastolic dysfunction (grade I) consistent with    impaired relaxation.  · Mild tricuspid valve regurgitation is present.  · Mild-to-moderate mitral valve regurgitation is present  · There is calcification of the aortic valve.          I have reviewed the medications:    Current Facility-Administered Medications:   •  acetaminophen (TYLENOL) tablet 500 mg, 500 mg, Oral, Q6H PRN, Pennie Perez MD, 500 mg at 04/04/19 0949  •  acetaminophen (TYLENOL) tablet 650 mg, 650 mg, Oral, Q4H PRN, Theo Figueroa MD  •  amiodarone (PACERONE) tablet 200 mg, 200 mg, Oral, Q24H, Marian Langston DO, 200 mg at 04/04/19 0844  •  aspirin EC tablet 325 mg, 325 mg, Oral, Q12H, Alie Kruger MD, 325 mg at 04/04/19 0844  •  bisacodyl (DULCOLAX) EC tablet 5 mg, 5 mg, Oral, Daily PRN, Pennie Perez MD, 5 mg at 03/31/19 0551  •  citalopram (CeleXA) tablet 20 mg, 20 mg, Oral, Daily, Pennie Perez MD, 20 mg at 04/04/19 0844  •  dextrose (D50W) 25 g/ 50mL Intravenous Solution 25 g, 25 g, Intravenous, Q15 Min PRN, Pennie Perez MD  •  dextrose (GLUTOSE) oral gel 15 g, 15 g, Oral, Q15 Min PRN, Pennie Perez MD  •  donepezil (ARICEPT) tablet 5 mg, 5 mg, Oral, Nightly, Pennie Perez MD, 5 mg at 04/03/19 2122  •  fluticasone (FLONASE) 50 MCG/ACT nasal spray 2 spray, 2 spray, Nasal, Daily, Pennie Perez MD, 2 spray at 04/04/19 0846  •  glucagon (human recombinant) (GLUCAGEN DIAGNOSTIC) injection 1 mg, 1 mg, Subcutaneous, PRN, Pennie Perez MD  •  hydrALAZINE (APRESOLINE) tablet 25 mg, 25 mg, Oral, Q8H, Pennie Perez MD, 25 mg at 04/04/19 0532  •  HYDROmorphone (DILAUDID) injection 0.5 mg, 0.5 mg, Intravenous, Q2H PRN, Marian Langston, DO, 0.5 mg at 03/30/19 1853  •  insulin detemir (LEVEMIR) injection 6 Units, 6 Units, Subcutaneous, QAM, Shanta Evans, APRN, 6 Units at 04/04/19 0844  •  insulin lispro (humaLOG) injection 0-9 Units, 0-9 Units, Subcutaneous, 4x Daily With Meals & Nightly, Shanta Evans, APRN, 7 Units at 04/04/19 1235  •   insulin lispro (humaLOG) injection 2 Units, 2 Units, Subcutaneous, TID With Meals, Shanta Evans, APRN, 2 Units at 04/04/19 1234  •  isosorbide mononitrate (IMDUR) 24 hr tablet 60 mg, 60 mg, Oral, Q24H, Marian Langston, , 60 mg at 04/04/19 0844  •  lactated ringers infusion, 9 mL/hr, Intravenous, Continuous, Vish Cheek MD, Last Rate: 9 mL/hr at 03/30/19 1126, 9 mL/hr at 03/30/19 1126  •  ondansetron (ZOFRAN) tablet 4 mg, 4 mg, Oral, Q8H PRN, Pennie Perez MD  •  oxyCODONE (ROXICODONE) immediate release tablet 10 mg, 10 mg, Oral, Q4H PRN, Theo Figueroa MD, 10 mg at 03/30/19 1512  •  oxyCODONE (ROXICODONE) immediate release tablet 5 mg, 5 mg, Oral, Q4H PRN, Theo Figueroa MD  •  polyethylene glycol 3350 powder (packet), 17 g, Oral, Daily, Pennie Perez MD, 17 g at 04/04/19 0846  •  ropivacaine (NAROPIN) 0.2% peripheral nerve cath (moog), 8 mL/hr, Peripheral Nerve, Continuous, Dollar, Chanelle, CRNA, Last Rate: 8 mL/hr at 04/01/19 0601, 8 mL/hr at 04/01/19 0601  •  sodium chloride 0.9 % flush 3 mL, 3 mL, Intravenous, Q12H, Pennie Perez MD, 3 mL at 04/01/19 0841  •  sodium chloride 0.9 % flush 3 mL, 3 mL, Intravenous, Q12H, Theo Figueroa MD, 3 mL at 03/30/19 2035  •  sodium chloride 0.9 % flush 3-10 mL, 3-10 mL, Intravenous, PRN, Pennie Perez MD  •  sodium chloride 0.9 % flush 3-10 mL, 3-10 mL, Intravenous, PRN, Theo Figueroa MD      Assessment/Plan   Assessment / Plan     Active Hospital Problems    Diagnosis POA   • Closed fracture of right hip (CMS/HCC) [S72.001A] Yes   • CKD (chronic kidney disease), stage III (CMS/[facility]) [N18.3] Yes   • Chronic diastolic congestive heart failure (CMS/HCC) [I50.32] Yes     · Echo (9/1/18): LVEF 60%. LVH. Left atrial dilatation. Aortic calcification with no stenosis.     • Paroxysmal atrial fibrillation (CMS/HCC) [I48.0] Yes     · Chads Vasc 6 (age > 75, female, CAD, DM, HTN)   · Rhythm control strategy with amiodarone     • SSS  (sick sinus syndrome) (CMS/HCC) [I49.5] Yes   • Dementia [F03.90] Yes   • Essential hypertension [I10] Yes   • Coronary artery disease involving native coronary artery of native heart with angina pectoris (CMS/[facility]) [I25.119] Yes     · Cardiac cath with PCI data deficit  · Echo (9/1/18): LVEF 60%. LVH. Left atrial dilatation. Aortic calcification with no stenosis.     • Fall [W19.XXXA] Yes          Brief Hospital Course to date:  Mary Ramirez is a 84 y.o. female with history of HTN, paroxysmal afib, DM, CKD 3 and left hip fracture s/p repair who presents with fall and fracture of right hip. OR 3/30.     Right hip fracture   - s/p repair 3/30   -  mg BID   - dressing clean and dry until post op day 7   --Dr. Figueroa, orthopedic recs:  Protected weight bearing as tolerated right lower extremity, hip range of motion as tolerated  23 hours perioperative antibiotic prophylaxis   SCD's bilateral lower extremities   Aspirin for DVT prophylaxis   Social work for discharge planning.   Dressing to remain in place for 7 days. May remove on POD#7. If no drainage, may shower on POD#10. No submerging wound in water. If drainage is noted, sterile dressing should be placed and wound checked daily. No showering until wound has remained dry for 72 consecutive hours.   Follow up in 2 weeks for re-assessment.    HTN   - Uncontrolled  - Continue amlodipine, clonidine, hydralazine and imdur  - Resume home aldactone.     Paroxysmal afib   - resume home amiodarone   - Previously on eliquis;  mg BID resumed by ortho  - discuss with family with recent falls and risk of bleeding.  Discussed with pharm D today.  Likely may need to come off eliquis indefinitely due to advanced dementia and falls.      DM   - Uncontrolled.   - Hold orals; SSI   - Increase Levemir.     Post op anemia  --Hemoglobin trending down today. Suspect postoperative as patient stable with no signs of active bleeding.   -- responded to 1u PRBCs    Lt heel  wound  --area of appearing nonblanchable tissue to lt heel.  --elevated heels off bed, waffle boots as needed  --woc consulted and following    CKD 3   - Cr around baseline; monitor   --Creatinine improved today but pt still not drinking much and Na/cl decreased.  Continue VFs today and encourage fluid intake.      DVT Prophylaxis:   mg BID     Disposition: I expect the patient to be discharged rehab TBD     CODE STATUS:   Code Status and Medical Interventions:   Ordered at: 19 0828     Level Of Support Discussed With:    Patient     Code Status:    CPR     Medical Interventions (Level of Support Prior to Arrest):    Full         Electronically signed by Alie Kruger MD, 19, 1:55 PM.        Electronically signed by Alie Kruger MD at 2019  2:03 PM          Physical Therapy Notes (most recent note)      Jerry Echavarria, PT at 2019  9:40 AM  Version 1 of 1         Acute Care - Physical Therapy Treatment Note  [facility]     Patient Name: Mary Ramirez  : 1934  MRN: [id number]  Today's Date: 2019  Onset of Illness/Injury or Date of Surgery: 19  Date of Referral to PT: 19  Referring Physician: hospitalist    Admit Date: 3/29/2019    Visit Dx:    ICD-10-CM ICD-9-CM   1. Closed fracture of right hip, initial encounter (CMS/[facility]) S72.001A 820.8   2. Hypertension, unspecified type I10 401.9   3. Closed displaced intertrochanteric fracture of right femur, initial encounter (CMS/[facility]) S72.141A 820.21   4. Impaired mobility and ADLs Z74.09 799.89     Patient Active Problem List   Diagnosis   • Fall   • Fracture, intertrochanteric, left femur (CMS/[facility])   • Essential hypertension   • Coronary artery disease involving native coronary artery of native heart with angina pectoris (CMS/[facility])   • Dementia   • Paroxysmal atrial fibrillation (CMS/[facility])   • Anticoagulated   • SSS (sick sinus syndrome) (CMS/[facility])   • Chronic diastolic congestive heart failure (CMS/[facility])   • DEANGELO  (acute kidney injury) (CMS/HCC)   • Benign essential hypertension   • CKD (chronic kidney disease), stage III (CMS/[facility])   • Mixed hyperlipidemia   • Major depressive disorder, single episode, mild (CMS/[facility])   • Chronic allergic rhinitis   • Memory loss   • Bradycardia   • At risk for falls   • Closed fracture of femur (CMS/HCC)   • Coronary arteriosclerosis after percutaneous transluminal coronary angioplasty (PTCA)   • Diabetic polyneuropathy (CMS/[facility])   • Diverticular disease of colon   • Foot pain   • GERD (gastroesophageal reflux disease)   • Peripheral artery insufficiency (CMS/[facility])   • Annual physical exam   • Vitamin D deficiency   • Renal disorder   • Closed fracture of right hip (CMS/[facility])       Therapy Treatment    Rehabilitation Treatment Summary     Row Name 04/05/19 0914             Treatment Time/Intention    Discipline  physical therapist  -      Document Type  therapy note (daily note)  -      Subjective Information  complains of;fatigue  -MJ      Mode of Treatment  physical therapy  -MJ      Patient/Family Observations  Pt sitting UIC  -      Care Plan Review  patient/other agree to care plan  -MJ      Patient Effort  good  -MJ      Existing Precautions/Restrictions  fall;other (see comments) confusion/exit alarm  -MJ      Recorded by [MJ] Jerry Echavarria, PT 04/05/19 0937      Row Name 04/05/19 0914             Cognitive Assessment/Intervention- PT/OT    Affect/Mental Status (Cognitive)  confused  -MJ      Orientation Status (Cognition)  oriented to;person;disoriented to;place;time  -MJ      Follows Commands (Cognition)  follows one step commands;75-90% accuracy;verbal cues/prompting required;repetition of directions required  -MJ      Safety Deficit (Cognitive)  mild deficit  -MJ      Recorded by [MJ] Jerry Echavarria, PT 04/05/19 0937      Row Name 04/05/19 0914             Safety Issues, Functional Mobility    Impairments Affecting Function (Mobility)  balance;pain;strength;cognition  -MJ       Recorded by [MJ] Jerry Echavarria, PT 04/05/19 0937      Row Name 04/05/19 0914             Mobility Assessment/Intervention    Extremity Weight-bearing Status  right lower extremity  -MJ      Right Lower Extremity (Weight-bearing Status)  weight-bearing as tolerated (WBAT)  -MJ      Recorded by [MJ] Jerry Echavarria, PT 04/05/19 0937      Row Name 04/05/19 0914             Bed Mobility Assessment/Treatment    Comment (Bed Mobility)  NT- pt [facility]  -MJ      Recorded by [MJ] Jerry Echavarria, PT 04/05/19 0937      Row Name 04/05/19 0914             Transfer Assessment/Treatment    Transfer Assessment/Treatment  sit-stand transfer;stand-sit transfer  -MJ      Comment (Transfers)  Verbal cues for correct hand placement. Pt with decreased safety for stand to sit, trying to sit before chair pulled up behind her  -MJ      Recorded by [MJ] Jerry Echavarria, PT 04/05/19 0937      Row Name 04/05/19 0914             Sit-Stand Transfer    Sit-Stand Boyd (Transfers)  moderate assist (50% patient effort);2 person assist;verbal cues  -MJ      Assistive Device (Sit-Stand Transfers)  walker, front-wheeled  -MJ      Recorded by [MJ] Jerry Echavarria, PT 04/05/19 0937      Row Name 04/05/19 0914             Stand-Sit Transfer    Stand-Sit Boyd (Transfers)  minimum assist (75% patient effort);verbal cues  -MJ      Assistive Device (Stand-Sit Transfers)  walker, front-wheeled  -MJ      Recorded by [MJ] Jrery Echavarria, PT 04/05/19 0937      Row Name 04/05/19 0914             Gait/Stairs Assessment/Training    97980 - Gait Training Minutes   8  -MJ      Boyd Level (Gait)  minimum assist (75% patient effort);1 person to manage equipment;verbal cues  -MJ      Assistive Device (Gait)  walker, front-wheeled  -MJ      Distance in Feet (Gait)  10  -MJ      Pattern (Gait)  step-to  -MJ      Deviations/Abnormal Patterns (Gait)  right sided deviations;antalgic;bilateral deviations;trey decreased;stride length decreased  -MJ      Bilateral Gait  Deviations  forward flexed posture;heel strike decreased;weight shift ability decreased  -MJ      Comment (Gait/Stairs)  Pt demo step to gait pattern at slow pace. Verbal cues for upright posture and to stay in middle of RW. Chair follow for safety. Gait limited by fatigue  -MJ      Recorded by [MJ] Jerry Echavarria, PT 04/05/19 0937      Row Name 04/05/19 0914             Therapeutic Exercise    98925 - PT Therapeutic Exercise Minutes  7  -MJ      Recorded by [MJ] Jerry Echavarria, PT 04/05/19 0937      Row Name 04/05/19 0914             Therapeutic Exercise    Lower Extremity (Therapeutic Exercise)  gluteal sets;heel slides, right;quad sets, right;SLR (straight leg raise), right  -MJ      Lower Extremity Range of Motion (Therapeutic Exercise)  hip abduction/adduction, right;ankle dorsiflexion/plantar flexion, right  -MJ      Exercise Type (Therapeutic Exercise)  AROM (active range of motion)  -MJ      Position (Therapeutic Exercise)  seated  -MJ      Sets/Reps (Therapeutic Exercise)  15x each  -MJ      Comment (Therapeutic Exercise)  Cues for technique. Denys to initiate heel slides  -MJ      Recorded by [MJ] Jerry Echavarria, PT 04/05/19 0937      Row Name 04/05/19 0914             Positioning and Restraints    Pre-Treatment Position  sitting in chair/recliner  -MJ      Post Treatment Position  chair  -MJ      In Chair  notified nsg;reclined;call light within reach;encouraged to call for assist;exit alarm on;on mechanical lift sling  -MJ      Recorded by [MJ] Jerry Echavarria, PT 04/05/19 0937      Row Name 04/05/19 0914             Pain Scale: Numbers Pre/Post-Treatment    Pain Scale: Numbers, Pretreatment  0/10 - no pain  -MJ      Pain Scale: Numbers, Post-Treatment  2/10  -MJ      Pain Location - Side  Right  -MJ      Pain Location  hip  -MJ      Pain Intervention(s)  Repositioned;Ambulation/increased activity  -MJ      Recorded by [MJ] Jerry Echavarria, PT 04/05/19 0937      Row Name                Wound 03/30/19 0907 Right hip  incision    Wound - Properties Group Date first assessed: 03/30/19 [JS] Time first assessed: 0907 [JS] Side: Right [JS] Location: hip [JS] Type: incision [JS] Recorded by:  [JS] Lilia Xiao RN 03/30/19 0907    Row Name                Wound 03/31/19 1500 Left medial heel other (see comments)    Wound - Properties Group Date first assessed: 03/31/19 [CP] Time first assessed: 1500 [CP] Present On Admission : no;picture taken [CP] Side: Left [CP] Orientation: medial [CP] Location: heel [CP] Type: other (see comments) [CP] Additional Comments: bruise vs DTPI [CP] Recorded by:  [CP] Abby Kirby APRN 03/31/19 1535    Row Name                Wound 03/31/19 1500 Right lateral heel other (see comments)    Wound - Properties Group Date first assessed: 03/31/19 [CP] Time first assessed: 1500 [CP] Present On Admission : no;picture taken [CP] Side: Right [CP] Orientation: lateral [CP] Location: heel [CP] Type: other (see comments) [CP] Additional Comments: bruise vs DTPI [CP] Recorded by:  [CP] Abby Kirby APRN 03/31/19 1537    Row Name 04/05/19 0914             Plan of Care Review    Plan of Care Reviewed With  patient  -MJ      Recorded by [MJ] Jerry Echavarria, PT 04/05/19 0937      Row Name 04/05/19 0914             Outcome Summary/Treatment Plan (PT)    Daily Summary of Progress (PT)  progress toward functional goals is gradual  -MJ      Recorded by [MJ] Jerry Echavarria, PT 04/05/19 0937        User Key  (r) = Recorded By, (t) = Taken By, (c) = Cosigned By    Initials Name Effective Dates Discipline    CP Abby Kirby APRN 02/05/19 -  Nurse    Jerry Mcmahon, PT 04/03/18 -  PT    Lilia Pagan RN 01/23/17 -  Nurse          Wound 03/30/19 0907 Right hip incision (Active)   Dressing Appearance dry;intact 4/4/2019 10:00 PM   Dressing Care, Wound dressing changed 4/5/2019 12:30 AM       Wound 03/31/19 1500 Left medial heel other (see comments) (Active)   Dressing Appearance  dry;intact;no drainage 4/4/2019 10:00 PM   Base maroon/purple 4/4/2019 10:30 AM   Periwound intact;dry;pink;blanchable 4/4/2019 10:30 AM   Edges irregular 4/4/2019 10:30 AM   Wound Length (cm) 2.2 cm 4/4/2019 10:30 AM   Wound Width (cm) 1.5 cm 4/4/2019 10:30 AM   Wound Depth (cm) 0 cm 4/4/2019 10:30 AM   Drainage Amount none 4/4/2019 10:30 AM   Care, Wound irrigated with;sterile normal saline;ultrasound therapy, non contact low frequency 4/4/2019 10:30 AM   Dressing Care, Wound low-adherent 4/4/2019 10:00 PM       Wound 03/31/19 1500 Right lateral heel other (see comments) (Active)   Dressing Appearance dry;intact;no drainage 4/4/2019 10:00 PM   Base maroon/purple 4/4/2019 10:30 AM   Periwound intact;dry;pink;blanchable 4/4/2019 10:30 AM   Edges irregular 4/4/2019 10:30 AM   Wound Length (cm) 1 cm 4/4/2019 10:30 AM   Wound Width (cm) 1 cm 4/4/2019 10:30 AM   Wound Depth (cm) 0 cm 4/4/2019 10:30 AM   Drainage Amount none 4/4/2019 10:30 AM   Care, Wound irrigated with;sterile normal saline;ultrasound therapy, non contact low frequency 4/4/2019 10:30 AM   Dressing Care, Wound low-adherent 4/4/2019 10:00 PM           Physical Therapy Education     Title: PT OT SLP Therapies (In Progress)     Topic: Physical Therapy (In Progress)     Point: Mobility training (In Progress)     Learning Progress Summary           Patient Acceptance, E,D, NR by REJI at 4/5/2019  9:14 AM    Comment:  Reviewed HEP, gait mechanics, safety with mobility    Acceptance, E,D, VU,NR by ANTONINA at 4/4/2019  8:38 AM    Comment:  Educated on precautions, weight bearing status, correct supine to sit t/f technique, correct gait mechanics, correct sit<->stand t/f technique, HEP, and progression of POC.    Acceptance, E,D, NR by REJI at 4/3/2019  1:24 PM    Comment:  Reviewed HEP, gait mechanics, benefits of mobility    Acceptance, E,D, NR by MJ at 4/3/2019  9:45 AM    Comment:  Reviewed indications to discontinue knee immobilizer, HEP, gait mechanics, benefits of  mobility    Acceptance, D,E, NR by LR at 4/1/2019  9:58 AM    Comment:  Educated on precautions, weight bearing status, correct supine to sit t/f technique, correct sit<->stand t/f technique, correct gait mechanics, HEP, indications for use of knee immobilizer, and progression of POC.    Acceptance, E,TB,D, NR by CT at 3/31/2019 11:40 AM   Family Acceptance, D,E, NR by LR at 4/1/2019  9:58 AM    Comment:  Educated on precautions, weight bearing status, correct supine to sit t/f technique, correct sit<->stand t/f technique, correct gait mechanics, HEP, indications for use of knee immobilizer, and progression of POC.   Significant Other Acceptance, E,TB,D, NR by CT at 3/31/2019 11:40 AM                   Point: Home exercise program (In Progress)     Learning Progress Summary           Patient Acceptance, E,D, NR by MJ at 4/5/2019  9:14 AM    Comment:  Reviewed HEP, gait mechanics, safety with mobility    Acceptance, E,D, VU,NR by LR at 4/4/2019  8:38 AM    Comment:  Educated on precautions, weight bearing status, correct supine to sit t/f technique, correct gait mechanics, correct sit<->stand t/f technique, HEP, and progression of POC.    Acceptance, E,D, NR by MJ at 4/3/2019  1:24 PM    Comment:  Reviewed HEP, gait mechanics, benefits of mobility    Acceptance, E,D, NR by MJ at 4/3/2019  9:45 AM    Comment:  Reviewed indications to discontinue knee immobilizer, HEP, gait mechanics, benefits of mobility    Acceptance, D,E, NR by LR at 4/1/2019  9:58 AM    Comment:  Educated on precautions, weight bearing status, correct supine to sit t/f technique, correct sit<->stand t/f technique, correct gait mechanics, HEP, indications for use of knee immobilizer, and progression of POC.    Acceptance, E,TB,D, NR by CT at 3/31/2019 11:40 AM   Family Acceptance, D,E, NR by LR at 4/1/2019  9:58 AM    Comment:  Educated on precautions, weight bearing status, correct supine to sit t/f technique, correct sit<->stand t/f technique,  correct gait mechanics, HEP, indications for use of knee immobilizer, and progression of POC.   Significant Other Acceptance, E,TB,D, NR by CT at 3/31/2019 11:40 AM                   Point: Body mechanics (In Progress)     Learning Progress Summary           Patient Acceptance, E,D, NR by MJ at 4/5/2019  9:14 AM    Comment:  Reviewed HEP, gait mechanics, safety with mobility    Acceptance, E,D, VU,NR by LR at 4/4/2019  8:38 AM    Comment:  Educated on precautions, weight bearing status, correct supine to sit t/f technique, correct gait mechanics, correct sit<->stand t/f technique, HEP, and progression of POC.    Acceptance, E,D, NR by MJ at 4/3/2019  1:24 PM    Comment:  Reviewed HEP, gait mechanics, benefits of mobility    Acceptance, E,D, NR by MJ at 4/3/2019  9:45 AM    Comment:  Reviewed indications to discontinue knee immobilizer, HEP, gait mechanics, benefits of mobility    Acceptance, D,E, NR by LR at 4/1/2019  9:58 AM    Comment:  Educated on precautions, weight bearing status, correct supine to sit t/f technique, correct sit<->stand t/f technique, correct gait mechanics, HEP, indications for use of knee immobilizer, and progression of POC.    Acceptance, E,TB,D, NR by CT at 3/31/2019 11:40 AM   Family Acceptance, D,E, NR by LR at 4/1/2019  9:58 AM    Comment:  Educated on precautions, weight bearing status, correct supine to sit t/f technique, correct sit<->stand t/f technique, correct gait mechanics, HEP, indications for use of knee immobilizer, and progression of POC.   Significant Other Acceptance, E,TB,D, NR by CT at 3/31/2019 11:40 AM                   Point: Precautions (In Progress)     Learning Progress Summary           Patient Acceptance, E,D, NR by MJ at 4/5/2019  9:14 AM    Comment:  Reviewed HEP, gait mechanics, safety with mobility    Acceptance, E,D, VU,NR by LR at 4/4/2019  8:38 AM    Comment:  Educated on precautions, weight bearing status, correct supine to sit t/f technique, correct gait  mechanics, correct sit<->stand t/f technique, HEP, and progression of POC.    Acceptance, E,D, NR by MJ at 4/3/2019  1:24 PM    Comment:  Reviewed HEP, gait mechanics, benefits of mobility    Acceptance, E,D, NR by MJ at 4/3/2019  9:45 AM    Comment:  Reviewed indications to discontinue knee immobilizer, HEP, gait mechanics, benefits of mobility    Acceptance, D,E, NR by LR at 4/1/2019  9:58 AM    Comment:  Educated on precautions, weight bearing status, correct supine to sit t/f technique, correct sit<->stand t/f technique, correct gait mechanics, HEP, indications for use of knee immobilizer, and progression of POC.    Acceptance, E,TB,D, NR by CT at 3/31/2019 11:40 AM   Family Acceptance, D,E, NR by LR at 4/1/2019  9:58 AM    Comment:  Educated on precautions, weight bearing status, correct supine to sit t/f technique, correct sit<->stand t/f technique, correct gait mechanics, HEP, indications for use of knee immobilizer, and progression of POC.   Significant Other Acceptance, E,TB,D, NR by CT at 3/31/2019 11:40 AM                               User Key     Initials Effective Dates Name Provider Type Discipline    LR 06/19/15 -  lAta Ochoa, PT Physical Therapist PT    CT 06/19/15 -  Woo Bermudez, PT Physical Therapist PT     04/03/18 -  Jerry Echavarria, PT Physical Therapist PT                PT Recommendation and Plan  Therapy Frequency (PT Clinical Impression): 2 times/day  Outcome Summary/Treatment Plan (PT)  Daily Summary of Progress (PT): progress toward functional goals is gradual  Plan of Care Reviewed With: patient  Progress: improving  Outcome Summary: Pt ambulated 10 feet with Denys and RW, limited by fatigue. Pt required less assist with gait and ther ex. Pt anticipates discharge to rehab today, otherwise will continue to progress mobility as able.   Outcome Measures     Row Name 04/05/19 0914 04/04/19 0838 04/03/19 1324       How much help from another person do you currently  need...    Turning from your back to your side while in flat bed without using bedrails?  2  -MJ  2  -LR  2  -MJ    Moving from lying on back to sitting on the side of a flat bed without bedrails?  2  -MJ  2  -LR  2  -MJ    Moving to and from a bed to a chair (including a wheelchair)?  2  -MJ  2  -LR  2  -MJ    Standing up from a chair using your arms (e.g., wheelchair, bedside chair)?  2  -MJ  2  -LR  2  -MJ    Climbing 3-5 steps with a railing?  1  -MJ  1  -LR  1  -MJ    To walk in hospital room?  3  -MJ  2  -LR  2  -MJ    AM-PAC 6 Clicks Score  12  -MJ  11  -LR  11  -MJ       Functional Assessment    Outcome Measure Options  AM-PAC 6 Clicks Basic Mobility (PT)  -MJ  AM-PAC 6 Clicks Basic Mobility (PT)  -LR  AM-PAC 6 Clicks Basic Mobility (PT)  -MJ    Row Name 04/03/19 0945             How much help from another person do you currently need...    Turning from your back to your side while in flat bed without using bedrails?  2  -MJ      Moving from lying on back to sitting on the side of a flat bed without bedrails?  2  -MJ      Moving to and from a bed to a chair (including a wheelchair)?  2  -MJ      Standing up from a chair using your arms (e.g., wheelchair, bedside chair)?  2  -MJ      Climbing 3-5 steps with a railing?  1  -MJ      To walk in hospital room?  2  -MJ      AM-PAC 6 Clicks Score  11  -MJ         Functional Assessment    Outcome Measure Options  AM-PAC 6 Clicks Basic Mobility (PT)  -        User Key  (r) = Recorded By, (t) = Taken By, (c) = Cosigned By    Initials Name Provider Type    LR Alta Ochoa, PT Physical Therapist    MJ Jerry Echavarria, PT Physical Therapist         Time Calculation:   PT Charges     Row Name 04/05/19 0914             Time Calculation    Start Time  0914  -MJ      PT Received On  04/05/19  -      PT Goal Re-Cert Due Date  04/10/19  -         Time Calculation- PT    Total Timed Code Minutes- PT  15 minute(s)  -         Timed Charges    90455 - PT Therapeutic  Exercise Minutes  7  -MJ      98370 - Gait Training Minutes   8  -MJ        User Key  (r) = Recorded By, (t) = Taken By, (c) = Cosigned By    Initials Name Provider Type    Jerry Mcmahon PT Physical Therapist        Therapy Charges for Today     Code Description Service Date Service Provider Modifiers Qty    66208368564 HC GAIT TRAINING EA 15 MIN 4/5/2019 Jerry Echavarria, PT GP 1    32891823041 HC PT THER SUPP EA 15 MIN 4/5/2019 Jerry Echavarria, PT GP 1          PT G-Codes  Outcome Measure Options: AM-PAC 6 Clicks Basic Mobility (PT)  AM-PAC 6 Clicks Score: 12  Score: 10    Jerry Echavarria PT  4/5/2019         Electronically signed by Jerry Echavarria PT at 4/5/2019  9:40 AM          Occupational Therapy Notes (most recent note)      Trini Leung OTR at 4/1/2019  3:17 PM          Problem: Patient Care Overview  Goal: Plan of Care Review  Outcome: Ongoing (interventions implemented as appropriate)   04/01/19 0959   Coping/Psychosocial   Plan of Care Reviewed With patient   OTHER   Outcome Summary Pt continues to have significant confusion and lethargy impacting command-following along with participating in purposeful activity, bossman. related to AE training and ADL engagement. Pt max/mod A X2 for all bed mobility, transfers and taking steps. Pt requiring AROM/AAROM for BUE HEP but not following commands for ADL tasks. Will continue to progress as tolerated.        Problem: Hip Arthroplasty (Total, Partial) (Adult)  Goal: Signs and Symptoms of Listed Potential Problems Will be Absent, Minimized or Managed (Hip Arthroplasty)  Outcome: Ongoing (interventions implemented as appropriate)   04/01/19 0959   Goal/Outcome Evaluation   Problems Assessed (Hip Arthroplasty) functional deficit   Problems Present (Hip Arthroplasty) functional deficit           Electronically signed by Trini Leung OTR at 4/1/2019  3:17 PM

## 2019-04-05 NOTE — THERAPY DISCHARGE NOTE
Acute Care - Physical Therapy Discharge Summary  [facility]       Patient Name: Mary Ramirez  : 1934  MRN: [id number]    Today's Date: 2019  Onset of Illness/Injury or Date of Surgery: 19    Date of Referral to PT: 19  Referring Physician: hospitalist      Admit Date: 3/29/2019      PT Recommendation and Plan    Visit Dx:    ICD-10-CM ICD-9-CM   1. Closed fracture of right hip, initial encounter (CMS/[facility]) S72.001A 820.8   2. Hypertension, unspecified type I10 401.9   3. Closed displaced intertrochanteric fracture of right femur, initial encounter (CMS/[facility]) S72.141A 820.21   4. Impaired mobility and ADLs Z74.09 799.89       Outcome Measures     Row Name 19 0914 19 0816 19 0838       How much help from another person do you currently need...    Turning from your back to your side while in flat bed without using bedrails?  2  -MJ  --  2  -LR    Moving from lying on back to sitting on the side of a flat bed without bedrails?  2  -MJ  --  2  -LR    Moving to and from a bed to a chair (including a wheelchair)?  2  -MJ  --  2  -LR    Standing up from a chair using your arms (e.g., wheelchair, bedside chair)?  2  -MJ  --  2  -LR    Climbing 3-5 steps with a railing?  1  -MJ  --  1  -LR    To walk in hospital room?  3  -MJ  --  2  -LR    AM-PAC 6 Clicks Score  12  -MJ  --  11  -LR       How much help from another is currently needed...    Putting on and taking off regular lower body clothing?  --  2  -MARÍA  --    Bathing (including washing, rinsing, and drying)  --  2  -MARÍA  --    Toileting (which includes using toilet bed pan or urinal)  --  2  -MARÍA  --    Putting on and taking off regular upper body clothing  --  2  -MARÍA  --    Taking care of personal grooming (such as brushing teeth)  --  3  -MARÍA  --    Eating meals  --  3  -MARÍA  --    Score  --  14  -MARÍA  --       Functional Assessment    Outcome Measure Options  AM-PAC 6 Clicks Basic Mobility (PT)  -REJI  AM-PAC 6 Clicks Daily  Activity (OT)  -MARÍA  -[facility] 6 Clicks Basic Mobility (PT)  -LR    Row Name 04/03/19 1324 04/03/19 0945          How much help from another person do you currently need...    Turning from your back to your side while in flat bed without using bedrails?  2  -MJ  2  -MJ     Moving from lying on back to sitting on the side of a flat bed without bedrails?  2  -MJ  2  -MJ     Moving to and from a bed to a chair (including a wheelchair)?  2  -MJ  2  -MJ     Standing up from a chair using your arms (e.g., wheelchair, bedside chair)?  2  -MJ  2  -MJ     Climbing 3-5 steps with a railing?  1  -MJ  1  -MJ     To walk in hospital room?  2  -MJ  2  -MJ     AM-PAC 6 Clicks Score  11  -MJ  11  -MJ        Functional Assessment    Outcome Measure Options  AM-PAC 6 Clicks Basic Mobility (PT)  -MJ  AM-PAC 6 Clicks Basic Mobility (PT)  -MJ       User Key  (r) = Recorded By, (t) = Taken By, (c) = Cosigned By    Initials Name Provider Type    Kassidy De La Torre, OT Occupational Therapist    LR Alta Ochoa, PT Physical Therapist    Jerry Mcmahon, PT Physical Therapist          PT Charges     Row Name 04/05/19 0914             Time Calculation    Start Time  0914  -MJ      PT Received On  04/05/19  -MJ      PT Goal Re-Cert Due Date  04/10/19  -MJ         Time Calculation- PT    Total Timed Code Minutes- PT  15 minute(s)  -MJ         Timed Charges    65243 - PT Therapeutic Exercise Minutes  7  -MJ      67856 - Gait Training Minutes   8  -MJ        User Key  (r) = Recorded By, (t) = Taken By, (c) = Cosigned By    Initials Name Provider Type    Jerry Mcmahon, PT Physical Therapist          Rehab Goal Summary     Row Name 04/05/19 1551 04/05/19 0816          Physical Therapy Goals    Bed Mobility Goal Selection (PT)  bed mobility, PT goal 1  -MJ  --     Transfer Goal Selection (PT)  transfer, PT goal 1  -MJ  --     Gait Training Goal Selection (PT)  gait training, PT goal 1  -MJ  --        Bed Mobility Goal 1 (PT)     Activity/Assistive Device (Bed Mobility Goal 1, PT)  bed mobility activities, all  -MJ  --     [place] Level/Cues Needed (Bed Mobility Goal 1, PT)  supervision required  -MJ  --     Time Frame (Bed Mobility Goal 1, PT)  5 days  -MJ  --     Progress/Outcomes (Bed Mobility Goal 1, PT)  goal not met;discharged from facility  -  --        Transfer Goal 1 (PT)    Activity/Assistive Device (Transfer Goal 1, PT)  walker, standard  -MJ  --     [place] Level/Cues Needed (Transfer Goal 1, PT)  independent;supervision required  -MJ  --     Time Frame (Transfer Goal 1, PT)  5 days  -MJ  --     Progress/Outcome (Transfer Goal 1, PT)  continuing progress toward goal;goal not met;discharged from facility  -  --        Gait Training Goal 1 (PT)    Activity/Assistive Device (Gait Training Goal 1, PT)  gait (walking locomotion);assistive device use;walker, rolling  -MJ  --     [place] Level (Gait Training Goal 1, PT)  supervision required;conditional independence  -MJ  --     Distance (Gait Goal 1, PT)  150  -MJ  --     Time Frame (Gait Training Goal 1, PT)  5 days  -MJ  --     Progress/Outcome (Gait Training Goal 1, PT)  goal not met;discharged from facility  -  --        Patient Education Goal (PT)    Activity (Patient Education Goal, PT)  increased bed mobility and gait  -MJ  --     [place]/Cues/Accuracy (Memory Goal 2, PT)  demonstrates adequately;verbalizes understanding  -MJ  --     Time Frame (Patient Education Goal, PT)  5 days  -MJ  --     Progress/Outcome (Patient Education Goal, PT)  goal met  -MJ  --        Bed Mobility Goal 1 (OT)    Progress/Outcomes (Bed Mobility Goal 1, OT)  --  goal not met;discharged from facility  -MARÍA        Transfer Goal 1 (OT)    Progress/Outcome (Transfer Goal 1, OT)  --  goal not met;discharged from [facility]  -MARÍA        Dressing Goal 1 (OT)    Barriers (Dressing Goal 1, OT)  --  limited by cognition for Ae use  -MARÍA     Progress/Outcome (Dressing Goal 1, OT)  --  goal  not met;discharged from facility  -MARÍA        Toileting Goal 1 (OT)    Progress/Outcome (Toileting Goal 1, OT)  --  goal partially met;discharged from facility met wiping jesus area  -MARÍA       User Key  (r) = Recorded By, (t) = Taken By, (c) = Cosigned By    Initials Name Provider Type Discipline    Kassidy De La Torre, OT Occupational Therapist OT    Jerry Mcmahon, PT Physical Therapist PT          Therapy Charges for Today     Code Description Service Date Service Provider Modifiers Qty    29822451188 HC GAIT TRAINING EA 15 MIN 4/5/2019 Jerry Echavarria, PT GP 1    66035318676 HC PT THER SUPP EA 15 MIN 4/5/2019 Jerry Echavarria, PT GP 1          PT Discharge Summary  Reason for Discharge: Discharge from facility  Outcomes Achieved: Patient able to partially acheive established goals  Discharge Destination: SNF      Jerry Echavarria, PT   4/5/2019

## 2019-04-05 NOTE — PROGRESS NOTES
Case Management Discharge Note    Final Note: Patient transferring today to [facility] for short term rehab 4/5.   Nurse to call report to [phone].  Transfer summary to be pulled from EPIC.  Please send scripts for narcotics with patient to be filled by facility pharmacy.  Patient has [employer] van transportation scheduled for 1630.  This is a private pay service arranged by family.  Family has called to reserve van, and son Josh states that van will be here at 1630 to pick patient up.  He is aware of cost, and has reserved van.  Patient does not have precert completed to transfer to SNF, however spoke with Dona ([facility]) and patient is privately paying for personal care room.  She has spoken with family.  They are agreeable to continue to private pay for room until insurance approval has been completed.  Spoke with Iona at [facility].  Precert is still pending at this time.  She will continue to follow    Destination - Selection Complete      Service Provider Request Status Selected Services Address Phone Number Fax Number    [facility] [address] [facility] [address] [phone]      Durable Medical Equipment      No service has been selected for the patient.      Dialysis/Infusion      No service has been selected for the patient.      Home Medical Care      No service has been selected for the patient.      Therapy      No service has been selected for the patient.      Community Resources      No service has been selected for the patient.        Transportation Services  [facility]    Final Discharge Disposition Code: 03 - skilled nursing facility (SNF)

## 2019-04-05 NOTE — THERAPY DISCHARGE NOTE
Acute Care - Occupational Therapy Treatment Note/Discharge  [facility]     Patient Name: Mary Ramirez  : 1934  MRN: [id number]  Today's Date: 2019  Onset of Illness/Injury or Date of Surgery: 19  Date of Referral to OT: 19  Referring Physician: hospitalist      Admit Date: 3/29/2019    Visit Dx:     ICD-10-CM ICD-9-CM   1. Closed fracture of right hip, initial encounter (CMS/HCC) S72.001A 820.8   2. Hypertension, unspecified type I10 401.9   3. Closed displaced intertrochanteric fracture of right femur, initial encounter (CMS/HCC) S72.141A 820.21   4. Impaired mobility and ADLs Z74.09 799.89     Patient Active Problem List   Diagnosis   • Fall   • Fracture, intertrochanteric, left femur (CMS/[facility])   • Essential hypertension   • Coronary artery disease involving native coronary artery of native heart with angina pectoris (CMS/[facility])   • Dementia   • Paroxysmal atrial fibrillation (CMS/HCC)   • Anticoagulated   • SSS (sick sinus syndrome) (CMS/HCC)   • Chronic diastolic congestive heart failure (CMS/HCC)   • DEANGELO (acute kidney injury) (CMS/HCC)   • Benign essential hypertension   • CKD (chronic kidney disease), stage III (CMS/[facility])   • Mixed hyperlipidemia   • Major depressive disorder, single episode, mild (CMS/[facility])   • Chronic allergic rhinitis   • Memory loss   • Bradycardia   • At risk for falls   • Closed fracture of femur (CMS/[facility])   • Coronary arteriosclerosis after percutaneous transluminal coronary angioplasty (PTCA)   • Diabetic polyneuropathy (CMS/[facility])   • Diverticular disease of colon   • Foot pain   • GERD (gastroesophageal reflux disease)   • Peripheral artery insufficiency (CMS/[facility])   • Annual physical exam   • Vitamin D deficiency   • Renal disorder   • Closed fracture of right hip (CMS/[facility])   • Postoperative anemia due to acute blood loss       Therapy Treatment    Rehabilitation Treatment Summary     Row Name 19 0914 19 0816          Treatment Time/Intention     Discipline  physical therapist  -MJ  occupational therapist  -MARÍA     Document Type  therapy note (daily note)  -  discharge treatment  -MARÍA     Subjective Information  complains of;fatigue  -  no complaints  -MARÍA     Mode of Treatment  physical therapy  -MJ  individual therapy;occupational therapy  -MARÍA     Patient/Family Observations  Pt sitting UIC  -MJ  Pt. found 1/2 way sitting up with feet handing out of bed with waffle boots on.  Pt. was requesting help to the bathroom.  -MARÍA     Care Plan Review  patient/other agree to care plan  -MJ  care plan/treatment goals reviewed;risks/benefits reviewed;current/potential barriers reviewed;patient/other agree to care plan  -MARÍA     Patient Effort  good  -  good  -MARÍA     Existing Precautions/Restrictions  fall;other (see comments) confusion/exit alarm  -  fall exit alarm, confusion  -MARÍA     Treatment Considerations/Comments  --  pt. remains confused, but stated no pain.  -MARÍA     Recorded by [MJ] Jerry Echavarria, PT 04/05/19 0937 [MARÍA] Kassidy Williamson, OT 04/05/19 1411     Row Name 04/05/19 0816             Vital Signs    Pre Systolic BP Rehab  194  -MARÍA      Pre Treatment Diastolic BP  78 nurse ok tx, per pt. had BP meds recently  -MARÍA      Post Systolic BP Rehab  179  -MARÍA      Post Treatment Diastolic BP  83  -MARÍA      Pretreatment Heart Rate (beats/min)  93  -MARÍA      Posttreatment Heart Rate (beats/min)  89  -MARÍA      Pre Patient Position  -- 1/2 sitting and 1/2 lying.  -MARÍA      Post Patient Position  Sitting  -MARÍA      Recorded by [MARÍA] Kassidy Williamson, OT 04/05/19 1411      Row Name 04/05/19 0914 04/05/19 0816          Cognitive Assessment/Intervention- PT/OT    Affect/Mental Status (Cognitive)  confused  -  confused  -MARÍA     Orientation Status (Cognition)  oriented to;person;disoriented to;place;time  -  oriented to;person;place place with cues, knew month, but not year  -MARÍA     Follows Commands (Cognition)  follows one step commands;75-90% accuracy;verbal  cues/prompting required;repetition of directions required  -MJ  follows one step commands;75-90% accuracy;repetition of directions required;verbal cues/prompting required  -MARÍA     Executive Function Deficit (Cognition)  --  moderate deficit;judgment;insight/awareness of deficits  -MARÍA     Memory Deficit (Cognitive)  --  moderate deficit  -MARÍA     Safety Deficit (Cognitive)  mild deficit  -MJ  judgment;problem solving;impulsivity;insight into deficits/self awareness;safety precautions awareness;safety precautions follow-through/compliance  -MARÍA     Personal Safety Interventions  --  fall prevention program maintained;gait belt;nonskid shoes/slippers when out of bed  -MARÍA     Recorded by [MJ] Jerry Echavarria, PT 04/05/19 0937 [MARÍA] Kassidy Williamson, OT 04/05/19 1411     Row Name 04/05/19 0914 04/05/19 0816          Safety Issues, Functional Mobility    Safety Issues Affecting Function (Mobility)  --  safety precaution awareness;safety precautions follow-through/compliance;judgment;insight into deficits/self awareness;impulsivity  -MARÍA     Impairments Affecting Function (Mobility)  balance;pain;strength;cognition  -  balance;range of motion (ROM);strength;cognition  -MARÍA     Recorded by [MJ] Jerry Echavarria, PT 04/05/19 0937 [MARÍA] Kassidy Williamson, OT 04/05/19 1411     Row Name 04/05/19 0914 04/05/19 0816          Mobility Assessment/Intervention    Extremity Weight-bearing Status  right lower extremity  -  right lower extremity  -MARÍA     Right Lower Extremity (Weight-bearing Status)  weight-bearing as tolerated (WBAT)  -  weight-bearing as tolerated (WBAT)  -MARÍA     Recorded by [MJ] Jerry Echavarria, PT 04/05/19 0937 [MARÍA] Kassidy Williamson, OT 04/05/19 1411     Row Name 04/05/19 0914 04/05/19 0816          Bed Mobility Assessment/Treatment    Bed Mobility Assessment/Treatment  --  supine-sit;scooting/bridging  -MARÍA     Scooting/Bridging [place] (Bed Mobility)  --  moderate assist (50% patient effort);nonverbal cues  (demo/gesture);verbal cues  -MARÍA     Supine-Sit [place] (Bed Mobility)  --  moderate assist (50% patient effort);nonverbal cues (demo/gesture);verbal cues  -MARÍA     Bed Mobility, Safety Issues  --  cognitive deficits limit understanding;decreased use of arms for pushing/pulling;decreased use of legs for bridging/pushing;impaired trunk control for bed mobility  -MARÍA     Comment (Bed Mobility)  NT- pt UIC  -MJ  pt. found 1/2 way OOB on arrival.   Waffle boots removed and pt. assisted to EOB with cueing. Pt. needed step by step cues.  -MARÍA     Recorded by [MJ] Jerry Echavarria, PT 04/05/19 0937 [MARÍA] Kassidy Williamson, OT 04/05/19 1411     Row Name 04/05/19 0816             Functional Mobility    Functional Mobility- Ind. Level  moderate assist (50% patient effort);2 person assist required periods of mod of 2 due to posterior lean  -MARÍA      Functional Mobility- Device  rolling walker  -MARÍA      Functional Mobility-Distance (Feet)  5  -MARÍA      Functional Mobility- Safety Issues  step length decreased;weight-shifting ability decreased  -MARÍA      Functional Mobility- Comment  Pt. assisted to BSC and then over to recliner.  Pt. periods of mod of 2 and periods of min of 2.  -MARÍA      Recorded by [MARÍA] Kassidy Williamson, OT 04/05/19 1411      Row Name 04/05/19 0914 04/05/19 0816          Transfer Assessment/Treatment    Transfer Assessment/Treatment  sit-stand transfer;stand-sit transfer  -  sit-stand transfer;stand-sit transfer  -MARÍA     Maintains Weight-bearing Status (Transfers)  --  unable to maintain weight-bearing status  -MARÍA     Comment (Transfers)  Verbal cues for correct hand placement. Pt with decreased safety for stand to sit, trying to sit before chair pulled up behind her  -  cues for hand positioning.  -MARÍA     Recorded by [MJ] Jerry Echavarria, PT 04/05/19 0937 [MARÍA] Kassidy Williamson, OT 04/05/19 1411     Row Name 04/05/19 0914 04/05/19 0816          Sit-Stand Transfer    Sit-Stand [place] (Transfers)  moderate assist  (50% patient effort);2 person assist;verbal cues  -MJ  moderate assist (50% patient effort);2 person assist;verbal cues;nonverbal cues (demo/gesture)  -MARÍA     Assistive Device (Sit-Stand Transfers)  walker, front-wheeled  -MJ  walker, front-wheeled  -MARÍA     Recorded by [MJ] Jerry Echavarria, PT 04/05/19 0937 [MARÍA] Kassidy Williamson, OT 04/05/19 1411     Row Name 04/05/19 0914 04/05/19 0816          Stand-Sit Transfer    Stand-Sit [place] (Transfers)  minimum assist (75% patient effort);verbal cues  -MJ  minimum assist (75% patient effort);2 person assist;nonverbal cues (demo/gesture);verbal cues  -MARÍA     Assistive Device (Stand-Sit Transfers)  walker, front-wheeled  -MJ  walker, front-wheeled  -MARÍA     Recorded by [MJ] Jerry Echavarria, PT 04/05/19 0937 [MARÍA] Kassidy Williamson, OT 04/05/19 1411     Row Name 04/05/19 0816             Toilet Transfer    Type (Toilet Transfer)  sit-stand;stand-sit  -MARÍA      [place] Level (Toilet Transfer)  verbal cues;moderate assist (50% patient effort);2 person assist  -MARÍA      Assistive Device (Toilet Transfer)  commode, bedside without drop arms;walker, front-wheeled  -MARÍA      Recorded by [MARÍA] Kassidy Williamson, OT 04/05/19 1411      Row Name 04/05/19 0914             Gait/Stairs Assessment/Training    22587 - Gait Training Minutes   8  -MJ      [place] Level (Gait)  minimum assist (75% patient effort);1 person to manage equipment;verbal cues  -MJ      Assistive Device (Gait)  walker, front-wheeled  -MJ      Distance in Feet (Gait)  10  -MJ      Pattern (Gait)  step-to  -MJ      Deviations/Abnormal Patterns (Gait)  right sided deviations;antalgic;bilateral deviations;trey decreased;stride length decreased  -MJ      Bilateral Gait Deviations  forward flexed posture;heel strike decreased;weight shift ability decreased  -MJ      Comment (Gait/Stairs)  Pt demo step to gait pattern at slow pace. Verbal cues for upright posture and to stay in middle of RW. Chair follow for safety. Gait  limited by fatigue  -MJ      Recorded by [MJ] Jerry Echavarria, PT 04/05/19 0937      Row Name 04/05/19 0816             ADL Assessment/Intervention    06648 - OT Self Care/Mgmt Minutes  8  -MARÍA      BADL Assessment/Intervention  toileting;feeding  -MARÍA      Recorded by [MARÍA] Kassidy Williamson, OT 04/05/19 1411      Row Name 04/05/19 0816             Self-Feeding Assessment/Training    [place] Level (Feeding)  prepare tray/open items;minimum assist (75% patient effort);liquids to mouth;scoop food and bring to mouth;independent  -MARÍA      Self-Feeding Assess/Train, Spillage Amount  minimal  -MARÍA      Position (Self-Feeding)  supported sitting  -MARÍA      Comment (Feeding)  some assist to open tray items  -MARÍA      Recorded by [MARÍA] Kassidy Williamson, LAURA 04/05/19 1411      Row Name 04/05/19 0816             Toileting Assessment/Training    [place] Level (Toileting)  toileting skills;moderate assist (50% patient effort);verbal cues;nonverbal cues (demo/gesture)  -MARÍA      Assistive Devices (Toileting)  commode, bedside without drop arms  -MARÍA      Toileting Position  supported sitting  -MARÍA      Comment (Toileting)  pt. able to wipe jesus area with cues and setup, but was unable to wipe buttocks.  OT assisted pt. with sit to stand and OT tech had to clean pt.'s buttocks.  -MARÍA      Recorded by [MARÍA] Kassidy Williamson, OT 04/05/19 1411      Row Name 04/05/19 0816             BADL Safety/Performance    Impairments, BADL Safety/Performance  balance;cognition;endurance/activity tolerance;strength;trunk/postural control  -MARÍA      Cognitive Impairments, BADL Safety/Performance  attention;impulsivity;judgment;problem solving/reasoning;safety precaution awareness;safety precaution follow-through  -MARÍA      Skilled BADL Treatment/Intervention  cognitive/safety deficit modifications;BADL process/adaptation training  -MARÍA      Progress in BADL Status  independence level  -MARÍA      Recorded by [MARÍA] Kassidy Williamson, OT 04/05/19 1411      Row Name  04/05/19 0816             Motor Skills Assessment/Interventions    Additional Documentation  Balance (Group);Balance Interventions (Group)  -MARÍA      Recorded by [MARÍA] Kassidy Williamson, OT 04/05/19 1411      Row Name 04/05/19 0914 04/05/19 0816          Therapeutic Exercise    69807 - PT Therapeutic Exercise Minutes  7  -MJ  --     04037 - OT Therapeutic Activity Minutes  --  7  -MARÍA     Recorded by [MJ] Jerry Echavarria, PT 04/05/19 0937 [MARÍA] Kassidy Williamson, OT 04/05/19 1411     Row Name 04/05/19 0914             Therapeutic Exercise    Lower Extremity (Therapeutic Exercise)  gluteal sets;heel slides, right;quad sets, right;SLR (straight leg raise), right  -MJ      Lower Extremity Range of Motion (Therapeutic Exercise)  hip abduction/adduction, right;ankle dorsiflexion/plantar flexion, right  -MJ      Exercise Type (Therapeutic Exercise)  AROM (active range of motion)  -MJ      Position (Therapeutic Exercise)  seated  -MJ      Sets/Reps (Therapeutic Exercise)  15x each  -MJ      Comment (Therapeutic Exercise)  Cues for technique. Denys to initiate heel slides  -MJ      Recorded by [MJ] Jerry Echavarria, PT 04/05/19 0937      Row Name 04/05/19 0816             Static Standing Balance    Level of [place] (Supported Standing, Static Balance)  moderate assist, 50 to 74% patient effort  -MARÍA      Time Able to Maintain Position (Supported Standing, Static Balance)  45 to 60 seconds  -MARÍA      Assistive Device Utilized (Supported Standing, Static Balance)  walker, rolling toileting  -MARÍA      Comment (Unsupported Standing, Static Balance)  forward flexed  -MARÍA      Recorded by [MARÍA] Kassidy Williamson, OT 04/05/19 1411      Row Name 04/05/19 0914 04/05/19 0816          Positioning and Restraints    Pre-Treatment Position  sitting in chair/recliner  -MJ  in bed 1/2 way OOB on arrival  -MARÍA     Post Treatment Position  chair  -MJ  --     In Chair  notified nsg;reclined;call light within reach;encouraged to call for assist;exit alarm on;on  mechanical lift sling  -MJ  --     Recorded by [MJ] Jerry Echavarria, PT 04/05/19 0937 [MARÍA] Kassidy Williamson, OT 04/05/19 1411     Row Name 04/05/19 0914 04/05/19 0816          Pain Scale: Numbers Pre/Post-Treatment    Pain Scale: Numbers, Pretreatment  0/10 - no pain  -MJ  0/10 - no pain  -MARÍA     Pain Scale: Numbers, Post-Treatment  2/10  -MJ  0/10 - no pain  -MARÍA     Pain Location - Side  Right  -MJ  --     Pain Location  hip  -MJ  --     Pain Intervention(s)  Repositioned;Ambulation/increased activity  -MJ  --     Recorded by [MJ] Jerry Echavarria, PT 04/05/19 0937 [MARÍA] Kassidy Williamson, OT 04/05/19 1411     Row Name                Wound 03/30/19 0907 Right hip incision    Wound - Properties Group Date first assessed: 03/30/19 [JS] Time first assessed: 0907 [JS] Side: Right [JS] Location: hip [JS] Type: incision [JS] Recorded by:  [JS] Lilia Xiao RN 03/30/19 0907    Row Name                Wound 03/31/19 1500 Left medial heel other (see comments)    Wound - Properties Group Date first assessed: 03/31/19 [CP] Time first assessed: 1500 [CP] Present On Admission : no;picture taken [CP] Side: Left [CP] Orientation: medial [CP] Location: heel [CP] Type: other (see comments) [CP] Additional Comments: bruise vs DTPI [CP] Recorded by:  [CP] Abby Kirby APRN 03/31/19 1535    Row Name                Wound 03/31/19 1500 Right lateral heel other (see comments)    Wound - Properties Group Date first assessed: 03/31/19 [CP] Time first assessed: 1500 [CP] Present On Admission : no;picture taken [CP] Side: Right [CP] Orientation: lateral [CP] Location: heel [CP] Type: other (see comments) [CP] Additional Comments: bruise vs DTPI [CP] Recorded by:  [CP] Abby Kirby APRN 03/31/19 1537    Row Name 04/05/19 0914 04/05/19 0816          Plan of Care Review    Plan of Care Reviewed With  patient  -MJ  patient  -MARÍA     Recorded by [MJ] Jerry Echavarria, PT 04/05/19 6523 [MARÍA] Kassidy Williamson, OT 04/05/19 1411     Row Name  04/05/19 0816             Outcome Summary/Treatment Plan (OT)    Daily Summary of Progress (OT)  progress towards functional goals is fair  -MARÍA      Barriers to Overall Progress (OT)  cognition  -MARÍA      Plan for Continued Treatment (OT)  Cont OT POC  -MARÍA      Recorded by [MARÍA] Kassidy Williamson, OT 04/05/19 1411      Row Name 04/05/19 0914             Outcome Summary/Treatment Plan (PT)    Daily Summary of Progress (PT)  progress toward functional goals is gradual  -MJ      Recorded by [MJ] Jerry Echavarria, PT 04/05/19 0937        User Key  (r) = Recorded By, (t) = Taken By, (c) = Cosigned By    Initials Name Effective Dates Discipline    MARÍA Kassidy Williamson, OT 06/08/18 -  OT    Abby Hinton, APRN 02/05/19 -  Nurse    Jerry Mcmahon, PT 04/03/18 -  PT    Lilia Pagan RN 01/23/17 -  Nurse        Wound 03/30/19 0907 Right hip incision (Active)   Dressing Appearance dry;intact 4/5/2019  8:00 AM   Dressing Care, Wound border dressing 4/5/2019  8:00 AM       Wound 03/31/19 1500 Left medial heel other (see comments) (Active)   Dressing Appearance dry;intact;no drainage 4/5/2019  8:00 AM   Base maroon/purple 4/5/2019  8:00 AM   Dressing Care, Wound low-adherent;foam 4/5/2019  8:00 AM       Wound 03/31/19 1500 Right lateral heel other (see comments) (Active)   Dressing Appearance dry;intact;no drainage 4/5/2019  8:00 AM   Base maroon/purple 4/5/2019  8:00 AM   Dressing Care, Wound low-adherent;foam 4/5/2019  8:00 AM       Rehab Goal Summary     Row Name 04/05/19 0816             Bed Mobility Goal 1 (OT)    Progress/Outcomes (Bed Mobility Goal 1, OT)  goal not met;discharged from facility  -MARÍA         Transfer Goal 1 (OT)    Progress/Outcome (Transfer Goal 1, OT)  goal not met;discharged from facility  -MARÍA         Dressing Goal 1 (OT)    Barriers (Dressing Goal 1, OT)  limited by cognition for Ae use  -MARÍA      Progress/Outcome (Dressing Goal 1, OT)  goal not met;discharged from facility  -MARÍA          Toileting Goal 1 (OT)    Progress/Outcome (Toileting Goal 1, OT)  goal partially met;discharged from facility met wiping jesus area  -MARÍA        User Key  (r) = Recorded By, (t) = Taken By, (c) = Cosigned By    Initials Name Provider Type Discipline    Kassidy De La Torre, OT Occupational Therapist OT          Occupational Therapy Education     Title: PT OT SLP Therapies (In Progress)     Topic: Occupational Therapy (In Progress)     Point: ADL training (In Progress)     Description: Instruct learner(s) on proper safety adaptation and remediation techniques during self care or transfers.   Instruct in proper use of assistive devices.    Learning Progress Summary           Patient Acceptance, E,D, NR by MARÍA at 4/5/2019  8:16 AM    Comment:  bed mobility, transfer safety, ADL sequencing.    Acceptance, E,D, NR,NL by ST at 4/1/2019  9:59 AM    Comment:  see flow sheet    Acceptance, E,D, NR by ST at 3/31/2019 10:18 AM    Comment:  see flow sheet                   Point: Home exercise program (In Progress)     Description: Instruct learner(s) on appropriate technique for monitoring, assisting and/or progressing therapeutic exercises/activities.    Learning Progress Summary           Patient Acceptance, E,D, NR,NL by ST at 4/1/2019  9:59 AM    Comment:  see flow sheet    Acceptance, E,D, NR by ST at 3/31/2019 10:18 AM    Comment:  see flow sheet                   Point: Precautions (In Progress)     Description: Instruct learner(s) on prescribed precautions during self-care and functional transfers.    Learning Progress Summary           Patient Acceptance, E,D, NR by MARÍA at 4/5/2019  8:16 AM    Comment:  bed mobility, transfer safety, ADL sequencing.    Acceptance, E,D, NR,NL by ST at 4/1/2019  9:59 AM    Comment:  see flow sheet    Acceptance, E,D, NR by ST at 3/31/2019 10:18 AM    Comment:  see flow sheet                   Point: Body mechanics (In Progress)     Description: Instruct learner(s) on proper positioning and  spine alignment during self-care, functional mobility activities and/or exercises.    Learning Progress Summary           Patient Acceptance, E,D, NR,NL by  at 4/1/2019  9:59 AM    Comment:  see flow sheet    Acceptance, E,D, NR by  at 3/31/2019 10:18 AM    Comment:  see flow sheet                               User Key     Initials Effective Dates Name Provider Type Discipline     06/08/18 -  Kassidy Williamson, OT Occupational Therapist OT    ST 06/10/18 -  Trini Leung OTR Occupational Therapist OT                OT Recommendation and Plan  Outcome Summary/Treatment Plan (OT)  Daily Summary of Progress (OT): progress towards functional goals is fair  Barriers to Overall Progress (OT): cognition  Plan for Continued Treatment (OT): Cont OT POC  Daily Summary of Progress (OT): progress towards functional goals is fair  Plan of Care Review  Plan of Care Reviewed With: patient  Plan of Care Reviewed With: patient  Outcome Summary: Pt. intiating more independence with supine to sit and with wiping self post toileting.  Pt. mod of 2 overall transfers.  Plans to rehab today.    Outcome Measures     Row Name 04/05/19 0914 04/05/19 0816 04/04/19 0838       How much help from another person do you currently need...    Turning from your back to your side while in flat bed without using bedrails?  2  -MJ  --  2  -LR    Moving from lying on back to sitting on the side of a flat bed without bedrails?  2  -MJ  --  2  -LR    Moving to and from a bed to a chair (including a wheelchair)?  2  -MJ  --  2  -LR    Standing up from a chair using your arms (e.g., wheelchair, bedside chair)?  2  -MJ  --  2  -LR    Climbing 3-5 steps with a railing?  1  -MJ  --  1  -LR    To walk in hospital room?  3  -MJ  --  2  -LR    AM-PAC 6 Clicks Score  12  -MJ  --  11  -LR       How much help from another is currently needed...    Putting on and taking off regular lower body clothing?  --  2  -MARÍA  --    Bathing (including washing, rinsing,  and drying)  --  2  -MARÍA  --    Toileting (which includes using toilet bed pan or urinal)  --  2  -MARÍA  --    Putting on and taking off regular upper body clothing  --  2  -MARÍA  --    Taking care of personal grooming (such as brushing teeth)  --  3  -MARÍA  --    Eating meals  --  3  -MARÍA  --    Score  --  14  -MARÍA  --       Functional Assessment    Outcome Measure Options  AM-[facility] 6 Clicks Basic Mobility (PT)  -[facility] 6 Clicks Daily Activity (OT)  -  AM-[facility] 6 Clicks Basic Mobility (PT)  -    Row Name 04/03/19 1324 04/03/19 0945          How much help from another person do you currently need...    Turning from your back to your side while in flat bed without using bedrails?  2  -MJ  2  -MJ     Moving from lying on back to sitting on the side of a flat bed without bedrails?  2  -MJ  2  -MJ     Moving to and from a bed to a chair (including a wheelchair)?  2  -MJ  2  -MJ     Standing up from a chair using your arms (e.g., wheelchair, bedside chair)?  2  -MJ  2  -MJ     Climbing 3-5 steps with a railing?  1  -MJ  1  -MJ     To walk in hospital room?  2  -MJ  2  -MJ     AM-PAC 6 Clicks Score  11  -MJ  11  -MJ        Functional Assessment    Outcome Measure Options  AM-PAC 6 Clicks Basic Mobility (PT)  -  AM-[facility] 6 Clicks Basic Mobility (PT)  -       User Key  (r) = Recorded By, (t) = Taken By, (c) = Cosigned By    Initials Name Provider Type    Kassidy De La Torre, OT Occupational Therapist    lAta Mcdonnell, PT Physical Therapist    MJ Jerry Echavarria, PT Physical Therapist           Time Calculation:    Time Calculation- OT     Row Name 04/05/19 0914 04/05/19 0816          Time Calculation- OT    OT Start Time  --  0816  -MARÍA     OT Received On  --  04/05/19  -MARÍA     OT Goal Re-Cert Due Date  --  04/10/19  -MARÍA        Timed Charges    48601 - Gait Training Minutes   8  -MJ  --     35298 - OT Therapeutic Activity Minutes  --  7  -MARÍA     09512 - OT Self Care/Mgmt Minutes  --  8  -MARÍA       User Key  (r) =  Recorded By, (t) = Taken By, (c) = Cosigned By    Initials Name Provider Type    Kassidy De La Torre, OT Occupational Therapist    Jerry Mcmahon, TIFFANIE Physical Therapist        Therapy Suggested Charges     Code   Minutes Charges    91615 (CPT®) Hc Ot Neuromusc Re Education Ea 15 Min      14970 (CPT®) Hc Ot Ther Proc Ea 15 Min      46900 (CPT®) Hc Ot Therapeutic Act Ea 15 Min 7     28017 (CPT®) Hc Ot Manual Therapy Ea 15 Min      57470 (CPT®) Hc Ot Iontophoresis Ea 15 Min      08328 (CPT®) Hc Ot Elec Stim Ea-Per 15 Min      65789 (CPT®) Hc Ot Ultrasound Ea 15 Min      38790 (CPT®) Hc Ot Self Care/Mgmt/Train Ea 15 Min 8 1    Total  15 1        Therapy Charges for Today     Code Description Service Date Service Provider Modifiers Qty    96646819594 HC OT SELF CARE/MGMT/TRAIN EA 15 MIN 4/5/2019 Kassidy Williamson, OT GO 1    61534579478 HC OT THER SUPP EA 15 MIN 4/5/2019 Kassidy Williamson, OT GO 1               OT Discharge Summary  Reason for Discharge: Discharge from facility  Outcomes Achieved: Patient able to partially acheive established goals  Discharge Destination: SNF    Kassidy Williamson OT  4/5/2019

## 2019-04-05 NOTE — NURSING NOTE
Report called to Kelsey @ [facility] @ [phone].  No scripts sent w/ pt, clinical paperwork printed and sent w/ transport in sealed envelope. Tele box 4996 cleaned and returned to [facility]. Pt information discharged from monitor in room per policy.  Pt left via private transport arranged by family and Case mgmt.

## 2019-04-05 NOTE — PLAN OF CARE
Problem: Patient Care Overview  Goal: Plan of Care Review  Outcome: Ongoing (interventions implemented as appropriate)   04/05/19 0968   Coping/Psychosocial   Plan of Care Reviewed With patient   Plan of Care Review   Progress improving   OTHER   Outcome Summary Pt ambulated 10 feet with Denys and RW, limited by fatigue. Pt required less assist with gait and ther ex. Pt anticipates discharge to rehab today, otherwise will continue to progress mobility as able.

## 2019-04-15 ENCOUNTER — OFFICE VISIT (OUTPATIENT)
Dept: CARDIOLOGY | Facility: CLINIC | Age: 84
End: 2019-04-15

## 2019-04-15 VITALS
HEIGHT: 61 IN | WEIGHT: 117 LBS | SYSTOLIC BLOOD PRESSURE: 86 MMHG | BODY MASS INDEX: 22.09 KG/M2 | HEART RATE: 70 BPM | DIASTOLIC BLOOD PRESSURE: 50 MMHG

## 2019-04-15 DIAGNOSIS — I10 ESSENTIAL HYPERTENSION: ICD-10-CM

## 2019-04-15 DIAGNOSIS — I25.119 CORONARY ARTERY DISEASE INVOLVING NATIVE CORONARY ARTERY OF NATIVE HEART WITH ANGINA PECTORIS (HCC): Primary | ICD-10-CM

## 2019-04-15 PROCEDURE — 99214 OFFICE O/P EST MOD 30 MIN: CPT | Performed by: INTERNAL MEDICINE

## 2019-04-15 NOTE — PROGRESS NOTES
[facility] at [facility]  Office visit  Mary Ramirez  1934    There is no work phone number on file.    VISIT DATE:  11/05/2018    PCP: Nirmal Lundberg MD  [address] [address]    CC:  Chief Complaint   Patient presents with   • Coronary Artery Disease       PROBLEM LIST:  Coronary artery disease involving native coronary artery of native heart with angina pectoris (CMS/HCC)             · Cardiac cath with PCI data deficit  · Echo (9/1/18): LVEF 60%. LVH. Left atrial dilatation. Aortic calcification with no stenosis.  · September 2018 myocardial PET: Normal perfusion.           Paroxysmal atrial fibrillation (CMS/HCC)            · Chads Vasc 6 (age > 75, female, CAD, DM, HTN)   · Rhythm control strategy with amiodarone  · Maintaining rhythm           DEANGELO (acute kidney injury) (CMS/HCC)     Type 2 diabetes mellitus with complication, with long-term current use of insulin (CMS/HCC)     Dementia     Chronic diastolic congestive heart failure (CMS/HCC)            · Echo (9/1/18): LVEF 60%. LVH. Left atrial dilatation. Aortic calcification with no stenosis.          ASSESSMENT:   Diagnosis Plan   1. Coronary artery disease involving native coronary artery of native heart with angina pectoris (CMS/HCC)     2. Essential hypertension         PLAN:  Coronary artery disease: Currently stable and asymptomatic.  Continue aspirin and afterload reduction.    Hypertension: Goal less than 140/90 mmHg.  Taking multiple antihypertensives in the morning leading to mid morning hypotension.  Decreasing amlodipine to 5 mg of moving to evening dosing.  Will have the [place] check twice daily blood pressures for 1 week.    Paroxysmal atrial fibrillation and atrial flutter: Continue amiodarone 200 mg by mouth daily.  We'll need biannual liver function test and thyroid function tests.  Annual chest x-ray.  Continue aspirin for stroke prophylaxis, off Eliquis due to recent multiple  "mechanical falls.    Subjective  Right leg in an immobilizer, using a wheelchair for ambulation after recent mechanical fall with right trochanteric fracture.  A.m. blood pressures running in the 140-155/80-90 mmHg range.  She is currently receiving all of her blood pressure medications at approximately 7:30 AM in the morning.  She is a little confused with slowed mental cognition currently.  She denies chest pain, palpitations or dyspnea.  Denies tremors on amiodarone.  Compliant with medical therapy.      PHYSICAL EXAMINATION:  Vitals:    04/15/19 1014   BP: (!) 86/50   BP Location: Left arm   Patient Position: Sitting   Pulse: 70   Weight: 53.1 kg (117 lb)   Height: 154.9 cm (61\")     General Appearance:    Alert, cooperative, no distress, appears stated age   Head:    Normocephalic, without obvious abnormality, atraumatic   Eyes:    conjunctiva/corneas clear   Nose:   Nares normal, septum midline, mucosa normal, no drainage   Throat:   Lips, teeth and gums normal   Neck:   Supple, symmetrical, trachea midline, no carotid    bruit or JVD   Lungs:     Clear to auscultation bilaterally, respirations unlabored   Chest Wall:    No tenderness or deformity    Heart:    Regular rate and rhythm, S1 and S2 normal, no murmur, rub   or gallop, normal carotid impulse bilaterally without bruit.   Abdomen:     Soft, non-tender   Extremities:   Extremities normal, atraumatic, no cyanosis or edema   Pulses:   2+ and symmetric all extremities   Skin:   Skin color, texture, turgor normal, no rashes or lesions       Diagnostic Data:  Procedures  Lab Results   Component Value Date    CHLPL 123 01/24/2016    TRIG 202 (H) 01/15/2019    HDL 67 (H) 01/15/2019     Lab Results   Component Value Date    GLUCOSE 184 (H) 04/02/2019    BUN 27 (H) 04/02/2019    CREATININE 0.96 04/02/2019     04/02/2019    K 3.9 04/02/2019     04/02/2019    CO2 23.0 04/02/2019     Lab Results   Component Value Date    HGBA1C 7.60 (H) 01/15/2019 " "    Lab Results   Component Value Date    WBC 10.05 04/03/2019    HGB 9.3 (L) 04/03/2019    HCT 28.9 (L) 04/03/2019     04/03/2019       Allergies  Allergies   Allergen Reactions   • Penicillins Other (See Comments)     Pt states \"i don't know\"       Current Medications    Current Outpatient Medications:   •  acetaminophen (TYLENOL) 500 MG tablet, Take 500 mg by mouth Every 6 (Six) Hours As Needed for Mild Pain . Take one tablet twice a day for leg pain, Disp: , Rfl:   •  amiodarone (PACERONE) 200 MG tablet, Take 1 tablet by mouth Daily., Disp: , Rfl:   •  amLODIPine (NORVASC) 10 MG tablet, Take 1 tablet by mouth Daily for 30 days., Disp: 30 tablet, Rfl: 0  •  aspirin  MG EC tablet, Take 1 tablet by mouth Daily., Disp: , Rfl:   •  bisacodyl (DULCOLAX) 5 MG EC tablet, Take 1 tablet by mouth Daily As Needed for Constipation., Disp: , Rfl:   •  citalopram (CeleXA) 20 MG tablet, Take 20 mg by mouth Daily., Disp: , Rfl:   •  CloNIDine (CATAPRES) 0.2 MG tablet, Take 1 tablet by mouth 2 (Two) Times a Day for 30 days., Disp: 60 tablet, Rfl: 0  •  docusate sodium (COLACE) 100 MG capsule, Take 100 mg by mouth 2 (Two) Times a Day., Disp: , Rfl:   •  donepezil (ARICEPT) 5 MG tablet, Take 5 mg by mouth Every Night., Disp: , Rfl:   •  fluticasone (FLONASE) 50 MCG/ACT nasal spray, 2 sprays into the nostril(s) as directed by provider Daily., Disp: , Rfl:   •  insulin aspart (novoLOG FLEXPEN) 100 UNIT/ML solution pen-injector sc pen, Inject 2-5 Units under the skin into the appropriate area as directed 4 (Four) Times a Day., Disp: , Rfl:   •  isosorbide mononitrate (IMDUR) 60 MG 24 hr tablet, Take 60 mg by mouth Daily., Disp: , Rfl:   •  metFORMIN (GLUCOPHAGE) 500 MG tablet, Take 1 tablet by mouth Daily With Breakfast., Disp: , Rfl:   •  Multiple Vitamin (MULTIVITAMINS PO), Take 1 tablet by mouth Daily., Disp: , Rfl:   •  ondansetron (ZOFRAN) 4 MG tablet, Take 4 mg by mouth Every 8 (Eight) Hours As Needed for Nausea or " Vomiting., Disp: , Rfl:   •  polyethylene glycol (MIRALAX) pack packet, Take 17 g by mouth Daily., Disp: , Rfl:           ROS  Review of Systems   Cardiovascular: Negative for chest pain, irregular heartbeat and palpitations.   Respiratory: Negative for shortness of breath and snoring.    Neurological: Positive for dizziness.       SOCIAL HX  Social History     Socioeconomic History   • Marital status:      Spouse name: N/A   • Number of children: 2   • Years of education: H.S   • Highest education level: Not on file   Occupational History   • Occupation:      Employer: RETIRED   Tobacco Use   • Smoking status: Former Smoker     Years: 2.00     Types: Cigarettes     Start date: 1975     Last attempt to quit: 1978     Years since quittin.4   • Smokeless tobacco: Never Used   • Tobacco comment: QUIT DATE 1984   Substance and Sexual Activity   • Alcohol use: No   • Drug use: No   • Sexual activity: No   Social History Narrative    [facility]    Patient drinks 2 cups of caffeine per day.,       FAMILY HX  Family History   Problem Relation Age of Onset   • Hypertension Mother    • Coronary artery disease Mother 56        PREMATURE    • Lung cancer Father    • Hypertension Father    • Osteoporosis Sister    • Lung cancer Brother              Ta Flores III, MD, FACC

## 2019-04-16 ENCOUNTER — TELEPHONE (OUTPATIENT)
Dept: INTERNAL MEDICINE | Facility: CLINIC | Age: 84
End: 2019-04-16

## 2019-04-16 ENCOUNTER — OFFICE VISIT (OUTPATIENT)
Dept: INTERNAL MEDICINE | Facility: CLINIC | Age: 84
End: 2019-04-16

## 2019-04-16 VITALS — SYSTOLIC BLOOD PRESSURE: 78 MMHG | DIASTOLIC BLOOD PRESSURE: 40 MMHG | HEART RATE: 64 BPM

## 2019-04-16 DIAGNOSIS — E11.42 DIABETIC POLYNEUROPATHY ASSOCIATED WITH TYPE 2 DIABETES MELLITUS (HCC): Primary | ICD-10-CM

## 2019-04-16 DIAGNOSIS — E11.21 DIABETIC NEPHROPATHY ASSOCIATED WITH TYPE 2 DIABETES MELLITUS (HCC): ICD-10-CM

## 2019-04-16 DIAGNOSIS — I25.119 CORONARY ARTERY DISEASE INVOLVING NATIVE CORONARY ARTERY OF NATIVE HEART WITH ANGINA PECTORIS (HCC): ICD-10-CM

## 2019-04-16 DIAGNOSIS — I50.32 CHRONIC DIASTOLIC CONGESTIVE HEART FAILURE (HCC): ICD-10-CM

## 2019-04-16 DIAGNOSIS — I10 ESSENTIAL HYPERTENSION: ICD-10-CM

## 2019-04-16 DIAGNOSIS — N18.30 CKD (CHRONIC KIDNEY DISEASE), STAGE III (HCC): ICD-10-CM

## 2019-04-16 PROCEDURE — 99214 OFFICE O/P EST MOD 30 MIN: CPT | Performed by: INTERNAL MEDICINE

## 2019-04-16 NOTE — PROGRESS NOTES
[facility]     Mary Ramirez  1934   [id number]      Patient Care Team:  Nirmal Lundberg MD as PCP - General (Internal Medicine)    Chief Complaint::   Chief Complaint   Patient presents with   • Hypertension   • Congestive Heart Failure   • Coronary Artery Disease   • Chronic Kidney Disease        HPI  Ms. Ramirez is now 84.  She comes in for follow-up of her diabetes, hypertension, dementia, chronic kidney disease and congestive heart failure.  It appears that she fell and fractured her right hip on March 29.  She is now back at the [facility].  Apparently she was transported here by the [facility].  No one is with her in the exam room and she appears slightly confused.  She saw Dr. Flores yesterday who suggested that she not take all of her blood pressure medicines at the same time in the morning because her blood pressure was low in his office yesterday morning as well.  She denies lightheadedness.  Blood glucoses at the [facility] have ranged from a low of 182 to a high of 366 but generally she is right around 200.  Blood pressures in the early morning have been 149-168/61-68.    Chronic Conditions:      Patient Active Problem List   Diagnosis   • Fall   • Fracture, intertrochanteric, left femur (CMS/HCC)   • Essential hypertension   • Coronary artery disease involving native coronary artery of native heart with angina pectoris (CMS/HCC)   • Dementia   • Paroxysmal atrial fibrillation (CMS/HCC)   • Anticoagulated   • SSS (sick sinus syndrome) (CMS/HCC)   • Chronic diastolic congestive heart failure (CMS/HCC)   • DEANGELO (acute kidney injury) (CMS/HCC)   • Benign essential hypertension   • CKD (chronic kidney disease), stage III (CMS/HCC)   • Mixed hyperlipidemia   • Major depressive disorder, single episode, mild (CMS/HCC)   • Chronic allergic rhinitis   • Memory loss   • Bradycardia   • At risk for falls   • Closed fracture of femur (CMS/HCC)   • Coronary arteriosclerosis after percutaneous  transluminal coronary angioplasty (PTCA)   • Diabetic polyneuropathy (CMS/[facility])   • Diverticular disease of colon   • Foot pain   • GERD (gastroesophageal reflux disease)   • Peripheral artery insufficiency (CMS/[facility])   • Annual physical exam   • Vitamin D deficiency   • Renal disorder   • Closed fracture of right hip (CMS/[facility])   • Postoperative anemia due to acute blood loss        Past Medical History:   Diagnosis Date   • Atrial fibrillation (CMS/[facility])    • Benign essential hypertension    • CAD (coronary artery disease)    • Carotid artery disease (CMS/[facility])    • CHF (congestive heart failure) (CMS/[facility])    • Chronic allergic rhinitis    • CKD (chronic kidney disease), stage III (CMS/[facility])    • DDD (degenerative disc disease), cervical    • Dementia    • Diabetes mellitus (CMS/[facility])    • Diverticulosis     WITH PERFORATION    • Hypertension    • Major depressive disorder, single episode, mild (CMS/[facility])    • Memory loss    • Mixed hyperlipidemia    • SSS (sick sinus syndrome) (CMS/[facility])    • TIA (transient ischemic attack) 01/2016       Past Surgical History:   Procedure Laterality Date   • ANKLE SURGERY Left 1996   • ATHERECTOMY  1995   • ATHRECTOMY ILIAC, FEMORAL, TIBIAL ARTERY     • BREAST LUMPECTOMY Left 1981   • COLON SURGERY     • COLOSTOMY  1983   • CORONARY STENT PLACEMENT  2001   • FINGER FRACTURE SURGERY Right    • HIP TROCANTERIC NAILING WITH INTRAMEDULLARY HIP SCREW Left 11/23/2017   • HIP TROCANTERIC NAILING WITH INTRAMEDULLARY HIP SCREW Right 3/30/2019    Procedure: HIP TROCANTERIC NAILING WITH INTRAMEDULLARY HIP SCREW RIGHT;  Surgeon: Theo Figueroa MD;  [facility];  Service: Orthopedics   • HYSTERECTOMY  1984   • OTHER SURGICAL HISTORY  1984    REANASTAMOSIS        Family History   Problem Relation Age of Onset   • Hypertension Mother    • Coronary artery disease Mother 56        PREMATURE    • Lung cancer Father    • Hypertension Father    • Osteoporosis Sister    • Lung cancer Brother   "      Social History     Socioeconomic History   • Marital status:      Spouse name: N/A   • Number of children: 2   • Years of education: H.S   • Highest education level: Not on file   Occupational History   • Occupation:      Employer: RETIRED   Tobacco Use   • Smoking status: Former Smoker     Years: 2.00     Types: Cigarettes     Start date: 1975     Last attempt to quit: 1978     Years since quittin.4   • Smokeless tobacco: Never Used   • Tobacco comment: QUIT DATE 1984   Substance and Sexual Activity   • Alcohol use: No   • Drug use: No   • Sexual activity: No   Social History Narrative    [facility]    Patient drinks 2 cups of caffeine per day.,       Allergies   Allergen Reactions   • Penicillins Other (See Comments)     Pt states \"i don't know\"         Current Outpatient Medications:   •  acetaminophen (TYLENOL) 500 MG tablet, Take 500 mg by mouth Every 6 (Six) Hours As Needed for Mild Pain . Take one tablet twice a day for leg pain, Disp: , Rfl:   •  amiodarone (PACERONE) 200 MG tablet, Take 1 tablet by mouth Daily., Disp: , Rfl:   •  amLODIPine (NORVASC) 10 MG tablet, Take 1 tablet by mouth Daily for 30 days., Disp: 30 tablet, Rfl: 0  •  aspirin  MG EC tablet, Take 1 tablet by mouth Daily., Disp: , Rfl:   •  bisacodyl (DULCOLAX) 5 MG EC tablet, Take 1 tablet by mouth Daily As Needed for Constipation., Disp: , Rfl:   •  citalopram (CeleXA) 20 MG tablet, Take 20 mg by mouth Daily., Disp: , Rfl:   •  CloNIDine (CATAPRES) 0.2 MG tablet, Take 1 tablet by mouth 2 (Two) Times a Day for 30 days., Disp: 60 tablet, Rfl: 0  •  docusate sodium (COLACE) 100 MG capsule, Take 100 mg by mouth 2 (Two) Times a Day., Disp: , Rfl:   •  donepezil (ARICEPT) 5 MG tablet, Take 5 mg by mouth Every Night., Disp: , Rfl:   •  fluticasone (FLONASE) 50 MCG/ACT nasal spray, 2 sprays into the nostril(s) as directed by provider Daily., Disp: , Rfl:   •  " insulin aspart (novoLOG FLEXPEN) 100 UNIT/ML solution pen-injector sc pen, Inject 2-5 Units under the skin into the appropriate area as directed 4 (Four) Times a Day., Disp: , Rfl:   •  isosorbide mononitrate (IMDUR) 60 MG 24 hr tablet, Take 60 mg by mouth Daily., Disp: , Rfl:   •  metFORMIN (GLUCOPHAGE) 500 MG tablet, Take 1 tablet by mouth Daily With Breakfast., Disp: , Rfl:   •  Multiple Vitamin (MULTIVITAMINS PO), Take 1 tablet by mouth Daily., Disp: , Rfl:   •  ondansetron (ZOFRAN) 4 MG tablet, Take 4 mg by mouth Every 8 (Eight) Hours As Needed for Nausea or Vomiting., Disp: , Rfl:   •  polyethylene glycol (MIRALAX) pack packet, Take 17 g by mouth Daily., Disp: , Rfl:     Review of Systems   Constitutional: Negative for chills, fatigue and fever.   HENT: Negative for congestion, ear pain and sinus pressure.    Respiratory: Negative for cough, chest tightness, shortness of breath and wheezing.    Cardiovascular: Negative for chest pain and palpitations.   Gastrointestinal: Negative for abdominal pain, blood in stool and constipation.   Skin: Negative for color change.   Allergic/Immunologic: Negative for environmental allergies.   Neurological: Negative for dizziness, speech difficulty and headache.   Psychiatric/Behavioral: Negative for decreased concentration. The patient is not nervous/anxious.         Vital Signs  Vitals:    04/16/19 0926   BP: (!) 78/40   BP Location: Left arm   Patient Position: Sitting   Cuff Size: Adult   Pulse: 64   Weight: Comment: unable to weigh       Physical Exam   Constitutional: She is oriented to person, place, and time.   Frail, elderly, in wheelchair   HENT:   Head: Normocephalic and atraumatic.   Cardiovascular: Normal rate, regular rhythm and normal heart sounds.   No murmur heard.  Pulmonary/Chest: Effort normal and breath sounds normal.   Neurological: She is alert and oriented to person, place, and time.   Psychiatric: She has a normal mood and affect.   Vitals  reviewed.     Procedures    ACE III MINI             Assessment/Plan:    Diabetic nephropathy, continue current dose of insulin and metformin.  Glucoses ranging around 200 are acceptable as hypoglycemia would be potentially catastrophic.    Diabetic neuropathy, as above    Chronic kidney disease stage III, continue avoidance of NSAIDs and control of blood pressure and blood glucose.    Hypertension controlled on amlodipine, clonidine.  Her blood pressures at the [facility] have been a bit high, but today and yesterday she is hypotensive in the office.  I agree with Dr. Flores's recommendation and sent that to the [facility] to push her amlodipine to the afternoon.    Depression continue citalopram    Coronary artery disease/CHF/atrial fibrillation per Dr. Flores.    Dementia, continue donepezil 5 mg a day.    Plan of care reviewed with patient at the conclusion of today's visit. Education was provided regarding diagnosis, management, and any prescribed or recommended OTC medications.Patient verbalizes understanding of and agreement with management plan.         Nirmal Lundberg MD

## 2019-04-16 NOTE — TELEPHONE ENCOUNTER
Dr. Lundberg requested call to [facility] due to low B/P and confusion today. Called [phone] and was routed to [place]. Problem with phone lines. Called pt son, Josh [phone]. He is OOT and did not know pt had appt today. He did know her B/P was low yesterdat at Dr. Flores's and she was also confused there. He made some med changes. Josh gave me # for  at [facility], [place]  [phone]. Called and he is coming to pick her up. Orders , next appt and AVS placed in envelope to go back to [facility]. Dr. Lundberg notified

## 2019-04-18 ENCOUNTER — OFFICE VISIT (OUTPATIENT)
Dept: ORTHOPEDIC SURGERY | Facility: CLINIC | Age: 84
End: 2019-04-18

## 2019-04-18 DIAGNOSIS — Z09 SURGERY FOLLOW-UP: ICD-10-CM

## 2019-04-18 DIAGNOSIS — S72.141D CLOSED INTERTROCHANTERIC FRACTURE OF HIP, RIGHT, WITH ROUTINE HEALING, SUBSEQUENT ENCOUNTER: Primary | ICD-10-CM

## 2019-04-18 PROCEDURE — 99024 POSTOP FOLLOW-UP VISIT: CPT | Performed by: ORTHOPAEDIC SURGERY

## 2019-04-18 NOTE — PROGRESS NOTES
Orthopaedic Clinic Note:  Hip Post Op    Chief Complaint   Patient presents with   • Post-op     2 weeks status post Right Hip Trocanteric Nailing with Intramedullary Hip Screw 03/30/2019        HPI    Ms. Ramirez is 2  week(s) s/p fixation right intertrochanteric hip fracture. Rates pain 7/10. She is ambulating with a walker and is taking Tylenol for pain control. She denies fevers, chills, or constitutional symptoms. She is continuing acute rehab PT. Patient is improving overall.  She denies any fevers chills or constitutional symptoms.  She is having some limitations and postoperative mobility due to pain..    Past Medical History:   Diagnosis Date   • Atrial fibrillation (CMS/HCC)    • Benign essential hypertension    • CAD (coronary artery disease)    • Carotid artery disease (CMS/HCC)    • CHF (congestive heart failure) (CMS/HCC)    • Chronic allergic rhinitis    • CKD (chronic kidney disease), stage III (CMS/HCC)    • DDD (degenerative disc disease), cervical    • Dementia    • Diabetes mellitus (CMS/HCC)    • Diverticulosis     WITH PERFORATION    • Hypertension    • Major depressive disorder, single episode, mild (CMS/HCC)    • Memory loss    • Mixed hyperlipidemia    • SSS (sick sinus syndrome) (CMS/HCC)    • TIA (transient ischemic attack) 01/2016      Past Surgical History:   Procedure Laterality Date   • ANKLE SURGERY Left 1996   • ATHERECTOMY  1995   • ATHRECTOMY ILIAC, FEMORAL, TIBIAL ARTERY     • BREAST LUMPECTOMY Left 1981   • COLON SURGERY     • COLOSTOMY  1983   • CORONARY STENT PLACEMENT  2001   • FINGER FRACTURE SURGERY Right    • HIP TROCANTERIC NAILING WITH INTRAMEDULLARY HIP SCREW Left 11/23/2017   • HIP TROCANTERIC NAILING WITH INTRAMEDULLARY HIP SCREW Right 3/30/2019    Procedure: HIP TROCANTERIC NAILING WITH INTRAMEDULLARY HIP SCREW RIGHT;  Surgeon: Theo Figueroa MD;  Location: [facility];  Service: Orthopedics   • HYSTERECTOMY  1984   • OTHER SURGICAL HISTORY  1984    REANASTAMOSIS        Family History   Problem Relation Age of Onset   • Hypertension Mother    • Coronary artery disease Mother 56        PREMATURE    • Lung cancer Father    • Hypertension Father    • Osteoporosis Sister    • Lung cancer Brother      Social History     Socioeconomic History   • Marital status:      Spouse name: N/A   • Number of children: 2   • Years of education: H.S   • Highest education level: Not on file   Occupational History   • Occupation: Cafeteria Manager     Employer: RETIRED   Tobacco Use   • Smoking status: Former Smoker     Years: 2.00     Types: Cigarettes     Start date: 1975     Last attempt to quit: 1978     Years since quittin.4   • Smokeless tobacco: Never Used   • Tobacco comment: QUIT DATE 1984   Substance and Sexual Activity   • Alcohol use: No   • Drug use: No   • Sexual activity: No   Social History Narrative    [facility]    Patient drinks 2 cups of caffeine per day.,      Current Outpatient Medications on File Prior to Visit   Medication Sig Dispense Refill   • acetaminophen (TYLENOL) 500 MG tablet Take 500 mg by mouth Every 6 (Six) Hours As Needed for Mild Pain . Take one tablet twice a day for leg pain     • amiodarone (PACERONE) 200 MG tablet Take 1 tablet by mouth Daily.     • amLODIPine (NORVASC) 10 MG tablet Take 1 tablet by mouth Daily for 30 days. 30 tablet 0   • aspirin  MG EC tablet Take 1 tablet by mouth Daily.     • bisacodyl (DULCOLAX) 5 MG EC tablet Take 1 tablet by mouth Daily As Needed for Constipation.     • citalopram (CeleXA) 20 MG tablet Take 20 mg by mouth Daily.     • CloNIDine (CATAPRES) 0.2 MG tablet Take 1 tablet by mouth 2 (Two) Times a Day for 30 days. 60 tablet 0   • docusate sodium (COLACE) 100 MG capsule Take 100 mg by mouth 2 (Two) Times a Day.     • donepezil (ARICEPT) 5 MG tablet Take 5 mg by mouth Every Night.     • fluticasone (FLONASE) 50 MCG/ACT nasal spray 2 sprays into the nostril(s) as  "directed by provider Daily.     • insulin aspart (novoLOG FLEXPEN) 100 UNIT/ML solution pen-injector sc pen Inject 2-5 Units under the skin into the appropriate area as directed 4 (Four) Times a Day.     • isosorbide mononitrate (IMDUR) 60 MG 24 hr tablet Take 60 mg by mouth Daily.     • metFORMIN (GLUCOPHAGE) 500 MG tablet Take 1 tablet by mouth Daily With Breakfast.     • Multiple Vitamin (MULTIVITAMINS PO) Take 1 tablet by mouth Daily.     • ondansetron (ZOFRAN) 4 MG tablet Take 4 mg by mouth Every 8 (Eight) Hours As Needed for Nausea or Vomiting.     • polyethylene glycol (MIRALAX) pack packet Take 17 g by mouth Daily.       No current facility-administered medications on file prior to visit.       Allergies   Allergen Reactions   • Penicillins Other (See Comments)     Pt states \"i don't know\"        Review of Systems   Constitutional: Negative.    HENT: Negative.    Eyes: Negative.    Respiratory: Negative.    Cardiovascular: Negative.    Gastrointestinal: Negative.    Endocrine: Negative.    Genitourinary: Negative.    Musculoskeletal: Positive for joint swelling.   Skin: Negative.    Allergic/Immunologic: Negative.    Neurological: Negative.    Hematological: Negative.    Psychiatric/Behavioral: Negative.         Physical Exam  There were no vitals taken for this visit.    There is no height or weight on file to calculate BMI.    GENERAL APPEARANCE: awake, alert, oriented, in no acute distress and well developed, well nourished  LUNGS:  breathing nonlabored  EXTREMITIES: no clubbing, cyanosis  PERIPHERAL PULSES: palpable dorsalis pedis and posterior tibial pulses bilaterally.    GAIT: Not assessed            Hip Exam:  Right    RANGE OF MOTION:  EXTENSION/FLEXION:  normal (0-110 degrees)  IR:  20  ER:  20  PAIN WITH HIP MOTION:  Yes, localized knee and lateral trochanter  PAIN WITH LOGROLL:  no     STRENGTH:  ABDUCTOR:  4/5  ADDUCTOR:  4/5  HIP FLEXION:  4/5    GREATER TROCHANTER BURSAL PAIN:  " yes    SENSATION TO LIGHT TOUCH:  DEEP PERONEAL/SUPERFICIAL PERONEAL/SURAL/SAPHENOUS/TIBIAL:   intact    EDEMA: Trace edema ankle  ERYTHEMA:  no  WOUNDS/INCISIONS:  yes, lateral based incisions are well-healed with no erythema or drainage  _______________________________________________________________  _______________________________________________________________    RADIOGRAPHIC FINDINGS:   Indication: Status post operative fixation right intertrochanteric hip fracture    Comparison: Todays xrays were compared to previous xrays from 3/30/2019    2 views right femur: Radiographs demonstrate hardware in place without evidence of hardware complication.  The intertrochanteric fracture alignment appears to be well-positioned with no evidence of complication.  There is been some slight interval collapse through the lag screw as expected.    Assessment/Plan:   Diagnosis Plan   1. Closed intertrochanteric fracture of hip, right, with routine healing, subsequent encounter     2. Surgery follow-up  XR Femur 2 View Right     At this point we will discontinue sutures.  She is to continue weightbearing as tolerated without restrictions.  I will see her back in 4 weeks for repeat evaluation with x-ray 2 views right femur.    Theo Figueroa MD  04/18/19  8:46 AM

## 2019-04-24 ENCOUNTER — TELEPHONE (OUTPATIENT)
Dept: ORTHOPEDIC SURGERY | Facility: CLINIC | Age: 84
End: 2019-04-24

## 2019-04-24 NOTE — TELEPHONE ENCOUNTER
I spoke with Lisbeth and gave her the ok to remove the distal stitch. I told her to call if she needs anything further.  [place]

## 2019-04-24 NOTE — TELEPHONE ENCOUNTER
CANDELARIO JANSEN FROM THE [facility] CALLED TO SEE IF SHE NEEDED TO REMOVE THE SUTURES THAT ARE LEFT OVER FROM THE PATIENTS LAST VISIT?     HER CALL BACK NUMBER [phone].

## 2019-05-16 ENCOUNTER — OFFICE VISIT (OUTPATIENT)
Dept: ORTHOPEDIC SURGERY | Facility: CLINIC | Age: 84
End: 2019-05-16

## 2019-05-16 DIAGNOSIS — S72.141D CLOSED INTERTROCHANTERIC FRACTURE OF HIP, RIGHT, WITH ROUTINE HEALING, SUBSEQUENT ENCOUNTER: Primary | ICD-10-CM

## 2019-05-16 PROCEDURE — 99024 POSTOP FOLLOW-UP VISIT: CPT | Performed by: ORTHOPAEDIC SURGERY

## 2019-05-16 NOTE — PROGRESS NOTES
Orthopaedic Clinic Note:  Hip Post Op    Chief Complaint   Patient presents with   • Post-op     4 week f/u; 7 weeks status post Right Hip Trocanteric Nailing with Intramedullary Hip Screw 03/30/2019        HPI    Ms. Ramirez is 7  week(s) s/p right hip intertrochanteric fracture fixation with cephalo-medullary nail.  She rates her pain 0/10 on the pain scale today.  She is ambulating with the assistance of a walker.  She remains in the assisted living facility and has significant dementia.  Her therapy has been limited due to her unwillingness to cooperate with the therapist at the assisted living facility.  Overall, her son who is with her today states that she is doing much better and is a little more active.  The main limitation at this point appears to be related to dementia and lack of cooperation.    Past Medical History:   Diagnosis Date   • Atrial fibrillation (CMS/HCC)    • Benign essential hypertension    • CAD (coronary artery disease)    • Carotid artery disease (CMS/HCC)    • CHF (congestive heart failure) (CMS/HCC)    • Chronic allergic rhinitis    • CKD (chronic kidney disease), stage III (CMS/HCC)    • DDD (degenerative disc disease), cervical    • Dementia    • Diabetes mellitus (CMS/HCC)    • Diverticulosis     WITH PERFORATION    • Hypertension    • Major depressive disorder, single episode, mild (CMS/HCC)    • Memory loss    • Mixed hyperlipidemia    • SSS (sick sinus syndrome) (CMS/HCC)    • TIA (transient ischemic attack) 01/2016      Past Surgical History:   Procedure Laterality Date   • ANKLE SURGERY Left 1996   • ATHERECTOMY  1995   • ATHRECTOMY ILIAC, FEMORAL, TIBIAL ARTERY     • BREAST LUMPECTOMY Left 1981   • COLON SURGERY     • COLOSTOMY  1983   • CORONARY STENT PLACEMENT  2001   • FINGER FRACTURE SURGERY Right    • HIP TROCANTERIC NAILING WITH INTRAMEDULLARY HIP SCREW Left 11/23/2017   • HIP TROCANTERIC NAILING WITH INTRAMEDULLARY HIP SCREW Right 3/30/2019    Procedure: HIP TROCANTERIC  NAILING WITH INTRAMEDULLARY HIP SCREW RIGHT;  Surgeon: Theo Figueroa MD;  Location: [facility];  Service: Orthopedics   • HYSTERECTOMY     • OTHER SURGICAL HISTORY      REANASTAMOSIS       Family History   Problem Relation Age of Onset   • Hypertension Mother    • Coronary artery disease Mother 56        PREMATURE    • Lung cancer Father    • Hypertension Father    • Osteoporosis Sister    • Lung cancer Brother      Social History     Socioeconomic History   • Marital status:      Spouse name: N/A   • Number of children: 2   • Years of education: H.S   • Highest education level: Not on file   Occupational History   • Occupation: Cafeteria Manager     Employer: RETIRED   Tobacco Use   • Smoking status: Former Smoker     Years: 2.00     Types: Cigarettes     Start date: 1975     Last attempt to quit: 1978     Years since quittin.5   • Smokeless tobacco: Never Used   • Tobacco comment: QUIT DATE 1984   Substance and Sexual Activity   • Alcohol use: No   • Drug use: No   • Sexual activity: No   Social History Narrative    [facility]    Patient drinks 2 cups of caffeine per day.,      Current Outpatient Medications on File Prior to Visit   Medication Sig Dispense Refill   • acetaminophen (TYLENOL) 500 MG tablet Take 500 mg by mouth Every 6 (Six) Hours As Needed for Mild Pain . Take one tablet twice a day for leg pain     • amiodarone (PACERONE) 200 MG tablet Take 1 tablet by mouth Daily.     • aspirin  MG EC tablet Take 1 tablet by mouth Daily.     • bisacodyl (DULCOLAX) 5 MG EC tablet Take 1 tablet by mouth Daily As Needed for Constipation.     • citalopram (CeleXA) 20 MG tablet Take 20 mg by mouth Daily.     • docusate sodium (COLACE) 100 MG capsule Take 100 mg by mouth 2 (Two) Times a Day.     • donepezil (ARICEPT) 5 MG tablet Take 5 mg by mouth Every Night.     • fluticasone (FLONASE) 50 MCG/ACT nasal spray 2 sprays into the nostril(s) as  "directed by provider Daily.     • insulin aspart (novoLOG FLEXPEN) 100 UNIT/ML solution pen-injector sc pen Inject 2-5 Units under the skin into the appropriate area as directed 4 (Four) Times a Day.     • isosorbide mononitrate (IMDUR) 60 MG 24 hr tablet Take 60 mg by mouth Daily.     • metFORMIN (GLUCOPHAGE) 500 MG tablet Take 1 tablet by mouth Daily With Breakfast.     • Multiple Vitamin (MULTIVITAMINS PO) Take 1 tablet by mouth Daily.     • ondansetron (ZOFRAN) 4 MG tablet Take 4 mg by mouth Every 8 (Eight) Hours As Needed for Nausea or Vomiting.     • polyethylene glycol (MIRALAX) pack packet Take 17 g by mouth Daily.       No current facility-administered medications on file prior to visit.       Allergies   Allergen Reactions   • Penicillins Other (See Comments)     Pt states \"i don't know\"        Review of Systems   Constitutional: Negative.    HENT: Negative.    Eyes: Negative.    Respiratory: Negative.    Cardiovascular: Negative.    Gastrointestinal: Negative.    Endocrine: Negative.    Genitourinary: Negative.    Musculoskeletal:        Right Hip Nailing f/u   Skin: Negative.    Allergic/Immunologic: Negative.    Neurological: Negative.    Hematological: Negative.    Psychiatric/Behavioral: Negative.         Physical Exam  There were no vitals taken for this visit.    There is no height or weight on file to calculate BMI.    GENERAL APPEARANCE: awake, alert, oriented, in no acute distress and well developed, well nourished  LUNGS:  breathing nonlabored  EXTREMITIES: no clubbing, cyanosis  PERIPHERAL PULSES: palpable dorsalis pedis and posterior tibial pulses bilaterally.    GAIT: Not assessed            Hip Exam:  Right    RANGE OF MOTION:  EXTENSION/FLEXION:  normal (0-110 degrees)  IR:  10  ER:  30  PAIN WITH HIP MOTION:  yes, laterally at trochanteric bursa  PAIN WITH LOGROLL:  no     STRENGTH:  ABDUCTOR:  4/5  ADDUCTOR:  4/5  HIP FLEXION:  4/5    GREATER TROCHANTER BURSAL PAIN:  yes    SENSATION TO " LIGHT TOUCH:  DEEP PERONEAL/SUPERFICIAL PERONEAL/SURAL/SAPHENOUS/TIBIAL:   intact    EDEMA:  no  ERYTHEMA:  no  WOUNDS/INCISIONS:  yes, lateral based incisions are well-healed with no erythema or drainage  _______________________________________________________________  _______________________________________________________________    RADIOGRAPHIC FINDINGS:   Indication: Status post right hip intertrochanteric fracture fixation    Comparison: Todays xrays were compared to previous xrays from 4/18/2019    Right femur 2 views: Radiographs demonstrate controlled collapse of the intertrochanteric fracture with fragments well aligned.  No evidence of hardware complication.  Interval callus formation is seen on orthogonal views of the hip.  Alignment remains satisfactory.  No evidence of intra-articular joint destruction.      Assessment/Plan:   Diagnosis Plan   1. Closed intertrochanteric fracture of hip, right, with routine healing, subsequent encounter  XR Femur 2 View Right     Patient appears to be healing the intertrochanteric fracture.  The majority of her pain is lateral at the trochanteric bursa.  This could be due to irritation of the lag screw versus continuing healing of her comminuted greater trochanteric fracture.  I recommended continued weightbearing as tolerated with the assistance of a walker.  I will see her back in 2 months for repeat assessment including x-ray 2 views right femur.    Theo Figueroa MD  05/16/19  10:34 AM

## 2019-05-21 ENCOUNTER — HOSPITAL ENCOUNTER (EMERGENCY)
Facility: HOSPITAL | Age: 84
Discharge: NURSING, REHAB OR LONG-TERM CARE | End: 2019-05-21
Attending: EMERGENCY MEDICINE | Admitting: EMERGENCY MEDICINE

## 2019-05-21 ENCOUNTER — APPOINTMENT (OUTPATIENT)
Dept: GENERAL RADIOLOGY | Facility: HOSPITAL | Age: 84
End: 2019-05-21

## 2019-05-21 ENCOUNTER — APPOINTMENT (OUTPATIENT)
Dept: CT IMAGING | Facility: HOSPITAL | Age: 84
End: 2019-05-21

## 2019-05-21 VITALS
SYSTOLIC BLOOD PRESSURE: 225 MMHG | HEIGHT: 61 IN | BODY MASS INDEX: 19.63 KG/M2 | OXYGEN SATURATION: 98 % | RESPIRATION RATE: 18 BRPM | HEART RATE: 70 BPM | DIASTOLIC BLOOD PRESSURE: 97 MMHG | WEIGHT: 104 LBS | TEMPERATURE: 97.7 F

## 2019-05-21 DIAGNOSIS — S82.045A CLOSED NONDISPLACED COMMINUTED FRACTURE OF LEFT PATELLA, INITIAL ENCOUNTER: ICD-10-CM

## 2019-05-21 DIAGNOSIS — S02.2XXA CLOSED NONDISPLACED FRACTURE OF NASAL BONE, INITIAL ENCOUNTER: Primary | ICD-10-CM

## 2019-05-21 PROCEDURE — 99285 EMERGENCY DEPT VISIT HI MDM: CPT

## 2019-05-21 PROCEDURE — 73560 X-RAY EXAM OF KNEE 1 OR 2: CPT

## 2019-05-21 PROCEDURE — 70450 CT HEAD/BRAIN W/O DYE: CPT

## 2019-05-21 PROCEDURE — 70486 CT MAXILLOFACIAL W/O DYE: CPT

## 2019-05-21 RX ORDER — AMLODIPINE BESYLATE 5 MG/1
5 TABLET ORAL DAILY
COMMUNITY
End: 2019-07-09 | Stop reason: DRUGHIGH

## 2019-05-21 RX ORDER — MINOCYCLINE HYDROCHLORIDE 100 MG/1
100 CAPSULE ORAL 2 TIMES DAILY
COMMUNITY
End: 2019-08-19

## 2019-05-21 RX ORDER — HYDROCODONE BITARTRATE AND ACETAMINOPHEN 5; 325 MG/1; MG/1
1 TABLET ORAL EVERY 6 HOURS PRN
Qty: 12 TABLET | Refills: 0 | Status: SHIPPED | OUTPATIENT
Start: 2019-05-21 | End: 2019-08-19

## 2019-05-21 RX ORDER — CLONIDINE HYDROCHLORIDE 0.1 MG/1
0.2 TABLET ORAL ONCE
Status: COMPLETED | OUTPATIENT
Start: 2019-05-21 | End: 2019-05-21

## 2019-05-21 RX ORDER — CLONIDINE HYDROCHLORIDE 0.2 MG/1
0.2 TABLET ORAL 2 TIMES DAILY
COMMUNITY
End: 2022-06-17 | Stop reason: HOSPADM

## 2019-05-21 RX ORDER — HYDROCODONE BITARTRATE AND ACETAMINOPHEN 5; 325 MG/1; MG/1
1 TABLET ORAL ONCE
Status: COMPLETED | OUTPATIENT
Start: 2019-05-21 | End: 2019-05-21

## 2019-05-21 RX ADMIN — CLONIDINE HYDROCHLORIDE 0.2 MG: 0.1 TABLET ORAL at 20:31

## 2019-05-21 RX ADMIN — HYDROCODONE BITARTRATE AND ACETAMINOPHEN 1 TABLET: 5; 325 TABLET ORAL at 20:30

## 2019-05-23 ENCOUNTER — OFFICE VISIT (OUTPATIENT)
Dept: ORTHOPEDIC SURGERY | Facility: CLINIC | Age: 84
End: 2019-05-23

## 2019-05-23 VITALS — HEART RATE: 62 BPM | OXYGEN SATURATION: 98 % | WEIGHT: 104.06 LBS | BODY MASS INDEX: 19.65 KG/M2 | HEIGHT: 61 IN

## 2019-05-23 DIAGNOSIS — S82.032A CLOSED DISPLACED TRANSVERSE FRACTURE OF LEFT PATELLA, INITIAL ENCOUNTER: ICD-10-CM

## 2019-05-23 DIAGNOSIS — M25.562 ACUTE PAIN OF LEFT KNEE: Primary | ICD-10-CM
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
## 2019-05-23 PROCEDURE — 99214 OFFICE O/P EST MOD 30 MIN: CPT | Performed by: PHYSICIAN ASSISTANT

## 2019-05-23 PROCEDURE — 27520 TREAT KNEECAP FRACTURE: CPT | Performed by: PHYSICIAN ASSISTANT

## 2019-05-23 NOTE — PROGRESS NOTES
[facility] Orthopaedic Surgery Clinic Note    Subjective     Chief Complaint   Patient presents with   • Left Knee - Pain     Patient fell on 5/21/19.        HPI      Mary Ramirez is a 84 y.o. female.  Patient returns to orthopedic clinic for evaluation of her left knee, on 5/21/2019 she fell.  She was seen in the ED on the same day and diagnosed with a left knee transverse minimally displaced patella fracture.  She was placed in a knee immobilizer and referred back to our clinic for evaluation and treatment.  At this time she endorses pain scale 3/5/2010.  Severity the pain moderate.  Quality the pain dull, aching.  Son is with her right now providing majority the history as patient has dementia so is difficult for her to relate exactly what happened.    Patient has been under care of Dr. Figueroa for a right hip trochanteric nail with intramedullary hip screw 3/30/2019.  She last saw Dr. Figueroa last week on 5/16/2019.        Past Medical History:   Diagnosis Date   • Atrial fibrillation (CMS/HCC)    • Benign essential hypertension    • CAD (coronary artery disease)    • Carotid artery disease (CMS/HCC)    • CHF (congestive heart failure) (CMS/HCC)    • Chronic allergic rhinitis    • CKD (chronic kidney disease), stage III (CMS/HCC)    • DDD (degenerative disc disease), cervical    • Dementia    • Diabetes mellitus (CMS/HCC)    • Diverticulosis     WITH PERFORATION    • Hypertension    • Major depressive disorder, single episode, mild (CMS/HCC)    • Memory loss    • Mixed hyperlipidemia    • SSS (sick sinus syndrome) (CMS/HCC)    • TIA (transient ischemic attack) 01/2016      Past Surgical History:   Procedure Laterality Date   • ANKLE SURGERY Left 1996   • ATHERECTOMY  1995   • ATHRECTOMY ILIAC, FEMORAL, TIBIAL ARTERY     • BREAST LUMPECTOMY Left 1981   • COLON SURGERY     • COLOSTOMY  1983   • CORONARY STENT PLACEMENT  2001   • FINGER FRACTURE SURGERY Right    • HIP TROCANTERIC NAILING WITH INTRAMEDULLARY HIP  SCREW Left 2017   • HIP TROCANTERIC NAILING WITH INTRAMEDULLARY HIP SCREW Right 3/30/2019    Procedure: HIP TROCANTERIC NAILING WITH INTRAMEDULLARY HIP SCREW RIGHT;  Surgeon: Theo Figueroa MD;  Location: [facility];  Service: Orthopedics   • HYSTERECTOMY     • OTHER SURGICAL HISTORY      REANASTAMOSIS       Family History   Problem Relation Age of Onset   • Hypertension Mother    • Coronary artery disease Mother 56        PREMATURE    • Lung cancer Father    • Hypertension Father    • Osteoporosis Sister    • Lung cancer Brother      Social History     Socioeconomic History   • Marital status:      Spouse name: N/A   • Number of children: 2   • Years of education: H.S   • Highest education level: Not on file   Occupational History   • Occupation:      Employer: RETIRED   Tobacco Use   • Smoking status: Former Smoker     Years: 2.00     Types: Cigarettes     Start date: 1975     Last attempt to quit: 1978     Years since quittin.5   • Smokeless tobacco: Never Used   • Tobacco comment: QUIT DATE 1984   Substance and Sexual Activity   • Alcohol use: No   • Drug use: No   • Sexual activity: No   Social History Narrative    [facility]    Patient drinks 2 cups of caffeine per day.,      Current Outpatient Medications on File Prior to Visit   Medication Sig Dispense Refill   • acetaminophen (TYLENOL) 500 MG tablet Take 500 mg by mouth Every 6 (Six) Hours As Needed for Mild Pain . Take one tablet twice a day for leg pain     • Amino Acids-Protein Hydrolys (PRO-STAT AWC PO) Take  by mouth.     • amiodarone (PACERONE) 200 MG tablet Take 1 tablet by mouth Daily.     • amLODIPine (NORVASC) 5 MG tablet Take 5 mg by mouth Daily.     • aspirin  MG EC tablet Take 1 tablet by mouth Daily.     • bisacodyl (DULCOLAX) 5 MG EC tablet Take 1 tablet by mouth Daily As Needed for Constipation.     • citalopram (CeleXA) 20 MG tablet Take 20 mg  "by mouth Daily.     • CloNIDine (CATAPRES) 0.2 MG tablet Take 0.2 mg by mouth 2 (Two) Times a Day.     • collagenase 250 UNIT/GM ointment Apply  topically to the appropriate area as directed Daily.     • docusate sodium (COLACE) 100 MG capsule Take 100 mg by mouth 2 (Two) Times a Day.     • donepezil (ARICEPT) 5 MG tablet Take 5 mg by mouth Every Night.     • fluticasone (FLONASE) 50 MCG/ACT nasal spray 2 sprays into the nostril(s) as directed by provider Daily.     • HYDROcodone-acetaminophen (NORCO) 5-325 MG per tablet Take 1 tablet by mouth Every 6 (Six) Hours As Needed for Severe Pain  for up to 12 doses. 12 tablet 0   • insulin aspart (novoLOG FLEXPEN) 100 UNIT/ML solution pen-injector sc pen Inject 2-5 Units under the skin into the appropriate area as directed 4 (Four) Times a Day.     • isosorbide mononitrate (IMDUR) 60 MG 24 hr tablet Take 60 mg by mouth Daily.     • metFORMIN (GLUCOPHAGE) 500 MG tablet Take 1 tablet by mouth Daily With Breakfast.     • minocycline (MINOCIN,DYNACIN) 100 MG capsule Take 100 mg by mouth 2 (Two) Times a Day.     • Multiple Vitamin (MULTIVITAMINS PO) Take 1 tablet by mouth Daily.     • ondansetron (ZOFRAN) 4 MG tablet Take 4 mg by mouth Every 8 (Eight) Hours As Needed for Nausea or Vomiting.     • polyethylene glycol (MIRALAX) pack packet Take 17 g by mouth Daily.       No current facility-administered medications on file prior to visit.       Allergies   Allergen Reactions   • Penicillins Other (See Comments)     Pt states \"i don't know\"        The following portions of the patient's history were reviewed and updated as appropriate: allergies, current medications, past family history, past medical history, past social history, past surgical history and problem list.    Review of Systems   Constitutional: Positive for activity change.   Eyes: Negative.    Respiratory: Negative.    Cardiovascular: Negative.    Gastrointestinal: Negative.    Endocrine: Negative.    Genitourinary: " "Negative.    Musculoskeletal: Positive for arthralgias.   Skin: Negative.    Allergic/Immunologic: Negative.    Neurological: Positive for dizziness and headaches.   Hematological: Negative.    Psychiatric/Behavioral: Negative.         Objective      Physical Exam  Pulse 62   Ht 154.9 cm (61\")   Wt 47.2 kg (104 lb 0.9 oz)   SpO2 98%   Breastfeeding? No   BMI 19.66 kg/m²     Body mass index is 19.66 kg/m².    GENERAL APPEARANCE: awake, alert, oriented, in no acute distress and well developed, well nourished  LUNGS:  breathing nonlabored  EXTREMITIES: no clubbing, cyanosis  PERIPHERAL PULSES: palpable dorsalis pedis and posterior tibial pulses bilaterally.    GAIT: Sitting in wheelchair    Ortho Exam  Left knee  Skin: Grossly intact with a small bruise noted anterior aspect of the knee.  No rashes or lesions appreciable.  No redness, warmth.  Positive moderate effusion.  Tenderness: Positive global tenderness to the knee with increased pain noted over patella.  Range of motion of the knee not assessed.  Patient is able to plantar and dorsiflex ankle.  Sensation intact DP, SP, S, S, T.    Imaging/Studies  Imaging Results (last 7 days)     ** No results found for the last 168 hours. **      Dr. Figueroa and I reviewed plain film imaging performed on 5/21/2019 which showed a minimally displaced transverse comminuted patella fracture.  Positive effusion noted.  Positive degenerative changes noted to the knee as well.  See chart for official report.      Assessment/Plan        ICD-10-CM ICD-9-CM   1. Acute pain of left knee M25.562 719.46   2. Closed displaced transverse fracture of left patella, initial encounter S82.032A 822.0   3. Fall, initial encounter W19.XXXA E888.9       No orders of the defined types were placed in this encounter.       -Left knee pain with noted closed displaced transverse fracture of the left patella.  -For now after discussing options with the son regarding surgery and nonoperative " management, son would like to proceed with nonoperative management if at all possible.  He does not believe his mother could handle another surgery.  -She will continue use the hydrocodone as previously prescribed for for pain control but recommend transitioning to over-the-counter pain medication as able.  -She is to remain in the knee immobilizer.  Absolutely no bending of the knee.  It is to remain straight.  -She may continue to weight-bear through use of the walker but understanding that the knee is in the immobilizer and remain straight.  -Follow-up next week for repeat imaging.  If there is been further displacement of the fracture then we will have to discuss possible operative management.  Son verbalized understanding.  -Questions and concerns answered.    Case was discussed with Dr. Figueroa who agreed with the above assessment and plan.      Medical Decision Making  Management Options : over-the-counter medicine, prescription/IM medicine and close treatment of fracture or dislocation  Data/Risk: radiology tests       Shania Clemente PA-C  05/24/19  8:37 AM         EMR Dragon/Transcription disclaimer:  Much of this encounter note is an electronic transcription of spoken language to printed text. Electronic transcription of spoken language may permit erroneous, or at times, nonsensical words or phrases to be inadvertently transcribed. Although I have reviewed the note for such errors, some may still exist.

## 2019-05-28 ENCOUNTER — TELEPHONE (OUTPATIENT)
Dept: ORTHOPEDIC SURGERY | Facility: CLINIC | Age: 84
End: 2019-05-28

## 2019-05-28 ENCOUNTER — OFFICE VISIT (OUTPATIENT)
Dept: ORTHOPEDIC SURGERY | Facility: CLINIC | Age: 84
End: 2019-05-28

## 2019-05-28 VITALS — OXYGEN SATURATION: 98 % | BODY MASS INDEX: 19.65 KG/M2 | HEART RATE: 61 BPM | HEIGHT: 61 IN | WEIGHT: 104.06 LBS

## 2019-05-28 DIAGNOSIS — S82.032D CLOSED DISPLACED TRANSVERSE FRACTURE OF LEFT PATELLA WITH ROUTINE HEALING, SUBSEQUENT ENCOUNTER: Primary | ICD-10-CM

## 2019-05-28 PROCEDURE — 99024 POSTOP FOLLOW-UP VISIT: CPT | Performed by: PHYSICIAN ASSISTANT

## 2019-05-28 NOTE — TELEPHONE ENCOUNTER
I called and explained to to Lisbeth that she may Weightbear as tolerated with the knee brace on and locked in extension, the knee has to stay straight. She understood and just wanted to confirm.  July

## 2019-05-28 NOTE — PROGRESS NOTES
"    [facility] Orthopaedic Surgery Clinic Note    Subjective     CC: Follow-up of the Left Knee (5 days)  DOI 5/21/2019    HPI    Mary Ramirez is a 84 y.o. female.  Patient returns today for one-week follow-up of her left knee, patella fracture.  Patient has been wearing the knee immobilizer with her leg in extension as directed.  At this time she endorses pain scale 6/10.  Severity the pain moderate.  Quite the pain aching.  She is currently sitting in a wheelchair.      ROS:    Constiutional:Pt denies fever, chills, nausea, or vomiting.  MSK:as above    Objective      Past Medical History  Past Medical History:   Diagnosis Date   • Atrial fibrillation (CMS/HCC)    • Benign essential hypertension    • CAD (coronary artery disease)    • Carotid artery disease (CMS/HCC)    • CHF (congestive heart failure) (CMS/HCC)    • Chronic allergic rhinitis    • CKD (chronic kidney disease), stage III (CMS/HCC)    • DDD (degenerative disc disease), cervical    • Dementia    • Diabetes mellitus (CMS/HCC)    • Diverticulosis     WITH PERFORATION    • Hypertension    • Major depressive disorder, single episode, mild (CMS/HCC)    • Memory loss    • Mixed hyperlipidemia    • SSS (sick sinus syndrome) (CMS/HCC)    • TIA (transient ischemic attack) 01/2016         Physical Exam  Pulse 61   Ht 154.9 cm (60.98\")   Wt 47.2 kg (104 lb 0.9 oz)   SpO2 98%   BMI 19.67 kg/m²     Body mass index is 19.67 kg/m².    Patient is well nourished and well developed.        Ortho Exam  Left knee  Skin: Grossly intact without any redness or warmth.  Previously noted bruise to the anterior aspect of the knee is resolved.  No rashes or lesions noted.  Continue with moderate effusion.  Tenderness: Positive global tenderness to the knee with increased pain noted over the patella.  Motion: Range of motion of the knee not assessed.  Patient is able to dorsiflex and plantarflex ankle.  Sensory: Grossly intact to light touch to DP, SP, S, S, " LOTUS      Imaging/Labs/EMG Reviewed:    Imaging Results (last 24 hours)     Procedure Component Value Units Date/Time    XR Knee 1 or 2 View Left [[id number]] Resulted:  05/28/19 0937     Updated:  05/28/19 0938    Narrative:       Indication: Left patella fracture    Comparison: Todays xrays were compared to previous xrays from 5/21/2019      Impression:            Left knee 2 views: Radiographs demonstrate persistent evidence comminuted   patella fracture.  Distal segment appears to be slightly more flexed   compared to prior radiographs.  There does not appear to be any   significant distraction at the fracture site.  Moderate knee effusion   remains.      Ordered plain film imaging of the left knee.  Images reviewed by Dr. Figueroa.  Slightly more flexed position noted of the distal fragment when compared to prior films but no significant distraction at the fracture site.  Positive.  Moderate knee effusion noted.  See chart for official report.      Assessment:  1. Closed displaced transverse fracture of left patella with routine healing, subsequent encounter        Plan:  1. Closed displaced transverse fracture left patella.  2. Once again discussed with the son surgery versus nonoperative management.  Optimal treatment would include operative management however the son states that he and his sister talked it over and his mother did not do well following previous hip surgery, so they would like to avoid any surgery, if possible.  3. At this point he realizes that nonoperative management will take longer for the fracture to heal, fracture can still diastase and she may have chronic pain to the knee.  Continue with nonoperative management per the son.  4. Patient was changed into a TROM brace locked in extension.  She is to wear the brace 24/7.  Knee is to remain straight at all times.  5. She may continue to use her walker for ambulation assistance as previously directed by Dr. Figueroa.  6. Follow-up 2 weeks for  repeat evaluation to include repeat imaging of the left knee AP and lateral only.  7. Questions and concerns answered.    Case was discussed with Dr. Figueroa who agreed with the above assessment and plan.      Medical Decision Making  Management Options : close treatment of fracture or dislocation  Data/Risk: radiology tests       Shania Clemente PA-C  05/28/19  11:01 AM

## 2019-05-29 ENCOUNTER — LAB REQUISITION (OUTPATIENT)
Dept: LAB | Facility: HOSPITAL | Age: 84
End: 2019-05-29

## 2019-05-29 ENCOUNTER — OFFICE VISIT (OUTPATIENT)
Dept: INTERNAL MEDICINE | Facility: CLINIC | Age: 84
End: 2019-05-29

## 2019-05-29 VITALS
DIASTOLIC BLOOD PRESSURE: 58 MMHG | SYSTOLIC BLOOD PRESSURE: 120 MMHG | TEMPERATURE: 98.7 F | WEIGHT: 101.8 LBS | BODY MASS INDEX: 19.25 KG/M2 | HEART RATE: 64 BPM

## 2019-05-29 DIAGNOSIS — I10 BENIGN ESSENTIAL HYPERTENSION: ICD-10-CM

## 2019-05-29 DIAGNOSIS — I73.9 PERIPHERAL ARTERY INSUFFICIENCY (HCC): ICD-10-CM

## 2019-05-29 DIAGNOSIS — G30.1 LATE ONSET ALZHEIMER'S DISEASE WITHOUT BEHAVIORAL DISTURBANCE (HCC): ICD-10-CM

## 2019-05-29 DIAGNOSIS — E11.21 DIABETIC NEPHROPATHY ASSOCIATED WITH TYPE 2 DIABETES MELLITUS (HCC): ICD-10-CM

## 2019-05-29 DIAGNOSIS — E11.42 DIABETIC POLYNEUROPATHY ASSOCIATED WITH TYPE 2 DIABETES MELLITUS (HCC): Primary | ICD-10-CM

## 2019-05-29 DIAGNOSIS — F02.80 LATE ONSET ALZHEIMER'S DISEASE WITHOUT BEHAVIORAL DISTURBANCE (HCC): ICD-10-CM

## 2019-05-29 DIAGNOSIS — Z00.00 ROUTINE GENERAL MEDICAL EXAMINATION AT A HEALTH CARE FACILITY: ICD-10-CM

## 2019-05-29 DIAGNOSIS — I50.32 CHRONIC DIASTOLIC CONGESTIVE HEART FAILURE (HCC): ICD-10-CM

## 2019-05-29 DIAGNOSIS — N18.30 CKD (CHRONIC KIDNEY DISEASE), STAGE III (HCC): ICD-10-CM

## 2019-05-29 DIAGNOSIS — I10 ESSENTIAL HYPERTENSION: ICD-10-CM

## 2019-05-29 PROCEDURE — 36415 COLL VENOUS BLD VENIPUNCTURE: CPT | Performed by: INTERNAL MEDICINE

## 2019-05-29 PROCEDURE — 99214 OFFICE O/P EST MOD 30 MIN: CPT | Performed by: INTERNAL MEDICINE

## 2019-05-30 LAB
ALBUMIN SERPL-MCNC: 4.1 G/DL (ref 3.5–5.2)
ALBUMIN/GLOB SERPL: 1.5 G/DL
ALP SERPL-CCNC: 188 U/L (ref 39–117)
ALT SERPL-CCNC: 17 U/L (ref 1–33)
AST SERPL-CCNC: 23 U/L (ref 1–32)
BASOPHILS # BLD AUTO: (no result) 10*3/UL
BASOPHILS # BLD MANUAL: 0.07 10*3/MM3 (ref 0–0.2)
BASOPHILS NFR BLD MANUAL: 0.9 % (ref 0–1.5)
BILIRUB SERPL-MCNC: 0.2 MG/DL (ref 0.2–1.2)
BUN SERPL-MCNC: 24 MG/DL (ref 8–23)
BUN/CREAT SERPL: 20 (ref 7–25)
CALCIUM SERPL-MCNC: 10.2 MG/DL (ref 8.6–10.5)
CHLORIDE SERPL-SCNC: 97 MMOL/L (ref 98–107)
CO2 SERPL-SCNC: 30 MMOL/L (ref 22–29)
CREAT SERPL-MCNC: 1.2 MG/DL (ref 0.57–1)
DIFFERENTIAL COMMENT: ABNORMAL
EOSINOPHIL # BLD AUTO: (no result) 10*3/UL
EOSINOPHIL # BLD MANUAL: 0.52 10*3/MM3 (ref 0–0.4)
EOSINOPHIL NFR BLD AUTO: (no result) %
EOSINOPHIL NFR BLD MANUAL: 6.6 % (ref 0.3–6.2)
ERYTHROCYTE [DISTWIDTH] IN BLOOD BY AUTOMATED COUNT: 16.3 % (ref 12.3–15.4)
GLOBULIN SER CALC-MCNC: 2.8 GM/DL
GLUCOSE SERPL-MCNC: 97 MG/DL (ref 65–99)
HBA1C MFR BLD: 6.5 % (ref 4.8–5.6)
HCT VFR BLD AUTO: 43.5 % (ref 34–46.6)
HGB BLD-MCNC: 12.8 G/DL (ref 12–15.9)
LYMPHOCYTES # BLD AUTO: (no result) 10*3/UL
LYMPHOCYTES # BLD MANUAL: 2 10*3/MM3 (ref 0.7–3.1)
LYMPHOCYTES NFR BLD AUTO: (no result) %
LYMPHOCYTES NFR BLD MANUAL: 25.5 % (ref 19.6–45.3)
MCH RBC QN AUTO: 27.4 PG (ref 26.6–33)
MCHC RBC AUTO-ENTMCNC: 29.4 G/DL (ref 31.5–35.7)
MCV RBC AUTO: 92.9 FL (ref 79–97)
MONOCYTES # BLD MANUAL: 0.37 10*3/MM3 (ref 0.1–0.9)
MONOCYTES NFR BLD AUTO: (no result) %
MONOCYTES NFR BLD MANUAL: 4.7 % (ref 5–12)
NEUTROPHILS # BLD MANUAL: 4.89 10*3/MM3 (ref 1.7–7)
NEUTROPHILS NFR BLD AUTO: (no result) %
NEUTROPHILS NFR BLD MANUAL: 62.3 % (ref 42.7–76)
PLATELET # BLD AUTO: 305 10*3/MM3 (ref 140–450)
PLATELET BLD QL SMEAR: ABNORMAL
POTASSIUM SERPL-SCNC: 3.4 MMOL/L (ref 3.5–5.2)
PROT SERPL-MCNC: 6.9 G/DL (ref 6–8.5)
RBC # BLD AUTO: 4.68 10*6/MM3 (ref 3.77–5.28)
RBC MORPH BLD: ABNORMAL
SODIUM SERPL-SCNC: 140 MMOL/L (ref 136–145)
WBC # BLD AUTO: 7.85 10*3/MM3 (ref 3.4–10.8)

## 2019-06-11 ENCOUNTER — OFFICE VISIT (OUTPATIENT)
Dept: ORTHOPEDIC SURGERY | Facility: CLINIC | Age: 84
End: 2019-06-11

## 2019-06-11 DIAGNOSIS — S82.032D CLOSED DISPLACED TRANSVERSE FRACTURE OF LEFT PATELLA WITH ROUTINE HEALING, SUBSEQUENT ENCOUNTER: Primary | ICD-10-CM

## 2019-06-11 PROCEDURE — 99024 POSTOP FOLLOW-UP VISIT: CPT | Performed by: PHYSICIAN ASSISTANT

## 2019-06-11 NOTE — PROGRESS NOTES
[facility] [place] [facility] Orthopaedic Surgery Clinic Note    Subjective     CC: Follow-up of the Left Knee (2 week f/u,Closed displaced transverse fracture of left patella )  DOI: 5/21/2019    HPI    Mary Ramirez is a 84 y.o. female.  Patient returns approximately 3 week follow-up for a closed displaced transverse fracture left patella is being treated nonoperatively.  Patient is currently in a TROM brace locked in extension.  No new complaints or issues.  She continues to endorse pain scale 6/10.  Severity the pain moderate.  Quality the pain aching.  Once again patient is sitting in a wheelchair during examination.  She denies any pain or discomfort to the ankle.  No reported numbness or tingling into the extremity.    ROS:    Constiutional:Pt denies fever, chills, nausea, or vomiting.  MSK:as above    Objective      Past Medical History  Past Medical History:   Diagnosis Date   • Atrial fibrillation (CMS/HCC)    • Benign essential hypertension    • CAD (coronary artery disease)    • Carotid artery disease (CMS/HCC)    • CHF (congestive heart failure) (CMS/HCC)    • Chronic allergic rhinitis    • CKD (chronic kidney disease), stage III (CMS/HCC)    • DDD (degenerative disc disease), cervical    • Dementia    • Diabetes mellitus (CMS/HCC)    • Diverticulosis     WITH PERFORATION    • Hypertension    • Major depressive disorder, single episode, mild (CMS/HCC)    • Memory loss    • Mixed hyperlipidemia    • SSS (sick sinus syndrome) (CMS/HCC)    • TIA (transient ischemic attack) 01/2016         Physical Exam  There were no vitals taken for this visit.    There is no height or weight on file to calculate BMI.    Patient is well nourished and well developed.        Ortho Exam  Left knee  Skin: Grossly intact without any redness or warmth.    Mild effusion still noted.  Significant atrophy noted to the quadriceps.  Tenderness: Prominently focal tenderness noted to the patella.  Still with mild discomfort noted to the remainder of the  knee.    Motion: Range of motion of the knee not assessed.    Patient able demonstrate full range of motion ankle without restrictions or limitations.  All range of motion to the ankle is pain-free.   Sensory: Grossly intact to light touch to DP, SP, S, S, T nerve distributions.    Imaging/Labs/EMG Reviewed:    Imaging Results (last 24 hours)     ** No results found for the last 24 hours. **      Ordered plain film imaging of the left knee AP and lateral.  Images reviewed by Dr. Figueroa.      Indication: Left patella fracture     Comparison: Todays xrays were compared to previous xrays from 5/28/2019     IMPRESSION:      Left knee: Radiographs demonstrate comminuted patella fracture with alignment that is unchanged compared to prior radiographs.  No significant displacement of fragments.  Bony apposition remains reasonable.      Assessment:  1. Closed displaced transverse fracture of left patella with routine healing, subsequent encounter        Plan:  1. Closed displaced transverse fracture left patella, stable.  2. Based on the imaging today we can continue with nonoperative management.  Patient's son then informed me that her PCP stated that because of her medical comorbidities that this was her only treatment option because the PCP believed the patient would not tolerate any more anesthesia/surgery.    3. Continue with TROM brace locked in extension.  She is to wear the brace 24/7.  Knee is to remain straight at all times.  4. She may continue to use her walker for ambulation assistance as previously directed by Dr. Figueroa with left leg in brace and locked in extension.  5. Follow-up 2 weeks for repeat evaluation to include repeat imaging of the left knee AP and lateral only.  Based on exam and imaging at that time anticipate being able to gradually begin opening TROM brace.  6. Questions and concerns answered.     Case was discussed with Dr. Figueroa who agreed with the above assessment and plan.      Medical  Decision Making  Management Options : close treatment of fracture or dislocation  Data/Risk: radiology tests       Shania Clemente PA-C  06/11/19  3:49 PM

## 2019-06-24 ENCOUNTER — TELEPHONE (OUTPATIENT)
Dept: INTERNAL MEDICINE | Facility: CLINIC | Age: 84
End: 2019-06-24

## 2019-06-25 ENCOUNTER — OFFICE VISIT (OUTPATIENT)
Dept: ORTHOPEDIC SURGERY | Facility: CLINIC | Age: 84
End: 2019-06-25

## 2019-06-25 ENCOUNTER — TELEPHONE (OUTPATIENT)
Dept: CARDIOLOGY | Facility: CLINIC | Age: 84
End: 2019-06-25

## 2019-06-25 VITALS — OXYGEN SATURATION: 99 % | BODY MASS INDEX: 19.23 KG/M2 | HEART RATE: 65 BPM | HEIGHT: 61 IN | WEIGHT: 101.85 LBS

## 2019-06-25 DIAGNOSIS — S82.032D CLOSED DISPLACED TRANSVERSE FRACTURE OF LEFT PATELLA WITH ROUTINE HEALING, SUBSEQUENT ENCOUNTER: ICD-10-CM

## 2019-06-25 DIAGNOSIS — Z09 FRACTURE FOLLOW-UP: Primary | ICD-10-CM

## 2019-06-25 PROCEDURE — 99024 POSTOP FOLLOW-UP VISIT: CPT | Performed by: PHYSICIAN ASSISTANT

## 2019-06-25 NOTE — PROGRESS NOTES
"    [facility] Orthopaedic Surgery Clinic Note    Subjective     Follow-up (2 week follow up - Closed displaced transverse fracture of left patella with routine healing)  DOI 5/21/2019    HPI    Mary Ramirez is a 84 y.o. female.   Patient returns today nearly 5 weeks status post fall.  She is been treated nonoperatively for a closed displaced transverse left patella fracture.  She has been treated in a TROM brace locked in extension however currently she has the brace on but it is allowing at least 30 degrees of flexion.  She continues to endorse a pain scale 6/10.  Severity the pain moderate.  Quality pain aching.  Once again she is in a wheelchair for examination.  She denies any numbness or tingling into the extremity.      Objective      Physical Exam  Pulse 65   Ht 154.9 cm (60.98\")   Wt 46.2 kg (101 lb 13.6 oz)   SpO2 99%   BMI 19.25 kg/m²     Body mass index is 19.25 kg/m².    TROM brace removed for imaging and patient immediately that the knee to approximately 75 degrees.    Ortho Exam  Left knee  Skin: Grossly intact without any redness or warmth.    Significant atrophy noted to the quadriceps.  Tenderness: Prominently focal tenderness noted to the patella.  Still with mild discomfort noted to the remainder of the knee.    Motion: Range of motion of the knee 0-45 degrees.    Patient able demonstrate full range of motion ankle without restrictions or limitations.  All range of motion to the ankle is pain-free.   Sensory: Grossly intact to light touch to DP, SP, S, S, T nerve distributions.  Patient is able to hold the leg in extension with palpable quad tendon noted.    Imaging/Studies  Imaging Results (last 24 hours)     Procedure Component Value Units Date/Time    XR Knee 1 or 2 View Left [[id number]] Resulted:  06/25/19 1437     Updated:  06/25/19 1439    Narrative:       Indication: Left patella fracture    Comparison: Todays xrays were compared to previous xrays from 6/11/2019      Impression:        "     Left Knee: Radiographs demonstrate a comminuted minimally displaced   patella fracture his alignment remains unchanged compared to prior   radiographs.              Assessment:  1. Fracture follow-up    2. Closed displaced transverse fracture of left patella with routine healing, subsequent encounter        Plan:  1. Closed displaced transverse fracture left patella, stable.  2. May begin opening TROM brace.  It was open today to 30 degrees.  Then each week she can open an additional 20 degrees so that it goes 30, 50, 70 and by the time she returns to see me in 4 weeks its open to 90 degrees.    3. She may continue to use her walker for ambulation assistance.  4. Follow-up 4 weeks for repeat evaluation to include repeat imaging of the left knee.    5. Questions and concerns answered.     Case was discussed with Dr. Figueroa who agreed with the above assessment and plan.      Shania Clemente PA-C  06/25/19  11:00 PM

## 2019-06-25 NOTE — TELEPHONE ENCOUNTER
[facility]ar[facility] at [facility] called yesterday to report b/p elevated. Asked for b/p readings twice daily for a week as stated in 's last office note. Sharifa stated would retrieve the b/p readings and fax to our office. Discussed with  in clinic. No B/P readings faxed to our office. Called [facility] today and talked to Lisbeth. She stated they were not faxed yesterday because they did not have the readings done to give. Stated started b/p log yesterday and would call and fax the report on Monday 7/1/19.

## 2019-07-09 ENCOUNTER — TELEPHONE (OUTPATIENT)
Dept: CARDIOLOGY | Facility: CLINIC | Age: 84
End: 2019-07-09

## 2019-07-09 RX ORDER — AMLODIPINE BESYLATE 10 MG/1
10 TABLET ORAL NIGHTLY
Qty: 30 TABLET | Refills: 5 | Status: SHIPPED | OUTPATIENT
Start: 2019-07-09 | End: 2022-06-17 | Stop reason: HOSPADM

## 2019-07-09 NOTE — TELEPHONE ENCOUNTER
Called the [place] [facility] and talked to patients nurse Lisbeth. Told her  received the blood pressure log received yesterday from [facility] and  wants her Amlodipine increased to 10 mg by mouth every evening. Verbalized understanding.

## 2019-07-09 NOTE — TELEPHONE ENCOUNTER
----- Message from Ta Flores III, MD sent at 7/9/2019 10:55 AM EDT -----  Increase amlodipine to 10mg po QPM

## 2019-07-16 ENCOUNTER — OFFICE VISIT (OUTPATIENT)
Dept: ORTHOPEDIC SURGERY | Facility: CLINIC | Age: 84
End: 2019-07-16

## 2019-07-16 VITALS — WEIGHT: 106 LBS | HEART RATE: 85 BPM | HEIGHT: 61 IN | BODY MASS INDEX: 20.01 KG/M2 | OXYGEN SATURATION: 94 %

## 2019-07-16 DIAGNOSIS — S72.141D CLOSED INTERTROCHANTERIC FRACTURE OF HIP, RIGHT, WITH ROUTINE HEALING, SUBSEQUENT ENCOUNTER: Primary | ICD-10-CM

## 2019-07-16 DIAGNOSIS — Z09 FRACTURE FOLLOW-UP: ICD-10-CM

## 2019-07-16 PROCEDURE — 99213 OFFICE O/P EST LOW 20 MIN: CPT | Performed by: ORTHOPAEDIC SURGERY

## 2019-07-16 NOTE — PROGRESS NOTES
Orthopaedic Clinic Note: Hip Established Patient    Chief Complaint   Patient presents with   • Follow-up     15 weeks status post Right Hip Trocanteric Nailing with Intramedullary Hip Screw 03/30/2019        HPI    It has been 2  month(s) since Ms. Ramirez's last visit. She returns to clinic today for follow-up right intertrochanteric hip fracture.  Since her last visit, she sustained a mechanical fall resulting in a left patella fracture which was elected to be treated nonoperatively.  Her main complaint today is pain in the left knee.  Her right hip is causing some mild discomfort along the lateral trochanter region.  She denies any pain in the groin.  Her ability to ambulate has been severely limited as a result of the patella fracture.  She is able to weight-bear on the right lower extremity with only some mild discomfort.  She continues to remain in assisted living facility.  She states that her hip is minimally symptomatic.  Her main complaint is the knee.  She is primarily mobilizing with a wheelchair.    Past Medical History:   Diagnosis Date   • Atrial fibrillation (CMS/HCC)    • Benign essential hypertension    • CAD (coronary artery disease)    • Carotid artery disease (CMS/HCC)    • CHF (congestive heart failure) (CMS/HCC)    • Chronic allergic rhinitis    • CKD (chronic kidney disease), stage III (CMS/HCC)    • DDD (degenerative disc disease), cervical    • Dementia    • Diabetes mellitus (CMS/HCC)    • Diverticulosis     WITH PERFORATION    • Hypertension    • Major depressive disorder, single episode, mild (CMS/HCC)    • Memory loss    • Mixed hyperlipidemia    • SSS (sick sinus syndrome) (CMS/HCC)    • TIA (transient ischemic attack) 01/2016      Past Surgical History:   Procedure Laterality Date   • ANKLE SURGERY Left 1996   • ATHERECTOMY  1995   • ATHRECTOMY ILIAC, FEMORAL, TIBIAL ARTERY     • BREAST LUMPECTOMY Left 1981   • COLON SURGERY     • COLOSTOMY  1983   • CORONARY STENT PLACEMENT  2001    • FINGER FRACTURE SURGERY Right    • HIP TROCHANTERIC NAILING WITH INTRAMEDULLARY HIP SCREW Left 2017   • HIP TROCHANTERIC NAILING WITH INTRAMEDULLARY HIP SCREW Right 3/30/2019    Procedure: HIP TROCANTERIC NAILING WITH INTRAMEDULLARY HIP SCREW RIGHT;  Surgeon: Theo Figueroa MD;  Location: [facility];  Service: Orthopedics   • HYSTERECTOMY     • OTHER SURGICAL HISTORY      REANASTAMOSIS       Family History   Problem Relation Age of Onset   • Hypertension Mother    • Coronary artery disease Mother 56        PREMATURE    • Lung cancer Father    • Hypertension Father    • Osteoporosis Sister    • Lung cancer Brother      Social History     Socioeconomic History   • Marital status:      Spouse name: N/A   • Number of children: 2   • Years of education: H.S   • Highest education level: Not on file   Occupational History   • Occupation:      Employer: RETIRED   Tobacco Use   • Smoking status: Former Smoker     Years: 2.00     Types: Cigarettes     Start date: 1975     Last attempt to quit: 1978     Years since quittin.7   • Smokeless tobacco: Never Used   • Tobacco comment: QUIT DATE 1984   Substance and Sexual Activity   • Alcohol use: No   • Drug use: No   • Sexual activity: No   Social History Narrative    Patient lives in [facility] [place]    Patient drinks 2 cups of caffeine per day.,      Current Outpatient Medications on File Prior to Visit   Medication Sig Dispense Refill   • acetaminophen (TYLENOL) 500 MG tablet Take 500 mg by mouth Every 6 (Six) Hours As Needed for Mild Pain . Take one tablet twice a day for leg pain     • Amino Acids-Protein Hydrolys (PRO-STAT AWC PO) Take  by mouth.     • amiodarone (PACERONE) 200 MG tablet Take 1 tablet by mouth Daily.     • amLODIPine (NORVASC) 10 MG tablet Take 1 tablet by mouth Every Night. 30 tablet 5   • aspirin  MG EC tablet Take 1 tablet by mouth Daily.     • bisacodyl (DULCOLAX) 5 MG EC  "tablet Take 1 tablet by mouth Daily As Needed for Constipation.     • citalopram (CeleXA) 20 MG tablet Take 20 mg by mouth Daily.     • CloNIDine (CATAPRES) 0.2 MG tablet Take 0.2 mg by mouth 2 (Two) Times a Day.     • collagenase 250 UNIT/GM ointment Apply  topically to the appropriate area as directed Daily.     • docusate sodium (COLACE) 100 MG capsule Take 100 mg by mouth 2 (Two) Times a Day.     • donepezil (ARICEPT) 5 MG tablet Take 5 mg by mouth Every Night.     • fluticasone (FLONASE) 50 MCG/ACT nasal spray 2 sprays into the nostril(s) as directed by provider Daily.     • HYDROcodone-acetaminophen (NORCO) 5-325 MG per tablet Take 1 tablet by mouth Every 6 (Six) Hours As Needed for Severe Pain  for up to 12 doses. 12 tablet 0   • insulin aspart (novoLOG FLEXPEN) 100 UNIT/ML solution pen-injector sc pen Inject 2-5 Units under the skin into the appropriate area as directed 4 (Four) Times a Day.     • isosorbide mononitrate (IMDUR) 60 MG 24 hr tablet Take 60 mg by mouth Daily.     • metFORMIN (GLUCOPHAGE) 500 MG tablet Take 1 tablet by mouth Daily With Breakfast.     • minocycline (MINOCIN,DYNACIN) 100 MG capsule Take 100 mg by mouth 2 (Two) Times a Day.     • Multiple Vitamin (MULTIVITAMINS PO) Take 1 tablet by mouth Daily.     • ondansetron (ZOFRAN) 4 MG tablet Take 4 mg by mouth Every 8 (Eight) Hours As Needed for Nausea or Vomiting.     • polyethylene glycol (MIRALAX) pack packet Take 17 g by mouth Daily.       No current facility-administered medications on file prior to visit.       Allergies   Allergen Reactions   • Penicillins Other (See Comments)     Pt states \"i don't know\"        Review of Systems   Constitutional: Negative.    HENT: Negative.    Eyes: Negative.    Respiratory: Negative.    Cardiovascular: Negative.    Gastrointestinal: Negative.    Endocrine: Negative.    Genitourinary: Negative.    Musculoskeletal: Negative.         Hip nailing, right side    Skin: Negative.    Allergic/Immunologic: " "Negative.    Neurological: Negative.    Hematological: Negative.    Psychiatric/Behavioral: Negative.         The patient's Review of Systems was personally reviewed and confirmed as accurate.    Physical Exam  Pulse 85, height 154.9 cm (60.98\"), weight 48.1 kg (106 lb), SpO2 94 %, not currently breastfeeding.    Body mass index is 20.04 kg/m².    GENERAL APPEARANCE: awake, alert, oriented, in no acute distress and well developed, well nourished  LUNGS:  breathing nonlabored  EXTREMITIES: no clubbing, cyanosis  PERIPHERAL PULSES: palpable dorsalis pedis and posterior tibial pulses bilaterally.    GAIT: Not assessed            Hip Exam:  Right     RANGE OF MOTION:  EXTENSION/FLEXION:  normal (0-110 degrees)  IR:  10  ER:  30  PAIN WITH HIP MOTION:  yes, laterally at trochanteric bursa  PAIN WITH LOGROLL:  no      STRENGTH:  ABDUCTOR:    4/5  ADDUCTOR:  4/5  HIP FLEXION:  4/5     GREATER TROCHANTER BURSAL PAIN:  yes    SENSATION TO LIGHT TOUCH:  DEEP PERONEAL/SUPERFICIAL PERONEAL/SURAL/SAPHENOUS/TIBIAL:   intact    EDEMA:  no  ERYTHEMA:  no  WOUNDS/INCISIONS:  yes, lateral based incisions are well-healed with no erythema or drainage  _______________________________________________________________  _________________________________________________________________    RADIOGRAPHIC FINDINGS:   Indication: Status post right intertrochanteric hip fracture fixation    Comparison: Todays xrays were compared to previous xrays from 5/16/2019    2 views right hip: Radiographs demonstrate consolidation of the intertrochanteric hip fracture.  Hardware in place without evidence of hardware complication.  Controlled collapse through the lag screw is identified and remains unchanged in alignment compared to prior radiographs.      Assessment/Plan:   Diagnosis Plan   1. Closed intertrochanteric fracture of hip, right, with routine healing, subsequent encounter     2. Fracture follow-up  XR Femur 2 View Right     Patient is to continue " weightbearing as tolerated through the right lower extremity.  Her mobility has been severely compromised due to the nonoperatively treated patella fracture on the left which is limiting her ability to weight-bear and walk.  I encouraged her to attempt weightbearing through the right lower extremity at least for transfers.  I will see her back in 3 months for repeat assessment x-ray 2 views right femur.  At this point, we will closely monitor for potential nonunion as the fracture line does remain visible 3 and half months out from surgery.    Theo Figueroa MD  07/16/19  9:05 AM

## 2019-07-23 ENCOUNTER — OFFICE VISIT (OUTPATIENT)
Dept: ORTHOPEDIC SURGERY | Facility: CLINIC | Age: 84
End: 2019-07-23

## 2019-07-23 VITALS — WEIGHT: 106.04 LBS | OXYGEN SATURATION: 99 % | BODY MASS INDEX: 20.02 KG/M2 | HEART RATE: 66 BPM | HEIGHT: 61 IN

## 2019-07-23 DIAGNOSIS — Z09 FRACTURE FOLLOW-UP: Primary | ICD-10-CM

## 2019-07-23 DIAGNOSIS — S82.032D CLOSED DISPLACED TRANSVERSE FRACTURE OF LEFT PATELLA WITH ROUTINE HEALING, SUBSEQUENT ENCOUNTER: ICD-10-CM

## 2019-07-23 PROCEDURE — 99024 POSTOP FOLLOW-UP VISIT: CPT | Performed by: PHYSICIAN ASSISTANT

## 2019-07-23 RX ORDER — CLONIDINE HYDROCHLORIDE 0.1 MG/1
TABLET ORAL
COMMUNITY
Start: 2019-07-05 | End: 2019-08-19 | Stop reason: SDUPTHER

## 2019-07-23 NOTE — PROGRESS NOTES
"    [facility] [place]    Subjective     Follow-up of the Left Knee (Closed Displaced Transverse Fracture of Left Patella )  DOI: 5/21/2019    HPI    Mary Ramirez is a 84 y.o. female.  Patient returns today approximately 9 weeks status post fall that resulted in left patella fracture.  She is been wearing a TROM knee brace that has been gradually opened over the last 3 weeks.  She continues to complain of pain to the knee without any specific area of tenderness.  She reports the pain is all over her knee.  She has been working with the therapist at her assisted living facilities (the [facility]).  Patient's primary means of ambulation is with use of a wheelchair.  Her therapist have been trying to get her to stand with a walker.  At this time she endorses a pain scale 7/10.  She denies any numbness or tingling into the extremity.      Objective      Physical Exam  Pulse 66   Ht 154.9 cm (60.98\")   Wt 48.1 kg (106 lb 0.7 oz)   SpO2 99%   Breastfeeding? No   BMI 20.05 kg/m²     Body mass index is 20.05 kg/m².      Ortho Exam  Left knee  Skin: Grossly intact without any redness or warmth.  Positive bilateral quadricep atrophy noted.  Tenderness: Global tenderness throughout knee, no significant increase over patella.  Positive tenderness noted medial and lateral joint lines  Motion: Range of motion of the knee 0-90 degrees.     Sensory: Grossly intact to light touch to DP, SP, S, S, T nerve distributions.  Extensor mechanism remains intact.      Imaging/Studies  Repeat imaging left knee.  Images reviewed by Dr. Munoz.    Knee X-Ray     Indication: Pain     Study:  AP, Lateral views of Left knee     Comparison: left knee 6/25/19      Findings:  Stable appearance to the inferior pole patella fracture with new bone visualized and consolidation of the posterior cortex  No bony lesions are present  Normal soft tissue appearance  Normal joint spaces and alignment     Impression: Healing left patella " fracture      Assessment:  1. Fracture follow-up    2. Closed displaced transverse fracture of left patella with routine healing, subsequent encounter        Plan:  1. Closed displaced transverse fracture left patella, stable and healing.  2. Transition to a hinged knee brace today.  3. Continue to use her walker for ambulation assistance.  4. At her next appointment if she continues to complain of significant amount of knee pain may consider possible corticosteroid injection.  5. Follow-up 4 weeks for repeat evaluation to include repeat imaging of the left knee.    6. Questions and concerns answered.      Shania Clemente PA-C  07/24/19  12:47 PM

## 2019-08-12 NOTE — DISCHARGE INSTRUCTIONS
Called patient. She continues to report persistent left chest wall pain. She reports the muscle relaxers make her drowsy and she is unable to be active or drive. She states when she would take the Tramadol she gets relief and is able to stay active. She has been using ice therapy throughout the day. This gives her relief for about 1 hour. She denies any heat therapy and reports when she takes a warm shower the left chest wall pain seems to worsen due to the warmth. She continues to take tylenol/acetaminophen 500 mg 2 tablets every 4 hours with no relief. Patient reports she is unable to take ibuprofen due to a gastric ulcer. Again discussed physical therapy. She declines at this time due to financial issues as she has recently relocated from [facility] to [place] and does not have insurance or established with a PCP in the area. Discussed with the patient that narcotics or tramadol would not be prescribed at this time (> 6 weeks post operative). Patient has agreed to an office visit with Padmini Gillette NP, Wednesday 8/14/19 at 1100. Consider the Dr. Michael baker

## 2019-08-19 ENCOUNTER — OFFICE VISIT (OUTPATIENT)
Dept: CARDIOLOGY | Facility: CLINIC | Age: 84
End: 2019-08-19

## 2019-08-19 VITALS
WEIGHT: 128 LBS | SYSTOLIC BLOOD PRESSURE: 110 MMHG | HEIGHT: 60 IN | BODY MASS INDEX: 25.13 KG/M2 | HEART RATE: 57 BPM | DIASTOLIC BLOOD PRESSURE: 50 MMHG

## 2019-08-19 DIAGNOSIS — I48.0 PAROXYSMAL ATRIAL FIBRILLATION (HCC): ICD-10-CM

## 2019-08-19 DIAGNOSIS — I49.5 SSS (SICK SINUS SYNDROME) (HCC): ICD-10-CM

## 2019-08-19 DIAGNOSIS — I25.119 CORONARY ARTERY DISEASE INVOLVING NATIVE CORONARY ARTERY OF NATIVE HEART WITH ANGINA PECTORIS (HCC): Primary | ICD-10-CM

## 2019-08-19 DIAGNOSIS — I10 ESSENTIAL HYPERTENSION: ICD-10-CM

## 2019-08-19 PROCEDURE — 93000 ELECTROCARDIOGRAM COMPLETE: CPT | Performed by: INTERNAL MEDICINE

## 2019-08-19 PROCEDURE — 99213 OFFICE O/P EST LOW 20 MIN: CPT | Performed by: INTERNAL MEDICINE

## 2019-08-19 NOTE — PROGRESS NOTES
[facility]  Office visit  Mary Ramirez  1934    There is no work phone number on file.    VISIT DATE:  8/19/2019      PCP: Nirmal Lundberg MD  [address] [address]    CC:  Chief Complaint   Patient presents with   • Coronary Artery Disease       PROBLEM LIST:  Coronary artery disease involving native coronary artery of native heart with angina pectoris (CMS/HCC)             · Cardiac cath with PCI data deficit  · Echo (9/1/18): LVEF 60%. LVH. Left atrial dilatation. Aortic calcification with no stenosis.  · September 2018 myocardial PET: Normal perfusion.           Paroxysmal atrial fibrillation (CMS/HCC)            · Chads Vasc 6 (age > 75, female, CAD, DM, HTN)   · Rhythm control strategy with amiodarone  · Maintaining rhythm           DEANGELO (acute kidney injury) (CMS/HCC)     Type 2 diabetes mellitus with complication, with long-term current use of insulin (CMS/HCC)     Dementia     Chronic diastolic congestive heart failure (CMS/HCC)            · Echo (9/1/18): LVEF 60%. LVH. Left atrial dilatation. Aortic calcification with no stenosis.          ASSESSMENT:   Diagnosis Plan   1. Coronary artery disease involving native coronary artery of native heart with angina pectoris (CMS/HCC)     2. Paroxysmal atrial fibrillation (CMS/HCC)     3. Essential hypertension     4. SSS (sick sinus syndrome) (CMS/HCC)         PLAN:  Coronary artery disease: Currently stable and asymptomatic.  Continue aspirin and afterload reduction.    Hypertension: Goal less than 160/90 mmHg.  Allowing for permissive hypertension to limit risk of falling with labile blood pressures.  Continue, initial clonidine, amlodipine and Imdur.    Paroxysmal atrial fibrillation and atrial flutter: Continue amiodarone 200 mg by mouth daily.  We'll need biannual liver function test and thyroid function tests.  Annual chest x-ray.  Continue aspirin for stroke prophylaxis, off Eliquis due  "to recent multiple mechanical falls.    Subjective  Pleasantly demented.  Blood pressures predominantly running less than 160/90 mmHg, no symptomatic hypotension on current medical therapy.  She denies chest pain, palpitations or dyspnea.  Denies tremors on amiodarone.  Compliant with medical therapy.      PHYSICAL EXAMINATION:  Vitals:    08/19/19 1106   BP: 110/50   BP Location: Left arm   Patient Position: Sitting   Pulse: 57   Weight: 58.1 kg (128 lb)   Height: 152.4 cm (60\")     General Appearance:    Alert, cooperative, no distress, appears stated age   Head:    Normocephalic, without obvious abnormality, atraumatic   Eyes:    conjunctiva/corneas clear   Nose:   Nares normal, septum midline, mucosa normal, no drainage   Throat:   Lips, teeth and gums normal   Neck:   Supple, symmetrical, trachea midline, no carotid    bruit or JVD   Lungs:     Clear to auscultation bilaterally, respirations unlabored   Chest Wall:    No tenderness or deformity    Heart:    Regular rate and rhythm, S1 and S2 normal, no murmur, rub   or gallop, normal carotid impulse bilaterally without bruit.   Abdomen:     Soft, non-tender   Extremities:   Extremities normal, atraumatic, no cyanosis or edema   Pulses:   2+ and symmetric all extremities   Skin:   Skin color, texture, turgor normal, no rashes or lesions       Diagnostic Data:    ECG 12 Lead  Date/Time: 8/19/2019 11:07 AM  Performed by: Ta Flores III, MD  Authorized by: Ta Flores III, MD   Comparison: compared with previous ECG from 3/30/2019  Similar to previous ECG  Rhythm: sinus rhythm  Other findings: poor R wave progression    Clinical impression: abnormal EKG          Lab Results   Component Value Date    CHLPL 123 01/24/2016    TRIG 202 (H) 01/15/2019    HDL 67 (H) 01/15/2019     Lab Results   Component Value Date    GLUCOSE 184 (H) 04/02/2019    BUN 24 (H) 05/29/2019    CREATININE 1.20 (H) 05/29/2019     05/29/2019    K 3.4 (L) 05/29/2019    CL 97 (L) " "05/29/2019    CO2 30.0 (H) 05/29/2019     Lab Results   Component Value Date    HGBA1C 6.50 (H) 05/29/2019     Lab Results   Component Value Date    WBC 7.85 05/29/2019    HGB 12.8 05/29/2019    HCT 43.5 05/29/2019     05/29/2019       Allergies  Allergies   Allergen Reactions   • Penicillins Other (See Comments)     Pt states \"i don't know\"       Current Medications    Current Outpatient Medications:   •  acetaminophen (TYLENOL) 500 MG tablet, Take 500 mg by mouth Every 6 (Six) Hours As Needed for Mild Pain . Take one tablet twice a day for leg pain, Disp: , Rfl:   •  Amino Acids-Protein Hydrolys (PRO-STAT AWC PO), Take  by mouth., Disp: , Rfl:   •  amiodarone (PACERONE) 200 MG tablet, Take 1 tablet by mouth Daily., Disp: , Rfl:   •  amLODIPine (NORVASC) 10 MG tablet, Take 1 tablet by mouth Every Night., Disp: 30 tablet, Rfl: 5  •  aspirin  MG EC tablet, Take 1 tablet by mouth Daily., Disp: , Rfl:   •  bisacodyl (DULCOLAX) 5 MG EC tablet, Take 1 tablet by mouth Daily As Needed for Constipation., Disp: , Rfl:   •  citalopram (CeleXA) 20 MG tablet, Take 20 mg by mouth Daily., Disp: , Rfl:   •  CloNIDine (CATAPRES) 0.2 MG tablet, Take 0.2 mg by mouth 2 (Two) Times a Day., Disp: , Rfl:   •  collagenase 250 UNIT/GM ointment, Apply  topically to the appropriate area as directed Daily., Disp: , Rfl:   •  docusate sodium (COLACE) 100 MG capsule, Take 100 mg by mouth 2 (Two) Times a Day., Disp: , Rfl:   •  donepezil (ARICEPT) 5 MG tablet, Take 5 mg by mouth Every Night., Disp: , Rfl:   •  fluticasone (FLONASE) 50 MCG/ACT nasal spray, 2 sprays into the nostril(s) as directed by provider Daily., Disp: , Rfl:   •  insulin aspart (novoLOG FLEXPEN) 100 UNIT/ML solution pen-injector sc pen, Inject 2-5 Units under the skin into the appropriate area as directed 4 (Four) Times a Day., Disp: , Rfl:   •  isosorbide mononitrate (IMDUR) 60 MG 24 hr tablet, Take 60 mg by mouth Daily., Disp: , Rfl:   •  metFORMIN (GLUCOPHAGE) " 500 MG tablet, Take 1 tablet by mouth Daily With Breakfast., Disp: , Rfl:   •  Multiple Vitamin (MULTIVITAMINS PO), Take 1 tablet by mouth Daily., Disp: , Rfl:   •  ondansetron (ZOFRAN) 4 MG tablet, Take 4 mg by mouth Every 8 (Eight) Hours As Needed for Nausea or Vomiting., Disp: , Rfl:   •  polyethylene glycol (MIRALAX) pack packet, Take 17 g by mouth Daily., Disp: , Rfl:           ROS  Review of Systems   Cardiovascular: Negative for chest pain, irregular heartbeat and palpitations.   Respiratory: Negative for shortness of breath and snoring.    Neurological: Positive for dizziness.       SOCIAL HX  Social History     Socioeconomic History   • Marital status:      Spouse name: N/A   • Number of children: 2   • Years of education: H.S   • Highest education level: Not on file   Occupational History   • Occupation:      Employer: RETIRED   Tobacco Use   • Smoking status: Former Smoker     Years: 2.00     Types: Cigarettes     Start date: 1975     Last attempt to quit: 1978     Years since quittin.8   • Smokeless tobacco: Never Used   • Tobacco comment: QUIT DATE 1984   Substance and Sexual Activity   • Alcohol use: No   • Drug use: No   • Sexual activity: No   Social History Narrative    [facility]    Patient drinks 2 cups of caffeine per day.,       FAMILY HX  Family History   Problem Relation Age of Onset   • Hypertension Mother    • Coronary artery disease Mother 56        PREMATURE    • Lung cancer Father    • Hypertension Father    • Osteoporosis Sister    • Lung cancer Brother              Ta Flores III, MD, [facility]

## 2019-08-20 ENCOUNTER — OFFICE VISIT (OUTPATIENT)
Dept: ORTHOPEDIC SURGERY | Facility: CLINIC | Age: 84
End: 2019-08-20

## 2019-08-20 VITALS — OXYGEN SATURATION: 98 % | HEIGHT: 60 IN | HEART RATE: 65 BPM | WEIGHT: 104 LBS | BODY MASS INDEX: 20.42 KG/M2

## 2019-08-20 DIAGNOSIS — M17.12 PRIMARY OSTEOARTHRITIS OF LEFT KNEE: ICD-10-CM

## 2019-08-20 DIAGNOSIS — Z09 FRACTURE FOLLOW-UP: Primary | ICD-10-CM

## 2019-08-20 DIAGNOSIS — S82.032D CLOSED DISPLACED TRANSVERSE FRACTURE OF LEFT PATELLA WITH ROUTINE HEALING, SUBSEQUENT ENCOUNTER: ICD-10-CM

## 2019-08-20 PROCEDURE — 99212 OFFICE O/P EST SF 10 MIN: CPT | Performed by: PHYSICIAN ASSISTANT

## 2019-08-20 NOTE — PROGRESS NOTES
"    [facility] [place] Orthopaedic Surgery Clinic Note    Subjective     CC: Follow-up of the Left Knee (4 week)      HPI    Mary Ramirez is a 84 y.o. female.  Returns nearly 3 months out from date of injury resulting in a left ~fracture.  She is currently wearing a hinged knee sleeve which she reports gives her stability and support.  She is working with physical therapy.  She does have a walker and does get up and try to use it periodically but majority the time she remains in her wheelchair.  She is working with therapist at her assisted living facilities ([facility]) on range of motion and lower extremity conditioning.  She denies numbness or tingling into the extremity.  She still notes pain to the knee but it is improved from what it was previously.  According to her son she has Tylenol for pain control but hardly ever asked for it.    ROS:    Constiutional:Pt denies fever, chills, nausea, or vomiting.  MSK:as above    Objective      Past Medical History  Past Medical History:   Diagnosis Date   • Atrial fibrillation (CMS/HCC)    • Benign essential hypertension    • CAD (coronary artery disease)    • Carotid artery disease (CMS/HCC)    • CHF (congestive heart failure) (CMS/HCC)    • Chronic allergic rhinitis    • CKD (chronic kidney disease), stage III (CMS/HCC)    • DDD (degenerative disc disease), cervical    • Dementia    • Diabetes mellitus (CMS/HCC)    • Diverticulosis     WITH PERFORATION    • Hypertension    • Major depressive disorder, single episode, mild (CMS/HCC)    • Memory loss    • Mixed hyperlipidemia    • SSS (sick sinus syndrome) (CMS/HCC)    • TIA (transient ischemic attack) 01/2016         Physical Exam  Pulse 65   Ht 152.4 cm (60\")   Wt 47.2 kg (104 lb)   SpO2 98%   BMI 20.31 kg/m²     Body mass index is 20.31 kg/m².    Patient is well nourished and well developed.        Ortho Exam  Left knee  Skin: Grossly intact without any redness or warmth.  Continued bilateral quadricep atrophy " noted.  Tenderness: Patient still with palpable tenderness to both medial and lateral joint line.  No significant tenderness noted to the fracture area.  Motion: Range of motion of the knee 0-90 degrees passively, actively 10-90 degrees.     Sensory: Grossly intact to light touch to DP, SP, S, S, T nerve distributions.  Extensor mechanism remains intact.      Imaging/Labs/EMG Reviewed:  Plain film imaging of the left knee.  Images reviewed by Dr. Figueroa.    Indication: Left patella fracture     Comparison: Todays xrays were compared to previous xrays from 7/23/2019     IMPRESSION:      Left Knee: Radiographs demonstrate moderate to severe tricompartmental osteoarthritis.  Patella fracture remains unchanged in alignment compared to prior radiographs.  Slight displacement but no distraction at the fracture site remains unchanged compared to prior radiographs.  Interval consolidation is identified.      Assessment:  1. Fracture follow-up    2. Closed displaced transverse fracture of left patella with routine healing, subsequent encounter    3. Primary osteoarthritis of left knee        Plan:  1. Closed displaced transverse fracture left patella, stable and healing.  2. May continue with hinged knee brace since he gives her stability and support.  3. Continue to use her walker for ambulation assistance.  4. Primary arthritis noted to the left knee.  Because she is only 3 months out since date of injury do not want to proceed with corticosteroid injection at this time.  However in 3 months if she still having significant pain to the knee then proceed forward with an injection at that time.  5. Follow-up as needed or in 3 months if they would like to proceed with corticosteroid injection.  May return sooner if issues arise or symptoms worsen/change.    6. Questions and concerns answered.      Shania Clemente PA-C  08/20/19  1:45 PM

## 2019-08-29 ENCOUNTER — OFFICE VISIT (OUTPATIENT)
Dept: INTERNAL MEDICINE | Facility: CLINIC | Age: 84
End: 2019-08-29

## 2019-08-29 ENCOUNTER — LAB REQUISITION (OUTPATIENT)
Dept: LAB | Facility: HOSPITAL | Age: 84
End: 2019-08-29

## 2019-08-29 VITALS
DIASTOLIC BLOOD PRESSURE: 62 MMHG | HEART RATE: 64 BPM | BODY MASS INDEX: 20.42 KG/M2 | HEIGHT: 60 IN | SYSTOLIC BLOOD PRESSURE: 128 MMHG | WEIGHT: 104 LBS

## 2019-08-29 DIAGNOSIS — N18.30 CKD (CHRONIC KIDNEY DISEASE), STAGE III (HCC): ICD-10-CM

## 2019-08-29 DIAGNOSIS — I50.32 CHRONIC DIASTOLIC CONGESTIVE HEART FAILURE (HCC): ICD-10-CM

## 2019-08-29 DIAGNOSIS — Z00.00 ROUTINE GENERAL MEDICAL EXAMINATION AT A HEALTH CARE FACILITY: ICD-10-CM

## 2019-08-29 DIAGNOSIS — F32.0 MAJOR DEPRESSIVE DISORDER, SINGLE EPISODE, MILD (HCC): ICD-10-CM

## 2019-08-29 DIAGNOSIS — E11.42 DIABETIC POLYNEUROPATHY ASSOCIATED WITH TYPE 2 DIABETES MELLITUS (HCC): Primary | ICD-10-CM

## 2019-08-29 DIAGNOSIS — F02.80 LATE ONSET ALZHEIMER'S DISEASE WITHOUT BEHAVIORAL DISTURBANCE (HCC): ICD-10-CM

## 2019-08-29 DIAGNOSIS — E78.2 MIXED HYPERLIPIDEMIA: ICD-10-CM

## 2019-08-29 DIAGNOSIS — G30.1 LATE ONSET ALZHEIMER'S DISEASE WITHOUT BEHAVIORAL DISTURBANCE (HCC): ICD-10-CM

## 2019-08-29 DIAGNOSIS — E55.9 VITAMIN D DEFICIENCY: ICD-10-CM

## 2019-08-29 DIAGNOSIS — I10 ESSENTIAL HYPERTENSION: ICD-10-CM

## 2019-08-29 PROCEDURE — 99214 OFFICE O/P EST MOD 30 MIN: CPT | Performed by: INTERNAL MEDICINE

## 2019-08-29 PROCEDURE — 36415 COLL VENOUS BLD VENIPUNCTURE: CPT | Performed by: INTERNAL MEDICINE

## 2019-08-29 NOTE — PROGRESS NOTES
"[facility]     Mary Ramirez  1934   [id number]      Patient Care Team:  Nirmal Lundberg MD as PCP - General (Internal Medicine)    Chief Complaint::   Chief Complaint   Patient presents with   • Hypertension   • Coronary Artery Disease   • Hyperlipidemia   • Diabetes        HPI  Ms. Ramirez is now 84.  She comes in for follow-up of her diabetes, hypertension, congestive heart failure, depression and dementia.  She is accompanied by her son, Josh.  She is still not ambulatory.  Her left patellar fracture is healing, and orthopedics encouraged her to start weightbearing, but she says it \"gives way.\"  She denies chest pain or dyspnea.  She has no pain at rest.  Her appetite is good.  She has no swelling.  Her memory remains poor.  Her mood appears to be good.  She seems to be content and happy at the [facility].  Review of records from the [facility] shows blood pressures ranging from 136-186/73-90.  Glucoses range from the upper 100s to a rare readings over 300 but most readings are between 180 and 240.    Chronic Conditions:      Patient Active Problem List   Diagnosis   • Fall   • Fracture, intertrochanteric, left femur (CMS/HCC)   • Essential hypertension   • Coronary artery disease involving native coronary artery of native heart with angina pectoris (CMS/HCC)   • Dementia   • Paroxysmal atrial fibrillation (CMS/HCC)   • Anticoagulated   • SSS (sick sinus syndrome) (CMS/HCC)   • Chronic diastolic congestive heart failure (CMS/HCC)   • DEANGELO (acute kidney injury) (CMS/HCC)   • Benign essential hypertension   • CKD (chronic kidney disease), stage III (CMS/HCC)   • Mixed hyperlipidemia   • Major depressive disorder, single episode, mild (CMS/HCC)   • Chronic allergic rhinitis   • Memory loss   • Bradycardia   • At risk for falls   • Closed fracture of femur (CMS/HCC)   • Coronary arteriosclerosis after percutaneous transluminal coronary angioplasty (PTCA)   • Diabetic polyneuropathy (CMS/HCC)   • " Diverticular disease of colon   • Foot pain   • GERD (gastroesophageal reflux disease)   • Peripheral artery insufficiency (CMS/[facility])   • Annual physical exam   • Vitamin D deficiency   • Renal disorder   • Closed fracture of right hip (CMS/[facility])   • Postoperative anemia due to acute blood loss   • Late onset Alzheimer's disease without behavioral disturbance        Past Medical History:   Diagnosis Date   • Atrial fibrillation (CMS/[facility])    • Benign essential hypertension    • CAD (coronary artery disease)    • Carotid artery disease (CMS/[facility])    • CHF (congestive heart failure) (CMS/HCC)    • Chronic allergic rhinitis    • CKD (chronic kidney disease), stage III (CMS/[facility])    • DDD (degenerative disc disease), cervical    • Dementia    • Diabetes mellitus (CMS/[facility])    • Diverticulosis     WITH PERFORATION    • Hypertension    • Major depressive disorder, single episode, mild (CMS/HCC)    • Memory loss    • Mixed hyperlipidemia    • SSS (sick sinus syndrome) (CMS/[facility])    • TIA (transient ischemic attack) 01/2016       Past Surgical History:   Procedure Laterality Date   • ANKLE SURGERY Left 1996   • ATHERECTOMY  1995   • ATHRECTOMY ILIAC, FEMORAL, TIBIAL ARTERY     • BREAST LUMPECTOMY Left 1981   • COLON SURGERY     • COLOSTOMY  1983   • CORONARY STENT PLACEMENT  2001   • FINGER FRACTURE SURGERY Right    • HIP TROCHANTERIC NAILING WITH INTRAMEDULLARY HIP SCREW Left 11/23/2017   • HIP TROCHANTERIC NAILING WITH INTRAMEDULLARY HIP SCREW Right 3/30/2019    Procedure: HIP TROCANTERIC NAILING WITH INTRAMEDULLARY HIP SCREW RIGHT;  Surgeon: Theo Figueroa MD;  Location: [facility];  Service: Orthopedics   • HYSTERECTOMY  1984   • OTHER SURGICAL HISTORY  1984    REANASTAMOSIS        Family History   Problem Relation Age of Onset   • Hypertension Mother    • Coronary artery disease Mother 56        PREMATURE    • Lung cancer Father    • Hypertension Father    • Osteoporosis Sister    • Lung cancer Brother        Social  "History     Socioeconomic History   • Marital status:      Spouse name: N/A   • Number of children: 2   • Years of education: H.S   • Highest education level: Not on file   Occupational History   • Occupation:      Employer: RETIRED   Tobacco Use   • Smoking status: Former Smoker     Years: 2.00     Types: Cigarettes     Start date: 1975     Last attempt to quit: 1978     Years since quittin.8   • Smokeless tobacco: Never Used   • Tobacco comment: QUIT DATE 1984   Substance and Sexual Activity   • Alcohol use: No   • Drug use: No   • Sexual activity: No   Social History Narrative    [facility]    Patient drinks 2 cups of caffeine per day.,       Allergies   Allergen Reactions   • Penicillins Other (See Comments)     Pt states \"i don't know\"         Current Outpatient Medications:   •  acetaminophen (TYLENOL) 500 MG tablet, Take 500 mg by mouth Every 6 (Six) Hours As Needed for Mild Pain . Take one tablet twice a day for leg pain, Disp: , Rfl:   •  Amino Acids-Protein Hydrolys (PRO-STAT AWC PO), Take  by mouth., Disp: , Rfl:   •  amiodarone (PACERONE) 200 MG tablet, Take 1 tablet by mouth Daily., Disp: , Rfl:   •  amLODIPine (NORVASC) 10 MG tablet, Take 1 tablet by mouth Every Night., Disp: 30 tablet, Rfl: 5  •  aspirin  MG EC tablet, Take 1 tablet by mouth Daily., Disp: , Rfl:   •  bisacodyl (DULCOLAX) 5 MG EC tablet, Take 1 tablet by mouth Daily As Needed for Constipation., Disp: , Rfl:   •  citalopram (CeleXA) 20 MG tablet, Take 20 mg by mouth Daily., Disp: , Rfl:   •  CloNIDine (CATAPRES) 0.2 MG tablet, Take 0.2 mg by mouth 2 (Two) Times a Day., Disp: , Rfl:   •  collagenase 250 UNIT/GM ointment, Apply  topically to the appropriate area as directed Daily., Disp: , Rfl:   •  docusate sodium (COLACE) 100 MG capsule, Take 100 mg by mouth 2 (Two) Times a Day., Disp: , Rfl:   •  donepezil (ARICEPT) 5 MG tablet, Take 5 mg by mouth Every " "Night., Disp: , Rfl:   •  fluticasone (FLONASE) 50 MCG/ACT nasal spray, 2 sprays into the nostril(s) as directed by provider Daily., Disp: , Rfl:   •  insulin aspart (novoLOG FLEXPEN) 100 UNIT/ML solution pen-injector sc pen, Inject 2-5 Units under the skin into the appropriate area as directed 4 (Four) Times a Day., Disp: , Rfl:   •  isosorbide mononitrate (IMDUR) 60 MG 24 hr tablet, Take 60 mg by mouth Daily., Disp: , Rfl:   •  metFORMIN (GLUCOPHAGE) 500 MG tablet, Take 1 tablet by mouth Daily With Breakfast., Disp: , Rfl:   •  Multiple Vitamin (MULTIVITAMINS PO), Take 1 tablet by mouth Daily., Disp: , Rfl:   •  ondansetron (ZOFRAN) 4 MG tablet, Take 4 mg by mouth Every 8 (Eight) Hours As Needed for Nausea or Vomiting., Disp: , Rfl:   •  polyethylene glycol (MIRALAX) pack packet, Take 17 g by mouth Daily. (Patient taking differently: Take 17 g by mouth Daily As Needed.), Disp: , Rfl:     Review of Systems   Constitutional: Negative for chills, fatigue and fever.   HENT: Negative for congestion, ear pain and sinus pressure.    Respiratory: Negative for cough, chest tightness, shortness of breath and wheezing.    Cardiovascular: Negative for chest pain and palpitations.   Gastrointestinal: Negative for abdominal pain, blood in stool and constipation.   Skin: Negative for color change.   Allergic/Immunologic: Negative for environmental allergies.   Neurological: Negative for dizziness, speech difficulty and headache.   Psychiatric/Behavioral: Negative for decreased concentration. The patient is not nervous/anxious.         Vital Signs  Vitals:    08/29/19 1000   BP: 128/62   BP Location: Left arm   Patient Position: Sitting   Cuff Size: Adult   Pulse: 64   Weight: 47.2 kg (104 lb)  Comment: At home   Height: 152.4 cm (60\")   PainSc:   6   PainLoc: Foot  Comment: heel and knee       Physical Exam   Constitutional: She is oriented to person, place, and time. She appears well-developed and well-nourished.   HENT: "   Head: Normocephalic and atraumatic.   Cardiovascular: Normal rate, regular rhythm and normal heart sounds.   No murmur heard.  Pulmonary/Chest: Effort normal and breath sounds normal.   Neurological: She is alert and oriented to person, place, and time.   Psychiatric: She has a normal mood and affect.   Vitals reviewed.     Procedures    ACE III MINI             Assessment/Plan:    Mary was seen today for hypertension, coronary artery disease, hyperlipidemia and diabetes.    Diagnoses and all orders for this visit:    Diabetic polyneuropathy associated with type 2 diabetes mellitus (CMS/[facility])  -     Hemoglobin A1c; Future  -     Hemoglobin A1c    Essential hypertension  -     Comprehensive Metabolic Panel; Future  -     CBC & Differential; Future  -     CBC & Differential  -     Comprehensive Metabolic Panel    Mixed hyperlipidemia    Vitamin D deficiency  -     Vitamin D 25 Hydroxy; Future  -     Vitamin D 25 Hydroxy    Chronic diastolic congestive heart failure (CMS/HCC)    CKD (chronic kidney disease), stage III (CMS/[facility])    Late onset Alzheimer's disease without behavioral disturbance    Major depressive disorder, single episode, mild (CMS/[facility])    Plan    A1c is pending.  Glucoses appear acceptably controlled.  She will continue metformin and sliding scale insulin.    Blood pressure is high at the [facility], but review of our records show consistently normal blood pressures in doctors offices.  She will continue clonidine and amlodipine.    She will continue efforts at healthy diet to control lipids.    Vitamin D is pending.    No evidence of congestive heart failure on today's examination, continue isosorbide, aspirin, amlodipine and Klonopin.    GFR is pending, she avoids NSAIDs, her blood pressure is adequately controlled.    Her memory remains poor, continue donepezil 5 mg a day.    Her mood appears compensated.  Continue citalopram.          Plan of care reviewed with patient at the conclusion of today's  visit. Education was provided regarding diagnosis, management, and any prescribed or recommended OTC medications.Patient verbalizes understanding of and agreement with management plan.         Nirmal Lundberg MD

## 2019-08-30 LAB
25(OH)D3+25(OH)D2 SERPL-MCNC: 32.3 NG/ML (ref 30–100)
ALBUMIN SERPL-MCNC: 4.3 G/DL (ref 3.5–5.2)
ALBUMIN/GLOB SERPL: 1.7 G/DL
ALP SERPL-CCNC: 129 U/L (ref 39–117)
ALT SERPL-CCNC: 17 U/L (ref 1–33)
AST SERPL-CCNC: 22 U/L (ref 1–32)
BASOPHILS # BLD AUTO: (no result) 10*3/UL
BASOPHILS # BLD MANUAL: 0.07 10*3/MM3 (ref 0–0.2)
BASOPHILS NFR BLD MANUAL: 1 % (ref 0–1.5)
BILIRUB SERPL-MCNC: 0.3 MG/DL (ref 0.2–1.2)
BUN SERPL-MCNC: 29 MG/DL (ref 8–23)
BUN/CREAT SERPL: 24.6 (ref 7–25)
CALCIUM SERPL-MCNC: 9.4 MG/DL (ref 8.6–10.5)
CHLORIDE SERPL-SCNC: 99 MMOL/L (ref 98–107)
CO2 SERPL-SCNC: 28.5 MMOL/L (ref 22–29)
CREAT SERPL-MCNC: 1.18 MG/DL (ref 0.57–1)
DIFFERENTIAL COMMENT: ABNORMAL
EOSINOPHIL # BLD AUTO: (no result) 10*3/UL
EOSINOPHIL # BLD MANUAL: 0.48 10*3/MM3 (ref 0–0.4)
EOSINOPHIL NFR BLD AUTO: (no result) %
EOSINOPHIL NFR BLD MANUAL: 7 % (ref 0.3–6.2)
ERYTHROCYTE [DISTWIDTH] IN BLOOD BY AUTOMATED COUNT: 17.2 % (ref 12.3–15.4)
GLOBULIN SER CALC-MCNC: 2.5 GM/DL
GLUCOSE SERPL-MCNC: 217 MG/DL (ref 65–99)
HBA1C MFR BLD: 7.3 % (ref 4.8–5.6)
HCT VFR BLD AUTO: 46.3 % (ref 34–46.6)
HGB BLD-MCNC: 13.9 G/DL (ref 12–15.9)
LYMPHOCYTES # BLD AUTO: (no result) 10*3/UL
LYMPHOCYTES # BLD MANUAL: 1.44 10*3/MM3 (ref 0.7–3.1)
LYMPHOCYTES NFR BLD AUTO: (no result) %
LYMPHOCYTES NFR BLD MANUAL: 21 % (ref 19.6–45.3)
MCH RBC QN AUTO: 27.3 PG (ref 26.6–33)
MCHC RBC AUTO-ENTMCNC: 30 G/DL (ref 31.5–35.7)
MCV RBC AUTO: 90.8 FL (ref 79–97)
MONOCYTES # BLD MANUAL: 0.75 10*3/MM3 (ref 0.1–0.9)
MONOCYTES NFR BLD AUTO: (no result) %
MONOCYTES NFR BLD MANUAL: 11 % (ref 5–12)
NEUTROPHILS # BLD MANUAL: 4.1 10*3/MM3 (ref 1.7–7)
NEUTROPHILS NFR BLD AUTO: (no result) %
NEUTROPHILS NFR BLD MANUAL: 60 % (ref 42.7–76)
PLATELET # BLD AUTO: 238 10*3/MM3 (ref 140–450)
PLATELET BLD QL SMEAR: ABNORMAL
POTASSIUM SERPL-SCNC: 4.3 MMOL/L (ref 3.5–5.2)
PROT SERPL-MCNC: 6.8 G/DL (ref 6–8.5)
RBC # BLD AUTO: 5.1 10*6/MM3 (ref 3.77–5.28)
RBC MORPH BLD: ABNORMAL
SODIUM SERPL-SCNC: 141 MMOL/L (ref 136–145)
WBC # BLD AUTO: 6.84 10*3/MM3 (ref 3.4–10.8)

## 2019-09-17 ENCOUNTER — TELEPHONE (OUTPATIENT)
Dept: INTERNAL MEDICINE | Facility: CLINIC | Age: 84
End: 2019-09-17

## 2019-09-17 NOTE — TELEPHONE ENCOUNTER
Patient's son called (Josh) and stated that the [facility] wanted to change her medication Metformin to something else because her sugar levels have been running high.  He could not remember the name and will contact them and give us a call back Wednesday morning.

## 2019-09-18 NOTE — TELEPHONE ENCOUNTER
Called Josh and he said the pharmacist said it should be changed. Called [facility] [phone] and spoke too Lisbeth. She said pt uses novolog sliding scale daily and pharmacist sugg changing from metformin 500 mg qd to glucophage XR 1000 mg qd. Requested they sent blood sugar readings for Dr. Lundberg to review and advised he is out of the office until Mon. Notified Josh

## 2019-09-21 ENCOUNTER — HOSPITAL ENCOUNTER (EMERGENCY)
Facility: HOSPITAL | Age: 84
Discharge: HOME | End: 2019-09-22
Attending: EMERGENCY MEDICINE | Admitting: EMERGENCY MEDICINE

## 2019-09-21 ENCOUNTER — APPOINTMENT (OUTPATIENT)
Dept: GENERAL RADIOLOGY | Facility: HOSPITAL | Age: 84
End: 2019-09-21

## 2019-09-21 DIAGNOSIS — K22.4 ESOPHAGEAL SPASM: ICD-10-CM

## 2019-09-21 DIAGNOSIS — R07.9 CHEST PAIN, UNSPECIFIED TYPE: Primary | ICD-10-CM

## 2019-09-21 LAB
ALBUMIN SERPL-MCNC: 4.4 G/DL (ref 3.5–5.2)
ALBUMIN/GLOB SERPL: 1.6 G/DL
ALP SERPL-CCNC: 124 U/L (ref 39–117)
ALT SERPL W P-5'-P-CCNC: 14 U/L (ref 1–33)
ANION GAP SERPL CALCULATED.3IONS-SCNC: 13 MMOL/L (ref 5–15)
AST SERPL-CCNC: 20 U/L (ref 1–32)
BASOPHILS # BLD AUTO: 0.05 10*3/MM3 (ref 0–0.2)
BASOPHILS NFR BLD AUTO: 0.7 % (ref 0–1.5)
BILIRUB SERPL-MCNC: 0.2 MG/DL (ref 0.2–1.2)
BUN BLD-MCNC: 34 MG/DL (ref 8–23)
BUN/CREAT SERPL: 23.4 (ref 7–25)
CALCIUM SPEC-SCNC: 9.3 MG/DL (ref 8.6–10.5)
CHLORIDE SERPL-SCNC: 97 MMOL/L (ref 98–107)
CO2 SERPL-SCNC: 28 MMOL/L (ref 22–29)
CREAT BLD-MCNC: 1.45 MG/DL (ref 0.57–1)
DEPRECATED RDW RBC AUTO: 53.5 FL (ref 37–54)
EOSINOPHIL # BLD AUTO: 0.16 10*3/MM3 (ref 0–0.4)
EOSINOPHIL NFR BLD AUTO: 2.3 % (ref 0.3–6.2)
ERYTHROCYTE [DISTWIDTH] IN BLOOD BY AUTOMATED COUNT: 16.6 % (ref 12.3–15.4)
GFR SERPL CREATININE-BSD FRML MDRD: 34 ML/MIN/1.73
GLOBULIN UR ELPH-MCNC: 2.8 GM/DL
GLUCOSE BLD-MCNC: 147 MG/DL (ref 65–99)
HCT VFR BLD AUTO: 41.7 % (ref 34–46.6)
HGB BLD-MCNC: 13.1 G/DL (ref 12–15.9)
HOLD SPECIMEN: NORMAL
HOLD SPECIMEN: NORMAL
IMM GRANULOCYTES # BLD AUTO: 0.02 10*3/MM3 (ref 0–0.05)
IMM GRANULOCYTES NFR BLD AUTO: 0.3 % (ref 0–0.5)
LIPASE SERPL-CCNC: 30 U/L (ref 13–60)
LYMPHOCYTES # BLD AUTO: 1.98 10*3/MM3 (ref 0.7–3.1)
LYMPHOCYTES NFR BLD AUTO: 29 % (ref 19.6–45.3)
MCH RBC QN AUTO: 27.6 PG (ref 26.6–33)
MCHC RBC AUTO-ENTMCNC: 31.4 G/DL (ref 31.5–35.7)
MCV RBC AUTO: 88 FL (ref 79–97)
MONOCYTES # BLD AUTO: 0.69 10*3/MM3 (ref 0.1–0.9)
MONOCYTES NFR BLD AUTO: 10.1 % (ref 5–12)
NEUTROPHILS # BLD AUTO: 3.93 10*3/MM3 (ref 1.7–7)
NEUTROPHILS NFR BLD AUTO: 57.6 % (ref 42.7–76)
NRBC BLD AUTO-RTO: 0 /100 WBC (ref 0–0.2)
NT-PROBNP SERPL-MCNC: 371.3 PG/ML (ref 5–1800)
PLATELET # BLD AUTO: 233 10*3/MM3 (ref 140–450)
PMV BLD AUTO: 11.6 FL (ref 6–12)
POTASSIUM BLD-SCNC: 3.7 MMOL/L (ref 3.5–5.2)
PROT SERPL-MCNC: 7.2 G/DL (ref 6–8.5)
RBC # BLD AUTO: 4.74 10*6/MM3 (ref 3.77–5.28)
SODIUM BLD-SCNC: 138 MMOL/L (ref 136–145)
TROPONIN T SERPL-MCNC: <0.01 NG/ML (ref 0–0.03)
TROPONIN T SERPL-MCNC: <0.01 NG/ML (ref 0–0.03)
WBC NRBC COR # BLD: 6.83 10*3/MM3 (ref 3.4–10.8)
WHOLE BLOOD HOLD SPECIMEN: NORMAL
WHOLE BLOOD HOLD SPECIMEN: NORMAL

## 2019-09-21 PROCEDURE — 71045 X-RAY EXAM CHEST 1 VIEW: CPT

## 2019-09-21 PROCEDURE — 85025 COMPLETE CBC W/AUTO DIFF WBC: CPT | Performed by: EMERGENCY MEDICINE

## 2019-09-21 PROCEDURE — 83880 ASSAY OF NATRIURETIC PEPTIDE: CPT | Performed by: EMERGENCY MEDICINE

## 2019-09-21 PROCEDURE — 83690 ASSAY OF LIPASE: CPT | Performed by: EMERGENCY MEDICINE

## 2019-09-21 PROCEDURE — 84484 ASSAY OF TROPONIN QUANT: CPT | Performed by: EMERGENCY MEDICINE

## 2019-09-21 PROCEDURE — 80053 COMPREHEN METABOLIC PANEL: CPT | Performed by: EMERGENCY MEDICINE

## 2019-09-21 PROCEDURE — 93005 ELECTROCARDIOGRAM TRACING: CPT | Performed by: EMERGENCY MEDICINE

## 2019-09-21 PROCEDURE — 99285 EMERGENCY DEPT VISIT HI MDM: CPT

## 2019-09-21 RX ORDER — ASPIRIN 81 MG/1
324 TABLET, CHEWABLE ORAL ONCE
Status: DISCONTINUED | OUTPATIENT
Start: 2019-09-21 | End: 2019-09-22 | Stop reason: HOSPADM

## 2019-09-21 RX ORDER — FAMOTIDINE 20 MG/1
20 TABLET, FILM COATED ORAL 2 TIMES DAILY
Qty: 60 TABLET | Refills: 0 | Status: SHIPPED | OUTPATIENT
Start: 2019-09-21 | End: 2019-10-21

## 2019-09-21 RX ORDER — SODIUM CHLORIDE 0.9 % (FLUSH) 0.9 %
10 SYRINGE (ML) INJECTION AS NEEDED
Status: DISCONTINUED | OUTPATIENT
Start: 2019-09-21 | End: 2019-09-22 | Stop reason: HOSPADM

## 2019-09-21 NOTE — ED PROVIDER NOTES
Subjective   Mary Ramirez is an 84 year old female presenting to the ED with complaints of chest pain. Patient reports a sudden onset of chest pain when she was about half way finished with dinner. She has intermittent pinching in the left chest but tonight she had an episode of severe chest pressure. Patient reports no vomiting, nausea, diaphoresis, or difficulty swallowing. She had a normal day up until the episode. She has dementia and has lived at [facility] for the past 2 years. She is currently recovering from bilateral patellar fractures from a fall. She has not had a barium swallow test or EGD. No other acute complaints at this time.        History provided by:  Relative and patient  Chest Pain   Pain location:  L chest  Pain quality: pressure and tightness    Pain radiates to:  Does not radiate  Pain severity:  Severe  Onset quality:  Sudden  Timing:  Intermittent  Progression:  Partially resolved  Chronicity:  New  Context: eating    Relieved by:  None tried  Ineffective treatments:  None tried  Associated symptoms: no diaphoresis, no nausea and no vomiting    Risk factors: coronary artery disease, diabetes mellitus, high cholesterol, hypertension and smoking (former smoker)        Review of Systems   Constitutional: Negative for diaphoresis.   Cardiovascular: Positive for chest pain.   Gastrointestinal: Negative for nausea and vomiting.   All other systems reviewed and are negative.      Past Medical History:   Diagnosis Date   • Atrial fibrillation (CMS/HCC)    • Benign essential hypertension    • CAD (coronary artery disease)    • Carotid artery disease (CMS/HCC)    • CHF (congestive heart failure) (CMS/HCC)    • Chronic allergic rhinitis    • CKD (chronic kidney disease), stage III (CMS/HCC)    • DDD (degenerative disc disease), cervical    • Dementia    • Diabetes mellitus (CMS/HCC)    • Diverticulosis     WITH PERFORATION    • Hypertension    • Major depressive disorder, single episode, mild  (CMS/[facility])    • Memory loss    • Mixed hyperlipidemia    • SSS (sick sinus syndrome) (CMS/[facility])    • TIA (transient ischemic attack) 01/2016     PROBLEM LIST:  Coronary artery disease involving native coronary artery of native heart with angina pectoris (CMS/[facility])               · Cardiac cath with PCI data deficit  · Echo (9/1/18): LVEF 60%. LVH. Left atrial dilatation. Aortic calcification with no stenosis.  · September 2018 myocardial PET: Normal perfusion.           Paroxysmal atrial fibrillation (CMS/[facility])             · Chads Vasc 6 (age > 75, female, CAD, DM, HTN)   · Rhythm control strategy with amiodarone  · Maintaining rhythm           DEANGELO (acute kidney injury) (CMS/[facility])     Type 2 diabetes mellitus with complication, with long-term current use of insulin (CMS/[facility])     Dementia     Chronic diastolic congestive heart failure (CMS/[facility])             · Echo (9/1/18): LVEF 60%. LVH. Left atrial dilatation. Aortic calcification with no stenosis.            ASSESSMENT:    Diagnosis Plan   1. Coronary artery disease involving native coronary artery of native heart with angina pectoris (CMS/[facility])      2. Paroxysmal atrial fibrillation (CMS/[facility])      3. Essential hypertension      4. SSS (sick sinus syndrome) (CMS/[facility])            PLAN:  Coronary artery disease: Currently stable and asymptomatic.  Continue aspirin and afterload reduction.     Hypertension: Goal less than 160/90 mmHg.  Allowing for permissive hypertension to limit risk of falling with labile blood pressures.  Continue, initial clonidine, amlodipine and Imdur.     Paroxysmal atrial fibrillation and atrial flutter: Continue amiodarone 200 mg by mouth daily.  We'll need biannual liver function test and thyroid function tests.  Annual chest x-ray.  Continue aspirin for stroke prophylaxis, off Eliquis due to recent multiple mechanical falls.     Subjective  Pleasantly demented.  Blood pressures predominantly running less than 160/90 mmHg, no symptomatic  "hypotension on current medical therapy.  She denies chest pain, palpitations or dyspnea.  Denies tremors on amiodarone.  Compliant with medical therapy.          Allergies   Allergen Reactions   • Penicillins Other (See Comments)     Pt states \"i don't know\"       Past Surgical History:   Procedure Laterality Date   • ANKLE SURGERY Left    • ATHERECTOMY     • ATHRECTOMY ILIAC, FEMORAL, TIBIAL ARTERY     • BREAST LUMPECTOMY Left    • COLON SURGERY     • COLOSTOMY     • CORONARY STENT PLACEMENT     • FINGER FRACTURE SURGERY Right    • HIP TROCHANTERIC NAILING WITH INTRAMEDULLARY HIP SCREW Left 2017   • HIP TROCHANTERIC NAILING WITH INTRAMEDULLARY HIP SCREW Right 3/30/2019    Procedure: HIP TROCANTERIC NAILING WITH INTRAMEDULLARY HIP SCREW RIGHT;  Surgeon: Theo Figueroa MD;  Location: [facility];  Service: Orthopedics   • HYSTERECTOMY     • OTHER SURGICAL HISTORY      REANASTAMOSIS        Family History   Problem Relation Age of Onset   • Hypertension Mother    • Coronary artery disease Mother 56        PREMATURE    • Lung cancer Father    • Hypertension Father    • Osteoporosis Sister    • Lung cancer Brother        Social History     Socioeconomic History   • Marital status:      Spouse name: N/A   • Number of children: 2   • Years of education: H.S   • Highest education level: Not on file   Occupational History   • Occupation:      Employer: RETIRED   Tobacco Use   • Smoking status: Former Smoker     Years: 2.00     Types: Cigarettes     Start date: 1975     Last attempt to quit: 1978     Years since quittin.9   • Smokeless tobacco: Never Used   • Tobacco comment: QUIT DATE 1984   Substance and Sexual Activity   • Alcohol use: No   • Drug use: No   • Sexual activity: No   Social History Narrative    [facility]    Patient drinks 2 cups of caffeine per day.,           Objective   Physical Exam "   Constitutional: She is oriented to person, place, and time. She appears well-developed and well-nourished.   No acute distress. Mild dementia.     HENT:   Head: Normocephalic and atraumatic.   Right Ear: External ear normal.   Left Ear: External ear normal.   Nose: Nose normal.   Mouth/Throat: Oropharynx is clear and moist.   Scratches on nose, however do not appear infected.   Eyes: Conjunctivae and EOM are normal. Pupils are equal, round, and reactive to light.   Neck: Normal range of motion. Neck supple.   Cardiovascular: Normal rate, regular rhythm, normal heart sounds and intact distal pulses.   Pulmonary/Chest: Effort normal and breath sounds normal.   Abdominal: Soft. Bowel sounds are normal.   Musculoskeletal: Normal range of motion.   Back is tender to palpation.   Neurological: She is alert and oriented to person, place, and time.   Face symmetric, tongue midline, voice strong, hearing, vision and speech preserved. Normal and equal strength to upper and lower extremities. Sensation intact.      Skin: Skin is warm and dry.   Nursing note and vitals reviewed.      Procedures           ED Course      Recent Results (from the past 24 hour(s))   Troponin    Collection Time: 09/21/19  6:29 PM   Result Value Ref Range    Troponin T <0.010 0.000 - 0.030 ng/mL   Comprehensive Metabolic Panel    Collection Time: 09/21/19  6:29 PM   Result Value Ref Range    Glucose 147 (H) 65 - 99 mg/dL    BUN 34 (H) 8 - 23 mg/dL    Creatinine 1.45 (H) 0.57 - 1.00 mg/dL    Sodium 138 136 - 145 mmol/L    Potassium 3.7 3.5 - 5.2 mmol/L    Chloride 97 (L) 98 - 107 mmol/L    CO2 28.0 22.0 - 29.0 mmol/L    Calcium 9.3 8.6 - 10.5 mg/dL    Total Protein 7.2 6.0 - 8.5 g/dL    Albumin 4.40 3.50 - 5.20 g/dL    ALT (SGPT) 14 1 - 33 U/L    AST (SGOT) 20 1 - 32 U/L    Alkaline Phosphatase 124 (H) 39 - 117 U/L    Total Bilirubin 0.2 0.2 - 1.2 mg/dL    eGFR Non African Amer 34 (L) >60 mL/min/1.73    Globulin 2.8 gm/dL    A/G Ratio 1.6 g/dL     BUN/Creatinine Ratio 23.4 7.0 - 25.0    Anion Gap 13.0 5.0 - 15.0 mmol/L   Lipase    Collection Time: 09/21/19  6:29 PM   Result Value Ref Range    Lipase 30 13 - 60 U/L   BNP    Collection Time: 09/21/19  6:29 PM   Result Value Ref Range    proBNP 371.3 5.0-1,800.0 pg/mL   Light Blue Top    Collection Time: 09/21/19  6:29 PM   Result Value Ref Range    Extra Tube hold for add-on    Green Top (Gel)    Collection Time: 09/21/19  6:29 PM   Result Value Ref Range    Extra Tube Hold for add-ons.    Lavender Top    Collection Time: 09/21/19  6:29 PM   Result Value Ref Range    Extra Tube hold for add-on    Gold Top - SST    Collection Time: 09/21/19  6:29 PM   Result Value Ref Range    Extra Tube Hold for add-ons.    CBC Auto Differential    Collection Time: 09/21/19  6:29 PM   Result Value Ref Range    WBC 6.83 3.40 - 10.80 10*3/mm3    RBC 4.74 3.77 - 5.28 10*6/mm3    Hemoglobin 13.1 12.0 - 15.9 g/dL    Hematocrit 41.7 34.0 - 46.6 %    MCV 88.0 79.0 - 97.0 fL    MCH 27.6 26.6 - 33.0 pg    MCHC 31.4 (L) 31.5 - 35.7 g/dL    RDW 16.6 (H) 12.3 - 15.4 %    RDW-SD 53.5 37.0 - 54.0 fl    MPV 11.6 6.0 - 12.0 fL    Platelets 233 140 - 450 10*3/mm3    Neutrophil % 57.6 42.7 - 76.0 %    Lymphocyte % 29.0 19.6 - 45.3 %    Monocyte % 10.1 5.0 - 12.0 %    Eosinophil % 2.3 0.3 - 6.2 %    Basophil % 0.7 0.0 - 1.5 %    Immature Grans % 0.3 0.0 - 0.5 %    Neutrophils, Absolute 3.93 1.70 - 7.00 10*3/mm3    Lymphocytes, Absolute 1.98 0.70 - 3.10 10*3/mm3    Monocytes, Absolute 0.69 0.10 - 0.90 10*3/mm3    Eosinophils, Absolute 0.16 0.00 - 0.40 10*3/mm3    Basophils, Absolute 0.05 0.00 - 0.20 10*3/mm3    Immature Grans, Absolute 0.02 0.00 - 0.05 10*3/mm3    nRBC 0.0 0.0 - 0.2 /100 WBC   Troponin    Collection Time: 09/21/19  9:15 PM   Result Value Ref Range    Troponin T <0.010 0.000 - 0.030 ng/mL     Note: In addition to lab results from this visit, the labs listed above may include labs taken at another facility or during a different  encounter within the last 24 hours. Please correlate lab times with ED admission and discharge times for further clarification of the services performed during this visit.    XR Chest 1 View   Preliminary Result   Chronic changes in with the lung fields with no evidence of   acute Panko disease.                Vitals:    09/21/19 2200 09/21/19 2230 09/21/19 2300 09/22/19 0000   BP: 165/98 (!) 183/81 175/76 163/77   BP Location:    Right arm   Patient Position:    Lying   Pulse: 58   58   Resp:       Temp:       TempSrc:       SpO2: 99% 98% 97% 96%   Weight:       Height:         Medications - No data to display  ECG/EMG Results (last 24 hours)     Procedure Component Value Units Date/Time    ECG 12 Lead [[id number]] Collected:  09/21/19 1823     Updated:  09/21/19 1832    Narrative:       Test Reason : chest pain  Blood Pressure : **/** mmHG  Vent. Rate : 055 BPM     Atrial Rate : 055 BPM     P-R Int : 196 ms          QRS Dur : 102 ms      QT Int : 486 ms       P-R-T Axes : -01 -23 042 degrees     QTc Int : 464 ms    Sinus bradycardia  Otherwise normal ECG  When compared with ECG of 29-MAR-2019 07:30,  Nonspecific T wave abnormality, improved in Lateral leads  qtc 440msec by my calculation  Confirmed by IRIS JACOBS MD ([id number]) on 9/21/2019 6:32:12 PM    Referred By:  homa           Confirmed By:IRIS JACOBS MD        ECG 12 Lead         ECG 12 Lead   Final Result   Test Reason : chest pain   Blood Pressure : **/** mmHG   Vent. Rate : 055 BPM     Atrial Rate : 055 BPM      P-R Int : 196 ms          QRS Dur : 102 ms       QT Int : 486 ms       P-R-T Axes : -01 -23 042 degrees      QTc Int : 464 ms      Sinus bradycardia   Otherwise normal ECG   When compared with ECG of 29-MAR-2019 07:30,   Nonspecific T wave abnormality, improved in Lateral leads   qtc 440msec by my calculation   Confirmed by IRIS JACOBS MD ([id number]) on 9/21/2019 6:32:12 PM      Referred By:  homa           Confirmed By:IRIS JACOBS MD                       MDM  Number of Diagnoses or Management Options  Chest pain, unspecified type:   Esophageal spasm:   Diagnosis management comments:       I reviewed all available studies at the bedside with the patient and her family.  Thankfully her cardiac evaluation is bland.  She is had recent stress test in 2018 that was reassuring.    Here in the ER her pain is back to its baseline.    I really suspect her pain represents some sort of esophageal spasm and not any sort of cardiac event.  Neither her or her family have a recollection of her ever getting upper GI or barium swallow or EGD.    I will refer her to the GI service for consideration of this and I will go ahead and start her on an H2 blocker to see if this helps.    She will return to the ER if worse in any way.    All are agreeable with the plan      Final diagnoses:   Chest pain, unspecified type   Esophageal spasm     Cee PITT, attest that this documentation has been prepared under the direction and in the presence of Dr. Shukri Heck MD.               Cee Rico  09/21/19 2016       Shukri Heck MD  09/22/19 0238

## 2019-09-22 VITALS
DIASTOLIC BLOOD PRESSURE: 77 MMHG | OXYGEN SATURATION: 96 % | WEIGHT: 100 LBS | SYSTOLIC BLOOD PRESSURE: 163 MMHG | HEART RATE: 58 BPM | BODY MASS INDEX: 18.88 KG/M2 | HEIGHT: 61 IN | TEMPERATURE: 98.3 F | RESPIRATION RATE: 16 BRPM

## 2019-09-22 NOTE — DISCHARGE INSTRUCTIONS
Please call ASAP to arrange outpatient follow up with one of the following gastroenterologists:    [facility] GI Provider Dr. Jackson Sahu, Dr. BRIDGET Finn, Dr. Shai Brody, CRISTA Pink    Office Location:  [address] [place] [address]    Office # [phone]    Or    [facility] GI Provider Dr. Sebastian Bartholomew and Dr. Mo Guidry    Office Location:   [address] [place] [address]    Office # [phone]

## 2019-09-23 RX ORDER — METFORMIN HYDROCHLORIDE 500 MG/1
500 TABLET, EXTENDED RELEASE ORAL
Qty: 30 TABLET | Refills: 5 | Status: SHIPPED | OUTPATIENT
Start: 2019-09-23 | End: 2019-09-23 | Stop reason: SDUPTHER

## 2019-09-23 RX ORDER — METFORMIN HYDROCHLORIDE 500 MG/1
1000 TABLET, EXTENDED RELEASE ORAL
Qty: 60 TABLET | Refills: 5 | Status: SHIPPED | OUTPATIENT
Start: 2019-09-23 | End: 2019-09-23 | Stop reason: SDUPTHER

## 2019-09-23 RX ORDER — METFORMIN HYDROCHLORIDE 500 MG/1
1000 TABLET, EXTENDED RELEASE ORAL
Qty: 60 TABLET | Refills: 5 | Status: SHIPPED | OUTPATIENT
Start: 2019-09-23 | End: 2019-09-23

## 2019-09-23 RX ORDER — METFORMIN HYDROCHLORIDE 500 MG/1
500 TABLET, EXTENDED RELEASE ORAL
Qty: 30 TABLET | Refills: 5 | Status: ON HOLD | OUTPATIENT
Start: 2019-09-23 | End: 2022-08-06

## 2019-09-23 NOTE — TELEPHONE ENCOUNTER
Dose change called to Alice again with [facility]. She voices understanding. Rx was sent to [facility]

## 2019-09-23 NOTE — TELEPHONE ENCOUNTER
After further review, her GFR is too low for a higher dose of metformin, but 500 mg of the xr would still provide better coverage.  EMR will only let me print this.

## 2019-10-17 ENCOUNTER — OFFICE VISIT (OUTPATIENT)
Dept: ORTHOPEDIC SURGERY | Facility: CLINIC | Age: 84
End: 2019-10-17

## 2019-10-17 VITALS — HEIGHT: 61 IN | HEART RATE: 57 BPM | OXYGEN SATURATION: 99 % | WEIGHT: 100.09 LBS | BODY MASS INDEX: 18.9 KG/M2

## 2019-10-17 DIAGNOSIS — M70.61 TROCHANTERIC BURSITIS OF RIGHT HIP: ICD-10-CM

## 2019-10-17 DIAGNOSIS — S72.141D CLOSED INTERTROCHANTERIC FRACTURE OF HIP, RIGHT, WITH ROUTINE HEALING, SUBSEQUENT ENCOUNTER: Primary | ICD-10-CM

## 2019-10-17 DIAGNOSIS — Z09 SURGERY FOLLOW-UP: ICD-10-CM

## 2019-10-17 PROCEDURE — 99213 OFFICE O/P EST LOW 20 MIN: CPT | Performed by: ORTHOPAEDIC SURGERY

## 2019-10-17 NOTE — PROGRESS NOTES
Orthopaedic Clinic Note: Hip Established Patient    Chief Complaint   Patient presents with   • Follow-up     3 month f/u; 7 months s/p Right Hip Trocanteric Nailing with Intramedullary Hip Screw 03/30/2019        HPI    It has been 3  month(s) since Ms. Ramirez's last visit. She returns to clinic today for follow-up right hip intertrochanteric fracture fixation.  She is proximate 7 months out from surgery.  Her rehab has been inhibited by a nondisplaced patella fracture that was treated nonoperatively on the left side.  She now is complaining of some isolated pain to the lateral trochanter that she rates a 5/10 on the pain scale.  She denies any pain in the groin.  According to her son, she primarily mobilizes with a wheelchair and only uses a walker for transfers and short distances.  She does have Alzheimer's and so was unable to clearly elicit a history.  She denies fevers chills or constitutional symptoms.    Past Medical History:   Diagnosis Date   • Atrial fibrillation (CMS/HCC)    • Benign essential hypertension    • CAD (coronary artery disease)    • Carotid artery disease (CMS/HCC)    • CHF (congestive heart failure) (CMS/HCC)    • Chronic allergic rhinitis    • CKD (chronic kidney disease), stage III (CMS/HCC)    • DDD (degenerative disc disease), cervical    • Dementia (CMS/HCC)    • Diabetes mellitus (CMS/HCC)    • Diverticulosis     WITH PERFORATION    • Hypertension    • Major depressive disorder, single episode, mild (CMS/HCC)    • Memory loss    • Mixed hyperlipidemia    • SSS (sick sinus syndrome) (CMS/HCC)    • TIA (transient ischemic attack) 01/2016      Past Surgical History:   Procedure Laterality Date   • ANKLE SURGERY Left 1996   • ATHERECTOMY  1995   • ATHRECTOMY ILIAC, FEMORAL, TIBIAL ARTERY     • BREAST LUMPECTOMY Left 1981   • COLON SURGERY     • COLOSTOMY  1983   • CORONARY STENT PLACEMENT  2001   • FINGER FRACTURE SURGERY Right    • HIP TROCHANTERIC NAILING WITH INTRAMEDULLARY HIP SCREW  Left 2017   • HIP TROCHANTERIC NAILING WITH INTRAMEDULLARY HIP SCREW Right 3/30/2019    Procedure: HIP TROCANTERIC NAILING WITH INTRAMEDULLARY HIP SCREW RIGHT;  Surgeon: Theo Figueroa MD;  Location: [facility];  Service: Orthopedics   • HYSTERECTOMY     • OTHER SURGICAL HISTORY      REANASTAMOSIS       Family History   Problem Relation Age of Onset   • Hypertension Mother    • Coronary artery disease Mother 56        PREMATURE    • Lung cancer Father    • Hypertension Father    • Osteoporosis Sister    • Lung cancer Brother      Social History     Socioeconomic History   • Marital status:      Spouse name: N/A   • Number of children: 2   • Years of education: H.S   • Highest education level: Not on file   Occupational History   • Occupation:      Employer: RETIRED   Tobacco Use   • Smoking status: Former Smoker     Years: 2.00     Types: Cigarettes     Start date: 1975     Last attempt to quit: 1978     Years since quittin.9   • Smokeless tobacco: Never Used   • Tobacco comment: QUIT DATE 1984   Substance and Sexual Activity   • Alcohol use: No   • Drug use: No   • Sexual activity: No   Social History Narrative    [facility]    Patient drinks 2 cups of caffeine per day.,      Current Outpatient Medications on File Prior to Visit   Medication Sig Dispense Refill   • acetaminophen (TYLENOL) 500 MG tablet Take 500 mg by mouth Every 6 (Six) Hours As Needed for Mild Pain . Take one tablet twice a day for leg pain     • Amino Acids-Protein Hydrolys (PRO-STAT AWC PO) Take  by mouth.     • amiodarone (PACERONE) 200 MG tablet Take 1 tablet by mouth Daily.     • amLODIPine (NORVASC) 10 MG tablet Take 1 tablet by mouth Every Night. 30 tablet 5   • aspirin  MG EC tablet Take 1 tablet by mouth Daily.     • bisacodyl (DULCOLAX) 5 MG EC tablet Take 1 tablet by mouth Daily As Needed for Constipation.     • citalopram (CeleXA) 20 MG  "tablet Take 20 mg by mouth Daily.     • CloNIDine (CATAPRES) 0.2 MG tablet Take 0.2 mg by mouth 2 (Two) Times a Day.     • collagenase 250 UNIT/GM ointment Apply  topically to the appropriate area as directed Daily.     • docusate sodium (COLACE) 100 MG capsule Take 100 mg by mouth 2 (Two) Times a Day.     • donepezil (ARICEPT) 5 MG tablet Take 5 mg by mouth Every Night.     • famotidine (PEPCID) 20 MG tablet Take 1 tablet by mouth 2 (Two) Times a Day for 30 days. 60 tablet 0   • fluticasone (FLONASE) 50 MCG/ACT nasal spray 2 sprays into the nostril(s) as directed by provider Daily.     • insulin aspart (novoLOG FLEXPEN) 100 UNIT/ML solution pen-injector sc pen Inject 2-5 Units under the skin into the appropriate area as directed 4 (Four) Times a Day.     • isosorbide mononitrate (IMDUR) 60 MG 24 hr tablet Take 60 mg by mouth Daily.     • metFORMIN ER (GLUCOPHAGE-XR) 500 MG 24 hr tablet Take 1 tablet by mouth Daily With Breakfast. 30 tablet 5   • Multiple Vitamin (MULTIVITAMINS PO) Take 1 tablet by mouth Daily.     • ondansetron (ZOFRAN) 4 MG tablet Take 4 mg by mouth Every 8 (Eight) Hours As Needed for Nausea or Vomiting.     • polyethylene glycol (MIRALAX) pack packet Take 17 g by mouth Daily. (Patient taking differently: Take 17 g by mouth Daily As Needed.)       No current facility-administered medications on file prior to visit.       Allergies   Allergen Reactions   • Penicillins Other (See Comments)     Pt states \"i don't know\"        Review of Systems   Constitutional: Negative.    HENT: Negative.    Eyes: Negative.    Respiratory: Negative.    Cardiovascular: Negative.    Gastrointestinal: Negative.    Endocrine: Negative.    Genitourinary: Negative.    Musculoskeletal: Positive for arthralgias.   Skin: Negative.    Allergic/Immunologic: Negative.    Neurological: Negative.    Hematological: Negative.    Psychiatric/Behavioral: Negative.         The patient's Review of Systems was personally reviewed and " "confirmed as accurate.    Physical Exam  Pulse 57, height 154.9 cm (60.98\"), weight 45.4 kg (100 lb 1.4 oz), SpO2 99 %, not currently breastfeeding.    Body mass index is 18.92 kg/m².    GENERAL APPEARANCE: awake, alert, oriented, in no acute distress and well developed, well nourished  LUNGS:  breathing nonlabored  EXTREMITIES: no clubbing, cyanosis  PERIPHERAL PULSES: palpable dorsalis pedis and posterior tibial pulses bilaterally.    GAIT: Not assessed            Hip Exam:  Right    RANGE OF MOTION:  EXTENSION/FLEXION:  normal (0-110 degrees)  IR (at 90 degrees of flexion):  15  ER (at 90 degrees of flexion):  30  PAIN WITH HIP MOTION:  no  PAIN WITH LOGROLL:  no     STINCHFIELD TEST: negative    STRENGTH:  ABDUCTOR:  4/5  ADDUCTOR:  5/5  HIP FLEXION:  4/5    GREATER TROCHANTER BURSAL PAIN:  yes    SENSATION TO LIGHT TOUCH:  DEEP PERONEAL/SUPERFICIAL PERONEAL/SURAL/SAPHENOUS/TIBIAL:   intact    EDEMA:  no  ERYTHEMA:  no  WOUNDS/INCISIONS:   yes, well healed surgical incision without evidence of erythema or drainage  _________________________________________________________________  _________________________________________________________________    RADIOGRAPHIC FINDINGS:   Indication: Status post operative fixation right intertrochanteric hip fracture    Comparison: Todays xrays were compared to previous xrays from 7/16/2019    Right femur 2 views: Radiographs demonstrate hardware in place without evidence of hardware complication.  There is interval controlled collapse of the lag screw proximally that remains unchanged compared to prior radiographs.  Bridging callus is identified to the comminuted trochanter region.      Assessment/Plan:   Diagnosis Plan   1. Closed intertrochanteric fracture of hip, right, with routine healing, subsequent encounter     2. Surgery follow-up  XR Femur 2 View Right   3. Trochanteric bursitis of right hip       Patient is doing well 7-month status post intertrochanteric fracture " fixation.  I explained that her residual lateral based hip pain is due to trochanteric bursitis from muscle weakness both from the comminuted fracture and her inability to appropriately rehab due to the contralateral patella fracture which occurred postoperatively.  I recommended strengthening exercises.  Otherwise she can have weightbearing as tolerated with protection of a walker.  She will follow-up in 6 months with repeat x-ray 2 views right femur.    Theo Figueroa MD  10/17/19  9:44 AM

## 2019-11-11 ENCOUNTER — HOSPITAL ENCOUNTER (EMERGENCY)
Facility: HOSPITAL | Age: 84
Discharge: HOME | End: 2019-11-11
Attending: EMERGENCY MEDICINE | Admitting: EMERGENCY MEDICINE

## 2019-11-11 ENCOUNTER — APPOINTMENT (OUTPATIENT)
Dept: GENERAL RADIOLOGY | Facility: HOSPITAL | Age: 84
End: 2019-11-11

## 2019-11-11 VITALS
TEMPERATURE: 97.6 F | HEIGHT: 61 IN | HEART RATE: 57 BPM | BODY MASS INDEX: 20.58 KG/M2 | RESPIRATION RATE: 16 BRPM | OXYGEN SATURATION: 96 % | SYSTOLIC BLOOD PRESSURE: 175 MMHG | DIASTOLIC BLOOD PRESSURE: 78 MMHG | WEIGHT: 109 LBS

## 2019-11-11 DIAGNOSIS — I25.119 CORONARY ARTERY DISEASE INVOLVING NATIVE CORONARY ARTERY OF NATIVE HEART WITH ANGINA PECTORIS (HCC): ICD-10-CM

## 2019-11-11 DIAGNOSIS — I48.0 PAROXYSMAL ATRIAL FIBRILLATION (HCC): ICD-10-CM

## 2019-11-11 DIAGNOSIS — I49.5 SSS (SICK SINUS SYNDROME) (HCC): ICD-10-CM

## 2019-11-11 DIAGNOSIS — G30.1 LATE ONSET ALZHEIMER'S DISEASE WITHOUT BEHAVIORAL DISTURBANCE (HCC): ICD-10-CM

## 2019-11-11 DIAGNOSIS — R07.89 CHEST WALL PAIN: Primary | ICD-10-CM

## 2019-11-11 DIAGNOSIS — F02.80 LATE ONSET ALZHEIMER'S DISEASE WITHOUT BEHAVIORAL DISTURBANCE (HCC): ICD-10-CM

## 2019-11-11 LAB
ALBUMIN SERPL-MCNC: 4 G/DL (ref 3.5–5.2)
ALBUMIN/GLOB SERPL: 1.6 G/DL
ALP SERPL-CCNC: 121 U/L (ref 39–117)
ALT SERPL W P-5'-P-CCNC: 16 U/L (ref 1–33)
ANION GAP SERPL CALCULATED.3IONS-SCNC: 14 MMOL/L (ref 5–15)
AST SERPL-CCNC: 24 U/L (ref 1–32)
BASOPHILS # BLD AUTO: 0.06 10*3/MM3 (ref 0–0.2)
BASOPHILS NFR BLD AUTO: 1 % (ref 0–1.5)
BILIRUB SERPL-MCNC: 0.2 MG/DL (ref 0.2–1.2)
BUN BLD-MCNC: 36 MG/DL (ref 8–23)
BUN/CREAT SERPL: 24.7 (ref 7–25)
CALCIUM SPEC-SCNC: 8.9 MG/DL (ref 8.6–10.5)
CHLORIDE SERPL-SCNC: 97 MMOL/L (ref 98–107)
CO2 SERPL-SCNC: 24 MMOL/L (ref 22–29)
CREAT BLD-MCNC: 1.46 MG/DL (ref 0.57–1)
DEPRECATED RDW RBC AUTO: 49.1 FL (ref 37–54)
EOSINOPHIL # BLD AUTO: 0.34 10*3/MM3 (ref 0–0.4)
EOSINOPHIL NFR BLD AUTO: 5.6 % (ref 0.3–6.2)
ERYTHROCYTE [DISTWIDTH] IN BLOOD BY AUTOMATED COUNT: 14.8 % (ref 12.3–15.4)
GFR SERPL CREATININE-BSD FRML MDRD: 34 ML/MIN/1.73
GLOBULIN UR ELPH-MCNC: 2.5 GM/DL
GLUCOSE BLD-MCNC: 180 MG/DL (ref 65–99)
HCT VFR BLD AUTO: 39.2 % (ref 34–46.6)
HGB BLD-MCNC: 12.4 G/DL (ref 12–15.9)
HOLD SPECIMEN: NORMAL
HOLD SPECIMEN: NORMAL
IMM GRANULOCYTES # BLD AUTO: 0.02 10*3/MM3 (ref 0–0.05)
IMM GRANULOCYTES NFR BLD AUTO: 0.3 % (ref 0–0.5)
LIPASE SERPL-CCNC: 33 U/L (ref 13–60)
LYMPHOCYTES # BLD AUTO: 1.35 10*3/MM3 (ref 0.7–3.1)
LYMPHOCYTES NFR BLD AUTO: 22.1 % (ref 19.6–45.3)
MCH RBC QN AUTO: 28.4 PG (ref 26.6–33)
MCHC RBC AUTO-ENTMCNC: 31.6 G/DL (ref 31.5–35.7)
MCV RBC AUTO: 89.9 FL (ref 79–97)
MONOCYTES # BLD AUTO: 0.7 10*3/MM3 (ref 0.1–0.9)
MONOCYTES NFR BLD AUTO: 11.5 % (ref 5–12)
NEUTROPHILS # BLD AUTO: 3.64 10*3/MM3 (ref 1.7–7)
NEUTROPHILS NFR BLD AUTO: 59.5 % (ref 42.7–76)
NRBC BLD AUTO-RTO: 0 /100 WBC (ref 0–0.2)
NT-PROBNP SERPL-MCNC: 335.1 PG/ML (ref 5–1800)
PLATELET # BLD AUTO: 229 10*3/MM3 (ref 140–450)
PMV BLD AUTO: 11.8 FL (ref 6–12)
POTASSIUM BLD-SCNC: 4.3 MMOL/L (ref 3.5–5.2)
PROT SERPL-MCNC: 6.5 G/DL (ref 6–8.5)
RBC # BLD AUTO: 4.36 10*6/MM3 (ref 3.77–5.28)
SODIUM BLD-SCNC: 135 MMOL/L (ref 136–145)
TROPONIN T SERPL-MCNC: <0.01 NG/ML (ref 0–0.03)
WBC NRBC COR # BLD: 6.11 10*3/MM3 (ref 3.4–10.8)
WHOLE BLOOD HOLD SPECIMEN: NORMAL
WHOLE BLOOD HOLD SPECIMEN: NORMAL

## 2019-11-11 PROCEDURE — 83690 ASSAY OF LIPASE: CPT | Performed by: EMERGENCY MEDICINE

## 2019-11-11 PROCEDURE — 99285 EMERGENCY DEPT VISIT HI MDM: CPT

## 2019-11-11 PROCEDURE — 80053 COMPREHEN METABOLIC PANEL: CPT | Performed by: EMERGENCY MEDICINE

## 2019-11-11 PROCEDURE — 83880 ASSAY OF NATRIURETIC PEPTIDE: CPT | Performed by: EMERGENCY MEDICINE

## 2019-11-11 PROCEDURE — 71045 X-RAY EXAM CHEST 1 VIEW: CPT

## 2019-11-11 PROCEDURE — 84484 ASSAY OF TROPONIN QUANT: CPT | Performed by: EMERGENCY MEDICINE

## 2019-11-11 PROCEDURE — 93005 ELECTROCARDIOGRAM TRACING: CPT

## 2019-11-11 PROCEDURE — 93005 ELECTROCARDIOGRAM TRACING: CPT | Performed by: EMERGENCY MEDICINE

## 2019-11-11 PROCEDURE — 85025 COMPLETE CBC W/AUTO DIFF WBC: CPT | Performed by: EMERGENCY MEDICINE

## 2019-11-11 RX ORDER — SODIUM CHLORIDE 0.9 % (FLUSH) 0.9 %
10 SYRINGE (ML) INJECTION AS NEEDED
Status: DISCONTINUED | OUTPATIENT
Start: 2019-11-11 | End: 2019-11-11 | Stop reason: HOSPADM

## 2019-11-11 RX ORDER — LIDOCAINE 50 MG/G
1 PATCH TOPICAL EVERY 24 HOURS
Qty: 30 PATCH | Refills: 0 | Status: SHIPPED | OUTPATIENT
Start: 2019-11-11

## 2019-11-11 RX ORDER — ASPIRIN 81 MG/1
324 TABLET, CHEWABLE ORAL ONCE
Status: DISCONTINUED | OUTPATIENT
Start: 2019-11-11 | End: 2019-11-11 | Stop reason: HOSPADM

## 2019-11-11 RX ORDER — LIDOCAINE 50 MG/G
1 PATCH TOPICAL
Status: DISCONTINUED | OUTPATIENT
Start: 2019-11-11 | End: 2019-11-11 | Stop reason: HOSPADM

## 2019-11-11 RX ADMIN — LIDOCAINE 1 PATCH: 50 PATCH TOPICAL at 20:13

## 2019-11-11 NOTE — ED PROVIDER NOTES
Subjective   Mayr Ramirez is a 84 y.o. [place] [facility] [place] ED with complaints of sudden onset, intermittent, sharp left sided chest pain starting around 1600. She rates the pain as 8/10 at it's worst. She also complains of associated nausea and near-syncope. She denies any diaphoresis, palpitations, vomiting, shortness of breath, or cough. EMS gave the patient 324 mg ASA and 0.4 mg NTG en route. She denies any current pain. She relates that that pain resolved shortly after arriving at the ED. She denies any recent fall or injury. Her son reports that she has a history of chest wall pain. She has a history of HTN, HLD, DM, CAD, atrial fibrillation, CKD, DDD, annd TIA. Her past surgical history includes a coronary stent placement, colon surgery, colostomy, hysterectomy, and atherectomy. Her PCP is Dr. Lundberg. Her cardiologist is Dr. Flores. There are no other acute complaints at this time.        History provided by:  Patient  Chest Pain   Pain location:  L chest  Pain quality: sharp    Pain radiates to:  Does not radiate  Pain severity:  Severe  Onset quality:  Sudden  Duration:  90 minutes  Timing:  Constant  Progression:  Resolved  Chronicity:  New  Relieved by:  Aspirin and nitroglycerin  Worsened by:  Nothing  Associated symptoms: nausea and syncope (near-syncope)    Associated symptoms: no cough, no diaphoresis, no palpitations, no shortness of breath and no vomiting    Risk factors: coronary artery disease, diabetes mellitus, high cholesterol and hypertension    Risk factors: not male and not obese        Review of Systems   Constitutional: Negative for diaphoresis.   Respiratory: Negative for cough and shortness of breath.    Cardiovascular: Positive for chest pain (resolved) and syncope (near-syncope). Negative for palpitations.   Gastrointestinal: Positive for nausea. Negative for vomiting.   All other systems reviewed and are negative.      Past Medical History:  "  Diagnosis Date   • Atrial fibrillation (CMS/Formerly [facility])    • Benign essential hypertension    • CAD (coronary artery disease)    • Carotid artery disease (CMS/[facility])    • CHF (congestive heart failure) (CMS/Formerly [facility])    • Chronic allergic rhinitis    • CKD (chronic kidney disease), stage III (CMS/[facility])    • DDD (degenerative disc disease), cervical    • Dementia (CMS/[facility]ly [facility])    • Diabetes mellitus (CMS/[facility]ly [facility])    • Diverticulosis     WITH PERFORATION    • Hypertension    • Major depressive disorder, single episode, mild (CMS/[facility])    • Memory loss    • Mixed hyperlipidemia    • SSS (sick sinus syndrome) (CMS/Former[facility])    • TIA (transient ischemic attack) 01/2016       Allergies   Allergen Reactions   • Penicillins Other (See Comments)     Pt states \"i don't know\"       Past Surgical History:   Procedure Laterality Date   • ANKLE SURGERY Left 1996   • ATHERECTOMY  1995   • ATHRECTOMY ILIAC, FEMORAL, TIBIAL ARTERY     • BREAST LUMPECTOMY Left 1981   • COLON SURGERY     • COLOSTOMY  1983   • CORONARY STENT PLACEMENT  2001   • FINGER FRACTURE SURGERY Right    • HIP TROCHANTERIC NAILING WITH INTRAMEDULLARY HIP SCREW Left 11/23/2017   • HIP TROCHANTERIC NAILING WITH INTRAMEDULLARY HIP SCREW Right 3/30/2019    Procedure: HIP TROCANTERIC NAILING WITH INTRAMEDULLARY HIP SCREW RIGHT;  Surgeon: Theo Figueroa MD;  Location: [facility] [place];  Service: Orthopedics   • HYSTERECTOMY  1984   • OTHER SURGICAL HISTORY  1984    REANASTAMOSIS        Family History   Problem Relation Age of Onset   • Hypertension Mother    • Coronary artery disease Mother 56        PREMATURE    • Lung cancer Father    • Hypertension Father    • Osteoporosis Sister    • Lung cancer Brother        Social History     Socioeconomic History   • Marital status:      Spouse name: N/A   • Number of children: 2   • Years of education: H.S   • Highest education level: Not on file   Occupational History   • Occupation:      Employer: RETIRED   Tobacco Use   • " Smoking status: Former Smoker     Years: 2.00     Types: Cigarettes     Start date: 1975     Last attempt to quit: 1978     Years since quittin.0   • Smokeless tobacco: Never Used   • Tobacco comment: QUIT DATE 1984   Substance and Sexual Activity   • Alcohol use: No   • Drug use: No   • Sexual activity: No   Social History Narrative    [facility]    Patient drinks 2 cups of caffeine per day.,         Objective   Physical Exam   Constitutional: She is oriented to person, place, and time. She appears well-developed and well-nourished.   HENT:   Head: Normocephalic and atraumatic.   Nose: Nose normal.   Eyes: Conjunctivae are normal. No scleral icterus.   Neck: Normal range of motion. Neck supple.   Cardiovascular: Normal rate, regular rhythm and normal heart sounds.   No murmur heard.  Pulmonary/Chest: Effort normal and breath sounds normal. No respiratory distress. She exhibits tenderness.   Left anterior chest wall tenderness. No external signs of injury. Normal thoracic expansion. Lungs clear to auscultation.   Abdominal: Soft. She exhibits no distension.   Musculoskeletal: Normal range of motion.   Neurological: She is alert and oriented to person, place, and time.   Skin: Skin is warm and dry.   Psychiatric: She has a normal mood and affect. Her behavior is normal.   Nursing note and vitals reviewed.      Procedures         ED Course  ED Course as of  Cardiac markers reviewed, within normal limits.  Chronic kidney disease stable.  Her chest wall is tender to palpation and reproduces her symptoms.  She does have a pre-existing history of chest wall pain.  We will treat this as chest wall pain, with close follow-up with her primary care provider and cardiology.  Patient and POA verbalized understanding and are agreeable to plan.  [TG]   1936 At bedside re-evaluating the patient and updating her and her son on labs/imaging. -TSG   [NP]      ED Course User Index  [NP] Doris Currie  [TG] Triston Burton PA-C      Recent Results (from the past 24 hour(s))   Troponin    Collection Time: 11/11/19  5:43 PM   Result Value Ref Range    Troponin T <0.010 0.000 - 0.030 ng/mL   Comprehensive Metabolic Panel    Collection Time: 11/11/19  5:43 PM   Result Value Ref Range    Glucose 180 (H) 65 - 99 mg/dL    BUN 36 (H) 8 - 23 mg/dL    Creatinine 1.46 (H) 0.57 - 1.00 mg/dL    Sodium 135 (L) 136 - 145 mmol/L    Potassium 4.3 3.5 - 5.2 mmol/L    Chloride 97 (L) 98 - 107 mmol/L    CO2 24.0 22.0 - 29.0 mmol/L    Calcium 8.9 8.6 - 10.5 mg/dL    Total Protein 6.5 6.0 - 8.5 g/dL    Albumin 4.00 3.50 - 5.20 g/dL    ALT (SGPT) 16 1 - 33 U/L    AST (SGOT) 24 1 - 32 U/L    Alkaline Phosphatase 121 (H) 39 - 117 U/L    Total Bilirubin 0.2 0.2 - 1.2 mg/dL    eGFR Non African Amer 34 (L) >60 mL/min/1.73    Globulin 2.5 gm/dL    A/G Ratio 1.6 g/dL    BUN/Creatinine Ratio 24.7 7.0 - 25.0    Anion Gap 14.0 5.0 - 15.0 mmol/L   Lipase    Collection Time: 11/11/19  5:43 PM   Result Value Ref Range    Lipase 33 13 - 60 U/L   BNP    Collection Time: 11/11/19  5:43 PM   Result Value Ref Range    proBNP 335.1 5.0-1,800.0 pg/mL   Light Blue Top    Collection Time: 11/11/19  5:43 PM   Result Value Ref Range    Extra Tube hold for add-on    Green Top (Gel)    Collection Time: 11/11/19  5:43 PM   Result Value Ref Range    Extra Tube Hold for add-ons.    Lavender Top    Collection Time: 11/11/19  5:43 PM   Result Value Ref Range    Extra Tube hold for add-on    Gold Top - SST    Collection Time: 11/11/19  5:43 PM   Result Value Ref Range    Extra Tube Hold for add-ons.    CBC Auto Differential    Collection Time: 11/11/19  5:43 PM   Result Value Ref Range    WBC 6.11 3.40 - 10.80 10*3/mm3    RBC 4.36 3.77 - 5.28 10*6/mm3    Hemoglobin 12.4 12.0 - 15.9 g/dL    Hematocrit 39.2 34.0 - 46.6 %    MCV 89.9 79.0 - 97.0 fL    MCH 28.4 26.6 - 33.0 pg    MCHC 31.6 31.5 - 35.7 g/dL    RDW  14.8 12.3 - 15.4 %    RDW-SD 49.1 37.0 - 54.0 fl    MPV 11.8 6.0 - 12.0 fL    Platelets 229 140 - 450 10*3/mm3    Neutrophil % 59.5 42.7 - 76.0 %    Lymphocyte % 22.1 19.6 - 45.3 %    Monocyte % 11.5 5.0 - 12.0 %    Eosinophil % 5.6 0.3 - 6.2 %    Basophil % 1.0 0.0 - 1.5 %    Immature Grans % 0.3 0.0 - 0.5 %    Neutrophils, Absolute 3.64 1.70 - 7.00 10*3/mm3    Lymphocytes, Absolute 1.35 0.70 - 3.10 10*3/mm3    Monocytes, Absolute 0.70 0.10 - 0.90 10*3/mm3    Eosinophils, Absolute 0.34 0.00 - 0.40 10*3/mm3    Basophils, Absolute 0.06 0.00 - 0.20 10*3/mm3    Immature Grans, Absolute 0.02 0.00 - 0.05 10*3/mm3    nRBC 0.0 0.0 - 0.2 /100 WBC     Note: In addition to lab results from this visit, the labs listed above may include labs taken at another facility or during a different encounter within the last 24 hours. Please correlate lab times with ED admission and discharge times for further clarification of the services performed during this visit.    XR Chest 1 View   Preliminary Result   1.  Chronic appearing lung changes without evidence for active airspace   disease.   2.  Moderate-to-large hiatal hernia with an air-fluid level suggested.       D:  11/11/2019   E:  11/11/2019                    Vitals:    11/11/19 1800 11/11/19 1830 11/11/19 1900 11/11/19 1930   BP: 163/79 162/72 179/74 175/78   BP Location:       Patient Position:       Pulse: 56 57 55 57   Resp:       Temp:       TempSrc:       SpO2: 97% 96% 98% 96%   Weight:       Height:         Medications - No data to display  ECG/EMG Results (last 24 hours)     Procedure Component Value Units Date/Time    ECG 12 Lead [[id number]] Collected:  11/11/19 1728     Updated:  11/11/19 1851        ECG 12 Lead                               MDM  Number of Diagnoses or Management Options  Chest wall pain: new and requires workup  Coronary artery disease involving native coronary artery of native heart with angina pectoris (CMS/HCC): established and improving  Late onset  Alzheimer's disease without behavioral disturbance (CMS/HCC): established and improving  Paroxysmal atrial fibrillation (CMS/HCC): established and improving  SSS (sick sinus syndrome) (CMS/HCC): established and improving     Amount and/or Complexity of Data Reviewed  Tests in the radiology section of CPT®: reviewed and ordered  Decide to obtain previous medical records or to obtain history from someone other than the patient: yes  Discuss the patient with other providers: yes  Independent visualization of images, tracings, or specimens: yes    Risk of Complications, Morbidity, and/or Mortality  Presenting problems: high  Diagnostic procedures: moderate  Management options: high    Patient Progress  Patient progress: stable      Final diagnoses:   Chest wall pain   Late onset Alzheimer's disease without behavioral disturbance (CMS/HCC)   SSS (sick sinus syndrome) (CMS/HCC)   Paroxysmal atrial fibrillation (CMS/HCC)   Coronary artery disease involving native coronary artery of native heart with angina pectoris (CMS/HCC)       Documentation assistance provided by drew Currie.  Information recorded by the scribe was done at my direction and has been verified and validated by me.           Doris Currie  11/11/19 1936       Triston Burton PA-C  11/11/19 1276

## 2019-11-12 ENCOUNTER — TELEPHONE (OUTPATIENT)
Dept: INTERNAL MEDICINE | Facility: CLINIC | Age: 84
End: 2019-11-12

## 2019-11-12 NOTE — TELEPHONE ENCOUNTER
Lisbeth with the [facility] called to let office know pt went to ER for chest wall pain.  She said ER said nothing cardiac and treated it as chest wall pain and gave pt Lidoderm patches for pain. She said pt was instructed to follow up with PCP.  Pt is planning on keeping her appt on 11/19/2019.

## 2019-11-12 NOTE — DISCHARGE INSTRUCTIONS
Warm compresses as needed for discomfort.  Tylenol as directed for pain.  Lidoderm patches as directed/as needed for pain.  Follow-up with Dr. Lundberg for recheck in 1 to 2 days.  Return to the emergency department immediately if any change or worsening of symptoms.

## 2019-11-19 ENCOUNTER — OFFICE VISIT (OUTPATIENT)
Dept: INTERNAL MEDICINE | Facility: CLINIC | Age: 84
End: 2019-11-19

## 2019-11-19 VITALS
DIASTOLIC BLOOD PRESSURE: 78 MMHG | BODY MASS INDEX: 20.39 KG/M2 | HEART RATE: 60 BPM | HEIGHT: 61 IN | SYSTOLIC BLOOD PRESSURE: 152 MMHG | WEIGHT: 108 LBS

## 2019-11-19 DIAGNOSIS — I48.0 PAROXYSMAL ATRIAL FIBRILLATION (HCC): ICD-10-CM

## 2019-11-19 DIAGNOSIS — I25.119 CORONARY ARTERY DISEASE INVOLVING NATIVE CORONARY ARTERY OF NATIVE HEART WITH ANGINA PECTORIS (HCC): ICD-10-CM

## 2019-11-19 DIAGNOSIS — F32.0 MAJOR DEPRESSIVE DISORDER, SINGLE EPISODE, MILD (HCC): ICD-10-CM

## 2019-11-19 DIAGNOSIS — E11.42 DIABETIC POLYNEUROPATHY ASSOCIATED WITH TYPE 2 DIABETES MELLITUS (HCC): ICD-10-CM

## 2019-11-19 DIAGNOSIS — E78.2 MIXED HYPERLIPIDEMIA: ICD-10-CM

## 2019-11-19 DIAGNOSIS — I10 ESSENTIAL HYPERTENSION: ICD-10-CM

## 2019-11-19 DIAGNOSIS — I50.32 CHRONIC DIASTOLIC CONGESTIVE HEART FAILURE (HCC): ICD-10-CM

## 2019-11-19 DIAGNOSIS — E11.21 DIABETIC NEPHROPATHY ASSOCIATED WITH TYPE 2 DIABETES MELLITUS (HCC): ICD-10-CM

## 2019-11-19 DIAGNOSIS — F02.80 LATE ONSET ALZHEIMER'S DISEASE WITHOUT BEHAVIORAL DISTURBANCE (HCC): ICD-10-CM

## 2019-11-19 DIAGNOSIS — Z00.00 ANNUAL PHYSICAL EXAM: Primary | ICD-10-CM

## 2019-11-19 DIAGNOSIS — G30.1 LATE ONSET ALZHEIMER'S DISEASE WITHOUT BEHAVIORAL DISTURBANCE (HCC): ICD-10-CM

## 2019-11-19 LAB — HBA1C MFR BLD: 7.2 %

## 2019-11-19 PROCEDURE — G0439 PPPS, SUBSEQ VISIT: HCPCS | Performed by: INTERNAL MEDICINE

## 2019-11-19 PROCEDURE — 83036 HEMOGLOBIN GLYCOSYLATED A1C: CPT | Performed by: INTERNAL MEDICINE

## 2019-11-19 PROCEDURE — 96160 PT-FOCUSED HLTH RISK ASSMT: CPT | Performed by: INTERNAL MEDICINE

## 2019-11-19 RX ORDER — ALUMINA, MAGNESIA, AND SIMETHICONE 2400; 2400; 240 MG/30ML; MG/30ML; MG/30ML
5 SUSPENSION ORAL EVERY 6 HOURS PRN
COMMUNITY

## 2019-11-19 NOTE — PROGRESS NOTES
QUICK REFERENCE INFORMATION:  The ABCs of the Annual Wellness Visit    Subsequent Medicare Wellness Visit    HEALTH RISK ASSESSMENT    1934    Recent Hospitalizations:  Recently treated at the following:  [facility].        Current Medical Providers:  Patient Care Team:  Nirmal Lundberg MD as PCP - General (Internal Medicine)        Smoking Status:  Social History     Tobacco Use   Smoking Status Former Smoker   • Years: 2.00   • Types: Cigarettes   • Start date: 1975   • Last attempt to quit: 1978   • Years since quittin.0   Smokeless Tobacco Never Used   Tobacco Comment    QUIT DATE 1984       Alcohol Consumption:  Social History     Substance and Sexual Activity   Alcohol Use No       Depression Screen:   PHQ-2/PHQ-9 Depression Screening 2019   Little interest or pleasure in doing things 1   Feeling down, depressed, or hopeless 0   Total Score 1       Health Habits and Functional and Cognitive Screening:  Functional & Cognitive Status 2019   Do you have difficulty preparing food and eating? No   Do you have difficulty bathing yourself, getting dressed or grooming yourself? Yes   Do you have difficulty using the toilet? No   Do you have difficulty moving around from place to place? Yes   Do you have trouble with steps or getting out of a bed or a chair? Yes   Current Diet Well Balanced Diet   Dental Exam Up to date   Eye Exam Up to date   Exercise (times per week) 0 times per week   Current Exercise Activities Include None   Do you need help using the phone?  No   Are you deaf or do you have serious difficulty hearing?  No   Do you need help with transportation? Yes   Do you need help shopping? No   Do you need help preparing meals?  No   Do you need help with housework?  No   Do you need help with laundry? Yes   Do you need help taking your medications? Yes   Do you need help managing money? No   Do you ever drive or ride in a car without wearing a seat  belt? No   Have you felt unusual stress, anger or loneliness in the last month? No   Who do you live with? Community   If you need help, do you have trouble finding someone available to you? No   Have you been bothered in the last four weeks by sexual problems? No       Fall Risk Screen:  MIRIAM Fall Risk Assessment was completed, and patient is at HIGH risk for falls. Assessment completed on:11/19/2019    ACE III MINI        Does the patient have evidence of cognitive impairment? Yes    Aspirin use counseling: Taking ASA appropriately as indicated    Recent Lab Results:  CMP:  Lab Results   Component Value Date     (H) 08/29/2019    BUN 36 (H) 11/11/2019    CREATININE 1.46 (H) 11/11/2019    EGFRIFNONA 34 (L) 11/11/2019    EGFRIFAFRI 53 (L) 08/29/2019    BCR 24.7 11/11/2019     (L) 11/11/2019    K 4.3 11/11/2019    CO2 24.0 11/11/2019    CALCIUM 8.9 11/11/2019    PROTENTOTREF 6.8 08/29/2019    ALBUMIN 4.00 11/11/2019    LABGLOBREF 2.5 08/29/2019    LABIL2 1.7 08/29/2019    BILITOT 0.2 11/11/2019    ALKPHOS 121 (H) 11/11/2019    AST 24 11/11/2019    ALT 16 11/11/2019     HbA1c:  Lab Results   Component Value Date    HGBA1C 7.2 (H) 11/19/2019    HGBA1C 7.30 (H) 08/29/2019     Microalbumin:  No results found for: MICROALBUR, POCMALB, POCCREAT  Lipid Panel  Lab Results   Component Value Date    CHOL 252 (H) 01/15/2019    TRIG 202 (H) 01/15/2019    HDL 67 (H) 01/15/2019     (H) 01/15/2019    AST 24 11/11/2019    ALT 16 11/11/2019       Visual Acuity:  No exam data present    Age-appropriate Screening Schedule:  Refer to the list below for future screening recommendations based on patient's age, sex and/or medical conditions. Orders for these recommended tests are listed in the plan section. The patient has been provided with a written plan.    Health Maintenance   Topic Date Due   • URINE MICROALBUMIN  1934   • TDAP/TD VACCINES (1 - Tdap) 12/15/1953   • ZOSTER VACCINE (1 of 2) 12/15/1984   •  DIABETIC EYE EXAM  07/28/2017   • LIPID PANEL  01/15/2020   • HEMOGLOBIN A1C  05/19/2020   • INFLUENZA VACCINE  Completed   • PNEUMOCOCCAL VACCINES (65+ LOW/MEDIUM RISK)  Completed        Subjective   History of Present Illness    Mary Ramirez is a 84 y.o. female who presents for a Subsequent Wellness Visit.    CHRONIC CONDITIONS    The following portions of the patient's history were reviewed and updated as appropriate: allergies, current medications, past family history, past medical history, past social history, past surgical history and problem list.    Outpatient Medications Prior to Visit   Medication Sig Dispense Refill   • acetaminophen (TYLENOL) 500 MG tablet Take 500 mg by mouth Every 6 (Six) Hours As Needed for Mild Pain . Take one tablet twice a day for leg pain     • aluminum-magnesium hydroxide-simethicone (MAALOX MAX) 400-400-40 MG/5ML suspension Take 5 mL by mouth Every 6 (Six) Hours As Needed for Indigestion or Heartburn.     • Amino Acids-Protein Hydrolys (PRO-STAT AWC PO) Take 1 dose by mouth 2 (Two) Times a Day.     • amiodarone (PACERONE) 200 MG tablet Take 1 tablet by mouth Daily.     • amLODIPine (NORVASC) 10 MG tablet Take 1 tablet by mouth Every Night. 30 tablet 5   • aspirin  MG EC tablet Take 1 tablet by mouth Daily.     • bisacodyl (DULCOLAX) 5 MG EC tablet Take 1 tablet by mouth Daily As Needed for Constipation.     • citalopram (CeleXA) 20 MG tablet Take 20 mg by mouth Daily.     • CloNIDine (CATAPRES) 0.2 MG tablet Take 0.2 mg by mouth 2 (Two) Times a Day.     • collagenase 250 UNIT/GM ointment Apply  topically to the appropriate area as directed Daily.     • docusate sodium (COLACE) 100 MG capsule Take 100 mg by mouth 2 (Two) Times a Day.     • donepezil (ARICEPT) 5 MG tablet Take 5 mg by mouth Every Night.     • fluticasone (FLONASE) 50 MCG/ACT nasal spray 2 sprays into the nostril(s) as directed by provider Daily.     • insulin aspart (novoLOG FLEXPEN) 100 UNIT/ML solution  pen-injector sc pen Inject 2-5 Units under the skin into the appropriate area as directed 4 (Four) Times a Day.     • isosorbide mononitrate (IMDUR) 60 MG 24 hr tablet Take 60 mg by mouth Daily.     • lidocaine (LIDODERM) 5 % Place 1 patch on the skin as directed by provider Daily. Remove & Discard patch within 12 hours or as directed by MD 30 patch 0   • metFORMIN ER (GLUCOPHAGE-XR) 500 MG 24 hr tablet Take 1 tablet by mouth Daily With Breakfast. 30 tablet 5   • Multiple Vitamin (MULTIVITAMINS PO) Take 1 tablet by mouth Daily.     • ondansetron (ZOFRAN) 4 MG tablet Take 4 mg by mouth Every 8 (Eight) Hours As Needed for Nausea or Vomiting.     • polyethylene glycol (MIRALAX) pack packet Take 17 g by mouth Daily. (Patient taking differently: Take 17 g by mouth Daily As Needed.)       No facility-administered medications prior to visit.        Patient Active Problem List   Diagnosis   • Fall   • Fracture, intertrochanteric, left femur (CMS/HCC)   • Essential hypertension   • Coronary artery disease involving native coronary artery of native heart with angina pectoris (CMS/HCC)   • Dementia (CMS/HCC)   • Paroxysmal atrial fibrillation (CMS/HCC)   • Anticoagulated   • SSS (sick sinus syndrome) (CMS/HCC)   • Chronic diastolic congestive heart failure (CMS/HCC)   • DEANGELO (acute kidney injury) (CMS/HCC)   • Benign essential hypertension   • CKD (chronic kidney disease), stage III (CMS/HCC)   • Mixed hyperlipidemia   • Major depressive disorder, single episode, mild (CMS/HCC)   • Chronic allergic rhinitis   • Memory loss   • Bradycardia   • At risk for falls   • Closed fracture of femur (CMS/HCC)   • Coronary arteriosclerosis after percutaneous transluminal coronary angioplasty (PTCA)   • Diabetic polyneuropathy (CMS/HCC)   • Diverticular disease of colon   • Foot pain   • GERD (gastroesophageal reflux disease)   • Peripheral artery insufficiency (CMS/HCC)   • Annual physical exam   • Vitamin D deficiency   • Renal disorder    • Closed fracture of right hip (CMS/[facility])   • Postoperative anemia due to acute blood loss   • Late onset Alzheimer's disease without behavioral disturbance (CMS/[facility])       Advance Care Planning:  Patient has an advance directive - a copy has been provided and is visible in patient header    Identification of Risk Factors:  Risk factors include: Cardiovascular risk.    Review of Systems   Constitutional: Negative for chills, fatigue and fever.   HENT: Negative for congestion, ear pain and sinus pressure.    Respiratory: Negative for cough, chest tightness, shortness of breath and wheezing.    Cardiovascular: Negative for chest pain and palpitations.   Gastrointestinal: Negative for abdominal pain, blood in stool and constipation.   Skin: Negative for color change.   Allergic/Immunologic: Negative for environmental allergies.   Neurological: Negative for dizziness, speech difficulty and headaches.   Psychiatric/Behavioral: Negative for confusion. The patient is not nervous/anxious.        Compared to one year ago, the patient feels her physical health is the same.  Compared to one year ago, the patient feels her mental health is the same.    Objective     Physical Exam   Constitutional: She appears well-developed and well-nourished.   HENT:   Head: Normocephalic and atraumatic.   Neck: Neck supple. No thyromegaly present.   Cardiovascular: Normal rate, regular rhythm, normal heart sounds and intact distal pulses. Exam reveals no gallop and no friction rub.   No murmur heard.  Pulmonary/Chest: Effort normal and breath sounds normal.    Mary had a diabetic foot exam performed today.   During the foot exam she had a monofilament test performed (Reduced sensation to monofilament in both feet).    Neurological Sensory Findings -  Unaltered sharp/dull right ankle/foot discrimination and unaltered sharp/dull left ankle/foot discrimination. No right ankle/foot altered proprioception and no left ankle/foot altered  "proprioception  Vascular Status -  Her right foot exhibits normal foot vasculature  and no edema. Her left foot exhibits normal foot vasculature  and no edema.  Skin Integrity  -  Her right foot skin is intact..  Vitals reviewed.       Procedures     Vitals:    11/19/19 1013   BP: 152/78   BP Location: Left arm   Patient Position: Sitting   Cuff Size: Adult   Pulse: 60   Weight: 49 kg (108 lb)   Height: 154.9 cm (60.98\")   PainSc:   6   PainLoc: Leg  Comment: bilateral       Patient's Body mass index is 20.42 kg/m². BMI is within normal parameters. No follow-up required..      Assessment/Plan   Problem List Items Addressed This Visit        Cardiovascular and Mediastinum    Essential hypertension    Relevant Medications    CloNIDine (CATAPRES) 0.2 MG tablet    amLODIPine (NORVASC) 10 MG tablet    Coronary artery disease involving native coronary artery of native heart with angina pectoris (CMS/HCC)    Overview     · Cardiac cath with PCI data deficit  · Echo (9/1/18): LVEF 60%. LVH. Left atrial dilatation. Aortic calcification with no stenosis.         Relevant Medications    isosorbide mononitrate (IMDUR) 60 MG 24 hr tablet    amLODIPine (NORVASC) 10 MG tablet    Paroxysmal atrial fibrillation (CMS/HCC)    Overview     · Chads Vasc 6 (age > 75, female, CAD, DM, HTN)   · Rhythm control strategy with amiodarone         Relevant Medications    isosorbide mononitrate (IMDUR) 60 MG 24 hr tablet    amLODIPine (NORVASC) 10 MG tablet    Chronic diastolic congestive heart failure (CMS/HCC)    Overview     · Echo (9/1/18): LVEF 60%. LVH. Left atrial dilatation. Aortic calcification with no stenosis.         Relevant Medications    isosorbide mononitrate (IMDUR) 60 MG 24 hr tablet    amLODIPine (NORVASC) 10 MG tablet    Mixed hyperlipidemia       Endocrine    Diabetic polyneuropathy (CMS/HCC)    Relevant Medications    insulin aspart (novoLOG FLEXPEN) 100 UNIT/ML solution pen-injector sc pen    metFORMIN ER (GLUCOPHAGE-XR) " 500 MG 24 hr tablet    Other Relevant Orders    POC Glycosylated Hemoglobin (Hb A1C) (Completed)       Nervous and Auditory    Late onset Alzheimer's disease without behavioral disturbance (CMS/HCC)    Relevant Medications    citalopram (CeleXA) 20 MG tablet    donepezil (ARICEPT) 5 MG tablet       Other    Major depressive disorder, single episode, mild (CMS/HCC)    Relevant Medications    citalopram (CeleXA) 20 MG tablet    donepezil (ARICEPT) 5 MG tablet    Annual physical exam - Primary    Overview     PATIENT ENCOUNTER STATUS            Other Visit Diagnoses     Diabetic nephropathy associated with type 2 diabetes mellitus (CMS/HCC)            Patient Self-Management and Personalized Health Advice  The patient has been provided with information about: fall prevention and preventive services including:   · Annual Wellness Visit (AWV).    Outpatient Encounter Medications as of 11/19/2019   Medication Sig Dispense Refill   • acetaminophen (TYLENOL) 500 MG tablet Take 500 mg by mouth Every 6 (Six) Hours As Needed for Mild Pain . Take one tablet twice a day for leg pain     • aluminum-magnesium hydroxide-simethicone (MAALOX MAX) 400-400-40 MG/5ML suspension Take 5 mL by mouth Every 6 (Six) Hours As Needed for Indigestion or Heartburn.     • Amino Acids-Protein Hydrolys (PRO-STAT AWC PO) Take 1 dose by mouth 2 (Two) Times a Day.     • amiodarone (PACERONE) 200 MG tablet Take 1 tablet by mouth Daily.     • amLODIPine (NORVASC) 10 MG tablet Take 1 tablet by mouth Every Night. 30 tablet 5   • aspirin  MG EC tablet Take 1 tablet by mouth Daily.     • bisacodyl (DULCOLAX) 5 MG EC tablet Take 1 tablet by mouth Daily As Needed for Constipation.     • citalopram (CeleXA) 20 MG tablet Take 20 mg by mouth Daily.     • CloNIDine (CATAPRES) 0.2 MG tablet Take 0.2 mg by mouth 2 (Two) Times a Day.     • collagenase 250 UNIT/GM ointment Apply  topically to the appropriate area as directed Daily.     • docusate sodium (COLACE)  100 MG capsule Take 100 mg by mouth 2 (Two) Times a Day.     • donepezil (ARICEPT) 5 MG tablet Take 5 mg by mouth Every Night.     • fluticasone (FLONASE) 50 MCG/ACT nasal spray 2 sprays into the nostril(s) as directed by provider Daily.     • insulin aspart (novoLOG FLEXPEN) 100 UNIT/ML solution pen-injector sc pen Inject 2-5 Units under the skin into the appropriate area as directed 4 (Four) Times a Day.     • isosorbide mononitrate (IMDUR) 60 MG 24 hr tablet Take 60 mg by mouth Daily.     • lidocaine (LIDODERM) 5 % Place 1 patch on the skin as directed by provider Daily. Remove & Discard patch within 12 hours or as directed by MD 30 patch 0   • metFORMIN ER (GLUCOPHAGE-XR) 500 MG 24 hr tablet Take 1 tablet by mouth Daily With Breakfast. 30 tablet 5   • Multiple Vitamin (MULTIVITAMINS PO) Take 1 tablet by mouth Daily.     • ondansetron (ZOFRAN) 4 MG tablet Take 4 mg by mouth Every 8 (Eight) Hours As Needed for Nausea or Vomiting.     • polyethylene glycol (MIRALAX) pack packet Take 17 g by mouth Daily. (Patient taking differently: Take 17 g by mouth Daily As Needed.)       No facility-administered encounter medications on file as of 11/19/2019.        Reviewed use of high risk medication in the elderly: yes  Reviewed for potential of harmful drug interactions in the elderly: yes    Follow Up:  Return in about 6 months (around 5/19/2020) for follow up.     There are no Patient Instructions on file for this visit.    An After Visit Summary and PPPS with all of these plans were given to the patient.

## 2019-12-02 ENCOUNTER — HOSPITAL ENCOUNTER (EMERGENCY)
Facility: HOSPITAL | Age: 84
Discharge: HOME | End: 2019-12-02
Attending: EMERGENCY MEDICINE | Admitting: EMERGENCY MEDICINE

## 2019-12-02 ENCOUNTER — APPOINTMENT (OUTPATIENT)
Dept: GENERAL RADIOLOGY | Facility: HOSPITAL | Age: 84
End: 2019-12-02

## 2019-12-02 ENCOUNTER — APPOINTMENT (OUTPATIENT)
Dept: CT IMAGING | Facility: HOSPITAL | Age: 84
End: 2019-12-02

## 2019-12-02 VITALS
RESPIRATION RATE: 16 BRPM | HEART RATE: 59 BPM | SYSTOLIC BLOOD PRESSURE: 122 MMHG | HEIGHT: 60 IN | OXYGEN SATURATION: 97 % | BODY MASS INDEX: 21.6 KG/M2 | WEIGHT: 110 LBS | TEMPERATURE: 98 F | DIASTOLIC BLOOD PRESSURE: 71 MMHG
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
## 2019-12-02 DIAGNOSIS — S70.02XA HEMATOMA OF LEFT HIP, INITIAL ENCOUNTER: ICD-10-CM
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
## 2019-12-02 DIAGNOSIS — M51.36 DDD (DEGENERATIVE DISC DISEASE), LUMBAR: ICD-10-CM

## 2019-12-02 DIAGNOSIS — Z74.09 DECREASED AMBULATION STATUS: ICD-10-CM

## 2019-12-02 LAB
ALBUMIN SERPL-MCNC: 4 G/DL (ref 3.5–5.2)
ALBUMIN/GLOB SERPL: 1.3 G/DL
ALP SERPL-CCNC: 117 U/L (ref 39–117)
ALT SERPL W P-5'-P-CCNC: 15 U/L (ref 1–33)
ANION GAP SERPL CALCULATED.3IONS-SCNC: 13 MMOL/L (ref 5–15)
AST SERPL-CCNC: 20 U/L (ref 1–32)
BASOPHILS # BLD AUTO: 0.05 10*3/MM3 (ref 0–0.2)
BASOPHILS NFR BLD AUTO: 0.7 % (ref 0–1.5)
BILIRUB SERPL-MCNC: 0.3 MG/DL (ref 0.2–1.2)
BUN BLD-MCNC: 29 MG/DL (ref 8–23)
BUN/CREAT SERPL: 26.6 (ref 7–25)
CALCIUM SPEC-SCNC: 9.4 MG/DL (ref 8.6–10.5)
CHLORIDE SERPL-SCNC: 97 MMOL/L (ref 98–107)
CK SERPL-CCNC: 51 U/L (ref 20–180)
CO2 SERPL-SCNC: 28 MMOL/L (ref 22–29)
CREAT BLD-MCNC: 1.09 MG/DL (ref 0.57–1)
DEPRECATED RDW RBC AUTO: 48.3 FL (ref 37–54)
EOSINOPHIL # BLD AUTO: 0.28 10*3/MM3 (ref 0–0.4)
EOSINOPHIL NFR BLD AUTO: 3.9 % (ref 0.3–6.2)
ERYTHROCYTE [DISTWIDTH] IN BLOOD BY AUTOMATED COUNT: 14.8 % (ref 12.3–15.4)
GFR SERPL CREATININE-BSD FRML MDRD: 48 ML/MIN/1.73
GLOBULIN UR ELPH-MCNC: 3.2 GM/DL
GLUCOSE BLD-MCNC: 184 MG/DL (ref 65–99)
HCT VFR BLD AUTO: 41.8 % (ref 34–46.6)
HGB BLD-MCNC: 13.1 G/DL (ref 12–15.9)
HOLD SPECIMEN: NORMAL
HOLD SPECIMEN: NORMAL
IMM GRANULOCYTES # BLD AUTO: 0.03 10*3/MM3 (ref 0–0.05)
IMM GRANULOCYTES NFR BLD AUTO: 0.4 % (ref 0–0.5)
LYMPHOCYTES # BLD AUTO: 1.33 10*3/MM3 (ref 0.7–3.1)
LYMPHOCYTES NFR BLD AUTO: 18.5 % (ref 19.6–45.3)
MAGNESIUM SERPL-MCNC: 2.2 MG/DL (ref 1.6–2.4)
MCH RBC QN AUTO: 28.1 PG (ref 26.6–33)
MCHC RBC AUTO-ENTMCNC: 31.3 G/DL (ref 31.5–35.7)
MCV RBC AUTO: 89.5 FL (ref 79–97)
MONOCYTES # BLD AUTO: 0.7 10*3/MM3 (ref 0.1–0.9)
MONOCYTES NFR BLD AUTO: 9.7 % (ref 5–12)
NEUTROPHILS # BLD AUTO: 4.79 10*3/MM3 (ref 1.7–7)
NEUTROPHILS NFR BLD AUTO: 66.8 % (ref 42.7–76)
NRBC BLD AUTO-RTO: 0 /100 WBC (ref 0–0.2)
PLATELET # BLD AUTO: 257 10*3/MM3 (ref 140–450)
PMV BLD AUTO: 12.2 FL (ref 6–12)
POTASSIUM BLD-SCNC: 4.1 MMOL/L (ref 3.5–5.2)
PROT SERPL-MCNC: 7.2 G/DL (ref 6–8.5)
RBC # BLD AUTO: 4.67 10*6/MM3 (ref 3.77–5.28)
SODIUM BLD-SCNC: 138 MMOL/L (ref 136–145)
TSH SERPL DL<=0.05 MIU/L-ACNC: 3.58 UIU/ML (ref 0.27–4.2)
WBC NRBC COR # BLD: 7.18 10*3/MM3 (ref 3.4–10.8)
WHOLE BLOOD HOLD SPECIMEN: NORMAL
WHOLE BLOOD HOLD SPECIMEN: NORMAL

## 2019-12-02 PROCEDURE — 72170 X-RAY EXAM OF PELVIS: CPT

## 2019-12-02 PROCEDURE — 83735 ASSAY OF MAGNESIUM: CPT | Performed by: EMERGENCY MEDICINE

## 2019-12-02 PROCEDURE — 93005 ELECTROCARDIOGRAM TRACING: CPT | Performed by: EMERGENCY MEDICINE

## 2019-12-02 PROCEDURE — 99284 EMERGENCY DEPT VISIT MOD MDM: CPT

## 2019-12-02 PROCEDURE — 80053 COMPREHEN METABOLIC PANEL: CPT | Performed by: EMERGENCY MEDICINE

## 2019-12-02 PROCEDURE — 72192 CT PELVIS W/O DYE: CPT

## 2019-12-02 PROCEDURE — 84443 ASSAY THYROID STIM HORMONE: CPT | Performed by: EMERGENCY MEDICINE

## 2019-12-02 PROCEDURE — 85025 COMPLETE CBC W/AUTO DIFF WBC: CPT | Performed by: EMERGENCY MEDICINE

## 2019-12-02 PROCEDURE — 72131 CT LUMBAR SPINE W/O DYE: CPT

## 2019-12-02 PROCEDURE — 82550 ASSAY OF CK (CPK): CPT | Performed by: EMERGENCY MEDICINE

## 2019-12-02 PROCEDURE — 71045 X-RAY EXAM CHEST 1 VIEW: CPT

## 2019-12-02 RX ORDER — ONDANSETRON 2 MG/ML
4 INJECTION INTRAMUSCULAR; INTRAVENOUS ONCE
Status: DISCONTINUED | OUTPATIENT
Start: 2019-12-02 | End: 2019-12-02 | Stop reason: HOSPADM

## 2019-12-02 RX ORDER — HYDROMORPHONE HYDROCHLORIDE 1 MG/ML
0.25 INJECTION, SOLUTION INTRAMUSCULAR; INTRAVENOUS; SUBCUTANEOUS ONCE
Status: DISCONTINUED | OUTPATIENT
Start: 2019-12-02 | End: 2019-12-02 | Stop reason: HOSPADM

## 2019-12-02 RX ORDER — SODIUM CHLORIDE 0.9 % (FLUSH) 0.9 %
10 SYRINGE (ML) INJECTION AS NEEDED
Status: DISCONTINUED | OUTPATIENT
Start: 2019-12-02 | End: 2019-12-02 | Stop reason: HOSPADM

## 2019-12-02 RX ORDER — SODIUM CHLORIDE, SODIUM LACTATE, POTASSIUM CHLORIDE, CALCIUM CHLORIDE 600; 310; 30; 20 MG/100ML; MG/100ML; MG/100ML; MG/100ML
125 INJECTION, SOLUTION INTRAVENOUS CONTINUOUS
Status: DISCONTINUED | OUTPATIENT
Start: 2019-12-02 | End: 2019-12-02 | Stop reason: HOSPADM

## 2019-12-02 NOTE — ED PROVIDER NOTES
Subjective   Mary Ramirez is a 84 year old female presenting to the ED today with complaints of a fall. Her son reports she resides in assisted living at [facility]. He states he was called this morning at 0800 and was told she had fallen last night while trying to get dressed, around 1900. She currently shows some confusion, telling various different places she had fallen, however her son states this is baseline. She states she injured her left hip, left knee, and left ankle however EMS were called this morning due to her left hip pain. Upon EMS arrival to [facility], she states she was placed on a stretcher to get into the ambulance to come to the ED. She currently denies headache, fever, rhinorrhea, cough, diarrhea, constipation, change in appetite, or change in weight. She has a history of diabetes mellitus. Her son states she has had other recent falls.  At baseline she ambulates with a walker. There are no other acute complaints at this time.           History provided by:  Patient and relative  Fall   Mechanism of injury: fall    Injury location:  Pelvis and leg  Pelvic injury location:  L hip  Leg injury location:  L knee and L ankle  Incident location: at [facility] where she resides.  Associated symptoms: no headaches        Review of Systems   Constitutional: Negative for appetite change, fever and unexpected weight change.   HENT: Negative for rhinorrhea.    Respiratory: Negative for cough.    Gastrointestinal: Negative for constipation and diarrhea.   Musculoskeletal:        Positive for left hip pain.   Neurological: Negative for headaches.   Psychiatric/Behavioral: Positive for confusion (no more than baseline per son).   All other systems reviewed and are negative.      Past Medical History:   Diagnosis Date   • Atrial fibrillation (CMS/formerly [facility])    • Benign essential hypertension    • CAD (coronary artery disease)    • Carotid artery disease (CMS/HCC)    • CHF (congestive heart failure) (CMS/HCC)    •  Chronic allergic rhinitis    • CKD (chronic kidney disease), stage III (CMS/Formerly [facility])    • DDD (degenerative disc disease), cervical    • Dementia (CMS/Formerly [facility])    • Diabetes mellitus (CMS/Formerly [facility])    • Diverticulosis     WITH PERFORATION    • Hypertension    • Major depressive disorder, single episode, mild (CMS/Formerly [facility])    • Memory loss    • Mixed hyperlipidemia    • SSS (sick sinus syndrome) (CMS/Formerly [facility])    • TIA (transient ischemic attack) 01/2016       Progress Note from 8/19/19:    PROBLEM LIST:  Coronary artery disease involving native coronary artery of native heart with angina pectoris (CMS/Formerly [facility])               · Cardiac cath with PCI data deficit  · Echo (9/1/18): LVEF 60%. LVH. Left atrial dilatation. Aortic calcification with no stenosis.  · September 2018 myocardial PET: Normal perfusion.           Paroxysmal atrial fibrillation (CMS/Formerly [facility])             · Chads Vasc 6 (age > 75, female, CAD, DM, HTN)   · Rhythm control strategy with amiodarone  · Maintaining rhythm           DEANGELO (acute kidney injury) (CMS/Formerly [facility])     Type 2 diabetes mellitus with complication, with long-term current use of insulin (CMS/Formerly [facility])     Dementia     Chronic diastolic congestive heart failure (CMS/Formerly [facility])             · Echo (9/1/18): LVEF 60%. LVH. Left atrial dilatation. Aortic calcification with no stenosis.            ASSESSMENT:    Diagnosis Plan   1. Coronary artery disease involving native coronary artery of native heart with angina pectoris (CMS/Formerly [facility])      2. Paroxysmal atrial fibrillation (CMS/Formerly [facility])      3. Essential hypertension      4. SSS (sick sinus syndrome) (CMS/Formerly [facility])            PLAN:  Coronary artery disease: Currently stable and asymptomatic.  Continue aspirin and afterload reduction.     Hypertension: Goal less than 160/90 mmHg.  Allowing for permissive hypertension to limit risk of falling with labile blood pressures.  Continue, initial clonidine, amlodipine and Imdur.     Paroxysmal atrial fibrillation and atrial flutter: Continue amiodarone  "200 mg by mouth daily.  We'll need biannual liver function test and thyroid function tests.  Annual chest x-ray.  Continue aspirin for stroke prophylaxis, off Eliquis due to recent multiple mechanical falls.       Allergies   Allergen Reactions   • Penicillins Other (See Comments)     Pt states \"i don't know\"       Past Surgical History:   Procedure Laterality Date   • ANKLE SURGERY Left    • ATHERECTOMY     • ATHRECTOMY ILIAC, FEMORAL, TIBIAL ARTERY     • BREAST LUMPECTOMY Left    • COLON SURGERY     • COLOSTOMY     • CORONARY STENT PLACEMENT     • FINGER FRACTURE SURGERY Right    • HIP TROCHANTERIC NAILING WITH INTRAMEDULLARY HIP SCREW Left 2017   • HIP TROCHANTERIC NAILING WITH INTRAMEDULLARY HIP SCREW Right 3/30/2019    Procedure: HIP TROCANTERIC NAILING WITH INTRAMEDULLARY HIP SCREW RIGHT;  Surgeon: Theo Figueroa MD;  Location: [facility];  Service: Orthopedics   • HYSTERECTOMY     • OTHER SURGICAL HISTORY      REANASTAMOSIS        Family History   Problem Relation Age of Onset   • Hypertension Mother    • Coronary artery disease Mother 56        PREMATURE    • Lung cancer Father    • Hypertension Father    • Osteoporosis Sister    • Lung cancer Brother        Social History     Socioeconomic History   • Marital status:      Spouse name: N/A   • Number of children: 2   • Years of education: H.S   • Highest education level: Not on file   Occupational History   • Occupation:      Employer: RETIRED   Tobacco Use   • Smoking status: Former Smoker     Years: 2.00     Types: Cigarettes     Start date: 1975     Last attempt to quit: 1978     Years since quittin.1   • Smokeless tobacco: Never Used   • Tobacco comment: QUIT DATE 1984   Substance and Sexual Activity   • Alcohol use: No   • Drug use: No   • Sexual activity: No   Social History Narrative    [facility]    Patient drinks 2 cups of caffeine per " day.,         Objective   Physical Exam   Constitutional: She is oriented to person, place, and time. She appears well-developed and well-nourished. No distress.   Alert but disoriented to time but not person. She has some knowledge of place. Her son states this is her baseline mental status. She could answer simple questions simply.   HENT:   Head: Normocephalic and atraumatic.   Right Ear: External ear normal.   Left Ear: External ear normal.   Nose: Nose normal.   Mouth/Throat: Oropharynx is clear and moist.   Uvula midline, normal oropharynx and nasal pharynx.    Eyes: Conjunctivae and EOM are normal. Pupils are equal, round, and reactive to light. Right eye exhibits no discharge. Left eye exhibits no discharge. No scleral icterus.   Neck: Normal range of motion. Neck supple. No JVD present. Carotid bruit is not present.   No adenopathy, JVD, bruits or thyromegaly.    Cardiovascular: Normal rate, regular rhythm, normal heart sounds and intact distal pulses. Exam reveals no gallop and no friction rub.   No murmur heard.  Regular rate and rhythm. No murmurs, rubs, gallops or heaves.    Pulmonary/Chest: Effort normal and breath sounds normal. No stridor. No respiratory distress. She has no wheezes. She has no rales.   Lungs are clear. No wheezes, rales, rhonchi or crackles.   Abdominal: Soft. Bowel sounds are normal. She exhibits no distension and no mass. There is no tenderness. There is no rebound and no guarding.   Thin abdomen. Positive bowel sounds, soft, non tender. No organomegaly or hepatosplenomegaly.   Flanks are without masses or rashes.    Musculoskeletal: She exhibits no edema or tenderness.   No rash. Axilla is without rashes or masses. Non-tender cervical and thoracic spine, with lower lumbar spine tenderness.  Left elbow has fair range of motion. Pelvis has a sheet tie in place and is stable, but patient has pain with rocking. She is able to lift both legs. Left knee, foot, and ankle show fair range  of motion. Right knee, foot, and ankle show fair range of motion.      Lymphadenopathy:     She has no cervical adenopathy.   Neurological: She is alert and oriented to person, place, and time. No cranial nerve deficit or sensory deficit. Coordination normal.   Face symmetric, tongue midline, voice strong, hearing, vision and speech preserved. Sensation intact. Moderate dementia.   Skin: Skin is warm and dry. Capillary refill takes less than 2 seconds. No rash noted. She is not diaphoretic. No erythema.   Skin tear on left elbow that is bandaged.    Psychiatric: She has a normal mood and affect. Her behavior is normal.   Nursing note and vitals reviewed.      Procedures         ED Course     Recent Results (from the past 24 hour(s))   CBC Auto Differential    Collection Time: 12/02/19  9:46 AM   Result Value Ref Range    WBC 7.18 3.40 - 10.80 10*3/mm3    RBC 4.67 3.77 - 5.28 10*6/mm3    Hemoglobin 13.1 12.0 - 15.9 g/dL    Hematocrit 41.8 34.0 - 46.6 %    MCV 89.5 79.0 - 97.0 fL    MCH 28.1 26.6 - 33.0 pg    MCHC 31.3 (L) 31.5 - 35.7 g/dL    RDW 14.8 12.3 - 15.4 %    RDW-SD 48.3 37.0 - 54.0 fl    MPV 12.2 (H) 6.0 - 12.0 fL    Platelets 257 140 - 450 10*3/mm3    Neutrophil % 66.8 42.7 - 76.0 %    Lymphocyte % 18.5 (L) 19.6 - 45.3 %    Monocyte % 9.7 5.0 - 12.0 %    Eosinophil % 3.9 0.3 - 6.2 %    Basophil % 0.7 0.0 - 1.5 %    Immature Grans % 0.4 0.0 - 0.5 %    Neutrophils, Absolute 4.79 1.70 - 7.00 10*3/mm3    Lymphocytes, Absolute 1.33 0.70 - 3.10 10*3/mm3    Monocytes, Absolute 0.70 0.10 - 0.90 10*3/mm3    Eosinophils, Absolute 0.28 0.00 - 0.40 10*3/mm3    Basophils, Absolute 0.05 0.00 - 0.20 10*3/mm3    Immature Grans, Absolute 0.03 0.00 - 0.05 10*3/mm3    nRBC 0.0 0.0 - 0.2 /100 WBC   Lavender Top    Collection Time: 12/02/19  9:46 AM   Result Value Ref Range    Extra Tube hold for add-on    Gold Top - SST    Collection Time: 12/02/19  9:46 AM   Result Value Ref Range    Extra Tube Hold for add-ons.     Comprehensive Metabolic Panel    Collection Time: 12/02/19  9:47 AM   Result Value Ref Range    Glucose 184 (H) 65 - 99 mg/dL    BUN 29 (H) 8 - 23 mg/dL    Creatinine 1.09 (H) 0.57 - 1.00 mg/dL    Sodium 138 136 - 145 mmol/L    Potassium 4.1 3.5 - 5.2 mmol/L    Chloride 97 (L) 98 - 107 mmol/L    CO2 28.0 22.0 - 29.0 mmol/L    Calcium 9.4 8.6 - 10.5 mg/dL    Total Protein 7.2 6.0 - 8.5 g/dL    Albumin 4.00 3.50 - 5.20 g/dL    ALT (SGPT) 15 1 - 33 U/L    AST (SGOT) 20 1 - 32 U/L    Alkaline Phosphatase 117 39 - 117 U/L    Total Bilirubin 0.3 0.2 - 1.2 mg/dL    eGFR Non African Amer 48 (L) >60 mL/min/1.73    Globulin 3.2 gm/dL    A/G Ratio 1.3 g/dL    BUN/Creatinine Ratio 26.6 (H) 7.0 - 25.0    Anion Gap 13.0 5.0 - 15.0 mmol/L   CK    Collection Time: 12/02/19  9:47 AM   Result Value Ref Range    Creatine Kinase 51 20 - 180 U/L   TSH    Collection Time: 12/02/19  9:47 AM   Result Value Ref Range    TSH 3.580 0.270 - 4.200 uIU/mL   Magnesium    Collection Time: 12/02/19  9:47 AM   Result Value Ref Range    Magnesium 2.2 1.6 - 2.4 mg/dL   Light Blue Top    Collection Time: 12/02/19  9:47 AM   Result Value Ref Range    Extra Tube hold for add-on    Green Top (Gel)    Collection Time: 12/02/19  9:47 AM   Result Value Ref Range    Extra Tube Hold for add-ons.      Note: In addition to lab results from this visit, the labs listed above may include labs taken at another facility or during a different encounter within the last 24 hours. Please correlate lab times with ED admission and discharge times for further clarification of the services performed during this visit.    CT Pelvis Without Contrast   Preliminary Result   Extensive soft tissue swelling and hematoma seen in the   subcutaneous tissues overlying the left hip. There is no definite acute   fracture or dislocation seen of the pelvis.       D:  12/02/2019   E:  12/02/2019              CT Lumbar Spine Without Contrast   Preliminary Result   Multilevel degenerative  changes seen with the lumbar spine   most pronounced at the L2-L3 level with no fracture or dislocation   present.       D:  12/02/2019   E:  12/02/2019          XR Chest 1 View   Preliminary Result   Stable chronic changes seen within the lung fields with no   new focal parenchymal opacification present.       D:  12/02/2019   E:  12/02/2019              XR Pelvis 1 or 2 View   Preliminary Result   Osteopenia of the bony structures with some irregularity   identified along the inferior aspect of the pelvic rim on the left which   may be related to prior injury. Clinical correlation is needed. No acute   fracture is seen of either of the proximal femurs or femoral necks.       D:  12/02/2019   E:  12/02/2019                    Vitals:    12/02/19 1030 12/02/19 1100 12/02/19 1130 12/02/19 1230   BP: 140/86 141/69 142/70 122/71   BP Location:       Patient Position:       Pulse: 57 56 57 59   Resp:       Temp:       TempSrc:       SpO2: 97% 97% 97% 97%   Weight:       Height:         Medications - No data to display  ECG/EMG Results (last 24 hours)     Procedure Component Value Units Date/Time    ECG 12 Lead [[id number]] Collected:  12/02/19 0858     Updated:  12/02/19 0918        ECG 12 Lead   Final Result   Test Reason : fall   Blood Pressure : **/** mmHG   Vent. Rate : 061 BPM     Atrial Rate : 061 BPM      P-R Int : 186 ms          QRS Dur : 086 ms       QT Int : 358 ms       P-R-T Axes : 029 -30 068 degrees      QTc Int : 360 ms      Normal sinus rhythm   Left axis deviation   Nonspecific T wave abnormality   Abnormal ECG   When compared with ECG of 11-NOV-2019 17:28,   premature atrial complexes are no longer present   Nonspecific T wave abnormality, worse in Lateral leads   QT has shortened   Confirmed by IRIS JACOBS MD ([id number]) on 12/2/2019 3:34:59 PM      Referred By:  EDMD           Confirmed By:IRIS JACOBS MD                          MDM  Number of Diagnoses or Management Options  DDD (degenerative disc  disease), lumbar:   Decreased ambulation status:   Fall at nursing home, initial encounter:   Hematoma of left hip, initial encounter:   Diagnosis management comments:       I reviewed all available studies at bedside with the patient and her family.  Her CT scan of the pelvis shows no fracture but a large gluteal hematoma think likely is the cause of her pain.    Per the patient's son he tells me she essentially is bed and wheelchair bound.  We discussed admission to the hospital but he think she will have enough help available that she would do fine at her facility and I think this is very reasonable.  Discussed care of this hematoma with local care and over-the-counter pain medication.  I encouraged follow-up with Pat primary care as well as orthopedics.  She is seen Dr. Figueroa the past and is currently seeing Dr. Lundberg.  Her labs also reviewed and are relatively bland.  She will return to the ER if worse in any way.    All are agreeable with the plan       Amount and/or Complexity of Data Reviewed  Clinical lab tests: reviewed  Tests in the radiology section of CPT®: reviewed  Tests in the medicine section of CPT®: reviewed        Final diagnoses:   Fall at nursing home, initial encounter   Decreased ambulation status   Hematoma of left hip, initial encounter   DDD (degenerative disc disease), lumbar       Documentation assistance provided by drew Rico.  Information recorded by the scribdillon was done at my direction and has been verified and validated by me.     Cee Rico  12/02/19 0956       Cee Rico  12/02/19 0944       Shukri Heck MD  12/02/19 [id number]

## 2019-12-02 NOTE — PROGRESS NOTES
Discharge Planning Assessment  [facility]     Patient Name: Mary Ramirez  MRN: [id number]  Today's Date: 12/2/2019    Admit Date: 12/2/2019    Discharge Needs Assessment     Row Name 12/02/19 1226       Living Environment    Lives With  other relative(s) Pt resides at [facility] assisted living    Current Living Arrangements  independent/assisted living facility    Primary Care Provided by  other (see comments) staff    Provides Primary Care For  no one, unable/limited ability to care for self    Family Caregiver if Needed  child(josef), adult    Family Caregiver Names  Son- Josh    Quality of Family Relationships  helpful;involved;supportive    Able to Return to Prior Arrangements  yes       Resource/Environmental Concerns    Resource/Environmental Concerns  none       Transition Planning    Patient/Family Anticipates Transition to  home    Patient/Family Anticipated Services at Transition  none    Transportation Anticipated  family or friend will provide       Discharge Needs Assessment    Readmission Within the Last 30 Days  no previous admission in last 30 days    Concerns to be Addressed  denies needs/concerns at this time    Equipment Currently Used at Home  wheelchair    Equipment Needed After Discharge  none    Outpatient/Agency/Support Group Needs  assisted living facility    Provided post acute provider list?  Refused    Patient's Choice of Community Agency(s)  Pt is a resident at [facility]        Discharge Plan     Row Name 12/02/19 1227       Plan    Plan  home, assisted living    Patient/Family in Agreement with Plan  yes    Plan Comments  CM spoke with pt and son Josh at bedside. Pt resides in assisted living at [facility]. Pt uses a wheelchair for mobility and needs assistance with adl's. Pt denies needs and plans to return to her assisted living via private transportation with her son.     Final Discharge Disposition Code  01 - home or self-care        Destination       No service coordination in this encounter.      Durable Medical Equipment      No service coordination in this encounter.      Dialysis/Infusion      No service coordination in this encounter.      Home Medical Care      No service coordination in this encounter.      Therapy      No service coordination in this encounter.      Community Resources      No service coordination in this encounter.          Demographic Summary     Row Name 12/02/19 1215       General Information    Referral Source  emergency department    Reason for Consult  discharge planning    Preferred Language  English    General Information Comments  PCP- Nirmal Lundberg       Contact Information    Permission Granted to Share Info With      Contact Information Comments  [phone]        Functional Status     Row Name 12/02/19 1217       Functional Status    Usual Activity Tolerance  moderate       Functional Status, IADL    Medications  assistive person    Meal Preparation  completely dependent    Housekeeping  completely dependent    Laundry  completely dependent    Shopping  completely dependent    IADL Comments  pt uses a wheelchair       Employment/    Employment/ Comments  Pt has [employer] Medicare, denies concerns or disruption in coverage. Pt has prescription drug coverage and denies issues obtaining or affording current medications.         Psychosocial    No documentation.       Abuse/Neglect    No documentation.       Legal    No documentation.       Substance Abuse    No documentation.       Patient Forms    No documentation.           Lilia Mohan RN

## 2019-12-02 NOTE — DISCHARGE INSTRUCTIONS
I would recommend physical therapy working with the patient at her facility for ambulation training and increase strength

## 2020-02-24 ENCOUNTER — OFFICE VISIT (OUTPATIENT)
Dept: CARDIOLOGY | Facility: CLINIC | Age: 85
End: 2020-02-24

## 2020-02-24 VITALS
SYSTOLIC BLOOD PRESSURE: 108 MMHG | OXYGEN SATURATION: 96 % | WEIGHT: 109 LBS | HEART RATE: 58 BPM | HEIGHT: 60 IN | BODY MASS INDEX: 21.4 KG/M2 | DIASTOLIC BLOOD PRESSURE: 52 MMHG

## 2020-02-24 DIAGNOSIS — I10 ESSENTIAL HYPERTENSION: ICD-10-CM

## 2020-02-24 DIAGNOSIS — I50.32 CHRONIC DIASTOLIC CONGESTIVE HEART FAILURE (HCC): ICD-10-CM

## 2020-02-24 DIAGNOSIS — I48.0 PAROXYSMAL ATRIAL FIBRILLATION (HCC): ICD-10-CM

## 2020-02-24 DIAGNOSIS — I49.5 SSS (SICK SINUS SYNDROME) (HCC): ICD-10-CM

## 2020-02-24 DIAGNOSIS — I25.119 CORONARY ARTERY DISEASE INVOLVING NATIVE CORONARY ARTERY OF NATIVE HEART WITH ANGINA PECTORIS (HCC): Primary | ICD-10-CM

## 2020-02-24 PROCEDURE — 99214 OFFICE O/P EST MOD 30 MIN: CPT | Performed by: INTERNAL MEDICINE

## 2020-02-24 NOTE — PROGRESS NOTES
[facility] at [facility]  Office visit  Mary Ramirez  1934    There is no work phone number on file.    VISIT DATE:  2/24/2020      PCP: Nirmal Lundberg MD  [id number] [address]    CC:  Chief Complaint   Patient presents with   • Coronary Artery Disease       PROBLEM LIST:  Coronary artery disease involving native coronary artery of native heart with angina pectoris (CMS/HCC)             · Cardiac cath with PCI data deficit  · Echo (9/1/18): LVEF 60%. LVH. Left atrial dilatation. Aortic calcification with no stenosis.  · September 2018 myocardial PET: Normal perfusion.           Paroxysmal atrial fibrillation (CMS/HCC)            · Chads Vasc 6 (age > 75, female, CAD, DM, HTN)   · Rhythm control strategy with amiodarone  · Maintaining rhythm           DEANGELO (acute kidney injury) (CMS/HCC)     Type 2 diabetes mellitus with complication, with long-term current use of insulin (CMS/HCC)     Dementia     Chronic diastolic congestive heart failure (CMS/HCC)            · Echo (9/1/18): LVEF 60%. LVH. Left atrial dilatation. Aortic calcification with no stenosis.          ASSESSMENT:   Diagnosis Plan   1. Coronary artery disease involving native coronary artery of native heart with angina pectoris (CMS/HCC)     2. Essential hypertension     3. Paroxysmal atrial fibrillation (CMS/HCC)     4. SSS (sick sinus syndrome) (CMS/HCC)     5. Chronic diastolic congestive heart failure (CMS/HCC)         PLAN:  Coronary artery disease: Currently stable and asymptomatic.  Continue aspirin and afterload reduction.    Hypertension: Goal less than 160/90 mmHg.  Allowing for permissive hypertension to limit risk of falling with labile blood pressures.  Continue, initial clonidine, amlodipine and Imdur.    Paroxysmal atrial fibrillation and atrial flutter: Continue amiodarone 200 mg by mouth daily.  biannual liver function test and thyroid function tests.  Annual chest x-ray.  Continue  "aspirin for stroke prophylaxis, off Eliquis due to recent multiple mechanical falls.    Subjective  Pleasantly demented.  Blood pressures predominantly running less than 160/90 mmHg, no symptomatic hypotension on current medical therapy.  She denies chest pain, palpitations or dyspnea.  Denies tremors on amiodarone.  Compliant with medical therapy.  Does report that she falls asleep easily during the day.  Predominately uses her wheelchair to get around the [place].      PHYSICAL EXAMINATION:  Vitals:    02/24/20 1349   BP: 108/52   BP Location: Left arm   Patient Position: Sitting   Pulse: 58   SpO2: 96%   Weight: 49.4 kg (109 lb)   Height: 152.4 cm (60\")     General Appearance:    Alert, cooperative, no distress, appears stated age   Head:    Normocephalic, without obvious abnormality, atraumatic   Eyes:    conjunctiva/corneas clear   Nose:   Nares normal, septum midline, mucosa normal, no drainage   Throat:   Lips, teeth and gums normal   Neck:   Supple, symmetrical, trachea midline, no carotid    bruit or JVD   Lungs:     Clear to auscultation bilaterally, respirations unlabored   Chest Wall:    No tenderness or deformity    Heart:    Regular rate and rhythm, S1 and S2 normal, no murmur, rub   or gallop, normal carotid impulse bilaterally without bruit.   Abdomen:     Soft, non-tender   Extremities:   Extremities normal, atraumatic, no cyanosis or edema   Pulses:   2+ and symmetric all extremities   Skin:   Skin color, texture, turgor normal, no rashes or lesions       Diagnostic Data:  Procedures  Lab Results   Component Value Date    CHLPL 123 01/24/2016    TRIG 202 (H) 01/15/2019    HDL 67 (H) 01/15/2019     Lab Results   Component Value Date    GLUCOSE 184 (H) 12/02/2019    BUN 29 (H) 12/02/2019    CREATININE 1.09 (H) 12/02/2019     12/02/2019    K 4.1 12/02/2019    CL 97 (L) 12/02/2019    CO2 28.0 12/02/2019     Lab Results   Component Value Date    HGBA1C 7.2 (H) 11/19/2019     Lab Results " "  Component Value Date    WBC 7.18 12/02/2019    HGB 13.1 12/02/2019    HCT 41.8 12/02/2019     12/02/2019       Allergies  Allergies   Allergen Reactions   • Penicillins Other (See Comments)     Pt states \"i don't know\"       Current Medications    Current Outpatient Medications:   •  acetaminophen (TYLENOL) 500 MG tablet, Take 500 mg by mouth 2 (Two) Times a Day. Take one tablet twice a day for leg pain  And prn every 6 hours, Disp: , Rfl:   •  aluminum-magnesium hydroxide-simethicone (MAALOX MAX) 400-400-40 MG/5ML suspension, Take 5 mL by mouth Every 6 (Six) Hours As Needed for Indigestion or Heartburn., Disp: , Rfl:   •  Amino Acids-Protein Hydrolys (PRO-STAT AWC PO), Take 1 dose by mouth 2 (Two) Times a Day., Disp: , Rfl:   •  amiodarone (PACERONE) 200 MG tablet, Take 1 tablet by mouth Daily., Disp: , Rfl:   •  amLODIPine (NORVASC) 10 MG tablet, Take 1 tablet by mouth Every Night., Disp: 30 tablet, Rfl: 5  •  aspirin  MG EC tablet, Take 1 tablet by mouth Daily., Disp: , Rfl:   •  bisacodyl (DULCOLAX) 5 MG EC tablet, Take 1 tablet by mouth Daily As Needed for Constipation., Disp: , Rfl:   •  citalopram (CeleXA) 20 MG tablet, Take 20 mg by mouth Daily., Disp: , Rfl:   •  CloNIDine (CATAPRES) 0.2 MG tablet, Take 0.2 mg by mouth 2 (Two) Times a Day., Disp: , Rfl:   •  docusate sodium (COLACE) 100 MG capsule, Take 100 mg by mouth 2 (Two) Times a Day., Disp: , Rfl:   •  donepezil (ARICEPT) 5 MG tablet, Take 5 mg by mouth Every Night., Disp: , Rfl:   •  fluticasone (FLONASE) 50 MCG/ACT nasal spray, 2 sprays into the nostril(s) as directed by provider Daily., Disp: , Rfl:   •  insulin aspart (novoLOG FLEXPEN) 100 UNIT/ML solution pen-injector sc pen, Inject 2-5 Units under the skin into the appropriate area as directed 4 (Four) Times a Day., Disp: , Rfl:   •  isosorbide mononitrate (IMDUR) 60 MG 24 hr tablet, Take 60 mg by mouth Daily., Disp: , Rfl:   •  lidocaine (LIDODERM) 5 %, Place 1 patch on the skin " as directed by provider Daily. Remove & Discard patch within 12 hours or as directed by MD, Disp: 30 patch, Rfl: 0  •  metFORMIN ER (GLUCOPHAGE-XR) 500 MG 24 hr tablet, Take 1 tablet by mouth Daily With Breakfast., Disp: 30 tablet, Rfl: 5  •  Multiple Vitamin (MULTIVITAMINS PO), Take 1 tablet by mouth Daily., Disp: , Rfl:   •  ondansetron (ZOFRAN) 4 MG tablet, Take 4 mg by mouth Every 8 (Eight) Hours As Needed for Nausea or Vomiting., Disp: , Rfl:   •  polyethylene glycol (MIRALAX) pack packet, Take 17 g by mouth Daily. (Patient taking differently: Take 17 g by mouth Daily As Needed.), Disp: , Rfl:   •  collagenase 250 UNIT/GM ointment, Apply  topically to the appropriate area as directed Daily., Disp: , Rfl:           ROS  Review of Systems   Cardiovascular: Negative for chest pain, irregular heartbeat and palpitations.   Respiratory: Negative for shortness of breath and snoring.    Neurological: Positive for dizziness.       SOCIAL HX  Social History     Socioeconomic History   • Marital status:      Spouse name: N/A   • Number of children: 2   • Years of education: H.S   • Highest education level: Not on file   Occupational History   • Occupation:      Employer: RETIRED   Tobacco Use   • Smoking status: Former Smoker     Years: 2.00     Types: Cigarettes     Start date: 1975     Last attempt to quit: 1978     Years since quittin.3   • Smokeless tobacco: Never Used   • Tobacco comment: QUIT DATE 1984   Substance and Sexual Activity   • Alcohol use: No   • Drug use: No   • Sexual activity: Never   Social History Narrative    [facility]    Patient drinks 2 cups of caffeine per day.,       FAMILY HX  Family History   Problem Relation Age of Onset   • Hypertension Mother    • Coronary artery disease Mother 56        PREMATURE    • Lung cancer Father    • Hypertension Father    • Osteoporosis Sister    • Lung cancer Brother              Ta GARCIA  Sandra CAMPA, MD, FACC

## 2020-04-07 ENCOUNTER — TELEPHONE (OUTPATIENT)
Dept: ORTHOPEDIC SURGERY | Facility: CLINIC | Age: 85
End: 2020-04-07

## 2021-01-12 NOTE — PROGRESS NOTES
Case Management Discharge Note    Final Note: Spoke with Pineda at [phone], Per Pineda Bejarano Citation, they will only need a transfer summary for Ms. Ramirez's return and he will obtain transfer summary from [facility]. No other needs noted at this time.     Destination     No service has been selected for the patient.      Durable Medical Equipment     No service has been selected for the patient.      Dialysis/Infusion     No service has been selected for the patient.      Home Medical Care     No service has been selected for the patient.      Social Care     No service has been selected for the patient.             Final Discharge Disposition Code: 01 - home or self-care   What Type Of Note Output Would You Prefer (Optional)?: Bullet Format How Severe Are Your Spot(S)?: mild Have Your Spot(S) Been Treated In The Past?: has not been treated Hpi Title: Evaluation of Skin Lesions

## 2022-06-03 ENCOUNTER — HOSPITAL ENCOUNTER (EMERGENCY)
Facility: HOSPITAL | Age: 87
Discharge: NURSING, REHAB OR LONG-TERM CARE | End: 2022-06-03
Attending: EMERGENCY MEDICINE | Admitting: EMERGENCY MEDICINE

## 2022-06-03 ENCOUNTER — APPOINTMENT (OUTPATIENT)
Dept: CT IMAGING | Facility: HOSPITAL | Age: 87
End: 2022-06-03

## 2022-06-03 ENCOUNTER — APPOINTMENT (OUTPATIENT)
Dept: GENERAL RADIOLOGY | Facility: HOSPITAL | Age: 87
End: 2022-06-03

## 2022-06-03 VITALS
HEART RATE: 115 BPM | DIASTOLIC BLOOD PRESSURE: 83 MMHG | SYSTOLIC BLOOD PRESSURE: 145 MMHG | RESPIRATION RATE: 18 BRPM | OXYGEN SATURATION: 96 % | TEMPERATURE: 97.8 F | BODY MASS INDEX: 22.08 KG/M2 | HEIGHT: 62 IN | WEIGHT: 120 LBS

## 2022-06-03 DIAGNOSIS — S09.90XA INJURY OF HEAD, INITIAL ENCOUNTER: ICD-10-CM

## 2022-06-03 DIAGNOSIS — E16.2 HYPOGLYCEMIA: ICD-10-CM

## 2022-06-03 DIAGNOSIS — J90 PLEURAL EFFUSION, RIGHT: ICD-10-CM

## 2022-06-03 DIAGNOSIS — R41.82 ALTERED MENTAL STATUS, UNSPECIFIED ALTERED MENTAL STATUS TYPE: Primary | ICD-10-CM
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
## 2022-06-03 LAB
ALBUMIN SERPL-MCNC: 4.4 G/DL (ref 3.5–5.2)
ALBUMIN/GLOB SERPL: 1.8 G/DL
ALP SERPL-CCNC: 107 U/L (ref 39–117)
ALT SERPL W P-5'-P-CCNC: 15 U/L (ref 1–33)
ANION GAP SERPL CALCULATED.3IONS-SCNC: 12 MMOL/L (ref 5–15)
AST SERPL-CCNC: 22 U/L (ref 1–32)
BASOPHILS # BLD AUTO: 0.03 10*3/MM3 (ref 0–0.2)
BASOPHILS NFR BLD AUTO: 0.5 % (ref 0–1.5)
BILIRUB SERPL-MCNC: 0.3 MG/DL (ref 0–1.2)
BILIRUB UR QL STRIP: NEGATIVE
BUN SERPL-MCNC: 32 MG/DL (ref 8–23)
BUN/CREAT SERPL: 23.7 (ref 7–25)
CALCIUM SPEC-SCNC: 9.4 MG/DL (ref 8.6–10.5)
CHLORIDE SERPL-SCNC: 99 MMOL/L (ref 98–107)
CLARITY UR: CLEAR
CO2 SERPL-SCNC: 26 MMOL/L (ref 22–29)
COLOR UR: YELLOW
CREAT SERPL-MCNC: 1.35 MG/DL (ref 0.57–1)
D-LACTATE SERPL-SCNC: 1.4 MMOL/L (ref 0.5–2)
DEPRECATED RDW RBC AUTO: 49.1 FL (ref 37–54)
EGFRCR SERPLBLD CKD-EPI 2021: 38.1 ML/MIN/1.73
EOSINOPHIL # BLD AUTO: 0.13 10*3/MM3 (ref 0–0.4)
EOSINOPHIL NFR BLD AUTO: 2.2 % (ref 0.3–6.2)
ERYTHROCYTE [DISTWIDTH] IN BLOOD BY AUTOMATED COUNT: 14.8 % (ref 12.3–15.4)
FLUAV SUBTYP SPEC NAA+PROBE: NOT DETECTED
FLUBV RNA ISLT QL NAA+PROBE: NOT DETECTED
GLOBULIN UR ELPH-MCNC: 2.5 GM/DL
GLUCOSE BLDC GLUCOMTR-MCNC: 140 MG/DL (ref 70–130)
GLUCOSE SERPL-MCNC: 265 MG/DL (ref 65–99)
GLUCOSE UR STRIP-MCNC: ABNORMAL MG/DL
HCT VFR BLD AUTO: 42.6 % (ref 34–46.6)
HGB BLD-MCNC: 14 G/DL (ref 12–15.9)
HGB UR QL STRIP.AUTO: NEGATIVE
HOLD SPECIMEN: NORMAL
IMM GRANULOCYTES # BLD AUTO: 0.02 10*3/MM3 (ref 0–0.05)
IMM GRANULOCYTES NFR BLD AUTO: 0.3 % (ref 0–0.5)
KETONES UR QL STRIP: NEGATIVE
LEUKOCYTE ESTERASE UR QL STRIP.AUTO: NEGATIVE
LYMPHOCYTES # BLD AUTO: 0.96 10*3/MM3 (ref 0.7–3.1)
LYMPHOCYTES NFR BLD AUTO: 16.4 % (ref 19.6–45.3)
MAGNESIUM SERPL-MCNC: 2.2 MG/DL (ref 1.6–2.4)
MCH RBC QN AUTO: 30 PG (ref 26.6–33)
MCHC RBC AUTO-ENTMCNC: 32.9 G/DL (ref 31.5–35.7)
MCV RBC AUTO: 91.2 FL (ref 79–97)
MONOCYTES # BLD AUTO: 0.51 10*3/MM3 (ref 0.1–0.9)
MONOCYTES NFR BLD AUTO: 8.7 % (ref 5–12)
NEUTROPHILS NFR BLD AUTO: 4.21 10*3/MM3 (ref 1.7–7)
NEUTROPHILS NFR BLD AUTO: 71.9 % (ref 42.7–76)
NITRITE UR QL STRIP: NEGATIVE
NRBC BLD AUTO-RTO: 0 /100 WBC (ref 0–0.2)
PH UR STRIP.AUTO: <=5 [PH] (ref 5–8)
PLATELET # BLD AUTO: 212 10*3/MM3 (ref 140–450)
PMV BLD AUTO: 12 FL (ref 6–12)
POTASSIUM SERPL-SCNC: 4.2 MMOL/L (ref 3.5–5.2)
PROT SERPL-MCNC: 6.9 G/DL (ref 6–8.5)
PROT UR QL STRIP: NEGATIVE
QT INTERVAL: 374 MS
QT INTERVAL: 396 MS
QTC INTERVAL: 506 MS
QTC INTERVAL: 510 MS
RBC # BLD AUTO: 4.67 10*6/MM3 (ref 3.77–5.28)
SARS-COV-2 RNA PNL SPEC NAA+PROBE: NOT DETECTED
SODIUM SERPL-SCNC: 137 MMOL/L (ref 136–145)
SP GR UR STRIP: 1.02 (ref 1–1.03)
TROPONIN T SERPL-MCNC: 0.03 NG/ML (ref 0–0.03)
TROPONIN T SERPL-MCNC: 0.04 NG/ML (ref 0–0.03)
UROBILINOGEN UR QL STRIP: ABNORMAL
WBC NRBC COR # BLD: 5.86 10*3/MM3 (ref 3.4–10.8)
WHOLE BLOOD HOLD COAG: NORMAL
WHOLE BLOOD HOLD SPECIMEN: NORMAL

## 2022-06-03 PROCEDURE — 87636 SARSCOV2 & INF A&B AMP PRB: CPT | Performed by: EMERGENCY MEDICINE

## 2022-06-03 PROCEDURE — 80053 COMPREHEN METABOLIC PANEL: CPT | Performed by: EMERGENCY MEDICINE

## 2022-06-03 PROCEDURE — 36415 COLL VENOUS BLD VENIPUNCTURE: CPT

## 2022-06-03 PROCEDURE — 82962 GLUCOSE BLOOD TEST: CPT

## 2022-06-03 PROCEDURE — 81003 URINALYSIS AUTO W/O SCOPE: CPT | Performed by: EMERGENCY MEDICINE

## 2022-06-03 PROCEDURE — 83605 ASSAY OF LACTIC ACID: CPT | Performed by: EMERGENCY MEDICINE

## 2022-06-03 PROCEDURE — 71250 CT THORAX DX C-: CPT

## 2022-06-03 PROCEDURE — 71045 X-RAY EXAM CHEST 1 VIEW: CPT

## 2022-06-03 PROCEDURE — 93005 ELECTROCARDIOGRAM TRACING: CPT | Performed by: EMERGENCY MEDICINE

## 2022-06-03 PROCEDURE — 70450 CT HEAD/BRAIN W/O DYE: CPT

## 2022-06-03 PROCEDURE — P9612 CATHETERIZE FOR URINE SPEC: HCPCS

## 2022-06-03 PROCEDURE — 99285 EMERGENCY DEPT VISIT HI MDM: CPT

## 2022-06-03 PROCEDURE — 84484 ASSAY OF TROPONIN QUANT: CPT | Performed by: EMERGENCY MEDICINE

## 2022-06-03 PROCEDURE — 83735 ASSAY OF MAGNESIUM: CPT | Performed by: EMERGENCY MEDICINE

## 2022-06-03 PROCEDURE — 85025 COMPLETE CBC W/AUTO DIFF WBC: CPT | Performed by: EMERGENCY MEDICINE

## 2022-06-03 RX ORDER — SODIUM CHLORIDE 0.9 % (FLUSH) 0.9 %
10 SYRINGE (ML) INJECTION AS NEEDED
Status: DISCONTINUED | OUTPATIENT
Start: 2022-06-03 | End: 2022-06-03 | Stop reason: HOSPADM

## 2022-06-03 NOTE — DISCHARGE INSTRUCTIONS
Continue antibiotics as previously prescribed.    If you change your mind about more aggressive care feel free to come back to the emergency department.  I do believe you are making the right decision at this point to take her back to her facility where she is much happier.  I am glad that they can provide this environment for her.

## 2022-06-03 NOTE — ED PROVIDER NOTES
" EMERGENCY DEPARTMENT ENCOUNTER    Pt Name: Mary Ramirez  MRN: [id number]  Pt :   1934  Room Number:    Date of encounter:  6/3/2022  PCP: Tiana Hong MD  ED Provider: Ariel Severino MD    Historian: Patient's family and paramedics.  Patient to a much lesser extent      HPI:  Chief Complaint: Altered mental status, fall, hypoglycemia        Context: Mary Ramirez is a 87 y.o. female who presents to the ED as she was found on the floor in her residence at an assisted care facility.  The patient was conscious but had obviously fallen to the floor.  She had struck her head and her left elbow at a minimum.  The patient suffers from dementia and is unable to give clear history and does not recall the fall.  Staff members found her to be \"hypoglycemic\" and administered oral glucose.  Paramedics were called and her blood sugar had risen above 200s by the time they arrived.  The patient's son arrives and tells us that the patient was diagnosed with pneumonia 2 or 3 days ago and started on a Z-Al.  Chest x-ray had been performed and an abnormality had been seen.  The patient has had no recent fevers or chills.  No recent cough.  The patient is suffering from advanced dementia and is unable to provide much meaningful information.      PAST MEDICAL HISTORY  Past Medical History:   Diagnosis Date   • Atrial fibrillation (HCC)    • Benign essential hypertension    • CAD (coronary artery disease)    • Carotid artery disease (HCC)    • CHF (congestive heart failure) ([facility])    • Chronic allergic rhinitis    • CKD (chronic kidney disease), stage III ([facility])    • DDD (degenerative disc disease), cervical    • Dementia ([facility])    • Diabetes mellitus ([facility])    • Diverticulosis     WITH PERFORATION    • Hypertension    • Major depressive disorder, single episode, mild (HCC)    • Memory loss    • Mixed hyperlipidemia    • SSS (sick sinus syndrome) ([facility])    • TIA (transient ischemic attack) 2016 "         PAST SURGICAL HISTORY  Past Surgical History:   Procedure Laterality Date   • ANKLE SURGERY Left    • ATHERECTOMY     • ATHRECTOMY ILIAC, FEMORAL, TIBIAL ARTERY     • BREAST LUMPECTOMY Left    • COLON SURGERY     • COLOSTOMY     • CORONARY STENT PLACEMENT     • FINGER FRACTURE SURGERY Right    • HIP TROCHANTERIC NAILING WITH INTRAMEDULLARY HIP SCREW Left 2017   • HIP TROCHANTERIC NAILING WITH INTRAMEDULLARY HIP SCREW Right 3/30/2019    Procedure: HIP TROCANTERIC NAILING WITH INTRAMEDULLARY HIP SCREW RIGHT;  Surgeon: Theo Figueroa MD;  Location: [facility];  Service: Orthopedics   • HYSTERECTOMY     • OTHER SURGICAL HISTORY      REANASTAMOSIS          FAMILY HISTORY  Family History   Problem Relation Age of Onset   • Hypertension Mother    • Coronary artery disease Mother 56        PREMATURE    • Lung cancer Father    • Hypertension Father    • Osteoporosis Sister    • Lung cancer Brother          SOCIAL HISTORY  Social History     Socioeconomic History   • Marital status:      Spouse name: N/A   • Number of children: 2   • Years of education: H.S   Tobacco Use   • Smoking status: Former Smoker     Years: 2.00     Types: Cigarettes     Start date: 1975     Quit date: 1978     Years since quittin.6   • Smokeless tobacco: Never Used   • Tobacco comment: QUIT DATE 1984   Substance and Sexual Activity   • Alcohol use: No   • Drug use: No   • Sexual activity: Never         ALLERGIES  Penicillins        REVIEW OF SYSTEMS  Review of Systems       Unable secondary to altered mental status/advanced dementia.      PHYSICAL EXAM    I have reviewed the triage vital signs and nursing notes.    ED Triage Vitals [22 1335]   Temp Heart Rate Resp BP SpO2   97.8 °F (36.6 °C) 109 18 130/66 96 %      Temp src Heart Rate Source Patient Position BP Location FiO2 (%)   Oral Monitor Lying Right arm --       Physical Exam  GENERAL:   Appears nontoxic and spry  but very poor historian and confused.  HENT: Nares patent.  Contusion noted in the mid forehead region.  Craniofacial bones are stable.  EYES: No scleral icterus.  CV: Regular rhythm, regular rate.  No murmurs gallops or rubs  RESPIRATORY: Normal effort.  No audible wheezes, rales or rhonchi.  Clear to auscultation to deep palpation  ABDOMEN: Soft, nontender  MUSCULOSKELETAL: No deformities.   NEURO: Alert, moves all extremities, follows commands.  Oriented to person and place but not to time.  Poor short-term memory.    SKIN: Warm, dry, no rash visualized.  Skin tear left elbow.        LAB RESULTS  Recent Results (from the past 24 hour(s))   Comprehensive Metabolic Panel    Collection Time: 06/03/22  1:21 PM    Specimen: Blood   Result Value Ref Range    Glucose 265 (H) 65 - 99 mg/dL    BUN 32 (H) 8 - 23 mg/dL    Creatinine 1.35 (H) 0.57 - 1.00 mg/dL    Sodium 137 136 - 145 mmol/L    Potassium 4.2 3.5 - 5.2 mmol/L    Chloride 99 98 - 107 mmol/L    CO2 26.0 22.0 - 29.0 mmol/L    Calcium 9.4 8.6 - 10.5 mg/dL    Total Protein 6.9 6.0 - 8.5 g/dL    Albumin 4.40 3.50 - 5.20 g/dL    ALT (SGPT) 15 1 - 33 U/L    AST (SGOT) 22 1 - 32 U/L    Alkaline Phosphatase 107 39 - 117 U/L    Total Bilirubin 0.3 0.0 - 1.2 mg/dL    Globulin 2.5 gm/dL    A/G Ratio 1.8 g/dL    BUN/Creatinine Ratio 23.7 7.0 - 25.0    Anion Gap 12.0 5.0 - 15.0 mmol/L    eGFR 38.1 (L) >60.0 mL/min/1.73   Magnesium    Collection Time: 06/03/22  1:21 PM    Specimen: Blood   Result Value Ref Range    Magnesium 2.2 1.6 - 2.4 mg/dL   Green Top (Gel)    Collection Time: 06/03/22  1:21 PM   Result Value Ref Range    Extra Tube Hold for add-ons.    Lavender Top    Collection Time: 06/03/22  1:21 PM   Result Value Ref Range    Extra Tube hold for add-on    Gold Top - SST    Collection Time: 06/03/22  1:21 PM   Result Value Ref Range    Extra Tube Hold for add-ons.    Gray Top    Collection Time: 06/03/22  1:21 PM   Result Value Ref Range    Extra Tube Hold for  add-ons.    Light Blue Top    Collection Time: 06/03/22  1:21 PM   Result Value Ref Range    Extra Tube Hold for add-ons.    CBC Auto Differential    Collection Time: 06/03/22  1:21 PM    Specimen: Blood   Result Value Ref Range    WBC 5.86 3.40 - 10.80 10*3/mm3    RBC 4.67 3.77 - 5.28 10*6/mm3    Hemoglobin 14.0 12.0 - 15.9 g/dL    Hematocrit 42.6 34.0 - 46.6 %    MCV 91.2 79.0 - 97.0 fL    MCH 30.0 26.6 - 33.0 pg    MCHC 32.9 31.5 - 35.7 g/dL    RDW 14.8 12.3 - 15.4 %    RDW-SD 49.1 37.0 - 54.0 fl    MPV 12.0 6.0 - 12.0 fL    Platelets 212 140 - 450 10*3/mm3    Neutrophil % 71.9 42.7 - 76.0 %    Lymphocyte % 16.4 (L) 19.6 - 45.3 %    Monocyte % 8.7 5.0 - 12.0 %    Eosinophil % 2.2 0.3 - 6.2 %    Basophil % 0.5 0.0 - 1.5 %    Immature Grans % 0.3 0.0 - 0.5 %    Neutrophils, Absolute 4.21 1.70 - 7.00 10*3/mm3    Lymphocytes, Absolute 0.96 0.70 - 3.10 10*3/mm3    Monocytes, Absolute 0.51 0.10 - 0.90 10*3/mm3    Eosinophils, Absolute 0.13 0.00 - 0.40 10*3/mm3    Basophils, Absolute 0.03 0.00 - 0.20 10*3/mm3    Immature Grans, Absolute 0.02 0.00 - 0.05 10*3/mm3    nRBC 0.0 0.0 - 0.2 /100 WBC   Lactic Acid, Plasma    Collection Time: 06/03/22  1:21 PM    Specimen: Blood   Result Value Ref Range    Lactate 1.4 0.5 - 2.0 mmol/L   ECG 12 Lead    Collection Time: 06/03/22  1:31 PM   Result Value Ref Range    QT Interval 396 ms    QTC Interval 510 ms   Urinalysis With Microscopic If Indicated (No Culture) - Urine, Catheter    Collection Time: 06/03/22  2:24 PM    Specimen: Urine, Catheter   Result Value Ref Range    Color, UA Yellow Yellow, Straw    Appearance, UA Clear Clear    pH, UA <=5.0 5.0 - 8.0    Specific Gravity, UA 1.021 1.001 - 1.030    Glucose,  mg/dL (2+) (A) Negative    Ketones, UA Negative Negative    Bilirubin, UA Negative Negative    Blood, UA Negative Negative    Protein, UA Negative Negative    Leuk Esterase, UA Negative Negative    Nitrite, UA Negative Negative    Urobilinogen, UA 0.2 E.U./dL 0.2 -  1.0 E.U./dL   Troponin    Collection Time: 06/03/22  2:43 PM    Specimen: Blood   Result Value Ref Range    Troponin T 0.038 (C) 0.000 - 0.030 ng/mL   POC Glucose Once    Collection Time: 06/03/22  3:12 PM    Specimen: Blood   Result Value Ref Range    Glucose 140 (H) 70 - 130 mg/dL   COVID-19 and FLU A/B PCR - Swab, Nasopharynx    Collection Time: 06/03/22  4:19 PM    Specimen: Nasopharynx; Swab   Result Value Ref Range    COVID19 Not Detected Not Detected - Ref. Range    Influenza A PCR Not Detected Not Detected    Influenza B PCR Not Detected Not Detected   ECG 12 Lead    Collection Time: 06/03/22  4:52 PM   Result Value Ref Range    QT Interval 374 ms    QTC Interval 506 ms   Troponin    Collection Time: 06/03/22  4:59 PM    Specimen: Blood   Result Value Ref Range    Troponin T 0.033 (C) 0.000 - 0.030 ng/mL       If labs were ordered, I independently reviewed the results.        RADIOLOGY  CT Head Without Contrast    Result Date: 6/3/2022   DATE OF EXAM: 6/3/2022 3:40 PM  PROCEDURE: CT HEAD WO CONTRAST-  INDICATIONS: Fall, altered mental status  COMPARISON: May 21, 2019   TECHNIQUE: Routine transaxial cuts were obtained through the head without the administration of contrast. Automated exposure control and iterative reconstruction methods were used.  FINDINGS: There is cerebral atrophy. There are some periventricular white matter changes as well as what looks like old lacunar type infarct in the left periventricular area and right basal ganglia region. There is no underlying hemorrhage. There is no midline shift. There some mucosal thickening in the posterior right ethmoid sinus area. There some hyperostosis frontalis.      1.  There are changes from more chronic small vessel ischemic change with interval lacunar type infarction in the left periventricular and right basal ganglia areas since prior CT. 2.  There is some underlying cerebral atrophy. 3.  Mucosal thickening posterior right ethmoid sinus.  This  report was finalized on 6/3/2022 4:19 PM by Bernardino Chase MD.      CT Chest Without Contrast Diagnostic    Result Date: 6/3/2022  DATE OF EXAM: 6/3/2022 3:40 PM  PROCEDURE: CT CHEST WO CONTRAST DIAGNOSTIC-  INDICATIONS: Fall, altered mental status, airspace disease on chest x-ray  COMPARISON: Chest x-ray same day  TECHNIQUE: Routine transaxial slices were obtained through the chest without the administration of intravenous contrast. Reconstructed coronal and sagittal images were also obtained. Automated exposure control and iterative construction methods were used.  The radiation dose reduction device was turned on for each scan per the ALARA (As Low as Reasonably Achievable) protocol.  FINDINGS: Nelli/mediastinum: Large hiatal hernia containing approximately half the stomach. Dilatation of the entire thoracic esophagus more pronounced distally. Thoracic aorta normal in caliber without calcification. Coronary artery stents noted. No pericardial effusion  Lungs/pleura: Lungs are partially distorted by artifact from patient motion. There is an abnormality in the posterior inferior right thorax that has a rounded morphology with tapering edges, suspected to be a loculated pleural fluid collection with adjacent rounded atelectasis. This measures up to 7.5 cm. There are increased interstitial markings in the mid and lower lung zones. There is mild interlobular septal thickening in the apices and bases. There is multifocal atelectasis in the lower lung zones. There is a 7 cm nodule in the anterior inferior left upper lobe. This calcified granuloma in the lingula. The pulmonary vasculature is prominent, and the nondependent pulmonary vessels appear more prominent than the dependent vessels suggesting pulmonary venous hypertension.  Upper abdomen: No acute abnormality  Bones/soft tissues: No acute or destructive bony abnormality       1. Opacity in the lower right lung felt to represent combination of loculated pleural  effusion and adjacent rounded atelectasis. This would be best assessed with contrast-enhanced CT in particular to rule out the presence of a mass 2. Mild increased interstitial markings suspected to be interstitial edema. Underlying chronic interstitial disease may be present 3. Multifocal atelectasis in the mid and lower lungs 4. 7 mm left upper lobe pulmonary nodule. Recommend follow-up chest CT in 6-12 months 5. Large hiatal hernia. Dilatation of the thoracic esophagus is likely due to chronic reflux  This report was finalized on 6/3/2022 4:17 PM by Reed Love.      XR Chest 1 View    Result Date: 6/3/2022  DATE OF EXAM: 6/3/2022 2:11 PM  PROCEDURE: XR CHEST 1 VW-  INDICATIONS: Weak/Dizzy/AMS triage protocol  COMPARISON: December 2, 2019  TECHNIQUE: Single radiographic AP view of the chest was obtained.  FINDINGS: There is airspace disease in the right lower chest. This could be secondary to pneumonia or possibly may be reflective of an underlying mass. There some atelectasis within the left mid chest and right middle lobe right lower lobe area. There are no pleural effusions. There are degenerative changes involving the left shoulder. There is cardiomegaly. There may be a hiatal hernia.      1.  Airspace disease within the right lower chest that may be secondary to pneumonia although an underlying more pathologic process not excluded in this area. 2.  There are some areas of atelectasis within both lungs. 3.  Cardiomegaly 4.  Probable hiatal hernia.  This report was finalized on 6/3/2022 2:23 PM by Bernardino Chase MD.        I ordered and reviewed the above noted radiographic studies.      I viewed images of chest CT which showed loculated effusion and right lower lobe region per my independent interpretation.    See radiologist's dictation for official interpretation.        PROCEDURES    Procedures    ECG 12 Lead   Final Result   Test Reason : trop elevated   Blood Pressure :   */*   mmHG   Vent. Rate : 110  BPM     Atrial Rate : 234 BPM      P-R Int :   * ms          QRS Dur :  92 ms       QT Int : 374 ms       P-R-T Axes :   * -16  71 degrees      QTc Int : 506 ms      Atrial flutter with variable AV block   ST & T wave abnormality, consider lateral ischemia   Abnormal ECG   When compared with ECG of 03-JUN-2022 13:31,   Atrial flutter has replaced Atrial fibrillation   Confirmed by SAILAJA MATHEWS MD ([id number]) on 6/3/2022 6:17:01 PM      Referred By: BISI           Confirmed By: ASILAJA MATHEWS MD      ECG 12 Lead   Final Result   Test Reason : Weak/Dizzy/AMS protocol   Blood Pressure :   */*   mmHG   Vent. Rate : 100 BPM     Atrial Rate : 101 BPM      P-R Int :   * ms          QRS Dur : 104 ms       QT Int : 396 ms       P-R-T Axes :   * -21  89 degrees      QTc Int : 510 ms      Atrial fibrillation   Nonspecific ST abnormality   Prolonged QT   Abnormal ECG   Confirmed by SAILAJA MATHEWS MD ([id number]) on 6/3/2022 3:45:24 PM      Referred By: EDMD           Confirmed By: SAILAJA MATHEWS MD          MEDICATIONS GIVEN IN ER    Medications - No data to display      PROGRESS, DATA ANALYSIS, CONSULTS, AND MEDICAL DECISION MAKING    All labs have been independently reviewed by me.  All radiology studies have been reviewed by me and the radiologist dictating the report.   EKG's have been independently viewed and interpreted by me.      Differential diagnoses: Fall with hypoglycemia possibly secondary to inadequate food intake.  Other more serious etiologies to include acute CVA, occult infection, etc. considered as well.      ED Course as of 06/03/22 2131 Fri Jun 03, 2022   6651 I had a long conversation with the patient's son who is her power of .  He then talked with his sister.  They have decided that they do not wish to have her admitted.  She loves the [place] and her life there.  She is not suffering at this time.  They understand that her symptoms could worsen and she could require admission and more aggressive care but  they do not wish to undergo this at this point.  They wish to continue with the Zithromax and I will make arrangement for transfer back. [MS]   1803 Patient ate a good meal and is back to her baseline.  She feels well and her blood sugars have been staying elevated.  We will discharged her facility. [MS]      ED Course User Index  [MS] Ariel Severino MD             AS OF 21:31 EDT VITALS:    BP - 145/83  HR - 115  TEMP - 97.8 °F (36.6 °C) (Oral)  O2 SATS - 96%                  DIAGNOSIS  Final diagnoses:   Altered mental status, unspecified altered mental status type   Fall, initial encounter   Injury of head, initial encounter   Hypoglycemia   Pleural effusion, right         DISPOSITION  DISCHARGE    Patient discharged in stable condition.    Reviewed implications of results, diagnosis, meds, responsibility to follow up, warning signs and symptoms of possible worsening, potential complications and reasons to return to ER.    Patient/Family voiced understanding of above instructions.    Discussed plan for discharge, as there is no emergent indication for admission.  Pt/family is agreeable and understands need for follow up and possible repeat testing.  Pt/family is aware that discharge does not mean that nothing is wrong but that it indicates no emergency is currently present that requires admission and they must continue care with follow-up as given below or with a physician of their choice.     FOLLOW-UP  Tiana Hong MD  [address] [id number] [phone]      NEXT AVAILABLE APPOINTMENT - RECHECK OF CONDITION     Emergency Department  [address] [place] [address] [phone]    IF YOU HAVE ANY CONCERN OF WORSENING CONDITION         Medication List      Changed    polyethylene glycol pack packet  Commonly known as: MIRALAX  Take 17 g by mouth Daily.  What changed:   · when to take this  · reasons to take this                      Ariel Severino MD  06/03/22 5668

## 2022-06-10 ENCOUNTER — APPOINTMENT (OUTPATIENT)
Dept: GENERAL RADIOLOGY | Facility: HOSPITAL | Age: 87
End: 2022-06-10

## 2022-06-10 ENCOUNTER — HOSPITAL ENCOUNTER (INPATIENT)
Facility: HOSPITAL | Age: 87
LOS: 6 days | Discharge: HOME | End: 2022-06-17
Attending: EMERGENCY MEDICINE | Admitting: HOSPITALIST

## 2022-06-10 DIAGNOSIS — J96.01 ACUTE RESPIRATORY FAILURE WITH HYPOXIA: ICD-10-CM

## 2022-06-10 DIAGNOSIS — J18.9 PNEUMONIA DUE TO INFECTIOUS ORGANISM, UNSPECIFIED LATERALITY, UNSPECIFIED PART OF LUNG: ICD-10-CM

## 2022-06-10 DIAGNOSIS — I50.9 ACUTE CONGESTIVE HEART FAILURE, UNSPECIFIED HEART FAILURE TYPE: ICD-10-CM

## 2022-06-10 DIAGNOSIS — A41.9 ACUTE SEPSIS: Primary | ICD-10-CM

## 2022-06-10 LAB
ALBUMIN SERPL-MCNC: 4.3 G/DL (ref 3.5–5.2)
ALBUMIN/GLOB SERPL: 1.4 G/DL
ALP SERPL-CCNC: 112 U/L (ref 39–117)
ALT SERPL W P-5'-P-CCNC: 10 U/L (ref 1–33)
ANION GAP SERPL CALCULATED.3IONS-SCNC: 10 MMOL/L (ref 5–15)
AST SERPL-CCNC: 18 U/L (ref 1–32)
BASOPHILS # BLD AUTO: 0.06 10*3/MM3 (ref 0–0.2)
BASOPHILS NFR BLD AUTO: 0.6 % (ref 0–1.5)
BILIRUB SERPL-MCNC: 0.5 MG/DL (ref 0–1.2)
BUN SERPL-MCNC: 27 MG/DL (ref 8–23)
BUN/CREAT SERPL: 21.1 (ref 7–25)
CALCIUM SPEC-SCNC: 9.7 MG/DL (ref 8.6–10.5)
CHLORIDE SERPL-SCNC: 98 MMOL/L (ref 98–107)
CO2 SERPL-SCNC: 25 MMOL/L (ref 22–29)
CREAT SERPL-MCNC: 1.28 MG/DL (ref 0.57–1)
D-LACTATE SERPL-SCNC: 1.2 MMOL/L (ref 0.5–2)
DEPRECATED RDW RBC AUTO: 50.5 FL (ref 37–54)
EGFRCR SERPLBLD CKD-EPI 2021: 40.6 ML/MIN/1.73
EOSINOPHIL # BLD AUTO: 0.1 10*3/MM3 (ref 0–0.4)
EOSINOPHIL NFR BLD AUTO: 0.9 % (ref 0.3–6.2)
ERYTHROCYTE [DISTWIDTH] IN BLOOD BY AUTOMATED COUNT: 14.8 % (ref 12.3–15.4)
GLOBULIN UR ELPH-MCNC: 3.1 GM/DL
GLUCOSE SERPL-MCNC: 117 MG/DL (ref 65–99)
HCT VFR BLD AUTO: 39.8 % (ref 34–46.6)
HGB BLD-MCNC: 12.8 G/DL (ref 12–15.9)
IMM GRANULOCYTES # BLD AUTO: 0.05 10*3/MM3 (ref 0–0.05)
IMM GRANULOCYTES NFR BLD AUTO: 0.5 % (ref 0–0.5)
LYMPHOCYTES # BLD AUTO: 1.26 10*3/MM3 (ref 0.7–3.1)
LYMPHOCYTES NFR BLD AUTO: 11.7 % (ref 19.6–45.3)
MCH RBC QN AUTO: 30 PG (ref 26.6–33)
MCHC RBC AUTO-ENTMCNC: 32.2 G/DL (ref 31.5–35.7)
MCV RBC AUTO: 93.4 FL (ref 79–97)
MONOCYTES # BLD AUTO: 0.93 10*3/MM3 (ref 0.1–0.9)
MONOCYTES NFR BLD AUTO: 8.6 % (ref 5–12)
NEUTROPHILS NFR BLD AUTO: 77.7 % (ref 42.7–76)
NEUTROPHILS NFR BLD AUTO: 8.41 10*3/MM3 (ref 1.7–7)
NRBC BLD AUTO-RTO: 0 /100 WBC (ref 0–0.2)
NT-PROBNP SERPL-MCNC: 5037 PG/ML (ref 0–1800)
PLATELET # BLD AUTO: 278 10*3/MM3 (ref 140–450)
PMV BLD AUTO: 12.3 FL (ref 6–12)
POTASSIUM SERPL-SCNC: 4.2 MMOL/L (ref 3.5–5.2)
PROT SERPL-MCNC: 7.4 G/DL (ref 6–8.5)
RBC # BLD AUTO: 4.26 10*6/MM3 (ref 3.77–5.28)
SODIUM SERPL-SCNC: 133 MMOL/L (ref 136–145)
TROPONIN T SERPL-MCNC: 0.03 NG/ML (ref 0–0.03)
WBC NRBC COR # BLD: 10.81 10*3/MM3 (ref 3.4–10.8)

## 2022-06-10 PROCEDURE — 83605 ASSAY OF LACTIC ACID: CPT | Performed by: EMERGENCY MEDICINE

## 2022-06-10 PROCEDURE — 85379 FIBRIN DEGRADATION QUANT: CPT | Performed by: EMERGENCY MEDICINE

## 2022-06-10 PROCEDURE — 80053 COMPREHEN METABOLIC PANEL: CPT | Performed by: EMERGENCY MEDICINE

## 2022-06-10 PROCEDURE — 99285 EMERGENCY DEPT VISIT HI MDM: CPT

## 2022-06-10 PROCEDURE — 99284 EMERGENCY DEPT VISIT MOD MDM: CPT

## 2022-06-10 PROCEDURE — 87040 BLOOD CULTURE FOR BACTERIA: CPT | Performed by: EMERGENCY MEDICINE

## 2022-06-10 PROCEDURE — 84145 PROCALCITONIN (PCT): CPT | Performed by: NURSE PRACTITIONER

## 2022-06-10 PROCEDURE — 85025 COMPLETE CBC W/AUTO DIFF WBC: CPT | Performed by: EMERGENCY MEDICINE

## 2022-06-10 PROCEDURE — 25010000002 CEFEPIME PER 500 MG: Performed by: EMERGENCY MEDICINE

## 2022-06-10 PROCEDURE — 93005 ELECTROCARDIOGRAM TRACING: CPT | Performed by: EMERGENCY MEDICINE

## 2022-06-10 PROCEDURE — 71045 X-RAY EXAM CHEST 1 VIEW: CPT

## 2022-06-10 PROCEDURE — 36415 COLL VENOUS BLD VENIPUNCTURE: CPT

## 2022-06-10 PROCEDURE — 87636 SARSCOV2 & INF A&B AMP PRB: CPT | Performed by: EMERGENCY MEDICINE

## 2022-06-10 PROCEDURE — 83880 ASSAY OF NATRIURETIC PEPTIDE: CPT | Performed by: EMERGENCY MEDICINE

## 2022-06-10 PROCEDURE — 84484 ASSAY OF TROPONIN QUANT: CPT | Performed by: EMERGENCY MEDICINE

## 2022-06-10 PROCEDURE — 25010000002 VANCOMYCIN PER 500 MG: Performed by: EMERGENCY MEDICINE

## 2022-06-10 RX ORDER — VANCOMYCIN HYDROCHLORIDE 1 G/200ML
20 INJECTION, SOLUTION INTRAVENOUS ONCE
Status: COMPLETED | OUTPATIENT
Start: 2022-06-10 | End: 2022-06-11

## 2022-06-10 RX ADMIN — SODIUM CHLORIDE 500 ML: 9 INJECTION, SOLUTION INTRAVENOUS at 23:41

## 2022-06-10 RX ADMIN — CEFEPIME 2 G: 2 INJECTION, POWDER, FOR SOLUTION INTRAVENOUS at 23:41

## 2022-06-10 RX ADMIN — VANCOMYCIN HYDROCHLORIDE 1000 MG: 1 INJECTION, SOLUTION INTRAVENOUS at 23:41

## 2022-06-11 ENCOUNTER — APPOINTMENT (OUTPATIENT)
Dept: CARDIOLOGY | Facility: HOSPITAL | Age: 87
End: 2022-06-11

## 2022-06-11 ENCOUNTER — APPOINTMENT (OUTPATIENT)
Dept: CT IMAGING | Facility: HOSPITAL | Age: 87
End: 2022-06-11

## 2022-06-11 PROBLEM — A41.9 ACUTE SEPSIS (HCC): Status: ACTIVE | Noted: 2022-06-11

## 2022-06-11 PROBLEM — I50.33 ACUTE ON CHRONIC DIASTOLIC CHF (CONGESTIVE HEART FAILURE): Status: ACTIVE | Noted: 2022-06-11

## 2022-06-11 PROBLEM — J96.02 ACUTE RESPIRATORY FAILURE WITH HYPERCAPNIA (HCC): Status: ACTIVE | Noted: 2022-06-11

## 2022-06-11 PROBLEM — J18.9 MULTIFOCAL PNEUMONIA: Status: ACTIVE | Noted: 2022-06-11

## 2022-06-11 LAB
ANION GAP SERPL CALCULATED.3IONS-SCNC: 10 MMOL/L (ref 5–15)
BACTERIA UR QL AUTO: NORMAL /HPF
BASOPHILS # BLD AUTO: 0.06 10*3/MM3 (ref 0–0.2)
BASOPHILS NFR BLD AUTO: 0.7 % (ref 0–1.5)
BH CV ECHO MEAS - AO MAX PG: 6.5 MMHG
BH CV ECHO MEAS - AO MEAN PG: 4 MMHG
BH CV ECHO MEAS - AO ROOT DIAM: 3 CM
BH CV ECHO MEAS - AO V2 MAX: 127 CM/SEC
BH CV ECHO MEAS - AO V2 VTI: 20.9 CM
BH CV ECHO MEAS - AVA(I,D): 1.66 CM2
BH CV ECHO MEAS - EDV(CUBED): 46.7 ML
BH CV ECHO MEAS - EDV(MOD-SP2): 48.3 ML
BH CV ECHO MEAS - EDV(MOD-SP4): 93.3 ML
BH CV ECHO MEAS - EF(MOD-BP): 51.5 %
BH CV ECHO MEAS - EF(MOD-SP2): 44.5 %
BH CV ECHO MEAS - EF(MOD-SP4): 56.8 %
BH CV ECHO MEAS - ESV(CUBED): 13.8 ML
BH CV ECHO MEAS - ESV(MOD-SP2): 26.8 ML
BH CV ECHO MEAS - ESV(MOD-SP4): 40.3 ML
BH CV ECHO MEAS - FS: 33.3 %
BH CV ECHO MEAS - IVS/LVPW: 1.3 CM
BH CV ECHO MEAS - IVSD: 1.3 CM
BH CV ECHO MEAS - LA DIMENSION: 4.4 CM
BH CV ECHO MEAS - LAT PEAK E' VEL: 12.3 CM/SEC
BH CV ECHO MEAS - LV MASS(C)D: 132.7 GRAMS
BH CV ECHO MEAS - LV MAX PG: 1.55 MMHG
BH CV ECHO MEAS - LV MEAN PG: 1 MMHG
BH CV ECHO MEAS - LV V1 MAX: 62.2 CM/SEC
BH CV ECHO MEAS - LV V1 VTI: 12.2 CM
BH CV ECHO MEAS - LVIDD: 3.6 CM
BH CV ECHO MEAS - LVIDS: 2.4 CM
BH CV ECHO MEAS - LVOT AREA: 2.8 CM2
BH CV ECHO MEAS - LVOT DIAM: 1.9 CM
BH CV ECHO MEAS - LVPWD: 1 CM
BH CV ECHO MEAS - MED PEAK E' VEL: 6.9 CM/SEC
BH CV ECHO MEAS - MR MAX PG: 79.6 MMHG
BH CV ECHO MEAS - MR MAX VEL: 446 CM/SEC
BH CV ECHO MEAS - MR MEAN PG: 59 MMHG
BH CV ECHO MEAS - MR MEAN VEL: 372 CM/SEC
BH CV ECHO MEAS - MR VTI: 114 CM
BH CV ECHO MEAS - MV DEC SLOPE: 1075 CM/SEC2
BH CV ECHO MEAS - MV E MAX VEL: 125 CM/SEC
BH CV ECHO MEAS - MV MAX PG: 10 MMHG
BH CV ECHO MEAS - MV MEAN PG: 4.4 MMHG
BH CV ECHO MEAS - MV P1/2T: 37.5 MSEC
BH CV ECHO MEAS - MV V2 VTI: 16.6 CM
BH CV ECHO MEAS - MVA(P1/2T): 5.9 CM2
BH CV ECHO MEAS - MVA(VTI): 2.08 CM2
BH CV ECHO MEAS - PA ACC TIME: 0.1 SEC
BH CV ECHO MEAS - PA PR(ACCEL): 34 MMHG
BH CV ECHO MEAS - RAP SYSTOLE: 3 MMHG
BH CV ECHO MEAS - RVSP: 32 MMHG
BH CV ECHO MEAS - SV(LVOT): 34.6 ML
BH CV ECHO MEAS - SV(MOD-SP2): 21.5 ML
BH CV ECHO MEAS - SV(MOD-SP4): 53 ML
BH CV ECHO MEAS - TAPSE (>1.6): 1.17 CM
BH CV ECHO MEAS - TR MAX PG: 26.1 MMHG
BH CV ECHO MEAS - TR MAX VEL: 254.7 CM/SEC
BH CV ECHO MEASUREMENTS AVERAGE E/E' RATIO: 13.02
BH CV LOWER VASCULAR LEFT COMMON FEMORAL AUGMENT: NORMAL
BH CV LOWER VASCULAR LEFT COMMON FEMORAL COMPETENT: NORMAL
BH CV LOWER VASCULAR LEFT COMMON FEMORAL COMPRESS: NORMAL
BH CV LOWER VASCULAR LEFT COMMON FEMORAL PHASIC: NORMAL
BH CV LOWER VASCULAR LEFT COMMON FEMORAL SPONT: NORMAL
BH CV LOWER VASCULAR LEFT DISTAL FEMORAL AUGMENT: NORMAL
BH CV LOWER VASCULAR LEFT DISTAL FEMORAL COMPETENT: NORMAL
BH CV LOWER VASCULAR LEFT DISTAL FEMORAL COMPRESS: NORMAL
BH CV LOWER VASCULAR LEFT DISTAL FEMORAL PHASIC: NORMAL
BH CV LOWER VASCULAR LEFT DISTAL FEMORAL SPONT: NORMAL
BH CV LOWER VASCULAR LEFT GASTRONEMIUS COMPRESS: NORMAL
BH CV LOWER VASCULAR LEFT GREATER SAPH AK COMPRESS: NORMAL
BH CV LOWER VASCULAR LEFT GREATER SAPH BK COMPRESS: NORMAL
BH CV LOWER VASCULAR LEFT LESSER SAPH COMPRESS: NORMAL
BH CV LOWER VASCULAR LEFT MID FEMORAL AUGMENT: NORMAL
BH CV LOWER VASCULAR LEFT MID FEMORAL COMPETENT: NORMAL
BH CV LOWER VASCULAR LEFT MID FEMORAL COMPRESS: NORMAL
BH CV LOWER VASCULAR LEFT MID FEMORAL PHASIC: NORMAL
BH CV LOWER VASCULAR LEFT MID FEMORAL SPONT: NORMAL
BH CV LOWER VASCULAR LEFT PERONEAL AUGMENT: NORMAL
BH CV LOWER VASCULAR LEFT PERONEAL COMPRESS: NORMAL
BH CV LOWER VASCULAR LEFT POPLITEAL AUGMENT: NORMAL
BH CV LOWER VASCULAR LEFT POPLITEAL COMPETENT: NORMAL
BH CV LOWER VASCULAR LEFT POPLITEAL COMPRESS: NORMAL
BH CV LOWER VASCULAR LEFT POPLITEAL PHASIC: NORMAL
BH CV LOWER VASCULAR LEFT POPLITEAL SPONT: NORMAL
BH CV LOWER VASCULAR LEFT POSTERIOR TIBIAL AUGMENT: NORMAL
BH CV LOWER VASCULAR LEFT POSTERIOR TIBIAL COMPRESS: NORMAL
BH CV LOWER VASCULAR LEFT PROFUNDA FEMORAL AUGMENT: NORMAL
BH CV LOWER VASCULAR LEFT PROFUNDA FEMORAL COMPETENT: NORMAL
BH CV LOWER VASCULAR LEFT PROFUNDA FEMORAL COMPRESS: NORMAL
BH CV LOWER VASCULAR LEFT PROFUNDA FEMORAL PHASIC: NORMAL
BH CV LOWER VASCULAR LEFT PROFUNDA FEMORAL SPONT: NORMAL
BH CV LOWER VASCULAR LEFT PROXIMAL FEMORAL AUGMENT: NORMAL
BH CV LOWER VASCULAR LEFT PROXIMAL FEMORAL COMPETENT: NORMAL
BH CV LOWER VASCULAR LEFT PROXIMAL FEMORAL COMPRESS: NORMAL
BH CV LOWER VASCULAR LEFT PROXIMAL FEMORAL PHASIC: NORMAL
BH CV LOWER VASCULAR LEFT PROXIMAL FEMORAL SPONT: NORMAL
BH CV LOWER VASCULAR LEFT SAPHENOFEMORAL JUNCTION AUGMENT: NORMAL
BH CV LOWER VASCULAR LEFT SAPHENOFEMORAL JUNCTION COMPETENT: NORMAL
BH CV LOWER VASCULAR LEFT SAPHENOFEMORAL JUNCTION COMPRESS: NORMAL
BH CV LOWER VASCULAR LEFT SAPHENOFEMORAL JUNCTION PHASIC: NORMAL
BH CV LOWER VASCULAR LEFT SAPHENOFEMORAL JUNCTION SPONT: NORMAL
BH CV LOWER VASCULAR RIGHT COMMON FEMORAL AUGMENT: NORMAL
BH CV LOWER VASCULAR RIGHT COMMON FEMORAL COMPETENT: NORMAL
BH CV LOWER VASCULAR RIGHT COMMON FEMORAL COMPRESS: NORMAL
BH CV LOWER VASCULAR RIGHT COMMON FEMORAL PHASIC: NORMAL
BH CV LOWER VASCULAR RIGHT COMMON FEMORAL SPONT: NORMAL
BH CV LOWER VASCULAR RIGHT DISTAL FEMORAL AUGMENT: NORMAL
BH CV LOWER VASCULAR RIGHT DISTAL FEMORAL COMPETENT: NORMAL
BH CV LOWER VASCULAR RIGHT DISTAL FEMORAL COMPRESS: NORMAL
BH CV LOWER VASCULAR RIGHT DISTAL FEMORAL PHASIC: NORMAL
BH CV LOWER VASCULAR RIGHT DISTAL FEMORAL SPONT: NORMAL
BH CV LOWER VASCULAR RIGHT GASTRONEMIUS COMPRESS: NORMAL
BH CV LOWER VASCULAR RIGHT GREATER SAPH AK COMPRESS: NORMAL
BH CV LOWER VASCULAR RIGHT GREATER SAPH BK COMPRESS: NORMAL
BH CV LOWER VASCULAR RIGHT LESSER SAPH COMPRESS: NORMAL
BH CV LOWER VASCULAR RIGHT MID FEMORAL AUGMENT: NORMAL
BH CV LOWER VASCULAR RIGHT MID FEMORAL COMPETENT: NORMAL
BH CV LOWER VASCULAR RIGHT MID FEMORAL COMPRESS: NORMAL
BH CV LOWER VASCULAR RIGHT MID FEMORAL PHASIC: NORMAL
BH CV LOWER VASCULAR RIGHT MID FEMORAL SPONT: NORMAL
BH CV LOWER VASCULAR RIGHT PERONEAL AUGMENT: NORMAL
BH CV LOWER VASCULAR RIGHT PERONEAL COMPRESS: NORMAL
BH CV LOWER VASCULAR RIGHT POPLITEAL AUGMENT: NORMAL
BH CV LOWER VASCULAR RIGHT POPLITEAL COMPETENT: NORMAL
BH CV LOWER VASCULAR RIGHT POPLITEAL COMPRESS: NORMAL
BH CV LOWER VASCULAR RIGHT POPLITEAL PHASIC: NORMAL
BH CV LOWER VASCULAR RIGHT POPLITEAL SPONT: NORMAL
BH CV LOWER VASCULAR RIGHT POSTERIOR TIBIAL AUGMENT: NORMAL
BH CV LOWER VASCULAR RIGHT POSTERIOR TIBIAL COMPRESS: NORMAL
BH CV LOWER VASCULAR RIGHT PROFUNDA FEMORAL AUGMENT: NORMAL
BH CV LOWER VASCULAR RIGHT PROFUNDA FEMORAL COMPETENT: NORMAL
BH CV LOWER VASCULAR RIGHT PROFUNDA FEMORAL COMPRESS: NORMAL
BH CV LOWER VASCULAR RIGHT PROFUNDA FEMORAL PHASIC: NORMAL
BH CV LOWER VASCULAR RIGHT PROFUNDA FEMORAL SPONT: NORMAL
BH CV LOWER VASCULAR RIGHT PROXIMAL FEMORAL AUGMENT: NORMAL
BH CV LOWER VASCULAR RIGHT PROXIMAL FEMORAL COMPETENT: NORMAL
BH CV LOWER VASCULAR RIGHT PROXIMAL FEMORAL COMPRESS: NORMAL
BH CV LOWER VASCULAR RIGHT PROXIMAL FEMORAL PHASIC: NORMAL
BH CV LOWER VASCULAR RIGHT PROXIMAL FEMORAL SPONT: NORMAL
BH CV LOWER VASCULAR RIGHT SAPHENOFEMORAL JUNCTION AUGMENT: NORMAL
BH CV LOWER VASCULAR RIGHT SAPHENOFEMORAL JUNCTION COMPETENT: NORMAL
BH CV LOWER VASCULAR RIGHT SAPHENOFEMORAL JUNCTION COMPRESS: NORMAL
BH CV LOWER VASCULAR RIGHT SAPHENOFEMORAL JUNCTION PHASIC: NORMAL
BH CV LOWER VASCULAR RIGHT SAPHENOFEMORAL JUNCTION SPONT: NORMAL
BH CV VAS BP LEFT ARM: NORMAL MMHG
BH CV XLRA - RV BASE: 3.7 CM
BH CV XLRA - RV LENGTH: 7.2 CM
BH CV XLRA - RV MID: 3.2 CM
BH CV XLRA - TDI S': 9.8 CM/SEC
BILIRUB UR QL STRIP: NEGATIVE
BUN SERPL-MCNC: 20 MG/DL (ref 8–23)
BUN/CREAT SERPL: 14.9 (ref 7–25)
CALCIUM SPEC-SCNC: 8.9 MG/DL (ref 8.6–10.5)
CHLORIDE SERPL-SCNC: 99 MMOL/L (ref 98–107)
CLARITY UR: CLEAR
CO2 SERPL-SCNC: 28 MMOL/L (ref 22–29)
COLOR UR: YELLOW
CREAT SERPL-MCNC: 1.34 MG/DL (ref 0.57–1)
D DIMER PPP FEU-MCNC: 3.43 MCGFEU/ML (ref 0.01–0.5)
DEPRECATED RDW RBC AUTO: 45.4 FL (ref 37–54)
EGFRCR SERPLBLD CKD-EPI 2021: 38.5 ML/MIN/1.73
EOSINOPHIL # BLD AUTO: 0.08 10*3/MM3 (ref 0–0.4)
EOSINOPHIL NFR BLD AUTO: 0.9 % (ref 0.3–6.2)
ERYTHROCYTE [DISTWIDTH] IN BLOOD BY AUTOMATED COUNT: 14.2 % (ref 12.3–15.4)
FLUAV RNA RESP QL NAA+PROBE: NOT DETECTED
FLUBV RNA RESP QL NAA+PROBE: NOT DETECTED
GLUCOSE BLDC GLUCOMTR-MCNC: 113 MG/DL (ref 70–130)
GLUCOSE BLDC GLUCOMTR-MCNC: 157 MG/DL (ref 70–130)
GLUCOSE BLDC GLUCOMTR-MCNC: 162 MG/DL (ref 70–130)
GLUCOSE SERPL-MCNC: 127 MG/DL (ref 65–99)
GLUCOSE UR STRIP-MCNC: NEGATIVE MG/DL
HBA1C MFR BLD: 7.3 % (ref 4.8–5.6)
HCT VFR BLD AUTO: 36.6 % (ref 34–46.6)
HGB BLD-MCNC: 12.5 G/DL (ref 12–15.9)
HGB UR QL STRIP.AUTO: NEGATIVE
HYALINE CASTS UR QL AUTO: NORMAL /LPF
IMM GRANULOCYTES # BLD AUTO: 0.03 10*3/MM3 (ref 0–0.05)
IMM GRANULOCYTES NFR BLD AUTO: 0.3 % (ref 0–0.5)
KETONES UR QL STRIP: NEGATIVE
L PNEUMO1 AG UR QL IA: NEGATIVE
LEFT ATRIUM VOLUME INDEX: 26.6 ML/M2
LEUKOCYTE ESTERASE UR QL STRIP.AUTO: ABNORMAL
LV EF 2D ECHO EST: 51 %
LYMPHOCYTES # BLD AUTO: 1.14 10*3/MM3 (ref 0.7–3.1)
LYMPHOCYTES NFR BLD AUTO: 12.8 % (ref 19.6–45.3)
MAXIMAL PREDICTED HEART RATE: 133 BPM
MAXIMAL PREDICTED HEART RATE: 133 BPM
MCH RBC QN AUTO: 30 PG (ref 26.6–33)
MCHC RBC AUTO-ENTMCNC: 34.2 G/DL (ref 31.5–35.7)
MCV RBC AUTO: 88 FL (ref 79–97)
MONOCYTES # BLD AUTO: 0.74 10*3/MM3 (ref 0.1–0.9)
MONOCYTES NFR BLD AUTO: 8.3 % (ref 5–12)
MRSA DNA SPEC QL NAA+PROBE: NEGATIVE
NEUTROPHILS NFR BLD AUTO: 6.88 10*3/MM3 (ref 1.7–7)
NEUTROPHILS NFR BLD AUTO: 77 % (ref 42.7–76)
NITRITE UR QL STRIP: NEGATIVE
NRBC BLD AUTO-RTO: 0 /100 WBC (ref 0–0.2)
PH UR STRIP.AUTO: 7 [PH] (ref 5–8)
PLATELET # BLD AUTO: 267 10*3/MM3 (ref 140–450)
PMV BLD AUTO: 11.4 FL (ref 6–12)
POTASSIUM SERPL-SCNC: 3.8 MMOL/L (ref 3.5–5.2)
PROCALCITONIN SERPL-MCNC: 0.12 NG/ML (ref 0–0.25)
PROT UR QL STRIP: NEGATIVE
QT INTERVAL: 374 MS
QTC INTERVAL: 494 MS
RBC # BLD AUTO: 4.16 10*6/MM3 (ref 3.77–5.28)
RBC # UR STRIP: NORMAL /HPF
REF LAB TEST METHOD: NORMAL
S PNEUM AG SPEC QL LA: NEGATIVE
SARS-COV-2 RNA RESP QL NAA+PROBE: NOT DETECTED
SODIUM SERPL-SCNC: 137 MMOL/L (ref 136–145)
SP GR UR STRIP: 1.01 (ref 1–1.03)
SQUAMOUS #/AREA URNS HPF: NORMAL /HPF
STRESS TARGET HR: 113 BPM
STRESS TARGET HR: 113 BPM
UROBILINOGEN UR QL STRIP: ABNORMAL
VANCOMYCIN SERPL-MCNC: 10.4 MCG/ML (ref 5–40)
WBC # UR STRIP: NORMAL /HPF
WBC NRBC COR # BLD: 8.93 10*3/MM3 (ref 3.4–10.8)

## 2022-06-11 PROCEDURE — 99223 1ST HOSP IP/OBS HIGH 75: CPT | Performed by: INTERNAL MEDICINE

## 2022-06-11 PROCEDURE — 94640 AIRWAY INHALATION TREATMENT: CPT

## 2022-06-11 PROCEDURE — 25010000002 HEPARIN (PORCINE) PER 1000 UNITS: Performed by: NURSE PRACTITIONER

## 2022-06-11 PROCEDURE — 80048 BASIC METABOLIC PNL TOTAL CA: CPT | Performed by: NURSE PRACTITIONER

## 2022-06-11 PROCEDURE — 93970 EXTREMITY STUDY: CPT | Performed by: INTERNAL MEDICINE

## 2022-06-11 PROCEDURE — 94799 UNLISTED PULMONARY SVC/PX: CPT

## 2022-06-11 PROCEDURE — 93306 TTE W/DOPPLER COMPLETE: CPT

## 2022-06-11 PROCEDURE — 87899 AGENT NOS ASSAY W/OPTIC: CPT | Performed by: NURSE PRACTITIONER

## 2022-06-11 PROCEDURE — 80202 ASSAY OF VANCOMYCIN: CPT

## 2022-06-11 PROCEDURE — 25010000002 FUROSEMIDE PER 20 MG: Performed by: INTERNAL MEDICINE

## 2022-06-11 PROCEDURE — 87449 NOS EACH ORGANISM AG IA: CPT | Performed by: NURSE PRACTITIONER

## 2022-06-11 PROCEDURE — 71250 CT THORAX DX C-: CPT

## 2022-06-11 PROCEDURE — 83036 HEMOGLOBIN GLYCOSYLATED A1C: CPT | Performed by: NURSE PRACTITIONER

## 2022-06-11 PROCEDURE — 93306 TTE W/DOPPLER COMPLETE: CPT | Performed by: INTERNAL MEDICINE

## 2022-06-11 PROCEDURE — 87641 MR-STAPH DNA AMP PROBE: CPT | Performed by: NURSE PRACTITIONER

## 2022-06-11 PROCEDURE — 85025 COMPLETE CBC W/AUTO DIFF WBC: CPT | Performed by: NURSE PRACTITIONER

## 2022-06-11 PROCEDURE — 82962 GLUCOSE BLOOD TEST: CPT

## 2022-06-11 PROCEDURE — 94664 DEMO&/EVAL PT USE INHALER: CPT

## 2022-06-11 PROCEDURE — 25010000002 CEFEPIME PER 500 MG

## 2022-06-11 PROCEDURE — 81001 URINALYSIS AUTO W/SCOPE: CPT | Performed by: NURSE PRACTITIONER

## 2022-06-11 PROCEDURE — 63710000001 INSULIN LISPRO (HUMAN) PER 5 UNITS: Performed by: NURSE PRACTITIONER

## 2022-06-11 PROCEDURE — 93970 EXTREMITY STUDY: CPT

## 2022-06-11 PROCEDURE — 25010000002 FUROSEMIDE PER 20 MG: Performed by: EMERGENCY MEDICINE

## 2022-06-11 RX ORDER — LIDOCAINE 50 MG/G
1 PATCH TOPICAL EVERY 24 HOURS
Status: DISCONTINUED | OUTPATIENT
Start: 2022-06-11 | End: 2022-06-17 | Stop reason: HOSPADM

## 2022-06-11 RX ORDER — ALUMINA, MAGNESIA, AND SIMETHICONE 2400; 2400; 240 MG/30ML; MG/30ML; MG/30ML
5 SUSPENSION ORAL EVERY 6 HOURS PRN
Status: DISCONTINUED | OUTPATIENT
Start: 2022-06-11 | End: 2022-06-13

## 2022-06-11 RX ORDER — IPRATROPIUM BROMIDE AND ALBUTEROL SULFATE 2.5; .5 MG/3ML; MG/3ML
3 SOLUTION RESPIRATORY (INHALATION)
Status: DISCONTINUED | OUTPATIENT
Start: 2022-06-11 | End: 2022-06-13

## 2022-06-11 RX ORDER — HEPARIN SODIUM 5000 [USP'U]/ML
5000 INJECTION, SOLUTION INTRAVENOUS; SUBCUTANEOUS EVERY 12 HOURS SCHEDULED
Status: DISCONTINUED | OUTPATIENT
Start: 2022-06-11 | End: 2022-06-17 | Stop reason: HOSPADM

## 2022-06-11 RX ORDER — AMLODIPINE BESYLATE 10 MG/1
10 TABLET ORAL NIGHTLY
Status: DISCONTINUED | OUTPATIENT
Start: 2022-06-12 | End: 2022-06-12

## 2022-06-11 RX ORDER — ASPIRIN 325 MG
325 TABLET, DELAYED RELEASE (ENTERIC COATED) ORAL DAILY
Status: DISCONTINUED | OUTPATIENT
Start: 2022-06-11 | End: 2022-06-17 | Stop reason: HOSPADM

## 2022-06-11 RX ORDER — FUROSEMIDE 10 MG/ML
40 INJECTION INTRAMUSCULAR; INTRAVENOUS ONCE
Status: COMPLETED | OUTPATIENT
Start: 2022-06-11 | End: 2022-06-11

## 2022-06-11 RX ORDER — DOCUSATE SODIUM 100 MG/1
100 CAPSULE, LIQUID FILLED ORAL 2 TIMES DAILY PRN
Status: DISCONTINUED | OUTPATIENT
Start: 2022-06-11 | End: 2022-06-17 | Stop reason: HOSPADM

## 2022-06-11 RX ORDER — DONEPEZIL HYDROCHLORIDE 5 MG/1
5 TABLET, FILM COATED ORAL NIGHTLY
Status: DISCONTINUED | OUTPATIENT
Start: 2022-06-11 | End: 2022-06-17 | Stop reason: HOSPADM

## 2022-06-11 RX ORDER — SODIUM CHLORIDE 0.9 % (FLUSH) 0.9 %
10 SYRINGE (ML) INJECTION AS NEEDED
Status: DISCONTINUED | OUTPATIENT
Start: 2022-06-11 | End: 2022-06-17 | Stop reason: HOSPADM

## 2022-06-11 RX ORDER — AMIODARONE HYDROCHLORIDE 200 MG/1
200 TABLET ORAL DAILY
Status: DISCONTINUED | OUTPATIENT
Start: 2022-06-11 | End: 2022-06-13

## 2022-06-11 RX ORDER — ACETAMINOPHEN 325 MG/1
650 TABLET ORAL EVERY 4 HOURS PRN
Status: DISCONTINUED | OUTPATIENT
Start: 2022-06-11 | End: 2022-06-17 | Stop reason: HOSPADM

## 2022-06-11 RX ORDER — CITALOPRAM 20 MG/1
20 TABLET ORAL DAILY
Status: DISCONTINUED | OUTPATIENT
Start: 2022-06-11 | End: 2022-06-17 | Stop reason: HOSPADM

## 2022-06-11 RX ORDER — FUROSEMIDE 10 MG/ML
20 INJECTION INTRAMUSCULAR; INTRAVENOUS
Status: DISCONTINUED | OUTPATIENT
Start: 2022-06-11 | End: 2022-06-12

## 2022-06-11 RX ORDER — ISOSORBIDE MONONITRATE 60 MG/1
60 TABLET, EXTENDED RELEASE ORAL DAILY
Status: DISCONTINUED | OUTPATIENT
Start: 2022-06-11 | End: 2022-06-17 | Stop reason: HOSPADM

## 2022-06-11 RX ORDER — ACETAMINOPHEN 650 MG/1
650 SUPPOSITORY RECTAL EVERY 4 HOURS PRN
Status: DISCONTINUED | OUTPATIENT
Start: 2022-06-11 | End: 2022-06-17 | Stop reason: HOSPADM

## 2022-06-11 RX ORDER — VANCOMYCIN HYDROCHLORIDE 1 G/200ML
20 INJECTION, SOLUTION INTRAVENOUS ONCE
Status: DISCONTINUED | OUTPATIENT
Start: 2022-06-11 | End: 2022-06-11

## 2022-06-11 RX ORDER — ACETAMINOPHEN 160 MG/5ML
650 SOLUTION ORAL EVERY 4 HOURS PRN
Status: DISCONTINUED | OUTPATIENT
Start: 2022-06-11 | End: 2022-06-17 | Stop reason: HOSPADM

## 2022-06-11 RX ORDER — SODIUM CHLORIDE 0.9 % (FLUSH) 0.9 %
10 SYRINGE (ML) INJECTION EVERY 12 HOURS SCHEDULED
Status: DISCONTINUED | OUTPATIENT
Start: 2022-06-11 | End: 2022-06-17 | Stop reason: HOSPADM

## 2022-06-11 RX ORDER — DEXTROSE MONOHYDRATE 25 G/50ML
25 INJECTION, SOLUTION INTRAVENOUS
Status: DISCONTINUED | OUTPATIENT
Start: 2022-06-11 | End: 2022-06-17 | Stop reason: HOSPADM

## 2022-06-11 RX ORDER — FLUTICASONE PROPIONATE 50 MCG
2 SPRAY, SUSPENSION (ML) NASAL DAILY
Status: DISCONTINUED | OUTPATIENT
Start: 2022-06-11 | End: 2022-06-17 | Stop reason: HOSPADM

## 2022-06-11 RX ORDER — INSULIN LISPRO 100 [IU]/ML
0-7 INJECTION, SOLUTION INTRAVENOUS; SUBCUTANEOUS
Status: DISCONTINUED | OUTPATIENT
Start: 2022-06-11 | End: 2022-06-17 | Stop reason: HOSPADM

## 2022-06-11 RX ORDER — DIPHENOXYLATE HYDROCHLORIDE AND ATROPINE SULFATE 2.5; .025 MG/1; MG/1
1 TABLET ORAL DAILY
Status: DISCONTINUED | OUTPATIENT
Start: 2022-06-11 | End: 2022-06-17 | Stop reason: HOSPADM

## 2022-06-11 RX ORDER — NICOTINE POLACRILEX 4 MG
15 LOZENGE BUCCAL
Status: DISCONTINUED | OUTPATIENT
Start: 2022-06-11 | End: 2022-06-17 | Stop reason: HOSPADM

## 2022-06-11 RX ADMIN — ASPIRIN 325 MG: 325 TABLET, COATED ORAL at 08:23

## 2022-06-11 RX ADMIN — Medication 10 ML: at 08:24

## 2022-06-11 RX ADMIN — IPRATROPIUM BROMIDE AND ALBUTEROL SULFATE 3 ML: .5; 3 SOLUTION RESPIRATORY (INHALATION) at 19:44

## 2022-06-11 RX ADMIN — FLUTICASONE PROPIONATE 2 SPRAY: 50 SPRAY, METERED NASAL at 08:24

## 2022-06-11 RX ADMIN — INSULIN LISPRO 2 UNITS: 100 INJECTION, SOLUTION INTRAVENOUS; SUBCUTANEOUS at 17:33

## 2022-06-11 RX ADMIN — INSULIN LISPRO 2 UNITS: 100 INJECTION, SOLUTION INTRAVENOUS; SUBCUTANEOUS at 08:24

## 2022-06-11 RX ADMIN — CITALOPRAM HYDROBROMIDE 20 MG: 20 TABLET ORAL at 08:23

## 2022-06-11 RX ADMIN — IPRATROPIUM BROMIDE AND ALBUTEROL SULFATE 3 ML: .5; 3 SOLUTION RESPIRATORY (INHALATION) at 14:56

## 2022-06-11 RX ADMIN — HEPARIN SODIUM 5000 UNITS: 5000 INJECTION INTRAVENOUS; SUBCUTANEOUS at 20:02

## 2022-06-11 RX ADMIN — DONEPEZIL HYDROCHLORIDE 5 MG: 5 TABLET ORAL at 04:35

## 2022-06-11 RX ADMIN — Medication 10 ML: at 20:01

## 2022-06-11 RX ADMIN — DONEPEZIL HYDROCHLORIDE 5 MG: 5 TABLET ORAL at 20:02

## 2022-06-11 RX ADMIN — LIDOCAINE 1 PATCH: 50 PATCH CUTANEOUS at 08:23

## 2022-06-11 RX ADMIN — MULTIVITAMIN TABLET 1 TABLET: TABLET at 08:23

## 2022-06-11 RX ADMIN — FUROSEMIDE 20 MG: 10 INJECTION INTRAMUSCULAR; INTRAVENOUS at 17:44

## 2022-06-11 RX ADMIN — FUROSEMIDE 40 MG: 10 INJECTION, SOLUTION INTRAMUSCULAR; INTRAVENOUS at 00:52

## 2022-06-11 RX ADMIN — HEPARIN SODIUM 5000 UNITS: 5000 INJECTION INTRAVENOUS; SUBCUTANEOUS at 08:23

## 2022-06-11 RX ADMIN — IPRATROPIUM BROMIDE AND ALBUTEROL SULFATE 3 ML: .5; 3 SOLUTION RESPIRATORY (INHALATION) at 07:08

## 2022-06-11 RX ADMIN — IPRATROPIUM BROMIDE AND ALBUTEROL SULFATE 3 ML: .5; 3 SOLUTION RESPIRATORY (INHALATION) at 10:51

## 2022-06-11 RX ADMIN — ISOSORBIDE MONONITRATE 60 MG: 60 TABLET, EXTENDED RELEASE ORAL at 08:23

## 2022-06-11 RX ADMIN — CEFEPIME HYDROCHLORIDE 1 G: 1 INJECTION, POWDER, FOR SOLUTION INTRAMUSCULAR; INTRAVENOUS at 12:37

## 2022-06-11 RX ADMIN — AMIODARONE HYDROCHLORIDE 200 MG: 200 TABLET ORAL at 08:26

## 2022-06-11 RX ADMIN — FUROSEMIDE 20 MG: 10 INJECTION INTRAMUSCULAR; INTRAVENOUS at 08:23

## 2022-06-11 NOTE — CONSULTS
"Pharmacy Consult-Vancomycin Dosing  Mary Ramirez is a  87 y.o. female receiving vancomycin therapy.     Indication: PNA  Consulting Provider: Hospitalist  ID Consult:     Goal Trough: 15-20 mcg/mL    Current Antimicrobial Therapy  Anti-Infectives (From admission, onward)      Ordered     Dose/Rate Route Frequency Start Stop    06/11/22 0243  cefepime (MAXIPIME) 1 g/100 mL 0.9% NS IVPB (mbp)        Ordering Provider: Mayco Parker IV, PharmD    1 g  over 4 Hours Intravenous Every 24 Hours 06/11/22 1200 06/15/22 1159    06/11/22 0245  vancomycin - no scheduled doses (Pharmacy dosing per levels)        Ordering Provider: Mayco Parker IV, PharmD     Does not apply Daily 06/11/22 0900 06/13/22 0859    06/11/22 0240  Pharmacy to dose vancomycin        Ordering Provider: Margy Colin APRN   \"And\" Linked Group Details     Does not apply Continuous PRN 06/11/22 0240 06/15/22 0239    06/10/22 2253  vancomycin (VANCOCIN) 1000 mg/200 mL dextrose 5% IVPB        Ordering Provider: David Fonseca MD    20 mg/kg × 52.2 kg Intravenous Once 06/10/22 2255 06/11/22 0050    06/10/22 2253  cefepime (MAXIPIME) 2 g/100 mL 0.9% NS (mbp)        Ordering Provider: David Fonseca MD    2 g  200 mL/hr over 30 Minutes Intravenous Once 06/10/22 2255 06/11/22 0029            Allergies  Allergies as of 06/10/2022 - Unable to Assess 06/10/2022   Allergen Reaction Noted    Penicillins Other (See Comments) 11/23/2017       Labs    Results from last 7 days   Lab Units 06/10/22  2230   BUN mg/dL 27*   CREATININE mg/dL 1.28*       Results from last 7 days   Lab Units 06/10/22  2319   WBC 10*3/mm3 10.81*       Evaluation of Dosing     Last Dose Received in the ED/Outside Facility: 1000 mg @ 2341  Is Patient on Dialysis or Renal Replacement: N    Ht - 162.6 cm (64.02\")  Wt - 58.6 kg (129 lb 3.2 oz)    Estimated Creatinine Clearance: 28.6 mL/min (A) (by C-G formula based on SCr of 1.28 mg/dL (H)).    Intake & Output (last 3 days)  "        06/08 0701  06/09 0700 06/09 0701  06/10 0700 06/10 0701  06/11 0700           Urine Unmeasured Occurrence   1 x            Microbiology and Radiology  Microbiology Results (last 10 days)       Procedure Component Value - Date/Time    COVID PRE-OP / PRE-PROCEDURE SCREENING ORDER (NO ISOLATION) - Swab, Nasopharynx [[id number]]  (Normal) Collected: 06/10/22 2318    Lab Status: Final result Specimen: Swab from Nasopharynx Updated: 06/11/22 0017    Narrative:      The following orders were created for panel order COVID PRE-OP / PRE-PROCEDURE SCREENING ORDER (NO ISOLATION) - Swab, Nasopharynx.  Procedure                               Abnormality         Status                     ---------                               -----------         ------                     COVID-19 and FLU A/B PCR...[[id number]]  Normal              Final result                 Please view results for these tests on the individual orders.    COVID-19 and FLU A/B PCR - Swab, Nasopharynx [[id number]]  (Normal) Collected: 06/10/22 2318    Lab Status: Final result Specimen: Swab from Nasopharynx Updated: 06/11/22 0017     COVID19 Not Detected     Influenza A PCR Not Detected     Influenza B PCR Not Detected    Narrative:      Fact sheet for providers: https://www.fda.gov/media/323307/download    Fact sheet for patients: https://www.fda.gov/media/261465/download    Test performed by PCR.    COVID PRE-OP / PRE-PROCEDURE SCREENING ORDER (NO ISOLATION) - Swab, Nasopharynx [[id number]]  (Normal) Collected: 06/03/22 1619    Lab Status: Final result Specimen: Swab from Nasopharynx Updated: 06/03/22 1648    Narrative:      The following orders were created for panel order COVID PRE-OP / PRE-PROCEDURE SCREENING ORDER (NO ISOLATION) - Swab, Nasopharynx.  Procedure                               Abnormality         Status                     ---------                               -----------         ------                     COVID-19 and FLU A/B  PCR...[[id number]]  Normal              Final result                 Please view results for these tests on the individual orders.    COVID-19 and FLU A/B PCR - Swab, Nasopharynx [[id number]]  (Normal) Collected: 06/03/22 1619    Lab Status: Final result Specimen: Swab from Nasopharynx Updated: 06/03/22 1648     COVID19 Not Detected     Influenza A PCR Not Detected     Influenza B PCR Not Detected    Narrative:      Fact sheet for providers: https://www.fda.gov/media/130479/download    Fact sheet for patients: https://www.fda.gov/media/241625/download    Test performed by PCR.            Vancomycin Levels:                        Assessment/Plan:    Advanced age female with elevated serum creatinine.  Status post 1000 mg IV vancomycin load.    MRSA PCR per protocol.  Random level with AM labs to assess distribution.    Further dosing per rounding pharmacist pending results of MRSA surveillance.    Thank you for this consult.  Mayco Parker IV, PharmD, BCPS  6/11/2022  06:06 EDT

## 2022-06-11 NOTE — H&P
[facility] Medicine Services  HISTORY AND PHYSICAL    Patient Name: Mary Ramirez  : 1934  MRN: [id number]  Primary Care Physician: Provider, No Known  Date of admission: 6/10/2022    Subjective   Subjective     Chief Complaint:  Shortness of air     HPI:  Mary Ramirez is a 87 y.o. female with a past medical history significant for paroxysmal atrial fibrillation, essential hypertension, CAD, diastolic congestive heart failure, CKD III, diabetes mellitus type 2, diverticulosis, Dementia, hyperlipidemia and TIA presents to the ED with complaints of shortness of air.  Due to underlying dementia patient is unable to provide much history.  She does confirm her shortness of air along with a non-productive cough.  She denies any recent fever, chills, nausea, vomiting or diarrhea.  She does currently reside in a nursing home.  She did present to the ED two nights ago after a fall and hypoglycemia.  She was found down by staff members and had obviously struck her head.  Oral glucose was administered and her blood sugar had risen above 200 by the time EMS arrived.  At that time she reports being diagnosed with pneumonia earlier in the week and has been on a z-anju.   ED provider spoke with patient's son who is her POA.  They did not wish to have her admitted.  Zithromax was continued and she was transferred back to the [place].    Tonight CXR obtained showed multifocal pneumonia.  Pulmonary edema, pulmonary hemorrhage or ARDS could not be excluded.  Right pleural effusion, cardiomegaly and hiatal hernia.  She arrived on RA and is currently requiring 70% hiflow nasal cannual with oxygen sat of 95%.  Patient will be admitted to [facility] under the care of the Hospitalist for further evaluation and treatment.         Review of Systems   Unable to perform ROS: Dementia        All other systems reviewed and are negative.     Personal History     Past Medical History:   Diagnosis Date   • Atrial  fibrillation (HCC)    • Benign essential hypertension    • CAD (coronary artery disease)    • Carotid artery disease (HCC)    • CHF (congestive heart failure) (HCC)    • Chronic allergic rhinitis    • CKD (chronic kidney disease), stage III (HCC)    • DDD (degenerative disc disease), cervical    • Dementia (HCC)    • Diabetes mellitus (HCC)    • Diverticulosis     WITH PERFORATION    • Hypertension    • Major depressive disorder, single episode, mild (HCC)    • Memory loss    • Mixed hyperlipidemia    • SSS (sick sinus syndrome) (HCC)    • TIA (transient ischemic attack) 01/2016             Past Surgical History:   Procedure Laterality Date   • ANKLE SURGERY Left 1996   • ATHERECTOMY  1995   • ATHRECTOMY ILIAC, FEMORAL, TIBIAL ARTERY     • BREAST LUMPECTOMY Left 1981   • COLON SURGERY     • COLOSTOMY  1983   • CORONARY STENT PLACEMENT  2001   • FINGER FRACTURE SURGERY Right    • HIP TROCHANTERIC NAILING WITH INTRAMEDULLARY HIP SCREW Left 11/23/2017   • HIP TROCHANTERIC NAILING WITH INTRAMEDULLARY HIP SCREW Right 3/30/2019    Procedure: HIP TROCANTERIC NAILING WITH INTRAMEDULLARY HIP SCREW RIGHT;  Surgeon: Theo Figueroa MD;  Location: [facility];  Service: Orthopedics   • HYSTERECTOMY  1984   • OTHER SURGICAL HISTORY  1984    REANASTAMOSIS        Family History:  family history includes Coronary artery disease (age of onset: 56) in her mother; Hypertension in her father and mother; Lung cancer in her brother and father; Osteoporosis in her sister. Otherwise pertinent FHx was reviewed and unremarkable.     Social History:  reports that she quit smoking about 43 years ago. Her smoking use included cigarettes. She started smoking about 46 years ago. She quit after 2.00 years of use. She has never used smokeless tobacco. She reports that she does not drink alcohol and does not use drugs.  Social History     Social History Narrative    [facility]    Patient drinks 2 cups of caffeine per  "day.,       Medications:  Amino Acids-Protein Hydrolys, Multiple Vitamin, acetaminophen, aluminum-magnesium hydroxide-simethicone, amLODIPine, amiodarone, aspirin EC, bisacodyl, citalopram, cloNIDine, collagenase, docusate sodium, donepezil, fluticasone, insulin aspart, isosorbide mononitrate, lidocaine, metFORMIN ER, ondansetron, and polyethylene glycol    Allergies   Allergen Reactions   • Penicillins Other (See Comments)     Pt states \"i don't know\"       Objective   Objective     Vital Signs:   Temp:  [98.5 °F (36.9 °C)] 98.5 °F (36.9 °C)  Heart Rate:  [103-113] 104  Resp:  [24] 24  BP: (132-144)/(89-98) 141/98  Flow (L/min):  [4-50] 50    Physical Exam  Vitals and nursing note reviewed.   Constitutional:       General: She is not in acute distress.     Appearance: Normal appearance. She is not ill-appearing, toxic-appearing or diaphoretic.   HENT:      Head: Normocephalic and atraumatic.      Nose: Nose normal.      Mouth/Throat:      Mouth: Mucous membranes are dry.      Pharynx: Oropharynx is clear.   Eyes:      Extraocular Movements: Extraocular movements intact.      Conjunctiva/sclera: Conjunctivae normal.      Pupils: Pupils are equal, round, and reactive to light.   Cardiovascular:      Rate and Rhythm: Normal rate. Rhythm irregular.      Pulses: Normal pulses.      Heart sounds: Normal heart sounds.   Pulmonary:      Effort: Pulmonary effort is normal.      Breath sounds: Rhonchi present.   Abdominal:      General: Bowel sounds are normal. There is no distension.      Palpations: Abdomen is soft. There is no mass.      Tenderness: There is no abdominal tenderness. There is no right CVA tenderness, left CVA tenderness, guarding or rebound.      Hernia: No hernia is present.   Musculoskeletal:         General: No swelling, tenderness, deformity or signs of injury. Normal range of motion.      Cervical back: Normal range of motion and neck supple.      Right lower leg: No edema.      Left lower leg: No " edema.   Skin:     General: Skin is warm and dry.   Neurological:      General: No focal deficit present.      Mental Status: She is alert. Mental status is at baseline. She is disoriented.   Psychiatric:         Mood and Affect: Mood normal.         Behavior: Behavior normal.         Results Reviewed:  I have personally reviewed most recent indicated data and agree with findings including:  [x]  Laboratory  [x]  Radiology  [x]  EKG/Telemetry  []  Pathology  []  Cardiac/Vascular Studies  []  Old records  []  Other:  Most pertinent findings include:      LAB RESULTS:      Lab 06/10/22  2319 06/10/22  2230   WBC 10.81*  --    HEMOGLOBIN 12.8  --    HEMATOCRIT 39.8  --    PLATELETS 278  --    NEUTROS ABS 8.41*  --    IMMATURE GRANS (ABS) 0.05  --    LYMPHS ABS 1.26  --    MONOS ABS 0.93*  --    EOS ABS 0.10  --    MCV 93.4  --    PROCALCITONIN  --  0.12   LACTATE 1.2  --    D DIMER QUANT 3.43*  --          Lab 06/10/22  2230   SODIUM 133*   POTASSIUM 4.2   CHLORIDE 98   CO2 25.0   ANION GAP 10.0   BUN 27*   CREATININE 1.28*   EGFR 40.6*   GLUCOSE 117*   CALCIUM 9.7         Lab 06/10/22  2230   TOTAL PROTEIN 7.4   ALBUMIN 4.30   GLOBULIN 3.1   ALT (SGPT) 10   AST (SGOT) 18   BILIRUBIN 0.5   ALK PHOS 112         Lab 06/10/22  2230   PROBNP 5,037.0*   TROPONIN T 0.028                 Brief Urine Lab Results  (Last result in the past 365 days)      Color   Clarity   Blood   Leuk Est   Nitrite   Protein   CREAT   Urine HCG        06/03/22 1424 Yellow   Clear   Negative   Negative   Negative   Negative               Microbiology Results (last 10 days)     Procedure Component Value - Date/Time    COVID PRE-OP / PRE-PROCEDURE SCREENING ORDER (NO ISOLATION) - Swab, Nasopharynx [[id number]]  (Normal) Collected: 06/10/22 2318    Lab Status: Final result Specimen: Swab from Nasopharynx Updated: 06/11/22 0017    Narrative:      The following orders were created for panel order COVID PRE-OP / PRE-PROCEDURE SCREENING ORDER (NO  ISOLATION) - Swab, Nasopharynx.  Procedure                               Abnormality         Status                     ---------                               -----------         ------                     COVID-19 and FLU A/B PCR...[[id number]]  Normal              Final result                 Please view results for these tests on the individual orders.    COVID-19 and FLU A/B PCR - Swab, Nasopharynx [[id number]]  (Normal) Collected: 06/10/22 2318    Lab Status: Final result Specimen: Swab from Nasopharynx Updated: 06/11/22 0017     COVID19 Not Detected     Influenza A PCR Not Detected     Influenza B PCR Not Detected    Narrative:      Fact sheet for providers: https://www.fda.gov/media/574556/download    Fact sheet for patients: https://www.fda.gov/media/391717/download    Test performed by PCR.    COVID PRE-OP / PRE-PROCEDURE SCREENING ORDER (NO ISOLATION) - Swab, Nasopharynx [[id number]]  (Normal) Collected: 06/03/22 1619    Lab Status: Final result Specimen: Swab from Nasopharynx Updated: 06/03/22 1648    Narrative:      The following orders were created for panel order COVID PRE-OP / PRE-PROCEDURE SCREENING ORDER (NO ISOLATION) - Swab, Nasopharynx.  Procedure                               Abnormality         Status                     ---------                               -----------         ------                     COVID-19 and FLU A/B PCR...[[id number]]  Normal              Final result                 Please view results for these tests on the individual orders.    COVID-19 and FLU A/B PCR - Swab, Nasopharynx [[id number]]  (Normal) Collected: 06/03/22 1619    Lab Status: Final result Specimen: Swab from Nasopharynx Updated: 06/03/22 1648     COVID19 Not Detected     Influenza A PCR Not Detected     Influenza B PCR Not Detected    Narrative:      Fact sheet for providers: https://www.fda.gov/media/105395/download    Fact sheet for patients: https://www.fda.gov/media/408146/download    Test performed by  PCR.          XR Chest 1 View    Result Date: 6/10/2022  DATE OF EXAM: 6/10/2022 10:37 PM  PROCEDURE: XR CHEST 1 VW-  INDICATIONS: sob  COMPARISON: Chest x-ray 06/03/2022 CT chest 06/03/2022  TECHNIQUE: Single radiographic view of the chest was obtained.  FINDINGS: Lungs are normally expanded. Heart size is normal. No pneumothorax or pleural effusion or focal pulmonary parenchymal opacity. Pulmonary vessels are distinct. Bones and soft tissues are normal.      Impression: Worse appearance of chest with multifocal airspace opacities most consistent with multifocal pneumonia. Pulmonary edema, pulmonary hemorrhage or ARDS cannot be excluded. Right pleural effusion. Cardiomegaly. Hiatal hernia.  This report was finalized on 6/10/2022 10:56 PM by Xiomara Beyer MD.        Results for orders placed during the hospital encounter of 03/03/19    Transthoracic Echo Complete With Contrast if Necessary Per Protocol    Interpretation Summary  · Calculated EF = 66.0%  · Left ventricular systolic function is normal.  · Left ventricular wall thickness is consistent with moderate concentric hypertrophy.  · Left atrial cavity size is severely dilated.  · Left ventricular diastolic dysfunction (grade I) consistent with impaired relaxation.  · Mild tricuspid valve regurgitation is present.  · Mild-to-moderate mitral valve regurgitation is present  · There is calcification of the aortic valve.      Assessment & Plan   Assessment & Plan       Essential hypertension    Coronary artery disease involving native coronary artery of native heart with angina pectoris (HCC)    Dementia (HCC)    Paroxysmal atrial fibrillation (HCC)    Benign essential hypertension    CKD (chronic kidney disease), stage III (HCC)    GERD (gastroesophageal reflux disease)    Late onset Alzheimer's disease without behavioral disturbance (HCC)    Acute respiratory failure with hypercapnia (HCC)    Multifocal pneumonia    Acute on chronic diastolic CHF (congestive heart  failure) (HCC)      87 year old female presents to the ED due to worsening shortness of air.  Was diagnosed with pneumonia several days ago and has been on a Zpak.      1) Multifocal pneumonia       Acute hypoxic respiratory failure  -CXR mentioned above  -initially on RA on arrival now currently requiring 70% hi flow NC  -o2 sat currently 95%  -d-dimer 3.43, obtain CT of chest tonight, VQ scan in am along with venous duplex of BLE  -Son verifies patient is DNR/DNI  -continue vancomycin and cefepime   -blood cultures x2 pending   -sputum culture  -COVID-19 negative   -check urine antigens   -MRSA PCR   -continuous pulse ox  -keep o2 sat >90%    2) Acute on chronic HFpEF  -last echo with EF 66%, mild tricuspid valve regurgitation  -mild to moderate MVR  -proBNP tonight 5, 037.0  -s/p 40 mg IV lasix   -strict I &O   -daily weight   -follows with Dr. Flores     3) Paroxysmal atrial fibrillation   -currently rate controlled   -on amiodarone   -previously on eliquis but stopped due to multiple falls   -echo mentioned above    4) CKD III   -baseline creatinine 1.1-1.4  -hold nephrotoxic agents   -repeat labs in am     5) Essential hypertension   -on norvasc, clonidine.    -BP stable     6) Dementia  -on aricept   -fall precautions     7) Diabetes mellitus type 2  -check hgb A1c   -start ldssi   -fingersticks achs         DVT prophylaxis:  heparin     CODE STATUS:  DNR/DNI        This note has been completed as part of a split-shared workflow.     Signature: Electronically signed by CRISTA Lindsay, 06/11/22, 1:16 AM EDT.      Attending   Admission Attestation       I have seen and examined the patient, performing an independent face-to-face diagnostic evaluation with plan of care reviewed and developed with the advanced practice clinician (APC).      Brief Summary Statement:   Mary Ramirez is a 87 y.o. female with history of dementia, diastolic heart failure, CAD, paroxysmal atrial fibrillation, DMII, CKDIII, and  HTN presents with shortness of breath and cough.  She was recently started on azithromycin for pneumonia, and was actually seen in the ED on 6/3 for the same -- but discharged after discussion of goals of care with the family.  CXR tonight consistent with multifocal infiltrates.    Remainder of detailed HPI is as noted by APC and has been reviewed and/or edited by me for completeness.    Attending Physical Exam:  Constitutional - no acute distress, somewhat frail, in bed  HEENT-NCAT, mucous membranes moist  CV-irregularly irregular  Resp-fine crackles in right lung fields  Abd-soft, nontender, nondistended, normoactive bowel sounds  Ext-No lower extremity cyanosis, clubbing or edema bilaterally  Neuro-awake, speech clear  Psych-normal affect   Skin- No rash on exposed UE or LE bilaterally, some bruising on legs      Brief Assessment/Plan :    Acute on chronic diastolic heart failure  Possible Pneumonia  - Repeat CT tonight shows progression of infiltrates compared to 6/3 imaging.   - at this juncture favor edema over infection -- procalcitonin low at 0.12, however proBNP elevated at 5,037.  Increased hypoxia after patient received bolus in ED.  - will continue empiric antibiotics and follow cultures, but will add IV lasix 20 mg BID.   - d-dimer elevated (3.4), will check VQ and bilateral LE duplex given low creatinine clearance  - loculated effusion of unclear chronicity, however small right effusion noted on CT Abdomen/Pelvis from 8/2018  - echo am    PAF    CKD III    Dementia    See detailed assessment and plan developed with APC which I have reviewed and/or edited for completeness.        Admission Status: I believe that this patient meets INPATIENT status due to need for IV diuresis for acute on chronic heart failure.  I feel patient’s risk for adverse outcomes and need for care warrant INPATIENT evaluation and I predict the patient’s care encounter to likely last beyond 2 midnights.        Emmanuel Mehta,  MD  06/11/22

## 2022-06-11 NOTE — PROGRESS NOTES
[facility] Medicine Services  ADMISSION FOLLOW-UP NOTE          Patient admitted after midnight, H&P by my partner performed earlier on today's date reviewed.  Interim findings, labs, and charting also reviewed.        [facility] Hospital Problem List has been managed and updated to include any new diagnoses:  Active Hospital Problems    Diagnosis  POA   • Acute respiratory failure with hypercapnia (HCC) [J96.02]  Yes   • Multifocal pneumonia [J18.9]  Yes   • Acute on chronic diastolic CHF (congestive heart failure) (HCC) [I50.33]  Unknown   • Acute sepsis (HCC) [A41.9]  Yes   • Late onset Alzheimer's disease without behavioral disturbance (HCC) [G30.1, F02.80]  Yes   • GERD (gastroesophageal reflux disease) [K21.9]  Yes   • Benign essential hypertension [I10]  Yes   • CKD (chronic kidney disease), stage III (HCC) [N18.30]  Yes   • Paroxysmal atrial fibrillation (HCC) [I48.0]  Yes   • Dementia (HCC) [F03.90]  Yes   • Coronary artery disease involving native coronary artery of native heart with angina pectoris (HCC) [I25.119]  Yes   • Essential hypertension [I10]  Yes      Resolved Hospital Problems   No resolved problems to display.         ADDITIONAL PLAN:  - detailed assessment and plan from admission reviewed  87 year old female presents to the ED due to worsening shortness of air.  Was diagnosed with pneumonia several days ago and has been on a Zpak.       Multifocal pneumonia   Acute hypoxic respiratory failure  -initially on RA on arrival now on high flow nasal cannula at 40 L and 50%.  -Febrile on presentation to 100.8.  Mild leukocytosis of 10.  Elevated D-dimer at 3.4.  -CT chest without contrast shows increased multifocal, ill-defined groundglass and streaky alveolar densities bilaterally concerning for pulmonary edema or pneumonia.  Unchanged around opacity in the right lung base.  An underlying parenchymal mass lesion is not excluded.  -D-dimer elevated 3.43.  VQ scan and  bilateral venous duplex pending  -Covid 19 negative  -Urine antigens pending  -Blood cultures x2 obtained and are pending  -Sputum culture pending  -Continue IV vancomycin and cefepime     Acute on chronic HFpEF  -last echo with EF 66%, mild tricuspid valve regurgitation  -mild to moderate MVR  -proBNP tonight 5,037.0  -s/p 40 mg IV lasix   -follows with Dr. Flores      Paroxysmal atrial fibrillation   -currently rate controlled   -on amiodarone   -previously on eliquis but stopped due to multiple falls   -echo mentioned above     CKD III   -baseline creatinine 1.1-1.4, creatinine at baseline  -hold nephrotoxic agents   -repeat labs in am      Essential hypertension   -on norvasc, clonidine.    -BP stable      Dementia  -on aricept   -fall precautions      Diabetes mellitus type 2  -check hgb A1c   -start ldssi   -fingersticks aleksandar Kruger MD  06/11/22

## 2022-06-11 NOTE — PLAN OF CARE
Patient arrived on the floor at 0430 hours from ED. Oriented only to self, follows some commands. Speech often incoherent/illogical. Lung sound severely diminished, currently on high flow NC with 70%, O2 sat over 92%. Multiple bruises/laceration noted, No PI noted upon admission. Patient was placed on waffle mattress Heels elevated in boots. BP significantly elevated. A fib on cardiac mx. Will cont to mx. Frequent nursing round in place.

## 2022-06-11 NOTE — ED PROVIDER NOTES
[facility]    EMERGENCY DEPARTMENT ENCOUNTER      Pt Name: Mary Ramirez  MRN: [id number]  YOB: 1934  Date of evaluation: 6/10/2022  Provider: David Fonseca MD    CHIEF COMPLAINT       Chief Complaint   Patient presents with   • Shortness of Breath         HISTORY OF PRESENT ILLNESS  (Location/Symptom, Timing/Onset, Context/Setting, Quality, Duration, Modifying Factors, Severity.)   Mary Ramirez is a 87 y.o. female who presents to the emergency department with 10 days of progressively worsening moderate severity shortness of breath with associated cough.  Additional history is severely limited secondary to significant dementia and so history obtained from review of record.  Patient was apparently diagnosed with pneumonia beginning of the month and starting on azithromycin.  She was subsequently seen in the emergency department on June 2 and CT chest showed infiltrate with loculated pleural fluid.  Per family wishes, she was discharged home at that time and has had significant worsening today with hypoxia.  Patient does not typically wear oxygen but has required several liters of oxygen prior to arrival to the ED.      Nursing notes were reviewed.    REVIEW OF SYSTEMS    (2-9 systems for level 4, 10 or more for level 5)   ROS:  Unable to obtain secondary to dementia      PAST MEDICAL HISTORY     Past Medical History:   Diagnosis Date   • Atrial fibrillation (HCC)    • Benign essential hypertension    • CAD (coronary artery disease)    • Carotid artery disease ([facility])    • CHF (congestive heart failure) ([facility])    • Chronic allergic rhinitis    • CKD (chronic kidney disease), stage III ([facility])    • DDD (degenerative disc disease), cervical    • Dementia ([facility])    • Diabetes mellitus ([facility])    • Diverticulosis     WITH PERFORATION    • Hypertension    • Major depressive disorder, single episode, mild ([facility])    • Memory loss    • Mixed hyperlipidemia    • SSS (sick sinus syndrome) ([facility])    • TIA (transient  ischemic attack) 01/2016         SURGICAL HISTORY       Past Surgical History:   Procedure Laterality Date   • ANKLE SURGERY Left 1996   • ATHERECTOMY  1995   • ATHRECTOMY ILIAC, FEMORAL, TIBIAL ARTERY     • BREAST LUMPECTOMY Left 1981   • COLON SURGERY     • COLOSTOMY  1983   • CORONARY STENT PLACEMENT  2001   • FINGER FRACTURE SURGERY Right    • HIP TROCHANTERIC NAILING WITH INTRAMEDULLARY HIP SCREW Left 11/23/2017   • HIP TROCHANTERIC NAILING WITH INTRAMEDULLARY HIP SCREW Right 3/30/2019    Procedure: HIP TROCANTERIC NAILING WITH INTRAMEDULLARY HIP SCREW RIGHT;  Surgeon: Theo Figueroa MD;  [facility];  Service: Orthopedics   • HYSTERECTOMY  1984   • OTHER SURGICAL HISTORY  1984    REANASTAMOSIS          CURRENT MEDICATIONS       Current Facility-Administered Medications:   •  acetaminophen (TYLENOL) tablet 650 mg, 650 mg, Oral, Q4H PRN **OR** acetaminophen (TYLENOL) 160 MG/5ML solution 650 mg, 650 mg, Oral, Q4H PRN **OR** acetaminophen (TYLENOL) suppository 650 mg, 650 mg, Rectal, Q4H PRN, Dixie, Margy, APRN  •  aluminum-magnesium hydroxide-simethicone (MAALOX MAX) 400-400-40 MG/5ML suspension 5 mL, 5 mL, Oral, Q6H PRN, Dixie, Margy, APRN  •  amiodarone (PACERONE) tablet 200 mg, 200 mg, Oral, Daily, Dixie, Margy, APRN  •  [START ON 6/12/2022] amLODIPine (NORVASC) tablet 10 mg, 10 mg, Oral, Nightly, Dixie, Margy, APRN  •  aspirin EC tablet 325 mg, 325 mg, Oral, Daily, Dixie, Margy, APRN  •  cefepime (MAXIPIME) 1 g/100 mL 0.9% NS IVPB (mbp), 1 g, Intravenous, Q24H, Mayco Parker IV, PharmD  •  citalopram (CeleXA) tablet 20 mg, 20 mg, Oral, Daily, Dixie, Margy, APRN  •  dextrose (D50W) (25 g/50 mL) IV injection 25 g, 25 g, Intravenous, Q15 Min PRN, Dixie, Margy, APRN  •  dextrose (GLUTOSE) oral gel 15 g, 15 g, Oral, Q15 Min PRN, Dixie, Margy, APRN  •  docusate sodium (COLACE) capsule 100 mg, 100 mg, Oral, BID PRN, Dixie, Margy, APRN  •  donepezil (ARICEPT)  tablet 5 mg, 5 mg, Oral, Nightly, Dixie, Margy, APRN  •  fluticasone (FLONASE) 50 MCG/ACT nasal spray 2 spray, 2 spray, Nasal, Daily, Dixie, Margy, APRN  •  furosemide (LASIX) injection 20 mg, 20 mg, Intravenous, BID, Emmanuel Mehta MD  •  glucagon (human recombinant) (GLUCAGEN DIAGNOSTIC) injection 1 mg, 1 mg, Intramuscular, Q15 Min PRN, Dixie, Margy, APRN  •  heparin (porcine) 5000 UNIT/ML injection 5,000 Units, 5,000 Units, Subcutaneous, Q12H, Dixie, Margy, APRN  •  Insulin Lispro (humaLOG) injection 0-7 Units, 0-7 Units, Subcutaneous, TID AC, Dixie, Margy, APRN  •  ipratropium-albuterol (DUO-NEB) nebulizer solution 3 mL, 3 mL, Nebulization, 4x Daily - RT, Dixie, Margy, APRN  •  isosorbide mononitrate (IMDUR) 24 hr tablet 60 mg, 60 mg, Oral, Daily, Dixie, Margy, APRN  •  lidocaine (LIDODERM) 5 % 1 patch, 1 patch, Transdermal, Q24H, Dixie, Margy, APRN  •  multivitamin (THERAGRAN) tablet 1 tablet, 1 tablet, Oral, Daily, Dixie, Margy, APRN  •  [DISCONTINUED] vancomycin (VANCOCIN) 1000 mg/200 mL dextrose 5% IVPB, 20 mg/kg, Intravenous, Once **AND** Pharmacy to dose vancomycin, , Does not apply, Continuous PRN, Dixie, Margy, APRN  •  sodium chloride 0.9 % flush 10 mL, 10 mL, Intravenous, Q12H, Dixie, Margy, APRN  •  sodium chloride 0.9 % flush 10 mL, 10 mL, Intravenous, PRN, Dixie, Margy, APRN  •  vancomycin - no scheduled doses (Pharmacy dosing per levels), , Does not apply, Daily, Mayco Parker IV, PharmD    Current Outpatient Medications:   •  acetaminophen (TYLENOL) 500 MG tablet, Take 500 mg by mouth 2 (Two) Times a Day. Take one tablet twice a day for leg pain  And prn every 6 hours, Disp: , Rfl:   •  aluminum-magnesium hydroxide-simethicone (MAALOX MAX) 400-400-40 MG/5ML suspension, Take 5 mL by mouth Every 6 (Six) Hours As Needed for Indigestion or Heartburn., Disp: , Rfl:   •  Amino Acids-Protein Hydrolys (PRO-STAT AWC PO), Take 1 dose by mouth 2 (Two)  Times a Day., Disp: , Rfl:   •  amiodarone (PACERONE) 200 MG tablet, Take 1 tablet by mouth Daily., Disp: , Rfl:   •  amLODIPine (NORVASC) 10 MG tablet, Take 1 tablet by mouth Every Night., Disp: 30 tablet, Rfl: 5  •  aspirin  MG EC tablet, Take 1 tablet by mouth Daily., Disp: , Rfl:   •  bisacodyl (DULCOLAX) 5 MG EC tablet, Take 1 tablet by mouth Daily As Needed for Constipation., Disp: , Rfl:   •  citalopram (CeleXA) 20 MG tablet, Take 20 mg by mouth Daily., Disp: , Rfl:   •  CloNIDine (CATAPRES) 0.2 MG tablet, Take 0.2 mg by mouth 2 (Two) Times a Day., Disp: , Rfl:   •  collagenase 250 UNIT/GM ointment, Apply  topically to the appropriate area as directed Daily., Disp: , Rfl:   •  docusate sodium (COLACE) 100 MG capsule, Take 100 mg by mouth 2 (Two) Times a Day., Disp: , Rfl:   •  donepezil (ARICEPT) 5 MG tablet, Take 5 mg by mouth Every Night., Disp: , Rfl:   •  fluticasone (FLONASE) 50 MCG/ACT nasal spray, 2 sprays into the nostril(s) as directed by provider Daily., Disp: , Rfl:   •  insulin aspart (novoLOG FLEXPEN) 100 UNIT/ML solution pen-injector sc pen, Inject 2-5 Units under the skin into the appropriate area as directed 4 (Four) Times a Day., Disp: , Rfl:   •  isosorbide mononitrate (IMDUR) 60 MG 24 hr tablet, Take 60 mg by mouth Daily., Disp: , Rfl:   •  lidocaine (LIDODERM) 5 %, Place 1 patch on the skin as directed by provider Daily. Remove & Discard patch within 12 hours or as directed by MD, Disp: 30 patch, Rfl: 0  •  metFORMIN ER (GLUCOPHAGE-XR) 500 MG 24 hr tablet, Take 1 tablet by mouth Daily With Breakfast., Disp: 30 tablet, Rfl: 5  •  Multiple Vitamin (MULTIVITAMINS PO), Take 1 tablet by mouth Daily., Disp: , Rfl:   •  ondansetron (ZOFRAN) 4 MG tablet, Take 4 mg by mouth Every 8 (Eight) Hours As Needed for Nausea or Vomiting., Disp: , Rfl:   •  polyethylene glycol (MIRALAX) pack packet, Take 17 g by mouth Daily. (Patient taking differently: Take 17 g by mouth Daily As Needed.), Disp: ,  Rfl:     ALLERGIES     Penicillins    FAMILY HISTORY       Family History   Problem Relation Age of Onset   • Hypertension Mother    • Coronary artery disease Mother 56        PREMATURE    • Lung cancer Father    • Hypertension Father    • Osteoporosis Sister    • Lung cancer Brother           SOCIAL HISTORY       Social History     Socioeconomic History   • Marital status:      Spouse name: N/A   • Number of children: 2   • Years of education: H.S   Tobacco Use   • Smoking status: Former Smoker     Years: 2.00     Types: Cigarettes     Start date: 1975     Quit date: 1978     Years since quittin.6   • Smokeless tobacco: Never Used   • Tobacco comment: QUIT DATE 1984   Substance and Sexual Activity   • Alcohol use: No   • Drug use: No   • Sexual activity: Never         PHYSICAL EXAM    (up to 7 for level 4, 8 or more for level 5)     Vitals:    22 0347 22 0348 22 0352 22 0353   BP:       Pulse: 112 112 117 113   Resp:       Temp:       TempSrc:       SpO2: 96% 96% 95% 97%   Weight:       Height:           Physical Exam  General: Ill-appearing  HEENT: Conjunctivae normal.  Neck: Trachea midline.  Cardiac: Tachycardic, regular rhythm, no murmurs, rubs, or gallops  Lungs: Tachypneic, diffusely coarse  Chest wall: There is no tenderness to palpation over the chest wall or over ribs  Abdomen: Abdomen is soft, nontender, nondistended. There are no firm or pulsatile masses, no rebound rigidity or guarding.   Musculoskeletal: No deformity.  Neuro: Alert   Dermatology: Skin is warm and dry  Psych: Mentation is grossly normal, cognition is grossly normal. Affect is appropriate.        DIAGNOSTIC RESULTS     EKG: All EKGs are interpreted by the Emergency Department Physician who either signs or Co-signs this chart in the absence of a cardiologist.    ECG 12 Lead   Final Result   Test Reason : SOA PROTOCOL   Blood Pressure :   */*   mmHG   Vent. Rate : 105 BPM     Atrial Rate  :  87 BPM      P-R Int :   * ms          QRS Dur :  86 ms       QT Int : 374 ms       P-R-T Axes :   *  -6   7 degrees      QTc Int : 494 ms      Atrial fibrillation with rapid ventricular response   Cannot rule out Anterior infarct , age undetermined   Abnormal ECG   When compared with ECG of 03-JUN-2022 16:52,   Atrial fibrillation has replaced Atrial flutter   Inverted T waves have replaced nonspecific T wave abnormality in Inferior    leads   Nonspecific T wave abnormality now evident in Anterior leads   T wave inversion no longer evident in Lateral leads   Confirmed by JULIO CESAR KIMBLE ([id number]) on 6/11/2022 4:14:51 AM      Referred By: EDMD           Confirmed By: JULIO CESAR KIMBLE          RADIOLOGY:   Non-plain film images such as CT, Ultrasound and MRI are read by the radiologist. Plain radiographic images are visualized and preliminarily interpreted by the emergency physician with the below findings:      [x] Radiologist's Report Reviewed:  CT Chest Without Contrast Diagnostic   Final Result   1. Increased multifocal, ill-defined groundglass and streaky alveolar densities bilaterally concerning for pulmonary edema or pneumonia.   2. Unchanged rounded opacity in the right lung base which could reflect loculated pleural fluid and/or round atelectasis. An underlying parenchymal mass lesion is not excluded. Evaluation limited by lack of contrast.   3. Stable large hiatal hernia.      Electronically signed by:  Theo Naqvi M.D.     6/11/2022 12:05 AM Mountain Time      XR Chest 1 View   Final Result      Increased airspace opacities throughout the right lung. Small right pleural effusion      Large hiatal hernia.      Electronically signed by:  Tiera Juares M.D.     6/10/2022 11:58 PM Mountain Time      NM Lung Quantitative Differential Function Perfusion    (Results Pending)         ED BEDSIDE ULTRASOUND:   Performed by ED Physician - none    LABS:    I have reviewed and interpreted all of the currently  available lab results from this visit (if applicable):  Results for orders placed or performed during the hospital encounter of 06/10/22   COVID-19 and FLU A/B PCR - Swab, Nasopharynx    Specimen: Nasopharynx; Swab   Result Value Ref Range    COVID19 Not Detected Not Detected - Ref. Range    Influenza A PCR Not Detected Not Detected    Influenza B PCR Not Detected Not Detected   Comprehensive Metabolic Panel    Specimen: Blood   Result Value Ref Range    Glucose 117 (H) 65 - 99 mg/dL    BUN 27 (H) 8 - 23 mg/dL    Creatinine 1.28 (H) 0.57 - 1.00 mg/dL    Sodium 133 (L) 136 - 145 mmol/L    Potassium 4.2 3.5 - 5.2 mmol/L    Chloride 98 98 - 107 mmol/L    CO2 25.0 22.0 - 29.0 mmol/L    Calcium 9.7 8.6 - 10.5 mg/dL    Total Protein 7.4 6.0 - 8.5 g/dL    Albumin 4.30 3.50 - 5.20 g/dL    ALT (SGPT) 10 1 - 33 U/L    AST (SGOT) 18 1 - 32 U/L    Alkaline Phosphatase 112 39 - 117 U/L    Total Bilirubin 0.5 0.0 - 1.2 mg/dL    Globulin 3.1 gm/dL    A/G Ratio 1.4 g/dL    BUN/Creatinine Ratio 21.1 7.0 - 25.0    Anion Gap 10.0 5.0 - 15.0 mmol/L    eGFR 40.6 (L) >60.0 mL/min/1.73   Troponin    Specimen: Blood   Result Value Ref Range    Troponin T 0.028 0.000 - 0.030 ng/mL   D-dimer, Quantitative    Specimen: Blood   Result Value Ref Range    D-Dimer, Quantitative 3.43 (H) 0.01 - 0.50 MCGFEU/mL   BNP    Specimen: Blood   Result Value Ref Range    proBNP 5,037.0 (H) 0.0 - 1,800.0 pg/mL   CBC Auto Differential    Specimen: Blood   Result Value Ref Range    WBC 10.81 (H) 3.40 - 10.80 10*3/mm3    RBC 4.26 3.77 - 5.28 10*6/mm3    Hemoglobin 12.8 12.0 - 15.9 g/dL    Hematocrit 39.8 34.0 - 46.6 %    MCV 93.4 79.0 - 97.0 fL    MCH 30.0 26.6 - 33.0 pg    MCHC 32.2 31.5 - 35.7 g/dL    RDW 14.8 12.3 - 15.4 %    RDW-SD 50.5 37.0 - 54.0 fl    MPV 12.3 (H) 6.0 - 12.0 fL    Platelets 278 140 - 450 10*3/mm3    Neutrophil % 77.7 (H) 42.7 - 76.0 %    Lymphocyte % 11.7 (L) 19.6 - 45.3 %    Monocyte % 8.6 5.0 - 12.0 %    Eosinophil % 0.9 0.3 - 6.2 %     Basophil % 0.6 0.0 - 1.5 %    Immature Grans % 0.5 0.0 - 0.5 %    Neutrophils, Absolute 8.41 (H) 1.70 - 7.00 10*3/mm3    Lymphocytes, Absolute 1.26 0.70 - 3.10 10*3/mm3    Monocytes, Absolute 0.93 (H) 0.10 - 0.90 10*3/mm3    Eosinophils, Absolute 0.10 0.00 - 0.40 10*3/mm3    Basophils, Absolute 0.06 0.00 - 0.20 10*3/mm3    Immature Grans, Absolute 0.05 0.00 - 0.05 10*3/mm3    nRBC 0.0 0.0 - 0.2 /100 WBC   Lactic Acid, Plasma    Specimen: Blood   Result Value Ref Range    Lactate 1.2 0.5 - 2.0 mmol/L   Procalcitonin    Specimen: Blood   Result Value Ref Range    Procalcitonin 0.12 0.00 - 0.25 ng/mL   Urinalysis With Culture If Indicated - Urine, Random Void    Specimen: Urine, Random Void   Result Value Ref Range    Color, UA Yellow Yellow, Straw    Appearance, UA Clear Clear    pH, UA 7.0 5.0 - 8.0    Specific Gravity, UA 1.007 1.001 - 1.030    Glucose, UA Negative Negative    Ketones, UA Negative Negative    Bilirubin, UA Negative Negative    Blood, UA Negative Negative    Protein, UA Negative Negative    Leuk Esterase, UA Trace (A) Negative    Nitrite, UA Negative Negative    Urobilinogen, UA 0.2 E.U./dL 0.2 - 1.0 E.U./dL   Urinalysis, Microscopic Only - Urine, Random Void    Specimen: Urine, Random Void   Result Value Ref Range    RBC, UA 0-2 None Seen, 0-2 /HPF    WBC, UA None Seen None Seen, 0-2 /HPF    Bacteria, UA None Seen None Seen, Trace /HPF    Squamous Epithelial Cells, UA None Seen None Seen, 0-2 /HPF    Hyaline Casts, UA None Seen 0 - 6 /LPF    Methodology Automated Microscopy    ECG 12 Lead   Result Value Ref Range    QT Interval 374 ms    QTC Interval 494 ms        All other labs were within normal range or not returned as of this dictation.      EMERGENCY DEPARTMENT COURSE and DIFFERENTIAL DIAGNOSIS/MDM:   Vitals:    Vitals:    06/11/22 0347 06/11/22 0348 06/11/22 0352 06/11/22 0353   BP:       Pulse: 112 112 117 113   Resp:       Temp:       TempSrc:       SpO2: 96% 96% 95% 97%   Weight:        Height:           ED Course as of 06/11/22 0416   Sat Jun 11, 2022   0044 Spoke with patient's son on the phone.  States that he would like for patient to be DNR/DNI and would not like her to have any invasive procedures.  He is okay with her being on supplemental oxygen therapy and receiving IV antibiotics. [NS]   0044 I spoke with Dr. Mehta who accepts the patient for admission. [NS]      ED Course User Index  [NS] David Fonseca MD       Patient presents with acute hypoxic respiratory failure secondary to findings concerning for pneumonia versus possibly pulmonary edema.  No evidence of significant anemia or electrolyte derangement.  No pneumothorax.  Patient given broad-spectrum IV antibiotics and IV Lasix in the ED.  She had progressively increasing oxygen requirement.  Started on high flow nasal cannula.  She is being admitted to the hospitalist service for further management.        MEDICATIONS ADMINISTERED IN ED:  Medications   aluminum-magnesium hydroxide-simethicone (MAALOX MAX) 400-400-40 MG/5ML suspension 5 mL (has no administration in time range)   amiodarone (PACERONE) tablet 200 mg (has no administration in time range)   amLODIPine (NORVASC) tablet 10 mg (has no administration in time range)   aspirin EC tablet 325 mg (has no administration in time range)   citalopram (CeleXA) tablet 20 mg (has no administration in time range)   docusate sodium (COLACE) capsule 100 mg (has no administration in time range)   donepezil (ARICEPT) tablet 5 mg (has no administration in time range)   fluticasone (FLONASE) 50 MCG/ACT nasal spray 2 spray (has no administration in time range)   isosorbide mononitrate (IMDUR) 24 hr tablet 60 mg (has no administration in time range)   lidocaine (LIDODERM) 5 % 1 patch (has no administration in time range)   multivitamin (THERAGRAN) tablet 1 tablet (has no administration in time range)   Pharmacy to dose vancomycin (has no administration in time range)   sodium chloride 0.9  % flush 10 mL (has no administration in time range)   sodium chloride 0.9 % flush 10 mL (has no administration in time range)   heparin (porcine) 5000 UNIT/ML injection 5,000 Units (has no administration in time range)   acetaminophen (TYLENOL) tablet 650 mg (has no administration in time range)     Or   acetaminophen (TYLENOL) 160 MG/5ML solution 650 mg (has no administration in time range)     Or   acetaminophen (TYLENOL) suppository 650 mg (has no administration in time range)   ipratropium-albuterol (DUO-NEB) nebulizer solution 3 mL (has no administration in time range)   dextrose (GLUTOSE) oral gel 15 g (has no administration in time range)   dextrose (D50W) (25 g/50 mL) IV injection 25 g (has no administration in time range)   glucagon (human recombinant) (GLUCAGEN DIAGNOSTIC) injection 1 mg (has no administration in time range)   Insulin Lispro (humaLOG) injection 0-7 Units (has no administration in time range)   cefepime (MAXIPIME) 1 g/100 mL 0.9% NS IVPB (mbp) (has no administration in time range)   vancomycin - no scheduled doses (Pharmacy dosing per levels) (has no administration in time range)   furosemide (LASIX) injection 20 mg (has no administration in time range)   vancomycin (VANCOCIN) 1000 mg/200 mL dextrose 5% IVPB (0 mg Intravenous Stopped 6/11/22 0050)   cefepime (MAXIPIME) 2 g/100 mL 0.9% NS (mbp) (0 g Intravenous Stopped 6/11/22 0029)   sodium chloride 0.9 % bolus 500 mL (0 mL Intravenous Stopped 6/11/22 0050)   furosemide (LASIX) injection 40 mg (40 mg Intravenous Given 6/11/22 0052)         CRITICAL CARE TIME    Approximately 65 minutes of discontinuous critical care time was provided to this patient by myself absent of any time spent performing procedures.  Patient presents critically ill with acute hypoxic respiratory failure secondary to pneumonia placing the cardiovascular, respiratory, neurologic systems at risk requiring the following interventions: Administration of supplemental  oxygen and progression to high flow nasal cannula, interpretation of lab/ECG/imaging, frequent reassessment, coordination of admission with the following response: Resolution of hypoxia.  Patient at high risk of deterioration and possibly death without these interventions.      FINAL IMPRESSION      1. Acute sepsis (HCC)    2. Pneumonia due to infectious organism, unspecified laterality, unspecified part of lung    3. Acute congestive heart failure, unspecified heart failure type (HCC)    4. Acute respiratory failure with hypoxia (HCC)          DISPOSITION/PLAN     ED Disposition     ED Disposition   Decision to Admit    Condition   --    Comment   Level of Care: Telemetry [5]   Diagnosis: Acute sepsis (HCC) [[id number]]   Certification: I Certify That Inpatient Hospital Services Are Medically Necessary For Greater Than 2 Midnights                 David Fonseca MD  Attending Emergency Physician               David Fonseca MD  06/11/22 0416

## 2022-06-11 NOTE — PAYOR COMM NOTE
"Don Meade (87 y.o. Female)             Date of Birth   1934    Social Security Number       Address   [address]  [facility] [address]    Home Phone   [phone]    MRN   [id number]       [facility]    Marital Status                               Admission Date   6/10/22    Admission Type   Emergency    Admitting Provider   Alie Kruger MD    Attending Provider   Alie Kruger MD    Department, Room/Bed   [facility] 3G, S364/1       Discharge Date       Discharge Disposition       Discharge Destination                               Attending Provider: Alie Kruger MD    Allergies: Penicillins    Isolation: None   Infection: None   Code Status: No CPR   Advance Care Planning Activity    Ht: 162.6 cm (64\")   Wt: 58.5 kg (129 lb)    Admission Cmt: None   Principal Problem: None                Active Insurance as of 6/10/2022     Primary Coverage     Payor Plan Insurance Group Employer/Plan Group    HUMANA MEDICARE REPLACEMENT HUMANA MEDICARE REPLACEMENT [id number]     Payor Plan Address Payor Plan Phone Number Payor Plan Fax Number Effective Dates    [address] [address] [phone]  1/1/2018 - None Entered    [facility] [address]       Subscriber Name Subscriber Birth Date Member ID       DON MEADE 1934 [id number]                 Emergency Contacts      (Rel.) Home Phone Work Phone Mobile Phone    Josh Meade (Son) -- -- [phone]    Gloria Giron (Daughter) [phone] -- --            [place]: [facility] [id number] Tax ID [id number]  Insurance Information                HUMANA MEDICARE REPLACEMENT/HUMANA MEDICARE REPLACEMENT Phone: [phone]    Subscriber: Don Meade Subscriber#: [id number]    Group#: [id number] Precert#: [id number]             History & Physical      Emmanuel Mehta MD at 06/11/22 0101              [facility] Medicine Services  HISTORY AND PHYSICAL    Patient Name: Don WADE" James  : 1934  MRN: [id number]  Primary Care Physician: Provider, No Known  Date of admission: 6/10/2022    Subjective   Subjective     Chief Complaint:  Shortness of air     HPI:  Mary Ramirez is a 87 y.o. female with a past medical history significant for paroxysmal atrial fibrillation, essential hypertension, CAD, diastolic congestive heart failure, CKD III, diabetes mellitus type 2, diverticulosis, Dementia, hyperlipidemia and TIA presents to the ED with complaints of shortness of air.  Due to underlying dementia patient is unable to provide much history.  She does confirm her shortness of air along with a non-productive cough.  She denies any recent fever, chills, nausea, vomiting or diarrhea.  She does currently reside in a nursing home.  She did present to the ED two nights ago after a fall and hypoglycemia.  She was found down by staff members and had obviously struck her head.  Oral glucose was administered and her blood sugar had risen above 200 by the time EMS arrived.  At that time she reports being diagnosed with pneumonia earlier in the week and has been on a z-anju.   ED provider spoke with patient's son who is her POA.  They did not wish to have her admitted.  Zithromax was continued and she was transferred back to the [place].    Tonight CXR obtained showed multifocal pneumonia.  Pulmonary edema, pulmonary hemorrhage or ARDS could not be excluded.  Right pleural effusion, cardiomegaly and hiatal hernia.  She arrived on RA and is currently requiring 70% hiflow nasal cannual with oxygen sat of 95%.  Patient will be admitted to [facility] under the care of the Hospitalist for further evaluation and treatment.         Review of Systems   Unable to perform ROS: Dementia        All other systems reviewed and are negative.     Personal History     Past Medical History:   Diagnosis Date   • Atrial fibrillation (HCC)    • Benign essential hypertension    • CAD (coronary artery disease)    • Carotid  artery disease (HCC)    • CHF (congestive heart failure) (HCC)    • Chronic allergic rhinitis    • CKD (chronic kidney disease), stage III (HCC)    • DDD (degenerative disc disease), cervical    • Dementia (HCC)    • Diabetes mellitus (HCC)    • Diverticulosis     WITH PERFORATION    • Hypertension    • Major depressive disorder, single episode, mild (HCC)    • Memory loss    • Mixed hyperlipidemia    • SSS (sick sinus syndrome) (HCC)    • TIA (transient ischemic attack) 01/2016             Past Surgical History:   Procedure Laterality Date   • ANKLE SURGERY Left 1996   • ATHERECTOMY  1995   • ATHRECTOMY ILIAC, FEMORAL, TIBIAL ARTERY     • BREAST LUMPECTOMY Left 1981   • COLON SURGERY     • COLOSTOMY  1983   • CORONARY STENT PLACEMENT  2001   • FINGER FRACTURE SURGERY Right    • HIP TROCHANTERIC NAILING WITH INTRAMEDULLARY HIP SCREW Left 11/23/2017   • HIP TROCHANTERIC NAILING WITH INTRAMEDULLARY HIP SCREW Right 3/30/2019    Procedure: HIP TROCANTERIC NAILING WITH INTRAMEDULLARY HIP SCREW RIGHT;  Surgeon: Theo Figueroa MD;  Location: [facility];  Service: Orthopedics   • HYSTERECTOMY  1984   • OTHER SURGICAL HISTORY  1984    REANASTAMOSIS        Family History:  family history includes Coronary artery disease (age of onset: 56) in her mother; Hypertension in her father and mother; Lung cancer in her brother and father; Osteoporosis in her sister. Otherwise pertinent FHx was reviewed and unremarkable.     Social History:  reports that she quit smoking about 43 years ago. Her smoking use included cigarettes. She started smoking about 46 years ago. She quit after 2.00 years of use. She has never used smokeless tobacco. She reports that she does not drink alcohol and does not use drugs.  Social History     Social History Narrative    Patient lives in [facility] [place]    Patient drinks 2 cups of caffeine per day.,       Medications:  Amino Acids-Protein Hydrolys, Multiple Vitamin, acetaminophen,  "aluminum-magnesium hydroxide-simethicone, amLODIPine, amiodarone, aspirin EC, bisacodyl, citalopram, cloNIDine, collagenase, docusate sodium, donepezil, fluticasone, insulin aspart, isosorbide mononitrate, lidocaine, metFORMIN ER, ondansetron, and polyethylene glycol    Allergies   Allergen Reactions   • Penicillins Other (See Comments)     Pt states \"i don't know\"       Objective   Objective     Vital Signs:   Temp:  [98.5 °F (36.9 °C)] 98.5 °F (36.9 °C)  Heart Rate:  [103-113] 104  Resp:  [24] 24  BP: (132-144)/(89-98) 141/98  Flow (L/min):  [4-50] 50    Physical Exam  Vitals and nursing note reviewed.   Constitutional:       General: She is not in acute distress.     Appearance: Normal appearance. She is not ill-appearing, toxic-appearing or diaphoretic.   HENT:      Head: Normocephalic and atraumatic.      Nose: Nose normal.      Mouth/Throat:      Mouth: Mucous membranes are dry.      Pharynx: Oropharynx is clear.   Eyes:      Extraocular Movements: Extraocular movements intact.      Conjunctiva/sclera: Conjunctivae normal.      Pupils: Pupils are equal, round, and reactive to light.   Cardiovascular:      Rate and Rhythm: Normal rate. Rhythm irregular.      Pulses: Normal pulses.      Heart sounds: Normal heart sounds.   Pulmonary:      Effort: Pulmonary effort is normal.      Breath sounds: Rhonchi present.   Abdominal:      General: Bowel sounds are normal. There is no distension.      Palpations: Abdomen is soft. There is no mass.      Tenderness: There is no abdominal tenderness. There is no right CVA tenderness, left CVA tenderness, guarding or rebound.      Hernia: No hernia is present.   Musculoskeletal:         General: No swelling, tenderness, deformity or signs of injury. Normal range of motion.      Cervical back: Normal range of motion and neck supple.      Right lower leg: No edema.      Left lower leg: No edema.   Skin:     General: Skin is warm and dry.   Neurological:      General: No focal " deficit present.      Mental Status: She is alert. Mental status is at baseline. She is disoriented.   Psychiatric:         Mood and Affect: Mood normal.         Behavior: Behavior normal.         Results Reviewed:  I have personally reviewed most recent indicated data and agree with findings including:  [x]  Laboratory  [x]  Radiology  [x]  EKG/Telemetry  []  Pathology  []  Cardiac/Vascular Studies  []  Old records  []  Other:  Most pertinent findings include:      LAB RESULTS:      Lab 06/10/22  2319 06/10/22  2230   WBC 10.81*  --    HEMOGLOBIN 12.8  --    HEMATOCRIT 39.8  --    PLATELETS 278  --    NEUTROS ABS 8.41*  --    IMMATURE GRANS (ABS) 0.05  --    LYMPHS ABS 1.26  --    MONOS ABS 0.93*  --    EOS ABS 0.10  --    MCV 93.4  --    PROCALCITONIN  --  0.12   LACTATE 1.2  --    D DIMER QUANT 3.43*  --          Lab 06/10/22  2230   SODIUM 133*   POTASSIUM 4.2   CHLORIDE 98   CO2 25.0   ANION GAP 10.0   BUN 27*   CREATININE 1.28*   EGFR 40.6*   GLUCOSE 117*   CALCIUM 9.7         Lab 06/10/22  2230   TOTAL PROTEIN 7.4   ALBUMIN 4.30   GLOBULIN 3.1   ALT (SGPT) 10   AST (SGOT) 18   BILIRUBIN 0.5   ALK PHOS 112         Lab 06/10/22  2230   PROBNP 5,037.0*   TROPONIN T 0.028                 Brief Urine Lab Results  (Last result in the past 365 days)      Color   Clarity   Blood   Leuk Est   Nitrite   Protein   CREAT   Urine HCG        06/03/22 1424 Yellow   Clear   Negative   Negative   Negative   Negative               Microbiology Results (last 10 days)     Procedure Component Value - Date/Time    COVID PRE-OP / PRE-PROCEDURE SCREENING ORDER (NO ISOLATION) - Swab, Nasopharynx [[id number]]  (Normal) Collected: 06/10/22 2318    Lab Status: Final result Specimen: Swab from Nasopharynx Updated: 06/11/22 0017    Narrative:      The following orders were created for panel order COVID PRE-OP / PRE-PROCEDURE SCREENING ORDER (NO ISOLATION) - Swab, Nasopharynx.  Procedure                               Abnormality          Status                     ---------                               -----------         ------                     COVID-19 and FLU A/B PCR...[[id number]]  Normal              Final result                 Please view results for these tests on the individual orders.    COVID-19 and FLU A/B PCR - Swab, Nasopharynx [[id number]]  (Normal) Collected: 06/10/22 2318    Lab Status: Final result Specimen: Swab from Nasopharynx Updated: 06/11/22 0017     COVID19 Not Detected     Influenza A PCR Not Detected     Influenza B PCR Not Detected    Narrative:      Fact sheet for providers: https://www.fda.gov/media/829599/download    Fact sheet for patients: https://www.fda.gov/media/615660/download    Test performed by PCR.    COVID PRE-OP / PRE-PROCEDURE SCREENING ORDER (NO ISOLATION) - Swab, Nasopharynx [[id number]]  (Normal) Collected: 06/03/22 1619    Lab Status: Final result Specimen: Swab from Nasopharynx Updated: 06/03/22 1648    Narrative:      The following orders were created for panel order COVID PRE-OP / PRE-PROCEDURE SCREENING ORDER (NO ISOLATION) - Swab, Nasopharynx.  Procedure                               Abnormality         Status                     ---------                               -----------         ------                     COVID-19 and FLU A/B PCR...[[id number]]  Normal              Final result                 Please view results for these tests on the individual orders.    COVID-19 and FLU A/B PCR - Swab, Nasopharynx [[id number]]  (Normal) Collected: 06/03/22 1619    Lab Status: Final result Specimen: Swab from Nasopharynx Updated: 06/03/22 1648     COVID19 Not Detected     Influenza A PCR Not Detected     Influenza B PCR Not Detected    Narrative:      Fact sheet for providers: https://www.fda.gov/media/400134/download    Fact sheet for patients: https://www.fda.gov/media/751240/download    Test performed by PCR.          XR Chest 1 View    Result Date: 6/10/2022  DATE OF EXAM: 6/10/2022 10:37 PM   PROCEDURE: XR CHEST 1 VW-  INDICATIONS: sob  COMPARISON: Chest x-ray 06/03/2022 CT chest 06/03/2022  TECHNIQUE: Single radiographic view of the chest was obtained.  FINDINGS: Lungs are normally expanded. Heart size is normal. No pneumothorax or pleural effusion or focal pulmonary parenchymal opacity. Pulmonary vessels are distinct. Bones and soft tissues are normal.      Impression: Worse appearance of chest with multifocal airspace opacities most consistent with multifocal pneumonia. Pulmonary edema, pulmonary hemorrhage or ARDS cannot be excluded. Right pleural effusion. Cardiomegaly. Hiatal hernia.  This report was finalized on 6/10/2022 10:56 PM by Xiomara Beyer MD.        Results for orders placed during the hospital encounter of 03/03/19    Transthoracic Echo Complete With Contrast if Necessary Per Protocol    Interpretation Summary  · Calculated EF = 66.0%  · Left ventricular systolic function is normal.  · Left ventricular wall thickness is consistent with moderate concentric hypertrophy.  · Left atrial cavity size is severely dilated.  · Left ventricular diastolic dysfunction (grade I) consistent with impaired relaxation.  · Mild tricuspid valve regurgitation is present.  · Mild-to-moderate mitral valve regurgitation is present  · There is calcification of the aortic valve.      Assessment & Plan   Assessment & Plan       Essential hypertension    Coronary artery disease involving native coronary artery of native heart with angina pectoris (HCC)    Dementia (HCC)    Paroxysmal atrial fibrillation (HCC)    Benign essential hypertension    CKD (chronic kidney disease), stage III (HCC)    GERD (gastroesophageal reflux disease)    Late onset Alzheimer's disease without behavioral disturbance (HCC)    Acute respiratory failure with hypercapnia (HCC)    Multifocal pneumonia    Acute on chronic diastolic CHF (congestive heart failure) (HCC)      87 year old female presents to the ED due to worsening shortness of  air.  Was diagnosed with pneumonia several days ago and has been on a Zpak.      1) Multifocal pneumonia       Acute hypoxic respiratory failure  -CXR mentioned above  -initially on RA on arrival now currently requiring 70% hi flow NC  -o2 sat currently 95%  -d-dimer 3.43, obtain CT of chest tonight, VQ scan in am along with venous duplex of BLE  -Son verifies patient is DNR/DNI  -continue vancomycin and cefepime   -blood cultures x2 pending   -sputum culture  -COVID-19 negative   -check urine antigens   -MRSA PCR   -continuous pulse ox  -keep o2 sat >90%    2) Acute on chronic HFpEF  -last echo with EF 66%, mild tricuspid valve regurgitation  -mild to moderate MVR  -proBNP tonight 5, 037.0  -s/p 40 mg IV lasix   -strict I &O   -daily weight   -follows with Dr. Flores     3) Paroxysmal atrial fibrillation   -currently rate controlled   -on amiodarone   -previously on eliquis but stopped due to multiple falls   -echo mentioned above    4) CKD III   -baseline creatinine 1.1-1.4  -hold nephrotoxic agents   -repeat labs in am     5) Essential hypertension   -on norvasc, clonidine.    -BP stable     6) Dementia  -on aricept   -fall precautions     7) Diabetes mellitus type 2  -check hgb A1c   -start ldssi   -fingersticks achs         DVT prophylaxis:  heparin     CODE STATUS:  DNR/DNI        This note has been completed as part of a split-shared workflow.     Signature: Electronically signed by CRISTA Lindsay, 06/11/22, 1:16 AM EDT.      Attending   Admission Attestation       I have seen and examined the patient, performing an independent face-to-face diagnostic evaluation with plan of care reviewed and developed with the advanced practice clinician (APC).      Brief Summary Statement:   Mary Ramirez is a 87 y.o. female with history of dementia, diastolic heart failure, CAD, paroxysmal atrial fibrillation, DMII, CKDIII, and HTN presents with shortness of breath and cough.  She was recently started on  azithromycin for pneumonia, and was actually seen in the ED on 6/3 for the same -- but discharged after discussion of goals of care with the family.  CXR tonight consistent with multifocal infiltrates.    Remainder of detailed HPI is as noted by APC and has been reviewed and/or edited by me for completeness.    Attending Physical Exam:  Constitutional - no acute distress, somewhat frail, in bed  HEENT-NCAT, mucous membranes moist  CV-irregularly irregular  Resp-fine crackles in right lung fields  Abd-soft, nontender, nondistended, normoactive bowel sounds  Ext-No lower extremity cyanosis, clubbing or edema bilaterally  Neuro-awake, speech clear  Psych-normal affect   Skin- No rash on exposed UE or LE bilaterally, some bruising on legs      Brief Assessment/Plan :    Acute on chronic diastolic heart failure  Possible Pneumonia  - Repeat CT tonight shows progression of infiltrates compared to 6/3 imaging.   - at this juncture favor edema over infection -- procalcitonin low at 0.12, however proBNP elevated at 5,037.  Increased hypoxia after patient received bolus in ED.  - will continue empiric antibiotics and follow cultures, but will add IV lasix 20 mg BID.   - d-dimer elevated (3.4), will check VQ and bilateral LE duplex given low creatinine clearance  - loculated effusion of unclear chronicity, however small right effusion noted on CT Abdomen/Pelvis from 8/2018  - echo am    PAF    CKD III    Dementia    See detailed assessment and plan developed with APC which I have reviewed and/or edited for completeness.        Admission Status: I believe that this patient meets INPATIENT status due to need for IV diuresis for acute on chronic heart failure.  I feel patient’s risk for adverse outcomes and need for care warrant INPATIENT evaluation and I predict the patient’s care encounter to likely last beyond 2 midnights.        Emmanuel Mehta MD  06/11/22                    Electronically signed by Emmanuel Mehta MD at  "06/11/22 0309          Discharge Summaryl      Mayco Parker IV PharmD at 06/11/22 0606          Pharmacy Consult-Vancomycin Dosing  Mary Ramirez is a  87 y.o. female receiving vancomycin therapy.     Indication: PNA  Consulting Provider: Hospitalist  ID Consult:     Goal Trough: 15-20 mcg/mL    Current Antimicrobial Therapy  Anti-Infectives (From admission, onward)      Ordered     Dose/Rate Route Frequency Start Stop    06/11/22 0243  cefepime (MAXIPIME) 1 g/100 mL 0.9% NS IVPB (mbp)        Ordering Provider: Mayco Parker IV PharmD    1 g  over 4 Hours Intravenous Every 24 Hours 06/11/22 1200 06/15/22 1159    06/11/22 0245  vancomycin - no scheduled doses (Pharmacy dosing per levels)        Ordering Provider: Mayco Parker IV PharmD     Does not apply Daily 06/11/22 0900 06/13/22 0859    06/11/22 0240  Pharmacy to dose vancomycin        Ordering Provider: Margy Colin, CRISTA   \"And\" Linked Group Details     Does not apply Continuous PRN 06/11/22 0240 06/15/22 0239    06/10/22 2253  vancomycin (VANCOCIN) 1000 mg/200 mL dextrose 5% IVPB        Ordering Provider: David Fonseca MD    20 mg/kg × 52.2 kg Intravenous Once 06/10/22 2255 06/11/22 0050    06/10/22 2253  cefepime (MAXIPIME) 2 g/100 mL 0.9% NS (mbp)        Ordering Provider: David Fonseca MD    2 g  200 mL/hr over 30 Minutes Intravenous Once 06/10/22 2255 06/11/22 0029            Allergies  Allergies as of 06/10/2022 - Unable to Assess 06/10/2022   Allergen Reaction Noted   • Penicillins Other (See Comments) 11/23/2017       Labs    Results from last 7 days   Lab Units 06/10/22  2230   BUN mg/dL 27*   CREATININE mg/dL 1.28*       Results from last 7 days   Lab Units 06/10/22  2319   WBC 10*3/mm3 10.81*       Evaluation of Dosing     Last Dose Received in the ED/Outside Facility: 1000 mg @ 2341  Is Patient on Dialysis or Renal Replacement: N    Ht - 162.6 cm (64.02\")  Wt - 58.6 kg (129 lb 3.2 oz)    Estimated Creatinine " Clearance: 28.6 mL/min (A) (by C-G formula based on SCr of 1.28 mg/dL (H)).    Intake & Output (last 3 days)         06/08 0701  06/09 0700 06/09 0701  06/10 0700 06/10 0701 06/11 0700           Urine Unmeasured Occurrence   1 x            Microbiology and Radiology  Microbiology Results (last 10 days)       Procedure Component Value - Date/Time    COVID PRE-OP / PRE-PROCEDURE SCREENING ORDER (NO ISOLATION) - Swab, Nasopharynx [[id number]]  (Normal) Collected: 06/10/22 2318    Lab Status: Final result Specimen: Swab from Nasopharynx Updated: 06/11/22 0017    Narrative:      The following orders were created for panel order COVID PRE-OP / PRE-PROCEDURE SCREENING ORDER (NO ISOLATION) - Swab, Nasopharynx.  Procedure                               Abnormality         Status                     ---------                               -----------         ------                     COVID-19 and FLU A/B PCR...[[id number]]  Normal              Final result                 Please view results for these tests on the individual orders.    COVID-19 and FLU A/B PCR - Swab, Nasopharynx [[id number]]  (Normal) Collected: 06/10/22 2318    Lab Status: Final result Specimen: Swab from Nasopharynx Updated: 06/11/22 0017     COVID19 Not Detected     Influenza A PCR Not Detected     Influenza B PCR Not Detected    Narrative:      Fact sheet for providers: https://www.fda.gov/media/245432/download    Fact sheet for patients: https://www.fda.gov/media/041051/download    Test performed by PCR.    COVID PRE-OP / PRE-PROCEDURE SCREENING ORDER (NO ISOLATION) - Swab, Nasopharynx [[id number]]  (Normal) Collected: 06/03/22 1619    Lab Status: Final result Specimen: Swab from Nasopharynx Updated: 06/03/22 1648    Narrative:      The following orders were created for panel order COVID PRE-OP / PRE-PROCEDURE SCREENING ORDER (NO ISOLATION) - Swab, Nasopharynx.  Procedure                               Abnormality         Status                      ---------                               -----------         ------                     COVID-19 and FLU A/B PCR...[[id number]]  Normal              Final result                 Please view results for these tests on the individual orders.    COVID-19 and FLU A/B PCR - Swab, Nasopharynx [[id number]]  (Normal) Collected: 22 1619    Lab Status: Final result Specimen: Swab from Nasopharynx Updated: 22 1648     COVID19 Not Detected     Influenza A PCR Not Detected     Influenza B PCR Not Detected    Narrative:      Fact sheet for providers: https://www.fda.gov/media/255256/download    Fact sheet for patients: https://www.fda.gov/media/014144/download    Test performed by PCR.            Vancomycin Levels:                        Assessment/Plan:    Advanced age female with elevated serum creatinine.  Status post 1000 mg IV vancomycin load.    MRSA PCR per protocol.  Random level with AM labs to assess distribution.    Further dosing per rounding pharmacist pending results of MRSA surveillance.    Thank you for this consult.  Mayco Parker IV, PharmD, BCPS  2022  06:06 EDT      Electronically signed by Mayco Parker IV, PharmD at 22     Dominick Almazan, RN at 22        Patient arrived on the floor at 0430 hours from ED. Oriented only to self, follows some commands. Speech often incoherent/illogical. Lung sound severely diminished, currently on high flow NC with 70%, O2 sat over 92%. Multiple bruises/laceration noted, No PI noted upon admission. Patient was placed on waffle mattress Heels elevated in boots. BP significantly elevated. A fib on cardiac mx. Will cont to mx. Frequent nursing round in place.                   Electronically signed by Dominick Almazan, RN at 22     Emmanuel Mehta MD at 22 0101              [facility] Medicine Services  HISTORY AND PHYSICAL    Patient Name: Mary Ramirez  : 1934  MRN:  [id number]  Primary Care Physician: Provider, No Known  Date of admission: 6/10/2022    Subjective   Subjective     Chief Complaint:  Shortness of air     HPI:  Mary Ramirez is a 87 y.o. female with a past medical history significant for paroxysmal atrial fibrillation, essential hypertension, CAD, diastolic congestive heart failure, CKD III, diabetes mellitus type 2, diverticulosis, Dementia, hyperlipidemia and TIA presents to the ED with complaints of shortness of air.  Due to underlying dementia patient is unable to provide much history.  She does confirm her shortness of air along with a non-productive cough.  She denies any recent fever, chills, nausea, vomiting or diarrhea.  She does currently reside in a nursing home.  She did present to the ED two nights ago after a fall and hypoglycemia.  She was found down by staff members and had obviously struck her head.  Oral glucose was administered and her blood sugar had risen above 200 by the time EMS arrived.  At that time she reports being diagnosed with pneumonia earlier in the week and has been on a z-anju.   ED provider spoke with patient's son who is her POA.  They did not wish to have her admitted.  Zithromax was continued and she was transferred back to the [place].    Tonight CXR obtained showed multifocal pneumonia.  Pulmonary edema, pulmonary hemorrhage or ARDS could not be excluded.  Right pleural effusion, cardiomegaly and hiatal hernia.  She arrived on RA and is currently requiring 70% hiflow nasal cannual with oxygen sat of 95%.  Patient will be admitted to [facility] under the care of the Hospitalist for further evaluation and treatment.         Review of Systems   Unable to perform ROS: Dementia        All other systems reviewed and are negative.     Personal History     Past Medical History:   Diagnosis Date   • Atrial fibrillation (HCC)    • Benign essential hypertension    • CAD (coronary artery disease)    • Carotid artery disease (HCC)    • CHF  (congestive heart failure) (HCC)    • Chronic allergic rhinitis    • CKD (chronic kidney disease), stage III (HCC)    • DDD (degenerative disc disease), cervical    • Dementia (HCC)    • Diabetes mellitus (HCC)    • Diverticulosis     WITH PERFORATION    • Hypertension    • Major depressive disorder, single episode, mild (HCC)    • Memory loss    • Mixed hyperlipidemia    • SSS (sick sinus syndrome) (HCC)    • TIA (transient ischemic attack) 01/2016             Past Surgical History:   Procedure Laterality Date   • ANKLE SURGERY Left 1996   • ATHERECTOMY  1995   • ATHRECTOMY ILIAC, FEMORAL, TIBIAL ARTERY     • BREAST LUMPECTOMY Left 1981   • COLON SURGERY     • COLOSTOMY  1983   • CORONARY STENT PLACEMENT  2001   • FINGER FRACTURE SURGERY Right    • HIP TROCHANTERIC NAILING WITH INTRAMEDULLARY HIP SCREW Left 11/23/2017   • HIP TROCHANTERIC NAILING WITH INTRAMEDULLARY HIP SCREW Right 3/30/2019    Procedure: HIP TROCANTERIC NAILING WITH INTRAMEDULLARY HIP SCREW RIGHT;  Surgeon: Theo Figueroa MD;  Location: [facility];  Service: Orthopedics   • HYSTERECTOMY  1984   • OTHER SURGICAL HISTORY  1984    REANASTAMOSIS        Family History:  family history includes Coronary artery disease (age of onset: 56) in her mother; Hypertension in her father and mother; Lung cancer in her brother and father; Osteoporosis in her sister. Otherwise pertinent FHx was reviewed and unremarkable.     Social History:  reports that she quit smoking about 43 years ago. Her smoking use included cigarettes. She started smoking about 46 years ago. She quit after 2.00 years of use. She has never used smokeless tobacco. She reports that she does not drink alcohol and does not use drugs.  Social History     Social History Narrative    Patient lives in [facility] [place]    Patient drinks 2 cups of caffeine per day.,       Medications:  Amino Acids-Protein Hydrolys, Multiple Vitamin, acetaminophen, aluminum-magnesium hydroxide-simethicone,  "amLODIPine, amiodarone, aspirin EC, bisacodyl, citalopram, cloNIDine, collagenase, docusate sodium, donepezil, fluticasone, insulin aspart, isosorbide mononitrate, lidocaine, metFORMIN ER, ondansetron, and polyethylene glycol    Allergies   Allergen Reactions   • Penicillins Other (See Comments)     Pt states \"i don't know\"       Objective   Objective     Vital Signs:   Temp:  [98.5 °F (36.9 °C)] 98.5 °F (36.9 °C)  Heart Rate:  [103-113] 104  Resp:  [24] 24  BP: (132-144)/(89-98) 141/98  Flow (L/min):  [4-50] 50    Physical Exam  Vitals and nursing note reviewed.   Constitutional:       General: She is not in acute distress.     Appearance: Normal appearance. She is not ill-appearing, toxic-appearing or diaphoretic.   HENT:      Head: Normocephalic and atraumatic.      Nose: Nose normal.      Mouth/Throat:      Mouth: Mucous membranes are dry.      Pharynx: Oropharynx is clear.   Eyes:      Extraocular Movements: Extraocular movements intact.      Conjunctiva/sclera: Conjunctivae normal.      Pupils: Pupils are equal, round, and reactive to light.   Cardiovascular:      Rate and Rhythm: Normal rate. Rhythm irregular.      Pulses: Normal pulses.      Heart sounds: Normal heart sounds.   Pulmonary:      Effort: Pulmonary effort is normal.      Breath sounds: Rhonchi present.   Abdominal:      General: Bowel sounds are normal. There is no distension.      Palpations: Abdomen is soft. There is no mass.      Tenderness: There is no abdominal tenderness. There is no right CVA tenderness, left CVA tenderness, guarding or rebound.      Hernia: No hernia is present.   Musculoskeletal:         General: No swelling, tenderness, deformity or signs of injury. Normal range of motion.      Cervical back: Normal range of motion and neck supple.      Right lower leg: No edema.      Left lower leg: No edema.   Skin:     General: Skin is warm and dry.   Neurological:      General: No focal deficit present.      Mental Status: She is " alert. Mental status is at baseline. She is disoriented.   Psychiatric:         Mood and Affect: Mood normal.         Behavior: Behavior normal.         Results Reviewed:  I have personally reviewed most recent indicated data and agree with findings including:  [x]  Laboratory  [x]  Radiology  [x]  EKG/Telemetry  []  Pathology  []  Cardiac/Vascular Studies  []  Old records  []  Other:  Most pertinent findings include:      LAB RESULTS:      Lab 06/10/22  2319 06/10/22  2230   WBC 10.81*  --    HEMOGLOBIN 12.8  --    HEMATOCRIT 39.8  --    PLATELETS 278  --    NEUTROS ABS 8.41*  --    IMMATURE GRANS (ABS) 0.05  --    LYMPHS ABS 1.26  --    MONOS ABS 0.93*  --    EOS ABS 0.10  --    MCV 93.4  --    PROCALCITONIN  --  0.12   LACTATE 1.2  --    D DIMER QUANT 3.43*  --          Lab 06/10/22  2230   SODIUM 133*   POTASSIUM 4.2   CHLORIDE 98   CO2 25.0   ANION GAP 10.0   BUN 27*   CREATININE 1.28*   EGFR 40.6*   GLUCOSE 117*   CALCIUM 9.7         Lab 06/10/22  2230   TOTAL PROTEIN 7.4   ALBUMIN 4.30   GLOBULIN 3.1   ALT (SGPT) 10   AST (SGOT) 18   BILIRUBIN 0.5   ALK PHOS 112         Lab 06/10/22  2230   PROBNP 5,037.0*   TROPONIN T 0.028                 Brief Urine Lab Results  (Last result in the past 365 days)      Color   Clarity   Blood   Leuk Est   Nitrite   Protein   CREAT   Urine HCG        06/03/22 1424 Yellow   Clear   Negative   Negative   Negative   Negative               Microbiology Results (last 10 days)     Procedure Component Value - Date/Time    COVID PRE-OP / PRE-PROCEDURE SCREENING ORDER (NO ISOLATION) - Swab, Nasopharynx [[id number]]  (Normal) Collected: 06/10/22 2318    Lab Status: Final result Specimen: Swab from Nasopharynx Updated: 06/11/22 0017    Narrative:      The following orders were created for panel order COVID PRE-OP / PRE-PROCEDURE SCREENING ORDER (NO ISOLATION) - Swab, Nasopharynx.  Procedure                               Abnormality         Status                     ---------                                -----------         ------                     COVID-19 and FLU A/B PCR...[[id number]]  Normal              Final result                 Please view results for these tests on the individual orders.    COVID-19 and FLU A/B PCR - Swab, Nasopharynx [[id number]]  (Normal) Collected: 06/10/22 2318    Lab Status: Final result Specimen: Swab from Nasopharynx Updated: 06/11/22 0017     COVID19 Not Detected     Influenza A PCR Not Detected     Influenza B PCR Not Detected    Narrative:      Fact sheet for providers: https://www.fda.gov/media/006070/download    Fact sheet for patients: https://www.fda.gov/media/365107/download    Test performed by PCR.    COVID PRE-OP / PRE-PROCEDURE SCREENING ORDER (NO ISOLATION) - Swab, Nasopharynx [[id number]]  (Normal) Collected: 06/03/22 1619    Lab Status: Final result Specimen: Swab from Nasopharynx Updated: 06/03/22 1648    Narrative:      The following orders were created for panel order COVID PRE-OP / PRE-PROCEDURE SCREENING ORDER (NO ISOLATION) - Swab, Nasopharynx.  Procedure                               Abnormality         Status                     ---------                               -----------         ------                     COVID-19 and FLU A/B PCR...[[id number]]  Normal              Final result                 Please view results for these tests on the individual orders.    COVID-19 and FLU A/B PCR - Swab, Nasopharynx [[id number]]  (Normal) Collected: 06/03/22 1619    Lab Status: Final result Specimen: Swab from Nasopharynx Updated: 06/03/22 1648     COVID19 Not Detected     Influenza A PCR Not Detected     Influenza B PCR Not Detected    Narrative:      Fact sheet for providers: https://www.fda.gov/media/527032/download    Fact sheet for patients: https://www.fda.gov/media/393836/download    Test performed by PCR.          XR Chest 1 View    Result Date: 6/10/2022  DATE OF EXAM: 6/10/2022 10:37 PM  PROCEDURE: XR CHEST 1 VW-  INDICATIONS: sob   COMPARISON: Chest x-ray 06/03/2022 CT chest 06/03/2022  TECHNIQUE: Single radiographic view of the chest was obtained.  FINDINGS: Lungs are normally expanded. Heart size is normal. No pneumothorax or pleural effusion or focal pulmonary parenchymal opacity. Pulmonary vessels are distinct. Bones and soft tissues are normal.      Impression: Worse appearance of chest with multifocal airspace opacities most consistent with multifocal pneumonia. Pulmonary edema, pulmonary hemorrhage or ARDS cannot be excluded. Right pleural effusion. Cardiomegaly. Hiatal hernia.  This report was finalized on 6/10/2022 10:56 PM by Xiomara Beyer MD.        Results for orders placed during the hospital encounter of 03/03/19    Transthoracic Echo Complete With Contrast if Necessary Per Protocol    Interpretation Summary  · Calculated EF = 66.0%  · Left ventricular systolic function is normal.  · Left ventricular wall thickness is consistent with moderate concentric hypertrophy.  · Left atrial cavity size is severely dilated.  · Left ventricular diastolic dysfunction (grade I) consistent with impaired relaxation.  · Mild tricuspid valve regurgitation is present.  · Mild-to-moderate mitral valve regurgitation is present  · There is calcification of the aortic valve.      Assessment & Plan   Assessment & Plan       Essential hypertension    Coronary artery disease involving native coronary artery of native heart with angina pectoris ([facility])    Dementia (HCC)    Paroxysmal atrial fibrillation (HCC)    Benign essential hypertension    CKD (chronic kidney disease), stage III ([facility])    GERD (gastroesophageal reflux disease)    Late onset Alzheimer's disease without behavioral disturbance ([facility])    Acute respiratory failure with hypercapnia (HCC)    Multifocal pneumonia    Acute on chronic diastolic CHF (congestive heart failure) ([facility])      87 year old female presents to the ED due to worsening shortness of air.  Was diagnosed with pneumonia several days  ago and has been on a Zpak.      1) Multifocal pneumonia       Acute hypoxic respiratory failure  -CXR mentioned above  -initially on RA on arrival now currently requiring 70% hi flow NC  -o2 sat currently 95%  -d-dimer 3.43, obtain CT of chest tonight, VQ scan in am along with venous duplex of BLE  -Son verifies patient is DNR/DNI  -continue vancomycin and cefepime   -blood cultures x2 pending   -sputum culture  -COVID-19 negative   -check urine antigens   -MRSA PCR   -continuous pulse ox  -keep o2 sat >90%    2) Acute on chronic HFpEF  -last echo with EF 66%, mild tricuspid valve regurgitation  -mild to moderate MVR  -proBNP tonight 5, 037.0  -s/p 40 mg IV lasix   -strict I &O   -daily weight   -follows with Dr. Flores     3) Paroxysmal atrial fibrillation   -currently rate controlled   -on amiodarone   -previously on eliquis but stopped due to multiple falls   -echo mentioned above    4) CKD III   -baseline creatinine 1.1-1.4  -hold nephrotoxic agents   -repeat labs in am     5) Essential hypertension   -on norvasc, clonidine.    -BP stable     6) Dementia  -on aricept   -fall precautions     7) Diabetes mellitus type 2  -check hgb A1c   -start ldssi   -fingersticks achs         DVT prophylaxis:  heparin     CODE STATUS:  DNR/DNI        This note has been completed as part of a split-shared workflow.     Signature: Electronically signed by CRISTA Lindsay, 06/11/22, 1:16 AM EDT.      Attending   Admission Attestation       I have seen and examined the patient, performing an independent face-to-face diagnostic evaluation with plan of care reviewed and developed with the advanced practice clinician (APC).      Brief Summary Statement:   Mary Ramirez is a 87 y.o. female with history of dementia, diastolic heart failure, CAD, paroxysmal atrial fibrillation, DMII, CKDIII, and HTN presents with shortness of breath and cough.  She was recently started on azithromycin for pneumonia, and was actually seen in the ED  on 6/3 for the same -- but discharged after discussion of goals of care with the family.  CXR tonight consistent with multifocal infiltrates.    Remainder of detailed HPI is as noted by APC and has been reviewed and/or edited by me for completeness.    Attending Physical Exam:  Constitutional - no acute distress, somewhat frail, in bed  HEENT-NCAT, mucous membranes moist  CV-irregularly irregular  Resp-fine crackles in right lung fields  Abd-soft, nontender, nondistended, normoactive bowel sounds  Ext-No lower extremity cyanosis, clubbing or edema bilaterally  Neuro-awake, speech clear  Psych-normal affect   Skin- No rash on exposed UE or LE bilaterally, some bruising on legs      Brief Assessment/Plan :    Acute on chronic diastolic heart failure  Possible Pneumonia  - Repeat CT tonight shows progression of infiltrates compared to 6/3 imaging.   - at this juncture favor edema over infection -- procalcitonin low at 0.12, however proBNP elevated at 5,037.  Increased hypoxia after patient received bolus in ED.  - will continue empiric antibiotics and follow cultures, but will add IV lasix 20 mg BID.   - d-dimer elevated (3.4), will check VQ and bilateral LE duplex given low creatinine clearance  - loculated effusion of unclear chronicity, however small right effusion noted on CT Abdomen/Pelvis from 8/2018  - echo am    PAF    CKD III    Dementia    See detailed assessment and plan developed with APC which I have reviewed and/or edited for completeness.        Admission Status: I believe that this patient meets INPATIENT status due to need for IV diuresis for acute on chronic heart failure.  I feel patient’s risk for adverse outcomes and need for care warrant INPATIENT evaluation and I predict the patient’s care encounter to likely last beyond 2 midnights.        Emmanuel Mehta MD  06/11/22                    Electronically signed by Emmanuel Mehta MD at 06/11/22 1426     David Fonseca MD at 06/10/22 1609             [place]    EMERGENCY DEPARTMENT ENCOUNTER      Pt Name: Mary Ramirez  MRN: [id number]  YOB: 1934  Date of evaluation: 6/10/2022  Provider: David Fonseca MD    CHIEF COMPLAINT       Chief Complaint   Patient presents with   • Shortness of Breath         HISTORY OF PRESENT ILLNESS  (Location/Symptom, Timing/Onset, Context/Setting, Quality, Duration, Modifying Factors, Severity.)   Mary Ramirez is a 87 y.o. female who presents to the emergency department with 10 days of progressively worsening moderate severity shortness of breath with associated cough.  Additional history is severely limited secondary to significant dementia and so history obtained from review of record.  Patient was apparently diagnosed with pneumonia beginning of the month and starting on azithromycin.  She was subsequently seen in the emergency department on June 2 and CT chest showed infiltrate with loculated pleural fluid.  Per family wishes, she was discharged home at that time and has had significant worsening today with hypoxia.  Patient does not typically wear oxygen but has required several liters of oxygen prior to arrival to the ED.      Nursing notes were reviewed.    REVIEW OF SYSTEMS    (2-9 systems for level 4, 10 or more for level 5)   ROS:  Unable to obtain secondary to dementia      PAST MEDICAL HISTORY     Past Medical History:   Diagnosis Date   • Atrial fibrillation (HCC)    • Benign essential hypertension    • CAD (coronary artery disease)    • Carotid artery disease ([facility])    • CHF (congestive heart failure) ([facility])    • Chronic allergic rhinitis    • CKD (chronic kidney disease), stage III ([facility])    • DDD (degenerative disc disease), cervical    • Dementia ([facility])    • Diabetes mellitus ([facility])    • Diverticulosis     WITH PERFORATION    • Hypertension    • Major depressive disorder, single episode, mild ([facility])    • Memory loss    • Mixed hyperlipidemia    • SSS (sick sinus syndrome) ([facility])    • TIA  (transient ischemic attack) 01/2016         SURGICAL HISTORY       Past Surgical History:   Procedure Laterality Date   • ANKLE SURGERY Left 1996   • ATHERECTOMY  1995   • ATHRECTOMY ILIAC, FEMORAL, TIBIAL ARTERY     • BREAST LUMPECTOMY Left 1981   • COLON SURGERY     • COLOSTOMY  1983   • CORONARY STENT PLACEMENT  2001   • FINGER FRACTURE SURGERY Right    • HIP TROCHANTERIC NAILING WITH INTRAMEDULLARY HIP SCREW Left 11/23/2017   • HIP TROCHANTERIC NAILING WITH INTRAMEDULLARY HIP SCREW Right 3/30/2019    Procedure: HIP TROCANTERIC NAILING WITH INTRAMEDULLARY HIP SCREW RIGHT;  Surgeon: Theo Figueroa MD;  Location: [facility];  Service: Orthopedics   • HYSTERECTOMY  1984   • OTHER SURGICAL HISTORY  1984    REANASTAMOSIS          CURRENT MEDICATIONS       Current Facility-Administered Medications:   •  acetaminophen (TYLENOL) tablet 650 mg, 650 mg, Oral, Q4H PRN **OR** acetaminophen (TYLENOL) 160 MG/5ML solution 650 mg, 650 mg, Oral, Q4H PRN **OR** acetaminophen (TYLENOL) suppository 650 mg, 650 mg, Rectal, Q4H PRN, Dixie, Margy, APRN  •  aluminum-magnesium hydroxide-simethicone (MAALOX MAX) 400-400-40 MG/5ML suspension 5 mL, 5 mL, Oral, Q6H PRN, Dixie, Margy, APRN  •  amiodarone (PACERONE) tablet 200 mg, 200 mg, Oral, Daily, Dixie, Margy, APRN  •  [START ON 6/12/2022] amLODIPine (NORVASC) tablet 10 mg, 10 mg, Oral, Nightly, Dixie, Margy, APRN  •  aspirin EC tablet 325 mg, 325 mg, Oral, Daily, Dixie, Margy, APRN  •  cefepime (MAXIPIME) 1 g/100 mL 0.9% NS IVPB (mbp), 1 g, Intravenous, Q24H, Mayco Parker IV, PharmD  •  citalopram (CeleXA) tablet 20 mg, 20 mg, Oral, Daily, Dixie, Margy, APRN  •  dextrose (D50W) (25 g/50 mL) IV injection 25 g, 25 g, Intravenous, Q15 Min PRN, Dixie, Margy, APRN  •  dextrose (GLUTOSE) oral gel 15 g, 15 g, Oral, Q15 Min PRN, Dixie, Margy, APRN  •  docusate sodium (COLACE) capsule 100 mg, 100 mg, Oral, BID PRN, Dixie, Margy, APRN  •  donepezil  (ARICEPT) tablet 5 mg, 5 mg, Oral, Nightly, Dixie, Margy, APRN  •  fluticasone (FLONASE) 50 MCG/ACT nasal spray 2 spray, 2 spray, Nasal, Daily, Dixie, Margy, APRN  •  furosemide (LASIX) injection 20 mg, 20 mg, Intravenous, BID, Emmanuel Mehta MD  •  glucagon (human recombinant) (GLUCAGEN DIAGNOSTIC) injection 1 mg, 1 mg, Intramuscular, Q15 Min PRN, Dixie, Margy, APRN  •  heparin (porcine) 5000 UNIT/ML injection 5,000 Units, 5,000 Units, Subcutaneous, Q12H, Dixie, Margy, APRN  •  Insulin Lispro (humaLOG) injection 0-7 Units, 0-7 Units, Subcutaneous, TID AC, Dixie, Margy, APRN  •  ipratropium-albuterol (DUO-NEB) nebulizer solution 3 mL, 3 mL, Nebulization, 4x Daily - RT, Dixie, Margy, APRN  •  isosorbide mononitrate (IMDUR) 24 hr tablet 60 mg, 60 mg, Oral, Daily, Dixie, Margy, APRN  •  lidocaine (LIDODERM) 5 % 1 patch, 1 patch, Transdermal, Q24H, Dixie, Magry, APRN  •  multivitamin (THERAGRAN) tablet 1 tablet, 1 tablet, Oral, Daily, Diixe, Margy, APRN  •  [DISCONTINUED] vancomycin (VANCOCIN) 1000 mg/200 mL dextrose 5% IVPB, 20 mg/kg, Intravenous, Once **AND** Pharmacy to dose vancomycin, , Does not apply, Continuous PRN, Dixie, Margy, APRN  •  sodium chloride 0.9 % flush 10 mL, 10 mL, Intravenous, Q12H, Dixie, Margy, APRN  •  sodium chloride 0.9 % flush 10 mL, 10 mL, Intravenous, PRN, Dixie, Margy, APRN  •  vancomycin - no scheduled doses (Pharmacy dosing per levels), , Does not apply, Daily, Mayco Parker IV, PharmD    Current Outpatient Medications:   •  acetaminophen (TYLENOL) 500 MG tablet, Take 500 mg by mouth 2 (Two) Times a Day. Take one tablet twice a day for leg pain  And prn every 6 hours, Disp: , Rfl:   •  aluminum-magnesium hydroxide-simethicone (MAALOX MAX) 400-400-40 MG/5ML suspension, Take 5 mL by mouth Every 6 (Six) Hours As Needed for Indigestion or Heartburn., Disp: , Rfl:   •  Amino Acids-Protein Hydrolys (PRO-STAT AWC PO), Take 1 dose by mouth  2 (Two) Times a Day., Disp: , Rfl:   •  amiodarone (PACERONE) 200 MG tablet, Take 1 tablet by mouth Daily., Disp: , Rfl:   •  amLODIPine (NORVASC) 10 MG tablet, Take 1 tablet by mouth Every Night., Disp: 30 tablet, Rfl: 5  •  aspirin  MG EC tablet, Take 1 tablet by mouth Daily., Disp: , Rfl:   •  bisacodyl (DULCOLAX) 5 MG EC tablet, Take 1 tablet by mouth Daily As Needed for Constipation., Disp: , Rfl:   •  citalopram (CeleXA) 20 MG tablet, Take 20 mg by mouth Daily., Disp: , Rfl:   •  CloNIDine (CATAPRES) 0.2 MG tablet, Take 0.2 mg by mouth 2 (Two) Times a Day., Disp: , Rfl:   •  collagenase 250 UNIT/GM ointment, Apply  topically to the appropriate area as directed Daily., Disp: , Rfl:   •  docusate sodium (COLACE) 100 MG capsule, Take 100 mg by mouth 2 (Two) Times a Day., Disp: , Rfl:   •  donepezil (ARICEPT) 5 MG tablet, Take 5 mg by mouth Every Night., Disp: , Rfl:   •  fluticasone (FLONASE) 50 MCG/ACT nasal spray, 2 sprays into the nostril(s) as directed by provider Daily., Disp: , Rfl:   •  insulin aspart (novoLOG FLEXPEN) 100 UNIT/ML solution pen-injector sc pen, Inject 2-5 Units under the skin into the appropriate area as directed 4 (Four) Times a Day., Disp: , Rfl:   •  isosorbide mononitrate (IMDUR) 60 MG 24 hr tablet, Take 60 mg by mouth Daily., Disp: , Rfl:   •  lidocaine (LIDODERM) 5 %, Place 1 patch on the skin as directed by provider Daily. Remove & Discard patch within 12 hours or as directed by MD, Disp: 30 patch, Rfl: 0  •  metFORMIN ER (GLUCOPHAGE-XR) 500 MG 24 hr tablet, Take 1 tablet by mouth Daily With Breakfast., Disp: 30 tablet, Rfl: 5  •  Multiple Vitamin (MULTIVITAMINS PO), Take 1 tablet by mouth Daily., Disp: , Rfl:   •  ondansetron (ZOFRAN) 4 MG tablet, Take 4 mg by mouth Every 8 (Eight) Hours As Needed for Nausea or Vomiting., Disp: , Rfl:   •  polyethylene glycol (MIRALAX) pack packet, Take 17 g by mouth Daily. (Patient taking differently: Take 17 g by mouth Daily As Needed.),  Disp: , Rfl:     ALLERGIES     Penicillins    FAMILY HISTORY       Family History   Problem Relation Age of Onset   • Hypertension Mother    • Coronary artery disease Mother 56        PREMATURE    • Lung cancer Father    • Hypertension Father    • Osteoporosis Sister    • Lung cancer Brother           SOCIAL HISTORY       Social History     Socioeconomic History   • Marital status:      Spouse name: N/A   • Number of children: 2   • Years of education: H.S   Tobacco Use   • Smoking status: Former Smoker     Years: 2.00     Types: Cigarettes     Start date: 1975     Quit date: 1978     Years since quittin.6   • Smokeless tobacco: Never Used   • Tobacco comment: QUIT DATE 1984   Substance and Sexual Activity   • Alcohol use: No   • Drug use: No   • Sexual activity: Never         PHYSICAL EXAM    (up to 7 for level 4, 8 or more for level 5)     Vitals:    22 0347 22 0348 22 0352 22 0353   BP:       Pulse: 112 112 117 113   Resp:       Temp:       TempSrc:       SpO2: 96% 96% 95% 97%   Weight:       Height:           Physical Exam  General: Ill-appearing  HEENT: Conjunctivae normal.  Neck: Trachea midline.  Cardiac: Tachycardic, regular rhythm, no murmurs, rubs, or gallops  Lungs: Tachypneic, diffusely coarse  Chest wall: There is no tenderness to palpation over the chest wall or over ribs  Abdomen: Abdomen is soft, nontender, nondistended. There are no firm or pulsatile masses, no rebound rigidity or guarding.   Musculoskeletal: No deformity.  Neuro: Alert   Dermatology: Skin is warm and dry  Psych: Mentation is grossly normal, cognition is grossly normal. Affect is appropriate.        DIAGNOSTIC RESULTS     EKG: All EKGs are interpreted by the Emergency Department Physician who either signs or Co-signs this chart in the absence of a cardiologist.    ECG 12 Lead   Final Result   Test Reason : SOA PROTOCOL   Blood Pressure :   */*   mmHG   Vent. Rate : 105 BPM      Atrial Rate :  87 BPM      P-R Int :   * ms          QRS Dur :  86 ms       QT Int : 374 ms       P-R-T Axes :   *  -6   7 degrees      QTc Int : 494 ms      Atrial fibrillation with rapid ventricular response   Cannot rule out Anterior infarct , age undetermined   Abnormal ECG   When compared with ECG of 03-JUN-2022 16:52,   Atrial fibrillation has replaced Atrial flutter   Inverted T waves have replaced nonspecific T wave abnormality in Inferior    leads   Nonspecific T wave abnormality now evident in Anterior leads   T wave inversion no longer evident in Lateral leads   Confirmed by JULIO CESAR KIMBLE ([id number]) on 6/11/2022 4:14:51 AM      Referred By: BILLIEMD           Confirmed By: JULIO CESAR KIMBLE          RADIOLOGY:   Non-plain film images such as CT, Ultrasound and MRI are read by the radiologist. Plain radiographic images are visualized and preliminarily interpreted by the emergency physician with the below findings:      [x] Radiologist's Report Reviewed:  CT Chest Without Contrast Diagnostic   Final Result   1. Increased multifocal, ill-defined groundglass and streaky alveolar densities bilaterally concerning for pulmonary edema or pneumonia.   2. Unchanged rounded opacity in the right lung base which could reflect loculated pleural fluid and/or round atelectasis. An underlying parenchymal mass lesion is not excluded. Evaluation limited by lack of contrast.   3. Stable large hiatal hernia.      Electronically signed by:  Theo Naqvi M.D.     6/11/2022 12:05 AM Mountain Time      XR Chest 1 View   Final Result      Increased airspace opacities throughout the right lung. Small right pleural effusion      Large hiatal hernia.      Electronically signed by:  Tiera Juares M.D.     6/10/2022 11:58 PM Mountain Time      NM Lung Quantitative Differential Function Perfusion    (Results Pending)         ED BEDSIDE ULTRASOUND:   Performed by ED Physician - none    LABS:    I have reviewed and interpreted all of the  currently available lab results from this visit (if applicable):  Results for orders placed or performed during the hospital encounter of 06/10/22   COVID-19 and FLU A/B PCR - Swab, Nasopharynx    Specimen: Nasopharynx; Swab   Result Value Ref Range    COVID19 Not Detected Not Detected - Ref. Range    Influenza A PCR Not Detected Not Detected    Influenza B PCR Not Detected Not Detected   Comprehensive Metabolic Panel    Specimen: Blood   Result Value Ref Range    Glucose 117 (H) 65 - 99 mg/dL    BUN 27 (H) 8 - 23 mg/dL    Creatinine 1.28 (H) 0.57 - 1.00 mg/dL    Sodium 133 (L) 136 - 145 mmol/L    Potassium 4.2 3.5 - 5.2 mmol/L    Chloride 98 98 - 107 mmol/L    CO2 25.0 22.0 - 29.0 mmol/L    Calcium 9.7 8.6 - 10.5 mg/dL    Total Protein 7.4 6.0 - 8.5 g/dL    Albumin 4.30 3.50 - 5.20 g/dL    ALT (SGPT) 10 1 - 33 U/L    AST (SGOT) 18 1 - 32 U/L    Alkaline Phosphatase 112 39 - 117 U/L    Total Bilirubin 0.5 0.0 - 1.2 mg/dL    Globulin 3.1 gm/dL    A/G Ratio 1.4 g/dL    BUN/Creatinine Ratio 21.1 7.0 - 25.0    Anion Gap 10.0 5.0 - 15.0 mmol/L    eGFR 40.6 (L) >60.0 mL/min/1.73   Troponin    Specimen: Blood   Result Value Ref Range    Troponin T 0.028 0.000 - 0.030 ng/mL   D-dimer, Quantitative    Specimen: Blood   Result Value Ref Range    D-Dimer, Quantitative 3.43 (H) 0.01 - 0.50 MCGFEU/mL   BNP    Specimen: Blood   Result Value Ref Range    proBNP 5,037.0 (H) 0.0 - 1,800.0 pg/mL   CBC Auto Differential    Specimen: Blood   Result Value Ref Range    WBC 10.81 (H) 3.40 - 10.80 10*3/mm3    RBC 4.26 3.77 - 5.28 10*6/mm3    Hemoglobin 12.8 12.0 - 15.9 g/dL    Hematocrit 39.8 34.0 - 46.6 %    MCV 93.4 79.0 - 97.0 fL    MCH 30.0 26.6 - 33.0 pg    MCHC 32.2 31.5 - 35.7 g/dL    RDW 14.8 12.3 - 15.4 %    RDW-SD 50.5 37.0 - 54.0 fl    MPV 12.3 (H) 6.0 - 12.0 fL    Platelets 278 140 - 450 10*3/mm3    Neutrophil % 77.7 (H) 42.7 - 76.0 %    Lymphocyte % 11.7 (L) 19.6 - 45.3 %    Monocyte % 8.6 5.0 - 12.0 %    Eosinophil % 0.9 0.3  - 6.2 %    Basophil % 0.6 0.0 - 1.5 %    Immature Grans % 0.5 0.0 - 0.5 %    Neutrophils, Absolute 8.41 (H) 1.70 - 7.00 10*3/mm3    Lymphocytes, Absolute 1.26 0.70 - 3.10 10*3/mm3    Monocytes, Absolute 0.93 (H) 0.10 - 0.90 10*3/mm3    Eosinophils, Absolute 0.10 0.00 - 0.40 10*3/mm3    Basophils, Absolute 0.06 0.00 - 0.20 10*3/mm3    Immature Grans, Absolute 0.05 0.00 - 0.05 10*3/mm3    nRBC 0.0 0.0 - 0.2 /100 WBC   Lactic Acid, Plasma    Specimen: Blood   Result Value Ref Range    Lactate 1.2 0.5 - 2.0 mmol/L   Procalcitonin    Specimen: Blood   Result Value Ref Range    Procalcitonin 0.12 0.00 - 0.25 ng/mL   Urinalysis With Culture If Indicated - Urine, Random Void    Specimen: Urine, Random Void   Result Value Ref Range    Color, UA Yellow Yellow, Straw    Appearance, UA Clear Clear    pH, UA 7.0 5.0 - 8.0    Specific Gravity, UA 1.007 1.001 - 1.030    Glucose, UA Negative Negative    Ketones, UA Negative Negative    Bilirubin, UA Negative Negative    Blood, UA Negative Negative    Protein, UA Negative Negative    Leuk Esterase, UA Trace (A) Negative    Nitrite, UA Negative Negative    Urobilinogen, UA 0.2 E.U./dL 0.2 - 1.0 E.U./dL   Urinalysis, Microscopic Only - Urine, Random Void    Specimen: Urine, Random Void   Result Value Ref Range    RBC, UA 0-2 None Seen, 0-2 /HPF    WBC, UA None Seen None Seen, 0-2 /HPF    Bacteria, UA None Seen None Seen, Trace /HPF    Squamous Epithelial Cells, UA None Seen None Seen, 0-2 /HPF    Hyaline Casts, UA None Seen 0 - 6 /LPF    Methodology Automated Microscopy    ECG 12 Lead   Result Value Ref Range    QT Interval 374 ms    QTC Interval 494 ms        All other labs were within normal range or not returned as of this dictation.      EMERGENCY DEPARTMENT COURSE and DIFFERENTIAL DIAGNOSIS/MDM:   Vitals:    Vitals:    06/11/22 0347 06/11/22 0348 06/11/22 0352 06/11/22 0353   BP:       Pulse: 112 112 117 113   Resp:       Temp:       TempSrc:       SpO2: 96% 96% 95% 97%   Weight:        Height:           ED Course as of 06/11/22 0416   Sat Jun 11, 2022   0044 Spoke with patient's son on the phone.  States that he would like for patient to be DNR/DNI and would not like her to have any invasive procedures.  He is okay with her being on supplemental oxygen therapy and receiving IV antibiotics. [NS]   0044 I spoke with Dr. Mehta who accepts the patient for admission. [NS]      ED Course User Index  [NS] David Fonseca MD       Patient presents with acute hypoxic respiratory failure secondary to findings concerning for pneumonia versus possibly pulmonary edema.  No evidence of significant anemia or electrolyte derangement.  No pneumothorax.  Patient given broad-spectrum IV antibiotics and IV Lasix in the ED.  She had progressively increasing oxygen requirement.  Started on high flow nasal cannula.  She is being admitted to the hospitalist service for further management.        MEDICATIONS ADMINISTERED IN ED:  Medications   aluminum-magnesium hydroxide-simethicone (MAALOX MAX) 400-400-40 MG/5ML suspension 5 mL (has no administration in time range)   amiodarone (PACERONE) tablet 200 mg (has no administration in time range)   amLODIPine (NORVASC) tablet 10 mg (has no administration in time range)   aspirin EC tablet 325 mg (has no administration in time range)   citalopram (CeleXA) tablet 20 mg (has no administration in time range)   docusate sodium (COLACE) capsule 100 mg (has no administration in time range)   donepezil (ARICEPT) tablet 5 mg (has no administration in time range)   fluticasone (FLONASE) 50 MCG/ACT nasal spray 2 spray (has no administration in time range)   isosorbide mononitrate (IMDUR) 24 hr tablet 60 mg (has no administration in time range)   lidocaine (LIDODERM) 5 % 1 patch (has no administration in time range)   multivitamin (THERAGRAN) tablet 1 tablet (has no administration in time range)   Pharmacy to dose vancomycin (has no administration in time range)   sodium chloride  0.9 % flush 10 mL (has no administration in time range)   sodium chloride 0.9 % flush 10 mL (has no administration in time range)   heparin (porcine) 5000 UNIT/ML injection 5,000 Units (has no administration in time range)   acetaminophen (TYLENOL) tablet 650 mg (has no administration in time range)     Or   acetaminophen (TYLENOL) 160 MG/5ML solution 650 mg (has no administration in time range)     Or   acetaminophen (TYLENOL) suppository 650 mg (has no administration in time range)   ipratropium-albuterol (DUO-NEB) nebulizer solution 3 mL (has no administration in time range)   dextrose (GLUTOSE) oral gel 15 g (has no administration in time range)   dextrose (D50W) (25 g/50 mL) IV injection 25 g (has no administration in time range)   glucagon (human recombinant) (GLUCAGEN DIAGNOSTIC) injection 1 mg (has no administration in time range)   Insulin Lispro (humaLOG) injection 0-7 Units (has no administration in time range)   cefepime (MAXIPIME) 1 g/100 mL 0.9% NS IVPB (mbp) (has no administration in time range)   vancomycin - no scheduled doses (Pharmacy dosing per levels) (has no administration in time range)   furosemide (LASIX) injection 20 mg (has no administration in time range)   vancomycin (VANCOCIN) 1000 mg/200 mL dextrose 5% IVPB (0 mg Intravenous Stopped 6/11/22 0050)   cefepime (MAXIPIME) 2 g/100 mL 0.9% NS (mbp) (0 g Intravenous Stopped 6/11/22 0029)   sodium chloride 0.9 % bolus 500 mL (0 mL Intravenous Stopped 6/11/22 0050)   furosemide (LASIX) injection 40 mg (40 mg Intravenous Given 6/11/22 0052)         CRITICAL CARE TIME    Approximately 65 minutes of discontinuous critical care time was provided to this patient by myself absent of any time spent performing procedures.  Patient presents critically ill with acute hypoxic respiratory failure secondary to pneumonia placing the cardiovascular, respiratory, neurologic systems at risk requiring the following interventions: Administration of supplemental  oxygen and progression to high flow nasal cannula, interpretation of lab/ECG/imaging, frequent reassessment, coordination of admission with the following response: Resolution of hypoxia.  Patient at high risk of deterioration and possibly death without these interventions.      FINAL IMPRESSION      1. Acute sepsis (HCC)    2. Pneumonia due to infectious organism, unspecified laterality, unspecified part of lung    3. Acute congestive heart failure, unspecified heart failure type (HCC)    4. Acute respiratory failure with hypoxia (HCC)          DISPOSITION/PLAN     ED Disposition     ED Disposition   Decision to Admit    Condition   --    Comment   Level of Care: Telemetry [5]   Diagnosis: Acute sepsis (HCC) [[id number]]   Certification: I Certify That Inpatient Hospital Services Are Medically Necessary For Greater Than 2 Midnights                 David Fonseca MD  Attending Emergency Physician               David Fonseca MD  06/11/22 0416      Electronically signed by David Fonseca MD at 06/11/22 0416       Vital Signs (last day)     Date/Time Temp Temp src Pulse Resp BP Patient Position SpO2    06/11/22 1105 98.7 (37.1) Oral 108 16 116/81 Lying 95    06/11/22 1054 -- -- 110 -- -- -- 95    06/11/22 0701 99.4 (37.4) Oral 104 16 137/84 Lying 96    06/11/22 0610 -- -- 107 -- -- -- 99    06/11/22 0520 -- -- 104 -- -- -- 98    06/11/22 0500 -- -- 110 -- -- -- --    06/11/22 0432 -- -- -- -- -- -- 93    06/11/22 0424 100.8 (38.2) Axillary 116 18 164/104 Lying 94    06/11/22 0400 -- -- 110 -- 150/95 -- --    06/11/22 0353 -- -- 113 -- -- -- 97    06/11/22 0352 -- -- 117 -- -- -- 95    06/11/22 0348 -- -- 112 -- -- -- 96    06/11/22 0347 -- -- 112 -- -- -- 96    06/11/22 0346 -- -- 112 -- -- -- 94    06/11/22 0345 -- -- 112 -- -- -- 97 06/11/22 0344 -- -- 115 -- -- -- 97 06/11/22 0343 -- -- 115 -- -- -- 97 06/11/22 0342 -- -- 113 -- -- -- 95    06/11/22 0341 -- -- 120 -- -- -- 97 06/11/22 0340 -- --  111 -- -- -- 96    06/11/22 0339 -- -- 114 -- -- -- 97    06/11/22 0338 -- -- 116 -- -- -- 97    06/11/22 0337 -- -- 114 -- -- -- 97    06/11/22 0336 -- -- 114 -- -- -- 97    06/11/22 0335 -- -- 117 -- -- -- 98    06/11/22 0334 -- -- 114 -- -- -- 98    06/11/22 0333 -- -- 111 -- -- -- 97    06/11/22 0332 -- -- 117 -- -- -- 97    06/11/22 0331 -- -- 111 -- -- -- 94    06/11/22 0330 -- -- 108 -- 148/125 -- 96    06/11/22 0329 -- -- 103 -- -- -- 98    06/11/22 0328 -- -- 116 -- -- -- 98    06/11/22 0327 -- -- 114 -- -- -- 96    06/11/22 0323 -- -- 112 -- -- -- 96    06/11/22 0322 -- -- 113 -- -- -- 97    06/11/22 0321 -- -- 112 -- -- -- 97    06/11/22 0320 -- -- 114 -- -- -- 98    06/11/22 0319 -- -- 112 -- -- -- 97    06/11/22 0318 -- -- 103 -- -- -- 97    06/11/22 0317 -- -- 110 -- -- -- 99    06/11/22 0316 -- -- 114 -- -- -- 95    06/11/22 0306 -- -- 117 -- -- -- 95    06/11/22 0305 -- -- 113 -- -- -- 95    06/11/22 0304 -- -- 110 -- -- -- 95    06/11/22 0303 -- -- 120 -- -- -- 94    06/11/22 0302 -- -- 113 -- -- -- 94    06/11/22 0301 -- -- 110 -- -- -- 95    06/11/22 0300 -- -- 109 -- 139/91 -- 95    06/11/22 0259 -- -- 117 -- -- -- 95    06/11/22 0237 -- -- 111 -- -- -- 96    06/11/22 0236 -- -- 105 -- -- -- 97    06/11/22 0235 -- -- 105 -- -- -- 96    06/11/22 0234 -- -- 117 -- -- -- 96    06/11/22 0233 -- -- -- -- -- -- 97    06/11/22 0232 -- -- 111 -- -- -- 97    06/11/22 0231 -- -- 112 -- -- -- 97    06/11/22 0230 -- -- 112 -- 143/126 -- 97    06/11/22 0229 -- -- 112 -- -- -- 97    06/11/22 0228 -- -- 112 -- -- -- 98    06/11/22 0227 -- -- 116 -- -- -- 98    06/11/22 0226 -- -- 112 -- -- -- 96    06/11/22 0225 -- -- 110 -- -- -- 98    06/11/22 0224 -- -- -- -- -- -- 97    06/11/22 0223 -- -- 111 -- -- -- 97    06/11/22 0222 -- -- 114 -- -- -- 98    06/11/22 0043 -- -- -- -- -- -- 95    06/11/22 0000 -- -- 104 -- 141/98 -- 90    06/10/22 2345 -- -- 113 -- -- -- 92    06/10/22 2344 -- -- 110 -- -- -- 91     06/10/22 2343 -- -- 105 -- -- -- 92    06/10/22 2342 -- -- 113 -- -- -- 92    06/10/22 2341 -- -- 103 -- -- -- 91    06/10/22 2340 -- -- 112 -- -- -- 92    06/10/22 2339 -- -- 112 -- -- -- 91    06/10/22 2338 -- -- 113 -- -- -- 90    06/10/22 2337 -- -- 112 -- -- -- 91    06/10/22 2336 -- -- 112 -- -- -- 90    06/10/22 2335 -- -- 113 -- -- -- 91    06/10/22 2334 -- -- 104 -- -- -- 92    06/10/22 2333 -- -- 111 -- -- -- 92    06/10/22 2303 -- -- 112 -- -- -- 91    06/10/22 2302 -- -- 112 -- -- -- 91    06/10/22 2301 -- -- 112 -- -- -- 90    06/10/22 2300 -- -- 106 -- 132/95 -- 91    06/10/22 2259 -- -- 112 -- -- -- 91    06/10/22 2258 -- -- 112 -- -- -- 92    06/10/22 2257 -- -- 112 -- -- -- 91    06/10/22 2256 -- -- 112 -- -- -- 93    06/10/22 2255 -- -- 111 -- -- -- 93    06/10/22 2245 98.5 (36.9) Oral 111 24 144/89 Lying 91            Current Facility-Administered Medications   Medication Dose Route Frequency Provider Last Rate Last Admin   • acetaminophen (TYLENOL) tablet 650 mg  650 mg Oral Q4H PRN Bela Colina, APRN        Or   • acetaminophen (TYLENOL) 160 MG/5ML solution 650 mg  650 mg Oral Q4H PRN Dixie, Margy, APRN        Or   • acetaminophen (TYLENOL) suppository 650 mg  650 mg Rectal Q4H PRN Dixie, Margy, APRN       • aluminum-magnesium hydroxide-simethicone (MAALOX MAX) 400-400-40 MG/5ML suspension 5 mL  5 mL Oral Q6H PRN Dixie, Margy, APRN       • amiodarone (PACERONE) tablet 200 mg  200 mg Oral Daily Dixie, Margy, APRN   200 mg at 06/11/22 0826   • [START ON 6/12/2022] amLODIPine (NORVASC) tablet 10 mg  10 mg Oral Nightly Dixie, Margy, APRN       • aspirin EC tablet 325 mg  325 mg Oral Daily Dixie, Margy, APRN   325 mg at 06/11/22 0823   • cefepime (MAXIPIME) 1 g/100 mL 0.9% NS IVPB (mbp)  1 g Intravenous Q24H Mayco Parker IV, PharmD       • citalopram (CeleXA) tablet 20 mg  20 mg Oral Daily Dixie, Margy, APRN   20 mg at 06/11/22 0823   • dextrose (D50W) (25 g/50  mL) IV injection 25 g  25 g Intravenous Q15 Min PRN Dixie, Margy, APRN       • dextrose (GLUTOSE) oral gel 15 g  15 g Oral Q15 Min PRN Dixie, Margy, APRN       • docusate sodium (COLACE) capsule 100 mg  100 mg Oral BID PRN Dixie, Margy, APRN       • donepezil (ARICEPT) tablet 5 mg  5 mg Oral Nightly Dixie, Margy, APRN   5 mg at 06/11/22 0435   • fluticasone (FLONASE) 50 MCG/ACT nasal spray 2 spray  2 spray Nasal Daily Dixie, Margy, APRN   2 spray at 06/11/22 0824   • furosemide (LASIX) injection 20 mg  20 mg Intravenous BID Emmanuel Mehta MD   20 mg at 06/11/22 0823   • glucagon (human recombinant) (GLUCAGEN DIAGNOSTIC) injection 1 mg  1 mg Intramuscular Q15 Min PRN Dixie, Margy, APRN       • heparin (porcine) 5000 UNIT/ML injection 5,000 Units  5,000 Units Subcutaneous Q12H Dixie, Margy, APRN   5,000 Units at 06/11/22 0823   • Insulin Lispro (humaLOG) injection 0-7 Units  0-7 Units Subcutaneous TID AC Dixie, Margy, APRN   2 Units at 06/11/22 0824   • ipratropium-albuterol (DUO-NEB) nebulizer solution 3 mL  3 mL Nebulization 4x Daily - RT Dixie, Margy, APRN   3 mL at 06/11/22 1051   • isosorbide mononitrate (IMDUR) 24 hr tablet 60 mg  60 mg Oral Daily Dixie, Margy, APRN   60 mg at 06/11/22 0823   • lidocaine (LIDODERM) 5 % 1 patch  1 patch Transdermal Q24H Dixie, Margy, APRN   1 patch at 06/11/22 0823   • multivitamin (THERAGRAN) tablet 1 tablet  1 tablet Oral Daily Dixie, Margy, APRN   1 tablet at 06/11/22 0823   • sodium chloride 0.9 % flush 10 mL  10 mL Intravenous Q12H Dixie, Margy, APRN   10 mL at 06/11/22 0824   • sodium chloride 0.9 % flush 10 mL  10 mL Intravenous PRN Dixie, Margy, APRN           Lab Results (last 24 hours)     Procedure Component Value Units Date/Time    Hemoglobin A1c [[id number]]  (Abnormal) Collected: 06/11/22 1012    Specimen: Blood Updated: 06/11/22 1129     Hemoglobin A1C 7.30 %     Narrative:      Hemoglobin A1C  Ranges:    Increased Risk for Diabetes  5.7% to 6.4%  Diabetes                     >= 6.5%  Diabetic Goal                < 7.0%    POC Glucose Once [[id number]]  (Normal) Collected: 06/11/22 1107    Specimen: Blood Updated: 06/11/22 1108     Glucose 113 mg/dL      Comment: Meter: [id number] : [id number] Deep Olivares       Basic Metabolic Panel [[id number]]  (Abnormal) Collected: 06/11/22 1012    Specimen: Blood Updated: 06/11/22 1051     Glucose 127 mg/dL      BUN 20 mg/dL      Creatinine 1.34 mg/dL      Sodium 137 mmol/L      Potassium 3.8 mmol/L      Comment: Slight hemolysis detected by analyzer. Results may be affected.        Chloride 99 mmol/L      CO2 28.0 mmol/L      Calcium 8.9 mg/dL      BUN/Creatinine Ratio 14.9     Anion Gap 10.0 mmol/L      eGFR 38.5 mL/min/1.73      Comment: National Kidney Foundation and American Society of Nephrology (ASN) Task Force recommended calculation based on the Chronic Kidney Disease Epidemiology Collaboration (CKD-EPI) equation refit without adjustment for race.       Narrative:      GFR Normal >60  Chronic Kidney Disease <60  Kidney Failure <15      Vancomycin, Random [[id number]]  (Normal) Collected: 06/11/22 1012    Specimen: Blood Updated: 06/11/22 1051     Vancomycin Random 10.40 mcg/mL     Narrative:      Therapeutic Ranges for Vancomycin    Vancomycin Random   5.0-40.0 mcg/mL  Vancomycin Trough   5.0-20.0 mcg/mL  Vancomycin Peak     20.0-40.0 mcg/mL    CBC Auto Differential [[id number]]  (Abnormal) Collected: 06/11/22 1012    Specimen: Blood Updated: 06/11/22 1048     WBC 8.93 10*3/mm3      RBC 4.16 10*6/mm3      Hemoglobin 12.5 g/dL      Hematocrit 36.6 %      MCV 88.0 fL      MCH 30.0 pg      MCHC 34.2 g/dL      RDW 14.2 %      RDW-SD 45.4 fl      MPV 11.4 fL      Platelets 267 10*3/mm3      Neutrophil % 77.0 %      Lymphocyte % 12.8 %      Monocyte % 8.3 %      Eosinophil % 0.9 %      Basophil % 0.7 %      Immature Grans % 0.3 %      Neutrophils, Absolute 6.88  10*3/mm3      Lymphocytes, Absolute 1.14 10*3/mm3      Monocytes, Absolute 0.74 10*3/mm3      Eosinophils, Absolute 0.08 10*3/mm3      Basophils, Absolute 0.06 10*3/mm3      Immature Grans, Absolute 0.03 10*3/mm3      nRBC 0.0 /100 WBC     MRSA Screen, PCR (Inpatient) - Swab, Nares [[id number]]  (Normal) Collected: 06/11/22 0332    Specimen: Swab from Nares Updated: 06/11/22 0745     MRSA PCR Negative    Narrative:      The negative predictive value of this diagnostic test is high and should only be used to consider de-escalating anti-MRSA therapy. A positive result may indicate colonization with MRSA and must be correlated clinically.  MRSA Negative    POC Glucose Once [[id number]]  (Abnormal) Collected: 06/11/22 0703    Specimen: Blood Updated: 06/11/22 0705     Glucose 157 mg/dL      Comment: Meter: [id number] : [id number] Deep Arzelia       Legionella Antigen, Urine - Urine, Urine, Clean Catch [[id number]] Collected: 06/11/22 0332    Specimen: Urine, Clean Catch Updated: 06/11/22 0613    S. Pneumo Ag Urine or CSF - Urine, Urine, Clean Catch [[id number]] Collected: 06/11/22 0332    Specimen: Urine, Clean Catch Updated: 06/11/22 0613    Blood Culture - Blood, Arm, Right [[id number]] Collected: 06/10/22 2319    Specimen: Blood from Arm, Right Updated: 06/11/22 0432    Blood Culture - Blood, Hand, Left [[id number]] Collected: 06/10/22 2319    Specimen: Blood from Hand, Left Updated: 06/11/22 0432    Urinalysis, Microscopic Only - Urine, Random Void [[id number]] Collected: 06/11/22 0333    Specimen: Urine, Random Void Updated: 06/11/22 0353     RBC, UA 0-2 /HPF      WBC, UA None Seen /HPF      Comment: Urine culture not indicated.        Bacteria, UA None Seen /HPF      Squamous Epithelial Cells, UA None Seen /HPF      Hyaline Casts, UA None Seen /LPF      Methodology Automated Microscopy    Urinalysis With Culture If Indicated - Urine, Random Void [[id number]]  (Abnormal) Collected: 06/11/22 0333    Specimen: Urine,  "Random Void Updated: 06/11/22 0353     Color, UA Yellow     Appearance, UA Clear     pH, UA 7.0     Specific Gravity, UA 1.007     Glucose, UA Negative     Ketones, UA Negative     Bilirubin, UA Negative     Blood, UA Negative     Protein, UA Negative     Leuk Esterase, UA Trace     Nitrite, UA Negative     Urobilinogen, UA 0.2 E.U./dL    Narrative:      In absence of clinical symptoms, the presence of pyuria, bacteria, and/or nitrites on the urinalysis result does not correlate with infection.    Procalcitonin [[id number]]  (Normal) Collected: 06/10/22 2230    Specimen: Blood Updated: 06/11/22 0100     Procalcitonin 0.12 ng/mL     Narrative:      As a Marker for Sepsis (Non-Neonates):    1. <0.5 ng/mL represents a low risk of severe sepsis and/or septic shock.  2. >2 ng/mL represents a high risk of severe sepsis and/or septic shock.    As a Marker for Lower Respiratory Tract Infections that require antibiotic therapy:    PCT on Admission    Antibiotic Therapy       6-12 Hrs later    >0.5                Strongly Recommended  >0.25 - <0.5        Recommended   0.1 - 0.25          Discouraged              Remeasure/reassess PCT  <0.1                Strongly Discouraged     Remeasure/reassess PCT    As 28 day mortality risk marker: \"Change in Procalcitonin Result\" (>80% or <=80%) if Day 0 (or Day 1) and Day 4 values are available. Refer to http://[email or web address]-pct-calculator.com    Change in PCT <=80%  A decrease of PCT levels below or equal to 80% defines a positive change in PCT test result representing a higher risk for 28-day all-cause mortality of patients diagnosed with severe sepsis for septic shock.    Change in PCT >80%  A decrease of PCT levels of more than 80% defines a negative change in PCT result representing a lower risk for 28-day all-cause mortality of patients diagnosed with severe sepsis or septic shock.       D-dimer, Quantitative [[id number]]  (Abnormal) Collected: 06/10/22 2319    Specimen: Blood " Updated: 06/11/22 0043     D-Dimer, Quantitative 3.43 MCGFEU/mL     Narrative:      The D-Dimer assay test is to be used in conjunction with a clinical pretest probability (PTP) assessment model, and has been approved by the FDA to exclude pulmonary embolism (PE) and deep venous thrombosis (DVT) in outpatients suspected of PE or DVT, with a cutoff of 0.5 MCGFEU/mL.    COVID PRE-OP / PRE-PROCEDURE SCREENING ORDER (NO ISOLATION) - Swab, Nasopharynx [[id number]]  (Normal) Collected: 06/10/22 2318    Specimen: Swab from Nasopharynx Updated: 06/11/22 0017    Narrative:      The following orders were created for panel order COVID PRE-OP / PRE-PROCEDURE SCREENING ORDER (NO ISOLATION) - Swab, Nasopharynx.  Procedure                               Abnormality         Status                     ---------                               -----------         ------                     COVID-19 and FLU A/B PCR...[[id number]]  Normal              Final result                 Please view results for these tests on the individual orders.    COVID-19 and FLU A/B PCR - Swab, Nasopharynx [[id number]]  (Normal) Collected: 06/10/22 2318    Specimen: Swab from Nasopharynx Updated: 06/11/22 0017     COVID19 Not Detected     Influenza A PCR Not Detected     Influenza B PCR Not Detected    Narrative:      Fact sheet for providers: https://www.fda.gov/media/066020/download    Fact sheet for patients: https://www.fda.gov/media/506044/download    Test performed by PCR.    Lactic Acid, Plasma [[id number]]  (Normal) Collected: 06/10/22 2319    Specimen: Blood Updated: 06/10/22 2358     Lactate 1.2 mmol/L      Comment: Falsely depressed results may occur on samples drawn from patients receiving N-Acetylcysteine (NAC) or Metamizole.       CBC & Differential [[id number]]  (Abnormal) Collected: 06/10/22 2319    Specimen: Blood Updated: 06/10/22 2357    Narrative:      The following orders were created for panel order CBC & Differential.  Procedure                                Abnormality         Status                     ---------                               -----------         ------                     CBC Auto Differential[[id number]]        Abnormal            Final result                 Please view results for these tests on the individual orders.    CBC Auto Differential [[id number]]  (Abnormal) Collected: 06/10/22 2319    Specimen: Blood Updated: 06/10/22 2357     WBC 10.81 10*3/mm3      RBC 4.26 10*6/mm3      Hemoglobin 12.8 g/dL      Hematocrit 39.8 %      MCV 93.4 fL      MCH 30.0 pg      MCHC 32.2 g/dL      RDW 14.8 %      RDW-SD 50.5 fl      MPV 12.3 fL      Platelets 278 10*3/mm3      Neutrophil % 77.7 %      Lymphocyte % 11.7 %      Monocyte % 8.6 %      Eosinophil % 0.9 %      Basophil % 0.6 %      Immature Grans % 0.5 %      Neutrophils, Absolute 8.41 10*3/mm3      Lymphocytes, Absolute 1.26 10*3/mm3      Monocytes, Absolute 0.93 10*3/mm3      Eosinophils, Absolute 0.10 10*3/mm3      Basophils, Absolute 0.06 10*3/mm3      Immature Grans, Absolute 0.05 10*3/mm3      nRBC 0.0 /100 WBC     Comprehensive Metabolic Panel [[id number]]  (Abnormal) Collected: 06/10/22 2230    Specimen: Blood Updated: 06/10/22 2303     Glucose 117 mg/dL      BUN 27 mg/dL      Creatinine 1.28 mg/dL      Sodium 133 mmol/L      Potassium 4.2 mmol/L      Comment: Slight hemolysis detected by analyzer. Results may be affected.        Chloride 98 mmol/L      CO2 25.0 mmol/L      Calcium 9.7 mg/dL      Total Protein 7.4 g/dL      Albumin 4.30 g/dL      ALT (SGPT) 10 U/L      AST (SGOT) 18 U/L      Alkaline Phosphatase 112 U/L      Total Bilirubin 0.5 mg/dL      Globulin 3.1 gm/dL      Comment: Calculated Result        A/G Ratio 1.4 g/dL      BUN/Creatinine Ratio 21.1     Anion Gap 10.0 mmol/L      eGFR 40.6 mL/min/1.73      Comment: National Kidney Foundation and American Society of Nephrology (ASN) Task Force recommended calculation based on the Chronic Kidney Disease Epidemiology  Collaboration (CKD-EPI) equation refit without adjustment for race.       Narrative:      GFR Normal >60  Chronic Kidney Disease <60  Kidney Failure <15      Troponin [[id number]]  (Normal) Collected: 06/10/22 2230    Specimen: Blood Updated: 06/10/22 2303     Troponin T 0.028 ng/mL     Narrative:      Troponin T Reference Range:  <= 0.03 ng/mL-   Negative for AMI  >0.03 ng/mL-     Abnormal for myocardial necrosis.  Clinicians would have to utilize clinical acumen, EKG, Troponin and serial changes to determine if it is an Acute Myocardial Infarction or myocardial injury due to an underlying chronic condition.       Results may be falsely decreased if patient taking Biotin.      BNP [[id number]]  (Abnormal) Collected: 06/10/22 2230    Specimen: Blood Updated: 06/10/22 2301     proBNP 5,037.0 pg/mL     Narrative:      Among patients with dyspnea, NT-proBNP is highly sensitive for the detection of acute congestive heart failure. In addition NT-proBNP of <300 pg/ml effectively rules out acute congestive heart failure with 99% negative predictive value.    Results may be falsely decreased if patient taking Biotin.          Imaging Results (Last 24 Hours)     Procedure Component Value Units Date/Time    CT Chest Without Contrast Diagnostic [[id number]] Collected: 06/11/22 0156     Updated: 06/11/22 0206    Narrative:      CT CHEST WITHOUT CONTRAST    CLINICAL INDICATION: Hypoxia.    COMPARISON: 6/3/2022.    TECHNIQUE: Noncontrast CT images of the chest were obtained. CT dose lowering techniques were used, to include: automated exposure control, adjustment for patient size, and or use of iterative reconstruction.    FINDINGS:    Cardiovascular: Heart size stable without pericardial effusion. Atherosclerotic calcifications are seen in the coronary vessels and aorta.     Mediastinum: No mediastinal or hilar lymphadenopathy. Trachea and central airways are patent. Thyroid is normal. Stable large hiatal hernia.     Lungs and  pleura: A redemonstrated rounded opacity is seen in the right lung base. Small right pleural effusion. Increased diffuse groundglass and reticular opacities are seen in the upper lobes compared to prior. Ill-defined streaky airspace densities   are also increased in the lung bases. No pneumothorax.    Upper abdomen: No acute abnormality.    Body wall: No acute abnormality.    Bones: No acute osseous abnormality.      Impression:      1. Increased multifocal, ill-defined groundglass and streaky alveolar densities bilaterally concerning for pulmonary edema or pneumonia.  2. Unchanged rounded opacity in the right lung base which could reflect loculated pleural fluid and/or round atelectasis. An underlying parenchymal mass lesion is not excluded. Evaluation limited by lack of contrast.  3. Stable large hiatal hernia.    Electronically signed by:  Theo Naqvi M.D.    6/11/2022 12:05 AM Mountain Time    XR Chest 1 View [[id number]] Collected: 06/10/22 2255     Updated: 06/11/22 0159    Narrative:      Single portable chest radiograph    INDICATION:  Shortness of breath    COMPARISON:  Chest radiograph 6/3/2022    FINDINGS:    Patient's hand overlies the left upper lobe.    Increased airspace opacities throughout the right lung. Small right pleural effusion. No pneumothorax.     The cardiomediastinal silhouette is enlarged as before. Large hiatal hernia. There is atherosclerotic calcification of the aorta.    No acute focal bony abnormalities are seen.      Impression:        Increased airspace opacities throughout the right lung. Small right pleural effusion    Large hiatal hernia.    Electronically signed by:  Tiera Juares M.D.    6/10/2022 11:58 PM Mountain Time        Orders (last 24 hrs)      Start     Ordered    06/12/22 2100  amLODIPine (NORVASC) tablet 10 mg  Nightly         06/11/22 0240    06/11/22 1200  cefepime (MAXIPIME) 1 g/100 mL 0.9% NS IVPB (mbp)  Every 24 Hours         06/11/22 0243    06/11/22 1109   POC Glucose Once  PROCEDURE ONCE         06/11/22 1107    06/11/22 1011  cefepime (MAXIPIME) 1 g/100 mL 0.9% NS IVPB (mbp)  Every 8 Hours,   Status:  Discontinued         06/11/22 0240    06/11/22 0900  amiodarone (PACERONE) tablet 200 mg  Daily         06/11/22 0240    06/11/22 0900  aspirin EC tablet 325 mg  Daily         06/11/22 0240    06/11/22 0900  citalopram (CeleXA) tablet 20 mg  Daily         06/11/22 0240    06/11/22 0900  fluticasone (FLONASE) 50 MCG/ACT nasal spray 2 spray  Daily         06/11/22 0240    06/11/22 0900  isosorbide mononitrate (IMDUR) 24 hr tablet 60 mg  Daily         06/11/22 0240 06/11/22 0900  lidocaine (LIDODERM) 5 % 1 patch  Every 24 Hours         06/11/22 0240 06/11/22 0900  multivitamin (THERAGRAN) tablet 1 tablet  Daily         06/11/22 0240 06/11/22 0900  heparin (porcine) 5000 UNIT/ML injection 5,000 Units  Every 12 Hours Scheduled         06/11/22 0240 06/11/22 0900  vancomycin - no scheduled doses (Pharmacy dosing per levels)  Daily,   Status:  Discontinued         06/11/22 0245 06/11/22 0900  furosemide (LASIX) injection 20 mg  2 Times Daily (Diuretics)         06/11/22 0251    06/11/22 0830  ipratropium-albuterol (DUO-NEB) nebulizer solution 3 mL  4 Times Daily - RT         06/11/22 0240    06/11/22 0730  Insulin Lispro (humaLOG) injection 0-7 Units  3 Times Daily Before Meals         06/11/22 0240    06/11/22 0706  POC Glucose Once  PROCEDURE ONCE         06/11/22 0703    06/11/22 0700  POC Glucose TID AC  3 Times Daily Before Meals       06/11/22 0240    06/11/22 0600  Incentive Spirometry  Every 4 Hours While Awake       06/11/22 0240    06/11/22 0600  CBC Auto Differential  Morning Draw         06/11/22 0240    06/11/22 0600  Basic Metabolic Panel  Morning Draw         06/11/22 0240    06/11/22 0600  Hemoglobin A1c  Morning Draw         06/11/22 0240    06/11/22 0600  NM Lung Quantitative Differential Function Perfusion  1 Time Imaging         06/11/22  0240    06/11/22 0600  Duplex Venous Lower Extremity - Bilateral CAR  Once         06/11/22 0240    06/11/22 0600  Vancomycin, Random  Morning Draw         06/11/22 0245    06/11/22 0400  donepezil (ARICEPT) tablet 5 mg  Nightly         06/11/22 0240    06/11/22 0400  Vital Signs  Every 4 Hours       06/11/22 0240    06/11/22 0352  Urinalysis, Microscopic Only - Urine, Random Void  Once         06/11/22 0351    06/11/22 0300  Strict Intake & Output  Every Hour       06/11/22 0240    06/11/22 0252  Adult Transthoracic Echo Complete W/ Cont if Necessary Per Protocol  Once         06/11/22 0251    06/11/22 0242  sodium chloride 0.9 % flush 10 mL  Every 12 Hours Scheduled         06/11/22 0240    06/11/22 0241  Pulse Oximetry on Admit  Once         06/11/22 0240    06/11/22 0241  Tobacco Cessation Education  Once         06/11/22 0240    06/11/22 0241  Pneumonia Education  Once        Comments: Pneumonia    06/11/22 0240    06/11/22 0241  Notify Provider if Patient Requires Greater Than 4LPM of Oxygen  Until Discontinued         06/11/22 0240    06/11/22 0241  Nursing Dysphagia Screening (Complete Prior to Giving Anything By Mouth)  Once        Comments: Use Dysphagia Screen for Pneumonia    06/11/22 0240    06/11/22 0241  RN to Place Order SLP Consult - Eval & Treat Choosing Reason of RN Dysphagia Screen Failed  Per Order Details        Comments: RN to Place Order SLP Consult - Eval & Treat Choosing Reason of RN Dysphagia Screen Failed    06/11/22 0240    06/11/22 0241  Nurse to Call MD or Nutrition Services for Diet if Patient Passes Dysphagia Screen  Once         06/11/22 0240    06/11/22 0241  Pneumonia Finding Seen on CXR  Once         06/11/22 0240    06/11/22 0241  Respiratory Culture - Sputum, Cough  Once         06/11/22 0240    06/11/22 0241  Legionella Antigen, Urine - Urine, Urine, Clean Catch  Once         06/11/22 0240    06/11/22 0241  MRSA Screen, PCR (Inpatient) - Swab, Nares  Once         06/11/22 0240     06/11/22 0241  S. Pneumo Ag Urine or CSF - Urine, Urine, Clean Catch  Once         06/11/22 0240    06/11/22 0241  Intake & Output  Every Shift       06/11/22 0240    06/11/22 0241  Weigh Patient  Once         06/11/22 0240    06/11/22 0241  Insert Peripheral IV  Once         06/11/22 0240    06/11/22 0241  Saline Lock & Maintain IV Access  Continuous         06/11/22 0240    06/11/22 0241  Cardiac Monitoring  Continuous         06/11/22 0240    06/11/22 0241  Pulse Oximetry, Continuous  Continuous         06/11/22 0240    06/11/22 0241  Follow Standard Precautions  Once         06/11/22 0240    06/11/22 0241  Fall Precautions  Continuous         06/11/22 0240    06/11/22 0241  Diet Regular; Consistent Carbohydrate, Cardiac  Diet Effective Now         06/11/22 0240    06/11/22 0241  Inpatient Case Management  Consult  Once        Provider:  (Not yet assigned)    06/11/22 0240    06/11/22 0241  Do NOT Hold Basal or Correction Scale Insulin When Patient is NPO, Hold Scheduled Mealtime (Bolus) Insulin if NPO  Continuous         06/11/22 0240    06/11/22 0241  Follow [facility] Hypoglycemia Standing Orders For Blood Glucose Less Than 70 mg/dL  Until Discontinued        Comments: ALERT PATIENT - NOT NPO & CAN SAFELY SWALLOW  Administer 4 oz Fruit Juice OR 4 oz Regular Soda OR 8 oz Milk OR 15-30 grams (1 tube) of Glucose Gel.  Recheck Blood Glucose Approximately 15 Minutes After Ingestion, Repeat Treatment & Continue to Recheck Blood Sugar Approximately Every 15 Minutes Until Blood Glucose is 70 or Higher.  Once Blood Glucose is 70 or Higher & if It Will Be More Than 60 Minutes Until Next Meal, Provide Appropriate Snack (Including Carbohydrate Food) Based on Meal Plan Orde pilo. Give Meal Tray As Soon As Possible.    PATIENT HAS IV ACCESS - UNRESPONSIVE, NPO OR UNABLE TO SAFELY SWALLOW  Administer 25g (50ml) D50W IV Push.  Recheck Blood Glucose Approximately 15 Minutes After Administration, if Blood Glucose  "Remains Less Than 70, Repeat Treatment   Recheck Blood Glucose Approximately 15 Minutes After 2nd Administration, if Blood Glucose Remains Less Than 70 After 2nd Dose of D50W, Contact Provider for Further Treatment Orders & Consider Adding IVF With D5W for Main tenance    PATIENT WITHOUT IV ACCESS - UNRESPONSIVE, NPO OR UNABLE TO SAFELY SWALLOW  Administer 1mg Glucagon SQ & Establish IV Access.  Turn Patient on Side - Nausea / Vomiting May Occur.  Recheck Blood Glucose Approximately 15 Minutes After Administration.  If Blood Glucose Remains Less Than 70, Administer 25g D50W IV Push (50ml).  Recheck Blood Glucose Approximately 15 Minutes After Administration of D50W, if Blood Glucose Remains Less Than 70, Contact Provider for Further Treatment Orders & C  Consider Adding IVF With D5 for Maintenance    Document Event & Patient Response to Interventions in EMR, Document Medications on MAR  Notify Provider if Hypoglycemia Treatment Needed    06/11/22 0240 06/11/22 0241  Urinalysis With Culture If Indicated - Urine, Random Void  STAT         06/11/22 0240 06/11/22 0240  vancomycin (VANCOCIN) 1000 mg/200 mL dextrose 5% IVPB  Once,   Status:  Discontinued        \"And\" Linked Group Details    06/11/22 0240    06/11/22 0240  Pharmacy to dose vancomycin  Continuous PRN,   Status:  Discontinued        \"And\" Linked Group Details    06/11/22 0240    06/11/22 0240  sodium chloride 0.9 % flush 10 mL  As Needed         06/11/22 0240    06/11/22 0240  acetaminophen (TYLENOL) tablet 650 mg  Every 4 Hours PRN        \"Or\" Linked Group Details    06/11/22 0240    06/11/22 0240  acetaminophen (TYLENOL) 160 MG/5ML solution 650 mg  Every 4 Hours PRN        \"Or\" Linked Group Details    06/11/22 0240    06/11/22 0240  acetaminophen (TYLENOL) suppository 650 mg  Every 4 Hours PRN        \"Or\" Linked Group Details    06/11/22 0240    06/11/22 0240  dextrose (GLUTOSE) oral gel 15 g  Every 15 Minutes PRN         06/11/22 0240    06/11/22 " 0240  dextrose (D50W) (25 g/50 mL) IV injection 25 g  Every 15 Minutes PRN         06/11/22 0240    06/11/22 0240  glucagon (human recombinant) (GLUCAGEN DIAGNOSTIC) injection 1 mg  Every 15 Minutes PRN         06/11/22 0240    06/11/22 0240  aluminum-magnesium hydroxide-simethicone (MAALOX MAX) 400-400-40 MG/5ML suspension 5 mL  Every 6 Hours PRN         06/11/22 0240    06/11/22 0240  docusate sodium (COLACE) capsule 100 mg  2 Times Daily PRN         06/11/22 0240    06/11/22 0127  Code Status and Medical Interventions:  Continuous         06/11/22 0138    06/11/22 0101  Oxygen Therapy- Heated High Flow Nasal Cannula; Titrate for SPO2: 90% - 95%  Continuous         06/11/22 0101    06/11/22 0059  ED Bed Request  Once         06/11/22 0155    06/11/22 0059  Inpatient Admission  Once         06/11/22 0155    06/11/22 0059  Cardiac Monitoring  Continuous,   Status:  Canceled         06/11/22 0155    06/11/22 0058  CT Chest Without Contrast Diagnostic  1 Time Imaging         06/11/22 0057    06/11/22 0033  Procalcitonin  STAT         06/11/22 0032    06/11/22 0018  furosemide (LASIX) injection 40 mg  Once         06/11/22 0016    06/10/22 2255  vancomycin (VANCOCIN) 1000 mg/200 mL dextrose 5% IVPB  Once         06/10/22 2253    06/10/22 2255  cefepime (MAXIPIME) 2 g/100 mL 0.9% NS (mbp)  Once         06/10/22 2253    06/10/22 2255  sodium chloride 0.9 % bolus 500 mL  Once         06/10/22 2253    06/10/22 2253  Blood Culture - Blood, Arm, Right  Once         06/10/22 2253    06/10/22 2253  Blood Culture - Blood, Hand, Left  Once         06/10/22 2253    06/10/22 2253  Lactic Acid, Plasma  Once         06/10/22 2253    06/10/22 2230  CBC & Differential  Once         06/10/22 2229    06/10/22 2230  Comprehensive Metabolic Panel  Once         06/10/22 2229    06/10/22 2230  Troponin  Once         06/10/22 2229    06/10/22 2230  D-dimer, Quantitative  Once         06/10/22 2229    06/10/22 2230  BNP  Once         06/10/22  "2229    06/10/22 2230  XR Chest 1 View  1 Time Imaging         06/10/22 2229    06/10/22 2230  CBC Auto Differential  PROCEDURE ONCE         06/10/22 2229    06/10/22 2228  COVID PRE-OP / PRE-PROCEDURE SCREENING ORDER (NO ISOLATION) - Swab, Nasopharynx  Once         06/10/22 2229    06/10/22 2228  COVID-19 and FLU A/B PCR - Swab, Nasopharynx  PROCEDURE ONCE         06/10/22 2229    06/10/22 2220  ECG 12 Lead  Once         06/10/22 2220    Unscheduled  Blood Gas, Arterial -With Co-Ox Panel: Yes  As Needed      Comments: Respiratory Distress      06/11/22 0240    Unscheduled  Up With Assistance  As Needed       06/11/22 0240                Physician Progress Notes (last 24 hours)  Notes from 06/10/22 1158 through 06/11/22 1158   No notes of this type exist for this encounter.            Consult Notes (last 24 hours)      Mayco Parker IV PharmD at 06/11/22 0606          Pharmacy Consult-Vancomycin Dosing  Mary Ramirez is a  87 y.o. female receiving vancomycin therapy.     Indication: PNA  Consulting Provider: Hospitalist  ID Consult:     Goal Trough: 15-20 mcg/mL    Current Antimicrobial Therapy  Anti-Infectives (From admission, onward)      Ordered     Dose/Rate Route Frequency Start Stop    06/11/22 0243  cefepime (MAXIPIME) 1 g/100 mL 0.9% NS IVPB (mbp)        Ordering Provider: Mayco Parker IV PharmD    1 g  over 4 Hours Intravenous Every 24 Hours 06/11/22 1200 06/15/22 1159    06/11/22 0245  vancomycin - no scheduled doses (Pharmacy dosing per levels)        Ordering Provider: Mayco Parker IV PharmD     Does not apply Daily 06/11/22 0900 06/13/22 0859    06/11/22 0240  Pharmacy to dose vancomycin        Ordering Provider: Margy Colin APRN   \"And\" Linked Group Details     Does not apply Continuous PRN 06/11/22 0240 06/15/22 0239    06/10/22 2253  vancomycin (VANCOCIN) 1000 mg/200 mL dextrose 5% IVPB        Ordering Provider: David Fonseca MD    20 mg/kg × 52.2 kg Intravenous Once " "06/10/22 2255 06/11/22 0050    06/10/22 2253  cefepime (MAXIPIME) 2 g/100 mL 0.9% NS (mbp)        Ordering Provider: David Fonseca MD    2 g  200 mL/hr over 30 Minutes Intravenous Once 06/10/22 2255 06/11/22 0029            Allergies  Allergies as of 06/10/2022 - Unable to Assess 06/10/2022   Allergen Reaction Noted   • Penicillins Other (See Comments) 11/23/2017       Labs    Results from last 7 days   Lab Units 06/10/22  2230   BUN mg/dL 27*   CREATININE mg/dL 1.28*       Results from last 7 days   Lab Units 06/10/22  2319   WBC 10*3/mm3 10.81*       Evaluation of Dosing     Last Dose Received in the ED/Outside Facility: 1000 mg @ 2341  Is Patient on Dialysis or Renal Replacement: N    Ht - 162.6 cm (64.02\")  Wt - 58.6 kg (129 lb 3.2 oz)    Estimated Creatinine Clearance: 28.6 mL/min (A) (by C-G formula based on SCr of 1.28 mg/dL (H)).    Intake & Output (last 3 days)         06/08 0701  06/09 0700 06/09 0701  06/10 0700 06/10 0701  06/11 0700           Urine Unmeasured Occurrence   1 x            Microbiology and Radiology  Microbiology Results (last 10 days)       Procedure Component Value - Date/Time    COVID PRE-OP / PRE-PROCEDURE SCREENING ORDER (NO ISOLATION) - Swab, Nasopharynx [[id number]]  (Normal) Collected: 06/10/22 2318    Lab Status: Final result Specimen: Swab from Nasopharynx Updated: 06/11/22 0017    Narrative:      The following orders were created for panel order COVID PRE-OP / PRE-PROCEDURE SCREENING ORDER (NO ISOLATION) - Swab, Nasopharynx.  Procedure                               Abnormality         Status                     ---------                               -----------         ------                     COVID-19 and FLU A/B PCR...[[id number]]  Normal              Final result                 Please view results for these tests on the individual orders.    COVID-19 and FLU A/B PCR - Swab, Nasopharynx [[id number]]  (Normal) Collected: 06/10/22 2318    Lab Status: Final result Specimen: " Swab from Nasopharynx Updated: 06/11/22 0017     COVID19 Not Detected     Influenza A PCR Not Detected     Influenza B PCR Not Detected    Narrative:      Fact sheet for providers: https://www.fda.gov/media/084514/download    Fact sheet for patients: https://www.fda.gov/media/272964/download    Test performed by PCR.    COVID PRE-OP / PRE-PROCEDURE SCREENING ORDER (NO ISOLATION) - Swab, Nasopharynx [[id number]]  (Normal) Collected: 06/03/22 1619    Lab Status: Final result Specimen: Swab from Nasopharynx Updated: 06/03/22 1648    Narrative:      The following orders were created for panel order COVID PRE-OP / PRE-PROCEDURE SCREENING ORDER (NO ISOLATION) - Swab, Nasopharynx.  Procedure                               Abnormality         Status                     ---------                               -----------         ------                     COVID-19 and FLU A/B PCR...[[id number]]  Normal              Final result                 Please view results for these tests on the individual orders.    COVID-19 and FLU A/B PCR - Swab, Nasopharynx [[id number]]  (Normal) Collected: 06/03/22 1619    Lab Status: Final result Specimen: Swab from Nasopharynx Updated: 06/03/22 1648     COVID19 Not Detected     Influenza A PCR Not Detected     Influenza B PCR Not Detected    Narrative:      Fact sheet for providers: https://www.fda.gov/media/803884/download    Fact sheet for patients: https://www.fda.gov/media/103313/download    Test performed by PCR.            Vancomycin Levels:                        Assessment/Plan:    Advanced age female with elevated serum creatinine.  Status post 1000 mg IV vancomycin load.    MRSA PCR per protocol.  Random level with AM labs to assess distribution.    Further dosing per rounding pharmacist pending results of MRSA surveillance.    Thank you for this consult.  Mayco Parker IV, PharmD, BCPS  6/11/2022  06:06 EDT      Electronically signed by Mayco Parker IV, PharmD at 06/11/22  0665

## 2022-06-12 LAB
ANION GAP SERPL CALCULATED.3IONS-SCNC: 13 MMOL/L (ref 5–15)
BUN SERPL-MCNC: 24 MG/DL (ref 8–23)
BUN/CREAT SERPL: 19.2 (ref 7–25)
CALCIUM SPEC-SCNC: 9 MG/DL (ref 8.6–10.5)
CHLORIDE SERPL-SCNC: 98 MMOL/L (ref 98–107)
CO2 SERPL-SCNC: 23 MMOL/L (ref 22–29)
CREAT SERPL-MCNC: 1.25 MG/DL (ref 0.57–1)
EGFRCR SERPLBLD CKD-EPI 2021: 41.8 ML/MIN/1.73
GLUCOSE BLDC GLUCOMTR-MCNC: 177 MG/DL (ref 70–130)
GLUCOSE BLDC GLUCOMTR-MCNC: 260 MG/DL (ref 70–130)
GLUCOSE BLDC GLUCOMTR-MCNC: 285 MG/DL (ref 70–130)
GLUCOSE SERPL-MCNC: 269 MG/DL (ref 65–99)
POTASSIUM SERPL-SCNC: 3.9 MMOL/L (ref 3.5–5.2)
SODIUM SERPL-SCNC: 134 MMOL/L (ref 136–145)

## 2022-06-12 PROCEDURE — 25010000002 HEPARIN (PORCINE) PER 1000 UNITS: Performed by: NURSE PRACTITIONER

## 2022-06-12 PROCEDURE — 63710000001 INSULIN LISPRO (HUMAN) PER 5 UNITS: Performed by: NURSE PRACTITIONER

## 2022-06-12 PROCEDURE — 80048 BASIC METABOLIC PNL TOTAL CA: CPT | Performed by: INTERNAL MEDICINE

## 2022-06-12 PROCEDURE — 82962 GLUCOSE BLOOD TEST: CPT

## 2022-06-12 PROCEDURE — 94799 UNLISTED PULMONARY SVC/PX: CPT

## 2022-06-12 PROCEDURE — 94664 DEMO&/EVAL PT USE INHALER: CPT

## 2022-06-12 PROCEDURE — 25010000002 CEFEPIME PER 500 MG

## 2022-06-12 PROCEDURE — 25010000002 FUROSEMIDE PER 20 MG: Performed by: INTERNAL MEDICINE

## 2022-06-12 PROCEDURE — 99233 SBSQ HOSP IP/OBS HIGH 50: CPT | Performed by: INTERNAL MEDICINE

## 2022-06-12 RX ORDER — METOPROLOL TARTRATE 5 MG/5ML
5 INJECTION INTRAVENOUS ONCE
Status: COMPLETED | OUTPATIENT
Start: 2022-06-12 | End: 2022-06-12

## 2022-06-12 RX ADMIN — HEPARIN SODIUM 5000 UNITS: 5000 INJECTION INTRAVENOUS; SUBCUTANEOUS at 08:28

## 2022-06-12 RX ADMIN — CEFEPIME HYDROCHLORIDE 1 G: 1 INJECTION, POWDER, FOR SOLUTION INTRAMUSCULAR; INTRAVENOUS at 13:07

## 2022-06-12 RX ADMIN — CITALOPRAM HYDROBROMIDE 20 MG: 20 TABLET ORAL at 08:29

## 2022-06-12 RX ADMIN — IPRATROPIUM BROMIDE AND ALBUTEROL SULFATE 3 ML: .5; 3 SOLUTION RESPIRATORY (INHALATION) at 14:39

## 2022-06-12 RX ADMIN — METOPROLOL TARTRATE 25 MG: 25 TABLET, FILM COATED ORAL at 13:06

## 2022-06-12 RX ADMIN — INSULIN LISPRO 4 UNITS: 100 INJECTION, SOLUTION INTRAVENOUS; SUBCUTANEOUS at 17:06

## 2022-06-12 RX ADMIN — INSULIN LISPRO 4 UNITS: 100 INJECTION, SOLUTION INTRAVENOUS; SUBCUTANEOUS at 12:05

## 2022-06-12 RX ADMIN — AMIODARONE HYDROCHLORIDE 200 MG: 200 TABLET ORAL at 08:29

## 2022-06-12 RX ADMIN — IPRATROPIUM BROMIDE AND ALBUTEROL SULFATE 3 ML: .5; 3 SOLUTION RESPIRATORY (INHALATION) at 10:33

## 2022-06-12 RX ADMIN — INSULIN LISPRO 2 UNITS: 100 INJECTION, SOLUTION INTRAVENOUS; SUBCUTANEOUS at 08:29

## 2022-06-12 RX ADMIN — Medication 10 ML: at 08:30

## 2022-06-12 RX ADMIN — METOPROLOL TARTRATE 5 MG: 5 INJECTION INTRAVENOUS at 13:05

## 2022-06-12 RX ADMIN — DONEPEZIL HYDROCHLORIDE 5 MG: 5 TABLET ORAL at 20:01

## 2022-06-12 RX ADMIN — ASPIRIN 325 MG: 325 TABLET, COATED ORAL at 08:29

## 2022-06-12 RX ADMIN — HEPARIN SODIUM 5000 UNITS: 5000 INJECTION INTRAVENOUS; SUBCUTANEOUS at 20:02

## 2022-06-12 RX ADMIN — FLUTICASONE PROPIONATE 2 SPRAY: 50 SPRAY, METERED NASAL at 08:29

## 2022-06-12 RX ADMIN — MULTIVITAMIN TABLET 1 TABLET: TABLET at 08:29

## 2022-06-12 RX ADMIN — FUROSEMIDE 20 MG: 10 INJECTION INTRAMUSCULAR; INTRAVENOUS at 08:28

## 2022-06-12 RX ADMIN — ISOSORBIDE MONONITRATE 60 MG: 60 TABLET, EXTENDED RELEASE ORAL at 08:29

## 2022-06-12 RX ADMIN — METOPROLOL TARTRATE 25 MG: 25 TABLET, FILM COATED ORAL at 20:02

## 2022-06-12 RX ADMIN — IPRATROPIUM BROMIDE AND ALBUTEROL SULFATE 3 ML: .5; 3 SOLUTION RESPIRATORY (INHALATION) at 21:13

## 2022-06-12 RX ADMIN — IPRATROPIUM BROMIDE AND ALBUTEROL SULFATE 3 ML: .5; 3 SOLUTION RESPIRATORY (INHALATION) at 06:27

## 2022-06-12 RX ADMIN — LIDOCAINE 1 PATCH: 50 PATCH CUTANEOUS at 08:29

## 2022-06-12 NOTE — PROGRESS NOTES
[facility]  PROGRESS NOTE    Patient Name: Mary Ramirez  : 1934  MRN: [id number]    Date of Admission: 6/10/2022  Primary Care Physician: Provider, No Known    Subjective   Subjective     CC:  Resp failure    HPI:  Remains confused this morning.  Oxygen requirements improved to 5 L.  Tachycardic up to the 130s    ROS:  Unable to obtain     Objective   Objective     Vital Signs:   Temp:  [98.2 °F (36.8 °C)-99.9 °F (37.7 °C)] 99.9 °F (37.7 °C)  Heart Rate:  [102-135] 102  Resp:  [14-22] 22  BP: (116-140)/() 131/99  Flow (L/min):  [4-40] 5     Physical Exam:  Constitutional: No acute distress, awake, alert  Respiratory: Clear to auscultation bilaterally, respiratory effort normal   Cardiovascular: RRR, no murmurs, rubs, or gallops  Gastrointestinal: Positive bowel sounds, soft, nontender, nondistended  Musculoskeletal: No bilateral ankle edema  Psychiatric: Appropriate affect, cooperative  Neurologic: Confused, disoriented  Skin: No rashes      Results Reviewed:  LAB RESULTS:      Lab 22  1012 06/10/22  2319 06/10/22  2230   WBC 8.93 10.81*  --    HEMOGLOBIN 12.5 12.8  --    HEMATOCRIT 36.6 39.8  --    PLATELETS 267 278  --    NEUTROS ABS 6.88 8.41*  --    IMMATURE GRANS (ABS) 0.03 0.05  --    LYMPHS ABS 1.14 1.26  --    MONOS ABS 0.74 0.93*  --    EOS ABS 0.08 0.10  --    MCV 88.0 93.4  --    PROCALCITONIN  --   --  0.12   LACTATE  --  1.2  --    D DIMER QUANT  --  3.43*  --          Lab 22  1012 06/10/22  2230   SODIUM 137 133*   POTASSIUM 3.8 4.2   CHLORIDE 99 98   CO2 28.0 25.0   ANION GAP 10.0 10.0   BUN 20 27*   CREATININE 1.34* 1.28*   EGFR 38.5* 40.6*   GLUCOSE 127* 117*   CALCIUM 8.9 9.7   HEMOGLOBIN A1C 7.30*  --          Lab 06/10/22  2230   TOTAL PROTEIN 7.4   ALBUMIN 4.30   GLOBULIN 3.1   ALT (SGPT) 10   AST (SGOT) 18   BILIRUBIN 0.5   ALK PHOS 112         Lab 06/10/22  2230   PROBNP 5,037.0*   TROPONIN T 0.028                 Brief  Urine Lab Results  (Last result in the past 365 days)      Color   Clarity   Blood   Leuk Est   Nitrite   Protein   CREAT   Urine HCG        06/11/22 0333 Yellow   Clear   Negative   Trace   Negative   Negative                 Microbiology Results Abnormal     Procedure Component Value - Date/Time    Blood Culture - Blood, Arm, Right [[id number]]  (Normal) Collected: 06/10/22 2319    Lab Status: Preliminary result Specimen: Blood from Arm, Right Updated: 06/12/22 0448     Blood Culture No growth at 24 hours    Blood Culture - Blood, Hand, Left [[id number]]  (Normal) Collected: 06/10/22 2319    Lab Status: Preliminary result Specimen: Blood from Hand, Left Updated: 06/12/22 0448     Blood Culture No growth at 24 hours    S. Pneumo Ag Urine or CSF - Urine, Urine, Clean Catch [[id number]]  (Normal) Collected: 06/11/22 0332    Lab Status: Final result Specimen: Urine, Clean Catch Updated: 06/11/22 1256     Strep Pneumo Ag Negative    Legionella Antigen, Urine - Urine, Urine, Clean Catch [[id number]]  (Normal) Collected: 06/11/22 0332    Lab Status: Final result Specimen: Urine, Clean Catch Updated: 06/11/22 1255     LEGIONELLA ANTIGEN, URINE Negative    MRSA Screen, PCR (Inpatient) - Swab, Nares [[id number]]  (Normal) Collected: 06/11/22 0332    Lab Status: Final result Specimen: Swab from Nares Updated: 06/11/22 0745     MRSA PCR Negative    Narrative:      The negative predictive value of this diagnostic test is high and should only be used to consider de-escalating anti-MRSA therapy. A positive result may indicate colonization with MRSA and must be correlated clinically.  MRSA Negative    COVID PRE-OP / PRE-PROCEDURE SCREENING ORDER (NO ISOLATION) - Swab, Nasopharynx [[id number]]  (Normal) Collected: 06/10/22 2318    Lab Status: Final result Specimen: Swab from Nasopharynx Updated: 06/11/22 0017    Narrative:      The following orders were created for panel order COVID PRE-OP / PRE-PROCEDURE SCREENING ORDER (NO ISOLATION)  - Swab, Nasopharynx.  Procedure                               Abnormality         Status                     ---------                               -----------         ------                     COVID-19 and FLU A/B PCR...[[id number]]  Normal              Final result                 Please view results for these tests on the individual orders.    COVID-19 and FLU A/B PCR - Swab, Nasopharynx [[id number]]  (Normal) Collected: 06/10/22 9525    Lab Status: Final result Specimen: Swab from Nasopharynx Updated: 06/11/22 0017     COVID19 Not Detected     Influenza A PCR Not Detected     Influenza B PCR Not Detected    Narrative:      Fact sheet for providers: https://www.fda.gov/media/250409/download    Fact sheet for patients: https://www.fda.gov/media/540323/download    Test performed by PCR.          Adult Transthoracic Echo Complete W/ Cont if Necessary Per Protocol    Result Date: 6/11/2022  · Estimated left ventricular EF = 51% Left ventricular ejection fraction appears to be 51 - 55%. Left ventricular systolic function is normal. · Left ventricular wall thickness is consistent with mild septal asymmetric hypertrophy. · There is calcification of the aortic valve mainly affecting the left coronary cusp(s). · Moderate mitral valve regurgitation is present. · Estimated right ventricular systolic pressure from tricuspid regurgitation is normal (<35 mmHg). · Incidental heart rate 120 bpm with diastolic inflow consistent with atrial fibrillation      CT Chest Without Contrast Diagnostic    Result Date: 6/11/2022  CT CHEST WITHOUT CONTRAST CLINICAL INDICATION: Hypoxia. COMPARISON: 6/3/2022. TECHNIQUE: Noncontrast CT images of the chest were obtained. CT dose lowering techniques were used, to include: automated exposure control, adjustment for patient size, and or use of iterative reconstruction. FINDINGS: Cardiovascular: Heart size stable without pericardial effusion. Atherosclerotic calcifications are seen in the coronary  vessels and aorta. Mediastinum: No mediastinal or hilar lymphadenopathy. Trachea and central airways are patent. Thyroid is normal. Stable large hiatal hernia. Lungs and pleura: A redemonstrated rounded opacity is seen in the right lung base. Small right pleural effusion. Increased diffuse groundglass and reticular opacities are seen in the upper lobes compared to prior. Ill-defined streaky airspace densities are also increased in the lung bases. No pneumothorax. Upper abdomen: No acute abnormality. Body wall: No acute abnormality. Bones: No acute osseous abnormality.     Impression: 1. Increased multifocal, ill-defined groundglass and streaky alveolar densities bilaterally concerning for pulmonary edema or pneumonia. 2. Unchanged rounded opacity in the right lung base which could reflect loculated pleural fluid and/or round atelectasis. An underlying parenchymal mass lesion is not excluded. Evaluation limited by lack of contrast. 3. Stable large hiatal hernia. Electronically signed by:  Theo Naqvi M.D.  6/11/2022 12:05 AM Mountain Time    XR Chest 1 View    Result Date: 6/11/2022  Single portable chest radiograph INDICATION:  Shortness of breath COMPARISON:  Chest radiograph 6/3/2022 FINDINGS: Patient's hand overlies the left upper lobe. Increased airspace opacities throughout the right lung. Small right pleural effusion. No pneumothorax. The cardiomediastinal silhouette is enlarged as before. Large hiatal hernia. There is atherosclerotic calcification of the aorta. No acute focal bony abnormalities are seen.     Impression: Increased airspace opacities throughout the right lung. Small right pleural effusion Large hiatal hernia. Electronically signed by:  Tiera Juares M.D.  6/10/2022 11:58 PM Mountain Time    Duplex Venous Lower Extremity - Bilateral CAR    Result Date: 6/11/2022  · Normal bilateral lower extremity venous duplex scan.        Results for orders placed during the hospital encounter of  06/10/22    Adult Transthoracic Echo Complete W/ Cont if Necessary Per Protocol    Interpretation Summary  · Estimated left ventricular EF = 51% Left ventricular ejection fraction appears to be 51 - 55%. Left ventricular systolic function is normal.  · Left ventricular wall thickness is consistent with mild septal asymmetric hypertrophy.  · There is calcification of the aortic valve mainly affecting the left coronary cusp(s).  · Moderate mitral valve regurgitation is present.  · Estimated right ventricular systolic pressure from tricuspid regurgitation is normal (<35 mmHg).  · Incidental heart rate 120 bpm with diastolic inflow consistent with atrial fibrillation      I have reviewed the medications:  Scheduled Meds:amiodarone, 200 mg, Oral, Daily  amLODIPine, 10 mg, Oral, Nightly  aspirin EC, 325 mg, Oral, Daily  cefepime, 1 g, Intravenous, Q24H  citalopram, 20 mg, Oral, Daily  donepezil, 5 mg, Oral, Nightly  fluticasone, 2 spray, Nasal, Daily  furosemide, 20 mg, Intravenous, BID  heparin (porcine), 5,000 Units, Subcutaneous, Q12H  insulin lispro, 0-7 Units, Subcutaneous, TID AC  ipratropium-albuterol, 3 mL, Nebulization, 4x Daily - RT  isosorbide mononitrate, 60 mg, Oral, Daily  lidocaine, 1 patch, Transdermal, Q24H  multivitamin, 1 tablet, Oral, Daily  sodium chloride, 10 mL, Intravenous, Q12H      Continuous Infusions:   PRN Meds:.•  acetaminophen **OR** acetaminophen **OR** acetaminophen  •  aluminum-magnesium hydroxide-simethicone  •  dextrose  •  dextrose  •  docusate sodium  •  glucagon (human recombinant)  •  sodium chloride    Assessment & Plan   Assessment & Plan     Active Hospital Problems    Diagnosis  POA   • Acute respiratory failure with hypercapnia ([facility]) [J96.02]  Yes   • Multifocal pneumonia [J18.9]  Yes   • Acute on chronic diastolic CHF (congestive heart failure) ([facility]) [I50.33]  Unknown   • Acute sepsis ([facility]) [A41.9]  Yes   • Late onset Alzheimer's disease without behavioral disturbance ([facility])  [G30.1, F02.80]  Yes   • GERD (gastroesophageal reflux disease) [K21.9]  Yes   • Benign essential hypertension [I10]  Yes   • CKD (chronic kidney disease), stage III (HCC) [N18.30]  Yes   • Paroxysmal atrial fibrillation (HCC) [I48.0]  Yes   • Dementia (HCC) [F03.90]  Yes   • Coronary artery disease involving native coronary artery of native heart with angina pectoris (HCC) [I25.119]  Yes   • Essential hypertension [I10]  Yes      Resolved Hospital Problems   No resolved problems to display.        87 year old female presents to the ED due to worsening shortness of air.  Was diagnosed with pneumonia several days ago and has been on a Zpak. Febrile on presentation to 100.8.  Mild leukocytosis of 10.  Elevated D-dimer at 3.4. CT chest without contrast shows increased multifocal, ill-defined groundglass and streaky alveolar densities bilaterally concerning for pulmonary edema or pneumonia.  Unchanged around opacity in the right lung base.  An underlying parenchymal mass lesion is not excluded.     Multifocal pneumonia   Acute hypoxic respiratory failure, improving  -Required high flow nasal cannula during admission, now improved to 5 L nasal cannula  -Remains tachycardic, fever curve is trending down  -D-dimer elevated 3.43.  VQ scan pending  -Lower extremity venous duplex negative for DVT; however technically difficult  -Covid 19 negative  -Strep pneumo urinary antigen negative  -Blood cultures remain negative for 24 hours  -Sputum culture pending  -MRSA PCR negative, discontinue vancomycin  -Continue cefepime     Acute on chronic HFpEF  -last echo with EF 66%, mild tricuspid valve regurgitation  -mild to moderate MVR  -proBNP tonight 5,037.0  -s/p 40 mg IV lasix   -Continue IV Lasix twice daily  -follows with Dr. Flores   -awaiting BMP this morning     Paroxysmal atrial fibrillation   -Uncontrolled, likely secondary to respiratory failure  -Continue amiodarone  -previously on eliquis but stopped due to multiple falls    -We will give dose of IV metoprolol and start p.o. metoprolol     CKD III   -baseline creatinine 1.1-1.4, creatinine at baseline  -hold nephrotoxic agents   -Creatinine stable this morning and at baseline,  -BMP in a.m.     Essential hypertension   -Holding amlodipine and clonidine due to low normal blood pressures     Dementia  -on aricept   -fall precautions      Diabetes mellitus type 2  -Continue sliding scale insulin  -fingersticks achs       Attempted to call Gloria (daughter) at [phone] at 12:50pm, she did not answer    DVT prophylaxis:  Medical DVT prophylaxis orders are present.             Disposition: I expect the patient to be discharged pending improved respiratory status.    CODE STATUS:   Code Status and Medical Interventions:   Ordered at: 06/11/22 0138     Medical Intervention Limits:    NO intubation (DNI)     Level Of Support Discussed With:    Patient    Health Care Surrogate     Code Status (Patient has no pulse and is not breathing):    No CPR (Do Not Attempt to Resuscitate)     Medical Interventions (Patient has pulse or is breathing):    Limited Support     I have prepared this progress note with copied portions of the prior day's progress note of my own authorship to preserve accuracy and maintain consistency of documentation. I have reviewed these portions and edited them for correctness. I verify that the above documentation accurately and truly represents the evaluation and management performed on today's date.       Alie Kruger MD  06/12/22

## 2022-06-12 NOTE — PLAN OF CARE
Problem: Skin Injury Risk Increased  Goal: Skin Health and Integrity  Outcome: Ongoing, Progressing  Goal: Skin Health and Integrity  Outcome: Ongoing, Progressing     Problem: Fall Injury Risk  Goal: Absence of Fall and Fall-Related Injury  Outcome: Ongoing, Progressing  Intervention: Promote Injury-Free Environment  Recent Flowsheet Documentation  Taken 6/12/2022 1349 by Mindy Moore RN  Safety Promotion/Fall Prevention:   activity supervised   safety round/check completed  Taken 6/12/2022 1000 by Mindy Moore RN  Safety Promotion/Fall Prevention: safety round/check completed  Taken 6/12/2022 0800 by Mindy Moore RN  Safety Promotion/Fall Prevention:   safety round/check completed   nonskid shoes/slippers when out of bed   activity supervised     Problem: Adult Inpatient Plan of Care  Goal: Plan of Care Review  Outcome: Ongoing, Progressing  Goal: Patient-Specific Goal (Individualized)  Outcome: Ongoing, Progressing  Goal: Absence of Hospital-Acquired Illness or Injury  Outcome: Ongoing, Progressing  Intervention: Identify and Manage Fall Risk  Recent Flowsheet Documentation  Taken 6/12/2022 1349 by Mindy Moore RN  Safety Promotion/Fall Prevention:   activity supervised   safety round/check completed  Taken 6/12/2022 1000 by Mindy Moore RN  Safety Promotion/Fall Prevention: safety round/check completed  Taken 6/12/2022 0800 by Mindy Moore RN  Safety Promotion/Fall Prevention:   safety round/check completed   nonskid shoes/slippers when out of bed   activity supervised  Goal: Optimal Comfort and Wellbeing  Outcome: Ongoing, Progressing  Intervention: Provide Person-Centered Care  Recent Flowsheet Documentation  Taken 6/12/2022 0800 by Mindy Moore RN  Trust Relationship/Rapport: care explained  Goal: Readiness for Transition of Care  Outcome: Ongoing, Progressing     Problem: Pain Acute  Goal: Acceptable Pain Control and Functional Ability  Outcome: Ongoing, Progressing     Problem: Confusion  Acute  Goal: Optimal Cognitive Function  Outcome: Ongoing, Progressing     Problem: Diabetes Comorbidity  Goal: Blood Glucose Level Within Targeted Range  Outcome: Ongoing, Progressing     Problem: Heart Failure Comorbidity  Goal: Maintenance of Heart Failure Symptom Control  Outcome: Ongoing, Progressing     Problem: Hypertension Comorbidity  Goal: Blood Pressure in Desired Range  Outcome: Ongoing, Progressing   Goal Outcome Evaluation:

## 2022-06-12 NOTE — PLAN OF CARE
Goal Outcome Evaluation:  Plan of Care Reviewed With: patient Alert to self.Tachycardic.4l of oxygen needed to maintain sat greater than 90s. Positioned for comfort using skin interventions by staff.

## 2022-06-13 ENCOUNTER — APPOINTMENT (OUTPATIENT)
Dept: NUCLEAR MEDICINE | Facility: HOSPITAL | Age: 87
End: 2022-06-13

## 2022-06-13 ENCOUNTER — APPOINTMENT (OUTPATIENT)
Dept: GENERAL RADIOLOGY | Facility: HOSPITAL | Age: 87
End: 2022-06-13

## 2022-06-13 LAB
ANION GAP SERPL CALCULATED.3IONS-SCNC: 10 MMOL/L (ref 5–15)
BUN SERPL-MCNC: 25 MG/DL (ref 8–23)
BUN/CREAT SERPL: 21.7 (ref 7–25)
CALCIUM SPEC-SCNC: 9.2 MG/DL (ref 8.6–10.5)
CHLORIDE SERPL-SCNC: 96 MMOL/L (ref 98–107)
CO2 SERPL-SCNC: 28 MMOL/L (ref 22–29)
CREAT SERPL-MCNC: 1.15 MG/DL (ref 0.57–1)
EGFRCR SERPLBLD CKD-EPI 2021: 46.2 ML/MIN/1.73
GLUCOSE BLDC GLUCOMTR-MCNC: 148 MG/DL (ref 70–130)
GLUCOSE BLDC GLUCOMTR-MCNC: 231 MG/DL (ref 70–130)
GLUCOSE BLDC GLUCOMTR-MCNC: 266 MG/DL (ref 70–130)
GLUCOSE SERPL-MCNC: 247 MG/DL (ref 65–99)
NT-PROBNP SERPL-MCNC: 2735 PG/ML (ref 0–1800)
POTASSIUM SERPL-SCNC: 3.5 MMOL/L (ref 3.5–5.2)
SODIUM SERPL-SCNC: 134 MMOL/L (ref 136–145)

## 2022-06-13 PROCEDURE — 83880 ASSAY OF NATRIURETIC PEPTIDE: CPT | Performed by: INTERNAL MEDICINE

## 2022-06-13 PROCEDURE — 25010000002 HALOPERIDOL LACTATE PER 5 MG: Performed by: PHYSICIAN ASSISTANT

## 2022-06-13 PROCEDURE — 99232 SBSQ HOSP IP/OBS MODERATE 35: CPT | Performed by: INTERNAL MEDICINE

## 2022-06-13 PROCEDURE — 63710000001 INSULIN LISPRO (HUMAN) PER 5 UNITS: Performed by: NURSE PRACTITIONER

## 2022-06-13 PROCEDURE — 78580 LUNG PERFUSION IMAGING: CPT

## 2022-06-13 PROCEDURE — 25010000002 FUROSEMIDE PER 20 MG: Performed by: INTERNAL MEDICINE

## 2022-06-13 PROCEDURE — 25010000002 HEPARIN (PORCINE) PER 1000 UNITS: Performed by: NURSE PRACTITIONER

## 2022-06-13 PROCEDURE — A9540 TC99M MAA: HCPCS | Performed by: INTERNAL MEDICINE

## 2022-06-13 PROCEDURE — 94799 UNLISTED PULMONARY SVC/PX: CPT

## 2022-06-13 PROCEDURE — 0 TECHNETIUM ALBUMIN AGGREGATED: Performed by: INTERNAL MEDICINE

## 2022-06-13 PROCEDURE — 71045 X-RAY EXAM CHEST 1 VIEW: CPT

## 2022-06-13 PROCEDURE — 25010000002 CEFEPIME PER 500 MG

## 2022-06-13 PROCEDURE — 80048 BASIC METABOLIC PNL TOTAL CA: CPT | Performed by: INTERNAL MEDICINE

## 2022-06-13 PROCEDURE — 82962 GLUCOSE BLOOD TEST: CPT

## 2022-06-13 PROCEDURE — 99222 1ST HOSP IP/OBS MODERATE 55: CPT | Performed by: INTERNAL MEDICINE

## 2022-06-13 RX ORDER — DOXYCYCLINE 100 MG/1
100 CAPSULE ORAL EVERY 12 HOURS SCHEDULED
Status: DISCONTINUED | OUTPATIENT
Start: 2022-06-13 | End: 2022-06-17 | Stop reason: HOSPADM

## 2022-06-13 RX ORDER — HALOPERIDOL 5 MG/ML
0.5 INJECTION INTRAMUSCULAR ONCE
Status: COMPLETED | OUTPATIENT
Start: 2022-06-13 | End: 2022-06-13

## 2022-06-13 RX ORDER — FUROSEMIDE 10 MG/ML
20 INJECTION INTRAMUSCULAR; INTRAVENOUS
Status: DISCONTINUED | OUTPATIENT
Start: 2022-06-13 | End: 2022-06-16

## 2022-06-13 RX ORDER — IPRATROPIUM BROMIDE AND ALBUTEROL SULFATE 2.5; .5 MG/3ML; MG/3ML
3 SOLUTION RESPIRATORY (INHALATION) EVERY 4 HOURS PRN
Status: DISCONTINUED | OUTPATIENT
Start: 2022-06-13 | End: 2022-06-17 | Stop reason: HOSPADM

## 2022-06-13 RX ADMIN — HEPARIN SODIUM 5000 UNITS: 5000 INJECTION INTRAVENOUS; SUBCUTANEOUS at 08:00

## 2022-06-13 RX ADMIN — DONEPEZIL HYDROCHLORIDE 5 MG: 5 TABLET ORAL at 21:39

## 2022-06-13 RX ADMIN — HEPARIN SODIUM 5000 UNITS: 5000 INJECTION INTRAVENOUS; SUBCUTANEOUS at 21:39

## 2022-06-13 RX ADMIN — Medication 10 ML: at 08:01

## 2022-06-13 RX ADMIN — ISOSORBIDE MONONITRATE 60 MG: 60 TABLET, EXTENDED RELEASE ORAL at 08:01

## 2022-06-13 RX ADMIN — INSULIN LISPRO 4 UNITS: 100 INJECTION, SOLUTION INTRAVENOUS; SUBCUTANEOUS at 08:00

## 2022-06-13 RX ADMIN — LIDOCAINE 1 PATCH: 50 PATCH CUTANEOUS at 08:00

## 2022-06-13 RX ADMIN — METOPROLOL TARTRATE 25 MG: 25 TABLET, FILM COATED ORAL at 08:01

## 2022-06-13 RX ADMIN — ASPIRIN 325 MG: 325 TABLET, COATED ORAL at 08:01

## 2022-06-13 RX ADMIN — AMIODARONE HYDROCHLORIDE 200 MG: 200 TABLET ORAL at 08:01

## 2022-06-13 RX ADMIN — Medication 10 ML: at 21:40

## 2022-06-13 RX ADMIN — HALOPERIDOL LACTATE 0.5 MG: 5 INJECTION, SOLUTION INTRAMUSCULAR at 01:54

## 2022-06-13 RX ADMIN — DOXYCYCLINE 100 MG: 100 CAPSULE ORAL at 21:39

## 2022-06-13 RX ADMIN — FUROSEMIDE 20 MG: 10 INJECTION INTRAMUSCULAR; INTRAVENOUS at 19:14

## 2022-06-13 RX ADMIN — METOPROLOL TARTRATE 25 MG: 25 TABLET, FILM COATED ORAL at 21:40

## 2022-06-13 RX ADMIN — ACETAMINOPHEN 325MG 650 MG: 325 TABLET ORAL at 21:40

## 2022-06-13 RX ADMIN — MULTIVITAMIN TABLET 1 TABLET: TABLET at 08:01

## 2022-06-13 RX ADMIN — FUROSEMIDE 20 MG: 10 INJECTION INTRAMUSCULAR; INTRAVENOUS at 11:04

## 2022-06-13 RX ADMIN — FLUTICASONE PROPIONATE 2 SPRAY: 50 SPRAY, METERED NASAL at 11:05

## 2022-06-13 RX ADMIN — KIT FOR THE PREPARATION OF TECHNETIUM TC 99M ALBUMIN AGGREGATED 1 DOSE: 2.5 INJECTION, POWDER, FOR SOLUTION INTRAVENOUS at 13:25

## 2022-06-13 RX ADMIN — DOXYCYCLINE 100 MG: 100 CAPSULE ORAL at 11:04

## 2022-06-13 RX ADMIN — INSULIN LISPRO 3 UNITS: 100 INJECTION, SOLUTION INTRAVENOUS; SUBCUTANEOUS at 17:14

## 2022-06-13 RX ADMIN — CEFEPIME HYDROCHLORIDE 1 G: 1 INJECTION, POWDER, FOR SOLUTION INTRAMUSCULAR; INTRAVENOUS at 11:04

## 2022-06-13 RX ADMIN — CITALOPRAM HYDROBROMIDE 20 MG: 20 TABLET ORAL at 08:01

## 2022-06-13 NOTE — PLAN OF CARE
Goal Outcome Evaluation:  Plan of Care Reviewed With: patient        Progress: improving  Outcome Evaluation: sats maintaining 96% on 4 L oxygen per NC,no increased work of breathing of SOA noted,  pleasantly confused at baseline, IV abx per order, new order Doxy PO, purewick, no c/o pain, oplan to dc rehab when medicaly ready VQ scan and chest xray completed today

## 2022-06-13 NOTE — PROGRESS NOTES
[facility] Medicine Services  PROGRESS NOTE    Patient Name: Mary Ramirez  : 1934  MRN: [id number]    Date of Admission: 6/10/2022  Primary Care Physician: Provider, No Known    Subjective   Subjective     CC:  Resp failure    HPI:  No shortness of breath but reports a cough, non productive    ROS:  Gen- No fevers, chills  CV- No chest pain, palpitations  Resp- + cough  GI- No N/V/D, abd pain        Objective   Objective     Vital Signs:   Temp:  [98.7 °F (37.1 °C)-99.8 °F (37.7 °C)] 99.8 °F (37.7 °C)  Heart Rate:  [] 98  Resp:  [20-24] 24  BP: ()/(54-93) 131/54  Flow (L/min):  [4-35] 4     Physical Exam:  Constitutional - no acute distress, nontoxic, in bed  HEENT-NCAT, mucous membranes moist  CV-RRR  Resp-fine rales right lower lung field  Abd-soft, nontender, nondistended, normoactive bowel sounds  Ext-No lower extremity cyanosis, clubbing or edema bilaterally  Neuro-alert, speech clear, moves all extremities   Psych-slightly flat affect   Skin- No rash on exposed UE or LE bilaterally        Results Reviewed:  LAB RESULTS:      Lab 22  1012 06/10/22  2319 06/10/22  2230   WBC 8.93 10.81*  --    HEMOGLOBIN 12.5 12.8  --    HEMATOCRIT 36.6 39.8  --    PLATELETS 267 278  --    NEUTROS ABS 6.88 8.41*  --    IMMATURE GRANS (ABS) 0.03 0.05  --    LYMPHS ABS 1.14 1.26  --    MONOS ABS 0.74 0.93*  --    EOS ABS 0.08 0.10  --    MCV 88.0 93.4  --    PROCALCITONIN  --   --  0.12   LACTATE  --  1.2  --    D DIMER QUANT  --  3.43*  --          Lab 22  0707 22  1211 22  1012 06/10/22  2230   SODIUM 134* 134* 137 133*   POTASSIUM 3.5 3.9 3.8 4.2   CHLORIDE 96* 98 99 98   CO2 28.0 23.0 28.0 25.0   ANION GAP 10.0 13.0 10.0 10.0   BUN 25* 24* 20 27*   CREATININE 1.15* 1.25* 1.34* 1.28*   EGFR 46.2* 41.8* 38.5* 40.6*   GLUCOSE 247* 269* 127* 117*   CALCIUM 9.2 9.0 8.9 9.7   HEMOGLOBIN A1C  --   --  7.30*  --          Lab 06/10/22  2230   TOTAL PROTEIN 7.4    ALBUMIN 4.30   GLOBULIN 3.1   ALT (SGPT) 10   AST (SGOT) 18   BILIRUBIN 0.5   ALK PHOS 112         Lab 06/10/22  2230   PROBNP 5,037.0*   TROPONIN T 0.028                 Brief Urine Lab Results  (Last result in the past 365 days)      Color   Clarity   Blood   Leuk Est   Nitrite   Protein   CREAT   Urine HCG        06/11/22 0333 Yellow   Clear   Negative   Trace   Negative   Negative                 Microbiology Results Abnormal     Procedure Component Value - Date/Time    Blood Culture - Blood, Arm, Right [[id number]]  (Normal) Collected: 06/10/22 2319    Lab Status: Preliminary result Specimen: Blood from Arm, Right Updated: 06/13/22 0445     Blood Culture No growth at 2 days    Blood Culture - Blood, Hand, Left [[id number]]  (Normal) Collected: 06/10/22 2319    Lab Status: Preliminary result Specimen: Blood from Hand, Left Updated: 06/13/22 0445     Blood Culture No growth at 2 days    S. Pneumo Ag Urine or CSF - Urine, Urine, Clean Catch [[id number]]  (Normal) Collected: 06/11/22 0332    Lab Status: Final result Specimen: Urine, Clean Catch Updated: 06/11/22 1256     Strep Pneumo Ag Negative    Legionella Antigen, Urine - Urine, Urine, Clean Catch [[id number]]  (Normal) Collected: 06/11/22 0332    Lab Status: Final result Specimen: Urine, Clean Catch Updated: 06/11/22 1255     LEGIONELLA ANTIGEN, URINE Negative    MRSA Screen, PCR (Inpatient) - Swab, Nares [[id number]]  (Normal) Collected: 06/11/22 0332    Lab Status: Final result Specimen: Swab from Nares Updated: 06/11/22 0745     MRSA PCR Negative    Narrative:      The negative predictive value of this diagnostic test is high and should only be used to consider de-escalating anti-MRSA therapy. A positive result may indicate colonization with MRSA and must be correlated clinically.  MRSA Negative    COVID PRE-OP / PRE-PROCEDURE SCREENING ORDER (NO ISOLATION) - Swab, Nasopharynx [[id number]]  (Normal) Collected: 06/10/22 2318    Lab Status: Final result Specimen:  Swab from Nasopharynx Updated: 06/11/22 0017    Narrative:      The following orders were created for panel order COVID PRE-OP / PRE-PROCEDURE SCREENING ORDER (NO ISOLATION) - Swab, Nasopharynx.  Procedure                               Abnormality         Status                     ---------                               -----------         ------                     COVID-19 and FLU A/B PCR...[[id number]]  Normal              Final result                 Please view results for these tests on the individual orders.    COVID-19 and FLU A/B PCR - Swab, Nasopharynx [[id number]]  (Normal) Collected: 06/10/22 9543    Lab Status: Final result Specimen: Swab from Nasopharynx Updated: 06/11/22 0017     COVID19 Not Detected     Influenza A PCR Not Detected     Influenza B PCR Not Detected    Narrative:      Fact sheet for providers: https://www.fda.gov/media/135563/download    Fact sheet for patients: https://www.fda.gov/media/509466/download    Test performed by PCR.          Duplex Venous Lower Extremity - Bilateral CAR    Result Date: 6/11/2022  · Normal bilateral lower extremity venous duplex scan.        Results for orders placed during the hospital encounter of 06/10/22    Adult Transthoracic Echo Complete W/ Cont if Necessary Per Protocol    Interpretation Summary  · Estimated left ventricular EF = 51% Left ventricular ejection fraction appears to be 51 - 55%. Left ventricular systolic function is normal.  · Left ventricular wall thickness is consistent with mild septal asymmetric hypertrophy.  · There is calcification of the aortic valve mainly affecting the left coronary cusp(s).  · Moderate mitral valve regurgitation is present.  · Estimated right ventricular systolic pressure from tricuspid regurgitation is normal (<35 mmHg).  · Incidental heart rate 120 bpm with diastolic inflow consistent with atrial fibrillation      I have reviewed the medications:  Scheduled Meds:amiodarone, 200 mg, Oral, Daily  aspirin EC,  325 mg, Oral, Daily  cefepime, 1 g, Intravenous, Q24H  citalopram, 20 mg, Oral, Daily  donepezil, 5 mg, Oral, Nightly  fluticasone, 2 spray, Nasal, Daily  furosemide, 20 mg, Intravenous, BID  heparin (porcine), 5,000 Units, Subcutaneous, Q12H  insulin lispro, 0-7 Units, Subcutaneous, TID AC  isosorbide mononitrate, 60 mg, Oral, Daily  lidocaine, 1 patch, Transdermal, Q24H  metoprolol tartrate, 25 mg, Oral, Q12H  multivitamin, 1 tablet, Oral, Daily  sodium chloride, 10 mL, Intravenous, Q12H      Continuous Infusions:   PRN Meds:.•  acetaminophen **OR** acetaminophen **OR** acetaminophen  •  aluminum-magnesium hydroxide-simethicone  •  dextrose  •  dextrose  •  docusate sodium  •  glucagon (human recombinant)  •  ipratropium-albuterol  •  sodium chloride    Assessment & Plan   Assessment & Plan     Active Hospital Problems    Diagnosis  POA   • Acute respiratory failure with hypercapnia (HCC) [J96.02]  Yes   • Multifocal pneumonia [J18.9]  Yes   • Acute on chronic diastolic CHF (congestive heart failure) ([facility]) [I50.33]  Unknown   • Acute sepsis ([facility]) [A41.9]  Yes   • Late onset Alzheimer's disease without behavioral disturbance ([facility]) [G30.1, F02.80]  Yes   • GERD (gastroesophageal reflux disease) [K21.9]  Yes   • Benign essential hypertension [I10]  Yes   • CKD (chronic kidney disease), stage III ([facility]) [N18.30]  Yes   • Paroxysmal atrial fibrillation ([facility]) [I48.0]  Yes   • Dementia ([facility]) [F03.90]  Yes   • Coronary artery disease involving native coronary artery of native heart with angina pectoris ([facility]) [I25.119]  Yes   • Essential hypertension [I10]  Yes      Resolved Hospital Problems   No resolved problems to display.        87 year old female presents to the ED due to worsening shortness of air.  Was diagnosed with pneumonia several days ago and has been on a Zpak. Febrile on presentation to 100.8.  Mild leukocytosis of 10.  Elevated D-dimer at 3.4. CT chest without contrast shows increased multifocal, ill-defined  groundglass and streaky alveolar densities bilaterally concerning for pulmonary edema or pneumonia.  Unchanged around opacity in the right lung base.  An underlying parenchymal mass lesion is not excluded.       Acute hypoxic respiratory failure  - secondary to multifocal pneumonia and acute on chronic diastolic heart failure  -Required high flow nasal cannula during admission, now improved to 4 L nasal cannula  -D-dimer elevated 3.43.  VQ scan pending  -Lower extremity venous duplex negative for DVT; however technically difficult    Multifocal pneumonia  - MRSA nares negative, legionella and strep pneumo urinary antigens negative  - cefepime/doxy  - repeat CXR am     Acute on chronic HFpEF  -EF 51% by echo 5/11/22  -proBNP 5,037 at admission, 2,735 today   -Continue IV Lasix 20 mg twice daily  -follows outpatient with Dr. Flores   -I/O last 3 completed shifts:  In: 120 [P.O.:120]  Out: 2100 [Urine:2100]  No intake/output data recorded.     Paroxysmal atrial fibrillation   -HR improved  -Continue amiodarone, metoprolol 25 mg PO BID added for rate control  -previously on eliquis but stopped due to multiple falls      CKD III   -baseline creatinine 1.1-1.4, creatinine at baseline at 1.15 today  -hold nephrotoxic agents   -BMP in a.m.     Essential hypertension   -Holding amlodipine and clonidine due to low normal blood pressures     Dementia  -on aricept   -fall precautions      Diabetes mellitus type 2  -Continue sliding scale insulin  -fingersticks achs   - a1c 7.3      DVT prophylaxis: heparin  Medical DVT prophylaxis orders are present.             Disposition: I expect the patient to be discharged pending improved respiratory status.    CODE STATUS:   Code Status and Medical Interventions:   Ordered at: 06/11/22 0138     Medical Intervention Limits:    NO intubation (DNI)     Level Of Support Discussed With:    Patient    Health Care Surrogate     Code Status (Patient has no pulse and is not breathing):    No CPR  (Do Not Attempt to Resuscitate)     Medical Interventions (Patient has pulse or is breathing):    Limited Support         Emmanuel Mehta MD  06/13/22

## 2022-06-13 NOTE — CONSULTS
Date of Hospital Visit: 22  Encounter Provider: Joann Yousif PA-C  Place of Service: [facility]  Patient Name: Mary Ramirez  :1934  Referral Provider: Provider, No Known  Primary Care Provider: Joel, No Known    Chief complaint/Reason for Consultation: Acute on chronic heart failure    Problem List:  1. Coronary artery disease  1. Cardiac cath with PCI, data deficit  2. Echo 2018: EF 60%.  LVH.  Left atrial dilation.  Aortic calcification with no stenosis.  3. MPS 2018: Normal perfusion  2. Paroxysmal atrial fibrillation  3. Chronic diastolic congestive heart failure  1. Echo 2022: EF 51%  4. Type 2 diabetes  5. Chronic kidney disease  6. Hypertension    History of Present Illness:   Patient is 87-year-old female with past medical history significant for paroxysmal atrial fibrillation, hypertension, coronary artery disease, diastolic CHF, type 2 diabetes, hyperlipidemia, and dementia who we are seeing in consultation today with complaints of shortness of breath.  Patient has dementia at baseline so my HPI is obtained from the patient's chart.    She presented to [facility] ED [facility]/10/2022 with complaints of shortness of breath.  She had previously been diagnosed with pneumonia and started on Z-Al.  She was also seen in the ER on  where CTA chest revealed infiltrate with loculated pleural fluid.  However, the family did not want the patient to be admitted and she was discharged back to her living facility.  Upon review presentation, the patient was found to have oxygen saturations in the 70s requiring supplemental oxygenation prior to arrival in the ED.    Throughout her hospital course thus far, she has been in atrial fibrillation with RVR.  Her typical rates were 90s to 100s, yesterday she did develop a rate in the 130s and was sustaining so she was given metoprolol push and started on metoprolol 25 mg twice daily.  She has been on antibiotics for her  pneumonia and she has been given Lasix for her heart failure exacerbation.  Given the history with this patient is limited however she does deny any chest pain.    Past Medical History:   Diagnosis Date   • Atrial fibrillation (HCC)    • Benign essential hypertension    • CAD (coronary artery disease)    • Carotid artery disease (HCC)    • CHF (congestive heart failure) (HCC)    • Chronic allergic rhinitis    • CKD (chronic kidney disease), stage III (HCC)    • DDD (degenerative disc disease), cervical    • Dementia (HCC)    • Diabetes mellitus (HCC)    • Diverticulosis     WITH PERFORATION    • Hypertension    • Major depressive disorder, single episode, mild (HCC)    • Memory loss    • Mixed hyperlipidemia    • SSS (sick sinus syndrome) ([facility])    • TIA (transient ischemic attack) 01/2016       Past Surgical History:   Procedure Laterality Date   • ANKLE SURGERY Left 1996   • ATHERECTOMY  1995   • ATHRECTOMY ILIAC, FEMORAL, TIBIAL ARTERY     • BREAST LUMPECTOMY Left 1981   • COLON SURGERY     • COLOSTOMY  1983   • CORONARY STENT PLACEMENT  2001   • FINGER FRACTURE SURGERY Right    • HIP TROCHANTERIC NAILING WITH INTRAMEDULLARY HIP SCREW Left 11/23/2017   • HIP TROCHANTERIC NAILING WITH INTRAMEDULLARY HIP SCREW Right 3/30/2019    Procedure: HIP TROCANTERIC NAILING WITH INTRAMEDULLARY HIP SCREW RIGHT;  Surgeon: Theo Figueroa MD;  Location: Pending sale to [facility];  Service: Orthopedics   • HYSTERECTOMY  1984   • OTHER SURGICAL HISTORY  1984    REANASTAMOSIS        Medications Prior to Admission   Medication Sig Dispense Refill Last Dose   • acetaminophen (TYLENOL) 500 MG tablet Take 500 mg by mouth 2 (Two) Times a Day. Take one tablet twice a day for leg pain   And prn every 6 hours   Unknown at Unknown time   • aluminum-magnesium hydroxide-simethicone (MAALOX MAX) 400-400-40 MG/5ML suspension Take 5 mL by mouth Every 6 (Six) Hours As Needed for Indigestion or Heartburn.   Unknown at Unknown time   • Amino Acids-Protein  Hydrolys (PRO-STAT AWC PO) Take 1 dose by mouth 2 (Two) Times a Day.   Unknown at Unknown time   • amiodarone (PACERONE) 200 MG tablet Take 1 tablet by mouth Daily.   Unknown at Unknown time   • amLODIPine (NORVASC) 10 MG tablet Take 1 tablet by mouth Every Night. 30 tablet 5 Unknown at Unknown time   • aspirin  MG EC tablet Take 1 tablet by mouth Daily.   Unknown at Unknown time   • bisacodyl (DULCOLAX) 5 MG EC tablet Take 1 tablet by mouth Daily As Needed for Constipation.   Unknown at Unknown time   • citalopram (CeleXA) 20 MG tablet Take 20 mg by mouth Daily.   Unknown at Unknown time   • CloNIDine (CATAPRES) 0.2 MG tablet Take 0.2 mg by mouth 2 (Two) Times a Day.   Unknown at Unknown time   • collagenase 250 UNIT/GM ointment Apply  topically to the appropriate area as directed Daily.   Unknown at Unknown time   • docusate sodium (COLACE) 100 MG capsule Take 100 mg by mouth 2 (Two) Times a Day.   Unknown at Unknown time   • donepezil (ARICEPT) 5 MG tablet Take 5 mg by mouth Every Night.   Unknown at Unknown time   • fluticasone (FLONASE) 50 MCG/ACT nasal spray 2 sprays into the nostril(s) as directed by provider Daily.   Unknown at Unknown time   • insulin aspart (novoLOG FLEXPEN) 100 UNIT/ML solution pen-injector sc pen Inject 2-5 Units under the skin into the appropriate area as directed 4 (Four) Times a Day.   Unknown at Unknown time   • isosorbide mononitrate (IMDUR) 60 MG 24 hr tablet Take 60 mg by mouth Daily.   Unknown at Unknown time   • lidocaine (LIDODERM) 5 % Place 1 patch on the skin as directed by provider Daily. Remove & Discard patch within 12 hours or as directed by MD 30 patch 0 Unknown at Unknown time   • metFORMIN ER (GLUCOPHAGE-XR) 500 MG 24 hr tablet Take 1 tablet by mouth Daily With Breakfast. 30 tablet 5 Unknown at Unknown time   • Multiple Vitamin (MULTIVITAMINS PO) Take 1 tablet by mouth Daily.   Unknown at Unknown time   • ondansetron (ZOFRAN) 4 MG tablet Take 4 mg by mouth Every  "8 (Eight) Hours As Needed for Nausea or Vomiting.      • polyethylene glycol (MIRALAX) pack packet Take 17 g by mouth Daily. (Patient taking differently: Take 17 g by mouth Daily As Needed.)   Unknown at Unknown time       Social History     Socioeconomic History   • Marital status:      Spouse name: N/A   • Number of children: 2   • Years of education: H.S   Tobacco Use   • Smoking status: Former Smoker     Years: 2.00     Types: Cigarettes     Start date: 1975     Quit date: 1978     Years since quittin.6   • Smokeless tobacco: Never Used   • Tobacco comment: QUIT DATE 1984   Substance and Sexual Activity   • Alcohol use: No   • Drug use: No   • Sexual activity: Never       Family History   Problem Relation Age of Onset   • Hypertension Mother    • Coronary artery disease Mother 56        PREMATURE    • Lung cancer Father    • Hypertension Father    • Osteoporosis Sister    • Lung cancer Brother        REVIEW OF SYSTEMS:     12 point ROS was performed and is Negative except as outlined in HPI     Objective:     Vitals:    22 0653 22 0656 22 0700 22 1109   BP:  131/54  96/58   BP Location:  Left arm  Left arm   Patient Position:  Sitting  Lying   Pulse: 106 107 98 105   Resp: 24   18   Temp: 98.4 °F (36.9 °C)   98.1 °F (36.7 °C)   TempSrc: Oral   Oral   SpO2:  94% 93%    Weight:       Height:         Body mass index is 22.14 kg/m².  Flowsheet Rows    Flowsheet Row First Filed Value   Admission Height 162.6 cm (64\") Documented at 06/10/2022 2245   Admission Weight 52.2 kg (115 lb) Documented at 06/10/2022 2245          Physical Exam   General: Ill appearing, no acute distress    Skin: Skin is warm and dry. No obvious cyanosis, erythema or pallor.   Neck:  no JVD.    Chest:No respiratory distress. No chest wall tenderness. Breath sounds are normal.  Cardiovascular: Irregular S1 and S2, no murmur, gallop or rub. PMI is not displaced.    Pulses:Radial and pedal pulses " are 2+ and symmetric.    Abdomen: Soft, nontender, normal bowel sounds.   Musculoskeletal/Extremities:  No edema.  Neurological: Demented at baseline.  No gross focal deficits.      Lab Review:                Results from last 7 days   Lab Units 06/13/22  0707   SODIUM mmol/L 134*   POTASSIUM mmol/L 3.5   CHLORIDE mmol/L 96*   CO2 mmol/L 28.0   BUN mg/dL 25*   CREATININE mg/dL 1.15*   GLUCOSE mg/dL 247*   CALCIUM mg/dL 9.2     Results from last 7 days   Lab Units 06/10/22  2230   TROPONIN T ng/mL 0.028     Results from last 7 days   Lab Units 06/11/22  1012   WBC 10*3/mm3 8.93   HEMOGLOBIN g/dL 12.5   HEMATOCRIT % 36.6   PLATELETS 10*3/mm3 267                 ProBNP 2750  Imaging Results (Last 24 Hours)     Procedure Component Value Units Date/Time    XR Chest 1 View [[id number]] Collected: 06/13/22 1448     Updated: 06/13/22 1452    Narrative:      DATE OF EXAM: 6/13/2022 2:05 PM     PROCEDURE: XR CHEST 1 VW-     INDICATIONS: v/q comparison; A41.9-Sepsis, unspecified organism;  J18.9-Pneumonia, unspecified organism; I50.9-Heart failure, unspecified;  J96.01-Acute respiratory failure with hypoxia     COMPARISON: Karo 10, 2022     TECHNIQUE: Single radiographic AP view of the chest was obtained.     FINDINGS:  The heart looks enlarged. There is a hiatal hernia. There is bilateral  airspace disease worse in the right upper lobe with some interval  worsening to the left lung since prior exam. There are no large pleural  effusions.        Impression:      1.  Bilateral pulmonary infiltrates which could reflect a multifocal  inflammatory infectious process.  2.  Cardiomegaly.  3.  Hiatal hernia     This report was finalized on 6/13/2022 2:49 PM by Bernardino Chase MD.       NM Lung Scan Perfusion Particulate [[id number]] Resulted: 06/13/22 1404     Updated: 06/13/22 1404           Adult Transthoracic Echo Complete W/ Cont if Necessary Per Protocol     Interpretation Summary  · Estimated left ventricular EF = 51% Left  ventricular ejection fraction appears to be 51 - 55%. Left ventricular systolic function is normal.  · Left ventricular wall thickness is consistent with mild septal asymmetric hypertrophy.  · There is calcification of the aortic valve mainly affecting the left coronary cusp(s).  · Moderate mitral valve regurgitation is present.  · Estimated right ventricular systolic pressure from tricuspid regurgitation is normal (<35 mmHg).  · Incidental heart rate 120 bpm with diastolic inflow consistent with atrial fibrillation     EKG 6/10/2022: atrial fibrillation with RVR, rate 105       Assessment:   1. Hypoxia, in the setting of pneumonia and acute on chronic CHF  2. Paroxysmal atrial fibrillation, at present this is persistent since at least Karo 3, 2022.  3. CKD III  4. Hypertension, currently normotensive  5. Diabetes  6. Dementia    Plan:   1. In regard to atrial fibrillation, agree with initiating metoprolol 25 mg twice daily for rate control.  She may benefit from increasing to 50 mg twice daily if BP allows.  Can consider anticoagulation after discussion with Dr. Flores and the family.  2. Discontinue amiodarone as she appears to be in persistent atrial fibrillation.  3. In regard to heart failure, gentle diuresis with Lasix. Monitor renal function.  She is currently on BB. We will titrate guideline directed medical therapy as tolerated.   4. Management of pneumonia and multiple medical conditions per hospitalist.   5. Thank you for the consult, Dr. Flores will see her for follow-up from tomorrow.    Scribed for Alyssa Goldstein MD by Joann Yousif PA-C. 6/13/2022  15:32 EDT     IAlyssa MD, personally performed the services described in this documentation as scribed by the above named individual in my presence, and it is both accurate and complete.  6/13/2022  16:28 EDT

## 2022-06-13 NOTE — PLAN OF CARE
Patient has been restless, repeatedly pulled lines/tubes, most importantly O2 tub ing due to the necessity for high dose of supplemental O2. Oriented only to self, patient remains confused. A fib on cardiac mx, rate between low 90s to mid 120s, otherwise VSS. Will cont to mx. Frequent nursing round in place.

## 2022-06-14 ENCOUNTER — APPOINTMENT (OUTPATIENT)
Dept: GENERAL RADIOLOGY | Facility: HOSPITAL | Age: 87
End: 2022-06-14

## 2022-06-14 LAB
ANION GAP SERPL CALCULATED.3IONS-SCNC: 13 MMOL/L (ref 5–15)
BUN SERPL-MCNC: 29 MG/DL (ref 8–23)
BUN/CREAT SERPL: 25.4 (ref 7–25)
CALCIUM SPEC-SCNC: 8.8 MG/DL (ref 8.6–10.5)
CHLORIDE SERPL-SCNC: 99 MMOL/L (ref 98–107)
CO2 SERPL-SCNC: 28 MMOL/L (ref 22–29)
CREAT SERPL-MCNC: 1.14 MG/DL (ref 0.57–1)
DEPRECATED RDW RBC AUTO: 49.1 FL (ref 37–54)
EGFRCR SERPLBLD CKD-EPI 2021: 46.7 ML/MIN/1.73
ERYTHROCYTE [DISTWIDTH] IN BLOOD BY AUTOMATED COUNT: 14.1 % (ref 12.3–15.4)
GLUCOSE BLDC GLUCOMTR-MCNC: 227 MG/DL (ref 70–130)
GLUCOSE BLDC GLUCOMTR-MCNC: 228 MG/DL (ref 70–130)
GLUCOSE BLDC GLUCOMTR-MCNC: 302 MG/DL (ref 70–130)
GLUCOSE SERPL-MCNC: 204 MG/DL (ref 65–99)
HCT VFR BLD AUTO: 42.2 % (ref 34–46.6)
HGB BLD-MCNC: 13.2 G/DL (ref 12–15.9)
MCH RBC QN AUTO: 29.5 PG (ref 26.6–33)
MCHC RBC AUTO-ENTMCNC: 31.3 G/DL (ref 31.5–35.7)
MCV RBC AUTO: 94.4 FL (ref 79–97)
PLATELET # BLD AUTO: 270 10*3/MM3 (ref 140–450)
PMV BLD AUTO: 12.9 FL (ref 6–12)
POTASSIUM SERPL-SCNC: 3.5 MMOL/L (ref 3.5–5.2)
PROCALCITONIN SERPL-MCNC: 0.16 NG/ML (ref 0–0.25)
RBC # BLD AUTO: 4.47 10*6/MM3 (ref 3.77–5.28)
SODIUM SERPL-SCNC: 140 MMOL/L (ref 136–145)
WBC NRBC COR # BLD: 7.73 10*3/MM3 (ref 3.4–10.8)

## 2022-06-14 PROCEDURE — 82962 GLUCOSE BLOOD TEST: CPT

## 2022-06-14 PROCEDURE — 25010000002 HEPARIN (PORCINE) PER 1000 UNITS: Performed by: NURSE PRACTITIONER

## 2022-06-14 PROCEDURE — 97161 PT EVAL LOW COMPLEX 20 MIN: CPT

## 2022-06-14 PROCEDURE — 25010000002 FUROSEMIDE PER 20 MG: Performed by: INTERNAL MEDICINE

## 2022-06-14 PROCEDURE — 25010000002 CEFEPIME PER 500 MG: Performed by: INTERNAL MEDICINE

## 2022-06-14 PROCEDURE — 99232 SBSQ HOSP IP/OBS MODERATE 35: CPT | Performed by: INTERNAL MEDICINE

## 2022-06-14 PROCEDURE — 80048 BASIC METABOLIC PNL TOTAL CA: CPT | Performed by: INTERNAL MEDICINE

## 2022-06-14 PROCEDURE — 63710000001 INSULIN LISPRO (HUMAN) PER 5 UNITS: Performed by: NURSE PRACTITIONER

## 2022-06-14 PROCEDURE — 71045 X-RAY EXAM CHEST 1 VIEW: CPT

## 2022-06-14 PROCEDURE — 85027 COMPLETE CBC AUTOMATED: CPT | Performed by: INTERNAL MEDICINE

## 2022-06-14 PROCEDURE — 84145 PROCALCITONIN (PCT): CPT | Performed by: INTERNAL MEDICINE

## 2022-06-14 RX ADMIN — METOPROLOL TARTRATE 25 MG: 25 TABLET, FILM COATED ORAL at 09:45

## 2022-06-14 RX ADMIN — FLUTICASONE PROPIONATE 2 SPRAY: 50 SPRAY, METERED NASAL at 09:44

## 2022-06-14 RX ADMIN — MULTIVITAMIN TABLET 1 TABLET: TABLET at 09:45

## 2022-06-14 RX ADMIN — Medication 10 ML: at 20:16

## 2022-06-14 RX ADMIN — HEPARIN SODIUM 5000 UNITS: 5000 INJECTION INTRAVENOUS; SUBCUTANEOUS at 20:16

## 2022-06-14 RX ADMIN — INSULIN LISPRO 3 UNITS: 100 INJECTION, SOLUTION INTRAVENOUS; SUBCUTANEOUS at 09:44

## 2022-06-14 RX ADMIN — CITALOPRAM HYDROBROMIDE 20 MG: 20 TABLET ORAL at 09:45

## 2022-06-14 RX ADMIN — HEPARIN SODIUM 5000 UNITS: 5000 INJECTION INTRAVENOUS; SUBCUTANEOUS at 09:46

## 2022-06-14 RX ADMIN — ASPIRIN 325 MG: 325 TABLET, COATED ORAL at 09:45

## 2022-06-14 RX ADMIN — FUROSEMIDE 20 MG: 10 INJECTION INTRAMUSCULAR; INTRAVENOUS at 12:34

## 2022-06-14 RX ADMIN — DOXYCYCLINE 100 MG: 100 CAPSULE ORAL at 20:16

## 2022-06-14 RX ADMIN — FUROSEMIDE 20 MG: 10 INJECTION INTRAMUSCULAR; INTRAVENOUS at 17:44

## 2022-06-14 RX ADMIN — ISOSORBIDE MONONITRATE 60 MG: 60 TABLET, EXTENDED RELEASE ORAL at 09:45

## 2022-06-14 RX ADMIN — CEFEPIME HYDROCHLORIDE 1 G: 1 INJECTION, POWDER, FOR SOLUTION INTRAMUSCULAR; INTRAVENOUS at 12:35

## 2022-06-14 RX ADMIN — DONEPEZIL HYDROCHLORIDE 5 MG: 5 TABLET ORAL at 20:16

## 2022-06-14 RX ADMIN — INSULIN LISPRO 5 UNITS: 100 INJECTION, SOLUTION INTRAVENOUS; SUBCUTANEOUS at 17:42

## 2022-06-14 RX ADMIN — DOXYCYCLINE 100 MG: 100 CAPSULE ORAL at 09:45

## 2022-06-14 RX ADMIN — METOPROLOL TARTRATE 25 MG: 25 TABLET, FILM COATED ORAL at 20:18

## 2022-06-14 NOTE — PLAN OF CARE
Goal Outcome Evaluation:  Plan of Care Reviewed With: patient        Progress: no change (PT eval)  Outcome Evaluation: PT evaluation complete. Patient presents with declined functional mobility warranting IPPT to promote return to previous level of function and facilitate independence with mobility. Pt alert to self only and able to follow commands. Pt required mod A x 1 for bed mobility and max A x 1 for transfers. Recommend d/c return to [facility].

## 2022-06-14 NOTE — PROGRESS NOTES
"[facility] Progress Note     LOS: 3 days   Patient Care Team:  Tiana Hong MD as PCP - General (Internal Medicine)  PCP:  Tiana Hong MD    Chief Complaint: Persistent atrial fibrillation    SUBJECTIVE: Improving rate control overnight.      Review of Systems:   All systems have been reviewed and are negative with the exception of those mentioned above.      OBJECTIVE:    Vital Sign Min/Max for last 24 hours  Temp  Min: 97.6 °F (36.4 °C)  Max: 99.9 °F (37.7 °C)   BP  Min: 91/71  Max: 138/81   Pulse  Min: 85  Max: 109   Resp  Min: 16  Max: 18   SpO2  Min: 87 %  Max: 97 %   No data recorded   No data recorded     Flowsheet Rows    Flowsheet Row First Filed Value   Admission Height 162.6 cm (64\") Documented at 06/10/2022 2245   Admission Weight 52.2 kg (115 lb) Documented at 06/10/2022 2245          Telemetry: Atrial fibrillation    Intake/Output Summary (Last 24 hours) at 6/14/2022 1300  Last data filed at 6/14/2022 0600  Gross per 24 hour   Intake 120 ml   Output 1200 ml   Net -1080 ml     Intake & Output (last 3 days)       06/11 0701  06/12 0700 06/12 0701  06/13 0700 06/13 0701  06/14 0700 06/14 0701  06/15 0700    P.O. 120  120     IV Piggyback   300     Total Intake(mL/kg) 120 (2.1)  420 (7.2)     Urine (mL/kg/hr) 2550 (1.8) 800 (0.6) 1200 (0.9)     Stool  0 0     Total Output 2550 800 1200     Net -2430 -800 -780             Urine Unmeasured Occurrence 1 x 2 x 2 x     Stool Unmeasured Occurrence  2 x 2 x            Physical Exam:    General Appearance:   Sleep, no distress, appears stated age   Neck:   Supple, symmetrical, trachea midline.   Lungs:    Breath sounds bilaterally, respirations unlabored   Chest Wall:    No tenderness or deformity    Heart:   Irregular regular, S1 and S2 normal, no murmur, rub   or gallop, normal carotid impulse bilaterally without bruit.   Extremities:   Extremities normal, atraumatic, no " cyanosis or edema   Pulses:   2+ and symmetric all extremities   Skin:   Skin color, texture, turgor normal, no rashes or lesions      LABS/DIAGNOSTIC DATA:  Results from last 7 days   Lab Units 06/14/22  0507 06/11/22  1012 06/10/22  2319   WBC 10*3/mm3 7.73 8.93 10.81*   HEMOGLOBIN g/dL 13.2 12.5 12.8   HEMATOCRIT % 42.2 36.6 39.8   PLATELETS 10*3/mm3 270 267 278     Lab Results   Lab Value Date/Time    TROPONINT 0.028 06/10/2022 2230    TROPONINT 0.033 (C) 06/03/2022 1659    TROPONINT 0.038 (C) 06/03/2022 1443    TROPONINT <0.010 11/11/2019 1743    TROPONINT <0.010 09/21/2019 2115    TROPONINT <0.010 09/21/2019 1829         Results from last 7 days   Lab Units 06/14/22  0507 06/13/22  0707 06/12/22  1211 06/11/22  1012 06/10/22  2230   SODIUM mmol/L 140 134* 134*   < > 133*   POTASSIUM mmol/L 3.5 3.5 3.9   < > 4.2   CHLORIDE mmol/L 99 96* 98   < > 98   CO2 mmol/L 28.0 28.0 23.0   < > 25.0   BUN mg/dL 29* 25* 24*   < > 27*   CREATININE mg/dL 1.14* 1.15* 1.25*   < > 1.28*   CALCIUM mg/dL 8.8 9.2 9.0   < > 9.7   BILIRUBIN mg/dL  --   --   --   --  0.5   ALK PHOS U/L  --   --   --   --  112   ALT (SGPT) U/L  --   --   --   --  10   AST (SGOT) U/L  --   --   --   --  18   GLUCOSE mg/dL 204* 247* 269*   < > 117*    < > = values in this interval not displayed.     Results from last 7 days   Lab Units 06/11/22  1012   HEMOGLOBIN A1C % 7.30*                   Medication Review:   aspirin EC, 325 mg, Oral, Daily  cefepime, 1 g, Intravenous, Q24H  citalopram, 20 mg, Oral, Daily  donepezil, 5 mg, Oral, Nightly  doxycycline, 100 mg, Oral, Q12H  fluticasone, 2 spray, Nasal, Daily  furosemide, 20 mg, Intravenous, BID  heparin (porcine), 5,000 Units, Subcutaneous, Q12H  insulin lispro, 0-7 Units, Subcutaneous, TID AC  isosorbide mononitrate, 60 mg, Oral, Daily  lidocaine, 1 patch, Transdermal, Q24H  metoprolol tartrate, 25 mg, Oral, Q12H  multivitamin, 1 tablet, Oral, Daily  sodium chloride, 10 mL, Intravenous, Q12H              ASSESSMENT/PLAN:    Essential hypertension    Coronary artery disease involving native coronary artery of native heart with angina pectoris (HCC)    Dementia (HCC)    Paroxysmal atrial fibrillation (HCC)    Benign essential hypertension    CKD (chronic kidney disease), stage III (HCC)    GERD (gastroesophageal reflux disease)    Late onset Alzheimer's disease without behavioral disturbance (HCC)    Acute respiratory failure with hypercapnia (HCC)    Multifocal pneumonia    Acute on chronic diastolic CHF (congestive heart failure) (HCC)    Acute sepsis (HCC)        Continue rate control with metoprolol tartrate 25 mg p.o. twice daily.  Continue gradual diuresis while trending renal function  Previously has been deemed to be inappropriate for chronic anticoagulation due to high risk of bleeding complications, will readdress with patient and family prior to discharge.          Ta Flores III, MD   06/14/22  13:00 EDT

## 2022-06-14 NOTE — CASE MANAGEMENT/SOCIAL WORK
Discharge Planning Assessment  [facility]     Patient Name: Mary Ramirez  MRN: [id number]  Today's Date: 6/14/2022    Admit Date: 6/10/2022     Discharge Needs Assessment     Row Name 06/14/22 0957       Living Environment    People in Home facility resident    Name(s) of People in Home [facility] for 3-4 yerars    Current Living Arrangements extended care facility    Duration at Residence Three to four years    Primary Care Provided by other (see comments)  [facility]    Provides Primary Care For no one    Family Caregiver if Needed child(josef), adult    Family Caregiver Names Josh Ramirez, son    Able to Return to Prior Arrangements yes       Transition Planning    Patient/Family Anticipates Transition to long-term care facility    Patient/Family Anticipated Services at Transition     Transportation Anticipated health plan transportation       Discharge Needs Assessment    Readmission Within the Last 30 Days no previous admission in last 30 days    Equipment Currently Used at Home wheelchair    Concerns to be Addressed discharge planning    Anticipated Changes Related to Illness none    Equipment Needed After Discharge none    Discharge Facility/Level of Care Needs nursing facility, intermediate               Discharge Plan     Row Name 06/14/22 1001       Plan    Plan Return to [facility]    Patient/Family in Agreement with Plan yes    Plan Comments Called & spoke with Ms Ramirez's son/POA, Josh, to complete IDP. Ms Ramirez [facility]. Assist with ADL's. Uses a w/c to self-propel for mobility. Doesn't ambulate. Denies HH/O2. Her son, Josh Ramirez is POA. No living will. Called and spoke with liaison, Pennie, at [facility], and she stated that Ms Ramirez will need to remain an Ax1 to return to [facility]. Cards following. Plan is to return to LTC. Will need a w/c transport. On O2 4LNC.  Will need to monitor for home O2 needs.    Final Discharge Disposition Code 30 - still a patient              Continued Care and Services - Admitted Since 6/10/2022     Destination     Service Provider Request Status Selected Services Address Phone Fax Patient Preferred    [facility]  Pending - No Request Sent N/A [id number], [facility] [id number] [phone] --              Expected Discharge Date and Time     Expected Discharge Date Expected Discharge Time    Jun 16, 2022          Demographic Summary     Row Name 06/14/22 0954       General Information    Admission Type inpatient    General Information Comments Verified PCP is Dr Hong. Primary insurance is [employer]. Has drug coverage.       Contact Information    Permission Granted to Share Info With ;power of  for healthcare    Contact Information Obtained for                Functional Status     Row Name 06/14/22 0957       Functional Status    Usual Activity Tolerance fair    Current Activity Tolerance poor       Functional Status, IADL    Medications completely dependent    Meal Preparation completely dependent    Housekeeping completely dependent    Laundry completely dependent    Shopping completely dependent               Psychosocial    No documentation.                Abuse/Neglect    No documentation.                Legal    No documentation.                Substance Abuse    No documentation.                Patient Forms    No documentation.                   Alie Ennis, JANNIE

## 2022-06-14 NOTE — PROGRESS NOTES
[facility] Medicine Services  PROGRESS NOTE    Patient Name: Mary Ramirez  : 1934  MRN: [id number]    Date of Admission: 6/10/2022  Primary Care Physician: Tiana Hong MD    Subjective   Subjective     CC:  Resp failure    HPI:  No complaints this morning, however pretty somnolent at time of exam    ROS:  Denies current shortness of breath or cough, otherwise ROS limited by somnolence        Objective   Objective     Vital Signs:   Temp:  [97.6 °F (36.4 °C)-99.9 °F (37.7 °C)] 97.6 °F (36.4 °C)  Heart Rate:  [] 90  Resp:  [16-18] 16  BP: ()/(66-91) 120/78  Flow (L/min):  [4-6] 4     Physical Exam:    Constitutional - frail, in bed  HEENT-NCAT, mucous membranes moist  CV-irregularly irregular  Resp-a few faint crackles on right  Abd-soft, nontender  Ext-No lower extremity cyanosis, clubbing or edema bilaterally  Neuro-somnolent but awakens to voice, speech clear  Psych-flat affect   Skin- No rash on exposed UE or LE bilaterally      Results Reviewed:  LAB RESULTS:      Lab 22  0507 22  1012 06/10/22  2319 06/10/22  2230   WBC 7.73 8.93 10.81*  --    HEMOGLOBIN 13.2 12.5 12.8  --    HEMATOCRIT 42.2 36.6 39.8  --    PLATELETS 270 267 278  --    NEUTROS ABS  --  6.88 8.41*  --    IMMATURE GRANS (ABS)  --  0.03 0.05  --    LYMPHS ABS  --  1.14 1.26  --    MONOS ABS  --  0.74 0.93*  --    EOS ABS  --  0.08 0.10  --    MCV 94.4 88.0 93.4  --    PROCALCITONIN 0.16  --   --  0.12   LACTATE  --   --  1.2  --    D DIMER QUANT  --   --  3.43*  --          Lab 22  0507 22  0707 22  1211 22  1012 06/10/22  2230   SODIUM 140 134* 134* 137 133*   POTASSIUM 3.5 3.5 3.9 3.8 4.2   CHLORIDE 99 96* 98 99 98   CO2 28.0 28.0 23.0 28.0 25.0   ANION GAP 13.0 10.0 13.0 10.0 10.0   BUN 29* 25* 24* 20 27*   CREATININE 1.14* 1.15* 1.25* 1.34* 1.28*   EGFR 46.7* 46.2* 41.8* 38.5* 40.6*   GLUCOSE 204* 247* 269* 127* 117*   CALCIUM 8.8 9.2 9.0  8.9 9.7   HEMOGLOBIN A1C  --   --   --  7.30*  --          Lab 06/10/22  2230   TOTAL PROTEIN 7.4   ALBUMIN 4.30   GLOBULIN 3.1   ALT (SGPT) 10   AST (SGOT) 18   BILIRUBIN 0.5   ALK PHOS 112         Lab 06/13/22  0707 06/10/22  2230   PROBNP 2,735.0* 5,037.0*   TROPONIN T  --  0.028                 Brief Urine Lab Results  (Last result in the past 365 days)      Color   Clarity   Blood   Leuk Est   Nitrite   Protein   CREAT   Urine HCG        06/11/22 0333 Yellow   Clear   Negative   Trace   Negative   Negative                 Microbiology Results Abnormal     Procedure Component Value - Date/Time    Blood Culture - Blood, Arm, Right [[id number]]  (Normal) Collected: 06/10/22 2319    Lab Status: Preliminary result Specimen: Blood from Arm, Right Updated: 06/14/22 0447     Blood Culture No growth at 3 days    Blood Culture - Blood, Hand, Left [[id number]]  (Normal) Collected: 06/10/22 2319    Lab Status: Preliminary result Specimen: Blood from Hand, Left Updated: 06/14/22 0447     Blood Culture No growth at 3 days    S. Pneumo Ag Urine or CSF - Urine, Urine, Clean Catch [[id number]]  (Normal) Collected: 06/11/22 0332    Lab Status: Final result Specimen: Urine, Clean Catch Updated: 06/11/22 1256     Strep Pneumo Ag Negative    Legionella Antigen, Urine - Urine, Urine, Clean Catch [[id number]]  (Normal) Collected: 06/11/22 0332    Lab Status: Final result Specimen: Urine, Clean Catch Updated: 06/11/22 1255     LEGIONELLA ANTIGEN, URINE Negative    MRSA Screen, PCR (Inpatient) - Swab, Nares [[id number]]  (Normal) Collected: 06/11/22 0332    Lab Status: Final result Specimen: Swab from Nares Updated: 06/11/22 0745     MRSA PCR Negative    Narrative:      The negative predictive value of this diagnostic test is high and should only be used to consider de-escalating anti-MRSA therapy. A positive result may indicate colonization with MRSA and must be correlated clinically.  MRSA Negative    COVID PRE-OP / PRE-PROCEDURE  SCREENING ORDER (NO ISOLATION) - Swab, Nasopharynx [[id number]]  (Normal) Collected: 06/10/22 2318    Lab Status: Final result Specimen: Swab from Nasopharynx Updated: 06/11/22 0017    Narrative:      The following orders were created for panel order COVID PRE-OP / PRE-PROCEDURE SCREENING ORDER (NO ISOLATION) - Swab, Nasopharynx.  Procedure                               Abnormality         Status                     ---------                               -----------         ------                     COVID-19 and FLU A/B PCR...[[id number]]  Normal              Final result                 Please view results for these tests on the individual orders.    COVID-19 and FLU A/B PCR - Swab, Nasopharynx [[id number]]  (Normal) Collected: 06/10/22 2318    Lab Status: Final result Specimen: Swab from Nasopharynx Updated: 06/11/22 0017     COVID19 Not Detected     Influenza A PCR Not Detected     Influenza B PCR Not Detected    Narrative:      Fact sheet for providers: https://www.fda.gov/media/152803/download    Fact sheet for patients: https://www.fda.gov/media/996327/download    Test performed by PCR.          NM Lung Scan Perfusion Particulate    Result Date: 6/13/2022  DATE OF EXAM: 6/13/2022 1:00 PM  PROCEDURE: NM LUNG SCAN PERFUSION PARTICULATE-  INDICATIONS: elevated D-dimer, pneumonia, CKD III; A41.9-Sepsis, unspecified organism; J18.9-Pneumonia, unspecified organism; I50.9-Heart failure, unspecified; J96.01-Acute respiratory failure with hypoxia  COMPARISON: 6/13/2022 portal chest radiograph  TECHNIQUE: 5.0 mCi of technetium 99m labeled was administered intravenously for the perfusion portion of the pulmonary exam. Scintigraphic images of the lungs were obtained in multiple projections to assess perfusion.  FINDINGS: As would be expected given the patient's extensive pulmonary parenchymal abnormalities, the overall perfusion pattern lungs is heterogeneous, and tends to follow the pattern of the patient's chest x-ray,  with generally diminished activity in the right upper lung and a portion of the right lung base, as well as attenuation of the left lung base due to elevated left hemidiaphragm. Study is considered indeterminate for pulmonary embolus, but there are no particular segmental or subsegmental defects to strongly suggest embolic disease. Perfusion abnormalities are all explainable on the basis of the patient's chest CT scan findings.      Impression: Technically indeterminate perfusion scan. However, perfusion scan abnormalities are all explainable on the basis the patient's pulmonary disease and no supporting findings are seen to suggest significant pulmonary embolic disease  .  This report was finalized on 6/13/2022 3:30 PM by Dr. Fidel Barnes MD.      XR Chest 1 View    Result Date: 6/14/2022  Examination: XR CHEST 1 VW-  Date of Exam: 6/14/2022 5:06 AM  Indication: pulmonary infiltrates; A41.9-Sepsis, unspecified organism; J18.9-Pneumonia, unspecified organism; I50.9-Heart failure, unspecified; J96.01-Acute respiratory failure with hypoxia.  Comparison: June 13, 2022  Technique: 1 view of the chest  Findings: The heart is enlarged. Again seen are multifocal infiltrates greater on the right than the left consistent with multifocal pneumonia. There is a moderate sized hiatal hernia. There are degenerative changes of the spine and shoulders.      Impression: Persistent multifocal infiltrates most pronounced in the right upper lobe favored to be pneumonia  This report was finalized on 6/14/2022 7:58 AM by Get Jameson MD.      XR Chest 1 View    Result Date: 6/13/2022  DATE OF EXAM: 6/13/2022 2:05 PM  PROCEDURE: XR CHEST 1 VW-  INDICATIONS: v/q comparison; A41.9-Sepsis, unspecified organism; J18.9-Pneumonia, unspecified organism; I50.9-Heart failure, unspecified; J96.01-Acute respiratory failure with hypoxia  COMPARISON: Karo 10, 2022  TECHNIQUE: Single radiographic AP view of the chest was obtained.  FINDINGS: The heart  looks enlarged. There is a hiatal hernia. There is bilateral airspace disease worse in the right upper lobe with some interval worsening to the left lung since prior exam. There are no large pleural effusions.      Impression: 1.  Bilateral pulmonary infiltrates which could reflect a multifocal inflammatory infectious process. 2.  Cardiomegaly. 3.  Hiatal hernia  This report was finalized on 6/13/2022 2:49 PM by Bernardino Chase MD.        Results for orders placed during the hospital encounter of 06/10/22    Adult Transthoracic Echo Complete W/ Cont if Necessary Per Protocol    Interpretation Summary  · Estimated left ventricular EF = 51% Left ventricular ejection fraction appears to be 51 - 55%. Left ventricular systolic function is normal.  · Left ventricular wall thickness is consistent with mild septal asymmetric hypertrophy.  · There is calcification of the aortic valve mainly affecting the left coronary cusp(s).  · Moderate mitral valve regurgitation is present.  · Estimated right ventricular systolic pressure from tricuspid regurgitation is normal (<35 mmHg).  · Incidental heart rate 120 bpm with diastolic inflow consistent with atrial fibrillation      I have reviewed the medications:  Scheduled Meds:aspirin EC, 325 mg, Oral, Daily  cefepime, 1 g, Intravenous, Q24H  citalopram, 20 mg, Oral, Daily  donepezil, 5 mg, Oral, Nightly  doxycycline, 100 mg, Oral, Q12H  fluticasone, 2 spray, Nasal, Daily  furosemide, 20 mg, Intravenous, BID  heparin (porcine), 5,000 Units, Subcutaneous, Q12H  insulin lispro, 0-7 Units, Subcutaneous, TID AC  isosorbide mononitrate, 60 mg, Oral, Daily  lidocaine, 1 patch, Transdermal, Q24H  metoprolol tartrate, 25 mg, Oral, Q12H  multivitamin, 1 tablet, Oral, Daily  sodium chloride, 10 mL, Intravenous, Q12H      Continuous Infusions:   PRN Meds:.•  acetaminophen **OR** acetaminophen **OR** acetaminophen  •  dextrose  •  dextrose  •  docusate sodium  •  glucagon (human recombinant)  •   ipratropium-albuterol  •  sodium chloride    Assessment & Plan   Assessment & Plan     Active Hospital Problems    Diagnosis  POA   • Acute respiratory failure with hypercapnia ([facility]) [J96.02]  Yes   • Multifocal pneumonia [J18.9]  Yes   • Acute on chronic diastolic CHF (congestive heart failure) ([facility]) [I50.33]  Unknown   • Acute sepsis ([facility]) [A41.9]  Yes   • Late onset Alzheimer's disease without behavioral disturbance ([facility]) [G30.1, F02.80]  Yes   • GERD (gastroesophageal reflux disease) [K21.9]  Yes   • Benign essential hypertension [I10]  Yes   • CKD (chronic kidney disease), stage III ([facility]) [N18.30]  Yes   • Paroxysmal atrial fibrillation ([facility]) [I48.0]  Yes   • Dementia ([facility]) [F03.90]  Yes   • 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        87 year old female presents to the ED due to worsening shortness of air.  Was diagnosed with pneumonia several days ago and has been on a Zpak. Febrile on presentation to 100.8.  Mild leukocytosis of 10.  Elevated D-dimer at 3.4. CT chest without contrast shows increased multifocal, ill-defined groundglass and streaky alveolar densities bilaterally concerning for pulmonary edema or pneumonia.  Unchanged around opacity in the right lung base.  An underlying parenchymal mass lesion is not excluded.       Acute hypoxic respiratory failure  - secondary to multifocal pneumonia and acute on chronic diastolic heart failure  -Required high flow nasal cannula during admission, now improved to 4 L nasal cannula  -D-dimer elevated 3.43.  VQ scan pending  -Lower extremity venous duplex negative for DVT; however technically difficult    Multifocal pneumonia  - MRSA nares negative, legionella and strep pneumo urinary antigens negative  - cefepime/doxy  - CXR today with continued dense infiltrate RUL.  VQ  indeterminate, but no defect to suggest PE when compared with known infiltrates.     Acute on chronic HFpEF  -EF 51% by echo 5/11/22  -proBNP 5,037 at admission, 2,735 today   -Continue IV Lasix 20 mg twice daily   -I/O last 3 completed shifts:  In: 420 [P.O.:120; IV Piggyback:300]  Out: 1600 [Urine:1600]  No intake/output data recorded.     Paroxysmal atrial fibrillation   -HR improved  -Continue amiodarone, metoprolol 25 mg PO BID added for rate control  -previously on eliquis but stopped due to multiple falls      CKD III   -baseline creatinine 1.1-1.4, creatinine at baseline at 1.14 today  -hold nephrotoxic agents   -BMP in a.m.     Essential hypertension   -Holding amlodipine and clonidine due to low normal blood pressures     Dementia  -on aricept   -fall precautions      Diabetes mellitus type 2  -Continue sliding scale insulin  -fingersticks achs   - a1c 7.3      DVT prophylaxis: heparin  Medical DVT prophylaxis orders are present.             Disposition: I expect the patient to be discharged pending improved respiratory status.    CODE STATUS:   Code Status and Medical Interventions:   Ordered at: 06/11/22 0138     Medical Intervention Limits:    NO intubation (DNI)     Level Of Support Discussed With:    Patient    Health Care Surrogate     Code Status (Patient has no pulse and is not breathing):    No CPR (Do Not Attempt to Resuscitate)     Medical Interventions (Patient has pulse or is breathing):    Limited Support         Emmanuel Mehta MD  06/14/22

## 2022-06-14 NOTE — THERAPY EVALUATION
Patient Name: Mary Ramirez  : 1934    MRN: [id number]                              Today's Date: 2022       Admit Date: 6/10/2022    Visit Dx:     ICD-10-CM ICD-9-CM   1. Acute sepsis (Former[facility])  A41.9 038.9     995.91   2. Pneumonia due to infectious organism, unspecified laterality, unspecified part of lung  J18.9 486   3. Acute congestive heart failure, unspecified heart failure type (Formerly [facility])  I50.9 428.0   4. 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 pectoris (Formerly [facility])   • Dementia (Formerly [facility])   • Paroxysmal atrial fibrillation ([facility]ly [facility])   • Anticoagulated   • SSS (sick sinus syndrome) (Formerly [facility])   • Chronic diastolic congestive heart failure ([facility]ly [facility])   • DEANGELO (acute kidney injury) (Formerly [facility])   • Benign essential hypertension   • CKD (chronic kidney disease), stage III ([facility])   • Mixed hyperlipidemia   • Major depressive disorder, single episode, mild ([facility])   • Chronic allergic rhinitis   • Memory loss   • Bradycardia   • At risk for falls   • Closed fracture of femur ([facility])   • Coronary arteriosclerosis after percutaneous transluminal coronary angioplasty (PTCA)   • Diabetic polyneuropathy ([facility])   • Diverticular disease of colon   • Foot pain   • GERD (gastroesophageal reflux disease)   • Peripheral artery insufficiency ([facility])   • Annual physical exam   • Vitamin D deficiency   • Renal disorder   • Closed fracture of right hip ([facility])   • Postoperative anemia due to acute blood loss   • Late onset Alzheimer's disease without behavioral disturbance ([facility])   • Altered mental status, unspecified altered mental status type   • Acute respiratory failure with hypercapnia ([facility])   • Multifocal pneumonia   • Acute on chronic diastolic CHF (congestive heart failure) ([facility])   • Acute sepsis ([facility])     Past Medical  History:   Diagnosis Date   • Atrial fibrillation (HCC)    • Benign essential hypertension    • CAD (coronary artery disease)    • Carotid artery disease (HCC)    • CHF (congestive heart failure) (HCC)    • Chronic allergic rhinitis    • CKD (chronic kidney disease), stage III (HCC)    • DDD (degenerative disc disease), cervical    • Dementia (HCC)    • Diabetes mellitus (HCC)    • Diverticulosis     WITH PERFORATION    • Hypertension    • Major depressive disorder, single episode, mild (HCC)    • Memory loss    • Mixed hyperlipidemia    • SSS (sick sinus syndrome) ([facility])    • TIA (transient ischemic attack) 01/2016     Past Surgical History:   Procedure Laterality Date   • ANKLE SURGERY Left 1996   • ATHERECTOMY  1995   • ATHRECTOMY ILIAC, FEMORAL, TIBIAL ARTERY     • BREAST LUMPECTOMY Left 1981   • COLON SURGERY     • COLOSTOMY  1983   • CORONARY STENT PLACEMENT  2001   • FINGER FRACTURE SURGERY Right    • HIP TROCHANTERIC NAILING WITH INTRAMEDULLARY HIP SCREW Left 11/23/2017   • HIP TROCHANTERIC NAILING WITH INTRAMEDULLARY HIP SCREW Right 3/30/2019    Procedure: HIP TROCANTERIC NAILING WITH INTRAMEDULLARY HIP SCREW RIGHT;  Surgeon: Theo Figueroa MD;  Location: [facility];  Service: Orthopedics   • HYSTERECTOMY  1984   • OTHER SURGICAL HISTORY  1984    REANASTAMOSIS       General Information     Row Name 06/14/22 3231          Physical Therapy Time and Intention    Document Type evaluation  -     Mode of Treatment physical therapy  -     Row Name 06/14/22 7300          General Information    Patient Profile Reviewed yes  -     Prior Level of Function --  Pt poor historian. Need to clarify. [place] requires to be A x 1 so assume some assist with all mobility.  -     Existing Precautions/Restrictions fall;oxygen therapy device and L/min  -     Barriers to Rehab cognitive status  -     Row Name 06/14/22 6755          Living Environment    People in Home facility resident  -     Row Name 06/14/22  1339          Home Main Entrance    Number of Stairs, Main Entrance none  -[facility] Name 06/14/22 1339          Stairs Within Home, Primary    Number of Stairs, Within Home, Primary none  -JH     Row Name 06/14/22 1339          Cognition    Orientation Status (Cognition) oriented to;person;disoriented to;place;situation;time  -JH     Row Name 06/14/22 1339          Safety Issues, Functional Mobility    Safety Issues Affecting Function (Mobility) ability to follow commands;awareness of need for assistance;insight into deficits/self-awareness;problem-solving;safety precaution awareness;safety precautions follow-through/compliance;sequencing abilities  -     Impairments Affecting Function (Mobility) balance;endurance/activity tolerance;strength  -           User Key  (r) = Recorded By, (t) = Taken By, (c) = Cosigned By    Initials Name Provider Type     Abelardo Cardenas, PT Physical Therapist               Mobility     [facility] Name 06/14/22 1343          Bed Mobility    Bed Mobility supine-sit  -     Supine-Sit [place] (Bed Mobility) moderate assist (50% patient effort);verbal cues;nonverbal cues (demo/gesture)  -     Assistive Device (Bed Mobility) head of bed elevated;draw sheet  -     Comment, (Bed Mobility) Required asssit at posterior trunk and BLE  -JH     Row Name 06/14/22 1343          Transfers    Comment, (Transfers) Pt required max A x 1 for stand pivot to chair with BUE support and blocking of BLE  -JH     Row Name 06/14/22 1343          Bed-Chair Transfer    Bed-Chair [place] (Transfers) maximum assist (25% patient effort);1 person assist  -     Assistive Device (Bed-Chair Transfers) other (see comments)  BUE support  -JH     Row Name 06/14/22 1343          Sit-Stand Transfer    Sit-Stand [place] (Transfers) maximum assist (25% patient effort);1 person to manage equipment  -JH     Row Name 06/14/22 1343          Gait/Stairs (Locomotion)    [place] Level (Gait) not tested  -            User Key  (r) = Recorded By, (t) = Taken By, (c) = Cosigned By    Initials Name Provider Type     Abelardo Cardenas, PT Physical Therapist               Obj/Interventions     [facility] Name 06/14/22 1345          Range of Motion Comprehensive    General Range of Motion bilateral lower extremity ROM WFL  -JH     Row Name 06/14/22 1345          Strength Comprehensive (MMT)    General Manual Muscle Testing (MMT) Assessment lower extremity strength deficits identified  -     Comment, General Manual Muscle Testing (MMT) Assessment BLE grossly 4/5  -[facility] Name 06/14/22 1345          Balance    Balance Assessment sitting static balance;sitting dynamic balance;sit to stand dynamic balance;standing static balance;standing dynamic balance  -     Static Sitting Balance supervision  -     Dynamic Sitting Balance supervision  -     Position, Sitting Balance unsupported;sitting edge of bed  -     Sit to Stand Dynamic Balance maximum assist;1-person assist  -     Static Standing Balance maximum assist;1-person assist  -     Dynamic Standing Balance maximum assist;1-person assist  -JH     Row Name 06/14/22 1345          Sensory Assessment (Somatosensory)    Sensory Assessment (Somatosensory) LE sensation intact  -           User Key  (r) = Recorded By, (t) = Taken By, (c) = Cosigned By    Initials Name Provider Type     Abelardo Cardenas, PT Physical Therapist               Goals/Plan     Row Name 06/14/22 1351          Bed Mobility Goal 1 (PT)    Activity/Assistive Device (Bed Mobility Goal 1, PT) supine to sit;sit to supine  -     [place] Level/Cues Needed (Bed Mobility Goal 1, PT) minimum assist (75% or more patient effort)  -     Time Frame (Bed Mobility Goal 1, PT) long term goal (LTG);1 week  -[facility] Name 06/14/22 1351          Transfer Goal 1 (PT)    Activity/Assistive Device (Transfer Goal 1, PT) sit-to-stand/stand-to-sit;bed-to-chair/chair-to-bed  -     [place] Level/Cues Needed  (Transfer Goal 1, PT) moderate assist (50-74% patient effort)  -     Time Frame (Transfer Goal 1, PT) long term goal (LTG);1 week  -[facility] Name 06/14/22 2215          Problem Specific Goal 1 (PT)    Problem Specific Goal 1 (PT) Pt will be able to self propel wheelchair 20 feet with min A for steering  -     Time Frame (Problem Specific Goal 1, PT) long-term goal (LTG);1 week  -JH     Row Name 06/14/22 0369          Therapy Assessment/Plan (PT)    Planned Therapy Interventions (PT) balance training;bed mobility training;gait training;home exercise program;postural re-education;transfer training;strengthening;wheelchair management/propulsion training;patient/family education  -           User Key  (r) = Recorded By, (t) = Taken By, (c) = Cosigned By    Initials Name Provider Type     Abelardo Cardenas, PT Physical Therapist               Clinical Impression     Row Name 06/14/22 1261          Pain    Pretreatment Pain Rating 0/10 - no pain  -     Posttreatment Pain Rating 0/10 - no pain  -     Pain Intervention(s) Repositioned  -JH     Row Name 06/14/22 4501          Plan of Care Review    Plan of Care Reviewed With patient  -     Progress no change  PT eval  -     Outcome Evaluation PT evaluation complete. Patient presents with declined functional mobility warranting IPPT to promote return to previous level of function and facilitate independence with mobility. Pt alert to self only and able to follow commands. Pt required mod A x 1 for bed mobility and max A x 1 for transfers. Recommend d/c return to [facility].  -[facility] 06/14/22 3156          Therapy Assessment/Plan (PT)    Patient/Family Therapy Goals Statement (PT) pt did not state  -     Rehab Potential (PT) good, to achieve stated therapy goals  -     Criteria for Skilled Interventions Met (PT) yes;meets criteria;skilled treatment is necessary  -     Therapy Frequency (PT) daily  -[facility] Name 06/14/22 1507           Vital Signs    Pre Systolic BP Rehab 120  -     Pre Treatment Diastolic BP 78  -     Pretreatment Heart Rate (beats/min) 104  -     Posttreatment Heart Rate (beats/min) 97  -     Pre SpO2 (%) 95  -     O2 Delivery Pre Treatment nasal cannula  -     O2 Delivery Intra Treatment nasal cannula  -     Post SpO2 (%) 97  4L  -     O2 Delivery Post Treatment nasal cannula  -     Pre Patient Position Supine  -     Intra Patient Position Standing  -     Post Patient Position Sitting  -     Row Name 06/14/22 1347          Positioning and Restraints    Pre-Treatment Position in bed  -     Post Treatment Position chair  -     In Chair notified nsg;reclined;call light within reach;encouraged to call for assist;exit alarm on;legs elevated;waffle cushion  -           User Key  (r) = Recorded By, (t) = Taken By, (c) = Cosigned By    Initials Name Provider Type    Abelardo Villalta PT Physical Therapist               Outcome Measures     Row Name 06/14/22 1350          How much help from another person do you currently need...    Turning from your back to your side while in flat bed without using bedrails? 3  -JH     Moving from lying on back to sitting on the side of a flat bed without bedrails? 2  -JH     Moving to and from a bed to a chair (including a wheelchair)? 2  -JH     Standing up from a chair using your arms (e.g., wheelchair, bedside chair)? 2  -JH     Climbing 3-5 steps with a railing? 1  -JH     To walk in hospital room? 1  -     AM-PAC 6 Clicks Score (PT) 11  -     Highest level of mobility 4 --> Transferred to chair/commode  -     Row Name 06/14/22 1350          Functional Assessment    Outcome Measure Options AM-PAC 6 Clicks Basic Mobility (PT)  -           User Key  (r) = Recorded By, (t) = Taken By, (c) = Cosigned By    Initials Name Provider Type    Abelardo Villalta PT Physical Therapist                             Physical Therapy Education                 Title: PT OT SLP  Therapies (In Progress)     Topic: Physical Therapy (In Progress)     Point: Mobility training (In Progress)     Learning Progress Summary           Patient Acceptance, E,TB, NR by  at 6/14/2022 1353    Comment: Education to patient on PT services, POC, discharge recommendations, safety with mobility.                   Point: Home exercise program (In Progress)     Learning Progress Summary           Patient Acceptance, E,TB, NR by  at 6/14/2022 1353    Comment: Education to patient on PT services, POC, discharge recommendations, safety with mobility.                   Point: Body mechanics (In Progress)     Learning Progress Summary           Patient Acceptance, E,TB, NR by  at 6/14/2022 1353    Comment: Education to patient on PT services, POC, discharge recommendations, safety with mobility.                   Point: Precautions (In Progress)     Learning Progress Summary           Patient Acceptance, E,TB, NR by  at 6/14/2022 1353    Comment: Education to patient on PT services, POC, discharge recommendations, safety with mobility.                               User Key     Initials Effective Dates Name Provider Type Discipline     09/22/20 -  Abelardo Cardenas, PT Physical Therapist PT              PT Recommendation and Plan  Planned Therapy Interventions (PT): balance training, bed mobility training, gait training, home exercise program, postural re-education, transfer training, strengthening, wheelchair management/propulsion training, patient/family education  Plan of Care Reviewed With: patient  Progress: no change (PT eval)  Outcome Evaluation: PT evaluation complete. Patient presents with declined functional mobility warranting IPPT to promote return to previous level of function and facilitate independence with mobility. Pt alert to self only and able to follow commands. Pt required mod A x 1 for bed mobility and max A x 1 for transfers. Recommend d/c return to [facility].     Time  Calculation:    PT Charges     Row Name 06/14/22 1354             Time Calculation    Start Time 1305  -JH      PT Received On 06/14/22  -      PT Goal Re-Cert Due Date 06/24/22  -              Untimed Charges    PT Eval/Re-eval Minutes 50  -JH              Total Minutes    Untimed Charges Total Minutes 50  -JH       Total Minutes 50  -JH            User Key  (r) = Recorded By, (t) = Taken By, (c) = Cosigned By    Initials Name Provider Type    Abelardo Villalta, PT Physical Therapist              Therapy Charges for Today     Code Description Service Date Service Provider Modifiers Qty    22345473571 HC PT EVAL LOW COMPLEXITY 4 6/14/2022 Abelardo Cardenas, PT GP 1    37893577109 HC PT THER SUPP EA 15 MIN 6/14/2022 Abelardo Cardenas, PT GP 2          PT G-Codes  Outcome Measure Options: AM-PAC 6 Clicks Basic Mobility (PT)  AM-PAC 6 Clicks Score (PT): 11    Abelardo Cardenas PT  6/14/2022

## 2022-06-14 NOTE — PLAN OF CARE
Patient oriented to self only. VSS. O2 at 4L NC. Incontinent bowel and Bladder. Adequate UOP via [employer]. She slept well tonight between care rounds.

## 2022-06-15 ENCOUNTER — APPOINTMENT (OUTPATIENT)
Dept: GENERAL RADIOLOGY | Facility: HOSPITAL | Age: 87
End: 2022-06-15

## 2022-06-15 PROBLEM — J96.01 ACUTE RESPIRATORY FAILURE WITH HYPOXIA (HCC): Status: ACTIVE | Noted: 2022-06-11

## 2022-06-15 LAB
ANION GAP SERPL CALCULATED.3IONS-SCNC: 11 MMOL/L (ref 5–15)
BASOPHILS # BLD AUTO: 0.05 10*3/MM3 (ref 0–0.2)
BASOPHILS NFR BLD AUTO: 0.6 % (ref 0–1.5)
BUN SERPL-MCNC: 31 MG/DL (ref 8–23)
BUN/CREAT SERPL: 25 (ref 7–25)
CALCIUM SPEC-SCNC: 9.1 MG/DL (ref 8.6–10.5)
CHLORIDE SERPL-SCNC: 93 MMOL/L (ref 98–107)
CO2 SERPL-SCNC: 29 MMOL/L (ref 22–29)
CREAT SERPL-MCNC: 1.24 MG/DL (ref 0.57–1)
DEPRECATED RDW RBC AUTO: 48.1 FL (ref 37–54)
EGFRCR SERPLBLD CKD-EPI 2021: 42.2 ML/MIN/1.73
EOSINOPHIL # BLD AUTO: 0.12 10*3/MM3 (ref 0–0.4)
EOSINOPHIL NFR BLD AUTO: 1.3 % (ref 0.3–6.2)
ERYTHROCYTE [DISTWIDTH] IN BLOOD BY AUTOMATED COUNT: 14.1 % (ref 12.3–15.4)
GLUCOSE BLDC GLUCOMTR-MCNC: 175 MG/DL (ref 70–130)
GLUCOSE BLDC GLUCOMTR-MCNC: 230 MG/DL (ref 70–130)
GLUCOSE BLDC GLUCOMTR-MCNC: 376 MG/DL (ref 70–130)
GLUCOSE SERPL-MCNC: 385 MG/DL (ref 65–99)
HCT VFR BLD AUTO: 39.9 % (ref 34–46.6)
HGB BLD-MCNC: 12.8 G/DL (ref 12–15.9)
IMM GRANULOCYTES # BLD AUTO: 0.04 10*3/MM3 (ref 0–0.05)
IMM GRANULOCYTES NFR BLD AUTO: 0.4 % (ref 0–0.5)
LYMPHOCYTES # BLD AUTO: 0.72 10*3/MM3 (ref 0.7–3.1)
LYMPHOCYTES NFR BLD AUTO: 8 % (ref 19.6–45.3)
MCH RBC QN AUTO: 29.7 PG (ref 26.6–33)
MCHC RBC AUTO-ENTMCNC: 32.1 G/DL (ref 31.5–35.7)
MCV RBC AUTO: 92.6 FL (ref 79–97)
MONOCYTES # BLD AUTO: 0.9 10*3/MM3 (ref 0.1–0.9)
MONOCYTES NFR BLD AUTO: 10 % (ref 5–12)
NEUTROPHILS NFR BLD AUTO: 7.13 10*3/MM3 (ref 1.7–7)
NEUTROPHILS NFR BLD AUTO: 79.7 % (ref 42.7–76)
NRBC BLD AUTO-RTO: 0 /100 WBC (ref 0–0.2)
PLATELET # BLD AUTO: 315 10*3/MM3 (ref 140–450)
PMV BLD AUTO: 12.3 FL (ref 6–12)
POTASSIUM SERPL-SCNC: 3.8 MMOL/L (ref 3.5–5.2)
RBC # BLD AUTO: 4.31 10*6/MM3 (ref 3.77–5.28)
SODIUM SERPL-SCNC: 133 MMOL/L (ref 136–145)
WBC NRBC COR # BLD: 8.96 10*3/MM3 (ref 3.4–10.8)

## 2022-06-15 PROCEDURE — 97165 OT EVAL LOW COMPLEX 30 MIN: CPT | Performed by: OCCUPATIONAL THERAPIST

## 2022-06-15 PROCEDURE — 85025 COMPLETE CBC W/AUTO DIFF WBC: CPT | Performed by: INTERNAL MEDICINE

## 2022-06-15 PROCEDURE — 80048 BASIC METABOLIC PNL TOTAL CA: CPT | Performed by: INTERNAL MEDICINE

## 2022-06-15 PROCEDURE — 99232 SBSQ HOSP IP/OBS MODERATE 35: CPT | Performed by: INTERNAL MEDICINE

## 2022-06-15 PROCEDURE — 99222 1ST HOSP IP/OBS MODERATE 55: CPT | Performed by: INTERNAL MEDICINE

## 2022-06-15 PROCEDURE — 25010000002 FUROSEMIDE PER 20 MG: Performed by: INTERNAL MEDICINE

## 2022-06-15 PROCEDURE — 71045 X-RAY EXAM CHEST 1 VIEW: CPT

## 2022-06-15 PROCEDURE — 97535 SELF CARE MNGMENT TRAINING: CPT | Performed by: OCCUPATIONAL THERAPIST

## 2022-06-15 PROCEDURE — 63710000001 INSULIN LISPRO (HUMAN) PER 5 UNITS: Performed by: NURSE PRACTITIONER

## 2022-06-15 PROCEDURE — 25010000002 CEFEPIME PER 500 MG: Performed by: INTERNAL MEDICINE

## 2022-06-15 PROCEDURE — 25010000002 HEPARIN (PORCINE) PER 1000 UNITS: Performed by: NURSE PRACTITIONER

## 2022-06-15 PROCEDURE — 82962 GLUCOSE BLOOD TEST: CPT

## 2022-06-15 RX ORDER — METOPROLOL TARTRATE 50 MG/1
50 TABLET, FILM COATED ORAL EVERY 12 HOURS SCHEDULED
Status: DISCONTINUED | OUTPATIENT
Start: 2022-06-15 | End: 2022-06-16

## 2022-06-15 RX ADMIN — CITALOPRAM HYDROBROMIDE 20 MG: 20 TABLET ORAL at 08:30

## 2022-06-15 RX ADMIN — INSULIN LISPRO 3 UNITS: 100 INJECTION, SOLUTION INTRAVENOUS; SUBCUTANEOUS at 08:30

## 2022-06-15 RX ADMIN — Medication 10 ML: at 20:21

## 2022-06-15 RX ADMIN — METOPROLOL TARTRATE 50 MG: 50 TABLET, FILM COATED ORAL at 08:30

## 2022-06-15 RX ADMIN — METOPROLOL TARTRATE 50 MG: 50 TABLET, FILM COATED ORAL at 20:20

## 2022-06-15 RX ADMIN — FLUTICASONE PROPIONATE 2 SPRAY: 50 SPRAY, METERED NASAL at 08:31

## 2022-06-15 RX ADMIN — Medication 10 ML: at 08:31

## 2022-06-15 RX ADMIN — DOXYCYCLINE 100 MG: 100 CAPSULE ORAL at 20:20

## 2022-06-15 RX ADMIN — ISOSORBIDE MONONITRATE 60 MG: 60 TABLET, EXTENDED RELEASE ORAL at 08:28

## 2022-06-15 RX ADMIN — ASPIRIN 325 MG: 325 TABLET, COATED ORAL at 08:28

## 2022-06-15 RX ADMIN — MULTIVITAMIN TABLET 1 TABLET: TABLET at 08:28

## 2022-06-15 RX ADMIN — DONEPEZIL HYDROCHLORIDE 5 MG: 5 TABLET ORAL at 20:20

## 2022-06-15 RX ADMIN — LIDOCAINE 1 PATCH: 50 PATCH CUTANEOUS at 08:30

## 2022-06-15 RX ADMIN — FUROSEMIDE 20 MG: 10 INJECTION INTRAMUSCULAR; INTRAVENOUS at 08:28

## 2022-06-15 RX ADMIN — HEPARIN SODIUM 5000 UNITS: 5000 INJECTION INTRAVENOUS; SUBCUTANEOUS at 08:28

## 2022-06-15 RX ADMIN — HEPARIN SODIUM 5000 UNITS: 5000 INJECTION INTRAVENOUS; SUBCUTANEOUS at 20:20

## 2022-06-15 RX ADMIN — CEFEPIME HYDROCHLORIDE 1 G: 1 INJECTION, POWDER, FOR SOLUTION INTRAMUSCULAR; INTRAVENOUS at 11:13

## 2022-06-15 RX ADMIN — INSULIN LISPRO 2 UNITS: 100 INJECTION, SOLUTION INTRAVENOUS; SUBCUTANEOUS at 16:43

## 2022-06-15 RX ADMIN — DOXYCYCLINE 100 MG: 100 CAPSULE ORAL at 08:28

## 2022-06-15 RX ADMIN — INSULIN LISPRO 6 UNITS: 100 INJECTION, SOLUTION INTRAVENOUS; SUBCUTANEOUS at 13:35

## 2022-06-15 NOTE — THERAPY EVALUATION
Patient Name: Mary Ramirez  : 1934    MRN: [id number]                              Today's Date: 6/15/2022       Admit Date: 6/10/2022    Visit Dx:     ICD-10-CM ICD-9-CM   1. Acute sepsis ([facility])  A41.9 038.9     995.91   2. Pneumonia due to infectious organism, unspecified laterality, unspecified part of lung  J18.9 486   3. Acute congestive heart failure, unspecified heart failure type ([facility])  I50.9 428.0   4. Acute respiratory failure with hypoxia ([facility])  J96.01 518.81     Patient Active Problem List   Diagnosis   • Fall   • Fracture, intertrochanteric, left femur ([facility])   • Essential hypertension   • Coronary artery disease involving native coronary artery of native heart with angina pectoris ([facility])   • Dementia ([facility])   • Paroxysmal atrial fibrillation ([facility])   • Anticoagulated   • SSS (sick sinus syndrome) ([facility])   • Chronic diastolic congestive heart failure ([facility])   • DEANGELO (acute kidney injury) ([facility])   • Benign essential hypertension   • CKD (chronic kidney disease), stage III ([facility])   • Mixed hyperlipidemia   • Major depressive disorder, single episode, mild ([facility])   • Chronic allergic rhinitis   • Memory loss   • Bradycardia   • At risk for falls   • Closed fracture of femur ([facility])   • Coronary arteriosclerosis after percutaneous transluminal coronary angioplasty (PTCA)   • Diabetic polyneuropathy ([facility])   • Diverticular disease of colon   • Foot pain   • GERD (gastroesophageal reflux disease)   • Peripheral artery insufficiency ([facility])   • Annual physical exam   • Vitamin D deficiency   • Renal disorder   • Closed fracture of right hip ([facility])   • Postoperative anemia due to acute blood loss   • Late onset Alzheimer's disease without behavioral disturbance ([facility])   • Altered mental status, unspecified altered mental status type   • Acute respiratory failure with hypercapnia ([facility])   • Multifocal pneumonia   • Acute on chronic diastolic CHF (congestive heart failure) ([facility])   • Acute sepsis ([facility])     Past Medical  History:   Diagnosis Date   • Atrial fibrillation (HCC)    • Benign essential hypertension    • CAD (coronary artery disease)    • Carotid artery disease (HCC)    • CHF (congestive heart failure) (HCC)    • Chronic allergic rhinitis    • CKD (chronic kidney disease), stage III (HCC)    • DDD (degenerative disc disease), cervical    • Dementia (HCC)    • Diabetes mellitus (HCC)    • Diverticulosis     WITH PERFORATION    • Hypertension    • Major depressive disorder, single episode, mild (HCC)    • Memory loss    • Mixed hyperlipidemia    • SSS (sick sinus syndrome) ([facility])    • TIA (transient ischemic attack) 01/2016     Past Surgical History:   Procedure Laterality Date   • ANKLE SURGERY Left 1996   • ATHERECTOMY  1995   • ATHRECTOMY ILIAC, FEMORAL, TIBIAL ARTERY     • BREAST LUMPECTOMY Left 1981   • COLON SURGERY     • COLOSTOMY  1983   • CORONARY STENT PLACEMENT  2001   • FINGER FRACTURE SURGERY Right    • HIP TROCHANTERIC NAILING WITH INTRAMEDULLARY HIP SCREW Left 11/23/2017   • HIP TROCHANTERIC NAILING WITH INTRAMEDULLARY HIP SCREW Right 3/30/2019    Procedure: HIP TROCANTERIC NAILING WITH INTRAMEDULLARY HIP SCREW RIGHT;  Surgeon: Theo Figueroa MD;  Location: [facility];  Service: Orthopedics   • HYSTERECTOMY  1984   • OTHER SURGICAL HISTORY  1984    REANASTAMOSIS       General Information     Row Name 06/15/22 0857          OT Time and Intention    Document Type evaluation  -AR     Mode of Treatment individual therapy;occupational therapy  -AR     Row Name 06/15/22 0857          General Information    Patient Profile Reviewed yes  -AR     Prior Level of Function --  unable to obtain d/t dementia, pt reports she uses a walker  -AR     Existing Precautions/Restrictions fall;oxygen therapy device and L/min;other (see comments)  dementia  -AR     Barriers to Rehab cognitive status;medically complex  -AR     Row Name 06/15/22 0857          Living Environment    People in Home facility resident  -AR     Row Name  06/15/22 0857          Home Main Entrance    Number of Stairs, Main Entrance none  -AR     Row Name 06/15/22 0857          Cognition    Orientation Status (Cognition) oriented to;person;disoriented to;place;situation;time  -AR     Row Name 06/15/22 0857          Safety Issues, Functional Mobility    Safety Issues Affecting Function (Mobility) awareness of need for assistance;insight into deficits/self-awareness;judgment;positioning of assistive device;problem-solving;safety precaution awareness;safety precautions follow-through/compliance;sequencing abilities  -AR     Impairments Affecting Function (Mobility) balance;endurance/activity tolerance;strength;cognition;postural/trunk control  -AR     Cognitive Impairments, Mobility Safety/Performance attention;awareness, need for assistance;insight into deficits/self-awareness;judgment;problem-solving/reasoning;safety precaution awareness;safety precaution follow-through;sequencing abilities  -AR           User Key  (r) = Recorded By, (t) = Taken By, (c) = Cosigned By    Initials Name Provider Type    AR Farzana Briscoe OT Occupational Therapist                 Mobility/ADL's     Row Name 06/15/22 0859          Bed Mobility    Bed Mobility supine-sit;scooting/bridging  -AR     Scooting/Bridging [place] (Bed Mobility) moderate assist (50% patient effort);verbal cues  -AR     Supine-Sit [place] (Bed Mobility) moderate assist (50% patient effort);verbal cues  -AR     Row Name 06/15/22 0859          Transfers    Transfers sit-stand transfer;stand-sit transfer;toilet transfer  -AR     Comment, (Transfers) Pt transitioned from EOB to BSC, then BSC to chair. She required intermittent cues for hand placement and she required assist to manipulate walker.  -AR     Bed-Chair [place] (Transfers) minimum assist (75% patient effort);verbal cues  -AR     Assistive Device (Bed-Chair Transfers) walker, front-wheeled  -AR     Sit-Stand [place] (Transfers) minimum  assist (75% patient effort);verbal cues  -AR     Stand-Sit [place] (Transfers) minimum assist (75% patient effort);verbal cues  -AR     [place] Level (Toilet Transfer) minimum assist (75% patient effort);verbal cues  -AR     Assistive Device (Toilet Transfer) commode, bedside without drop arms;walker, front-wheeled  -AR     Row Name 06/15/22 0859          Sit-Stand Transfer    Assistive Device (Sit-Stand Transfers) walker, front-wheeled  -AR     Row Name 06/15/22 0859          Stand-Sit Transfer    Assistive Device (Stand-Sit Transfers) walker, front-wheeled  -AR     Row Name 06/15/22 0859          Toilet Transfer    Type (Toilet Transfer) stand-sit;sit-stand  -AR     Row Name 06/15/22 0859          Activities of Daily Living    BADL Assessment/Intervention lower body dressing;grooming;feeding;toileting  -AR     Row Name 06/15/22 0859          Lower Body Dressing Assessment/Training    [place] Level (Lower Body Dressing) don;socks;contact guard assist  -AR     Position (Lower Body Dressing) edge of bed sitting  -AR     Row Name 06/15/22 0859          Grooming Assessment/Training    [place] Level (Grooming) wash face, hands;minimum assist (75% patient effort);verbal cues  -AR     Position (Grooming) supported sitting  -AR     Comment, (Grooming) Cues to initiate task  -AR     Row Name 06/15/22 0859          Self-Feeding Assessment/Training    [place] Level (Feeding) supervision;set up  -AR     Position (Self-Feeding) supine  -AR     Row Name 06/15/22 0859          Toileting Assessment/Training    [place] Level (Toileting) perform perineal hygiene;adjust/manage clothing;maximum assist (25% patient effort)  -AR     Assistive Devices (Toileting) commode, bedside without drop arms  -AR     Position (Toileting) supported standing  -AR           User Key  (r) = Recorded By, (t) = Taken By, (c) = Cosigned By    Initials Name Provider Type    Farzana Dale, OT Occupational Therapist                Obj/Interventions     Row Name 06/15/22 0902          Range of Motion Comprehensive    General Range of Motion bilateral upper extremity ROM WFL  -AR     Comment, General Range of Motion Crepitus noted L shoulder  -AR     Row Name 06/15/22 0902          Strength Comprehensive (MMT)    Comment, General Manual Muscle Testing (MMT) Assessment BUE 4/5  -AR     Row Name 06/15/22 0902          Balance    Balance Assessment sitting static balance;sitting dynamic balance;standing static balance;standing dynamic balance  -AR     Static Sitting Balance supervision  -AR     Dynamic Sitting Balance contact guard  -AR     Position, Sitting Balance sitting edge of bed  -AR     Static Standing Balance minimal assist  -AR     Dynamic Standing Balance minimal assist  -AR     Position/Device Used, Standing Balance walker, front-wheeled  -AR           User Key  (r) = Recorded By, (t) = Taken By, (c) = Cosigned By    Initials Name Provider Type    Farzana Dale, OT Occupational Therapist               Goals/Plan     Row Name 06/15/22 0914          Transfer Goal 1 (OT)    Activity/Assistive Device (Transfer Goal 1, OT) sit-to-stand/stand-to-sit;commode, bedside without drop arms;walker, rolling  -AR     [place] Level/Cues Needed (Transfer Goal 1, OT) contact guard required;verbal cues required  -AR     Time Frame (Transfer Goal 1, OT) long term goal (LTG);by discharge  -AR     Progress/Outcome (Transfer Goal 1, OT) goal ongoing  -AR     Row Name 06/15/22 0914          Dressing Goal 1 (OT)    Activity/Device (Dressing Goal 1, OT) lower body dressing  -AR     [place]/Cues Needed (Dressing Goal 1, OT) supervision required;verbal cues required  -AR     Time Frame (Dressing Goal 1, OT) long term goal (LTG);by discharge  -AR     Progress/Outcome (Dressing Goal 1, OT) goal ongoing  -AR     [facility] Name 06/15/22 0914          Toileting Goal 1 (OT)    Activity/Device (Toileting Goal 1, OT) toileting skills, all;grab  bar/safety frame;raised toilet seat  -AR     [place] Level/Cues Needed (Toileting Goal 1, OT) verbal cues required;supervision required  -AR     Time Frame (Toileting Goal 1, OT) long term goal (LTG);by discharge  -AR     Progress/Outcome (Toileting Goal 1, OT) goal ongoing  -AR     Row Name 06/15/22 0914          Therapy Assessment/Plan (OT)    Planned Therapy Interventions (OT) adaptive equipment training;BADL retraining;IADL retraining;occupation/activity based interventions;patient/caregiver education/training;activity tolerance training;transfer/mobility retraining;ROM/therapeutic exercise  -AR           User Key  (r) = Recorded By, (t) = Taken By, (c) = Cosigned By    Initials Name Provider Type    Farzana Dale, LAURA Occupational Therapist               Clinical Impression     Row Name 06/15/22 0902          Pain Assessment    Pretreatment Pain Rating 0/10 - no pain  -AR     Posttreatment Pain Rating 0/10 - no pain  -AR     Row Name 06/15/22 0902          Plan of Care Review    Plan of Care Reviewed With patient  -AR     Outcome Evaluation Pt alert and oriented to self, no c/o pain. She completed bed mobility with mod assist, transfer training with min assist using RW, LB dressing with CGA and toileting with max assist. Pt on 3L NC, no episodes of desaturation. Recommend SNF.  -AR     Row Name 06/15/22 0902          Therapy Assessment/Plan (OT)    Rehab Potential (OT) good, to achieve stated therapy goals  -AR     Criteria for Skilled Therapeutic Interventions Met (OT) yes  -AR     Therapy Frequency (OT) daily  -AR     Row Name 06/15/22 0902          Therapy Plan Review/Discharge Plan (OT)    Anticipated Discharge Disposition (OT) skilled nursing facility  -AR     Row Name 06/15/22 0902          Vital Signs    Pre SpO2 (%) 100  -AR     O2 Delivery Pre Treatment supplemental O2  -AR     Post SpO2 (%) 97  -AR     O2 Delivery Post Treatment supplemental O2  -AR     Pre Patient Position Supine  -AR      Intra Patient Position Standing  -AR     Post Patient Position Sitting  -AR     Row Name 06/15/22 0902          Positioning and Restraints    Pre-Treatment Position in bed  -AR     Post Treatment Position chair  -AR     In Chair notified nsg;reclined;encouraged to call for assist;exit alarm on;legs elevated;waffle cushion;on mechanical lift sling;with nsg;call light within reach  -AR           User Key  (r) = Recorded By, (t) = Taken By, (c) = Cosigned By    Initials Name Provider Type    Farzana Dale OT Occupational Therapist               Outcome Measures     Row Name 06/15/22 0918          How much help from another is currently needed...    Putting on and taking off regular lower body clothing? 3  -AR     Bathing (including washing, rinsing, and drying) 3  -AR     Toileting (which includes using toilet bed pan or urinal) 2  -AR     Putting on and taking off regular upper body clothing 3  -AR     Taking care of personal grooming (such as brushing teeth) 3  -AR     Eating meals 3  -AR     AM-[facility] 6 Clicks Score (OT) 17  -AR     Row Name 06/15/22 0918          Functional Assessment    Outcome Measure Options AM-PAC 6 Clicks Daily Activity (OT)  -AR           User Key  (r) = Recorded By, (t) = Taken By, (c) = Cosigned By    Initials Name Provider Type    Farzana Dale OT Occupational Therapist                Occupational Therapy Education                 Title: PT OT SLP Therapies (In Progress)     Topic: Occupational Therapy (In Progress)     Point: ADL training (Done)     Description:   Instruct learner(s) on proper safety adaptation and remediation techniques during self care or transfers.   Instruct in proper use of assistive devices.              Learning Progress Summary           Patient Mannie, BUD,GLORIA, VU,NR by AR at 6/15/2022 0920                   Point: Home exercise program (Not Started)     Description:   Instruct learner(s) on appropriate technique for monitoring, assisting and/or  progressing therapeutic exercises/activities.              Learner Progress:  Not documented in this visit.          Point: Precautions (Done)     Description:   Instruct learner(s) on prescribed precautions during self-care and functional transfers.              Learning Progress Summary           Patient Mannie BUD,GLORIA, BIANCA,NR by AR at 6/15/2022 0920                   Point: Body mechanics (Done)     Description:   Instruct learner(s) on proper positioning and spine alignment during self-care, functional mobility activities and/or exercises.              Learning Progress Summary           Patient MarissaBUD kunz D, BIANCA,NR by AR at 6/15/2022 0920                               User Key     Initials Effective Dates Name Provider Type Discipline    AR 06/16/21 -  Farzana Briscoe, OT Occupational Therapist OT              OT Recommendation and Plan  Planned Therapy Interventions (OT): adaptive equipment training, BADL retraining, IADL retraining, occupation/activity based interventions, patient/caregiver education/training, activity tolerance training, transfer/mobility retraining, ROM/therapeutic exercise  Therapy Frequency (OT): daily  Plan of Care Review  Plan of Care Reviewed With: patient  Outcome Evaluation: Pt alert and oriented to self, no c/o pain. She completed bed mobility with mod assist, transfer training with min assist using RW, LB dressing with CGA and toileting with max assist. Pt on 3L NC, no episodes of desaturation. Recommend SNF.     Time Calculation:    Time Calculation- OT     Row Name 06/15/22 0920             Time Calculation- OT    OT Start Time 0810  -AR      OT Received On 06/15/22  -AR      OT Goal Re-Cert Due Date 06/25/22  -AR              Timed Charges    17783 - OT Self Care/Mgmt Minutes 10  -AR              Untimed Charges    OT Eval/Re-eval Minutes 60  -AR              Total Minutes    Timed Charges Total Minutes 10  -AR      Untimed Charges Total Minutes 60  -AR       Total Minutes 70  -AR             User Key  (r) = Recorded By, (t) = Taken By, (c) = Cosigned By    Initials Name Provider Type    Farzana Dale OT Occupational Therapist              Therapy Charges for Today     Code Description Service Date Service Provider Modifiers Qty    89535168609 HC OT SELF CARE/MGMT/TRAIN EA 15 MIN 6/15/2022 Farzana Briscoe OT GO 1    [id number]  OT EVAL LOW COMPLEXITY 4 6/15/2022 Farzana Briscoe OT GO 1    [id number]  OT THER SUPP EA 15 MIN 6/15/2022 Farzana Briscoe OT GO 4               Farzana Briscoe OT  6/15/2022

## 2022-06-15 NOTE — PLAN OF CARE
Goal Outcome Evaluation:  Plan of Care Reviewed With: patient           Outcome Evaluation: Pt alert and oriented to self, no c/o pain. She completed bed mobility with mod assist, transfer training with min assist using RW, LB dressing with CGA and toileting with max assist. Pt on 3L NC, no episodes of desaturation. Recommend SNF.

## 2022-06-15 NOTE — PLAN OF CARE
Goal Outcome Evaluation:  Plan of Care Reviewed With: patient        Progress: improving  Outcome Evaluation: pt oriented to self only, has remained on RA all day sating 93-95%, lasix held this pm d/t Cr at 1.24 (checked with Dr Mehta) moderate BM today, voiding per mary. up in chair most of the day, assist x1 for transfers, limited ambulation. VSS, afib Hr 90-110s, 120s this am, metoprolol increased

## 2022-06-15 NOTE — PROGRESS NOTES
"[facility] Progress Note     LOS: 4 days   Patient Care Team:  Tiana Hong MD as PCP - General (Internal Medicine)  PCP:  Tiana Hong MD    Chief Complaint: Persistent atrial fibrillation    SUBJECTIVE: Resting in bedside chair.  Off supplemental oxygen.  Awake, no acute distress but confused      Review of Systems:   All systems have been reviewed and are negative with the exception of those mentioned above.      OBJECTIVE:    Vital Sign Min/Max for last 24 hours  Temp  Min: 97.1 °F (36.2 °C)  Max: 98.9 °F (37.2 °C)   BP  Min: 117/64  Max: 142/99   Pulse  Min: 87  Max: 119   Resp  Min: 16  Max: 18   SpO2  Min: 79 %  Max: 99 %   No data recorded   No data recorded     Flowsheet Rows    Flowsheet Row First Filed Value   Admission Height 162.6 cm (64\") Documented at 06/10/2022 2245   Admission Weight 52.2 kg (115 lb) Documented at 06/10/2022 2245          Telemetry: Atrial fibrillation    Intake/Output Summary (Last 24 hours) at 6/15/2022 1551  Last data filed at 6/15/2022 1407  Gross per 24 hour   Intake 505 ml   Output 1150 ml   Net -645 ml     Intake & Output (last 3 days)       06/12 0701  06/13 0700 06/13 0701  06/14 0700 06/14 0701  06/15 0700 06/15 0701  06/16 0700    P.O.  120  505    IV Piggyback  300      Total Intake(mL/kg)  420 (7.2)  505 (8.6)    Urine (mL/kg/hr) 800 (0.6) 1200 (0.9) 950 (0.7) 650 (1.3)    Stool 0 0 0     Total Output 800 1200 950 650    Net -800 -780 -950 -145            Urine Unmeasured Occurrence 2 x 2 x 1 x     Stool Unmeasured Occurrence 2 x 2 x 1 x            Physical Exam:    General Appearance:   Awake, no distress, appears stated age   Neck:   Supple, symmetrical, trachea midline.   Lungs:    Breath sounds bilaterally, respirations unlabored   Chest Wall:    No tenderness or deformity    Heart:   Irregular regular, S1 and S2 normal, no murmur, rub   or gallop, normal carotid impulse bilaterally " without bruit.   Extremities:   Extremities normal, atraumatic, no cyanosis or edema   Pulses:   2+ and symmetric all extremities   Skin:   Skin color, texture, turgor normal, no rashes or lesions      LABS/DIAGNOSTIC DATA:  Results from last 7 days   Lab Units 06/15/22  1128 06/14/22  0507 06/11/22  1012   WBC 10*3/mm3 8.96 7.73 8.93   HEMOGLOBIN g/dL 12.8 13.2 12.5   HEMATOCRIT % 39.9 42.2 36.6   PLATELETS 10*3/mm3 315 270 267     Lab Results   Lab Value Date/Time    TROPONINT 0.028 06/10/2022 2230    TROPONINT 0.033 (C) 06/03/2022 1659    TROPONINT 0.038 (C) 06/03/2022 1443    TROPONINT <0.010 11/11/2019 1743    TROPONINT <0.010 09/21/2019 2115    TROPONINT <0.010 09/21/2019 1829         Results from last 7 days   Lab Units 06/15/22  1128 06/14/22  0507 06/13/22  0707 06/11/22  1012 06/10/22  2230   SODIUM mmol/L 133* 140 134*   < > 133*   POTASSIUM mmol/L 3.8 3.5 3.5   < > 4.2   CHLORIDE mmol/L 93* 99 96*   < > 98   CO2 mmol/L 29.0 28.0 28.0   < > 25.0   BUN mg/dL 31* 29* 25*   < > 27*   CREATININE mg/dL 1.24* 1.14* 1.15*   < > 1.28*   CALCIUM mg/dL 9.1 8.8 9.2   < > 9.7   BILIRUBIN mg/dL  --   --   --   --  0.5   ALK PHOS U/L  --   --   --   --  112   ALT (SGPT) U/L  --   --   --   --  10   AST (SGOT) U/L  --   --   --   --  18   GLUCOSE mg/dL 385* 204* 247*   < > 117*    < > = values in this interval not displayed.     Results from last 7 days   Lab Units 06/11/22  1012   HEMOGLOBIN A1C % 7.30*                   Medication Review:   aspirin EC, 325 mg, Oral, Daily  cefepime, 1 g, Intravenous, Q24H  citalopram, 20 mg, Oral, Daily  donepezil, 5 mg, Oral, Nightly  doxycycline, 100 mg, Oral, Q12H  fluticasone, 2 spray, Nasal, Daily  furosemide, 20 mg, Intravenous, BID  heparin (porcine), 5,000 Units, Subcutaneous, Q12H  insulin lispro, 0-7 Units, Subcutaneous, TID AC  isosorbide mononitrate, 60 mg, Oral, Daily  lidocaine, 1 patch, Transdermal, Q24H  metoprolol tartrate, 50 mg, Oral, Q12H  multivitamin, 1 tablet,  Oral, Daily  sodium chloride, 10 mL, Intravenous, Q12H             ASSESSMENT/PLAN:    Acute respiratory failure with hypoxia (HCC)    Essential hypertension    Coronary artery disease involving native coronary artery of native heart with angina pectoris (HCC)    Dementia (HCC)    Paroxysmal atrial fibrillation (HCC)    Benign essential hypertension    CKD (chronic kidney disease), stage III (HCC)    GERD (gastroesophageal reflux disease)    Late onset Alzheimer's disease without behavioral disturbance (HCC)    Multifocal pneumonia    Acute on chronic diastolic CHF (congestive heart failure) (HCC)    Acute sepsis (HCC)        Continue rate control, increasing metoprolol tartrate to 50 mg p.o. twice daily.  Continue gradual diuresis while trending renal function  She is a poor candidate for chronic anticoagulation.          Ta Flores III, MD   06/15/22  15:51 EDT

## 2022-06-15 NOTE — CONSULTS
Pulmonary Medicine Consultation     Patient Care Team:  Tiana Hong MD as PCP - General (Internal Medicine)    Reason for Consultation: Acute hypoxic respiratory failure with abnormal CT chest    Subjective   History of Present Illness:  This is a very nice 87-year-old woman with severe dementia who unfortunately is not able to participate in her interview.  Her son is in the room and answers most of the story questions for me as appropriate.  The patient was admitted to the hospital on 6/10/2022 with abrupt shortness of breath with desaturations.  She was found to have right upper lobe infiltrate as well as some left upper lobe infiltrates.  This led to a CT scan of the chest which demonstrated interlobular septal thickening as well as parenchymal disease much worse in the right upper lobe compared with the left as well as what appears to be rounded atelectasis in the right base.  The upper lobe disease is new compared with her prior chest CAT scan though the right lower lobe atelectatic change is stable compared to prior.    Her stay has been complicated by intermittent rapid atrial fibrillation and suspected acute heart failure.  She has remained afebrile.  She has been on broad-spectrum antibiotics.  Procalcitonin was within normal limits.  White blood cell count is within normal limits currently.  She has been eating apparently without choking.    Today she is actually seen in the room and she is on room air.  Previously she was requiring high flow nasal cannula oxygen several days ago.  Chest x-ray performed today shows interval improvement in the right upper lobe infiltrate compared to yesterday.    Review of Systems:  Pertinent items are noted in HPI, all other systems reviewed and negative    Past Medical History:  Past Medical History:   Diagnosis Date   • Atrial fibrillation (HCC)    • Benign essential hypertension    • CAD (coronary artery disease)    • Carotid artery disease (HCC)    •  "CHF (congestive heart failure) ([facility])    • Chronic allergic rhinitis    • CKD (chronic kidney disease), stage III ([facility])    • DDD (degenerative disc disease), cervical    • Dementia ([facility])    • Diabetes mellitus ([facility])    • Diverticulosis     WITH PERFORATION    • Hypertension    • Major depressive disorder, single episode, mild ([facility])    • Memory loss    • Mixed hyperlipidemia    • SSS (sick sinus syndrome) ([facility])    • TIA (transient ischemic attack) 01/2016     Past Surgical History:   Procedure Laterality Date   • ANKLE SURGERY Left 1996   • ATHERECTOMY  1995   • ATHRECTOMY ILIAC, FEMORAL, TIBIAL ARTERY     • BREAST LUMPECTOMY Left 1981   • COLON SURGERY     • COLOSTOMY  1983   • CORONARY STENT PLACEMENT  2001   • FINGER FRACTURE SURGERY Right    • HIP TROCHANTERIC NAILING WITH INTRAMEDULLARY HIP SCREW Left 11/23/2017   • HIP TROCHANTERIC NAILING WITH INTRAMEDULLARY HIP SCREW Right 3/30/2019    Procedure: HIP TROCANTERIC NAILING WITH INTRAMEDULLARY HIP SCREW RIGHT;  Surgeon: Theo Figueroa MD;  Location: [facility];  Service: Orthopedics   • HYSTERECTOMY  1984   • OTHER SURGICAL HISTORY  1984    REANASTAMOSIS        Allergies:  Allergies   Allergen Reactions   • Penicillins Other (See Comments)     Pt states \"i don't know\"       Medications:  No current facility-administered medications on file prior to encounter.     Current Outpatient Medications on File Prior to Encounter   Medication Sig Dispense Refill   • acetaminophen (TYLENOL) 500 MG tablet Take 500 mg by mouth 2 (Two) Times a Day. Take one tablet twice a day for leg pain   And prn every 6 hours     • aluminum-magnesium hydroxide-simethicone (MAALOX MAX) 400-400-40 MG/5ML suspension Take 5 mL by mouth Every 6 (Six) Hours As Needed for Indigestion or Heartburn.     • Amino Acids-Protein Hydrolys (PRO-STAT AWC PO) Take 1 dose by mouth 2 (Two) Times a Day.     • amiodarone (PACERONE) 200 MG tablet Take 1 tablet by mouth Daily.     • amLODIPine (NORVASC) 10 MG " tablet Take 1 tablet by mouth Every Night. 30 tablet 5   • aspirin  MG EC tablet Take 1 tablet by mouth Daily.     • bisacodyl (DULCOLAX) 5 MG EC tablet Take 1 tablet by mouth Daily As Needed for Constipation.     • citalopram (CeleXA) 20 MG tablet Take 20 mg by mouth Daily.     • CloNIDine (CATAPRES) 0.2 MG tablet Take 0.2 mg by mouth 2 (Two) Times a Day.     • collagenase 250 UNIT/GM ointment Apply  topically to the appropriate area as directed Daily.     • docusate sodium (COLACE) 100 MG capsule Take 100 mg by mouth 2 (Two) Times a Day.     • donepezil (ARICEPT) 5 MG tablet Take 5 mg by mouth Every Night.     • fluticasone (FLONASE) 50 MCG/ACT nasal spray 2 sprays into the nostril(s) as directed by provider Daily.     • insulin aspart (novoLOG FLEXPEN) 100 UNIT/ML solution pen-injector sc pen Inject 2-5 Units under the skin into the appropriate area as directed 4 (Four) Times a Day.     • isosorbide mononitrate (IMDUR) 60 MG 24 hr tablet Take 60 mg by mouth Daily.     • lidocaine (LIDODERM) 5 % Place 1 patch on the skin as directed by provider Daily. Remove & Discard patch within 12 hours or as directed by MD 30 patch 0   • metFORMIN ER (GLUCOPHAGE-XR) 500 MG 24 hr tablet Take 1 tablet by mouth Daily With Breakfast. 30 tablet 5   • Multiple Vitamin (MULTIVITAMINS PO) Take 1 tablet by mouth Daily.     • ondansetron (ZOFRAN) 4 MG tablet Take 4 mg by mouth Every 8 (Eight) Hours As Needed for Nausea or Vomiting.     • polyethylene glycol (MIRALAX) pack packet Take 17 g by mouth Daily. (Patient taking differently: Take 17 g by mouth Daily As Needed.)          aspirin EC, 325 mg, Oral, Daily  cefepime, 1 g, Intravenous, Q24H  citalopram, 20 mg, Oral, Daily  donepezil, 5 mg, Oral, Nightly  doxycycline, 100 mg, Oral, Q12H  fluticasone, 2 spray, Nasal, Daily  furosemide, 20 mg, Intravenous, BID  heparin (porcine), 5,000 Units, Subcutaneous, Q12H  insulin lispro, 0-7 Units, Subcutaneous, TID AC  isosorbide  mononitrate, 60 mg, Oral, Daily  lidocaine, 1 patch, Transdermal, Q24H  metoprolol tartrate, 50 mg, Oral, Q12H  multivitamin, 1 tablet, Oral, Daily  sodium chloride, 10 mL, Intravenous, Q12H        Social History:  Social History     Socioeconomic History   • Marital status:      Spouse name: N/A   • Number of children: 2   • Years of education: H.S   Tobacco Use   • Smoking status: Former Smoker     Years: 2.00     Types: Cigarettes     Start date: 1975     Quit date: 1978     Years since quittin.6   • Smokeless tobacco: Never Used   • Tobacco comment: QUIT DATE 1984   Substance and Sexual Activity   • Alcohol use: No   • Drug use: No   • Sexual activity: Never       Family History:  Family History   Problem Relation Age of Onset   • Hypertension Mother    • Coronary artery disease Mother 56        PREMATURE    • Lung cancer Father    • Hypertension Father    • Osteoporosis Sister    • Lung cancer Brother      Family history is reviewed and is not contributory to the case.    Objective   Objective     Physical Exam:  Vitals:    06/15/22 1330   BP:    Pulse: 101   Resp:    Temp:    SpO2:      Physical Exam  Vitals reviewed.   Constitutional:       Comments: Thin, calm.   HENT:      Head: Normocephalic.      Nose: Nose normal.      Mouth/Throat:      Mouth: Mucous membranes are moist.      Pharynx: No oropharyngeal exudate.   Eyes:      Extraocular Movements: Extraocular movements intact.      Pupils: Pupils are equal, round, and reactive to light.   Neck:      Vascular: No carotid bruit.   Cardiovascular:      Rate and Rhythm: Tachycardia present.      Pulses: Normal pulses.      Heart sounds: Normal heart sounds. No murmur heard.     Comments: Regular.  Telemetry does show controlled atrial fibrillation.  Pulmonary:      Effort: Pulmonary effort is normal.      Comments: Decreased air movement in the right base.  Left base is clear.  Mild coarse rales bilaterally right greater than  left.  Abdominal:      General: Abdomen is flat. Bowel sounds are normal.   Musculoskeletal:      Cervical back: Normal range of motion and neck supple. No rigidity or tenderness.      Right lower leg: No edema.      Left lower leg: No edema.   Lymphadenopathy:      Cervical: No cervical adenopathy.   Skin:     General: Skin is warm.      Findings: No lesion.   Neurological:      Mental Status: She is alert.      Comments: The patient is calm in bed.  Moving all 4 extremities equally.  She is alert.  She is not oriented at all.  Speech is fluent however.         Lab Results/Imaging/Diagnostics:  Results from last 7 days   Lab Units 06/15/22  1128 06/14/22  0507 06/11/22  1012   WBC 10*3/mm3 8.96 7.73 8.93   HEMOGLOBIN g/dL 12.8 13.2 12.5   PLATELETS 10*3/mm3 315 270 267     Results from last 7 days   Lab Units 06/15/22  1128 06/14/22  0507 06/13/22  0707   SODIUM mmol/L 133* 140 134*   POTASSIUM mmol/L 3.8 3.5 3.5   CO2 mmol/L 29.0 28.0 28.0   BUN mg/dL 31* 29* 25*   CREATININE mg/dL 1.24* 1.14* 1.15*   GLUCOSE mg/dL 385* 204* 247*     Estimated Creatinine Clearance: 29.5 mL/min (A) (by C-G formula based on SCr of 1.24 mg/dL (H)).    Results from last 7 days   Lab Units 06/11/22  1012   HEMOGLOBIN A1C % 7.30*           Images: Imaging reviewed as per HPI.    Assessment & Plan   Impression & Plan     Impression:    Acute respiratory failure with hypoxia (HCC)    Essential hypertension    Coronary artery disease involving native coronary artery of native heart with angina pectoris (HCC)    Dementia (HCC)    Paroxysmal atrial fibrillation (HCC)    Benign essential hypertension    CKD (chronic kidney disease), stage III (HCC)    GERD (gastroesophageal reflux disease)    Late onset Alzheimer's disease without behavioral disturbance (HCC)    Multifocal pneumonia    Acute on chronic diastolic CHF (congestive heart failure) (HCC)    Acute sepsis (HCC)      Plan:    Mrs. Ramirez has right upper lobe greater than left upper  lobe infiltrates which are indeterminate.  These could be pulmonary edema brought on by decompensated heart failure from her rapid atrial fibrillation.  Certainly could also represent infection though her procalcitonin is negative.  Much less likely would be other causes of this abnormality including pulmonary hemorrhage though she has not had any hemoptysis nor has she had a drop in her hemoglobin.  Her breathing has gotten much better and she is currently on room air without distress.  Her dementia precludes her from really participating much in describing her symptoms however she seems comfortable and happy currently.    I would recommend that she be covered with antibiotics though I think that it is unlikely that we are dealing with bacterial pneumonia at this time.  I would plan to treat her for 10 to 12 days with appropriate broad antibiotics.    Continue with diuresis as necessary.    Wean oxygen though currently she is not requiring anything above room air.    Please let me know if I can be of any further assistance in her care.    I discussed these findings and my recommendations with patient and family       Theo Rubio MD, [facility]  Pulmonary and Critical Care Medicine  06/15/22 13:44 EDT

## 2022-06-15 NOTE — PROGRESS NOTES
[facility] Medicine Services  PROGRESS NOTE    Patient Name: Mary Ramirez  : 1934  MRN: [id number]    Date of Admission: 6/10/2022  Primary Care Physician: Tiana Hong MD    Subjective   Subjective     CC:  Resp failure    HPI:  Denies cough. Up in chair this morning    ROS:  No cough, ROS otherwise limited by patient's dementia        Objective   Objective     Vital Signs:   Temp:  [97.1 °F (36.2 °C)-98.9 °F (37.2 °C)] 98.3 °F (36.8 °C)  Heart Rate:  [] 92  Resp:  [16-18] 18  BP: (117-142)/() 131/88  Flow (L/min):  [3] 3     Physical Exam:    Constitutional - frail, up in chair  HEENT-NCAT, mucous membranes moist  CV-irregular  Resp-diminished bilaterally  Abd-soft, nontender, nondistended, normoactive bowel sounds  Ext-No lower extremity cyanosis, clubbing or edema bilaterally  Neuro-awake, some confusion, moves all extremities  Psych-slightly flat affect   Skin- No rash on exposed UE or LE bilaterally      Results Reviewed:  LAB RESULTS:      Lab 06/15/22  1128 22  0507 22  1012 06/10/22  2319 06/10/22  2230   WBC 8.96 7.73 8.93 10.81*  --    HEMOGLOBIN 12.8 13.2 12.5 12.8  --    HEMATOCRIT 39.9 42.2 36.6 39.8  --    PLATELETS 315 270 267 278  --    NEUTROS ABS 7.13*  --  6.88 8.41*  --    IMMATURE GRANS (ABS) 0.04  --  0.03 0.05  --    LYMPHS ABS 0.72  --  1.14 1.26  --    MONOS ABS 0.90  --  0.74 0.93*  --    EOS ABS 0.12  --  0.08 0.10  --    MCV 92.6 94.4 88.0 93.4  --    PROCALCITONIN  --  0.16  --   --  0.12   LACTATE  --   --   --  1.2  --    D DIMER QUANT  --   --   --  3.43*  --          Lab 06/15/22  1128 22  0507 22  0707 22  1211 22  1012   SODIUM 133* 140 134* 134* 137   POTASSIUM 3.8 3.5 3.5 3.9 3.8   CHLORIDE 93* 99 96* 98 99   CO2 29.0 28.0 28.0 23.0 28.0   ANION GAP 11.0 13.0 10.0 13.0 10.0   BUN 31* 29* 25* 24* 20   CREATININE 1.24* 1.14* 1.15* 1.25* 1.34*   EGFR 42.2* 46.7* 46.2* 41.8* 38.5*    GLUCOSE 385* 204* 247* 269* 127*   CALCIUM 9.1 8.8 9.2 9.0 8.9   HEMOGLOBIN A1C  --   --   --   --  7.30*         Lab 06/10/22  2230   TOTAL PROTEIN 7.4   ALBUMIN 4.30   GLOBULIN 3.1   ALT (SGPT) 10   AST (SGOT) 18   BILIRUBIN 0.5   ALK PHOS 112         Lab 06/13/22  0707 06/10/22  2230   PROBNP 2,735.0* 5,037.0*   TROPONIN T  --  0.028                 Brief Urine Lab Results  (Last result in the past 365 days)      Color   Clarity   Blood   Leuk Est   Nitrite   Protein   CREAT   Urine HCG        06/11/22 0333 Yellow   Clear   Negative   Trace   Negative   Negative                 Microbiology Results Abnormal     Procedure Component Value - Date/Time    Blood Culture - Blood, Arm, Right [[id number]]  (Normal) Collected: 06/10/22 2319    Lab Status: Preliminary result Specimen: Blood from Arm, Right Updated: 06/15/22 0447     Blood Culture No growth at 4 days    Blood Culture - Blood, Hand, Left [[id number]]  (Normal) Collected: 06/10/22 2319    Lab Status: Preliminary result Specimen: Blood from Hand, Left Updated: 06/15/22 0447     Blood Culture No growth at 4 days    S. Pneumo Ag Urine or CSF - Urine, Urine, Clean Catch [[id number]]  (Normal) Collected: 06/11/22 0332    Lab Status: Final result Specimen: Urine, Clean Catch Updated: 06/11/22 1256     Strep Pneumo Ag Negative    Legionella Antigen, Urine - Urine, Urine, Clean Catch [[id number]]  (Normal) Collected: 06/11/22 0332    Lab Status: Final result Specimen: Urine, Clean Catch Updated: 06/11/22 1255     LEGIONELLA ANTIGEN, URINE Negative    MRSA Screen, PCR (Inpatient) - Swab, Nares [[id number]]  (Normal) Collected: 06/11/22 0332    Lab Status: Final result Specimen: Swab from Nares Updated: 06/11/22 0745     MRSA PCR Negative    Narrative:      The negative predictive value of this diagnostic test is high and should only be used to consider de-escalating anti-MRSA therapy. A positive result may indicate colonization with MRSA and must be correlated  clinically.  MRSA Negative    COVID PRE-OP / PRE-PROCEDURE SCREENING ORDER (NO ISOLATION) - Swab, Nasopharynx [[id number]]  (Normal) Collected: 06/10/22 2318    Lab Status: Final result Specimen: Swab from Nasopharynx Updated: 06/11/22 0017    Narrative:      The following orders were created for panel order COVID PRE-OP / PRE-PROCEDURE SCREENING ORDER (NO ISOLATION) - Swab, Nasopharynx.  Procedure                               Abnormality         Status                     ---------                               -----------         ------                     COVID-19 and FLU A/B PCR...[[id number]]  Normal              Final result                 Please view results for these tests on the individual orders.    COVID-19 and FLU A/B PCR - Swab, Nasopharynx [[id number]]  (Normal) Collected: 06/10/22 2318    Lab Status: Final result Specimen: Swab from Nasopharynx Updated: 06/11/22 0017     COVID19 Not Detected     Influenza A PCR Not Detected     Influenza B PCR Not Detected    Narrative:      Fact sheet for providers: https://www.fda.gov/media/041810/download    Fact sheet for patients: https://www.fda.gov/media/384526/download    Test performed by PCR.          XR Chest 1 View    Result Date: 6/15/2022   DATE OF EXAM: 6/15/2022 4:09 AM  PROCEDURE: XR CHEST 1 VW-  INDICATIONS: pneumonia; A41.9-Sepsis, unspecified organism; J18.9-Pneumonia, unspecified organism; I50.9-Heart failure, unspecified; J96.01-Acute respiratory failure with hypoxia  COMPARISON: 06/14/2022  TECHNIQUE: Portable chest radiograph.  FINDINGS:  Heart is mildly enlarged, stable. There is redemonstration of extensive multifocal airspace disease compatible with multifocal pneumonia, similar to the prior study. No pneumothorax. No large pleural effusion. Aortic arch atherosclerosis. Large hiatal hernia noted.      Impression: Persistent multifocal consolidation throughout the lungs compatible with pneumonia.  This report was finalized on 6/15/2022 7:17 AM  by Michael Osei MD.      XR Chest 1 View    Result Date: 6/14/2022  Examination: XR CHEST 1 VW-  Date of Exam: 6/14/2022 5:06 AM  Indication: pulmonary infiltrates; A41.9-Sepsis, unspecified organism; J18.9-Pneumonia, unspecified organism; I50.9-Heart failure, unspecified; J96.01-Acute respiratory failure with hypoxia.  Comparison: June 13, 2022  Technique: 1 view of the chest  Findings: The heart is enlarged. Again seen are multifocal infiltrates greater on the right than the left consistent with multifocal pneumonia. There is a moderate sized hiatal hernia. There are degenerative changes of the spine and shoulders.      Impression: Persistent multifocal infiltrates most pronounced in the right upper lobe favored to be pneumonia  This report was finalized on 6/14/2022 7:58 AM by Get Jameson MD.        Results for orders placed during the hospital encounter of 06/10/22    Adult Transthoracic Echo Complete W/ Cont if Necessary Per Protocol    Interpretation Summary  · Estimated left ventricular EF = 51% Left ventricular ejection fraction appears to be 51 - 55%. Left ventricular systolic function is normal.  · Left ventricular wall thickness is consistent with mild septal asymmetric hypertrophy.  · There is calcification of the aortic valve mainly affecting the left coronary cusp(s).  · Moderate mitral valve regurgitation is present.  · Estimated right ventricular systolic pressure from tricuspid regurgitation is normal (<35 mmHg).  · Incidental heart rate 120 bpm with diastolic inflow consistent with atrial fibrillation      I have reviewed the medications:  Scheduled Meds:aspirin EC, 325 mg, Oral, Daily  cefepime, 1 g, Intravenous, Q24H  citalopram, 20 mg, Oral, Daily  donepezil, 5 mg, Oral, Nightly  doxycycline, 100 mg, Oral, Q12H  fluticasone, 2 spray, Nasal, Daily  furosemide, 20 mg, Intravenous, BID  heparin (porcine), 5,000 Units, Subcutaneous, Q12H  insulin lispro, 0-7 Units, Subcutaneous, TID AC  isosorbide  mononitrate, 60 mg, Oral, Daily  lidocaine, 1 patch, Transdermal, Q24H  metoprolol tartrate, 50 mg, Oral, Q12H  multivitamin, 1 tablet, Oral, Daily  sodium chloride, 10 mL, Intravenous, Q12H      Continuous Infusions:   PRN Meds:.•  acetaminophen **OR** acetaminophen **OR** acetaminophen  •  dextrose  •  dextrose  •  docusate sodium  •  glucagon (human recombinant)  •  ipratropium-albuterol  •  sodium chloride    Assessment & Plan   Assessment & Plan     Active Hospital Problems    Diagnosis  POA   • **Acute respiratory failure with hypoxia (HCC) [J96.01]  Yes   • Multifocal pneumonia [J18.9]  Yes   • Acute on chronic diastolic CHF (congestive heart failure) ([facility]) [I50.33]  Unknown   • Acute sepsis (HCC) [A41.9]  Yes   • Late onset Alzheimer's disease without behavioral disturbance ([facility]) [G30.1, F02.80]  Yes   • GERD (gastroesophageal reflux disease) [K21.9]  Yes   • Benign essential hypertension [I10]  Yes   • CKD (chronic kidney disease), stage III ([facility]) [N18.30]  Yes   • Paroxysmal atrial fibrillation ([facility]) [I48.0]  Yes   • Dementia ([facility]) [F03.90]  Yes   • Coronary artery disease involving native coronary artery of native heart with angina pectoris ([facility]) [I25.119]  Yes   • Essential hypertension [I10]  Yes      Resolved Hospital Problems   No resolved problems to display.        87 year old female presents to the ED due to worsening shortness of air.  Was diagnosed with pneumonia several days ago and has been on a Zpak. Febrile on presentation to 100.8.  Mild leukocytosis of 10.  Elevated D-dimer at 3.4. CT chest without contrast shows increased multifocal, ill-defined groundglass and streaky alveolar densities bilaterally concerning for pulmonary edema or pneumonia.  Unchanged around opacity in the right lung base.  An underlying parenchymal mass lesion is not excluded.       Acute hypoxic respiratory failure  - secondary to multifocal pneumonia and acute on chronic diastolic heart failure  - Required high flow  nasal cannula during admission, now improved to room air with antibiotics and diuresis  - VQ indeterminate, but no defect to suggest PE when compared with known infiltrates.  - Lower extremity venous duplex negative for DVT; however technically difficult    Multifocal pneumonia  - MRSA nares negative, legionella and strep pneumo urinary antigens negative  - cefepime/doxy  - CXR today with slight clearing to my eye of RUL infiltrate  Acute on chronic HFpEF  -EF 51% by echo 5/11/22  -proBNP 5,037 at admission  -Continue IV Lasix 20 mg twice daily. Monitor renal function  -I/O last 3 completed shifts:  In: 120 [P.O.:120]  Out: 1700 [Urine:1700]  I/O this shift:  In: 505 [P.O.:505]  Out: 650 [Urine:650]     Paroxysmal atrial fibrillation   -HR improved  -Continue amiodarone, metoprolol increased to 50 mg PO BID added for rate control  -previously on eliquis but stopped due to multiple falls      CKD III   -baseline creatinine 1.1-1.4, creatinine at baseline at 1.24 today  -hold nephrotoxic agents   -BMP in a.m.     Essential hypertension   -Holding amlodipine and clonidine due to low normal blood pressures     Dementia  -on aricept   -fall precautions      Diabetes mellitus type 2  -Continue sliding scale insulin  -fingersticks achs   - a1c 7.3      DVT prophylaxis: heparin  Medical DVT prophylaxis orders are present.       AM-PAC 6 Clicks Score (PT): 16 (06/15/22 0800)     Disposition: I expect the patient to be discharged pending improved respiratory status.    CODE STATUS:   Code Status and Medical Interventions:   Ordered at: 06/11/22 0138     Medical Intervention Limits:    NO intubation (DNI)     Level Of Support Discussed With:    Patient    Health Care Surrogate     Code Status (Patient has no pulse and is not breathing):    No CPR (Do Not Attempt to Resuscitate)     Medical Interventions (Patient has pulse or is breathing):    Limited Support         Emmanuel Mehta MD  06/15/22

## 2022-06-16 LAB
ANION GAP SERPL CALCULATED.3IONS-SCNC: 14 MMOL/L (ref 5–15)
BACTERIA SPEC AEROBE CULT: NORMAL
BACTERIA SPEC AEROBE CULT: NORMAL
BUN SERPL-MCNC: 33 MG/DL (ref 8–23)
BUN/CREAT SERPL: 31.1 (ref 7–25)
CALCIUM SPEC-SCNC: 9.3 MG/DL (ref 8.6–10.5)
CHLORIDE SERPL-SCNC: 95 MMOL/L (ref 98–107)
CO2 SERPL-SCNC: 24 MMOL/L (ref 22–29)
CREAT SERPL-MCNC: 1.06 MG/DL (ref 0.57–1)
EGFRCR SERPLBLD CKD-EPI 2021: 50.9 ML/MIN/1.73
GLUCOSE BLDC GLUCOMTR-MCNC: 159 MG/DL (ref 70–130)
GLUCOSE BLDC GLUCOMTR-MCNC: 162 MG/DL (ref 70–130)
GLUCOSE BLDC GLUCOMTR-MCNC: 216 MG/DL (ref 70–130)
GLUCOSE SERPL-MCNC: 226 MG/DL (ref 65–99)
POTASSIUM SERPL-SCNC: 3.8 MMOL/L (ref 3.5–5.2)
SARS-COV-2 RNA PNL SPEC NAA+PROBE: NOT DETECTED
SODIUM SERPL-SCNC: 133 MMOL/L (ref 136–145)

## 2022-06-16 PROCEDURE — 25010000002 CEFEPIME PER 500 MG: Performed by: INTERNAL MEDICINE

## 2022-06-16 PROCEDURE — 25010000002 HEPARIN (PORCINE) PER 1000 UNITS: Performed by: NURSE PRACTITIONER

## 2022-06-16 PROCEDURE — 82962 GLUCOSE BLOOD TEST: CPT

## 2022-06-16 PROCEDURE — 99232 SBSQ HOSP IP/OBS MODERATE 35: CPT | Performed by: HOSPITALIST

## 2022-06-16 PROCEDURE — 99232 SBSQ HOSP IP/OBS MODERATE 35: CPT | Performed by: INTERNAL MEDICINE

## 2022-06-16 PROCEDURE — 25010000002 FUROSEMIDE PER 20 MG: Performed by: INTERNAL MEDICINE

## 2022-06-16 PROCEDURE — U0004 COV-19 TEST NON-CDC HGH THRU: HCPCS | Performed by: HOSPITALIST

## 2022-06-16 PROCEDURE — 63710000001 INSULIN LISPRO (HUMAN) PER 5 UNITS: Performed by: NURSE PRACTITIONER

## 2022-06-16 PROCEDURE — 80048 BASIC METABOLIC PNL TOTAL CA: CPT | Performed by: INTERNAL MEDICINE

## 2022-06-16 RX ORDER — FUROSEMIDE 40 MG/1
40 TABLET ORAL DAILY
Status: DISCONTINUED | OUTPATIENT
Start: 2022-06-17 | End: 2022-06-17 | Stop reason: HOSPADM

## 2022-06-16 RX ADMIN — HEPARIN SODIUM 5000 UNITS: 5000 INJECTION INTRAVENOUS; SUBCUTANEOUS at 21:28

## 2022-06-16 RX ADMIN — ISOSORBIDE MONONITRATE 60 MG: 60 TABLET, EXTENDED RELEASE ORAL at 08:16

## 2022-06-16 RX ADMIN — DOXYCYCLINE 100 MG: 100 CAPSULE ORAL at 08:16

## 2022-06-16 RX ADMIN — CEFEPIME HYDROCHLORIDE 1 G: 1 INJECTION, POWDER, FOR SOLUTION INTRAMUSCULAR; INTRAVENOUS at 12:39

## 2022-06-16 RX ADMIN — METOPROLOL TARTRATE 50 MG: 50 TABLET, FILM COATED ORAL at 08:16

## 2022-06-16 RX ADMIN — Medication 10 ML: at 08:17

## 2022-06-16 RX ADMIN — FLUTICASONE PROPIONATE 2 SPRAY: 50 SPRAY, METERED NASAL at 08:16

## 2022-06-16 RX ADMIN — MULTIVITAMIN TABLET 1 TABLET: TABLET at 08:16

## 2022-06-16 RX ADMIN — HEPARIN SODIUM 5000 UNITS: 5000 INJECTION INTRAVENOUS; SUBCUTANEOUS at 08:16

## 2022-06-16 RX ADMIN — FUROSEMIDE 20 MG: 10 INJECTION INTRAMUSCULAR; INTRAVENOUS at 08:17

## 2022-06-16 RX ADMIN — METOPROLOL TARTRATE 75 MG: 50 TABLET, FILM COATED ORAL at 21:28

## 2022-06-16 RX ADMIN — INSULIN LISPRO 3 UNITS: 100 INJECTION, SOLUTION INTRAVENOUS; SUBCUTANEOUS at 07:58

## 2022-06-16 RX ADMIN — ACETAMINOPHEN 325MG 650 MG: 325 TABLET ORAL at 21:28

## 2022-06-16 RX ADMIN — Medication 10 ML: at 21:28

## 2022-06-16 RX ADMIN — CITALOPRAM HYDROBROMIDE 20 MG: 20 TABLET ORAL at 08:16

## 2022-06-16 RX ADMIN — LIDOCAINE 1 PATCH: 50 PATCH CUTANEOUS at 08:16

## 2022-06-16 RX ADMIN — DONEPEZIL HYDROCHLORIDE 5 MG: 5 TABLET ORAL at 21:28

## 2022-06-16 RX ADMIN — INSULIN LISPRO 2 UNITS: 100 INJECTION, SOLUTION INTRAVENOUS; SUBCUTANEOUS at 12:39

## 2022-06-16 RX ADMIN — ASPIRIN 325 MG: 325 TABLET, COATED ORAL at 08:16

## 2022-06-16 RX ADMIN — DOXYCYCLINE 100 MG: 100 CAPSULE ORAL at 21:28

## 2022-06-16 RX ADMIN — CEFEPIME HYDROCHLORIDE 1 G: 1 INJECTION, POWDER, FOR SOLUTION INTRAMUSCULAR; INTRAVENOUS at 23:27

## 2022-06-16 NOTE — PROGRESS NOTES
"[facility] Progress Note     LOS: 5 days   Patient Care Team:  Tiana Hong MD as PCP - General (Internal Medicine)  PCP:  Tiana Hong MD    Chief Complaint: Persistent atrial fibrillation    SUBJECTIVE: Resting comfortably in bed, asleep, no acute distress, satting 100% on room air.  Telemetry reveals rate controlled atrial fibrillation.      Review of Systems:   All systems have been reviewed and are negative with the exception of those mentioned above.      OBJECTIVE:    Vital Sign Min/Max for last 24 hours  Temp  Min: 97.9 °F (36.6 °C)  Max: 99.1 °F (37.3 °C)   BP  Min: 107/94  Max: 147/95   Pulse  Min: 78  Max: 125   Resp  Min: 16  Max: 18   SpO2  Min: 90 %  Max: 100 %   No data recorded   No data recorded     Flowsheet Rows    Flowsheet Row First Filed Value   Admission Height 162.6 cm (64\") Documented at 06/10/2022 2245   Admission Weight 52.2 kg (115 lb) Documented at 06/10/2022 2245          Telemetry: Atrial fibrillation    Intake/Output Summary (Last 24 hours) at 6/16/2022 1026  Last data filed at 6/16/2022 1008  Gross per 24 hour   Intake 890 ml   Output 1575 ml   Net -685 ml     Intake & Output (last 3 days)       06/13 0701  06/14 0700 06/14 0701  06/15 0700 06/15 0701  06/16 0700 06/16 0701  06/17 0700    P.O. 120  820 215    IV Piggyback 300       Total Intake(mL/kg) 420 (7.2)  820 (14) 215 (3.7)    Urine (mL/kg/hr) 1200 (0.9) 950 (0.7) 975 (0.7) 600 (3)    Stool 0 0 0     Total Output 1200 950 975 600    Net -780 -950 -155 -385            Urine Unmeasured Occurrence 2 x 1 x      Stool Unmeasured Occurrence 2 x 1 x 2 x            Physical Exam:    General Appearance:   Awake, no distress, appears stated age   Neck:   Supple, symmetrical, trachea midline.   Lungs:    Breath sounds bilaterally, respirations unlabored   Chest Wall:    No tenderness or deformity    Heart:   Irregular regular, S1 and S2 normal, no " murmur, rub   or gallop, normal carotid impulse bilaterally without bruit.   Extremities:   Extremities normal, atraumatic, no cyanosis or edema   Pulses:   2+ and symmetric all extremities   Skin:   Skin color, texture, turgor normal, no rashes or lesions      LABS/DIAGNOSTIC DATA:  Results from last 7 days   Lab Units 06/15/22  1128 06/14/22  0507 06/11/22  1012   WBC 10*3/mm3 8.96 7.73 8.93   HEMOGLOBIN g/dL 12.8 13.2 12.5   HEMATOCRIT % 39.9 42.2 36.6   PLATELETS 10*3/mm3 315 270 267     Lab Results   Lab Value Date/Time    TROPONINT 0.028 06/10/2022 2230    TROPONINT 0.033 (C) 06/03/2022 1659    TROPONINT 0.038 (C) 06/03/2022 1443    TROPONINT <0.010 11/11/2019 1743    TROPONINT <0.010 09/21/2019 2115    TROPONINT <0.010 09/21/2019 1829         Results from last 7 days   Lab Units 06/16/22  0503 06/15/22  1128 06/14/22  0507 06/11/22  1012 06/10/22  2230   SODIUM mmol/L 133* 133* 140   < > 133*   POTASSIUM mmol/L 3.8 3.8 3.5   < > 4.2   CHLORIDE mmol/L 95* 93* 99   < > 98   CO2 mmol/L 24.0 29.0 28.0   < > 25.0   BUN mg/dL 33* 31* 29*   < > 27*   CREATININE mg/dL 1.06* 1.24* 1.14*   < > 1.28*   CALCIUM mg/dL 9.3 9.1 8.8   < > 9.7   BILIRUBIN mg/dL  --   --   --   --  0.5   ALK PHOS U/L  --   --   --   --  112   ALT (SGPT) U/L  --   --   --   --  10   AST (SGOT) U/L  --   --   --   --  18   GLUCOSE mg/dL 226* 385* 204*   < > 117*    < > = values in this interval not displayed.     Results from last 7 days   Lab Units 06/11/22  1012   HEMOGLOBIN A1C % 7.30*                   Medication Review:   aspirin EC, 325 mg, Oral, Daily  cefepime, 1 g, Intravenous, Q24H  citalopram, 20 mg, Oral, Daily  donepezil, 5 mg, Oral, Nightly  doxycycline, 100 mg, Oral, Q12H  fluticasone, 2 spray, Nasal, Daily  [START ON 6/17/2022] furosemide, 40 mg, Oral, Daily  heparin (porcine), 5,000 Units, Subcutaneous, Q12H  insulin lispro, 0-7 Units, Subcutaneous, TID AC  isosorbide mononitrate, 60 mg, Oral, Daily  lidocaine, 1 patch,  Transdermal, Q24H  metoprolol tartrate, 75 mg, Oral, Q12H  multivitamin, 1 tablet, Oral, Daily  sodium chloride, 10 mL, Intravenous, Q12H             ASSESSMENT/PLAN:    Acute respiratory failure with hypoxia (HCC)    Essential hypertension    Coronary artery disease involving native coronary artery of native heart with angina pectoris (HCC)    Dementia (HCC)    Paroxysmal atrial fibrillation (HCC)    Benign essential hypertension    CKD (chronic kidney disease), stage III (HCC)    GERD (gastroesophageal reflux disease)    Late onset Alzheimer's disease without behavioral disturbance (HCC)    Multifocal pneumonia    Acute on chronic diastolic CHF (congestive heart failure) (HCC)    Acute sepsis (HCC)        Continue rate control, increasing metoprolol tartrate to 75 mg p.o. twice daily.  Continue gradual diuresis while trending renal function, transitioning to oral Lasix  She is a poor candidate for chronic anticoagulation.          Ta Flores III, MD   06/16/22  10:26 EDT

## 2022-06-16 NOTE — PROGRESS NOTES
[facility] Medicine Services  PROGRESS NOTE    Patient Name: Mary Ramirez  : 1934  MRN: [id number]    Date of Admission: 6/10/2022  Primary Care Physician: Tiana Hong MD    Subjective   Subjective     CC:    Shortness of air    HPI:   No family in room.  Discussed [place] on Friday with patient, unclear if she understands where she is    ROS:    Unable to assess    Objective   Objective     Vital Signs:   Temp:  [98 °F (36.7 °C)-99.1 °F (37.3 °C)] 98.7 °F (37.1 °C)  Heart Rate:  [] 83  Resp:  [18] 18  BP: (106-147)/(58-95) 113/58  Flow (L/min):  [3] 3     Physical Exam:    Constitutional: awake, NAD  HENT: NCAT, dry tongue, no JVD  Respiratory: Clear to auscultation bilaterally, respiratory effort normal   Cardiovascular: irreg, s1 and s2  Gastrointestinal: Positive bowel sounds, soft, nontender, nondistended  Musculoskeletal: No bilateral ankle edema  Psychiatric: Appropriate affect, cooperative  Neurologic: Oriented to person, confused, barely awake  Skin: dry, + skin tenting    Results Reviewed:  LAB RESULTS:      Lab 06/15/22  1128 22  0507 22  1012 06/10/22  2319 06/10/22  2230   WBC 8.96 7.73 8.93 10.81*  --    HEMOGLOBIN 12.8 13.2 12.5 12.8  --    HEMATOCRIT 39.9 42.2 36.6 39.8  --    PLATELETS 315 270 267 278  --    NEUTROS ABS 7.13*  --  6.88 8.41*  --    IMMATURE GRANS (ABS) 0.04  --  0.03 0.05  --    LYMPHS ABS 0.72  --  1.14 1.26  --    MONOS ABS 0.90  --  0.74 0.93*  --    EOS ABS 0.12  --  0.08 0.10  --    MCV 92.6 94.4 88.0 93.4  --    PROCALCITONIN  --  0.16  --   --  0.12   LACTATE  --   --   --  1.2  --    D DIMER QUANT  --   --   --  3.43*  --          Lab 22  0503 06/15/22  1128 22  0507 22  0707 22  1211 22  1012   SODIUM 133* 133* 140 134* 134* 137   POTASSIUM 3.8 3.8 3.5 3.5 3.9 3.8   CHLORIDE 95* 93* 99 96* 98 99   CO2 24.0 29.0 28.0 28.0 23.0 28.0   ANION GAP 14.0 11.0 13.0 10.0 13.0 10.0    BUN 33* 31* 29* 25* 24* 20   CREATININE 1.06* 1.24* 1.14* 1.15* 1.25* 1.34*   EGFR 50.9* 42.2* 46.7* 46.2* 41.8* 38.5*   GLUCOSE 226* 385* 204* 247* 269* 127*   CALCIUM 9.3 9.1 8.8 9.2 9.0 8.9   HEMOGLOBIN A1C  --   --   --   --   --  7.30*         Lab 06/10/22  2230   TOTAL PROTEIN 7.4   ALBUMIN 4.30   GLOBULIN 3.1   ALT (SGPT) 10   AST (SGOT) 18   BILIRUBIN 0.5   ALK PHOS 112         Lab 06/13/22  0707 06/10/22  2230   PROBNP 2,735.0* 5,037.0*   TROPONIN T  --  0.028                 Brief Urine Lab Results  (Last result in the past 365 days)      Color   Clarity   Blood   Leuk Est   Nitrite   Protein   CREAT   Urine HCG        06/11/22 0333 Yellow   Clear   Negative   Trace   Negative   Negative                 Microbiology Results Abnormal     Procedure Component Value - Date/Time    Blood Culture - Blood, Arm, Right [[id number]]  (Normal) Collected: 06/10/22 2319    Lab Status: Final result Specimen: Blood from Arm, Right Updated: 06/16/22 0447     Blood Culture No growth at 5 days    Blood Culture - Blood, Hand, Left [[id number]]  (Normal) Collected: 06/10/22 2319    Lab Status: Final result Specimen: Blood from Hand, Left Updated: 06/16/22 0447     Blood Culture No growth at 5 days    S. Pneumo Ag Urine or CSF - Urine, Urine, Clean Catch [[id number]]  (Normal) Collected: 06/11/22 0332    Lab Status: Final result Specimen: Urine, Clean Catch Updated: 06/11/22 1256     Strep Pneumo Ag Negative    Legionella Antigen, Urine - Urine, Urine, Clean Catch [[id number]]  (Normal) Collected: 06/11/22 0332    Lab Status: Final result Specimen: Urine, Clean Catch Updated: 06/11/22 1255     LEGIONELLA ANTIGEN, URINE Negative    MRSA Screen, PCR (Inpatient) - Swab, Nares [[id number]]  (Normal) Collected: 06/11/22 0332    Lab Status: Final result Specimen: Swab from Nares Updated: 06/11/22 0745     MRSA PCR Negative    Narrative:      The negative predictive value of this diagnostic test is high and should only be used to  consider de-escalating anti-MRSA therapy. A positive result may indicate colonization with MRSA and must be correlated clinically.  MRSA Negative    COVID PRE-OP / PRE-PROCEDURE SCREENING ORDER (NO ISOLATION) - Swab, Nasopharynx [[id number]]  (Normal) Collected: 06/10/22 2318    Lab Status: Final result Specimen: Swab from Nasopharynx Updated: 06/11/22 0017    Narrative:      The following orders were created for panel order COVID PRE-OP / PRE-PROCEDURE SCREENING ORDER (NO ISOLATION) - Swab, Nasopharynx.  Procedure                               Abnormality         Status                     ---------                               -----------         ------                     COVID-19 and FLU A/B PCR...[[id number]]  Normal              Final result                 Please view results for these tests on the individual orders.    COVID-19 and FLU A/B PCR - Swab, Nasopharynx [[id number]]  (Normal) Collected: 06/10/22 2318    Lab Status: Final result Specimen: Swab from Nasopharynx Updated: 06/11/22 0017     COVID19 Not Detected     Influenza A PCR Not Detected     Influenza B PCR Not Detected    Narrative:      Fact sheet for providers: https://www.fda.gov/media/748983/download    Fact sheet for patients: https://www.fda.gov/media/627344/download    Test performed by PCR.          XR Chest 1 View    Result Date: 6/15/2022   DATE OF EXAM: 6/15/2022 4:09 AM  PROCEDURE: XR CHEST 1 VW-  INDICATIONS: pneumonia; A41.9-Sepsis, unspecified organism; J18.9-Pneumonia, unspecified organism; I50.9-Heart failure, unspecified; J96.01-Acute respiratory failure with hypoxia  COMPARISON: 06/14/2022  TECHNIQUE: Portable chest radiograph.  FINDINGS:  Heart is mildly enlarged, stable. There is redemonstration of extensive multifocal airspace disease compatible with multifocal pneumonia, similar to the prior study. No pneumothorax. No large pleural effusion. Aortic arch atherosclerosis. Large hiatal hernia noted.      Impression: Persistent  multifocal consolidation throughout the lungs compatible with pneumonia.  This report was finalized on 6/15/2022 7:17 AM by Michael Osei MD.        Results for orders placed during the hospital encounter of 06/10/22    Adult Transthoracic Echo Complete W/ Cont if Necessary Per Protocol    Interpretation Summary  · Estimated left ventricular EF = 51% Left ventricular ejection fraction appears to be 51 - 55%. Left ventricular systolic function is normal.  · Left ventricular wall thickness is consistent with mild septal asymmetric hypertrophy.  · There is calcification of the aortic valve mainly affecting the left coronary cusp(s).  · Moderate mitral valve regurgitation is present.  · Estimated right ventricular systolic pressure from tricuspid regurgitation is normal (<35 mmHg).  · Incidental heart rate 120 bpm with diastolic inflow consistent with atrial fibrillation      I have reviewed the medications:  Scheduled Meds:aspirin EC, 325 mg, Oral, Daily  [START ON 6/17/2022] cefepime, 1 g, Intravenous, Q12H  citalopram, 20 mg, Oral, Daily  donepezil, 5 mg, Oral, Nightly  doxycycline, 100 mg, Oral, Q12H  fluticasone, 2 spray, Nasal, Daily  [START ON 6/17/2022] furosemide, 40 mg, Oral, Daily  heparin (porcine), 5,000 Units, Subcutaneous, Q12H  insulin lispro, 0-7 Units, Subcutaneous, TID AC  isosorbide mononitrate, 60 mg, Oral, Daily  lidocaine, 1 patch, Transdermal, Q24H  metoprolol tartrate, 75 mg, Oral, Q12H  multivitamin, 1 tablet, Oral, Daily  sodium chloride, 10 mL, Intravenous, Q12H      Continuous Infusions:   PRN Meds:.•  acetaminophen **OR** acetaminophen **OR** acetaminophen  •  dextrose  •  dextrose  •  docusate sodium  •  glucagon (human recombinant)  •  ipratropium-albuterol  •  sodium chloride    Assessment & Plan   Assessment & Plan     Active Hospital Problems    Diagnosis  POA   • **Acute respiratory failure with hypoxia (HCC) [J96.01]  Yes   • Multifocal pneumonia [J18.9]  Yes   • Acute on chronic  diastolic CHF (congestive heart failure) ([facility]) [I50.33]  Unknown   • Acute sepsis (HCC) [A41.9]  Yes   • Late onset Alzheimer's disease without behavioral disturbance (HCC) [G30.1, F02.80]  Yes   • GERD (gastroesophageal reflux disease) [K21.9]  Yes   • Benign essential hypertension [I10]  Yes   • CKD (chronic kidney disease), stage III ([facility]) [N18.30]  Yes   • Paroxysmal atrial fibrillation (HCC) [I48.0]  Yes   • Dementia ([facility]) [F03.90]  Yes   • Coronary artery disease involving native coronary artery of native heart with angina pectoris ([facility]) [I25.119]  Yes   • Essential hypertension [I10]  Yes      Resolved Hospital Problems   No resolved problems to display.        87 year old female presents to the ED due to worsening shortness of air.  Was diagnosed with pneumonia several days ago and has been on a Zpak. Febrile on presentation to 100.8.  Mild leukocytosis of 10.  Elevated D-dimer at 3.4. CT chest without contrast shows increased multifocal, ill-defined groundglass and streaky alveolar densities bilaterally concerning for pulmonary edema or pneumonia.  Unchanged around opacity in the right lung base.  An underlying parenchymal mass lesion is not excluded.       Acute hypoxic respiratory failure  - secondary to multifocal pneumonia and acute on chronic diastolic heart failure  - Required high flow nasal cannula during admission, now improved to room air with antibiotics and diuresis  - VQ indeterminate, but no defect to suggest PE when compared with known infiltrates.  - Lower extremity venous duplex negative for DVT; however technically difficult  - may be improving as oxygen requirements down    Multifocal pneumonia  - MRSA nares negative, legionella and strep pneumo urinary antigens negative  - cefepime/doxy    Acute on chronic HFpEF  -EF 51% by echo 5/11/22  -proBNP 5,037 at admission  -Continue IV Lasix 20 mg twice daily. Monitor renal function    Paroxysmal atrial fibrillation   - rate controlled atrial  fibrillation  - Continue amiodarone  - Metoprolol increased to 75 mg twice daily  - previously on eliquis but stopped due to multiple falls   - She was deemed to be a poor candidate for chronic anticoagulation     CKD III   -baseline creatinine 1.1-1.4  -creatinine 1.06  -hold nephrotoxic agents      Essential hypertension   -Holding amlodipine and clonidine due to low normal blood pressures     Dementia  -on aricept   -fall precautions      Diabetes mellitus type 2  -Continue sliding scale insulin  -fingersticks achs   - a1c 7.3      DVT prophylaxis: heparin  Medical DVT prophylaxis orders are present.       AM-PAC 6 Clicks Score (PT): 16 (06/16/22 0830)     Disposition: I expect the patient to be discharged pending improved respiratory status.    CODE STATUS:   Code Status and Medical Interventions:   Ordered at: 06/11/22 0138     Medical Intervention Limits:    NO intubation (DNI)     Level Of Support Discussed With:    Patient    Health Care Surrogate     Code Status (Patient has no pulse and is not breathing):    No CPR (Do Not Attempt to Resuscitate)     Medical Interventions (Patient has pulse or is breathing):    Limited Support         Vish Andrade MD  06/16/22

## 2022-06-16 NOTE — CASE MANAGEMENT/SOCIAL WORK
Continued Stay Note  [facility]     Patient Name: Mary Ramirez  MRN: [id number]  Today's Date: 6/16/2022    Admit Date: 6/10/2022     Discharge Plan     Row Name 06/16/22 1511       Plan    Plan [facility] vs. skilled bed    Patient/Family in Agreement with Plan yes    Plan Comments Received call from Pennie at [facility] stating that Ms. Ramirez has been declined by her [employer] insurance for the rehab admission.  Discussed patient with Dr. Andrade.  PT will be seeing Ms. Ramirez this afternoon and will assessing for her readiness to return to her Memory Care Unit at [facility].   A Peer to peer is due tomorrow morning by 9am with [employer].  Dr. Andrade is aware and will initiate peer to peer, if rehab is needed.    Updated Ms. Ramirez's son, Josh, by phone, and he verbalized understanding.    CM will follow up.    Final Discharge Disposition Code 30 - still a patient                            Expected Discharge Date and Time     Expected Discharge Date Expected Discharge Time    Jun 17, 2022             Brandy Hunter RN

## 2022-06-17 VITALS
WEIGHT: 129 LBS | DIASTOLIC BLOOD PRESSURE: 78 MMHG | TEMPERATURE: 98.3 F | RESPIRATION RATE: 20 BRPM | HEIGHT: 64 IN | OXYGEN SATURATION: 96 % | HEART RATE: 80 BPM | BODY MASS INDEX: 22.02 KG/M2 | SYSTOLIC BLOOD PRESSURE: 139 MMHG

## 2022-06-17 LAB
GLUCOSE BLDC GLUCOMTR-MCNC: 183 MG/DL (ref 70–130)
GLUCOSE BLDC GLUCOMTR-MCNC: 272 MG/DL (ref 70–130)

## 2022-06-17 PROCEDURE — 99239 HOSP IP/OBS DSCHRG MGMT >30: CPT | Performed by: HOSPITALIST

## 2022-06-17 PROCEDURE — 63710000001 INSULIN LISPRO (HUMAN) PER 5 UNITS: Performed by: NURSE PRACTITIONER

## 2022-06-17 PROCEDURE — 97530 THERAPEUTIC ACTIVITIES: CPT

## 2022-06-17 PROCEDURE — 99232 SBSQ HOSP IP/OBS MODERATE 35: CPT | Performed by: INTERNAL MEDICINE

## 2022-06-17 PROCEDURE — 25010000002 HEPARIN (PORCINE) PER 1000 UNITS: Performed by: NURSE PRACTITIONER

## 2022-06-17 PROCEDURE — 82962 GLUCOSE BLOOD TEST: CPT

## 2022-06-17 PROCEDURE — 25010000002 CEFEPIME PER 500 MG: Performed by: HOSPITALIST

## 2022-06-17 RX ORDER — CEFDINIR 300 MG/1
300 CAPSULE ORAL 2 TIMES DAILY
Qty: 6 CAPSULE | Refills: 0 | Status: SHIPPED | OUTPATIENT
Start: 2022-06-17 | End: 2022-06-20

## 2022-06-17 RX ORDER — METOPROLOL TARTRATE 75 MG/1
75 TABLET, FILM COATED ORAL EVERY 12 HOURS SCHEDULED
Qty: 60 TABLET | Refills: 0 | Status: SHIPPED | OUTPATIENT
Start: 2022-06-17 | End: 2022-07-25 | Stop reason: ALTCHOICE

## 2022-06-17 RX ORDER — DOXYCYCLINE HYCLATE 50 MG/1
50 CAPSULE ORAL 2 TIMES DAILY
Qty: 6 CAPSULE | Refills: 0 | Status: SHIPPED | OUTPATIENT
Start: 2022-06-17 | End: 2022-06-20

## 2022-06-17 RX ORDER — FUROSEMIDE 40 MG/1
40 TABLET ORAL DAILY
Qty: 30 TABLET | Refills: 0 | Status: ON HOLD | OUTPATIENT
Start: 2022-06-18 | End: 2022-08-06

## 2022-06-17 RX ADMIN — CITALOPRAM HYDROBROMIDE 20 MG: 20 TABLET ORAL at 08:53

## 2022-06-17 RX ADMIN — FUROSEMIDE 40 MG: 40 TABLET ORAL at 08:53

## 2022-06-17 RX ADMIN — LIDOCAINE 1 PATCH: 50 PATCH CUTANEOUS at 08:54

## 2022-06-17 RX ADMIN — INSULIN LISPRO 4 UNITS: 100 INJECTION, SOLUTION INTRAVENOUS; SUBCUTANEOUS at 12:18

## 2022-06-17 RX ADMIN — HEPARIN SODIUM 5000 UNITS: 5000 INJECTION INTRAVENOUS; SUBCUTANEOUS at 08:53

## 2022-06-17 RX ADMIN — FLUTICASONE PROPIONATE 2 SPRAY: 50 SPRAY, METERED NASAL at 08:54

## 2022-06-17 RX ADMIN — ASPIRIN 325 MG: 325 TABLET, COATED ORAL at 08:53

## 2022-06-17 RX ADMIN — DOXYCYCLINE 100 MG: 100 CAPSULE ORAL at 08:53

## 2022-06-17 RX ADMIN — ISOSORBIDE MONONITRATE 60 MG: 60 TABLET, EXTENDED RELEASE ORAL at 08:53

## 2022-06-17 RX ADMIN — INSULIN LISPRO 2 UNITS: 100 INJECTION, SOLUTION INTRAVENOUS; SUBCUTANEOUS at 08:53

## 2022-06-17 RX ADMIN — MULTIVITAMIN TABLET 1 TABLET: TABLET at 08:52

## 2022-06-17 RX ADMIN — METOPROLOL TARTRATE 75 MG: 50 TABLET, FILM COATED ORAL at 08:53

## 2022-06-17 RX ADMIN — Medication 10 ML: at 08:54

## 2022-06-17 NOTE — PLAN OF CARE
Patient confused. VSS. A-fib controlled rate on telemetry. She slept well between care rounds. Awaiting rehab placement. Peer to peer review to be done this morning. Covid screening Negative. No complaint of pain. 2L NC O2 in use while sleeping.

## 2022-06-17 NOTE — DISCHARGE SUMMARY
[facility] Medicine Services  DISCHARGE SUMMARY    Patient Name: Mary Ramirez  : 1934  MRN: [id number]    Date of Admission: 6/10/2022 10:14 PM  Date of Discharge:  2022 11:23 am  Primary Care Physician: Tiana Hong MD    Consults     Date and Time Order Name Status Description    6/15/2022 12:32 AM Inpatient Pulmonology Consult Completed     2022 10:38 AM Inpatient Cardiology Consult Completed         Theo Rubio MD - Pulmonary Medicine  Alyssa Goldstein MD - Cardiology    Hospital Course     Presenting Problem:   Acute sepsis (HCC) [A41.9]    Active Hospital Problems    Diagnosis  POA   • **Acute respiratory failure with hypoxia (HCC) [J96.01]  Yes   • Multifocal pneumonia [J18.9]  Yes   • Acute on chronic diastolic CHF (congestive heart failure) (HCC) [I50.33]  Unknown   • Acute sepsis (HCC) [A41.9]  Yes   • Late onset Alzheimer's disease without behavioral disturbance (HCC) [G30.1, F02.80]  Yes   • GERD (gastroesophageal reflux disease) [K21.9]  Yes   • Benign essential hypertension [I10]  Yes   • CKD (chronic kidney disease), stage III (HCC) [N18.30]  Yes   • Paroxysmal atrial fibrillation (HCC) [I48.0]  Yes   • Dementia (HCC) [F03.90]  Yes   • Coronary artery disease involving native coronary artery of native heart with angina pectoris (HCC) [I25.119]  Yes   • Essential hypertension [I10]  Yes      Resolved Hospital Problems   No resolved problems to display.          Hospital Course:  Mary Ramirez is a 87 y.o. female who presented to the ER due to worsening shortness of air.  Was diagnosed with pneumonia several days ago and has been on a Zpak. Febrile on presentation to 100.8.  Mild leukocytosis of 10.  Elevated D-dimer at 3.4. CT chest without contrast shows increased multifocal, ill-defined groundglass and streaky alveolar densities bilaterally concerning for pulmonary edema or pneumonia.  Unchanged around opacity in the right lung  base.  An underlying parenchymal mass lesion is not excluded.  She was seen by Pulmonary Medicine while hospitalized        Acute hypoxic respiratory failure  - secondary to multifocal pneumonia and acute on chronic diastolic heart failure  - Required high flow nasal cannula during admission, now improved to room air with antibiotics and diuresis  - VQ indeterminate, but no defect to suggest PE when compared with known infiltrates.  - Lower extremity venous duplex negative for DVT; however technically difficult  - may be improving as oxygen requirements down  - plan is for 10 days of abx, however she was on abx prior to arrival it sounds like too     Multifocal pneumonia  - MRSA nares negative, legionella and strep pneumo urinary antigens negative  - she has received a week of IV abx here  - transition to oral at discharge     Acute on chronic HFpEF  -EF 51% by echo 5/11/22  -proBNP 5,037 at admission  -lasix oral per Cardiology     Paroxysmal atrial fibrillation   - rate controlled atrial fibrillation  - Continue amiodarone  - Metoprolol increased to 75 mg twice daily  - previously on eliquis but stopped due to multiple falls   - She was deemed to be a poor candidate for chronic anticoagulation     CKD III   -baseline creatinine 1.1-1.4  -creatinine 1.06  -hold nephrotoxic agents      Essential hypertension   -Holding amlodipine and clonidine due to low normal blood pressures     Dementia  -on aricept   -fall precautions      Diabetes mellitus type 2  -Continue sliding scale insulin  -fingersticks achs   -hemoglobin A1C on admission ~ 7.3% appears relatively well controlled for age 87 yrs old      Day of Discharge     HPI:   Says she is doing well.  No complaints.  No family in room.  No issues reported during MDR today    Review of Systems    Unable to assess    Vital Signs:   Temp:  [97.5 °F (36.4 °C)-99 °F (37.2 °C)] 98.3 °F (36.8 °C)  Heart Rate:  [77-87] 80  Resp:  [18-20] 20  BP: (106-164)/(58-98)  139/78  Flow (L/min):  [2] 2      Physical Exam:    Constitutional: awake, No Acute Distress  HENT: NCAT, dry tongue  Respiratory: Clear to auscultation bilaterally, respiratory effort normal   Cardiovascular: RRR, s1 and s2  Gastrointestinal: Positive bowel sounds, soft, nontender, nondistended  Musculoskeletal: wasted muscles of the extremities  Psychiatric: flat affect, cooperative  Skin: dry, + skin tenting    Pertinent  and/or Most Recent Results     LAB RESULTS:      Lab 06/15/22  1128 06/14/22  0507 06/11/22  1012 06/10/22  2319 06/10/22  2230   WBC 8.96 7.73 8.93 10.81*  --    HEMOGLOBIN 12.8 13.2 12.5 12.8  --    HEMATOCRIT 39.9 42.2 36.6 39.8  --    PLATELETS 315 270 267 278  --    NEUTROS ABS 7.13*  --  6.88 8.41*  --    IMMATURE GRANS (ABS) 0.04  --  0.03 0.05  --    LYMPHS ABS 0.72  --  1.14 1.26  --    MONOS ABS 0.90  --  0.74 0.93*  --    EOS ABS 0.12  --  0.08 0.10  --    MCV 92.6 94.4 88.0 93.4  --    PROCALCITONIN  --  0.16  --   --  0.12   LACTATE  --   --   --  1.2  --    D DIMER QUANT  --   --   --  3.43*  --          Lab 06/16/22  0503 06/15/22  1128 06/14/22  0507 06/13/22  0707 06/12/22  1211 06/11/22  1012   SODIUM 133* 133* 140 134* 134* 137   POTASSIUM 3.8 3.8 3.5 3.5 3.9 3.8   CHLORIDE 95* 93* 99 96* 98 99   CO2 24.0 29.0 28.0 28.0 23.0 28.0   ANION GAP 14.0 11.0 13.0 10.0 13.0 10.0   BUN 33* 31* 29* 25* 24* 20   CREATININE 1.06* 1.24* 1.14* 1.15* 1.25* 1.34*   EGFR 50.9* 42.2* 46.7* 46.2* 41.8* 38.5*   GLUCOSE 226* 385* 204* 247* 269* 127*   CALCIUM 9.3 9.1 8.8 9.2 9.0 8.9   HEMOGLOBIN A1C  --   --   --   --   --  7.30*         Lab 06/10/22  2230   TOTAL PROTEIN 7.4   ALBUMIN 4.30   GLOBULIN 3.1   ALT (SGPT) 10   AST (SGOT) 18   BILIRUBIN 0.5   ALK PHOS 112         Lab 06/13/22  0707 06/10/22  2230   PROBNP 2,735.0* 5,037.0*   TROPONIN T  --  0.028                 Brief Urine Lab Results  (Last result in the past 365 days)      Color   Clarity   Blood   Leuk Est   Nitrite   Protein    CREAT   Urine HCG        06/11/22 0333 Yellow   Clear   Negative   Trace   Negative   Negative               Microbiology Results (last 10 days)     Procedure Component Value - Date/Time    COVID-19, [facility] IN-HOUSE NP/OP SWAB IN UTM/VTM/[facility] TRANSPORT MEDIA 24HR TAT - Swab, Nasopharynx [[id number]]  (Normal) Collected: 06/16/22 1123    Lab Status: Final result Specimen: Swab from Nasopharynx Updated: 06/16/22 1718     COVID19 Not Detected    Narrative:      Fact sheet for providers: https://www.fda.gov/media/879420/download     Fact sheet for patients: https://www.fda.gov/media/732077/download    Test performed by RT PCR.    Legionella Antigen, Urine - Urine, Urine, Clean Catch [[id number]]  (Normal) Collected: 06/11/22 0332    Lab Status: Final result Specimen: Urine, Clean Catch Updated: 06/11/22 1255     LEGIONELLA ANTIGEN, URINE Negative    MRSA Screen, PCR (Inpatient) - Swab, Nares [[id number]]  (Normal) Collected: 06/11/22 0332    Lab Status: Final result Specimen: Swab from Nares Updated: 06/11/22 0745     MRSA PCR Negative    Narrative:      The negative predictive value of this diagnostic test is high and should only be used to consider de-escalating anti-MRSA therapy. A positive result may indicate colonization with MRSA and must be correlated clinically.  MRSA Negative    S. Pneumo Ag Urine or CSF - Urine, Urine, Clean Catch [[id number]]  (Normal) Collected: 06/11/22 0332    Lab Status: Final result Specimen: Urine, Clean Catch Updated: 06/11/22 1256     Strep Pneumo Ag Negative    Blood Culture - Blood, Arm, Right [[id number]]  (Normal) Collected: 06/10/22 2319    Lab Status: Final result Specimen: Blood from Arm, Right Updated: 06/16/22 0447     Blood Culture No growth at 5 days    Blood Culture - Blood, Hand, Left [[id number]]  (Normal) Collected: 06/10/22 2319    Lab Status: Final result Specimen: Blood from Hand, Left Updated: 06/16/22 0447     Blood Culture No growth at 5 days    COVID  PRE-OP / PRE-PROCEDURE SCREENING ORDER (NO ISOLATION) - Swab, Nasopharynx [[id number]]  (Normal) Collected: 06/10/22 2318    Lab Status: Final result Specimen: Swab from Nasopharynx Updated: 06/11/22 0017    Narrative:      The following orders were created for panel order COVID PRE-OP / PRE-PROCEDURE SCREENING ORDER (NO ISOLATION) - Swab, Nasopharynx.  Procedure                               Abnormality         Status                     ---------                               -----------         ------                     COVID-19 and FLU A/B PCR...[[id number]]  Normal              Final result                 Please view results for these tests on the individual orders.    COVID-19 and FLU A/B PCR - Swab, Nasopharynx [[id number]]  (Normal) Collected: 06/10/22 2318    Lab Status: Final result Specimen: Swab from Nasopharynx Updated: 06/11/22 0017     COVID19 Not Detected     Influenza A PCR Not Detected     Influenza B PCR Not Detected    Narrative:      Fact sheet for providers: https://www.fda.gov/media/178341/download    Fact sheet for patients: https://www.fda.gov/media/981896/download    Test performed by PCR.          Adult Transthoracic Echo Complete W/ Cont if Necessary Per Protocol    Result Date: 6/11/2022  · Estimated left ventricular EF = 51% Left ventricular ejection fraction appears to be 51 - 55%. Left ventricular systolic function is normal. · Left ventricular wall thickness is consistent with mild septal asymmetric hypertrophy. · There is calcification of the aortic valve mainly affecting the left coronary cusp(s). · Moderate mitral valve regurgitation is present. · Estimated right ventricular systolic pressure from tricuspid regurgitation is normal (<35 mmHg). · Incidental heart rate 120 bpm with diastolic inflow consistent with atrial fibrillation      CT Chest Without Contrast Diagnostic    Result Date: 6/11/2022  CT CHEST WITHOUT CONTRAST CLINICAL INDICATION: Hypoxia. COMPARISON: 6/3/2022.  TECHNIQUE: Noncontrast CT images of the chest were obtained. CT dose lowering techniques were used, to include: automated exposure control, adjustment for patient size, and or use of iterative reconstruction. FINDINGS: Cardiovascular: Heart size stable without pericardial effusion. Atherosclerotic calcifications are seen in the coronary vessels and aorta. Mediastinum: No mediastinal or hilar lymphadenopathy. Trachea and central airways are patent. Thyroid is normal. Stable large hiatal hernia. Lungs and pleura: A redemonstrated rounded opacity is seen in the right lung base. Small right pleural effusion. Increased diffuse groundglass and reticular opacities are seen in the upper lobes compared to prior. Ill-defined streaky airspace densities are also increased in the lung bases. No pneumothorax. Upper abdomen: No acute abnormality. Body wall: No acute abnormality. Bones: No acute osseous abnormality.     1. Increased multifocal, ill-defined groundglass and streaky alveolar densities bilaterally concerning for pulmonary edema or pneumonia. 2. Unchanged rounded opacity in the right lung base which could reflect loculated pleural fluid and/or round atelectasis. An underlying parenchymal mass lesion is not excluded. Evaluation limited by lack of contrast. 3. Stable large hiatal hernia. Electronically signed by:  Theo Naqvi M.D.  6/11/2022 12:05 AM Mountain Time    NM Lung Scan Perfusion Particulate    Result Date: 6/13/2022  DATE OF EXAM: 6/13/2022 1:00 PM  PROCEDURE: NM LUNG SCAN PERFUSION PARTICULATE-  INDICATIONS: elevated D-dimer, pneumonia, CKD III; A41.9-Sepsis, unspecified organism; J18.9-Pneumonia, unspecified organism; I50.9-Heart failure, unspecified; J96.01-Acute respiratory failure with hypoxia  COMPARISON: 6/13/2022 portal chest radiograph  TECHNIQUE: 5.0 mCi of technetium 99m labeled was administered intravenously for the perfusion portion of the pulmonary exam. Scintigraphic images of the lungs were  obtained in multiple projections to assess perfusion.  FINDINGS: As would be expected given the patient's extensive pulmonary parenchymal abnormalities, the overall perfusion pattern lungs is heterogeneous, and tends to follow the pattern of the patient's chest x-ray, with generally diminished activity in the right upper lung and a portion of the right lung base, as well as attenuation of the left lung base due to elevated left hemidiaphragm. Study is considered indeterminate for pulmonary embolus, but there are no particular segmental or subsegmental defects to strongly suggest embolic disease. Perfusion abnormalities are all explainable on the basis of the patient's chest CT scan findings.      Technically indeterminate perfusion scan. However, perfusion scan abnormalities are all explainable on the basis the patient's pulmonary disease and no supporting findings are seen to suggest significant pulmonary embolic disease  .  This report was finalized on 6/13/2022 3:30 PM by Dr. Fidel Barnes MD.      XR Chest 1 View    Result Date: 6/15/2022   DATE OF EXAM: 6/15/2022 4:09 AM  PROCEDURE: XR CHEST 1 VW-  INDICATIONS: pneumonia; A41.9-Sepsis, unspecified organism; J18.9-Pneumonia, unspecified organism; I50.9-Heart failure, unspecified; J96.01-Acute respiratory failure with hypoxia  COMPARISON: 06/14/2022  TECHNIQUE: Portable chest radiograph.  FINDINGS:  Heart is mildly enlarged, stable. There is redemonstration of extensive multifocal airspace disease compatible with multifocal pneumonia, similar to the prior study. No pneumothorax. No large pleural effusion. Aortic arch atherosclerosis. Large hiatal hernia noted.      Persistent multifocal consolidation throughout the lungs compatible with pneumonia.  This report was finalized on 6/15/2022 7:17 AM by Michael Osei MD.      XR Chest 1 View    Result Date: 6/14/2022  Examination: XR CHEST 1 VW-  Date of Exam: 6/14/2022 5:06 AM  Indication: pulmonary infiltrates;  A41.9-Sepsis, unspecified organism; J18.9-Pneumonia, unspecified organism; I50.9-Heart failure, unspecified; J96.01-Acute respiratory failure with hypoxia.  Comparison: June 13, 2022  Technique: 1 view of the chest  Findings: The heart is enlarged. Again seen are multifocal infiltrates greater on the right than the left consistent with multifocal pneumonia. There is a moderate sized hiatal hernia. There are degenerative changes of the spine and shoulders.      Persistent multifocal infiltrates most pronounced in the right upper lobe favored to be pneumonia  This report was finalized on 6/14/2022 7:58 AM by Get Jameson MD.      XR Chest 1 View    Result Date: 6/13/2022  DATE OF EXAM: 6/13/2022 2:05 PM  PROCEDURE: XR CHEST 1 VW-  INDICATIONS: v/q comparison; A41.9-Sepsis, unspecified organism; J18.9-Pneumonia, unspecified organism; I50.9-Heart failure, unspecified; J96.01-Acute respiratory failure with hypoxia  COMPARISON: Karo 10, 2022  TECHNIQUE: Single radiographic AP view of the chest was obtained.  FINDINGS: The heart looks enlarged. There is a hiatal hernia. There is bilateral airspace disease worse in the right upper lobe with some interval worsening to the left lung since prior exam. There are no large pleural effusions.      1.  Bilateral pulmonary infiltrates which could reflect a multifocal inflammatory infectious process. 2.  Cardiomegaly. 3.  Hiatal hernia  This report was finalized on 6/13/2022 2:49 PM by Bernardino Chase MD.      XR Chest 1 View    Result Date: 6/11/2022  Single portable chest radiograph INDICATION:  Shortness of breath COMPARISON:  Chest radiograph 6/3/2022 FINDINGS: Patient's hand overlies the left upper lobe. Increased airspace opacities throughout the right lung. Small right pleural effusion. No pneumothorax. The cardiomediastinal silhouette is enlarged as before. Large hiatal hernia. There is atherosclerotic calcification of the aorta. No acute focal bony abnormalities are seen.      Increased airspace opacities throughout the right lung. Small right pleural effusion Large hiatal hernia. Electronically signed by:  Tiera Juares M.D.  6/10/2022 11:58 PM Mountain Time    Duplex Venous Lower Extremity - Bilateral CAR    Result Date: 6/11/2022  · Normal bilateral lower extremity venous duplex scan.        Results for orders placed during the hospital encounter of 06/10/22    Duplex Venous Lower Extremity - Bilateral CAR    Interpretation Summary  · Normal bilateral lower extremity venous duplex scan.      Results for orders placed during the hospital encounter of 06/10/22    Duplex Venous Lower Extremity - Bilateral CAR    Interpretation Summary  · Normal bilateral lower extremity venous duplex scan.      Results for orders placed during the hospital encounter of 06/10/22    Adult Transthoracic Echo Complete W/ Cont if Necessary Per Protocol    Interpretation Summary  · Estimated left ventricular EF = 51% Left ventricular ejection fraction appears to be 51 - 55%. Left ventricular systolic function is normal.  · Left ventricular wall thickness is consistent with mild septal asymmetric hypertrophy.  · There is calcification of the aortic valve mainly affecting the left coronary cusp(s).  · Moderate mitral valve regurgitation is present.  · Estimated right ventricular systolic pressure from tricuspid regurgitation is normal (<35 mmHg).  · Incidental heart rate 120 bpm with diastolic inflow consistent with atrial fibrillation        Discharge Details        Discharge Medications      New Medications      Instructions Start Date   cefdinir 300 MG capsule  Commonly known as: OMNICEF   300 mg, Oral, 2 Times Daily      doxycycline 50 MG capsule  Commonly known as: VIBRAMYCIN   50 mg, Oral, 2 Times Daily      furosemide 40 MG tablet  Commonly known as: LASIX   40 mg, Oral, Daily   Start Date: June 18, 2022     Metoprolol Tartrate 75 MG tablet   75 mg, Oral, Every 12 Hours Scheduled         Changes to  Medications      Instructions Start Date   polyethylene glycol pack packet  Commonly known as: MIRALAX  What changed:   when to take this  reasons to take this   17 g, Oral, Daily         Continue These Medications      Instructions Start Date   acetaminophen 500 MG tablet  Commonly known as: TYLENOL   500 mg, Oral, 2 Times Daily, Take one tablet twice a day for leg pain  And prn every 6 hours      aluminum-magnesium hydroxide-simethicone 400-400-40 MG/5ML suspension  Commonly known as: MAALOX MAX   5 mL, Oral, Every 6 Hours PRN      aspirin  MG tablet   325 mg, Oral, Daily      citalopram 20 MG tablet  Commonly known as: CeleXA   20 mg, Oral, Daily      docusate sodium 100 MG capsule  Commonly known as: COLACE   100 mg, Oral, 2 Times Daily      donepezil 5 MG tablet  Commonly known as: ARICEPT   5 mg, Oral, Nightly      fluticasone 50 MCG/ACT nasal spray  Commonly known as: FLONASE   2 sprays, Nasal, Daily      insulin aspart 100 UNIT/ML solution pen-injector sc pen  Commonly known as: novoLOG FLEXPEN   2-5 Units, Subcutaneous, 4 Times Daily      isosorbide mononitrate 60 MG 24 hr tablet  Commonly known as: IMDUR   60 mg, Oral, Daily      lidocaine 5 %  Commonly known as: Lidoderm   1 patch, Transdermal, Every 24 Hours, Remove & Discard patch within 12 hours or as directed by MD      metFORMIN  MG 24 hr tablet  Commonly known as: GLUCOPHAGE-XR   500 mg, Oral, Daily With Breakfast      MULTIVITAMINS PO   1 tablet, Oral, Daily      ondansetron 4 MG tablet  Commonly known as: ZOFRAN   4 mg, Oral, Every 8 Hours PRN      PRO-STAT AWC PO   1 dose, Oral, 2 Times Daily         Stop These Medications    amiodarone 200 MG tablet  Commonly known as: PACERONE     amLODIPine 10 MG tablet  Commonly known as: NORVASC     bisacodyl 5 MG EC tablet  Commonly known as: DULCOLAX     cloNIDine 0.2 MG tablet  Commonly known as: CATAPRES     collagenase 250 UNIT/GM ointment            Allergies   Allergen Reactions   •  "Penicillins Other (See Comments)     Pt states \"i don't know\"     Discharge Disposition:  Home or Self Care -  [facility]    Diet:  Hospital:  Diet Order   Procedures   • Diet Regular; Consistent Carbohydrate, Cardiac     Activity:   As tolerated    Restrictions or Other Recommendations:   Complete abx as recommended by Pulmonary Medicine       CODE STATUS:    Code Status and Medical Interventions:   Ordered at: 06/11/22 0138     Medical Intervention Limits:    NO intubation (DNI)     Level Of Support Discussed With:    Patient    Health Care Surrogate     Code Status (Patient has no pulse and is not breathing):    No CPR (Do Not Attempt to Resuscitate)     Medical Interventions (Patient has pulse or is breathing):    Limited Support     No future appointments.    Additional Instructions for the Follow-ups that You Need to Schedule     Discharge Follow-up with PCP   As directed       Currently Documented PCP:    Tiana Hong MD    PCP Phone Number:    [phone]     Follow Up Details: Primary Care Doctor - 1 week                 Vish Andrade MD  06/17/22      Time Spent on Discharge:  I spent  42  minutes on this discharge activity which included: face-to-face encounter with the patient, reviewing the data in the system, coordination of the care with the nursing staff as well as consultants, documentation, and entering orders.        "

## 2022-06-17 NOTE — PLAN OF CARE
Goal Outcome Evaluation:  Plan of Care Reviewed With: patient        Progress: improving  Outcome Evaluation: pt slept most of the day,, apparently did not sleep last night, woke up for dinner, ambulated in room, up to chair. remained on RA all day, sating mid to upper 90s, awake or asleep. VSS, afib with controlled rate.

## 2022-06-17 NOTE — PLAN OF CARE
Problem: Skin Injury Risk Increased  Goal: Skin Health and Integrity  6/17/2022 1136 by Mindy Moore RN  Outcome: Ongoing, Progressing  6/17/2022 1134 by Mindy Moore RN  Outcome: Ongoing, Progressing  Goal: Skin Health and Integrity  6/17/2022 1136 by Mindy Moore RN  Outcome: Ongoing, Progressing  6/17/2022 1134 by Mindy Moore RN  Outcome: Ongoing, Progressing     Problem: Fall Injury Risk  Goal: Absence of Fall and Fall-Related Injury  6/17/2022 1136 by Mindy Moore RN  Outcome: Ongoing, Progressing  6/17/2022 1134 by Mindy Moore RN  Outcome: Ongoing, Progressing  Intervention: Promote Injury-Free Environment  Recent Flowsheet Documentation  Taken 6/17/2022 1000 by Mindy Moore RN  Safety Promotion/Fall Prevention: safety round/check completed  Taken 6/17/2022 0830 by Mindy Moore RN  Safety Promotion/Fall Prevention: safety round/check completed     Problem: Adult Inpatient Plan of Care  Goal: Plan of Care Review  6/17/2022 1136 by Mindy Moore RN  Outcome: Ongoing, Progressing  6/17/2022 1134 by Mindy Moore RN  Outcome: Ongoing, Progressing  Flowsheets (Taken 6/17/2022 1134)  Progress: improving  Plan of Care Reviewed With: patient  Outcome Evaluation: Patient at baseline plan to return to the [facility] care unit, no c/o pain, AAOx1 at baseline, tolerating regular cc diet, SS insulin prn  Goal: Patient-Specific Goal (Individualized)  6/17/2022 1136 by Mindy Moore RN  Outcome: Ongoing, Progressing  6/17/2022 1134 by Mindy Moore RN  Outcome: Ongoing, Progressing  Goal: Absence of Hospital-Acquired Illness or Injury  6/17/2022 1136 by Mindy Moore RN  Outcome: Ongoing, Progressing  6/17/2022 1134 by Mindy Moore RN  Outcome: Ongoing, Progressing  Intervention: Identify and Manage Fall Risk  Recent Flowsheet Documentation  Taken 6/17/2022 1000 by Mindy Moore RN  Safety Promotion/Fall Prevention: safety round/check completed  Taken 6/17/2022 0830 by Mindy Moore  RN  Safety Promotion/Fall Prevention: safety round/check completed  Goal: Optimal Comfort and Wellbeing  6/17/2022 1136 by Mindy Moore RN  Outcome: Ongoing, Progressing  6/17/2022 1134 by Mindy Moore RN  Outcome: Ongoing, Progressing  Intervention: Provide Person-Centered Care  Recent Flowsheet Documentation  Taken 6/17/2022 0830 by Mindy Moore RN  Trust Relationship/Rapport: care explained  Goal: Readiness for Transition of Care  6/17/2022 1136 by Mindy Moore RN  Outcome: Ongoing, Progressing  6/17/2022 1134 by Mindy Moore RN  Outcome: Ongoing, Progressing     Problem: Pain Acute  Goal: Acceptable Pain Control and Functional Ability  6/17/2022 1136 by Mindy Moore RN  Outcome: Ongoing, Progressing  6/17/2022 1134 by Mindy Moore RN  Outcome: Ongoing, Progressing     Problem: Confusion Acute  Goal: Optimal Cognitive Function  6/17/2022 1136 by Mindy Moore RN  Outcome: Ongoing, Progressing  6/17/2022 1134 by Mindy Moore RN  Outcome: Ongoing, Progressing     Problem: Diabetes Comorbidity  Goal: Blood Glucose Level Within Targeted Range  6/17/2022 1136 by Mindy Moore RN  Outcome: Ongoing, Progressing  6/17/2022 1134 by Mindy Moore RN  Outcome: Ongoing, Progressing     Problem: Heart Failure Comorbidity  Goal: Maintenance of Heart Failure Symptom Control  6/17/2022 1136 by Mindy Moore RN  Outcome: Ongoing, Progressing  6/17/2022 1134 by Mindy Moore RN  Outcome: Ongoing, Progressing     Problem: Hypertension Comorbidity  Goal: Blood Pressure in Desired Range  6/17/2022 1136 by Mindy Moore RN  Outcome: Ongoing, Progressing  6/17/2022 1134 by Mindy Moore RN  Outcome: Ongoing, Progressing   Goal Outcome Evaluation:  Plan of Care Reviewed With: patient        Progress: improving  Outcome Evaluation: Patient at baseline plan to return to the [facility] unit, no c/o pain, AAOx1 at baseline, tolerating regular cc diet, SS insulin prn

## 2022-06-17 NOTE — PLAN OF CARE
Goal Outcome Evaluation:  Plan of Care Reviewed With: patient        Progress: improving  Outcome Evaluation: Pt. demonstrates bed mobility with min assist. She performed sit to stand and bed to chair transfer w/min assist. She self propelled independently in her wheelchair w/set up assistance. Will continue to progress as able.

## 2022-06-17 NOTE — CASE MANAGEMENT/SOCIAL WORK
Case Management Discharge Note      Final Note: Ms. Ramirez has been evaluated by PT today and she is appropriate to return to her Memory Care room at [facility].  Confirmed the bed with Betsy at the facility.  Updated Ms. Ramirez's son, Josh, by lilliana, and he is agreeable to the DC plan.  [facility] is scheduled to take Ms. Ramirez to [facility] today at 1pm.  COVID test completed yesterday and results are normal.  Facility pharmacy provider is Gordy Bell, and is noted in [email or web address] for e-scribing.  Please call report to [facility] at  [phone].  CM will fax the DC summary to fax [phone].  Please have a copy of the DC summary and AVS in the DC packet.  Thank you.         Selected Continued Care - Admitted Since 6/10/2022     Destination Coordination complete.    Service Provider Selected Services Address Phone Fax Patient Preferred    [facility]  [facility] [address] [facility] [address] [phone] --       Internal Comment last updated by Brandy Hunter, RN 6/17/2022 1139    Memory Care Unit                               Transportation Services  Ambulance: [facility]    Final Discharge Disposition Code: 01 - home or self-care

## 2022-06-17 NOTE — PLAN OF CARE
Goal Outcome Evaluation:  Plan of Care Reviewed With: patient        Progress: improving  Outcome Evaluation: Patient at baseline plan to return to the [facility] unit, no c/o pain, AAOx1 at baseline, tolerating regular cc diet, SS insulin prn

## 2022-06-17 NOTE — PROGRESS NOTES
"[facility] Progress Note     LOS: 6 days   Patient Care Team:  Tiana Hong MD as PCP - General (Internal Medicine)  PCP:  Tiana Hong MD    Chief Complaint: Persistent atrial fibrillation    SUBJECTIVE: Resting comfortably in bed, asleep, no acute distress, satting 100% on room air.  Telemetry reveals rate controlled atrial fibrillation.      Review of Systems:   All systems have been reviewed and are negative with the exception of those mentioned above.      OBJECTIVE:    Vital Sign Min/Max for last 24 hours  Temp  Min: 97.5 °F (36.4 °C)  Max: 99 °F (37.2 °C)   BP  Min: 106/72  Max: 164/98   Pulse  Min: 77  Max: 87   Resp  Min: 18  Max: 18   SpO2  Min: 90 %  Max: 100 %   No data recorded   No data recorded     Flowsheet Rows    Flowsheet Row First Filed Value   Admission Height 162.6 cm (64\") Documented at 06/10/2022 2245   Admission Weight 52.2 kg (115 lb) Documented at 06/10/2022 2245          Telemetry: Atrial fibrillation    Intake/Output Summary (Last 24 hours) at 6/17/2022 1014  Last data filed at 6/17/2022 0900  Gross per 24 hour   Intake 810 ml   Output 750 ml   Net 60 ml     Intake & Output (last 3 days)       06/14 0701  06/15 0700 06/15 0701  06/16 0700 06/16 0701  06/17 0700 06/17 0701  06/18 0700    P.O.  820 810 215    IV Piggyback        Total Intake(mL/kg)  820 (14) 810 (13.8) 215 (3.7)    Urine (mL/kg/hr) 950 (0.7) 975 (0.7) 900 (0.6) 450 (2.4)    Stool 0 0 0     Total Output 950 975 900 450    Net -950 -155 -90 -235            Urine Unmeasured Occurrence 1 x  2 x     Stool Unmeasured Occurrence 1 x 2 x 1 x            Physical Exam:    General Appearance:   Awake, no distress, appears stated age   Neck:   Supple, symmetrical, trachea midline.   Lungs:    Breath sounds bilaterally, respirations unlabored   Chest Wall:    No tenderness or deformity    Heart:   Irregular regular, S1 and S2 normal, no murmur, rub   " or gallop, normal carotid impulse bilaterally without bruit.   Extremities:   Extremities normal, atraumatic, no cyanosis or edema   Pulses:   2+ and symmetric all extremities   Skin:   Skin color, texture, turgor normal, no rashes or lesions      LABS/DIAGNOSTIC DATA:  Results from last 7 days   Lab Units 06/15/22  1128 06/14/22  0507 06/11/22  1012   WBC 10*3/mm3 8.96 7.73 8.93   HEMOGLOBIN g/dL 12.8 13.2 12.5   HEMATOCRIT % 39.9 42.2 36.6   PLATELETS 10*3/mm3 315 270 267     Lab Results   Lab Value Date/Time    TROPONINT 0.028 06/10/2022 2230    TROPONINT 0.033 (C) 06/03/2022 1659    TROPONINT 0.038 (C) 06/03/2022 1443    TROPONINT <0.010 11/11/2019 1743    TROPONINT <0.010 09/21/2019 2115    TROPONINT <0.010 09/21/2019 1829         Results from last 7 days   Lab Units 06/16/22  0503 06/15/22  1128 06/14/22  0507 06/11/22  1012 06/10/22  2230   SODIUM mmol/L 133* 133* 140   < > 133*   POTASSIUM mmol/L 3.8 3.8 3.5   < > 4.2   CHLORIDE mmol/L 95* 93* 99   < > 98   CO2 mmol/L 24.0 29.0 28.0   < > 25.0   BUN mg/dL 33* 31* 29*   < > 27*   CREATININE mg/dL 1.06* 1.24* 1.14*   < > 1.28*   CALCIUM mg/dL 9.3 9.1 8.8   < > 9.7   BILIRUBIN mg/dL  --   --   --   --  0.5   ALK PHOS U/L  --   --   --   --  112   ALT (SGPT) U/L  --   --   --   --  10   AST (SGOT) U/L  --   --   --   --  18   GLUCOSE mg/dL 226* 385* 204*   < > 117*    < > = values in this interval not displayed.     Results from last 7 days   Lab Units 06/11/22  1012   HEMOGLOBIN A1C % 7.30*                   Medication Review:   aspirin EC, 325 mg, Oral, Daily  cefepime, 1 g, Intravenous, Q12H  citalopram, 20 mg, Oral, Daily  donepezil, 5 mg, Oral, Nightly  doxycycline, 100 mg, Oral, Q12H  fluticasone, 2 spray, Nasal, Daily  furosemide, 40 mg, Oral, Daily  heparin (porcine), 5,000 Units, Subcutaneous, Q12H  insulin lispro, 0-7 Units, Subcutaneous, TID AC  isosorbide mononitrate, 60 mg, Oral, Daily  lidocaine, 1 patch, Transdermal, Q24H  metoprolol tartrate, 75  mg, Oral, Q12H  multivitamin, 1 tablet, Oral, Daily  sodium chloride, 10 mL, Intravenous, Q12H             ASSESSMENT/PLAN:    Acute respiratory failure with hypoxia (HCC)    Essential hypertension    Coronary artery disease involving native coronary artery of native heart with angina pectoris (HCC)    Dementia (HCC)    Paroxysmal atrial fibrillation (HCC)    Benign essential hypertension    CKD (chronic kidney disease), stage III (HCC)    GERD (gastroesophageal reflux disease)    Late onset Alzheimer's disease without behavioral disturbance (HCC)    Multifocal pneumonia    Acute on chronic diastolic CHF (congestive heart failure) (HCC)    Acute sepsis (HCC)        Continue rate control, increasing metoprolol tartrate to 75 mg p.o. twice daily.  Continue gradual diuresis while trending renal function  Currently high risk for chronic anticoagulation.    -Stable from a cardiac standpoint, will follow on as-needed basis over the weekend.          Ta Flores III, MD   06/17/22  10:14 EDT

## 2022-06-17 NOTE — THERAPY TREATMENT NOTE
Patient Name: Mary Ramirez  : 1934    MRN: [id number]                              Today's Date: 2022       Admit Date: 6/10/2022    Visit Dx:     ICD-10-CM ICD-9-CM   1. Acute sepsis ([facility])  A41.9 038.9     995.91   2. Pneumonia due to infectious organism, unspecified laterality, unspecified part of lung  J18.9 486   3. Acute congestive heart failure, unspecified heart failure type ([facility])  I50.9 428.0   4. Acute respiratory failure with hypoxia ([facility])  J96.01 518.81     Patient Active Problem List   Diagnosis   • Fall   • Fracture, intertrochanteric, left femur ([facility])   • Essential hypertension   • Coronary artery disease involving native coronary artery of native heart with angina pectoris ([facility])   • Dementia ([facility])   • Paroxysmal atrial fibrillation ([facility])   • Anticoagulated   • SSS (sick sinus syndrome) ([facility])   • Chronic diastolic congestive heart failure ([facility])   • DEANGELO (acute kidney injury) ([facility])   • Benign essential hypertension   • CKD (chronic kidney disease), stage III ([facility])   • Mixed hyperlipidemia   • Major depressive disorder, single episode, mild ([facility])   • Chronic allergic rhinitis   • Memory loss   • Bradycardia   • At risk for falls   • Closed fracture of femur ([facility])   • Coronary arteriosclerosis after percutaneous transluminal coronary angioplasty (PTCA)   • Diabetic polyneuropathy ([facility])   • Diverticular disease of colon   • Foot pain   • GERD (gastroesophageal reflux disease)   • Peripheral artery insufficiency ([facility])   • Annual physical exam   • Vitamin D deficiency   • Renal disorder   • Closed fracture of right hip ([facility])   • Postoperative anemia due to acute blood loss   • Late onset Alzheimer's disease without behavioral disturbance ([facility])   • Altered mental status, unspecified altered mental status type   • Acute respiratory failure with hypoxia ([facility])   • Multifocal pneumonia   • Acute on chronic diastolic CHF (congestive heart failure) ([facility])   • Acute sepsis ([facility])     Past Medical  History:   Diagnosis Date   • Atrial fibrillation (HCC)    • Benign essential hypertension    • CAD (coronary artery disease)    • Carotid artery disease (HCC)    • CHF (congestive heart failure) (HCC)    • Chronic allergic rhinitis    • CKD (chronic kidney disease), stage III (HCC)    • DDD (degenerative disc disease), cervical    • Dementia (HCC)    • Diabetes mellitus (HCC)    • Diverticulosis     WITH PERFORATION    • Hypertension    • Major depressive disorder, single episode, mild (HCC)    • Memory loss    • Mixed hyperlipidemia    • SSS (sick sinus syndrome) ([facility])    • TIA (transient ischemic attack) 01/2016     Past Surgical History:   Procedure Laterality Date   • ANKLE SURGERY Left 1996   • ATHERECTOMY  1995   • ATHRECTOMY ILIAC, FEMORAL, TIBIAL ARTERY     • BREAST LUMPECTOMY Left 1981   • COLON SURGERY     • COLOSTOMY  1983   • CORONARY STENT PLACEMENT  2001   • FINGER FRACTURE SURGERY Right    • HIP TROCHANTERIC NAILING WITH INTRAMEDULLARY HIP SCREW Left 11/23/2017   • HIP TROCHANTERIC NAILING WITH INTRAMEDULLARY HIP SCREW Right 3/30/2019    Procedure: HIP TROCANTERIC NAILING WITH INTRAMEDULLARY HIP SCREW RIGHT;  Surgeon: Theo Figueroa MD;  Location: [facility];  Service: Orthopedics   • HYSTERECTOMY  1984   • OTHER SURGICAL HISTORY  1984    REANASTAMOSIS       General Information     Row Name 06/17/22 1116          Physical Therapy Time and Intention    Document Type therapy note (daily note)  -SS     Mode of Treatment physical therapy  -     Row Name 06/17/22 1116          General Information    Patient Profile Reviewed yes  -SS     Existing Precautions/Restrictions fall;other (see comments)  dementia  -SS     Barriers to Rehab cognitive status;medically complex  -     Row Name 06/17/22 1116          Cognition    Orientation Status (Cognition) unable/difficult to assess;other (see comments)  pt does not respond to questions on multiple attempts  -     Row Name 06/17/22 1116          Safety  Issues, Functional Mobility    Safety Issues Affecting Function (Mobility) safety precaution awareness;safety precautions follow-through/compliance;sequencing abilities  -     Impairments Affecting Function (Mobility) balance;endurance/activity tolerance;cognition;postural/trunk control  -     Cognitive Impairments, Mobility Safety/Performance attention;safety precaution awareness;safety precaution follow-through;sequencing abilities  -SS           User Key  (r) = Recorded By, (t) = Taken By, (c) = Cosigned By    Initials Name Provider Type     ePnnie Diaz, PT Physical Therapist               Mobility     Row Name 06/17/22 1120          Bed Mobility    Bed Mobility scooting/bridging;supine-sit;sit-supine  -SS     Scooting/Bridging [place] (Bed Mobility) minimum assist (75% patient effort);verbal cues  -     Supine-Sit [place] (Bed Mobility) minimum assist (75% patient effort);verbal cues  -SS     Sit-Supine [place] (Bed Mobility) minimum assist (75% patient effort);verbal cues  -     Assistive Device (Bed Mobility) head of bed elevated;bed rails  -     Comment, (Bed Mobility) VC for sequencing  -     Row Name 06/17/22 1120          Bed-Chair Transfer    Bed-Chair [place] (Transfers) minimum assist (75% patient effort);verbal cues  -     Assistive Device (Bed-Chair Transfers) walker, front-wheeled  -     Comment, (Bed-Chair Transfer) VC for hand placement, sequencing; pt. requires increased time  -     Row Name 06/17/22 1120          Sit-Stand Transfer    Sit-Stand [place] (Transfers) minimum assist (75% patient effort);verbal cues  -     Assistive Device (Sit-Stand Transfers) other (see comments)  BUE support  -     Comment, (Sit-Stand Transfer) VC for hand placement  -     Row Name 06/17/22 1120          Gait/Stairs (Locomotion)    Comment, (Gait/Stairs) pt. demonstrated ability to self propel in WC w/BUE on wheels for 10' independently, set up assist required   -SS           User Key  (r) = Recorded By, (t) = Taken By, (c) = Cosigned By    Initials Name Provider Type     Pennie Diaz PT Physical Therapist               Obj/Interventions     Row Name 06/17/22 1126          Balance    Balance Assessment sitting static balance;sitting dynamic balance;sit to stand dynamic balance;standing static balance;standing dynamic balance  -SS     Static Sitting Balance supervision  -SS     Dynamic Sitting Balance standby assist  -SS     Position, Sitting Balance unsupported;sitting edge of bed  -     Sit to Stand Dynamic Balance minimal assist  -SS     Static Standing Balance minimal assist  -SS     Dynamic Standing Balance minimal assist  -SS     Position/Device Used, Standing Balance supported;other (see comments)  BUE support  -     Balance Interventions sitting;standing;sit to stand;supported;static;dynamic  -           User Key  (r) = Recorded By, (t) = Taken By, (c) = Cosigned By    Initials Name Provider Type     Pennie Diaz PT Physical Therapist               Goals/Plan     Row Name 06/17/22 1132          Transfer Goal 1 (PT)    Activity/Assistive Device (Transfer Goal 1, PT) sit-to-stand/stand-to-sit;bed-to-chair/chair-to-bed  -SS     [place] Level/Cues Needed (Transfer Goal 1, PT) modified independence  -SS     Time Frame (Transfer Goal 1, PT) long term goal (LTG);10 days  -SS     Progress/Outcome (Transfer Goal 1, PT) goal met;goal revised this date;goal ongoing  -     Row Name 06/17/22 1133          Problem Specific Goal 1 (PT)    Problem Specific Goal 1 (PT) Pt will be able to self propel wheelchair 20 feet with independence for steering  -SS     Time Frame (Problem Specific Goal 1, PT) long-term goal (LTG);1 week  -SS     Progress/Outcome (Problem Specific Goal 1, PT) good progress toward goal;goal revised this date;goal ongoing  -           User Key  (r) = Recorded By, (t) = Taken By, (c) = Cosigned By    Initials Name Provider Type      Pennie Diaz, PT Physical Therapist               Clinical Impression     Row Name 06/17/22 1127          Pain    Pretreatment Pain Rating 0/10 - no pain  -     Posttreatment Pain Rating 0/10 - no pain  -     Pain Intervention(s) Repositioned;Ambulation/increased activity  -     Additional Documentation Pain Scale: Numbers Pre/Post-Treatment (Group)  -     Row Name 06/17/22 1127          Plan of Care Review    Plan of Care Reviewed With patient  -     Progress improving  -     Outcome Evaluation Pt. demonstrates bed mobility with min assist. She performed sit to stand and bed to chair transfer w/min assist. She self propelled independently in her wheelchair w/set up assistance. Will continue to progress as able.  -     Row Name 06/17/22 1127          Therapy Assessment/Plan (PT)    Rehab Potential (PT) good, to achieve stated therapy goals  -     Criteria for Skilled Interventions Met (PT) yes;meets criteria;skilled treatment is necessary  -     Therapy Frequency (PT) daily  -     Row Name 06/17/22 1127          Vital Signs    Pre Systolic BP Rehab 140  -SS     Pre Treatment Diastolic BP 75  -SS     Pretreatment Heart Rate (beats/min) 88  -SS     Pre SpO2 (%) 96  -SS     O2 Delivery Pre Treatment room air  -SS     Pre Patient Position Supine  -SS     Row Name 06/17/22 1127          Positioning and Restraints    Pre-Treatment Position in bed  -SS     Post Treatment Position bed  -SS     In Bed notified nsg;fowlers;call light within reach;encouraged to call for assist;exit alarm on  -SS           User Key  (r) = Recorded By, (t) = Taken By, (c) = Cosigned By    Initials Name Provider Type     Pennie Diaz, PT Physical Therapist               Outcome Measures     Row Name 06/17/22 1132          How much help from another person do you currently need...    Turning from your back to your side while in flat bed without using bedrails? 3  -SS     Moving from lying on back to sitting on the side of  a flat bed without bedrails? 3  -SS     Moving to and from a bed to a chair (including a wheelchair)? 3  -SS     Standing up from a chair using your arms (e.g., wheelchair, bedside chair)? 3  -SS     Climbing 3-5 steps with a railing? 2  -SS     To walk in hospital room? 3  -SS     AM-PAC 6 Clicks Score (PT) 17  -SS     Highest level of mobility 5 --> Static standing  -SS     Row Name 06/17/22 1132          Functional Assessment    Outcome Measure Options AM-PAC 6 Clicks Basic Mobility (PT)  -           User Key  (r) = Recorded By, (t) = Taken By, (c) = Cosigned By    Initials Name Provider Type     Pennie Diaz, TIFFANIE Physical Therapist                             Physical Therapy Education                 Title: PT OT SLP Therapies (In Progress)     Topic: Physical Therapy (In Progress)     Point: Mobility training (Done)     Learning Progress Summary           Patient Eager, E, VU,NR by  at 6/17/2022 1133    Comment: Reviewed safety/technique with bed mobility, transfers, WC navigation, PT POC    Acceptance, E,TB, NR by  at 6/14/2022 1353    Comment: Education to patient on PT services, POC, discharge recommendations, safety with mobility.                   Point: Home exercise program (In Progress)     Learning Progress Summary           Patient Acceptance, E,TB, NR by  at 6/14/2022 1353    Comment: Education to patient on PT services, POC, discharge recommendations, safety with mobility.                   Point: Body mechanics (Done)     Learning Progress Summary           Patient Eager, E, VU,NR by  at 6/17/2022 1133    Comment: Reviewed safety/technique with bed mobility, transfers, WC navigation, PT POC    Acceptance, E,TB, NR by  at 6/14/2022 1353    Comment: Education to patient on PT services, POC, discharge recommendations, safety with mobility.                   Point: Precautions (Done)     Learning Progress Summary           Patient Eager, E, VU,NR by  at 6/17/2022 1133    Comment:  Reviewed safety/technique with bed mobility, transfers, WC navigation, PT POC    Acceptance, E,TB, NR by  at 6/14/2022 [id number]    Comment: Education to patient on PT services, POC, discharge recommendations, safety with mobility.                               User Key     Initials Effective Dates Name Provider Type [facility] 09/22/20 -  Abelardo Cardenas, PT Physical Therapist PT     06/01/21 -  Pennie Diaz PT Physical Therapist PT              PT Recommendation and Plan     Plan of Care Reviewed With: patient  Progress: improving  Outcome Evaluation: Pt. demonstrates bed mobility with min assist. She performed sit to stand and bed to chair transfer w/min assist. She self propelled independently in her wheelchair w/set up assistance. Will continue to progress as able.     Time Calculation:    PT Charges     Row Name 06/17/22 1134             Time Calculation    Start Time 1049  -SS      Stop Time 1112  -SS      Time Calculation (min) 23 min  -SS      PT Received On 06/17/22  -              Time Calculation- PT    Total Timed Code Minutes- PT 23 minute(s)  -SS              Timed Charges    39280 - PT Therapeutic Activity Minutes 23  -SS              Total Minutes    Timed Charges Total Minutes 23  -SS       Total Minutes 23  -SS            User Key  (r) = Recorded By, (t) = Taken By, (c) = Cosigned By    Initials Name Provider Type     Pennie Diaz, TIFFANIE Physical Therapist              Therapy Charges for Today     Code Description Service Date Service Provider Modifiers Qty    [id number] HC PT THERAPEUTIC ACT EA 15 MIN 6/17/2022 Pennie Diaz PT GP 2          PT G-Codes  Outcome Measure Options: AM-PAC 6 Clicks Basic Mobility (PT)  AM-PAC 6 Clicks Score (PT): 17  AM-PAC 6 Clicks Score (OT): 17    Pennie Diaz PT  6/17/2022

## 2022-06-18 ENCOUNTER — READMISSION MANAGEMENT (OUTPATIENT)
Dept: CALL CENTER | Facility: HOSPITAL | Age: 87
End: 2022-06-18

## 2022-06-18 NOTE — OUTREACH NOTE
Prep Survey    Flowsheet Row Responses   [facility] facility patient discharged from? [facility]   Is LACE score < 7 ? No   Emergency Room discharge w/ pulse ox? No   Eligibility Not Eligible   What are the reasons patient is not eligible? [facility]   Does the patient have one of the following disease processes/diagnoses(primary or secondary)? Acute MI (STEMI,NSTEMI)  [Acute respiratory failure with hypoxia, Multifocal pneumonia, Acute on chronic diastolic CHF, Acute sepsis ]   Prep survey completed? Yes          KUN Gallegos Registered Nurse

## 2022-07-25 ENCOUNTER — OFFICE VISIT (OUTPATIENT)
Dept: CARDIOLOGY | Facility: CLINIC | Age: 87
End: 2022-07-25

## 2022-07-25 VITALS
OXYGEN SATURATION: 98 % | HEIGHT: 64 IN | BODY MASS INDEX: 19.63 KG/M2 | DIASTOLIC BLOOD PRESSURE: 86 MMHG | SYSTOLIC BLOOD PRESSURE: 112 MMHG | HEART RATE: 106 BPM | WEIGHT: 115 LBS

## 2022-07-25 DIAGNOSIS — I50.33 ACUTE ON CHRONIC DIASTOLIC CHF (CONGESTIVE HEART FAILURE): Primary | ICD-10-CM

## 2022-07-25 DIAGNOSIS — I50.32 CHRONIC DIASTOLIC CONGESTIVE HEART FAILURE: ICD-10-CM

## 2022-07-25 DIAGNOSIS — I10 BENIGN ESSENTIAL HYPERTENSION: ICD-10-CM

## 2022-07-25 DIAGNOSIS — I48.91 ATRIAL FIBRILLATION WITH RVR: ICD-10-CM

## 2022-07-25 PROCEDURE — 93000 ELECTROCARDIOGRAM COMPLETE: CPT | Performed by: PHYSICIAN ASSISTANT

## 2022-07-25 PROCEDURE — 99213 OFFICE O/P EST LOW 20 MIN: CPT | Performed by: PHYSICIAN ASSISTANT

## 2022-07-25 RX ORDER — ASPIRIN 81 MG/1
81 TABLET ORAL DAILY
Qty: 30 TABLET | Refills: 6 | Status: ON HOLD | OUTPATIENT
Start: 2022-07-25 | End: 2022-08-06

## 2022-07-25 RX ORDER — IBUPROFEN 600 MG/1
TABLET ORAL AS NEEDED
COMMUNITY
Start: 2022-06-04

## 2022-07-25 RX ORDER — METOPROLOL TARTRATE 100 MG/1
100 TABLET ORAL 2 TIMES DAILY
Qty: 60 TABLET | Refills: 11 | Status: SHIPPED | OUTPATIENT
Start: 2022-07-25 | End: 2022-08-11 | Stop reason: HOSPADM

## 2022-07-25 RX ORDER — INSULIN GLARGINE 100 [IU]/ML
10 INJECTION, SOLUTION SUBCUTANEOUS NIGHTLY
COMMUNITY
Start: 2022-06-20

## 2022-07-25 NOTE — PROGRESS NOTES
"[facility] at [facility]  INITIAL OFFICE CONSULT      Mary Ramirez  1934  PCP: Tiana Hong MD    SUBJECTIVE:   Mary Ramirez is a 87 y.o. female seen for a consultation visit regarding the following:     Chief Complaint:   Chief Complaint   Patient presents with   • Coronary artery disease involving native coronary artery of   • Atrial Fibrillation          Consultation is requested by No ref. provider found for evaluation of Coronary artery disease involving native coronary artery of and Atrial Fibrillation        History:  87-year-old female seen in office today reestablish care for congestive heart failure, atrial fibrillation.  Patient has severe advanced dementia comes from long-term care facility.  Her son who is power of  accompanies her is able to answer questions for her.  Patient was recently mid to [facility] on 6/10/2024 with shortness of breath.  She was diagnosed with pneumonia a week prior to admission and was placed on Z-Al.  However her condition declined.  She is admitted with fevers chills elevated white cell count CT scan revealed multifocal bilateral pneumonia and concern for sepsis.  During hospitalization there was also concerned about diastolic heart failure.  She did receive IV Lasix.  In addition she had atrial fibrillation during the time initiated on amiodarone therapy.  She is maintained on amiodarone.  Amiodarone was eventually discontinued as she remained in persistent A. fib and was felt rate control would be the only treatment option necessary at this point.  She apparently is not anticoagulated because of \"frequent falls\".  Discussing with her son who accompanies with her she has not had a follow-up in a few months.  She is less mobile and she was she is now more in a wheelchair and almost nearly bedbound.  She does have elevated chads Vascor of 6.  Is reported she is has some higher heart rates while at the " nursing home but otherwise has been doing well.      Cardiac PMH: (Old records have been reviewed and summarized below)  1. CHF  a. A/C DHF 6/10/2022  b. Echocardiogram, Normal EF.  Aortic calcification with no stenosis  2. Afib  a. Permanent A. fib  b. Amiodarone started 6/10/2022 during hospitalization and subsequent discontinued patient remains in permanent A. fib  c. Chadsvasc=6, currently no anticoagulation  3. Recent multifocal PNA, sepsis Admission to [facility] 6/10/2022-6/17/2022.   4. CKD Stage III  5. GERD  6. Alzheimer's dementia      Past Medical History, Past Surgical History, Family history, Social History, and Medications were all reviewed with the patient today and updated as necessary.     Current Outpatient Medications   Medication Sig Dispense Refill   • acetaminophen (TYLENOL) 500 MG tablet Take 500 mg by mouth 2 (Two) Times a Day. Take one tablet twice a day for leg pain   And prn every 6 hours     • aluminum-magnesium hydroxide-simethicone (MAALOX MAX) 400-400-40 MG/5ML suspension Take 5 mL by mouth Every 6 (Six) Hours As Needed for Indigestion or Heartburn.     • Amino Acids-Protein Hydrolys (PRO-STAT AWC PO) Take 1 dose by mouth 2 (Two) Times a Day.     • aspirin  MG EC tablet Take 1 tablet by mouth Daily.     • citalopram (CeleXA) 20 MG tablet Take 20 mg by mouth Daily.     • docusate sodium (COLACE) 100 MG capsule Take 100 mg by mouth 2 (Two) Times a Day.     • donepezil (ARICEPT) 5 MG tablet Take 5 mg by mouth Every Night.     • fluticasone (FLONASE) 50 MCG/ACT nasal spray 2 sprays into the nostril(s) as directed by provider Daily.     • Glucagon, rDNA, (Glucagon Emergency) 1 MG kit As Needed.     • insulin aspart (novoLOG FLEXPEN) 100 UNIT/ML solution pen-injector sc pen Inject 2-5 Units under the skin into the appropriate area as directed 4 (Four) Times a Day.     • isosorbide mononitrate (IMDUR) 60 MG 24 hr tablet Take 60 mg by mouth Daily.     • lidocaine (LIDODERM) 5 % Place 1 patch  "on the skin as directed by provider Daily. Remove & Discard patch within 12 hours or as directed by MD 30 patch 0   • metFORMIN ER (GLUCOPHAGE-XR) 500 MG 24 hr tablet Take 1 tablet by mouth Daily With Breakfast. 30 tablet 5   • metoprolol tartrate 75 MG tablet Take 75 mg by mouth Every 12 (Twelve) Hours for 30 days. (Patient taking differently: Take 100 mg by mouth As Needed.) 60 tablet 0   • Multiple Vitamin (MULTIVITAMINS PO) Take 1 tablet by mouth Daily.     • ondansetron (ZOFRAN) 4 MG tablet Take 4 mg by mouth Every 8 (Eight) Hours As Needed for Nausea or Vomiting.     • polyethylene glycol (MIRALAX) pack packet Take 17 g by mouth Daily. (Patient taking differently: Take 17 g by mouth Daily As Needed.)     • furosemide (LASIX) 40 MG tablet Take 1 tablet by mouth Daily for 30 days. 30 tablet 0   • Lantus SoloStar 100 UNIT/ML injection pen Daily.       No current facility-administered medications for this visit.     Allergies   Allergen Reactions   • Penicillins Other (See Comments)     Pt states \"i don't know\"         Past Medical History:   Diagnosis Date   • Atrial fibrillation (HCC)    • Benign essential hypertension    • CAD (coronary artery disease)    • Carotid artery disease ([facility])    • CHF (congestive heart failure) ([facility])    • Chronic allergic rhinitis    • CKD (chronic kidney disease), stage III ([facility])    • DDD (degenerative disc disease), cervical    • Dementia ([facility])    • Diabetes mellitus ([facility])    • Diverticulosis     WITH PERFORATION    • Hypertension    • Major depressive disorder, single episode, mild ([facility])    • Memory loss    • Mixed hyperlipidemia    • SSS (sick sinus syndrome) ([facility])    • TIA (transient ischemic attack) 01/2016     Past Surgical History:   Procedure Laterality Date   • ANKLE SURGERY Left 1996   • ATHERECTOMY  1995   • ATHRECTOMY ILIAC, FEMORAL, TIBIAL ARTERY     • BREAST LUMPECTOMY Left 1981   • COLON SURGERY     • COLOSTOMY  1983   • CORONARY STENT PLACEMENT  2001   • FINGER " "FRACTURE SURGERY Right    • HIP TROCHANTERIC NAILING WITH INTRAMEDULLARY HIP SCREW Left 2017   • HIP TROCHANTERIC NAILING WITH INTRAMEDULLARY HIP SCREW Right 3/30/2019    Procedure: HIP TROCANTERIC NAILING WITH INTRAMEDULLARY HIP SCREW RIGHT;  Surgeon: Theo Figueroa MD;  Location: [facility];  Service: Orthopedics   • HYSTERECTOMY     • OTHER SURGICAL HISTORY      REANASTAMOSIS      Family History   Problem Relation Age of Onset   • Hypertension Mother    • Coronary artery disease Mother 56        PREMATURE    • Lung cancer Father    • Hypertension Father    • Osteoporosis Sister    • Lung cancer Brother      Social History     Tobacco Use   • Smoking status: Former Smoker     Years: 2.00     Types: Cigarettes     Start date: 1975     Quit date: 1978     Years since quittin.7   • Smokeless tobacco: Never Used   • Tobacco comment: QUIT DATE 1984   Substance Use Topics   • Alcohol use: No       ROS:  Review of Symptoms: Unable to obtain as patient has severe dementia unable to answer many questions  G       PHYSICAL EXAM:   /86 (BP Location: Right arm, Patient Position: Sitting)   Pulse 106   Ht 162.6 cm (64\")   Wt 52.2 kg (115 lb)   SpO2 98%   BMI 19.74 kg/m²      Wt Readings from Last 5 Encounters:   22 52.2 kg (115 lb)   22 58.5 kg (129 lb)   22 54.4 kg (120 lb)   20 49.4 kg (109 lb)   19 49.9 kg (110 lb)     BP Readings from Last 5 Encounters:   22 112/86   22 139/78   22 145/83   20 108/52   19 122/71       General-Well Nourished, Well developed  Eyes - PERRLA  Neck- supple, No mass  CV- regular rate and rhythm, no MRG  Lung- clear bilaterally  Abd- soft, +BS  Musc/skel - Norm strength and range of motion  Skin- warm and dry  Neuro - Alert & Oriented x 3, appropriate mood.    Patient's external notes were reviewed.  Independent interpretation of test performed by another physician in facility were " reviewed.  Outside laboratory data was also reviewed.    Medical problems and test results were reviewed with the patient today.           EKG:  (EKG/Tracing has been independently visualized by me and summarized below)      ECG 12 Lead    Date/Time: 7/25/2022 9:54 AM  Performed by: Mayco Jackson PA  Authorized by: Mayco Jackson PA   Rhythm: atrial fibrillation  Rate: tachycardic  Conduction: conduction normal  Other: no other findings    Clinical impression: abnormal EKG            ASSESSMENT   1.  Permanent A. fib: Currently on metoprolol 100 mg twice daily for rate control.  We will add 25 m as needed for rate control.  2.  Recent multifocal bilateral pneumonia hospitalization 6/10/2022 [facility]  3.  Diastolic heart failure: Resolved patient is euvolemic and stable at this time.  4.  Hypertension: Controlled  5.  Valvular heart disease, echocardiogram 6/11/2022 EF 51% moderate MR with aortic valve calcification  6.  Elevated chads Vascor of 6.  Long discussion with the our  son.  Initially she has not been adequately symptoms of frequent falls.  However the son states she has not fallen in several months.  She is quite more bedbound in a wheelchair at this point.  We do long discussion regarding risk and benefits of starting Eliquis he is agreeable to do so.  We will start Eliquis 2.5 mg twice daily.  6. CAD: Remote stents. No angina symptoms. She is on Imdur.      PLAN  · Continue metoprolol 100 mg twice daily increase 25 m as needed for heart rate greater than 100.  · Long discussion patient's power of  son she has high chads Vascor of 6, she has not had a fall in several months would recommend initiating Eliquis 2.5 mg twice daily and reduce aspirin 81 mg daily.  · Return to follow-up in 6 months or sooner as needed        Cardiology/Electrophysiology  07/25/22  09:52 EDT  Will Renetta TAMEZ

## 2022-08-05 ENCOUNTER — APPOINTMENT (OUTPATIENT)
Dept: GENERAL RADIOLOGY | Facility: HOSPITAL | Age: 87
End: 2022-08-05

## 2022-08-05 ENCOUNTER — HOSPITAL ENCOUNTER (INPATIENT)
Facility: HOSPITAL | Age: 87
LOS: 6 days | Discharge: HOSPICE | End: 2022-08-11
Attending: EMERGENCY MEDICINE | Admitting: INTERNAL MEDICINE

## 2022-08-05 DIAGNOSIS — A41.9 SEPSIS, DUE TO UNSPECIFIED ORGANISM, UNSPECIFIED WHETHER ACUTE ORGAN DYSFUNCTION PRESENT: ICD-10-CM

## 2022-08-05 DIAGNOSIS — R79.89 ELEVATED BRAIN NATRIURETIC PEPTIDE (BNP) LEVEL: ICD-10-CM

## 2022-08-05 DIAGNOSIS — J18.9 PNEUMONIA OF BOTH LOWER LOBES DUE TO INFECTIOUS ORGANISM: Primary | ICD-10-CM

## 2022-08-05 DIAGNOSIS — N28.9 RENAL INSUFFICIENCY: ICD-10-CM

## 2022-08-05 DIAGNOSIS — J96.21 ACUTE ON CHRONIC RESPIRATORY FAILURE WITH HYPOXIA: ICD-10-CM

## 2022-08-05 LAB
ALBUMIN SERPL-MCNC: 3.7 G/DL (ref 3.5–5.2)
ALBUMIN/GLOB SERPL: 1.4 G/DL
ALP SERPL-CCNC: 97 U/L (ref 39–117)
ALT SERPL W P-5'-P-CCNC: 105 U/L (ref 1–33)
ANION GAP SERPL CALCULATED.3IONS-SCNC: 11 MMOL/L (ref 5–15)
AST SERPL-CCNC: 33 U/L (ref 1–32)
BASOPHILS # BLD AUTO: 0.03 10*3/MM3 (ref 0–0.2)
BASOPHILS NFR BLD AUTO: 0.2 % (ref 0–1.5)
BILIRUB SERPL-MCNC: 0.5 MG/DL (ref 0–1.2)
BUN SERPL-MCNC: 49 MG/DL (ref 8–23)
BUN/CREAT SERPL: 26.6 (ref 7–25)
CALCIUM SPEC-SCNC: 9.2 MG/DL (ref 8.6–10.5)
CHLORIDE SERPL-SCNC: 99 MMOL/L (ref 98–107)
CO2 SERPL-SCNC: 21 MMOL/L (ref 22–29)
CREAT SERPL-MCNC: 1.84 MG/DL (ref 0.57–1)
D-LACTATE SERPL-SCNC: 3.9 MMOL/L (ref 0.5–2)
D-LACTATE SERPL-SCNC: 4 MMOL/L (ref 0.5–2)
D-LACTATE SERPL-SCNC: 4.3 MMOL/L (ref 0.5–2)
DEPRECATED RDW RBC AUTO: 57.6 FL (ref 37–54)
EGFRCR SERPLBLD CKD-EPI 2021: 26.3 ML/MIN/1.73
EOSINOPHIL # BLD AUTO: 0.09 10*3/MM3 (ref 0–0.4)
EOSINOPHIL NFR BLD AUTO: 0.7 % (ref 0.3–6.2)
ERYTHROCYTE [DISTWIDTH] IN BLOOD BY AUTOMATED COUNT: 16.9 % (ref 12.3–15.4)
FLUAV SUBTYP SPEC NAA+PROBE: NOT DETECTED
FLUBV RNA ISLT QL NAA+PROBE: NOT DETECTED
GLOBULIN UR ELPH-MCNC: 2.7 GM/DL
GLUCOSE BLDC GLUCOMTR-MCNC: 179 MG/DL (ref 70–130)
GLUCOSE SERPL-MCNC: 126 MG/DL (ref 65–99)
HCT VFR BLD AUTO: 44.8 % (ref 34–46.6)
HGB BLD-MCNC: 13.6 G/DL (ref 12–15.9)
HOLD SPECIMEN: NORMAL
IMM GRANULOCYTES # BLD AUTO: 0.08 10*3/MM3 (ref 0–0.05)
IMM GRANULOCYTES NFR BLD AUTO: 0.7 % (ref 0–0.5)
LYMPHOCYTES # BLD AUTO: 1.39 10*3/MM3 (ref 0.7–3.1)
LYMPHOCYTES NFR BLD AUTO: 11.5 % (ref 19.6–45.3)
MCH RBC QN AUTO: 28.7 PG (ref 26.6–33)
MCHC RBC AUTO-ENTMCNC: 30.4 G/DL (ref 31.5–35.7)
MCV RBC AUTO: 94.5 FL (ref 79–97)
MONOCYTES # BLD AUTO: 1.26 10*3/MM3 (ref 0.1–0.9)
MONOCYTES NFR BLD AUTO: 10.4 % (ref 5–12)
NEUTROPHILS NFR BLD AUTO: 76.5 % (ref 42.7–76)
NEUTROPHILS NFR BLD AUTO: 9.23 10*3/MM3 (ref 1.7–7)
NRBC BLD AUTO-RTO: 0.2 /100 WBC (ref 0–0.2)
NT-PROBNP SERPL-MCNC: ABNORMAL PG/ML (ref 0–1800)
PLATELET # BLD AUTO: 166 10*3/MM3 (ref 140–450)
PMV BLD AUTO: 12.4 FL (ref 6–12)
POTASSIUM SERPL-SCNC: 5.5 MMOL/L (ref 3.5–5.2)
POTASSIUM SERPL-SCNC: 5.8 MMOL/L (ref 3.5–5.2)
PROCALCITONIN SERPL-MCNC: 0.06 NG/ML (ref 0–0.25)
PROT SERPL-MCNC: 6.4 G/DL (ref 6–8.5)
QT INTERVAL: 406 MS
QTC INTERVAL: 521 MS
RBC # BLD AUTO: 4.74 10*6/MM3 (ref 3.77–5.28)
SARS-COV-2 RNA PNL SPEC NAA+PROBE: NOT DETECTED
SODIUM SERPL-SCNC: 131 MMOL/L (ref 136–145)
TROPONIN T SERPL-MCNC: 0.03 NG/ML (ref 0–0.03)
WBC NRBC COR # BLD: 12.08 10*3/MM3 (ref 3.4–10.8)
WHOLE BLOOD HOLD COAG: NORMAL
WHOLE BLOOD HOLD SPECIMEN: NORMAL

## 2022-08-05 PROCEDURE — 36415 COLL VENOUS BLD VENIPUNCTURE: CPT

## 2022-08-05 PROCEDURE — 84132 ASSAY OF SERUM POTASSIUM: CPT | Performed by: INTERNAL MEDICINE

## 2022-08-05 PROCEDURE — 87899 AGENT NOS ASSAY W/OPTIC: CPT | Performed by: INTERNAL MEDICINE

## 2022-08-05 PROCEDURE — 87449 NOS EACH ORGANISM AG IA: CPT | Performed by: INTERNAL MEDICINE

## 2022-08-05 PROCEDURE — 25010000002 CEFEPIME PER 500 MG: Performed by: EMERGENCY MEDICINE

## 2022-08-05 PROCEDURE — 93005 ELECTROCARDIOGRAM TRACING: CPT | Performed by: EMERGENCY MEDICINE

## 2022-08-05 PROCEDURE — 71045 X-RAY EXAM CHEST 1 VIEW: CPT

## 2022-08-05 PROCEDURE — 85025 COMPLETE CBC W/AUTO DIFF WBC: CPT | Performed by: EMERGENCY MEDICINE

## 2022-08-05 PROCEDURE — 84145 PROCALCITONIN (PCT): CPT | Performed by: EMERGENCY MEDICINE

## 2022-08-05 PROCEDURE — 83605 ASSAY OF LACTIC ACID: CPT | Performed by: EMERGENCY MEDICINE

## 2022-08-05 PROCEDURE — 80053 COMPREHEN METABOLIC PANEL: CPT | Performed by: EMERGENCY MEDICINE

## 2022-08-05 PROCEDURE — 63710000001 INSULIN DETEMIR PER 5 UNITS: Performed by: INTERNAL MEDICINE

## 2022-08-05 PROCEDURE — 87040 BLOOD CULTURE FOR BACTERIA: CPT | Performed by: EMERGENCY MEDICINE

## 2022-08-05 PROCEDURE — 84484 ASSAY OF TROPONIN QUANT: CPT | Performed by: EMERGENCY MEDICINE

## 2022-08-05 PROCEDURE — 87641 MR-STAPH DNA AMP PROBE: CPT

## 2022-08-05 PROCEDURE — 87636 SARSCOV2 & INF A&B AMP PRB: CPT | Performed by: EMERGENCY MEDICINE

## 2022-08-05 PROCEDURE — 93005 ELECTROCARDIOGRAM TRACING: CPT

## 2022-08-05 PROCEDURE — 82962 GLUCOSE BLOOD TEST: CPT

## 2022-08-05 PROCEDURE — 99284 EMERGENCY DEPT VISIT MOD MDM: CPT

## 2022-08-05 PROCEDURE — 99223 1ST HOSP IP/OBS HIGH 75: CPT | Performed by: INTERNAL MEDICINE

## 2022-08-05 PROCEDURE — 83880 ASSAY OF NATRIURETIC PEPTIDE: CPT | Performed by: EMERGENCY MEDICINE

## 2022-08-05 PROCEDURE — 25010000002 VANCOMYCIN PER 500 MG: Performed by: EMERGENCY MEDICINE

## 2022-08-05 RX ORDER — HYDRALAZINE HYDROCHLORIDE 10 MG/1
10 TABLET, FILM COATED ORAL DAILY PRN
COMMUNITY
End: 2022-08-11 | Stop reason: HOSPADM

## 2022-08-05 RX ORDER — VANCOMYCIN HYDROCHLORIDE 1 G/200ML
20 INJECTION, SOLUTION INTRAVENOUS ONCE
Status: COMPLETED | OUTPATIENT
Start: 2022-08-05 | End: 2022-08-05

## 2022-08-05 RX ORDER — INSULIN LISPRO 100 [IU]/ML
0-7 INJECTION, SOLUTION INTRAVENOUS; SUBCUTANEOUS
Status: DISCONTINUED | OUTPATIENT
Start: 2022-08-06 | End: 2022-08-11 | Stop reason: HOSPADM

## 2022-08-05 RX ORDER — ALBUTEROL SULFATE 2.5 MG/3ML
2.5 SOLUTION RESPIRATORY (INHALATION)
Status: DISCONTINUED | OUTPATIENT
Start: 2022-08-05 | End: 2022-08-08

## 2022-08-05 RX ORDER — ACETAMINOPHEN 160 MG/5ML
650 SOLUTION ORAL EVERY 4 HOURS PRN
Status: DISCONTINUED | OUTPATIENT
Start: 2022-08-05 | End: 2022-08-11 | Stop reason: HOSPADM

## 2022-08-05 RX ORDER — DEXTROSE MONOHYDRATE 25 G/50ML
25 INJECTION, SOLUTION INTRAVENOUS
Status: DISCONTINUED | OUTPATIENT
Start: 2022-08-05 | End: 2022-08-11 | Stop reason: HOSPADM

## 2022-08-05 RX ORDER — ACETAMINOPHEN 325 MG/1
650 TABLET ORAL EVERY 4 HOURS PRN
Status: DISCONTINUED | OUTPATIENT
Start: 2022-08-05 | End: 2022-08-11 | Stop reason: HOSPADM

## 2022-08-05 RX ORDER — ACETAMINOPHEN 650 MG/1
650 SUPPOSITORY RECTAL EVERY 4 HOURS PRN
Status: DISCONTINUED | OUTPATIENT
Start: 2022-08-05 | End: 2022-08-11 | Stop reason: HOSPADM

## 2022-08-05 RX ORDER — NICOTINE POLACRILEX 4 MG
15 LOZENGE BUCCAL
Status: DISCONTINUED | OUTPATIENT
Start: 2022-08-05 | End: 2022-08-11 | Stop reason: HOSPADM

## 2022-08-05 RX ORDER — CITALOPRAM 20 MG/1
20 TABLET ORAL DAILY
Status: DISCONTINUED | OUTPATIENT
Start: 2022-08-06 | End: 2022-08-11 | Stop reason: HOSPADM

## 2022-08-05 RX ORDER — BUMETANIDE 0.25 MG/ML
2 INJECTION INTRAMUSCULAR; INTRAVENOUS ONCE
Status: COMPLETED | OUTPATIENT
Start: 2022-08-05 | End: 2022-08-05

## 2022-08-05 RX ORDER — ASPIRIN 325 MG
325 TABLET, DELAYED RELEASE (ENTERIC COATED) ORAL DAILY
Status: DISCONTINUED | OUTPATIENT
Start: 2022-08-06 | End: 2022-08-06

## 2022-08-05 RX ORDER — SODIUM CHLORIDE 0.9 % (FLUSH) 0.9 %
10 SYRINGE (ML) INJECTION AS NEEDED
Status: DISCONTINUED | OUTPATIENT
Start: 2022-08-05 | End: 2022-08-11 | Stop reason: HOSPADM

## 2022-08-05 RX ORDER — BUMETANIDE 0.25 MG/ML
1 INJECTION INTRAMUSCULAR; INTRAVENOUS EVERY 12 HOURS
Status: DISCONTINUED | OUTPATIENT
Start: 2022-08-06 | End: 2022-08-08

## 2022-08-05 RX ORDER — DONEPEZIL HYDROCHLORIDE 5 MG/1
5 TABLET, FILM COATED ORAL NIGHTLY
Status: DISCONTINUED | OUTPATIENT
Start: 2022-08-05 | End: 2022-08-11 | Stop reason: HOSPADM

## 2022-08-05 RX ORDER — SODIUM CHLORIDE 0.9 % (FLUSH) 0.9 %
1-10 SYRINGE (ML) INJECTION AS NEEDED
Status: DISCONTINUED | OUTPATIENT
Start: 2022-08-05 | End: 2022-08-11 | Stop reason: HOSPADM

## 2022-08-05 RX ORDER — ONDANSETRON 4 MG/1
4 TABLET, FILM COATED ORAL EVERY 6 HOURS PRN
Status: DISCONTINUED | OUTPATIENT
Start: 2022-08-05 | End: 2022-08-11 | Stop reason: HOSPADM

## 2022-08-05 RX ORDER — SODIUM CHLORIDE 0.9 % (FLUSH) 0.9 %
10 SYRINGE (ML) INJECTION EVERY 12 HOURS SCHEDULED
Status: DISCONTINUED | OUTPATIENT
Start: 2022-08-05 | End: 2022-08-11 | Stop reason: HOSPADM

## 2022-08-05 RX ORDER — METOPROLOL TARTRATE 100 MG/1
100 TABLET ORAL 2 TIMES DAILY
Status: DISCONTINUED | OUTPATIENT
Start: 2022-08-05 | End: 2022-08-07

## 2022-08-05 RX ORDER — ISOSORBIDE MONONITRATE 60 MG/1
30 TABLET, EXTENDED RELEASE ORAL 3 TIMES DAILY
Status: DISCONTINUED | OUTPATIENT
Start: 2022-08-05 | End: 2022-08-06

## 2022-08-05 RX ORDER — ONDANSETRON 2 MG/ML
4 INJECTION INTRAMUSCULAR; INTRAVENOUS EVERY 6 HOURS PRN
Status: DISCONTINUED | OUTPATIENT
Start: 2022-08-05 | End: 2022-08-11 | Stop reason: HOSPADM

## 2022-08-05 RX ORDER — HYDROCODONE BITARTRATE AND ACETAMINOPHEN 5; 325 MG/1; MG/1
1 TABLET ORAL EVERY 4 HOURS PRN
Status: DISCONTINUED | OUTPATIENT
Start: 2022-08-05 | End: 2022-08-11 | Stop reason: HOSPADM

## 2022-08-05 RX ORDER — VANCOMYCIN HYDROCHLORIDE 1 G/200ML
20 INJECTION, SOLUTION INTRAVENOUS ONCE
Status: DISCONTINUED | OUTPATIENT
Start: 2022-08-05 | End: 2022-08-05

## 2022-08-05 RX ADMIN — SODIUM ZIRCONIUM CYCLOSILICATE 10 G: 10 POWDER, FOR SUSPENSION ORAL at 23:58

## 2022-08-05 RX ADMIN — APIXABAN 2.5 MG: 2.5 TABLET, FILM COATED ORAL at 22:32

## 2022-08-05 RX ADMIN — VANCOMYCIN HYDROCHLORIDE 1000 MG: 1 INJECTION, SOLUTION INTRAVENOUS at 16:57

## 2022-08-05 RX ADMIN — Medication 10 ML: at 22:31

## 2022-08-05 RX ADMIN — METOPROLOL TARTRATE 100 MG: 100 TABLET, FILM COATED ORAL at 22:32

## 2022-08-05 RX ADMIN — BUMETANIDE 2 MG: 0.25 INJECTION INTRAMUSCULAR; INTRAVENOUS at 16:56

## 2022-08-05 RX ADMIN — INSULIN DETEMIR 10 UNITS: 100 INJECTION, SOLUTION SUBCUTANEOUS at 22:36

## 2022-08-05 RX ADMIN — ISOSORBIDE MONONITRATE 30 MG: 60 TABLET, EXTENDED RELEASE ORAL at 22:32

## 2022-08-05 RX ADMIN — CEFEPIME 2 G: 2 INJECTION, POWDER, FOR SOLUTION INTRAVENOUS at 16:25

## 2022-08-05 RX ADMIN — DONEPEZIL HYDROCHLORIDE 5 MG: 5 TABLET ORAL at 22:32

## 2022-08-05 NOTE — ED PROVIDER NOTES
[place]    EMERGENCY DEPARTMENT ENCOUNTER      Pt Name: Mary Ramirez  MRN: [id number]  YOB: 1934  Date of evaluation: 8/5/2022  Provider: Rasta Marcano DO    CHIEF COMPLAINT       Chief Complaint   Patient presents with   • Shortness of Breath         HISTORY OF PRESENT ILLNESS  (Location/Symptom, Timing/Onset, Context/Setting, Quality, Duration, Modifying Factors, Severity.)   Mary Ramirez is a 87 y.o. female who presents to the emergency department from the [facility] secondary to shortness of breath, hypoxia.  The patient does not provide much history she has severe dementia, and is at baseline alert and oriented x1.  The son who is her POA is at the bedside, he is very helpful and provides much additional history.  He really only sees his mother once or twice per week due to limitations at the nursing facility.  He notes she has admitted in the hospital recently multiple times with intermittent pneumonia, states he thinks she was started on antibiotic orally about a week ago for possible continued pneumonia.  Staff called him today and stated the patient who has been just feeling unwell for the last week or 2 was noted to have low oxygen levels and was placed on supplemental oxygen with saturations initially in the low 80s.  She does not normally wear oxygen supplementation.  He notes the patient is DNR.  No other acute systemic complaints.      Nursing notes were reviewed.    REVIEW OF SYSTEMS    (2-9 systems for level 4, 10 or more for level 5)   ROS:  Symptoms noted above unable to fully perform review of systems with patient's chronic dementia      PAST MEDICAL HISTORY     Past Medical History:   Diagnosis Date   • Atrial fibrillation (HCC)    • Benign essential hypertension    • CAD (coronary artery disease)    • Carotid artery disease (HCC)    • CHF (congestive heart failure) ([facility])    • Chronic allergic rhinitis    • CKD (chronic kidney disease), stage III ([facility])     • DDD (degenerative disc disease), cervical    • Dementia ([facility])    • Diabetes mellitus (HCC)    • Diverticulosis     WITH PERFORATION    • Hypertension    • Major depressive disorder, single episode, mild (HCC)    • Memory loss    • Mixed hyperlipidemia    • SSS (sick sinus syndrome) ([facility])    • TIA (transient ischemic attack) 2016         SURGICAL HISTORY       Past Surgical History:   Procedure Laterality Date   • ANKLE SURGERY Left    • ATHERECTOMY     • ATHRECTOMY ILIAC, FEMORAL, TIBIAL ARTERY     • BREAST LUMPECTOMY Left    • COLON SURGERY     • COLOSTOMY     • CORONARY STENT PLACEMENT     • FINGER FRACTURE SURGERY Right    • HIP TROCHANTERIC NAILING WITH INTRAMEDULLARY HIP SCREW Left 2017   • HIP TROCHANTERIC NAILING WITH INTRAMEDULLARY HIP SCREW Right 3/30/2019    Procedure: HIP TROCANTERIC NAILING WITH INTRAMEDULLARY HIP SCREW RIGHT;  Surgeon: Theo Figueroa MD;  Location: [facility];  Service: Orthopedics   • HYSTERECTOMY     • OTHER SURGICAL HISTORY      REANASTAMOSIS          CURRENT MEDICATIONS       Current Facility-Administered Medications:   •  sodium chloride 0.9 % flush 10 mL, 10 mL, Intravenous, PRN, Rasta Marcano, DO    ALLERGIES     Penicillins    FAMILY HISTORY       Family History   Problem Relation Age of Onset   • Hypertension Mother    • Coronary artery disease Mother 56        PREMATURE    • Lung cancer Father    • Hypertension Father    • Osteoporosis Sister    • Lung cancer Brother           SOCIAL HISTORY       Social History     Socioeconomic History   • Marital status:      Spouse name: N/A   • Number of children: 2   • Years of education: H.S   Tobacco Use   • Smoking status: Former Smoker     Years: 2.00     Types: Cigarettes     Start date: 1975     Quit date: 1978     Years since quittin.7   • Smokeless tobacco: Never Used   • Tobacco comment: QUIT DATE 1984   Substance and Sexual Activity   • Alcohol  "use: No   • Drug use: No   • Sexual activity: Never         PHYSICAL EXAM    (up to 7 for level 4, 8 or more for level 5)     Vitals:    08/05/22 1845 08/05/22 1914 08/05/22 1915 08/05/22 2015   BP:    117/95   BP Location:    Left arm   Patient Position:    Lying   Pulse: 104 109 105 105   Resp:    20   Temp:    95.8 °F (35.4 °C)   TempSrc:    Oral   SpO2: 97% 100% 99% 95%   Weight:    54.6 kg (120 lb 6.4 oz)   Height:    152.4 cm (60\")       Physical Exam  General : Patient is awake, opens eyes, alert to person but not place or time,.  Elderly, frail  HEENT: Pupils are equally round, EOMI, conjunctivae clear  Neck: Neck is supple  Cardiac: Heart irregularly irregular  Lungs: Lungs decreased breath sounds bilaterally, pulse ox 93% on 2 L nasal cannula  Abdomen: Abdomen is soft, nontender, nondistended.  Musculoskeletal: Generalized muscle atrophy, patient not really following commands which limits my ability to fully do muscle strength testing  Neuro: Patient with limited ability to perform neuro examination, severe dementia is appreciated  Dermatology: Skin is warm and dry  Psych: Unable to assess      DIAGNOSTIC RESULTS     EKG: All EKGs are interpreted by the Emergency Department Physician who either signs or Co-signs this chart in the absence of a cardiologist.    ECG 12 Lead   Final Result   Test Reason : SOA Protocol   Blood Pressure :   */*   mmHG   Vent. Rate :  99 BPM     Atrial Rate : 111 BPM      P-R Int :   * ms          QRS Dur :  94 ms       QT Int : 406 ms       P-R-T Axes :   * 246 158 degrees      QTc Int : 521 ms      Atrial fibrillation  rapid response   mile st depression lateral leads more pronounced than prior,   1mm st elev lead III   premature supraventricular complexes and fusion complexes   Right superior axis deviation   ST elevation, consider anterior injury or acute infarct   Prolonged QT   Abnormal ECG   When compared with ECG of 10-ISH-2022 23:00,   slight increase in st depressionj " lateral leads and jorge in III   Confirmed by HERLINDA GORDON MD ([id number]) on 8/5/2022 3:31:51 PM      Referred By: ED MD           Confirmed By: HERLINDA GORDON MD          RADIOLOGY:   Non-plain film images such as CT, Ultrasound and MRI are read by the radiologist. Plain radiographic images are visualized and preliminarily interpreted by the emergency physician with the below findings:      [] Radiologist's Report Reviewed:  XR Chest 1 View   Final Result   1. Persistent bibasilar airspace disease which may relate to atelectasis   or pneumonia.   2. Bilateral upper lobe airspace disease on the prior study has   resolved.   3. Small bilateral pleural effusions.   4. Large hiatal hernia.       This report was finalized on 8/5/2022 3:21 PM by Michael Osei MD.                ED BEDSIDE ULTRASOUND:   Performed by ED Physician - none    LABS:    I have reviewed and interpreted all of the currently available lab results from this visit (if applicable):  Results for orders placed or performed during the hospital encounter of 08/05/22   COVID-19 and FLU A/B PCR - Swab, Nasopharynx    Specimen: Nasopharynx; Swab   Result Value Ref Range    COVID19 Not Detected Not Detected - Ref. Range    Influenza A PCR Not Detected Not Detected    Influenza B PCR Not Detected Not Detected   Comprehensive Metabolic Panel    Specimen: Blood   Result Value Ref Range    Glucose 126 (H) 65 - 99 mg/dL    BUN 49 (H) 8 - 23 mg/dL    Creatinine 1.84 (H) 0.57 - 1.00 mg/dL    Sodium 131 (L) 136 - 145 mmol/L    Potassium 5.8 (H) 3.5 - 5.2 mmol/L    Chloride 99 98 - 107 mmol/L    CO2 21.0 (L) 22.0 - 29.0 mmol/L    Calcium 9.2 8.6 - 10.5 mg/dL    Total Protein 6.4 6.0 - 8.5 g/dL    Albumin 3.70 3.50 - 5.20 g/dL    ALT (SGPT) 105 (H) 1 - 33 U/L    AST (SGOT) 33 (H) 1 - 32 U/L    Alkaline Phosphatase 97 39 - 117 U/L    Total Bilirubin 0.5 0.0 - 1.2 mg/dL    Globulin 2.7 gm/dL    A/G Ratio 1.4 g/dL    BUN/Creatinine Ratio 26.6 (H) 7.0 - 25.0    Anion Gap 11.0  5.0 - 15.0 mmol/L    eGFR 26.3 (L) >60.0 mL/min/1.73   BNP    Specimen: Blood   Result Value Ref Range    proBNP 33,432.0 (H) 0.0 - 1,800.0 pg/mL   Troponin    Specimen: Blood   Result Value Ref Range    Troponin T 0.032 (C) 0.000 - 0.030 ng/mL   CBC Auto Differential    Specimen: Blood   Result Value Ref Range    WBC 12.08 (H) 3.40 - 10.80 10*3/mm3    RBC 4.74 3.77 - 5.28 10*6/mm3    Hemoglobin 13.6 12.0 - 15.9 g/dL    Hematocrit 44.8 34.0 - 46.6 %    MCV 94.5 79.0 - 97.0 fL    MCH 28.7 26.6 - 33.0 pg    MCHC 30.4 (L) 31.5 - 35.7 g/dL    RDW 16.9 (H) 12.3 - 15.4 %    RDW-SD 57.6 (H) 37.0 - 54.0 fl    MPV 12.4 (H) 6.0 - 12.0 fL    Platelets 166 140 - 450 10*3/mm3    Neutrophil % 76.5 (H) 42.7 - 76.0 %    Lymphocyte % 11.5 (L) 19.6 - 45.3 %    Monocyte % 10.4 5.0 - 12.0 %    Eosinophil % 0.7 0.3 - 6.2 %    Basophil % 0.2 0.0 - 1.5 %    Immature Grans % 0.7 (H) 0.0 - 0.5 %    Neutrophils, Absolute 9.23 (H) 1.70 - 7.00 10*3/mm3    Lymphocytes, Absolute 1.39 0.70 - 3.10 10*3/mm3    Monocytes, Absolute 1.26 (H) 0.10 - 0.90 10*3/mm3    Eosinophils, Absolute 0.09 0.00 - 0.40 10*3/mm3    Basophils, Absolute 0.03 0.00 - 0.20 10*3/mm3    Immature Grans, Absolute 0.08 (H) 0.00 - 0.05 10*3/mm3    nRBC 0.2 0.0 - 0.2 /100 WBC   Lactic Acid, Plasma    Specimen: Blood   Result Value Ref Range    Lactate 4.0 (C) 0.5 - 2.0 mmol/L   Procalcitonin    Specimen: Blood   Result Value Ref Range    Procalcitonin 0.06 0.00 - 0.25 ng/mL   STAT Lactic Acid, Reflex    Specimen: Blood   Result Value Ref Range    Lactate 4.3 (C) 0.5 - 2.0 mmol/L   ECG 12 Lead   Result Value Ref Range    QT Interval 406 ms    QTC Interval 521 ms   Green Top (Gel)   Result Value Ref Range    Extra Tube Hold for add-ons.    Lavender Top   Result Value Ref Range    Extra Tube hold for add-on    Gold Top - SST   Result Value Ref Range    Extra Tube Hold for add-ons.    Gray Top   Result Value Ref Range    Extra Tube Hold for add-ons.    Light Blue Top   Result Value  "Ref Range    Extra Tube Hold for add-ons.         All other labs were within normal range or not returned as of this dictation.      EMERGENCY DEPARTMENT COURSE and DIFFERENTIAL DIAGNOSIS/MDM:   Vitals:    Vitals:    08/05/22 1845 08/05/22 1914 08/05/22 1915 08/05/22 2015   BP:    117/95   BP Location:    Left arm   Patient Position:    Lying   Pulse: 104 109 105 105   Resp:    20   Temp:    95.8 °F (35.4 °C)   TempSrc:    Oral   SpO2: 97% 100% 99% 95%   Weight:    54.6 kg (120 lb 6.4 oz)   Height:    152.4 cm (60\")            Patient with hypoxia, saturations in the 80s with a history of recurrent pneumonia.  Sats initially 88%, improved to 93% on 2 L nasal cannula.  IV, labs, cultures obtained.  Patient started broad-spectrum antibiotics.  There is concern for pneumonia on x-ray imaging.  BNP of 33,000, with her shortness of breath, initially treating for CHF, further work-up reveals concern for possible pneumonia, sepsis, her lactate is 4.0, procalcitonin 0.06.  She is negative for COVID, she will require further work-up and evaluation with admission in the hospital.  Case was discussed with our hospitalist team, Dr. Finn, for admission.          MEDICATIONS ADMINISTERED IN ED:  Medications   sodium chloride 0.9 % flush 10 mL (has no administration in time range)   vancomycin (VANCOCIN) 1000 mg/200 mL dextrose 5% IVPB (0 mg/kg × 54.4 kg Intravenous Stopped 8/5/22 1820)   cefepime (MAXIPIME) 2 g/100 mL 0.9% NS (mbp) (0 g Intravenous Stopped 8/5/22 1656)   bumetanide (BUMEX) injection 2 mg (2 mg Intravenous Given 8/5/22 1656)       PROCEDURES:  Procedures    CRITICAL CARE TIME    Total Critical Care time was 30 minutes, excluding separately reportable procedures.  Acute respiratory failure, hypoxia, sepsis required multiple interventions, reevaluations, discussion with consultants. There was a high probability of clinically significant/life threatening deterioration in the patient's condition which required my " urgent intervention.      FINAL IMPRESSION      1. Pneumonia of both lower lobes due to infectious organism    2. Acute on chronic respiratory failure with hypoxia (HCC)    3. Renal insufficiency    4. Elevated brain natriuretic peptide (BNP) level    5. Sepsis, due to unspecified organism, unspecified whether acute organ dysfunction present (HCC)          DISPOSITION/PLAN     ED Disposition     ED Disposition   Decision to Admit    Condition   --    Comment   Level of Care: Telemetry [5]   Diagnosis: Pneumonia of both lower lobes due to infectious organism [[id number]]   Admitting Physician: MONO MCCONNELL [[id number]]   Attending Physician: MONO MCCONNELL [[id number]]   Isolate for COVID?: No [0]   Certification: I Certify That Inpatient Hospital Services Are Medically Necessary For Greater Than 2 Midnights                 Comment: Please note this report has been produced using speech recognition software.      Rasta Marcano DO  Attending Emergency Physician               Rasta Marcano DO  08/05/22 2148

## 2022-08-06 ENCOUNTER — APPOINTMENT (OUTPATIENT)
Dept: CARDIOLOGY | Facility: HOSPITAL | Age: 87
End: 2022-08-06

## 2022-08-06 LAB
ANION GAP SERPL CALCULATED.3IONS-SCNC: 15 MMOL/L (ref 5–15)
BH CV ECHO MEAS - AO MAX PG: 5.2 MMHG
BH CV ECHO MEAS - AO MEAN PG: 3 MMHG
BH CV ECHO MEAS - AO ROOT AREA (BSA CORRECTED): 1.9 CM2
BH CV ECHO MEAS - AO ROOT DIAM: 2.9 CM
BH CV ECHO MEAS - AO V2 MAX: 114 CM/SEC
BH CV ECHO MEAS - AO V2 VTI: 17.8 CM
BH CV ECHO MEAS - AVA(I,D): 1.36 CM2
BH CV ECHO MEAS - EDV(CUBED): 59.3 ML
BH CV ECHO MEAS - EDV(MOD-SP2): 70.6 ML
BH CV ECHO MEAS - EDV(MOD-SP4): 91.1 ML
BH CV ECHO MEAS - EF(MOD-SP2): 25.1 %
BH CV ECHO MEAS - EF(MOD-SP4): 25 %
BH CV ECHO MEAS - ESV(CUBED): 27 ML
BH CV ECHO MEAS - ESV(MOD-SP2): 52.9 ML
BH CV ECHO MEAS - ESV(MOD-SP4): 68.3 ML
BH CV ECHO MEAS - FS: 23.1 %
BH CV ECHO MEAS - IVS/LVPW: 1.18 CM
BH CV ECHO MEAS - IVSD: 1.3 CM
BH CV ECHO MEAS - LA DIMENSION: 5.1 CM
BH CV ECHO MEAS - LAT PEAK E' VEL: 8.6 CM/SEC
BH CV ECHO MEAS - LV DIASTOLIC VOL/BSA (35-75): 60.2 CM2
BH CV ECHO MEAS - LV MASS(C)D: 159.3 GRAMS
BH CV ECHO MEAS - LV MAX PG: 0.89 MMHG
BH CV ECHO MEAS - LV MEAN PG: 0 MMHG
BH CV ECHO MEAS - LV SYSTOLIC VOL/BSA (12-30): 45.1 CM2
BH CV ECHO MEAS - LV V1 MAX: 47.2 CM/SEC
BH CV ECHO MEAS - LV V1 VTI: 7.7 CM
BH CV ECHO MEAS - LVIDD: 3.9 CM
BH CV ECHO MEAS - LVIDS: 3 CM
BH CV ECHO MEAS - LVOT AREA: 3.1 CM2
BH CV ECHO MEAS - LVOT DIAM: 2 CM
BH CV ECHO MEAS - LVPWD: 1.1 CM
BH CV ECHO MEAS - MED PEAK E' VEL: 6.5 CM/SEC
BH CV ECHO MEAS - MR MAX PG: 78.5 MMHG
BH CV ECHO MEAS - MR MAX VEL: 443 CM/SEC
BH CV ECHO MEAS - MR MEAN PG: 46 MMHG
BH CV ECHO MEAS - MR MEAN VEL: 319 CM/SEC
BH CV ECHO MEAS - MR VTI: 130 CM
BH CV ECHO MEAS - MV DEC SLOPE: 488 CM/SEC2
BH CV ECHO MEAS - MV DEC TIME: 0.18 MSEC
BH CV ECHO MEAS - MV E MAX VEL: 92.9 CM/SEC
BH CV ECHO MEAS - MV MAX PG: 3.9 MMHG
BH CV ECHO MEAS - MV MEAN PG: 2 MMHG
BH CV ECHO MEAS - MV P1/2T: 65.4 MSEC
BH CV ECHO MEAS - MV V2 VTI: 16.4 CM
BH CV ECHO MEAS - MVA(P1/2T): 3.4 CM2
BH CV ECHO MEAS - MVA(VTI): 1.48 CM2
BH CV ECHO MEAS - PA ACC TIME: 0.07 SEC
BH CV ECHO MEAS - PA PR(ACCEL): 45.7 MMHG
BH CV ECHO MEAS - PI END-D VEL: 168 CM/SEC
BH CV ECHO MEAS - RAP SYSTOLE: 8 MMHG
BH CV ECHO MEAS - RF(MV,LVOT)(1DIAM): 0.86 CM
BH CV ECHO MEAS - RVSP: 37 MMHG
BH CV ECHO MEAS - SI(MOD-SP2): 11.7 ML/M2
BH CV ECHO MEAS - SI(MOD-SP4): 15.1 ML/M2
BH CV ECHO MEAS - SV(LVOT): 24.2 ML
BH CV ECHO MEAS - SV(MOD-SP2): 17.7 ML
BH CV ECHO MEAS - SV(MOD-SP4): 22.8 ML
BH CV ECHO MEAS - TAPSE (>1.6): 0.88 CM
BH CV ECHO MEAS - TR MAX PG: 20.1 MMHG
BH CV ECHO MEAS - TR MAX VEL: 221 CM/SEC
BH CV ECHO MEASUREMENTS AVERAGE E/E' RATIO: 12.3
BH CV VAS BP RIGHT ARM: NORMAL MMHG
BH CV XLRA - RV BASE: 4.1 CM
BH CV XLRA - RV LENGTH: 7.2 CM
BH CV XLRA - RV MID: 3.2 CM
BH CV XLRA - TDI S': 5.1 CM/SEC
BUN SERPL-MCNC: 53 MG/DL (ref 8–23)
BUN/CREAT SERPL: 26.9 (ref 7–25)
CALCIUM SPEC-SCNC: 8.6 MG/DL (ref 8.6–10.5)
CHLORIDE SERPL-SCNC: 96 MMOL/L (ref 98–107)
CHOLEST SERPL-MCNC: 124 MG/DL (ref 0–200)
CO2 SERPL-SCNC: 23 MMOL/L (ref 22–29)
CREAT SERPL-MCNC: 1.97 MG/DL (ref 0.57–1)
D-LACTATE SERPL-SCNC: 2.4 MMOL/L (ref 0.5–2)
D-LACTATE SERPL-SCNC: 3 MMOL/L (ref 0.5–2)
DEPRECATED RDW RBC AUTO: 53.1 FL (ref 37–54)
EGFRCR SERPLBLD CKD-EPI 2021: 24.2 ML/MIN/1.73
ERYTHROCYTE [DISTWIDTH] IN BLOOD BY AUTOMATED COUNT: 16.3 % (ref 12.3–15.4)
GLUCOSE BLDC GLUCOMTR-MCNC: 123 MG/DL (ref 70–130)
GLUCOSE BLDC GLUCOMTR-MCNC: 126 MG/DL (ref 70–130)
GLUCOSE BLDC GLUCOMTR-MCNC: 203 MG/DL (ref 70–130)
GLUCOSE BLDC GLUCOMTR-MCNC: 47 MG/DL (ref 70–130)
GLUCOSE BLDC GLUCOMTR-MCNC: 51 MG/DL (ref 70–130)
GLUCOSE BLDC GLUCOMTR-MCNC: 69 MG/DL (ref 70–130)
GLUCOSE BLDC GLUCOMTR-MCNC: 74 MG/DL (ref 70–130)
GLUCOSE SERPL-MCNC: 144 MG/DL (ref 65–99)
HBA1C MFR BLD: 6.8 % (ref 4.8–5.6)
HCT VFR BLD AUTO: 39.3 % (ref 34–46.6)
HDLC SERPL-MCNC: 25 MG/DL (ref 40–60)
HGB BLD-MCNC: 12.5 G/DL (ref 12–15.9)
L PNEUMO1 AG UR QL IA: NEGATIVE
LDLC SERPL CALC-MCNC: 77 MG/DL (ref 0–100)
LDLC/HDLC SERPL: 2.98 {RATIO}
LEFT ATRIUM VOLUME INDEX: 60 ML/M2
MAXIMAL PREDICTED HEART RATE: 133 BPM
MCH RBC QN AUTO: 28.5 PG (ref 26.6–33)
MCHC RBC AUTO-ENTMCNC: 31.8 G/DL (ref 31.5–35.7)
MCV RBC AUTO: 89.7 FL (ref 79–97)
MRSA DNA SPEC QL NAA+PROBE: NEGATIVE
PLATELET # BLD AUTO: 136 10*3/MM3 (ref 140–450)
PMV BLD AUTO: 12.8 FL (ref 6–12)
POTASSIUM SERPL-SCNC: 4.7 MMOL/L (ref 3.5–5.2)
RBC # BLD AUTO: 4.38 10*6/MM3 (ref 3.77–5.28)
S PNEUM AG SPEC QL LA: NEGATIVE
SODIUM SERPL-SCNC: 134 MMOL/L (ref 136–145)
STRESS TARGET HR: 113 BPM
TRIGL SERPL-MCNC: 122 MG/DL (ref 0–150)
TSH SERPL DL<=0.05 MIU/L-ACNC: 5.83 UIU/ML (ref 0.27–4.2)
VANCOMYCIN SERPL-MCNC: 13.6 MCG/ML (ref 5–40)
VLDLC SERPL-MCNC: 22 MG/DL (ref 5–40)
WBC NRBC COR # BLD: 8.13 10*3/MM3 (ref 3.4–10.8)

## 2022-08-06 PROCEDURE — 25010000002 SULFUR HEXAFLUORIDE MICROSPH 60.7-25 MG RECONSTITUTED SUSPENSION: Performed by: INTERNAL MEDICINE

## 2022-08-06 PROCEDURE — 25010000002 CEFEPIME PER 500 MG

## 2022-08-06 PROCEDURE — 93306 TTE W/DOPPLER COMPLETE: CPT

## 2022-08-06 PROCEDURE — 83605 ASSAY OF LACTIC ACID: CPT | Performed by: EMERGENCY MEDICINE

## 2022-08-06 PROCEDURE — 94664 DEMO&/EVAL PT USE INHALER: CPT

## 2022-08-06 PROCEDURE — 80202 ASSAY OF VANCOMYCIN: CPT

## 2022-08-06 PROCEDURE — 94799 UNLISTED PULMONARY SVC/PX: CPT

## 2022-08-06 PROCEDURE — 83036 HEMOGLOBIN GLYCOSYLATED A1C: CPT | Performed by: INTERNAL MEDICINE

## 2022-08-06 PROCEDURE — 25010000002 VANCOMYCIN PER 500 MG

## 2022-08-06 PROCEDURE — 80048 BASIC METABOLIC PNL TOTAL CA: CPT | Performed by: INTERNAL MEDICINE

## 2022-08-06 PROCEDURE — 85027 COMPLETE CBC AUTOMATED: CPT | Performed by: INTERNAL MEDICINE

## 2022-08-06 PROCEDURE — 80061 LIPID PANEL: CPT | Performed by: INTERNAL MEDICINE

## 2022-08-06 PROCEDURE — 93306 TTE W/DOPPLER COMPLETE: CPT | Performed by: INTERNAL MEDICINE

## 2022-08-06 PROCEDURE — 84443 ASSAY THYROID STIM HORMONE: CPT | Performed by: INTERNAL MEDICINE

## 2022-08-06 PROCEDURE — 94640 AIRWAY INHALATION TREATMENT: CPT

## 2022-08-06 PROCEDURE — 25010000002 CEFTRIAXONE PER 250 MG: Performed by: FAMILY MEDICINE

## 2022-08-06 PROCEDURE — 82962 GLUCOSE BLOOD TEST: CPT

## 2022-08-06 PROCEDURE — 99232 SBSQ HOSP IP/OBS MODERATE 35: CPT | Performed by: FAMILY MEDICINE

## 2022-08-06 RX ORDER — ACETAMINOPHEN 500 MG
500 TABLET ORAL 2 TIMES DAILY
COMMUNITY

## 2022-08-06 RX ORDER — DEXTROSE MONOHYDRATE 25 G/50ML
50 INJECTION, SOLUTION INTRAVENOUS ONCE
Status: COMPLETED | OUTPATIENT
Start: 2022-08-06 | End: 2022-08-06

## 2022-08-06 RX ORDER — ISOSORBIDE DINITRATE 20 MG/1
30 TABLET ORAL
Status: DISCONTINUED | OUTPATIENT
Start: 2022-08-06 | End: 2022-08-06

## 2022-08-06 RX ORDER — ASPIRIN 81 MG/1
81 TABLET, CHEWABLE ORAL DAILY
Status: DISCONTINUED | OUTPATIENT
Start: 2022-08-07 | End: 2022-08-11 | Stop reason: HOSPADM

## 2022-08-06 RX ORDER — ISOSORBIDE DINITRATE 40 MG/1
20 TABLET ORAL 3 TIMES DAILY
COMMUNITY

## 2022-08-06 RX ORDER — ASPIRIN 325 MG
325 TABLET ORAL DAILY
Status: DISCONTINUED | OUTPATIENT
Start: 2022-08-06 | End: 2022-08-06

## 2022-08-06 RX ORDER — DOXYCYCLINE 100 MG/1
100 CAPSULE ORAL EVERY 12 HOURS SCHEDULED
Status: COMPLETED | OUTPATIENT
Start: 2022-08-06 | End: 2022-08-10

## 2022-08-06 RX ORDER — ASPIRIN 81 MG/1
81 TABLET, CHEWABLE ORAL DAILY
COMMUNITY

## 2022-08-06 RX ORDER — ISOSORBIDE DINITRATE 20 MG/1
20 TABLET ORAL
Status: DISCONTINUED | OUTPATIENT
Start: 2022-08-06 | End: 2022-08-11 | Stop reason: HOSPADM

## 2022-08-06 RX ADMIN — DEXTROSE MONOHYDRATE 50 ML: 25 INJECTION, SOLUTION INTRAVENOUS at 09:44

## 2022-08-06 RX ADMIN — METOPROLOL TARTRATE 100 MG: 100 TABLET, FILM COATED ORAL at 21:45

## 2022-08-06 RX ADMIN — ASPIRIN 325 MG ORAL TABLET 325 MG: 325 PILL ORAL at 10:53

## 2022-08-06 RX ADMIN — SODIUM CHLORIDE 1 G: 900 INJECTION INTRAVENOUS at 11:00

## 2022-08-06 RX ADMIN — DONEPEZIL HYDROCHLORIDE 5 MG: 5 TABLET ORAL at 21:45

## 2022-08-06 RX ADMIN — APIXABAN 2.5 MG: 2.5 TABLET, FILM COATED ORAL at 10:53

## 2022-08-06 RX ADMIN — ALBUTEROL SULFATE 2.5 MG: 2.5 SOLUTION RESPIRATORY (INHALATION) at 08:20

## 2022-08-06 RX ADMIN — Medication 10 ML: at 21:45

## 2022-08-06 RX ADMIN — ALBUTEROL SULFATE 2.5 MG: 2.5 SOLUTION RESPIRATORY (INHALATION) at 15:55

## 2022-08-06 RX ADMIN — ISOSORBIDE DINITRATE 30 MG: 20 TABLET ORAL at 11:02

## 2022-08-06 RX ADMIN — ALBUTEROL SULFATE 2.5 MG: 2.5 SOLUTION RESPIRATORY (INHALATION) at 19:29

## 2022-08-06 RX ADMIN — VANCOMYCIN HYDROCHLORIDE 500 MG: 500 INJECTION, POWDER, LYOPHILIZED, FOR SOLUTION INTRAVENOUS at 05:08

## 2022-08-06 RX ADMIN — ALBUTEROL SULFATE 2.5 MG: 2.5 SOLUTION RESPIRATORY (INHALATION) at 23:58

## 2022-08-06 RX ADMIN — METOPROLOL TARTRATE 100 MG: 100 TABLET, FILM COATED ORAL at 10:51

## 2022-08-06 RX ADMIN — CITALOPRAM HYDROBROMIDE 20 MG: 20 TABLET ORAL at 10:52

## 2022-08-06 RX ADMIN — DOXYCYCLINE 100 MG: 100 CAPSULE ORAL at 11:02

## 2022-08-06 RX ADMIN — CEFEPIME 2 G: 2 INJECTION, POWDER, FOR SOLUTION INTRAVENOUS at 06:18

## 2022-08-06 RX ADMIN — ISOSORBIDE DINITRATE 20 MG: 20 TABLET ORAL at 17:12

## 2022-08-06 RX ADMIN — BUMETANIDE 1 MG: 0.25 INJECTION INTRAMUSCULAR; INTRAVENOUS at 17:12

## 2022-08-06 RX ADMIN — DOXYCYCLINE 100 MG: 100 CAPSULE ORAL at 21:45

## 2022-08-06 RX ADMIN — BUMETANIDE 1 MG: 0.25 INJECTION INTRAMUSCULAR; INTRAVENOUS at 05:08

## 2022-08-06 RX ADMIN — ALBUTEROL SULFATE 2.5 MG: 2.5 SOLUTION RESPIRATORY (INHALATION) at 11:43

## 2022-08-06 RX ADMIN — SULFUR HEXAFLUORIDE 3 ML: KIT at 15:45

## 2022-08-06 RX ADMIN — DEXTROSE MONOHYDRATE 25 G: 25 INJECTION, SOLUTION INTRAVENOUS at 21:36

## 2022-08-06 RX ADMIN — APIXABAN 2.5 MG: 2.5 TABLET, FILM COATED ORAL at 21:45

## 2022-08-06 RX ADMIN — ALBUTEROL SULFATE 2.5 MG: 2.5 SOLUTION RESPIRATORY (INHALATION) at 00:39

## 2022-08-06 NOTE — CONSULTS
"Pharmacy Consult-Vancomycin Dosing  Mary Ramirez is a  87 y.o. female receiving vancomycin therapy.     Indication: PNA  Consulting Provider: Hospitalist  ID Consult:     Goal Trough: 15-20 mcg/mL    Current Antimicrobial Therapy  Anti-Infectives (From admission, onward)      Ordered     Dose/Rate Route Frequency Start Stop    08/05/22 2152  vancomycin - no scheduled doses (Pharmacy dosing per levels)        Ordering Provider: Mayco Parker IV, PharmD     Does not apply Daily 08/06/22 0900 08/08/22 0859    08/05/22 2155  cefepime (MAXIPIME) 2 g/100 mL 0.9% NS (mbp)        Ordering Provider: Mayco Parker IV, PharmD    2 g  over 4 Hours Intravenous Every 24 Hours 08/06/22 0600 08/13/22 0559    08/05/22 2150  Pharmacy to dose vancomycin        Ordering Provider: Merari Finn MD   \"And\" Linked Group Details     Does not apply Continuous PRN 08/05/22 2147 08/12/22 2146 08/05/22 1536  vancomycin (VANCOCIN) 1000 mg/200 mL dextrose 5% IVPB        Ordering Provider: Rasta Marcano DO    20 mg/kg × 54.4 kg Intravenous Once 08/05/22 1600 08/05/22 1820    08/05/22 1536  cefepime (MAXIPIME) 2 g/100 mL 0.9% NS (mbp)        Ordering Provider: Rasta Marcano DO    2 g  200 mL/hr over 30 Minutes Intravenous Once 08/05/22 1538 08/05/22 1656            Allergies  Allergies as of 08/05/2022 - Reviewed 08/05/2022   Allergen Reaction Noted    Penicillins Other (See Comments) 11/23/2017       Labs    Results from last 7 days   Lab Units 08/06/22  0227 08/05/22  1508   BUN mg/dL 53* 49*   CREATININE mg/dL 1.97* 1.84*       Results from last 7 days   Lab Units 08/06/22  0227 08/05/22  1508   WBC 10*3/mm3 8.13 12.08*       Evaluation of Dosing     Last Dose Received in the ED/Outside Facility: 1000 mg @ 1657 on 8/5/22  Is Patient on Dialysis or Renal Replacement:     Ht - 152.4 cm (60\")  Wt - 54.6 kg (120 lb 6.4 oz)    Estimated Creatinine Clearance: 15.6 mL/min (A) (by C-G formula based on SCr of " 1.97 mg/dL (H)).    Intake & Output (last 3 days)         08/03 0701  08/04 0700 08/04 0701  08/05 0700 08/05 0701  08/06 0700    IV Piggyback   100    Total Intake(mL/kg)   100 (1.8)    Net   +100                   Microbiology and Radiology  Microbiology Results (last 10 days)       Procedure Component Value - Date/Time    COVID PRE-OP / PRE-PROCEDURE SCREENING ORDER (NO ISOLATION) - Swab, Nasopharynx [[id number]]  (Normal) Collected: 08/05/22 1609    Lab Status: Final result Specimen: Swab from Nasopharynx Updated: 08/05/22 1649    Narrative:      The following orders were created for panel order COVID PRE-OP / PRE-PROCEDURE SCREENING ORDER (NO ISOLATION) - Swab, Nasopharynx.  Procedure                               Abnormality         Status                     ---------                               -----------         ------                     COVID-19 and FLU A/B PCR...[[id number]]  Normal              Final result                 Please view results for these tests on the individual orders.    COVID-19 and FLU A/B PCR - Swab, Nasopharynx [[id number]]  (Normal) Collected: 08/05/22 1609    Lab Status: Final result Specimen: Swab from Nasopharynx Updated: 08/05/22 1649     COVID19 Not Detected     Influenza A PCR Not Detected     Influenza B PCR Not Detected    Narrative:      Fact sheet for providers: https://www.fda.gov/media/682969/download    Fact sheet for patients: https://www.fda.gov/media/588940/download    Test performed by PCR.            Vancomycin Levels:    Results from last 7 days   Lab Units 08/06/22  0227   VANCOMYCIN RM mcg/mL 13.60                     Assessment/Plan:    Advanced age female with multifocal PNA, renal failure.   Status post 1000 mg IV vancomycin load yesterday afternoon.  Random level this morning reflective of adequate distribution, impaired clearance.     MRSA PCR per protocol.  Supplement with 500 mg IV vancomycin this morning.    Further per rounding pharmacist pending  therapy continuation.    Thank you for this consult.  Mayco Parker IV, PharmD, BCPS  8/6/2022  03:40 EDT

## 2022-08-06 NOTE — PLAN OF CARE
Goal Outcome Evaluation:  Plan of Care Reviewed With: patient        Progress: no change  Outcome Evaluation: Patient new admit this shift. VSS. Pt disoriented x4. Passed bedside dysphagia; took meds OK crushed in applesauce. Lokelma administered x1. Pt remains on 2L NC. Continue current POC.

## 2022-08-06 NOTE — PROGRESS NOTES
"    [facility]  PROGRESS NOTE    Patient Name: Mary Ramirez  : 1934  MRN: [id number]    Date of Admission: 2022  Primary Care Physician: Tiana Hong MD    Subjective   Subjective     CC:  F/u shortness of breath     HPI:  Pt resting. Answers with one word answers. Says \"no\" to pain and shortness of breath     ROS:  Unable to obtain due to mental status     Objective   Objective     Vital Signs:   Temp:  [95.4 °F (35.2 °C)-97.7 °F (36.5 °C)] 96.9 °F (36.1 °C)  Heart Rate:  [] 105  Resp:  [18-22] 20  BP: (100-127)/(67-98) 104/79  Flow (L/min):  [2-4] 4     Physical Exam:  Constitutional: No acute distress, elderly female resting in bed   HENT: NCAT, mucous membranes moist  Respiratory: Clear to auscultation bilaterally, respiratory effort normal on 2L NC  Cardiovascular: RRR, no murmurs, rubs, or gallops  Gastrointestinal: Positive bowel sounds, soft, nontender, nondistended  Musculoskeletal: No bilateral ankle edema  Psychiatric: Unable to assess   Neurologic: Oriented x 1,  speech clear  Skin: No rashes      Results Reviewed:  LAB RESULTS:      Lab 22  1850 22  1508   WBC 8.13  --   --  12.08*   HEMOGLOBIN 12.5  --   --  13.6   HEMATOCRIT 39.3  --   --  44.8   PLATELETS 136*  --   --  166   NEUTROS ABS  --   --   --  9.23*   IMMATURE GRANS (ABS)  --   --   --  0.08*   LYMPHS ABS  --   --   --  1.39   MONOS ABS  --   --   --  1.26*   EOS ABS  --   --   --  0.09   MCV 89.7  --   --  94.5   PROCALCITONIN  --   --   --  0.06   LACTATE 3.0* 3.9* 4.3* 4.0*         Lab 22  1508   SODIUM 134*  --  131*   POTASSIUM 4.7 5.5* 5.8*   CHLORIDE 96*  --  99   CO2 23.0  --  21.0*   ANION GAP 15.0  --  11.0   BUN 53*  --  49*   CREATININE 1.97*  --  1.84*   EGFR 24.2*  --  26.3*   GLUCOSE 144*  --  126*   CALCIUM 8.6  --  9.2   HEMOGLOBIN A1C 6.80*  --   --    TSH 5.830*  --   " --          Lab 08/05/22  1508   TOTAL PROTEIN 6.4   ALBUMIN 3.70   GLOBULIN 2.7   ALT (SGPT) 105*   AST (SGOT) 33*   BILIRUBIN 0.5   ALK PHOS 97         Lab 08/05/22  1508   PROBNP 33,432.0*   TROPONIN T 0.032*         Lab 08/06/22  0227   CHOLESTEROL 124   LDL CHOL 77   HDL CHOL 25*   TRIGLYCERIDES 122             Brief Urine Lab Results  (Last result in the past 365 days)      Color   Clarity   Blood   Leuk Est   Nitrite   Protein   CREAT   Urine HCG        06/11/22 0333 Yellow   Clear   Negative   Trace   Negative   Negative                 Microbiology Results Abnormal     Procedure Component Value - Date/Time    MRSA Screen, PCR (Inpatient) - Swab, Nares [[id number]]  (Normal) Collected: 08/05/22 2327    Lab Status: Final result Specimen: Swab from Nares Updated: 08/06/22 0734     MRSA PCR Negative    Narrative:      The negative predictive value of this diagnostic test is high and should only be used to consider de-escalating anti-MRSA therapy. A positive result may indicate colonization with MRSA and must be correlated clinically.  MRSA Negative    COVID PRE-OP / PRE-PROCEDURE SCREENING ORDER (NO ISOLATION) - Swab, Nasopharynx [[id number]]  (Normal) Collected: 08/05/22 1609    Lab Status: Final result Specimen: Swab from Nasopharynx Updated: 08/05/22 1649    Narrative:      The following orders were created for panel order COVID PRE-OP / PRE-PROCEDURE SCREENING ORDER (NO ISOLATION) - Swab, Nasopharynx.  Procedure                               Abnormality         Status                     ---------                               -----------         ------                     COVID-19 and FLU A/B PCR...[[id number]]  Normal              Final result                 Please view results for these tests on the individual orders.    COVID-19 and FLU A/B PCR - Swab, Nasopharynx [[id number]]  (Normal) Collected: 08/05/22 1609    Lab Status: Final result Specimen: Swab from Nasopharynx Updated: 08/05/22 1649     COVID19  Not Detected     Influenza A PCR Not Detected     Influenza B PCR Not Detected    Narrative:      Fact sheet for providers: https://www.fda.gov/media/574986/download    Fact sheet for patients: https://www.fda.gov/media/316972/download    Test performed by PCR.          XR Chest 1 View    Result Date: 8/5/2022   DATE OF EXAM: 8/5/2022 3:19 PM  PROCEDURE: XR CHEST 1 VW-  INDICATIONS: SOA triage protocol  COMPARISON: 06/15/2022  TECHNIQUE: Portable chest radiograph.  FINDINGS:  Stable cardiac enlargement. Large hiatal hernia again noted. No pneumothorax. There is persistent bibasilar airspace disease which may relate to atelectasis and/or pneumonia. Bilateral upper lung airspace disease on the prior study appears improved. Patient's chin overlies the right apex partially limiting assessment. Small bilateral pleural effusions. No pneumothorax.      Impression: 1. Persistent bibasilar airspace disease which may relate to atelectasis or pneumonia. 2. Bilateral upper lobe airspace disease on the prior study has resolved. 3. Small bilateral pleural effusions. 4. Large hiatal hernia.  This report was finalized on 8/5/2022 3:21 PM by Michael Osei MD.        Results for orders placed during the hospital encounter of 06/10/22    Adult Transthoracic Echo Complete W/ Cont if Necessary Per Protocol    Interpretation Summary  · Estimated left ventricular EF = 51% Left ventricular ejection fraction appears to be 51 - 55%. Left ventricular systolic function is normal.  · Left ventricular wall thickness is consistent with mild septal asymmetric hypertrophy.  · There is calcification of the aortic valve mainly affecting the left coronary cusp(s).  · Moderate mitral valve regurgitation is present.  · Estimated right ventricular systolic pressure from tricuspid regurgitation is normal (<35 mmHg).  · Incidental heart rate 120 bpm with diastolic inflow consistent with atrial fibrillation      I have reviewed the medications:  Scheduled  Meds:albuterol, 2.5 mg, Nebulization, Q4H - RT  apixaban, 2.5 mg, Oral, BID  aspirin, 325 mg, Oral, Daily  bumetanide, 1 mg, Intravenous, Q12H  cefepime, 2 g, Intravenous, Q24H  citalopram, 20 mg, Oral, Daily  donepezil, 5 mg, Oral, Nightly  insulin detemir, 10 Units, Subcutaneous, Nightly  insulin lispro, 0-7 Units, Subcutaneous, TID AC  isosorbide dinitrate, 30 mg, Oral, TID - Nitrates  metoprolol tartrate, 100 mg, Oral, BID  sodium chloride, 10 mL, Intravenous, Q12H  vancomycin (dosing per levels), , Does not apply, Daily      Continuous Infusions:Pharmacy to dose vancomycin,       PRN Meds:.•  acetaminophen **OR** acetaminophen **OR** acetaminophen  •  dextrose  •  dextrose  •  glucagon (human recombinant)  •  HYDROcodone-acetaminophen  •  ondansetron **OR** ondansetron  •  [DISCONTINUED] vancomycin **AND** Pharmacy to dose vancomycin  •  sodium chloride  •  sodium chloride    Assessment & Plan   Assessment & Plan     Active Hospital Problems    Diagnosis  POA   • Pneumonia of both lower lobes due to infectious organism [J18.9]  Yes   • Acute on chronic diastolic CHF (congestive heart failure) ([facility]) [I50.33]  Yes   • Acute respiratory failure with hypoxia ([facility]) [J96.01]  Yes   • Acute sepsis ([facility]) [A41.9]  Yes   • DEANGELO (acute kidney injury) ([facility]) [N17.9]  Yes   • Coronary artery disease involving native coronary artery of native heart with angina pectoris ([facility]) [I25.119]  Yes      Resolved Hospital Problems   No resolved problems to display.        Brief Hospital Course to date:  Mary Ramirez is a 87 y.o. female with a past medical history significant for paroxysmal atrial fibrillation, essential hypertension, CAD, diastolic congestive heart failure, CKD III, diabetes mellitus type 2, diverticulosis, Dementia, hyperlipidemia and TIA presented to the ED with complaints of shortness of air.    Multifocal pneumonia   Acute hypoxic respiratory failure  -imaging as mentioned above  - patient is DNR/DNI  -vanc and  cefepime changed to rocephin and doxy   -blood cultures x2   -sputum culture  -COVID-19 negative   -check urine antigens   -MRSA PCR   -continuous pulse ox  -keep o2 sat >90%     Acute on chronic HFpEF  -last echo with EF 66%, mild tricuspid valve regurgitation  -mild to moderate MVR  -proBNP  33K   -s/p 2mg IV Bumex in the ED  -strict I &O   -daily weight   -follows with Dr. Flores   -- repeat TTE pending   -- continue with IV Bumex        Paroxysmal atrial fibrillation   -currently rate controlled   -previously on eliquis but stopped due to multiple falls, however, per her med rec, it appears she is on it.  Will continue for now but need to verify with her facility if she is truly on it.      Elevated Creatinine on CKD III   -baseline creatinine 1.1-1.4, Cr currently 1.97.   -- monitor closely with diuresis  -- if worsens, would send urine studies  -repeat labs in am      Essential hypertension   -continue home meds     Dementia  -on aricept   -fall precautions      Diabetes mellitus type 2  - hgb A1c   -start ldssi   -fingersticks achs     Expected Discharge Location and Transportation: Home vs rehab   Expected Discharge Date: 8/10    DVT prophylaxis:  Medical DVT prophylaxis orders are present.     AM-PAC 6 Clicks Score (PT): 7 (08/05/22 2015)    CODE STATUS:   Code Status and Medical Interventions:   Ordered at: 08/05/22 2151     Medical Intervention Limits:    NO intubation (DNI)    NO cardioversion    NO dialysis    NO NIPPV (Non-Invasive Positive Pressure Ventilation)    NO artificial nutrition    NO vasopressors     Level Of Support Discussed With:    Health Care Surrogate     Code Status (Patient has no pulse and is not breathing):    No CPR (Do Not Attempt to Resuscitate)     Medical Interventions (Patient has pulse or is breathing):    Limited Support       Pennie German, DO  08/06/22

## 2022-08-06 NOTE — H&P
[facility]  HISTORY AND PHYSICAL    Patient Name: Mary Ramirez  : 1934  MRN: [id number]  Primary Care Physician: Tiana Hong MD  Date of admission: 2022      Subjective   Subjective     Chief Complaint:  SOA    HPI:  Mary Ramirez is a 87 y.o. female  with a past medical history significant for paroxysmal atrial fibrillation, essential hypertension, CAD, diastolic congestive heart failure, CKD III, diabetes mellitus type 2, diverticulosis, Dementia, hyperlipidemia and TIA presented to the ED with complaints of shortness of air.  Due to underlying dementia patient is unable to provide much history, her son is at bedside and reports that the patient had been doing well earlier this week when she was seen by Dr. Flores with Cardiology. The following day, she developed PNA and was put on ABX at her nursing home (data deficient).  Pt's son states that she acutely worsened overnight and they called him this am to ask if he wanted her to come to the ED for evaluation or if he wanted to pursue hospice.       Review of Systems   KAYLEEN due to pt's dementia      All other systems reviewed and are negative.     Personal History     Past Medical History:   Diagnosis Date   • Atrial fibrillation (HCC)    • Benign essential hypertension    • CAD (coronary artery disease)    • Carotid artery disease (HCC)    • CHF (congestive heart failure) (HCC)    • Chronic allergic rhinitis    • CKD (chronic kidney disease), stage III (HCC)    • DDD (degenerative disc disease), cervical    • Dementia (HCC)    • Diabetes mellitus (HCC)    • Diverticulosis     WITH PERFORATION    • Hypertension    • Major depressive disorder, single episode, mild (HCC)    • Memory loss    • Mixed hyperlipidemia    • SSS (sick sinus syndrome) ([facility])    • TIA (transient ischemic attack) 2016             Past Surgical History:   Procedure Laterality Date   • ANKLE SURGERY Left    •  ATHERECTOMY  1995   • ATHRECTOMY ILIAC, FEMORAL, TIBIAL ARTERY     • BREAST LUMPECTOMY Left 1981   • COLON SURGERY     • COLOSTOMY  1983   • CORONARY STENT PLACEMENT  2001   • FINGER FRACTURE SURGERY Right    • HIP TROCHANTERIC NAILING WITH INTRAMEDULLARY HIP SCREW Left 11/23/2017   • HIP TROCHANTERIC NAILING WITH INTRAMEDULLARY HIP SCREW Right 3/30/2019    Procedure: HIP TROCANTERIC NAILING WITH INTRAMEDULLARY HIP SCREW RIGHT;  Surgeon: Theo Figueroa MD;  Location: [facility];  Service: Orthopedics   • HYSTERECTOMY  1984   • OTHER SURGICAL HISTORY  1984    REANASTAMOSIS        Family History:  family history includes Coronary artery disease (age of onset: 56) in her mother; Hypertension in her father and mother; Lung cancer in her brother and father; Osteoporosis in her sister. Otherwise pertinent FHx was reviewed and unremarkable.     Social History:  reports that she quit smoking about 43 years ago. Her smoking use included cigarettes. She started smoking about 46 years ago. She quit after 2.00 years of use. She has never used smokeless tobacco. She reports that she does not drink alcohol and does not use drugs.  Social History     Social History Narrative    [facility]    Patient drinks 2 cups of caffeine per day.,       Medications:  Available home medication information reviewed.  Medications Prior to Admission   Medication Sig Dispense Refill Last Dose   • acetaminophen (TYLENOL) 500 MG tablet Take 500 mg by mouth 2 (Two) Times a Day. Take one tablet twice a day for leg pain   And prn every 6 hours      • aluminum-magnesium hydroxide-simethicone (MAALOX MAX) 400-400-40 MG/5ML suspension Take 5 mL by mouth Every 6 (Six) Hours As Needed for Indigestion or Heartburn.      • Amino Acids-Protein Hydrolys (PRO-STAT AWC PO) Take 1 dose by mouth 2 (Two) Times a Day.      • apixaban (ELIQUIS) 2.5 MG tablet tablet Take 1 tablet by mouth 2 (Two) Times a Day. 60 tablet 6    • aspirin  (aspirin) 81 MG EC tablet Take 1 tablet by mouth Daily. (Patient taking differently: Take 325 mg by mouth Daily.) 30 tablet 6    • citalopram (CeleXA) 20 MG tablet Take 20 mg by mouth Daily.      • docusate sodium (COLACE) 100 MG capsule Take 100 mg by mouth 2 (Two) Times a Day.      • donepezil (ARICEPT) 5 MG tablet Take 5 mg by mouth Every Night.      • fluticasone (FLONASE) 50 MCG/ACT nasal spray 2 sprays into the nostril(s) as directed by provider Daily.      • furosemide (LASIX) 40 MG tablet Take 1 tablet by mouth Daily for 30 days. 30 tablet 0    • Glucagon, rDNA, (Glucagon Emergency) 1 MG kit As Needed.      • hydrALAZINE (APRESOLINE) 10 MG tablet Take 10 mg by mouth Daily As Needed.      • insulin aspart (novoLOG FLEXPEN) 100 UNIT/ML solution pen-injector sc pen Inject 2-5 Units under the skin into the appropriate area as directed 4 (Four) Times a Day.      • isosorbide mononitrate (IMDUR) 60 MG 24 hr tablet Take 60 mg by mouth 3 (Three) Times a Day. 0.5 tab      • Lantus SoloStar 100 UNIT/ML injection pen Daily.      • lidocaine (LIDODERM) 5 % Place 1 patch on the skin as directed by provider Daily. Remove & Discard patch within 12 hours or as directed by MD 30 patch 0    • metFORMIN ER (GLUCOPHAGE-XR) 500 MG 24 hr tablet Take 1 tablet by mouth Daily With Breakfast. (Patient taking differently: Take 500 mg by mouth 2 (Two) Times a Day.) 30 tablet 5    • metoprolol tartrate (LOPRESSOR) 100 MG tablet Take 1 tablet by mouth 2 (Two) Times a Day. 60 tablet 11    • metoprolol tartrate (LOPRESSOR) 25 MG tablet Take 25 mg by mouth 2 (Two) Times a Day As Needed.      • Multiple Vitamin (MULTIVITAMINS PO) Take 1 tablet by mouth Daily.      • ondansetron (ZOFRAN) 4 MG tablet Take 4 mg by mouth Every 8 (Eight) Hours As Needed for Nausea or Vomiting.      • polyethylene glycol (MIRALAX) pack packet Take 17 g by mouth Daily. (Patient taking differently: Take 17 g by mouth Daily As Needed.)          Allergies   Allergen  "Reactions   • Penicillins Other (See Comments)     Pt states \"i don't know\"       Objective   Objective     Vital Signs:   Temp:  [95.8 °F (35.4 °C)-97.7 °F (36.5 °C)] 95.8 °F (35.4 °C)  Heart Rate:  [] 105  Resp:  [20] 20  BP: (101-127)/(67-98) 117/95       Physical Exam   Constitutional: Awake, alert  Eyes: PERRLA, sclerae anicteric, no conjunctival injection  HENT: NCAT, mucous membranes moist  Neck: Supple, no thyromegaly, no lymphadenopathy, trachea midline  Respiratory: Clear to auscultation bilaterally, nonlabored respirations   Cardiovascular: RRR, no murmurs, rubs, or gallops, palpable pedal pulses bilaterally  Gastrointestinal: Positive bowel sounds, soft, nontender, nondistended  Musculoskeletal: No bilateral ankle edema, no clubbing or cyanosis to extremities  Psychiatric: Appropriate affect, cooperative  Neurologic: awake, not oriented, strength symmetric in all extremities, Cranial Nerves grossly intact to confrontation, speech clear  Skin: No rashes    Result Review:  I have personally reviewed the results from the time of this admission to 8/5/2022 21:53 EDT and agree with these findings:  [x]  Laboratory list / accordion  [x]  Microbiology  [x]  Radiology  []  EKG/Telemetry   []  Cardiology/Vascular   []  Pathology  [x]  Old records  []  Other:  Most notable findings include: see assessment and plan      LAB RESULTS:      Lab 08/05/22  1850 08/05/22  1508   WBC  --  12.08*   HEMOGLOBIN  --  13.6   HEMATOCRIT  --  44.8   PLATELETS  --  166   NEUTROS ABS  --  9.23*   IMMATURE GRANS (ABS)  --  0.08*   LYMPHS ABS  --  1.39   MONOS ABS  --  1.26*   EOS ABS  --  0.09   MCV  --  94.5   PROCALCITONIN  --  0.06   LACTATE 4.3* 4.0*         Lab 08/05/22  1508   SODIUM 131*   POTASSIUM 5.8*   CHLORIDE 99   CO2 21.0*   ANION GAP 11.0   BUN 49*   CREATININE 1.84*   EGFR 26.3*   GLUCOSE 126*   CALCIUM 9.2         Lab 08/05/22  1508   TOTAL PROTEIN 6.4   ALBUMIN 3.70   GLOBULIN 2.7   ALT (SGPT) 105*   AST " (SGOT) 33*   BILIRUBIN 0.5   ALK PHOS 97         Lab 08/05/22  1508   PROBNP 33,432.0*   TROPONIN T 0.032*                 UA    Urinalysis 6/3/22 6/11/22 6/11/22     0333 0333   Squamous Epithelial Cells, UA   None Seen   Specific [place], UA 1.021 1.007    Ketones, UA Negative Negative    Blood, UA Negative Negative    Leukocytes, UA Negative Trace (A)    Nitrite, UA Negative Negative    RBC, UA   0-2   WBC, UA   None Seen   Bacteria, UA   None Seen   (A) Abnormal value       Comments are available for some flowsheets but are not being displayed.             Microbiology Results (last 10 days)     Procedure Component Value - Date/Time    COVID PRE-OP / PRE-PROCEDURE SCREENING ORDER (NO ISOLATION) - Swab, Nasopharynx [[id number]]  (Normal) Collected: 08/05/22 1609    Lab Status: Final result Specimen: Swab from Nasopharynx Updated: 08/05/22 1649    Narrative:      The following orders were created for panel order COVID PRE-OP / PRE-PROCEDURE SCREENING ORDER (NO ISOLATION) - Swab, Nasopharynx.  Procedure                               Abnormality         Status                     ---------                               -----------         ------                     COVID-19 and FLU A/B PCR...[[id number]]  Normal              Final result                 Please view results for these tests on the individual orders.    COVID-19 and FLU A/B PCR - Swab, Nasopharynx [[id number]]  (Normal) Collected: 08/05/22 1609    Lab Status: Final result Specimen: Swab from Nasopharynx Updated: 08/05/22 1649     COVID19 Not Detected     Influenza A PCR Not Detected     Influenza B PCR Not Detected    Narrative:      Fact sheet for providers: https://www.fda.gov/media/588595/download    Fact sheet for patients: https://www.fda.gov/media/125257/download    Test performed by PCR.          XR Chest 1 View    Result Date: 8/5/2022   DATE OF EXAM: 8/5/2022 3:19 PM  PROCEDURE: XR CHEST 1 VW-  INDICATIONS: SOA triage protocol  COMPARISON:  06/15/2022  TECHNIQUE: Portable chest radiograph.  FINDINGS:  Stable cardiac enlargement. Large hiatal hernia again noted. No pneumothorax. There is persistent bibasilar airspace disease which may relate to atelectasis and/or pneumonia. Bilateral upper lung airspace disease on the prior study appears improved. Patient's chin overlies the right apex partially limiting assessment. Small bilateral pleural effusions. No pneumothorax.      Impression: 1. Persistent bibasilar airspace disease which may relate to atelectasis or pneumonia. 2. Bilateral upper lobe airspace disease on the prior study has resolved. 3. Small bilateral pleural effusions. 4. Large hiatal hernia.  This report was finalized on 8/5/2022 3:21 PM by Michael Osei MD.        Results for orders placed during the hospital encounter of 06/10/22    Adult Transthoracic Echo Complete W/ Cont if Necessary Per Protocol    Interpretation Summary  · Estimated left ventricular EF = 51% Left ventricular ejection fraction appears to be 51 - 55%. Left ventricular systolic function is normal.  · Left ventricular wall thickness is consistent with mild septal asymmetric hypertrophy.  · There is calcification of the aortic valve mainly affecting the left coronary cusp(s).  · Moderate mitral valve regurgitation is present.  · Estimated right ventricular systolic pressure from tricuspid regurgitation is normal (<35 mmHg).  · Incidental heart rate 120 bpm with diastolic inflow consistent with atrial fibrillation      Assessment & Plan   Assessment & Plan     Active Hospital Problems    Diagnosis  POA   • Acute on chronic diastolic CHF (congestive heart failure) ([facility]) [I50.33]  Yes     Priority: High   • Acute respiratory failure with hypoxia ([facility]) [J96.01]  Yes     Priority: High   • Acute sepsis ([facility]) [A41.9]  Yes     Priority: High   • DEANGELO (acute kidney injury) ([facility]) [N17.9]  Yes     Priority: Medium   • Coronary artery disease involving native coronary artery of native  heart with angina pectoris (HCC) [I25.119]  Yes     Priority: Low     Cardiac cath with PCI data deficit  Echo (9/1/18): LVEF 60%. LVH. Left atrial dilatation. Aortic calcification with no stenosis.     • Pneumonia of both lower lobes due to infectious organism [J18.9]  Yes       Ms. Ramirez is an 86 yo WF  with a past medical history significant for paroxysmal atrial fibrillation, essential hypertension, CAD, diastolic congestive heart failure, CKD III, diabetes mellitus type 2, diverticulosis, Dementia, hyperlipidemia and TIA presents to the ED with complaints of shortness of air.  She was found to have multifocal PNA.    Plan:    Multifocal pneumonia   Acute hypoxic respiratory failure  -imaging as mentioned above  -Son verifies patient is DNR/DNI  -continue vancomycin and cefepime (PCN allergy)  -blood cultures x2 pending   -sputum culture  -COVID-19 negative   -check urine antigens   -MRSA PCR   -continuous pulse ox  -keep o2 sat >90%     Acute on chronic HFpEF  -last echo with EF 66%, mild tricuspid valve regurgitation  -mild to moderate MVR  -proBNP tonight 33K   -s/p 2mg IV Bumex in the ED  -strict I &O   -daily weight   -follows with Dr. Flores   -- repeat TTE in am  -- continue with IV Bumex     Hyperkalemia  --suspect due to hemolysis, repeat K+ now     Paroxysmal atrial fibrillation   -currently rate controlled   -previously on eliquis but stopped due to multiple falls, however, per her med rec, it appears she is on it.  Will continue for now but need to verify with her facility if she is truly on it.      Elevated Creatinine on CKD III   -baseline creatinine 1.1-1.4, Cr currently 1.84. Likely due to cardiorenal.  -- monitor closely with diuresis  -- if worsens, would send urine studies  -repeat labs in am      Essential hypertension   -continue home meds     Dementia  -on aricept   -fall precautions      Diabetes mellitus type 2  -check hgb A1c   -start ldssi   -fingersticks achs     DVT prophylaxis:   Eliquis (please verify home med list in am)      CODE STATUS:  DNR/DNI  Code Status and Medical Interventions:   Ordered at: 08/05/22 9222     Medical Intervention Limits:    NO intubation (DNI)    NO cardioversion    NO dialysis    NO NIPPV (Non-Invasive Positive Pressure Ventilation)    NO artificial nutrition    NO vasopressors     Level Of Support Discussed With:    Health Care Surrogate     Code Status (Patient has no pulse and is not breathing):    No CPR (Do Not Attempt to Resuscitate)     Medical Interventions (Patient has pulse or is breathing):    Limited Support         Merari Finn MD  08/05/22

## 2022-08-07 LAB
GLUCOSE BLDC GLUCOMTR-MCNC: 134 MG/DL (ref 70–130)
GLUCOSE BLDC GLUCOMTR-MCNC: 157 MG/DL (ref 70–130)
GLUCOSE BLDC GLUCOMTR-MCNC: 188 MG/DL (ref 70–130)
GLUCOSE BLDC GLUCOMTR-MCNC: 92 MG/DL (ref 70–130)

## 2022-08-07 PROCEDURE — 99232 SBSQ HOSP IP/OBS MODERATE 35: CPT | Performed by: FAMILY MEDICINE

## 2022-08-07 PROCEDURE — 25010000002 CEFTRIAXONE PER 250 MG: Performed by: FAMILY MEDICINE

## 2022-08-07 PROCEDURE — 94799 UNLISTED PULMONARY SVC/PX: CPT

## 2022-08-07 PROCEDURE — 82962 GLUCOSE BLOOD TEST: CPT

## 2022-08-07 PROCEDURE — 99222 1ST HOSP IP/OBS MODERATE 55: CPT | Performed by: INTERNAL MEDICINE

## 2022-08-07 PROCEDURE — 63710000001 INSULIN DETEMIR PER 5 UNITS: Performed by: INTERNAL MEDICINE

## 2022-08-07 PROCEDURE — 63710000001 INSULIN LISPRO (HUMAN) PER 5 UNITS: Performed by: INTERNAL MEDICINE

## 2022-08-07 PROCEDURE — 94664 DEMO&/EVAL PT USE INHALER: CPT

## 2022-08-07 PROCEDURE — 97166 OT EVAL MOD COMPLEX 45 MIN: CPT

## 2022-08-07 PROCEDURE — 97530 THERAPEUTIC ACTIVITIES: CPT

## 2022-08-07 RX ADMIN — ALBUTEROL SULFATE 2.5 MG: 2.5 SOLUTION RESPIRATORY (INHALATION) at 07:49

## 2022-08-07 RX ADMIN — BUMETANIDE 1 MG: 0.25 INJECTION INTRAMUSCULAR; INTRAVENOUS at 17:08

## 2022-08-07 RX ADMIN — APIXABAN 2.5 MG: 2.5 TABLET, FILM COATED ORAL at 20:05

## 2022-08-07 RX ADMIN — BUMETANIDE 1 MG: 0.25 INJECTION INTRAMUSCULAR; INTRAVENOUS at 05:22

## 2022-08-07 RX ADMIN — SODIUM CHLORIDE 1 G: 900 INJECTION INTRAVENOUS at 10:37

## 2022-08-07 RX ADMIN — ALBUTEROL SULFATE 2.5 MG: 2.5 SOLUTION RESPIRATORY (INHALATION) at 23:13

## 2022-08-07 RX ADMIN — INSULIN LISPRO 2 UNITS: 100 INJECTION, SOLUTION INTRAVENOUS; SUBCUTANEOUS at 17:08

## 2022-08-07 RX ADMIN — ISOSORBIDE DINITRATE 20 MG: 20 TABLET ORAL at 13:56

## 2022-08-07 RX ADMIN — ISOSORBIDE DINITRATE 20 MG: 20 TABLET ORAL at 17:08

## 2022-08-07 RX ADMIN — METOPROLOL TARTRATE 100 MG: 100 TABLET, FILM COATED ORAL at 08:46

## 2022-08-07 RX ADMIN — DOXYCYCLINE 100 MG: 100 CAPSULE ORAL at 20:05

## 2022-08-07 RX ADMIN — METOPROLOL TARTRATE 150 MG: 100 TABLET, FILM COATED ORAL at 20:05

## 2022-08-07 RX ADMIN — APIXABAN 2.5 MG: 2.5 TABLET, FILM COATED ORAL at 08:46

## 2022-08-07 RX ADMIN — DOXYCYCLINE 100 MG: 100 CAPSULE ORAL at 08:45

## 2022-08-07 RX ADMIN — CITALOPRAM HYDROBROMIDE 20 MG: 20 TABLET ORAL at 08:46

## 2022-08-07 RX ADMIN — INSULIN DETEMIR 10 UNITS: 100 INJECTION, SOLUTION SUBCUTANEOUS at 20:06

## 2022-08-07 RX ADMIN — ASPIRIN 81 MG CHEWABLE TABLET 81 MG: 81 TABLET CHEWABLE at 08:45

## 2022-08-07 RX ADMIN — Medication 10 ML: at 08:46

## 2022-08-07 RX ADMIN — ALBUTEROL SULFATE 2.5 MG: 2.5 SOLUTION RESPIRATORY (INHALATION) at 11:51

## 2022-08-07 RX ADMIN — ALBUTEROL SULFATE 2.5 MG: 2.5 SOLUTION RESPIRATORY (INHALATION) at 19:17

## 2022-08-07 RX ADMIN — ISOSORBIDE DINITRATE 20 MG: 20 TABLET ORAL at 08:46

## 2022-08-07 RX ADMIN — ALBUTEROL SULFATE 2.5 MG: 2.5 SOLUTION RESPIRATORY (INHALATION) at 03:41

## 2022-08-07 RX ADMIN — DONEPEZIL HYDROCHLORIDE 5 MG: 5 TABLET ORAL at 20:06

## 2022-08-07 RX ADMIN — Medication 10 ML: at 20:06

## 2022-08-07 NOTE — PLAN OF CARE
Problem: Adult Inpatient Plan of Care  Goal: Plan of Care Review  Recent Flowsheet Documentation  Taken 8/7/2022 1050 by Clayton Sorenson OT  Progress: (OT eval) no change  Plan of Care Reviewed With: patient  Outcome Evaluation: OT eval completed. Pt presents w/ generalized weakness, cognitive deficits, impaired balance/coordination, and decreaesd functional endurance warranting skilled IP OT services to promote return to PLOF. BUE/BLE AAROM performed as warm-up, MaxA for bed mobility and SPT to recliner, Dep for LB ADLs and grooming this date. Pt tolerated sitting balance activities and progressed to SBA sitting EOB. Rec d/c to SNF.

## 2022-08-07 NOTE — CONSULTS
Cardiology Consult:     Mary Ramirez  1934  [phone]  There is no work phone number on file.    08/07/22    DATE OF ADMISSION: 8/5/2022  [facility]    Tiana Hong MD  [address] [facility] / [facility] [address]  Referring Provider: No ref. provider found     Chief Complaint   Patient presents with   • Shortness of Breath   Reason for consultation: Acute on chronic systolic heart failure/shortness of breath    Cardiac PMH: (Old records have been reviewed and summarized below)  1. CHF/HFrEF  a. A/C DHF 6/10/2022  b. Echocardiogram, Normal EF.  Aortic calcification with no stenosis  c. Admission to [facility] 8/5/2022 with shortness of breath and elevated BNP/abnormal chest x-ray  d. Echocardiogram 8/6/2022: EF 26 to 30%, left atrial volume severely increased, moderate calcification of the aortic valve, moderate MR, moderate to severe TR, RVSP 35 to 45 mmHg  2. Afib  a. Permanent A. fib  b. Amiodarone started 6/10/2022 during hospitalization and subsequent discontinued patient remains in permanent A. fib  c. Chadsvasc=6, currently Eliquis 2.5 mg twice daily  3. Recent multifocal PNA, sepsis Admission to [facility] 6/10/2022-6/17/2022  1. Recurrent multifocal pneumonia August 2022.   4. CKD Stage III  5. GERD  6. Alzheimer's dementia          History of Present Illness:   Mary Ramirez is an 87-year-old female with past medical history significant for permanent atrial fibrillation, heart failure (preserved EF now with heart failure with reduced EF, recurrent pneumonia, chronic kidney disease stage III, Alzheimer's dementia who cardiology is asked to see today due to acute systolic heart failure.  The patient was admitted on 8/5/2022 when she presented to the ED with complaints of shortness of air.  She has underlying severe dementia and is unable to provide much history but according to records her son was at bedside upon admission and reports the patient had been doing  "well earlier in the week when she was seen in the cardiology office but then started developing worsening shortness of breath.  She was put on antibiotics by her nursing home but continued to worsen.    ED evaluation showed elevated white count of 12 elevated proBNP of 33,000 and gray zone troponin 0.032, creatinine 1.84, and imaging of her lungs showing persistent bibasilar air space disease likely pneumonia and also small bilateral pleural effusions.  She was started on antibiotics and IV Bumex.  COVID test negative.  She was also found to be in rate controlled atrial fibrillation.  Her home medications including metoprolol and Eliquis have been continued.  Currently the patient is sitting up in the chair with 2 L oxygen via nasal cannula with normal O2 sats.  She is unable to give any history but denies any current complaints of chest pain or shortness of breath.  I tried to call her son on his phone but he did not answer.      Allergies   Allergen Reactions   • Penicillins Other (See Comments)     Pt states \"i don't know\"       Prior to Admission Medications     Prescriptions Last Dose Informant Patient Reported? Taking?    acetaminophen (TYLENOL) 500 MG tablet  Nursing Home Yes Yes    Take 500 mg by mouth Every 6 (Six) Hours As Needed (leg pain).    aluminum-magnesium hydroxide-simethicone (MAALOX MAX) 400-400-40 MG/5ML suspension  Nursing Home Yes Yes    Take 5 mL by mouth Every 6 (Six) Hours As Needed for Indigestion or Heartburn.    citalopram (CeleXA) 20 MG tablet  Nursing Home Yes Yes    Take 20 mg by mouth Daily.    docusate sodium (COLACE) 100 MG capsule  Nursing Home Yes Yes    Take 100 mg by mouth 2 (Two) Times a Day.    donepezil (ARICEPT) 5 MG tablet  Nursing Home Yes Yes    Take 5 mg by mouth Every Night.    fluticasone (FLONASE) 50 MCG/ACT nasal spray  Nursing Home Yes Yes    2 sprays into the nostril(s) as directed by provider Daily.    Glucagon, rDNA, (Glucagon Emergency) 1 MG kit  Nursing Home " Yes Yes    As Needed.    insulin aspart (novoLOG FLEXPEN) 100 UNIT/ML solution pen-injector sc pen  Nursing Home Yes Yes    Inject 2-8 Units under the skin into the appropriate area as directed 4 (Four) Times a Day Before Meals & at Bedtime.    lidocaine (LIDODERM) 5 %  Nursing Home No Yes    Place 1 patch on the skin as directed by provider Daily. Remove & Discard patch within 12 hours or as directed by MD    Multiple Vitamin (MULTIVITAMINS PO)  Nursing Home Yes Yes    Take 1 tablet by mouth Daily.    ondansetron (ZOFRAN) 4 MG tablet  Nursing Home Yes Yes    Take 4 mg by mouth Every 8 (Eight) Hours As Needed for Nausea or Vomiting.    polyethylene glycol (MIRALAX) pack packet  Nursing Home No Yes    Take 17 g by mouth Daily.    acetaminophen (TYLENOL) 500 MG tablet  Nursing Home Yes Yes    Take 500 mg by mouth 2 (Two) Times a Day.    apixaban (ELIQUIS) 2.5 MG tablet tablet  Nursing Home No Yes    Take 1 tablet by mouth 2 (Two) Times a Day.    aspirin 81 MG chewable tablet  Nursing Home Yes Yes    Chew 81 mg Daily.    hydrALAZINE (APRESOLINE) 10 MG tablet  Nursing Home Yes Yes    Take 10 mg by mouth Daily As Needed (for SBP > 170 or DBP > 90).    isosorbide dinitrate (ISORDIL) 40 MG tablet  Nursing Home Yes Yes    Take 20 mg by mouth 3 (Three) Times a Day.    Lantus SoloStar 100 UNIT/ML injection pen  Nursing Home Yes Yes    Inject 10 Units under the skin into the appropriate area as directed Every Night.    metFORMIN (GLUCOPHAGE) 500 MG tablet  Nursing Home Yes Yes    Take 500 mg by mouth 2 (Two) Times a Day With Meals.    metoprolol tartrate (LOPRESSOR) 100 MG tablet  Nursing Home No Yes    Take 1 tablet by mouth 2 (Two) Times a Day.    metoprolol tartrate (LOPRESSOR) 25 MG tablet  Nursing Home Yes Yes    Take 25 mg by mouth Every 12 (Twelve) Hours As Needed (for HR > 100).            Current Facility-Administered Medications:   •  acetaminophen (TYLENOL) tablet 650 mg, 650 mg, Oral, Q4H PRN **OR** acetaminophen  (TYLENOL) 160 MG/5ML solution 650 mg, 650 mg, Oral, Q4H PRN **OR** acetaminophen (TYLENOL) suppository 650 mg, 650 mg, Rectal, Q4H PRN, Merari Finn MD  •  albuterol (PROVENTIL) nebulizer solution 0.083% 2.5 mg/3mL, 2.5 mg, Nebulization, Q4H - RT, Merari Finn MD, 2.5 mg at 08/07/22 0749  •  apixaban (ELIQUIS) tablet 2.5 mg, 2.5 mg, Oral, BID, Merari Finn MD, 2.5 mg at 08/07/22 0846  •  aspirin chewable tablet 81 mg, 81 mg, Oral, Daily, Pennie German DO, 81 mg at 08/07/22 0845  •  bumetanide (BUMEX) injection 1 mg, 1 mg, Intravenous, Q12H, Merari Finn MD, 1 mg at 08/07/22 0522  •  cefTRIAXone (ROCEPHIN) 1 g/100 mL 0.9% NS (MBP), 1 g, Intravenous, Q24H, Pennie German DO, 1 g at 08/07/22 1037  •  citalopram (CeleXA) tablet 20 mg, 20 mg, Oral, Daily, Merari Finn MD, 20 mg at 08/07/22 0846  •  dextrose (D50W) (25 g/50 mL) IV injection 25 g, 25 g, Intravenous, Q15 Min PRN, Merari Finn MD, 25 g at 08/06/22 2136  •  dextrose (GLUTOSE) oral gel 15 g, 15 g, Oral, Q15 Min PRN, Merari Finn MD  •  donepezil (ARICEPT) tablet 5 mg, 5 mg, Oral, Nightly, Merari Finn MD, 5 mg at 08/06/22 2145  •  doxycycline (MONODOX) capsule 100 mg, 100 mg, Oral, Q12H, Pennie German DO, 100 mg at 08/07/22 0845  •  glucagon (human recombinant) (GLUCAGEN DIAGNOSTIC) injection 1 mg, 1 mg, Intramuscular, Q15 Min PRN, Merari Finn MD  •  HYDROcodone-acetaminophen (NORCO) 5-325 MG per tablet 1 tablet, 1 tablet, Oral, Q4H PRN, Merari Finn MD  •  insulin detemir (LEVEMIR) injection 10 Units, 10 Units, Subcutaneous, Nightly, Merari Finn MD, 10 Units at 08/05/22 2236  •  Insulin Lispro (humaLOG) injection 0-7 Units, 0-7 Units, Subcutaneous, TID AC, Merari Finn MD  •  isosorbide dinitrate (ISORDIL) tablet 20 mg, 20 mg, Oral, TID - Nitrates, Pennie German DO, 20 mg at 08/07/22  0846  •  metoprolol tartrate (LOPRESSOR) tablet 100 mg, 100 mg, Oral, BID, Merari Finn MD, 100 mg at 22 0846  •  ondansetron (ZOFRAN) tablet 4 mg, 4 mg, Oral, Q6H PRN **OR** ondansetron (ZOFRAN) injection 4 mg, 4 mg, Intravenous, Q6H PRN, Merari Finn MD  •  sodium chloride 0.9 % flush 1-10 mL, 1-10 mL, Intravenous, PRN, Merari Finn MD  •  sodium chloride 0.9 % flush 10 mL, 10 mL, Intravenous, PRN, Merari Finn MD  •  sodium chloride 0.9 % flush 10 mL, 10 mL, Intravenous, Q12H, Merari Finn MD, 10 mL at 22 0846    Social History     Socioeconomic History   • Marital status:      Spouse name: N/A   • Number of children: 2   • Years of education: H.S   Tobacco Use   • Smoking status: Former Smoker     Years: 2.00     Types: Cigarettes     Start date: 1975     Quit date: 1978     Years since quittin.7   • Smokeless tobacco: Never Used   • Tobacco comment: QUIT DATE 1984   Substance and Sexual Activity   • Alcohol use: No   • Drug use: No   • Sexual activity: Never       Family History   Problem Relation Age of Onset   • Hypertension Mother    • Coronary artery disease Mother 56        PREMATURE    • Lung cancer Father    • Hypertension Father    • Osteoporosis Sister    • Lung cancer Brother        REVIEW OF SYSTEMS: Unable to obtain due to patient's dementia    Vitals:    22 0800 22 0900 22 1000 22 1100   BP:    137/89   BP Location:    Right arm   Patient Position:    Sitting   Pulse: 104 105 104 104   Resp:    18   Temp:    97.1 °F (36.2 °C)   TempSrc:    Oral   SpO2: 100%  100% 95%   Weight:       Height:             Vital Sign Min/Max for last 24 hours  Temp  Min: 95.3 °F (35.2 °C)  Max: 97.2 °F (36.2 °C)   BP  Min: 111/71  Max: 144/99   Pulse  Min: 98  Max: 105   Resp  Min: 16  Max: 18   SpO2  Min: 90 %  Max: 100 %   Flow (L/min)  Min: 0.5  Max: 2      Intake/Output Summary (Last 24 hours) at  8/7/2022 1147  Last data filed at 8/7/2022 0358  Gross per 24 hour   Intake 50 ml   Output 2050 ml   Net -2000 ml             Physical Exam:  GEN: Well nourished, Well- developed  No acute distress.  Alert in no acute distress  HEENT: Normocephalic, Atraumatic, PERRLA, moist mucous membranes  NECK: supple, NO JVD, no thyromegaly, no lymphadenopathy  CARDIAC: S1S2, irregularly irregular no murmur, gallop, rub  LUNGS: Poor effort , diminished breath sounds in bases  ABDOMEN: Soft, nontender, normal bowel sounds  EXTREMITIES:No gross deformities,  No clubbing, cyanosis, or edema  SKIN: Warm, dry  NEURO: No focal deficits  PSYCHIATRIC: Normal affect and mood      I personally viewed and interpreted the patient's EKG/Telemetry/lab data    Data:   Results from last 7 days   Lab Units 08/06/22 0227 08/05/22  1508   WBC 10*3/mm3 8.13 12.08*   HEMOGLOBIN g/dL 12.5 13.6   HEMATOCRIT % 39.3 44.8   PLATELETS 10*3/mm3 136* 166     Results from last 7 days   Lab Units 08/06/22 0227 08/05/22 2229 08/05/22  1508   SODIUM mmol/L 134*  --  131*   POTASSIUM mmol/L 4.7 5.5* 5.8*   CHLORIDE mmol/L 96*  --  99   CO2 mmol/L 23.0  --  21.0*   BUN mg/dL 53*  --  49*   CREATININE mg/dL 1.97*  --  1.84*   GLUCOSE mg/dL 144*  --  126*      Results from last 7 days   Lab Units 08/06/22 0227   HEMOGLOBIN A1C % 6.80*     Results from last 7 days   Lab Units 08/06/22 0227   TSH uIU/mL 5.830*     Results from last 7 days   Lab Units 08/05/22  1508   PROBNP pg/mL 33,432.0*         Results from last 7 days   Lab Units 08/05/22  1508   TROPONIN T ng/mL 0.032*     Results from last 7 days   Lab Units 08/06/22 0227   CHOLESTEROL mg/dL 124   TRIGLYCERIDES mg/dL 122   HDL CHOL mg/dL 25*   LDL CHOL mg/dL 77     Results from last 7 days   Lab Units 08/05/22  1508   PROBNP pg/mL 33,432.0*       Intake/Output Summary (Last 24 hours) at 8/7/2022 1147  Last data filed at 8/7/2022 0358  Gross per 24 hour   Intake 50 ml   Output 2050 ml   Net -2000 ml      Study Result chest x-ray 8/5/2022    Narrative & Impression      DATE OF EXAM:   8/5/2022 3:19 PM     PROCEDURE:   XR CHEST 1 VW-     INDICATIONS:   SOA triage protocol     COMPARISON:  06/15/2022     TECHNIQUE:   Portable chest radiograph.     FINDINGS:    Stable cardiac enlargement. Large hiatal hernia again noted. No  pneumothorax. There is persistent bibasilar airspace disease which may  relate to atelectasis and/or pneumonia. Bilateral upper lung airspace  disease on the prior study appears improved. Patient's chin overlies the  right apex partially limiting assessment. Small bilateral pleural  effusions. No pneumothorax.     IMPRESSION:  1. Persistent bibasilar airspace disease which may relate to atelectasis  or pneumonia.  2. Bilateral upper lobe airspace disease on the prior study has  resolved.  3. Small bilateral pleural effusions.  4. Large hiatal hernia.     This report was finalized on 8/5/2022 3:21 PM by Michael Osei MD.         Telemetry: Atrial fibrillation with heart rates mostly in the 90s and low 100s        Assessment and Plan:   1.  HFrEF/Acute Systolic CHF:  -Patient with clinical features of acute CHF with abnormal chest x-ray and elevated proBNP 33,000  -Echocardiogram 8/6/2022 with markedly reduced EF of 26 to 30% which is markedly changed from previous EF on 6/11/2022 echocardiogram 51-55%  -Suspect partially from atrial fibrillation with RVR  -Agree with gentle diuresis with Bumex 1 mg IV twice daily, strict I's and O's, and monitoring of electrolytes.  Over the last 24 hours the patient has diuresed 2 L.  -Continue metoprolol 100 mg twice daily, no Entresto/ACE/ARB due to chronic kidney disease with elevated creatinine this admission  -Patient is DNR/DNI status  -Did attempt to call the son today he did not answer.  Will defer further discussions with family to Dr. Flores who will resume care tomorrow    2.  Permanent atrial fibrillation:  -Heart rates not optimally controlled on  metoprolol 100 mg twice daily. BP should tolerate an increase in Metoprolol to 150 mg BID. If cannot be rate controlled, may need AVN BiV PM, however, discuss of GOC do need to be made with her son who is POA. Defer to Dr. Flores.  -CHADSVasc = 6 currently on Eliquis 2.5 mg twice daily    3.  Multifocal pneumonia:  -Treatment per primary team, IV antibiotics, neb treatments        Electronically signed by MARI Mahmood, 08/07/22, 11:47 AM EDT.    History and physical examination verified.  Agree with PAs note.  87-year-old white female with a past medical history of CHF chronic systolic dysfunction: Chronic kidney disease, GERD, Alzheimer's dementia, pneumonia, and permanent atrial fibrillation who was admitted secondary to shortness of breath.  We are asked to see her for management of her heart failure and atrial fibrillation.  The patient was admitted on 8/5/2022 with increasing shortness of breath.  She does have significant dementia at the baseline state and most of the history was provided by family members.  She was admitted from nursing home.  Upon arrival in the ED, she was found to have a BNP of 33,000 with a troponin of 0.32 and a baseline creatinine of 1.8.  Chest x-ray was concerning for pneumonia.  She was started on IV antibiotics as well as IV diuretics.  She was also found to be in atrial fibrillation with relatively stable heart rates.  Home medications did include both Eliquis as well as metoprolol.  Presently she denies any active cardiovascular issues or complaints.  Patient has had net -2 L urine output over the past 24 hours.    Family history/social history/review of systems as per PAs note.    Physical Exam Temperature 97.1 heart rate 100 respiratory rate 18 blood pressure 137/89 O2 saturation 100% on 2 L  Constitutional: Confused Unable to answer questions  HENT: Normocephalic.   Eyes: Conjunctivae are normal. No scleral icterus.   Neck: Normal carotid pulses, + JVD present. Carotid  bruit is not present. No tracheal deviation, no edema and no erythema present. No thyromegaly present.   Cardiovascular: Irregular irregular rate and rhythm normal S1 and S2.  No obvious murmurs appreciated.  Pulmonary/Chest: Decreased breath sounds right greater than left.  Respiratory effort poor.  Abdominal: Soft. Bowel sounds are normal. no distension and no mass. There is no hepatosplenomegaly. There is no tenderness. There is no rebound and no guarding. No aortic pulsations.  Musculoskeletal:  exhibits no edema, tenderness or deformity.   Neurological: Confused demented nonfocal  Skin: Warm to touch positive ecchymosis on arms   Psychiatric: Flat mood and affect.    Laboratory data as per PAs note.  Creatinine 1.97.  Hemoglobin 12.5.  TSH 5.8.  BNP 33,432.  Troponin 0.032.    Chest x-ray: Heart size enlarged.  Bilateral pleural effusions right greater than left.     Twelve-lead EK2022 atrial fibrillation with ventricular rate of 100 bpm, ST elevation inferiorly with Q waves and ST depression laterally.  No change compared EKG 6/10/2022.    Echocardiogram 2022 LVEF 25 to 30% mildly reduced RV systolic function left atrial size severely increased moderate MR moderate to severe TR    Impression/plan:    1.  Chronic systolic LV dysfunction congestive heart failure with LVEF 25 to 30%.  This is a marked decrease in LVEF since most recent echocardiogram on 2022.  Although twelve-lead EKGs would suggest inferior myocardial infarction, there are no significant changes compared to last EKG on 6/10/2022.  The new LV dysfunction is most likely multifactorial including possible underlying CAD and A. fib with RVR.  Last known EKG in sinus rhythm was 2019.  Continue diuresis for now.  Would not be aggressive in restoring sinus rhythm in this patient at this time due to severe underlying dementia and multiple comorbidities.  Goal will be for rate control.    2.  Permanent atrial fibrillation see #1.   Needs better control of heart rates.  Adjust metoprolol as needed.  Would not be aggressive with procedures at this time.    3.  Positive troponin: Most likely due to congestive heart failure in the setting of chronic renal insufficiency.    4.  Chronic renal insufficiency: Per hospitalist.  Limits our ability to treat her heart failure.    5.   Pneumonia per hospitalist.      I, Amos Hanna MD, personally performed the services face to face as described and documented by the above named individual. I have made any necessary edits and it is both accurate and complete 8/7/2022  13:32 EDT

## 2022-08-07 NOTE — PLAN OF CARE
Problem: Adult Inpatient Plan of Care  Goal: Plan of Care Review  Outcome: Ongoing, Progressing  Flowsheets  Taken 8/7/2022 1802 by Nataly Chand RN  Plan of Care Reviewed With: son  Taken 8/7/2022 1050 by Clayton Sorenson OT  Progress: (OT eval) no change   Goal Outcome Evaluation:  Plan of Care Reviewed With: son         Patient is alert at this time. Son at bedside. Patient will occasionally answer with yes or no. Legs rigid and spastic. Tremors in upper extremities. Feet reddened. On RA, sat 96%. ST with -140. Needs total assistance to eat. PW in place. Patient has redness that is blanchable on coccyx. WOC nurse had recommendations. Afebrile.

## 2022-08-07 NOTE — THERAPY EVALUATION
Patient Name: Mary Ramirez  : 1934    MRN: [id number]                              Today's Date: 2022       Admit Date: 2022    Visit Dx:     ICD-10-CM ICD-9-CM   1. Pneumonia of both lower lobes due to infectious organism  J18.9 486   2. Acute on chronic respiratory failure with hypoxia ([facility])  J96.21 518.84     799.02   3. Renal insufficiency  N28.9 593.9   4. Elevated brain natriuretic peptide (BNP) level  R79.89 790.99   5. Sepsis, due to unspecified organism, unspecified whether acute organ dysfunction present ([facility])  A41.9 038.9     995.91     Patient Active Problem List   Diagnosis   • Fall   • Fracture, intertrochanteric, left femur ([facility])   • Essential hypertension   • Coronary artery disease involving native coronary artery of native heart with angina pectoris ([facility])   • Dementia ([facility])   • Paroxysmal atrial fibrillation ([facility])   • Anticoagulated   • SSS (sick sinus syndrome) ([facility])   • Chronic diastolic congestive heart failure ([facility])   • DEANGELO (acute kidney injury) ([facility])   • Benign essential hypertension   • CKD (chronic kidney disease), stage III ([facility])   • Mixed hyperlipidemia   • Major depressive disorder, single episode, mild ([facility])   • Chronic allergic rhinitis   • Memory loss   • Bradycardia   • At risk for falls   • Closed fracture of femur ([facility])   • Coronary arteriosclerosis after percutaneous transluminal coronary angioplasty (PTCA)   • Diabetic polyneuropathy ([facility])   • Diverticular disease of colon   • Foot pain   • GERD (gastroesophageal reflux disease)   • Peripheral artery insufficiency ([facility])   • Annual physical exam   • Vitamin D deficiency   • Renal disorder   • Closed fracture of right hip ([facility])   • Postoperative anemia due to acute blood loss   • Late onset Alzheimer's disease without behavioral disturbance ([facility])   • Altered mental status, unspecified altered mental status type   • Acute respiratory failure with hypoxia ([facility])   • Multifocal pneumonia   • Acute on chronic diastolic  CHF (congestive heart failure) (HCC)   • Acute sepsis (HCC)   • Pneumonia of both lower lobes due to infectious organism     Past Medical History:   Diagnosis Date   • Atrial fibrillation (HCC)    • Benign essential hypertension    • CAD (coronary artery disease)    • Carotid artery disease (HCC)    • CHF (congestive heart failure) (HCC)    • Chronic allergic rhinitis    • CKD (chronic kidney disease), stage III (HCC)    • DDD (degenerative disc disease), cervical    • Dementia (HCC)    • Diabetes mellitus (HCC)    • Diverticulosis     WITH PERFORATION    • Hypertension    • Major depressive disorder, single episode, mild (HCC)    • Memory loss    • Mixed hyperlipidemia    • SSS (sick sinus syndrome) (HCC)    • TIA (transient ischemic attack) 01/2016     Past Surgical History:   Procedure Laterality Date   • ANKLE SURGERY Left 1996   • ATHERECTOMY  1995   • ATHRECTOMY ILIAC, FEMORAL, TIBIAL ARTERY     • BREAST LUMPECTOMY Left 1981   • COLON SURGERY     • COLOSTOMY  1983   • CORONARY STENT PLACEMENT  2001   • FINGER FRACTURE SURGERY Right    • HIP TROCHANTERIC NAILING WITH INTRAMEDULLARY HIP SCREW Left 11/23/2017   • HIP TROCHANTERIC NAILING WITH INTRAMEDULLARY HIP SCREW Right 3/30/2019    Procedure: HIP TROCANTERIC NAILING WITH INTRAMEDULLARY HIP SCREW RIGHT;  Surgeon: Theo Figueroa MD;  Location: [facility];  Service: Orthopedics   • HYSTERECTOMY  1984   • OTHER SURGICAL HISTORY  1984    REANASTAMOSIS       General Information     Row Name 08/07/22 1032          OT Time and Intention    Document Type evaluation  -CS     Mode of Treatment occupational therapy  -CS     Row Name 08/07/22 1032          General Information    Patient Profile Reviewed yes  -CS     Prior Level of Function --  Per RN report, Pt lives at [facility]. Pt limited historian this date 2/2 confusion: specific assist levels for mobility and ADL completion require further inquiry.  -CS     Existing Precautions/Restrictions fall;other (see  comments)  baseline dementia  -CS     Barriers to Rehab medically complex;previous functional deficit;cognitive status  -CS     Row Name 08/07/22 1032          Living Environment    People in Home facility resident  -CS     Row Name 08/07/22 1032          Home Main Entrance    Number of Stairs, Main Entrance none  -CS     Row Name 08/07/22 1032          Stairs Within Home, Primary    Number of Stairs, Within Home, Primary none  -CS     Row Name 08/07/22 1032          Cognition    Orientation Status (Cognition) oriented to;person;disoriented to;place;situation;time  -CS     Row Name 08/07/22 1032          Safety Issues, Functional Mobility    Safety Issues Affecting Function (Mobility) ability to follow commands;awareness of need for assistance;insight into deficits/self-awareness;positioning of assistive device;problem-solving;safety precaution awareness;safety precautions follow-through/compliance;sequencing abilities  -CS     Impairments Affecting Function (Mobility) balance;coordination;cognition;motor planning;postural/trunk control;range of motion (ROM);strength  -CS     Cognitive Impairments, Mobility Safety/Performance awareness, need for assistance;attention;insight into deficits/self-awareness;problem-solving/reasoning;safety precaution awareness;safety precaution follow-through;sequencing abilities  -CS     Comment, Safety Issues/Impairments (Mobility) alert and following commands  -CS           User Key  (r) = Recorded By, (t) = Taken By, (c) = Cosigned By    Initials Name Provider Type    CS Clayton Sorenson OT Occupational Therapist                 Mobility/ADL's     Row Name 08/07/22 1035          Bed Mobility    Bed Mobility supine-sit;scooting/bridging  -CS     Scooting/Bridging [place] (Bed Mobility) maximum assist (25% patient effort);verbal cues;nonverbal cues (demo/gesture)  -CS     Supine-Sit [place] (Bed Mobility) maximum assist (25% patient effort);verbal cues;nonverbal cues  (demo/gesture)  -CS     Bed Mobility, Safety Issues impaired trunk control for bed mobility;decreased use of arms for pushing/pulling;decreased use of legs for bridging/pushing;cognitive deficits limit understanding  -     Assistive Device (Bed Mobility) draw sheet;head of bed elevated;bed rails  -CS     Comment, (Bed Mobility) assist at both trunk/BLEs, mild posterior lean upon sitting - improved with time and progessed to SBA  -     Row Name 08/07/22 1035          Transfers    Transfers bed-chair transfer  -     Bed-Chair [place] (Transfers) maximum assist (25% patient effort);verbal cues;nonverbal cues (demo/gesture)  -     Assistive Device (Bed-Chair Transfers) other (see comments)  -     Row Name 08/07/22 1035          Bed-Chair Transfer    Comment, (Bed-Chair Transfer) BUE support  -     Row Name 08/07/22 1035          Activities of Daily Living    BADL Assessment/Intervention upper body dressing;lower body dressing;grooming;feeding  -     Row Name 08/07/22 1035          Upper Body Dressing Assessment/Training    [place] Level (Upper Body Dressing) don;pajama/robe;moderate assist (50% patient effort)  -CS     Position (Upper Body Dressing) edge of bed sitting  -     Row Name 08/07/22 1035          Lower Body Dressing Assessment/Training    [place] Level (Lower Body Dressing) don;socks;dependent (less than 25% patient effort)  -CS     Position (Lower Body Dressing) edge of bed sitting  -     Row Name 08/07/22 1035          Grooming Assessment/Training    [place] Level (Grooming) wash face, hands;hair care, combing/brushing;dependent (less than 25% patient effort)  -CS     Comment, (Grooming) Pt did not reach to tools when offered, once placed was unable to initiate grooming despite gestural cues, assist for completion  -     Row Name 08/07/22 1035          Self-Feeding Assessment/Training    [place] Level (Feeding) liquids to mouth;dependent (less than 25% patient  effort)  -CS     Comment, (Feeding) Despite cueing, Pt would not extend arm to grasp cup  -CS           User Key  (r) = Recorded By, (t) = Taken By, (c) = Cosigned By    Initials Name Provider Type    CS Clayton Sorenson, OT Occupational Therapist               Obj/Interventions     Row Name 08/07/22 1046          Sensory Assessment (Somatosensory)    Sensory Assessment (Somatosensory) unable/difficult to assess  -     Row Name 08/07/22 1046          Vision Assessment/Intervention    Visual Impairment/Limitations corrective lenses full-time  -     Row Name 08/07/22 1046          Range of Motion Comprehensive    General Range of Motion upper extremity range of motion deficits identified  -CS     Comment, General Range of Motion rigid/stiff PROM  -     Row Name 08/07/22 1046          Strength Comprehensive (MMT)    General Manual Muscle Testing (MMT) Assessment upper extremity strength deficits identified  -CS     Comment, General Manual Muscle Testing (MMT) Assessment formal MMT deferred 2/2 confusion, demo'd gross fxl strength 3+/5 during activities  -     Row Name 08/07/22 1046          Shoulder (Therapeutic Exercise)    Shoulder (Therapeutic Exercise) AAROM (active assistive range of motion)  -     Shoulder AAROM (Therapeutic Exercise) bilateral;flexion;extension;internal rotation;external rotation;10 repetitions  -     Row Name 08/07/22 1046          Elbow/Forearm (Therapeutic Exercise)    Elbow/Forearm (Therapeutic Exercise) AAROM (active assistive range of motion)  -     Elbow/Forearm AAROM (Therapeutic Exercise) bilateral;extension;flexion;supination;pronation;10 repetitions  -     Row Name 08/07/22 1046          Motor Skills    Motor Skills coordination;functional endurance  -CS     Coordination bimanual skills;fine motor deficit;gross motor deficit;moderate impairment  -     Functional Endurance O2 sats stable on 2Lnc  -     Therapeutic Exercise shoulder;elbow/forearm;other (see comments)   ankle dorsi/plant flex x 2, knee flex/ext x10  -CS     Row Name 08/07/22 1046          Balance    Balance Assessment sitting static balance;sitting dynamic balance;standing static balance  -CS     Static Sitting Balance standby assist  -CS     Dynamic Sitting Balance minimal assist  -CS     Position, Sitting Balance unsupported;sitting edge of bed  -CS     Static Standing Balance dependent  -CS     Position/Device Used, Standing Balance supported;other (see comments)  BUE support  -CS     Balance Interventions sitting;sit to stand;standing;weight shifting activity;core stability exercise  -CS     Comment, Balance progressed to SBA for static sitting balance EOB  -CS           User Key  (r) = Recorded By, (t) = Taken By, (c) = Cosigned By    Initials Name Provider Type     Clayton Sorenson, OT Occupational Therapist               Goals/Plan     Row Name 08/07/22 1054          Bed Mobility Goal 1 (OT)    Activity/Assistive Device (Bed Mobility Goal 1, OT) supine to sit;sit to supine;scooting  -CS     [place] Level/Cues Needed (Bed Mobility Goal 1, OT) moderate assist (50-74% patient effort)  -CS     Time Frame (Bed Mobility Goal 1, OT) long term goal (LTG)  -CS     Progress/Outcomes (Bed Mobility Goal 1, OT) goal ongoing  -     Row Name 08/07/22 1054          Transfer Goal 1 (OT)    Activity/Assistive Device (Transfer Goal 1, OT) bed-to-chair/chair-to-bed;commode, bedside without drop arms  -CS     [place] Level/Cues Needed (Transfer Goal 1, OT) moderate assist (50-74% patient effort)  -CS     Time Frame (Transfer Goal 1, OT) long term goal (LTG);10 days  -CS     Progress/Outcome (Transfer Goal 1, OT) goal ongoing  -     Row Name 08/07/22 1054          Dressing Goal 1 (OT)    Activity/Device (Dressing Goal 1, OT) upper body dressing  -CS     [place]/Cues Needed (Dressing Goal 1, OT) minimum assist (75% or more patient effort)  -CS     Time Frame (Dressing Goal 1, OT) long term goal (LTG);10 days   -CS     Progress/Outcome (Dressing Goal 1, OT) goal ongoing  -CS     Row Name 08/07/22 1054          Grooming Goal 1 (OT)    Activity/Device (Grooming Goal 1, OT) hair care;wash face, hands  -CS     [place] (Grooming Goal 1, OT) minimum assist (75% or more patient effort)  -CS     Time Frame (Grooming Goal 1, OT) long term goal (LTG);10 days  -CS     Progress/Outcome (Grooming Goal 1, OT) goal ongoing  -CS     Row Name 08/07/22 1054          Therapy Assessment/Plan (OT)    Planned Therapy Interventions (OT) activity tolerance training;functional balance retraining;occupation/activity based interventions;strengthening exercise;ROM/therapeutic exercise;transfer/mobility retraining;patient/caregiver education/training;adaptive equipment training  -CS           User Key  (r) = Recorded By, (t) = Taken By, (c) = Cosigned By    Initials Name Provider Type    CS Clayton Sorenson, OT Occupational Therapist               Clinical Impression     [facility] Name 08/07/22 1050          Pain Assessment    Additional Documentation Pain Scale: FACES Pre/Post-Treatment (Group)  -[facility] Name 08/07/22 1050          Pain Scale: FACES Pre/Post-Treatment    Pain: FACES Scale, Pretreatment 2-->hurts little bit  -CS     Posttreatment Pain Rating 2-->hurts little bit  -CS     Pre/Posttreatment Pain Comment difficult to assess focal point(s) of pain (2/2 confusion), startle/pain response when BLEs touched  -CS     Row Name 08/07/22 1050          Plan of Care Review    Plan of Care Reviewed With patient  -CS     Progress no change  OT eval  -CS     Outcome Evaluation OT eval completed. Pt presents w/ generalized weakness, cognitive deficits, impaired balance/coordination, and decreaesd functional endurance warranting skilled IP OT services to promote return to PLOF. BUE/BLE AAROM performed as warm-up, MaxA for bed mobility and SPT to recliner, Dep for LB ADLs and grooming this date. Pt tolerated sitting balance activities and progressed to  SBA sitting EOB. Rec d/c to SNF.  -CS     Row Name 08/07/22 1050          Therapy Assessment/Plan (OT)    Rehab Potential (OT) fair, will monitor progress closely  -CS     Criteria for Skilled Therapeutic Interventions Met (OT) yes;meets criteria;skilled treatment is necessary  -CS     Therapy Frequency (OT) daily  -CS     Row Name 08/07/22 1050          Therapy Plan Review/Discharge Plan (OT)    Anticipated Discharge Disposition (OT) skilled nursing facility  -CS     Row Name 08/07/22 1050          Vital Signs    Pre Systolic BP Rehab 144  RN cleared for eval, VSS on 2Lnc  -CS     Pre Treatment Diastolic BP 99  -CS     Posttreatment Heart Rate (beats/min) 105  -CS     O2 Delivery Pre Treatment nasal cannula  -CS     O2 Delivery Intra Treatment nasal cannula  -CS     Post SpO2 (%) 100  -CS     O2 Delivery Post Treatment nasal cannula  -CS     Pre Patient Position Supine  -CS     Intra Patient Position Standing  -CS     Post Patient Position Sitting  -CS     Row Name 08/07/22 1050          Positioning and Restraints    Pre-Treatment Position in bed  -CS     Post Treatment Position chair  -CS     In Chair notified nsg;reclined;sitting;call light within reach;encouraged to call for assist;exit alarm on;RUE elevated;LUE elevated;waffle cushion;on mechanical lift sling;legs elevated;heels elevated  -CS           User Key  (r) = Recorded By, (t) = Taken By, (c) = Cosigned By    Initials Name Provider Type    CS Clayton Sorenson, OT Occupational Therapist               Outcome Measures     Row Name 08/07/22 1055          How much help from another is currently needed...    Putting on and taking off regular lower body clothing? 1  -CS     Bathing (including washing, rinsing, and drying) 1  -CS     Toileting (which includes using toilet bed pan or urinal) 1  -CS     Putting on and taking off regular upper body clothing 2  -CS     Taking care of personal grooming (such as brushing teeth) 2  -CS     Eating meals 2  -CS      -[facility] 6 Clicks Score (OT) 9  -CS     Row Name 08/07/22 0800          How much help from another person do you currently need...    Turning from your back to your side while in flat bed without using bedrails? 1  -EG     Moving from lying on back to sitting on the side of a flat bed without bedrails? 1  -EG     Moving to and from a bed to a chair (including a wheelchair)? 1  -EG     Standing up from a chair using your arms (e.g., wheelchair, bedside chair)? 1  -EG     Climbing 3-5 steps with a railing? 1  -EG     To walk in hospital room? 1  -EG     AM-PAC 6 Clicks Score (PT) 6  -EG     Highest level of mobility 2 --> Bed activities/dependent transfer  -EG     Row Name 08/07/22 1055          Functional Assessment    Outcome Measure Options AM-PAC 6 Clicks Daily Activity (OT)  -CS           User Key  (r) = Recorded By, (t) = Taken By, (c) = Cosigned By    Initials Name Provider Type    EG Nataly Chand, RN Registered Nurse    Clayton Lau OT Occupational Therapist                Occupational Therapy Education                 Title: PT OT SLP Therapies (In Progress)     Topic: Occupational Therapy (Done)     Point: ADL training (Done)     Description:   Instruct learner(s) on proper safety adaptation and remediation techniques during self care or transfers.   Instruct in proper use of assistive devices.              Learning Progress Summary           Patient Acceptance, E,D, NR,DU by  at 8/7/2022 1055                   Point: Home exercise program (Done)     Description:   Instruct learner(s) on appropriate technique for monitoring, assisting and/or progressing therapeutic exercises/activities.              Learning Progress Summary           Patient Acceptance, E,D, NR,DU by  at 8/7/2022 1055                   Point: Precautions (Done)     Description:   Instruct learner(s) on prescribed precautions during self-care and functional transfers.              Learning Progress Summary           Patient  Acceptance, E,D, NR,DU by  at 8/7/2022 1055                   Point: Body mechanics (Done)     Description:   Instruct learner(s) on proper positioning and spine alignment during self-care, functional mobility activities and/or exercises.              Learning Progress Summary           Patient Acceptance, E,D, NR,DU by  at 8/7/2022 1055                               User Key     Initials Effective Dates Name Provider Type Discipline     06/16/21 -  Clayton Sorenson OT Occupational Therapist OT              OT Recommendation and Plan  Planned Therapy Interventions (OT): activity tolerance training, functional balance retraining, occupation/activity based interventions, strengthening exercise, ROM/therapeutic exercise, transfer/mobility retraining, patient/caregiver education/training, adaptive equipment training  Therapy Frequency (OT): daily  Plan of Care Review  Plan of Care Reviewed With: patient  Progress: no change (OT eval)  Outcome Evaluation: OT eval completed. Pt presents w/ generalized weakness, cognitive deficits, impaired balance/coordination, and decreaesd functional endurance warranting skilled IP OT services to promote return to PLOF. BUE/BLE AAROM performed as warm-up, MaxA for bed mobility and SPT to recliner, Dep for LB ADLs and grooming this date. Pt tolerated sitting balance activities and progressed to SBA sitting EOB. Rec d/c to SNF.     Time Calculation:    Time Calculation- OT     Row Name 08/07/22 1055             Time Calculation- OT    OT Start Time 0930  -CS      OT Received On 08/07/22  -CS      OT Goal Re-Cert Due Date 08/17/22  -CS              Timed Charges    82038 - OT Therapeutic Exercise Minutes 4  -CS      95543 - OT Therapeutic Activity Minutes 6  -CS              Untimed Charges    OT Eval/Re-eval Minutes 48  -CS              Total Minutes    Timed Charges Total Minutes 10  -CS      Untimed Charges Total Minutes 48  -CS       Total Minutes 58  -CS            User Key   (r) = Recorded By, (t) = Taken By, (c) = Cosigned By    Initials Name Provider Type    CS Clayton Sorenson, OT Occupational Therapist              Therapy Charges for Today     Code Description Service Date Service Provider Modifiers Qty    [id number]  OT THERAPEUTIC ACT EA 15 MIN 8/7/2022 Clayton Sorenson OT GO 1    [id number]  OT EVAL MOD COMPLEXITY 4 8/7/2022 Clayton Sorenson OT GO 1               Clayton Sorenson OT  8/7/2022

## 2022-08-07 NOTE — NURSING NOTE
WO Nursing Consult: Patient with low Pepito score of 12. Would she benefit from a specialty bed? Thank You!    Reviewed chart and patient is incontinent of urine, spoke with bedside nurse and states patient is quite thin bottom is red but blanchable.  Pepito equal 12.  WILL mattress ordered    Pressure Injury Prevention Protocol (initiate for Pepito Score of 18 or less): Most recent Pepito Scale score: 12    Specialty support surface ordered: WILL         *Keep skin dry, turn q 2 hr, keep heels elevated and offloaded with offloading heel boots.     *Apply z-guard to bottom BID and as needed with incontinence episodes.    *Apply silicone foam border dressing to bony prominences.      *Follow C.A.R.E protocol if medical devices (Bipap, boo, Ng tube, etc) are being used.    Thank you for consulting the [facility] Nurse.  WO Team will sign off.  If alteration to skin integrity or change in wound bed presentation, please contact WOC team.            (2) potential problem

## 2022-08-07 NOTE — PLAN OF CARE
Goal Outcome Evaluation:  Plan of Care Reviewed With: patient        Progress: declining  Outcome Evaluation: Unable to obtain temp on patient at beginning of shift. FSBS obtained at 47. Recheck 51. Rectal temp 95.3. 1 amp d50 given and HS dose of Levemir held. Recheck FSBS 203. PA notified and arianna tolbert ordered. Pt alert throughout, yet remains confused as is her baseline. Temp now stable this AM at 96.8 orally and arianna tyreeer currently off. Otherwise, VSS. Good UOP this shift. Continue current POC.

## 2022-08-07 NOTE — PROGRESS NOTES
[facility]  PROGRESS NOTE    Patient Name: Mary Ramirez  : 1934  MRN: [id number]    Date of Admission: 2022  Primary Care Physician: Tiana Hong MD    Subjective   Subjective     CC:  F/u shortness of breath     HPI:   Pt still limited answer to yes and no.   No acute events overnight    ROS:  Unable to obtain due to mental status     Objective   Objective     Vital Signs:   Temp:  [95.3 °F (35.2 °C)-97.2 °F (36.2 °C)] 97.2 °F (36.2 °C)  Heart Rate:  [] 104  Resp:  [16-20] 18  BP: (111-144)/(71-99) 144/99  Flow (L/min):  [0.5-3] 2     Physical Exam:  Constitutional: No acute distress, elderly female    HENT: NCAT, mucous membranes moist  Respiratory: Clear to auscultation bilaterally, respiratory effort normal on 2L NC  Cardiovascular: RRR, no murmurs, rubs, or gallops  Gastrointestinal: Positive bowel sounds, soft, nontender, nondistended  Musculoskeletal: No bilateral ankle edema  Psychiatric: Unable to assess   Neurologic: Oriented x 1,  speech clear  Skin: No rashes      Results Reviewed:  LAB RESULTS:      Lab 22  1850 22  1508   WBC  --  8.13  --   --  12.08*   HEMOGLOBIN  --  12.5  --   --  13.6   HEMATOCRIT  --  39.3  --   --  44.8   PLATELETS  --  136*  --   --  166   NEUTROS ABS  --   --   --   --  9.23*   IMMATURE GRANS (ABS)  --   --   --   --  0.08*   LYMPHS ABS  --   --   --   --  1.39   MONOS ABS  --   --   --   --  1.26*   EOS ABS  --   --   --   --  0.09   MCV  --  89.7  --   --  94.5   PROCALCITONIN  --   --   --   --  0.06   LACTATE 2.4* 3.0* 3.9* 4.3* 4.0*         Lab 22 22  1508   SODIUM 134*  --  131*   POTASSIUM 4.7 5.5* 5.8*   CHLORIDE 96*  --  99   CO2 23.0  --  21.0*   ANION GAP 15.0  --  11.0   BUN 53*  --  49*   CREATININE 1.97*  --  1.84*   EGFR 24.2*  --  26.3*   GLUCOSE 144*  --  126*   CALCIUM 8.6  --  9.2    HEMOGLOBIN A1C 6.80*  --   --    TSH 5.830*  --   --          Lab 08/05/22  1508   TOTAL PROTEIN 6.4   ALBUMIN 3.70   GLOBULIN 2.7   ALT (SGPT) 105*   AST (SGOT) 33*   BILIRUBIN 0.5   ALK PHOS 97         Lab 08/05/22  1508   PROBNP 33,432.0*   TROPONIN T 0.032*         Lab 08/06/22  0227   CHOLESTEROL 124   LDL CHOL 77   HDL CHOL 25*   TRIGLYCERIDES 122             Brief Urine Lab Results  (Last result in the past 365 days)      Color   Clarity   Blood   Leuk Est   Nitrite   Protein   CREAT   Urine HCG        06/11/22 0333 Yellow   Clear   Negative   Trace   Negative   Negative                 Microbiology Results Abnormal     Procedure Component Value - Date/Time    Blood Culture - Blood, Arm, Right [[id number]]  (Normal) Collected: 08/05/22 1548    Lab Status: Preliminary result Specimen: Blood from Arm, Right Updated: 08/06/22 1633     Blood Culture No growth at 24 hours    Blood Culture - Blood, Hand, Right [[id number]]  (Normal) Collected: 08/05/22 1606    Lab Status: Preliminary result Specimen: Blood from Hand, Right Updated: 08/06/22 1633     Blood Culture No growth at 24 hours    Legionella Antigen, Urine - Urine, Urine, Clean Catch [[id number]]  (Normal) Collected: 08/05/22 2227    Lab Status: Final result Specimen: Urine, Clean Catch Updated: 08/06/22 1236     LEGIONELLA ANTIGEN, URINE Negative    S. Pneumo Ag Urine or CSF - Urine, Urine, Clean Catch [[id number]]  (Normal) Collected: 08/05/22 2227    Lab Status: Final result Specimen: Urine, Clean Catch Updated: 08/06/22 1236     Strep Pneumo Ag Negative    MRSA Screen, PCR (Inpatient) - Swab, Nares [[id number]]  (Normal) Collected: 08/05/22 2327    Lab Status: Final result Specimen: Swab from Nares Updated: 08/06/22 0734     MRSA PCR Negative    Narrative:      The negative predictive value of this diagnostic test is high and should only be used to consider de-escalating anti-MRSA therapy. A positive result may indicate colonization with MRSA and must be  correlated clinically.  MRSA Negative    COVID PRE-OP / PRE-PROCEDURE SCREENING ORDER (NO ISOLATION) - Swab, Nasopharynx [[id number]]  (Normal) Collected: 08/05/22 1609    Lab Status: Final result Specimen: Swab from Nasopharynx Updated: 08/05/22 1649    Narrative:      The following orders were created for panel order COVID PRE-OP / PRE-PROCEDURE SCREENING ORDER (NO ISOLATION) - Swab, Nasopharynx.  Procedure                               Abnormality         Status                     ---------                               -----------         ------                     COVID-19 and FLU A/B PCR...[[id number]]  Normal              Final result                 Please view results for these tests on the individual orders.    COVID-19 and FLU A/B PCR - Swab, Nasopharynx [[id number]]  (Normal) Collected: 08/05/22 1609    Lab Status: Final result Specimen: Swab from Nasopharynx Updated: 08/05/22 1649     COVID19 Not Detected     Influenza A PCR Not Detected     Influenza B PCR Not Detected    Narrative:      Fact sheet for providers: https://www.fda.gov/media/680231/download    Fact sheet for patients: https://www.fda.gov/media/475393/download    Test performed by PCR.          Adult Transthoracic Echo Complete W/ Cont if Necessary Per Protocol    Result Date: 8/6/2022  · Left ventricular ejection fraction appears to be 26 - 30%. Left ventricular systolic function is severely decreased. · Left ventricular wall thickness is consistent with mild concentric hypertrophy. · Mildly reduced right ventricular systolic function noted. · Left atrial volume is severely increased. · There is moderate calcification of the aortic valve mainly affecting the left coronary cusp(s). · Moderate mitral valve regurgitation is present. · Moderate to severe tricuspid valve regurgitation is present. · Estimated right ventricular systolic pressure from tricuspid regurgitation is mildly elevated (35-45 mmHg).      XR Chest 1 View    Result Date:  8/5/2022   DATE OF EXAM: 8/5/2022 3:19 PM  PROCEDURE: XR CHEST 1 VW-  INDICATIONS: SOA triage protocol  COMPARISON: 06/15/2022  TECHNIQUE: Portable chest radiograph.  FINDINGS:  Stable cardiac enlargement. Large hiatal hernia again noted. No pneumothorax. There is persistent bibasilar airspace disease which may relate to atelectasis and/or pneumonia. Bilateral upper lung airspace disease on the prior study appears improved. Patient's chin overlies the right apex partially limiting assessment. Small bilateral pleural effusions. No pneumothorax.      Impression: 1. Persistent bibasilar airspace disease which may relate to atelectasis or pneumonia. 2. Bilateral upper lobe airspace disease on the prior study has resolved. 3. Small bilateral pleural effusions. 4. Large hiatal hernia.  This report was finalized on 8/5/2022 3:21 PM by Michael Osei MD.        Results for orders placed during the hospital encounter of 08/05/22    Adult Transthoracic Echo Complete W/ Cont if Necessary Per Protocol    Interpretation Summary  · Left ventricular ejection fraction appears to be 26 - 30%. Left ventricular systolic function is severely decreased.  · Left ventricular wall thickness is consistent with mild concentric hypertrophy.  · Mildly reduced right ventricular systolic function noted.  · Left atrial volume is severely increased.  · There is moderate calcification of the aortic valve mainly affecting the left coronary cusp(s).  · Moderate mitral valve regurgitation is present.  · Moderate to severe tricuspid valve regurgitation is present.  · Estimated right ventricular systolic pressure from tricuspid regurgitation is mildly elevated (35-45 mmHg).      I have reviewed the medications:  Scheduled Meds:albuterol, 2.5 mg, Nebulization, Q4H - RT  apixaban, 2.5 mg, Oral, BID  aspirin, 81 mg, Oral, Daily  bumetanide, 1 mg, Intravenous, Q12H  cefTRIAXone, 1 g, Intravenous, Q24H  citalopram, 20 mg, Oral, Daily  donepezil, 5 mg, Oral,  Nightly  doxycycline, 100 mg, Oral, Q12H  insulin detemir, 10 Units, Subcutaneous, Nightly  insulin lispro, 0-7 Units, Subcutaneous, TID AC  isosorbide dinitrate, 20 mg, Oral, TID - Nitrates  metoprolol tartrate, 100 mg, Oral, BID  sodium chloride, 10 mL, Intravenous, Q12H      Continuous Infusions:   PRN Meds:.•  acetaminophen **OR** acetaminophen **OR** acetaminophen  •  dextrose  •  dextrose  •  glucagon (human recombinant)  •  HYDROcodone-acetaminophen  •  ondansetron **OR** ondansetron  •  sodium chloride  •  sodium chloride    Assessment & Plan   Assessment & Plan     Active Hospital Problems    Diagnosis  POA   • Pneumonia of both lower lobes due to infectious organism [J18.9]  Yes   • Acute on chronic diastolic CHF (congestive heart failure) ([facility]) [I50.33]  Yes   • Acute respiratory failure with hypoxia ([facility]) [J96.01]  Yes   • Acute sepsis ([facility]) [A41.9]  Yes   • DEANGELO (acute kidney injury) ([facility]) [N17.9]  Yes   • Coronary artery disease involving native coronary artery of native heart with angina pectoris ([facility]) [I25.119]  Yes      Resolved Hospital Problems   No resolved problems to display.        Brief Hospital Course to date:  Mary Ramirez is a 87 y.o. female with a past medical history significant for paroxysmal atrial fibrillation, essential hypertension, CAD, diastolic congestive heart failure, CKD III, diabetes mellitus type 2, diverticulosis, Dementia, hyperlipidemia and TIA presented to the ED with complaints of shortness of air.    Multifocal pneumonia   Acute hypoxic respiratory failure  - patient is DNR/DNI  -vanc and cefepime changed to rocephin and doxy   -blood cultures x2 NG at 24 hours  -sputum culture yet to be obtained   -COVID-19 negative   - urine antigens negative   -MRSA PCR negative        Acute on chronic HFpEF  -proBNP  33K on admission   -s/p 2mg IV Bumex in the ED  -strict I &O   -daily weight   -follows with Dr. Flores   -- TTE  EF 26-30% last echo ef 55%  -- continue with IV  Bumex  -cardiology to see         Paroxysmal atrial fibrillation   -currently rate controlled   -previously on eliquis but stopped due to multiple falls, however, per her med rec, it appears she is on it.  Will continue for now but need to verify with her facility if she is truly on it.      Elevated Creatinine on CKD III   -baseline creatinine 1.1-1.4, Cr currently 1.97.   -- monitor closely with diuresis  -- if worsens, would send urine studies  -repeat labs in am      Essential hypertension   -continue home meds     Dementia  -on aricept   -fall precautions      Diabetes mellitus type 2  -[facility]       GOC/Family update   Today I was able to update the patient's son over the phone. He is now aware that her EF is significantly decreased. He confirmed she is DNR/DNI. He would like to speak to hospice. He states that his sister also makes decisions and that they might need to meet with both of them. Palliative medicine was consulted this am.     Expected Discharge Location and Transportation: Home vs rehab   Expected Discharge Date: 8/10    DVT prophylaxis:  Medical DVT prophylaxis orders are present.     AM-PAC 6 Clicks Score (PT): 8 (08/06/22 0800)    CODE STATUS:   Code Status and Medical Interventions:   Ordered at: 08/05/22 9011     Medical Intervention Limits:    NO intubation (DNI)    NO cardioversion    NO dialysis    NO NIPPV (Non-Invasive Positive Pressure Ventilation)    NO artificial nutrition    NO vasopressors     Level Of Support Discussed With:    Health Care Surrogate     Code Status (Patient has no pulse and is not breathing):    No CPR (Do Not Attempt to Resuscitate)     Medical Interventions (Patient has pulse or is breathing):    Limited Support       Pennie German, DO  08/07/22

## 2022-08-07 NOTE — CONSULTS
Tiana Hong MD  Consulting physician: Maxi    Chief Complaint   Patient presents with   • Shortness of Breath       Reason for consult: [facility]    HPI: Pt is a 87 year old female brought into the hospital due to dyspnea.  She is a resident of a nursing facility with a history of CHf as well as dementia.  Pt herself unable to provide any type of HPI or subjective data at this point.  During the patients work up she was found to have PNA and has been treated for this.    Pain assesment: Denies    Dyspnea: Postitive but improved    N/V: Denies    PPS: 30      Past Medical History:   Diagnosis Date   • Atrial fibrillation (HCC)    • Benign essential hypertension    • CAD (coronary artery disease)    • Carotid artery disease (HCC)    • CHF (congestive heart failure) ([facility])    • Chronic allergic rhinitis    • CKD (chronic kidney disease), stage III ([facility])    • DDD (degenerative disc disease), cervical    • Dementia ([facility])    • Diabetes mellitus ([facility])    • Diverticulosis     WITH PERFORATION    • Hypertension    • Major depressive disorder, single episode, mild ([facility])    • Memory loss    • Mixed hyperlipidemia    • SSS (sick sinus syndrome) ([facility])    • TIA (transient ischemic attack) 01/2016     Past Surgical History:   Procedure Laterality Date   • ANKLE SURGERY Left 1996   • ATHERECTOMY  1995   • ATHRECTOMY ILIAC, FEMORAL, TIBIAL ARTERY     • BREAST LUMPECTOMY Left 1981   • COLON SURGERY     • COLOSTOMY  1983   • CORONARY STENT PLACEMENT  2001   • FINGER FRACTURE SURGERY Right    • HIP TROCHANTERIC NAILING WITH INTRAMEDULLARY HIP SCREW Left 11/23/2017   • HIP TROCHANTERIC NAILING WITH INTRAMEDULLARY HIP SCREW Right 3/30/2019    Procedure: HIP TROCANTERIC NAILING WITH INTRAMEDULLARY HIP SCREW RIGHT;  Surgeon: Theo Figueroa MD;  Location: [facility];  Service: Orthopedics   • HYSTERECTOMY  1984   • OTHER SURGICAL HISTORY  1984    REANASTAMOSIS      Current Code Status     Date Active Code Status Order  ID Comments User Context       8/5/2022 2151 No CPR (Do Not Attempt to Resuscitate) [id number]  Merari Finn MD Inpatient     Advance Care Planning Activity      Questions for Current Code Status     Question Answer    Code Status (Patient has no pulse and is not breathing) No CPR (Do Not Attempt to Resuscitate)    Medical Interventions (Patient has pulse or is breathing) Limited Support    Medical Intervention Limits: NO intubation (DNI)     NO cardioversion     NO dialysis     NO NIPPV (Non-Invasive Positive Pressure Ventilation)     NO artificial nutrition     NO vasopressors    Level Of Support Discussed With Health Care Surrogate        Current Facility-Administered Medications   Medication Dose Route Frequency Provider Last Rate Last Admin   • acetaminophen (TYLENOL) tablet 650 mg  650 mg Oral Q4H PRN Merari Finn MD        Or   • acetaminophen (TYLENOL) 160 MG/5ML solution 650 mg  650 mg Oral Q4H PRN Merari Finn MD        Or   • acetaminophen (TYLENOL) suppository 650 mg  650 mg Rectal Q4H PRN Merari Finn MD       • albuterol (PROVENTIL) nebulizer solution 0.083% 2.5 mg/3mL  2.5 mg Nebulization Q4H - RT Merari Finn MD   2.5 mg at 08/07/22 1151   • apixaban (ELIQUIS) tablet 2.5 mg  2.5 mg Oral BID Merari Finn MD   2.5 mg at 08/07/22 0846   • aspirin chewable tablet 81 mg  81 mg Oral Daily Pennie German DO   81 mg at 08/07/22 0845   • bumetanide (BUMEX) injection 1 mg  1 mg Intravenous Q12H Merari Finn MD   1 mg at 08/07/22 0522   • cefTRIAXone (ROCEPHIN) 1 g/100 mL 0.9% NS (MBP)  1 g Intravenous Q24H Pennie German DO   1 g at 08/07/22 1037   • citalopram (CeleXA) tablet 20 mg  20 mg Oral Daily Merari Finn MD   20 mg at 08/07/22 0846   • dextrose (D50W) (25 g/50 mL) IV injection 25 g  25 g Intravenous Q15 Min PRN Merari Finn MD   25 g at 08/06/22 2137   • dextrose (GLUTOSE) oral gel 15 g   "15 g Oral Q15 Min PRN Merari Finn MD       • donepezil (ARICEPT) tablet 5 mg  5 mg Oral Nightly Merari Finn MD   5 mg at 08/06/22 2145   • doxycycline (MONODOX) capsule 100 mg  100 mg Oral Q12H Pennie German, DO   100 mg at 08/07/22 0845   • glucagon (human recombinant) (GLUCAGEN DIAGNOSTIC) injection 1 mg  1 mg Intramuscular Q15 Min PRN Merari Finn MD       • HYDROcodone-acetaminophen (NORCO) 5-325 MG per tablet 1 tablet  1 tablet Oral Q4H PRN Merari Finn MD       • insulin detemir (LEVEMIR) injection 10 Units  10 Units Subcutaneous Nightly Merari Finn MD   10 Units at 08/05/22 2236   • Insulin Lispro (humaLOG) injection 0-7 Units  0-7 Units Subcutaneous TID AC Merari Finn MD       • isosorbide dinitrate (ISORDIL) tablet 20 mg  20 mg Oral TID - Nitrates Pennie German, DO   20 mg at 08/07/22 0846   • metoprolol tartrate (LOPRESSOR) tablet 100 mg  100 mg Oral BID Merari Finn MD   100 mg at 08/07/22 0846   • ondansetron (ZOFRAN) tablet 4 mg  4 mg Oral Q6H PRN Merari Finn MD        Or   • ondansetron (ZOFRAN) injection 4 mg  4 mg Intravenous Q6H PRN Merari Finn MD       • sodium chloride 0.9 % flush 1-10 mL  1-10 mL Intravenous PRN Merari Finn MD       • sodium chloride 0.9 % flush 10 mL  10 mL Intravenous PRN Merari Finn MD       • sodium chloride 0.9 % flush 10 mL  10 mL Intravenous Q12H Merari Finn MD   10 mL at 08/07/22 0846        •  acetaminophen **OR** acetaminophen **OR** acetaminophen  •  dextrose  •  dextrose  •  glucagon (human recombinant)  •  HYDROcodone-acetaminophen  •  ondansetron **OR** ondansetron  •  sodium chloride  •  sodium chloride  Allergies   Allergen Reactions   • Penicillins Other (See Comments)     Pt states \"i don't know\"     Family History   Problem Relation Age of Onset   • Hypertension Mother    • Coronary artery disease Mother 56 " "       PREMATURE    • Lung cancer Father    • Hypertension Father    • Osteoporosis Sister    • Lung cancer Brother      Social History     Socioeconomic History   • Marital status:      Spouse name: N/A   • Number of children: 2   • Years of education: H.S   Tobacco Use   • Smoking status: Former Smoker     Years: 2.00     Types: Cigarettes     Start date: 1975     Quit date: 1978     Years since quittin.7   • Smokeless tobacco: Never Used   • Tobacco comment: QUIT DATE 1984   Substance and Sexual Activity   • Alcohol use: No   • Drug use: No   • Sexual activity: Never     Review of Systems - Unable to fully acess due to pts confusion      /89 (BP Location: Right arm, Patient Position: Sitting)   Pulse 105   Temp 97.1 °F (36.2 °C) (Oral)   Resp 17   Ht 152.4 cm (60\")   Wt 55.3 kg (122 lb)   SpO2 99%   BMI 23.83 kg/m²     Intake/Output Summary (Last 24 hours) at 2022 1222  Last data filed at 2022 0358  Gross per 24 hour   Intake 50 ml   Output 2050 ml   Net -2000 ml     Physical Exam:      General Appearance:    Alert, confused, only mumbles a few words during exam   Head:    Normocephalic, without obvious abnormality, atraumatic   Eyes:            Lids and lashes normal, conjunctivae and sclerae normal, no   icterus, no pallor, corneas clear, PERRLA   Ears:    Ears appear intact with no abnormalities noted   Throat:   No oral lesions, no thrush, oral mucosa moist   Neck:   No adenopathy, supple, trachea midline, no thyromegaly, no     carotid bruit, no JVD   Back:     No kyphosis present, no scoliosis present, no skin lesions,       erythema or scars, no tenderness to percussion or                   palpation,   range of motion normal   Lungs:     Clear to auscultation,respirations regular, even and                   unlabored    Heart:    Regular rhythm and normal rate, normal S1 and S2, no            murmur, no gallop, no rub, no click   Breast Exam:    Deferred "   Abdomen:     Normal bowel sounds, no masses, no organomegaly, soft        non-tender, non-distended, no guarding, no rebound                 tenderness   Genitalia:    Deferred   Extremities:   Moves all extremities well, no edema, no cyanosis, no              redness   Pulses:   Pulses palpable and equal bilaterally   Skin:   No bleeding, bruising or rash   Lymph nodes:   No palpable adenopathy   Neurologic:   Cranial nerves 2 - 12 grossly intact, sensation intact, DTR        present and equal bilaterally       Results from last 7 days   Lab Units 08/06/22 0227   WBC 10*3/mm3 8.13   HEMOGLOBIN g/dL 12.5   HEMATOCRIT % 39.3   PLATELETS 10*3/mm3 136*     Results from last 7 days   Lab Units 08/06/22 0227 08/05/22 2229 08/05/22  1508   SODIUM mmol/L 134*  --  131*   POTASSIUM mmol/L 4.7   < > 5.8*   CHLORIDE mmol/L 96*  --  99   CO2 mmol/L 23.0  --  21.0*   BUN mg/dL 53*  --  49*   CREATININE mg/dL 1.97*  --  1.84*   CALCIUM mg/dL 8.6  --  9.2   BILIRUBIN mg/dL  --   --  0.5   ALK PHOS U/L  --   --  97   ALT (SGPT) U/L  --   --  105*   AST (SGOT) U/L  --   --  33*   GLUCOSE mg/dL 144*  --  126*    < > = values in this interval not displayed.     Results from last 7 days   Lab Units 08/06/22 0227   SODIUM mmol/L 134*   POTASSIUM mmol/L 4.7   CHLORIDE mmol/L 96*   CO2 mmol/L 23.0   BUN mg/dL 53*   CREATININE mg/dL 1.97*   GLUCOSE mg/dL 144*   CALCIUM mg/dL 8.6     Imaging Results (Last 72 Hours)     Procedure Component Value Units Date/Time    XR Chest 1 View [[id number]] Collected: 08/05/22 1519     Updated: 08/05/22 1524    Narrative:         DATE OF EXAM:   8/5/2022 3:19 PM     PROCEDURE:   XR CHEST 1 VW-     INDICATIONS:   SOA triage protocol     COMPARISON:  06/15/2022     TECHNIQUE:   Portable chest radiograph.     FINDINGS:    Stable cardiac enlargement. Large hiatal hernia again noted. No  pneumothorax. There is persistent bibasilar airspace disease which may  relate to atelectasis and/or pneumonia.  Bilateral upper lung airspace  disease on the prior study appears improved. Patient's chin overlies the  right apex partially limiting assessment. Small bilateral pleural  effusions. No pneumothorax.       Impression:      1. Persistent bibasilar airspace disease which may relate to atelectasis  or pneumonia.  2. Bilateral upper lobe airspace disease on the prior study has  resolved.  3. Small bilateral pleural effusions.  4. Large hiatal hernia.     This report was finalized on 8/5/2022 3:21 PM by Michael Osei MD.           Impression: Dementia  UTI  Dyspnea  Change in MS  GOC    Plan: Pts son appears to be deicsion maker.  We will plan on talking to him about GOC.  Apparently hospice has been discussed in the past and prior to this admission.   Pts code status noted to be DNR which I agree with.          Mike Navarro,   08/07/22  12:22 EDT

## 2022-08-07 NOTE — CONSULTS
"                    Clinical Nutrition       Patient Name: Mary Ramirez  YOB: 1934  MRN: [id number]  Date of Encounter: 22 19:02 EDT  Admission date: 2022      Reason for Visit   Physician consult r/t intake      EMR  Reviewed   Yes    Height: Height: 152.4 cm (60\")  Weight: Weight: 55.3 kg (122 lb) (22 0600)  BMI: BMI (Calculated): 23.8    Problem:    Coronary artery disease involving native coronary artery of native heart with angina pectoris (HCC)    DEANGELO (acute kidney injury) (HCC)    Acute respiratory failure with hypoxia (HCC)    Acute on chronic diastolic CHF (congestive heart failure) (HCC)    Acute sepsis (HCC)    Pneumonia of both lower lobes due to infectious organism    HTN    PAF    DM II    CKD III    Hx  Divertic w perf, GERD    Dementia     Reported/Observed/Food/Nutrition Related - Comments     Noted pt w waning interest in food. May take suppl.      Current Nutrition Prescription     Diet Pureed; Cardiac, Consistent Carbohydrate  Orders Placed This Encounter      Dietary Nutrition Supplements Boost Glucose Control  3x/da added per RD    Average Intake from [facility]% x 4     Nutrition Diagnosis     Problem Inadequate oral intake   Etiology Effect dementia   Signs/Symptoms 19% intake x 4 meals recorded   Status:    Actions     Follow treatment progress, Care plan reviewed, Menu provided, Supplement provided Menu provided for choices.    Monitor Per Protocol      Gloria Quinn RD,   Time Spent: 25 min        "

## 2022-08-08 LAB
ANION GAP SERPL CALCULATED.3IONS-SCNC: 14 MMOL/L (ref 5–15)
BUN SERPL-MCNC: 32 MG/DL (ref 8–23)
BUN/CREAT SERPL: 24.6 (ref 7–25)
CALCIUM SPEC-SCNC: 8.8 MG/DL (ref 8.6–10.5)
CHLORIDE SERPL-SCNC: 97 MMOL/L (ref 98–107)
CO2 SERPL-SCNC: 31 MMOL/L (ref 22–29)
CREAT SERPL-MCNC: 1.3 MG/DL (ref 0.57–1)
EGFRCR SERPLBLD CKD-EPI 2021: 39.9 ML/MIN/1.73
GLUCOSE BLDC GLUCOMTR-MCNC: 136 MG/DL (ref 70–130)
GLUCOSE BLDC GLUCOMTR-MCNC: 138 MG/DL (ref 70–130)
GLUCOSE BLDC GLUCOMTR-MCNC: 252 MG/DL (ref 70–130)
GLUCOSE BLDC GLUCOMTR-MCNC: 39 MG/DL (ref 70–130)
GLUCOSE BLDC GLUCOMTR-MCNC: 43 MG/DL (ref 70–130)
GLUCOSE SERPL-MCNC: 29 MG/DL (ref 65–99)
POTASSIUM SERPL-SCNC: 3.2 MMOL/L (ref 3.5–5.2)
SODIUM SERPL-SCNC: 142 MMOL/L (ref 136–145)

## 2022-08-08 PROCEDURE — 25010000002 CEFTRIAXONE PER 250 MG: Performed by: FAMILY MEDICINE

## 2022-08-08 PROCEDURE — 63710000001 INSULIN LISPRO (HUMAN) PER 5 UNITS: Performed by: INTERNAL MEDICINE

## 2022-08-08 PROCEDURE — 97161 PT EVAL LOW COMPLEX 20 MIN: CPT

## 2022-08-08 PROCEDURE — 94799 UNLISTED PULMONARY SVC/PX: CPT

## 2022-08-08 PROCEDURE — 80048 BASIC METABOLIC PNL TOTAL CA: CPT | Performed by: FAMILY MEDICINE

## 2022-08-08 PROCEDURE — 94664 DEMO&/EVAL PT USE INHALER: CPT

## 2022-08-08 PROCEDURE — 99232 SBSQ HOSP IP/OBS MODERATE 35: CPT | Performed by: INTERNAL MEDICINE

## 2022-08-08 PROCEDURE — 97110 THERAPEUTIC EXERCISES: CPT

## 2022-08-08 PROCEDURE — 82962 GLUCOSE BLOOD TEST: CPT

## 2022-08-08 RX ORDER — ALBUTEROL SULFATE 2.5 MG/3ML
2.5 SOLUTION RESPIRATORY (INHALATION) EVERY 6 HOURS PRN
Status: DISCONTINUED | OUTPATIENT
Start: 2022-08-08 | End: 2022-08-11 | Stop reason: HOSPADM

## 2022-08-08 RX ORDER — BUMETANIDE 1 MG/1
1 TABLET ORAL 2 TIMES DAILY
Status: DISCONTINUED | OUTPATIENT
Start: 2022-08-08 | End: 2022-08-10

## 2022-08-08 RX ADMIN — DOXYCYCLINE 100 MG: 100 CAPSULE ORAL at 20:06

## 2022-08-08 RX ADMIN — Medication 10 ML: at 10:58

## 2022-08-08 RX ADMIN — ALBUTEROL SULFATE 2.5 MG: 2.5 SOLUTION RESPIRATORY (INHALATION) at 03:16

## 2022-08-08 RX ADMIN — DOXYCYCLINE 100 MG: 100 CAPSULE ORAL at 08:50

## 2022-08-08 RX ADMIN — CITALOPRAM HYDROBROMIDE 20 MG: 20 TABLET ORAL at 08:50

## 2022-08-08 RX ADMIN — DEXTROSE MONOHYDRATE 25 G: 25 INJECTION, SOLUTION INTRAVENOUS at 07:18

## 2022-08-08 RX ADMIN — DONEPEZIL HYDROCHLORIDE 5 MG: 5 TABLET ORAL at 20:06

## 2022-08-08 RX ADMIN — ISOSORBIDE DINITRATE 20 MG: 20 TABLET ORAL at 17:43

## 2022-08-08 RX ADMIN — INSULIN LISPRO 4 UNITS: 100 INJECTION, SOLUTION INTRAVENOUS; SUBCUTANEOUS at 17:44

## 2022-08-08 RX ADMIN — APIXABAN 2.5 MG: 2.5 TABLET, FILM COATED ORAL at 08:49

## 2022-08-08 RX ADMIN — METOPROLOL TARTRATE 150 MG: 100 TABLET, FILM COATED ORAL at 08:49

## 2022-08-08 RX ADMIN — ALBUTEROL SULFATE 2.5 MG: 2.5 SOLUTION RESPIRATORY (INHALATION) at 11:36

## 2022-08-08 RX ADMIN — APIXABAN 2.5 MG: 2.5 TABLET, FILM COATED ORAL at 20:06

## 2022-08-08 RX ADMIN — BUMETANIDE 1 MG: 1 TABLET ORAL at 20:06

## 2022-08-08 RX ADMIN — ASPIRIN 81 MG CHEWABLE TABLET 81 MG: 81 TABLET CHEWABLE at 08:49

## 2022-08-08 RX ADMIN — ISOSORBIDE DINITRATE 20 MG: 20 TABLET ORAL at 13:08

## 2022-08-08 RX ADMIN — ISOSORBIDE DINITRATE 20 MG: 20 TABLET ORAL at 08:49

## 2022-08-08 RX ADMIN — ALBUTEROL SULFATE 2.5 MG: 2.5 SOLUTION RESPIRATORY (INHALATION) at 07:55

## 2022-08-08 RX ADMIN — BUMETANIDE 1 MG: 0.25 INJECTION INTRAMUSCULAR; INTRAVENOUS at 05:55

## 2022-08-08 RX ADMIN — Medication 10 ML: at 20:06

## 2022-08-08 RX ADMIN — METOPROLOL TARTRATE 150 MG: 100 TABLET, FILM COATED ORAL at 20:06

## 2022-08-08 RX ADMIN — SODIUM CHLORIDE 1 G: 900 INJECTION INTRAVENOUS at 10:57

## 2022-08-08 NOTE — PROGRESS NOTES
Palliative Care Progress Note    Date of Admission: 8/5/2022    Subjective:  Pt with no complaints at present  Current Code Status     Date Active Code Status Order ID Comments User Context       8/5/2022 2151 No CPR (Do Not Attempt to Resuscitate) [id number]  Merari Finn MD Inpatient     Advance Care Planning Activity      Questions for Current Code Status     Question Answer    Code Status (Patient has no pulse and is not breathing) No CPR (Do Not Attempt to Resuscitate)    Medical Interventions (Patient has pulse or is breathing) Limited Support    Medical Intervention Limits: NO intubation (DNI)     NO cardioversion     NO dialysis     NO NIPPV (Non-Invasive Positive Pressure Ventilation)     NO artificial nutrition     NO vasopressors    Level Of Support Discussed With Health Care Surrogate        No current facility-administered medications on file prior to encounter.     Current Outpatient Medications on File Prior to Encounter   Medication Sig Dispense Refill   • acetaminophen (TYLENOL) 500 MG tablet Take 500 mg by mouth Every 6 (Six) Hours As Needed (leg pain).     • acetaminophen (TYLENOL) 500 MG tablet Take 500 mg by mouth 2 (Two) Times a Day.     • aluminum-magnesium hydroxide-simethicone (MAALOX MAX) 400-400-40 MG/5ML suspension Take 5 mL by mouth Every 6 (Six) Hours As Needed for Indigestion or Heartburn.     • apixaban (ELIQUIS) 2.5 MG tablet tablet Take 1 tablet by mouth 2 (Two) Times a Day. 60 tablet 6   • aspirin 81 MG chewable tablet Chew 81 mg Daily.     • citalopram (CeleXA) 20 MG tablet Take 20 mg by mouth Daily.     • docusate sodium (COLACE) 100 MG capsule Take 100 mg by mouth 2 (Two) Times a Day.     • donepezil (ARICEPT) 5 MG tablet Take 5 mg by mouth Every Night.     • fluticasone (FLONASE) 50 MCG/ACT nasal spray 2 sprays into the nostril(s) as directed by provider Daily.     • Glucagon, rDNA, (Glucagon Emergency) 1 MG kit As Needed.     • hydrALAZINE (APRESOLINE) 10 MG tablet  "Take 10 mg by mouth Daily As Needed (for SBP > 170 or DBP > 90).     • insulin aspart (novoLOG FLEXPEN) 100 UNIT/ML solution pen-injector sc pen Inject 2-8 Units under the skin into the appropriate area as directed 4 (Four) Times a Day Before Meals & at Bedtime.     • isosorbide dinitrate (ISORDIL) 40 MG tablet Take 20 mg by mouth 3 (Three) Times a Day.     • Lantus SoloStar 100 UNIT/ML injection pen Inject 10 Units under the skin into the appropriate area as directed Every Night.     • lidocaine (LIDODERM) 5 % Place 1 patch on the skin as directed by provider Daily. Remove & Discard patch within 12 hours or as directed by MD 30 patch 0   • metFORMIN (GLUCOPHAGE) 500 MG tablet Take 500 mg by mouth 2 (Two) Times a Day With Meals.     • metoprolol tartrate (LOPRESSOR) 100 MG tablet Take 1 tablet by mouth 2 (Two) Times a Day. 60 tablet 11   • metoprolol tartrate (LOPRESSOR) 25 MG tablet Take 25 mg by mouth Every 12 (Twelve) Hours As Needed (for HR > 100).     • Multiple Vitamin (MULTIVITAMINS PO) Take 1 tablet by mouth Daily.     • ondansetron (ZOFRAN) 4 MG tablet Take 4 mg by mouth Every 8 (Eight) Hours As Needed for Nausea or Vomiting.     • polyethylene glycol (MIRALAX) pack packet Take 17 g by mouth Daily.          •  acetaminophen **OR** acetaminophen **OR** acetaminophen  •  dextrose  •  dextrose  •  glucagon (human recombinant)  •  HYDROcodone-acetaminophen  •  ondansetron **OR** ondansetron  •  sodium chloride  •  sodium chloride    Objective: /74 (BP Location: Right arm, Patient Position: Lying)   Pulse 108   Temp 98.3 °F (36.8 °C) (Oral)   Resp 17   Ht 152.4 cm (60\")   Wt 55.3 kg (121 lb 14.6 oz)   SpO2 97%   BMI 23.81 kg/m²      Intake/Output Summary (Last 24 hours) at 8/8/2022 1247  Last data filed at 8/8/2022 1100  Gross per 24 hour   Intake 483 ml   Output 2000 ml   Net -1517 ml     Physical Exam:      General Appearance:    Asleep, difficult to arouse, only mumbles   Head:    Normocephalic, " without obvious abnormality, atraumatic   Eyes:            Lids and lashes normal, conjunctivae and sclerae normal, no   icterus, no pallor, corneas clear, PERRLA   Ears:    Ears appear intact with no abnormalities noted   Throat:   No oral lesions, no thrush, oral mucosa moist   Neck:   No adenopathy, supple, trachea midline, no thyromegaly, no     carotid bruit, no JVD   Back:     No kyphosis present, no scoliosis present, no skin lesions,       erythema or scars, no tenderness to percussion or                   palpation,   range of motion normal   Lungs:     Clear to auscultation,respirations regular, even and                   unlabored    Heart:    Regular rhythm and normal rate, normal S1 and S2, no            murmur, no gallop, no rub, no click   Breast Exam:    Deferred   Abdomen:     Normal bowel sounds, no masses, no organomegaly, soft        non-tender, non-distended, no guarding, no rebound                 tenderness   Genitalia:    Deferred   Extremities:   Moves all extremities well, no edema, no cyanosis, no              redness   Pulses:   Pulses palpable and equal bilaterally   Skin:   No bleeding, bruising or rash   Lymph nodes:   No palpable adenopathy   Neurologic:   Cranial nerves 2 - 12 grossly intact, sensation intact, DTR        present and equal bilaterally     Results from last 7 days   Lab Units 08/06/22  0227   WBC 10*3/mm3 8.13   HEMOGLOBIN g/dL 12.5   HEMATOCRIT % 39.3   PLATELETS 10*3/mm3 136*     Results from last 7 days   Lab Units 08/08/22  0513 08/05/22  2229 08/05/22  1508   SODIUM mmol/L 142   < > 131*   POTASSIUM mmol/L 3.2*   < > 5.8*   CHLORIDE mmol/L 97*   < > 99   CO2 mmol/L 31.0*   < > 21.0*   BUN mg/dL 32*   < > 49*   CREATININE mg/dL 1.30*   < > 1.84*   CALCIUM mg/dL 8.8   < > 9.2   BILIRUBIN mg/dL  --   --  0.5   ALK PHOS U/L  --   --  97   ALT (SGPT) U/L  --   --  105*   AST (SGOT) U/L  --   --  33*   GLUCOSE mg/dL 29*   < > 126*    < > = values in this interval not  displayed.       Impression: Dementia  UTI  Dyspnea  Change in MS  [facility]  Plan: Hospice planning to meet with the son today at 2:00.  I would agree that she is outpatient hospice appropriate.  Palliative medicine will continue to follow and have any further discussions as needed.  Otherwise continue to current plan of care and symptom management regimen.        Mike Navarro DO  08/08/22  12:47 EDT

## 2022-08-08 NOTE — PROGRESS NOTES
Continued Stay Note  [facility]     Patient Name: Mary Ramirez  MRN: [id number]  Today's Date: 8/8/2022    Admit Date: 8/5/2022     Discharge Plan     Row Name 08/08/22 1451       Plan    Plan LTC with hospice    Plan Comments Met with the patient's son, Josh. Josh requests for the patient to return back to the [facility] as soon as possible. Ambulance set up for Wednesday at 1545. Called [facility] and notified of the patient returning back to the facility with hospice services. EMS/DNR completed. No other needs at this time. Provided the son with the 24 hour number to call when the patient arrives back to the facility. Please call [phone] for any questions or concerns.    Row Name 08/08/22 4484       Plan    Plan LTC with hospice services    Plan Comments Patient will be returning to the [facility].               Discharge Codes    No documentation.               Expected Discharge Date and Time     Expected Discharge Date Expected Discharge Time    Aug 12, 2022             SUSANNE SAENZ

## 2022-08-08 NOTE — PROGRESS NOTES
[facility] Medicine Services  PROGRESS NOTE    Patient Name: Mary Ramirez  : 1934  MRN: [id number]    Date of Admission: 2022  Primary Care Physician: Tiana Hong MD    Subjective   Subjective     CC:  F/u shortness of breath     HPI:  I have seen and evaluated the patient this morning.  Pleasantly confused and demented.  Cannot contribute much to HPI.  Had hypoglycemic episode this morning.  Able to eat but needs assistance with feeding    ROS:  Unable to obtain due to dementia    Objective   Objective     Vital Signs:   Temp:  [96.7 °F (35.9 °C)-98.2 °F (36.8 °C)] 96.8 °F (36 °C)  Heart Rate:  [104-107] 106  Resp:  [16-20] 17  BP: (125-162)/() 162/110  Flow (L/min):  [2] 2     Physical Exam:  General: Chronically ill looking.  Minimally conversant.  Pleasant.  Head: Atraumatic and normocephalic, without obvious abnormality  Eyes:   Conjunctivae and sclerae normal, no Icterus. No pallor  Ears:  Ears appear intact with no abnormalities noted  Throat: No oral lesions, no thrush, oral mucosa moist  Neck: Supple, trachea midline, no thyromegaly  Back:   No kyphoscoliosis present. No tenderness to palpation,   no sacral edema  Lungs: Clear to auscultation bilaterally, equal air entry, no wheezing or crackles  Heart:  Normal S1 and S2, no murmur, no gallop, No JVD, no lower extremity swelling  Abdomen:  Soft, no tenderness, no organomegaly, normal bowel sounds  Normal bowel sounds, no masses, no organomegaly. Soft, nontender, nondistended, no guarding, no rebound tenderness.  Extremities: No gross abnormalities, no clubbing, pulses palpable and equal bilaterally  Skin: No bleeding, bruising or rash, normal skin turgor and elasticity  Neurologic: Cranial nerves appear intact with no evidence of facial asymmetry, normal motor and sensory functions in all 4 extremities  Psych: Alert and oriented x2    Results Reviewed:  LAB RESULTS:      Lab 22  0831  08/06/22 0227 08/05/22 2229 08/05/22  1850 08/05/22  1508   WBC  --  8.13  --   --  12.08*   HEMOGLOBIN  --  12.5  --   --  13.6   HEMATOCRIT  --  39.3  --   --  44.8   PLATELETS  --  136*  --   --  166   NEUTROS ABS  --   --   --   --  9.23*   IMMATURE GRANS (ABS)  --   --   --   --  0.08*   LYMPHS ABS  --   --   --   --  1.39   MONOS ABS  --   --   --   --  1.26*   EOS ABS  --   --   --   --  0.09   MCV  --  89.7  --   --  94.5   PROCALCITONIN  --   --   --   --  0.06   LACTATE 2.4* 3.0* 3.9* 4.3* 4.0*         Lab 08/08/22 0513 08/06/22 0227 08/05/22 2229 08/05/22  1508   SODIUM 142 134*  --  131*   POTASSIUM 3.2* 4.7 5.5* 5.8*   CHLORIDE 97* 96*  --  99   CO2 31.0* 23.0  --  21.0*   ANION GAP 14.0 15.0  --  11.0   BUN 32* 53*  --  49*   CREATININE 1.30* 1.97*  --  1.84*   EGFR 39.9* 24.2*  --  26.3*   GLUCOSE 29* 144*  --  126*   CALCIUM 8.8 8.6  --  9.2   HEMOGLOBIN A1C  --  6.80*  --   --    TSH  --  5.830*  --   --          Lab 08/05/22  1508   TOTAL PROTEIN 6.4   ALBUMIN 3.70   GLOBULIN 2.7   ALT (SGPT) 105*   AST (SGOT) 33*   BILIRUBIN 0.5   ALK PHOS 97         Lab 08/05/22  1508   PROBNP 33,432.0*   TROPONIN T 0.032*         Lab 08/06/22  0227   CHOLESTEROL 124   LDL CHOL 77   HDL CHOL 25*   TRIGLYCERIDES 122             Brief Urine Lab Results  (Last result in the past 365 days)      Color   Clarity   Blood   Leuk Est   Nitrite   Protein   CREAT   Urine HCG        06/11/22 0333 Yellow   Clear   Negative   Trace   Negative   Negative                 Microbiology Results Abnormal     Procedure Component Value - Date/Time    Blood Culture - Blood, Hand, Right [[id number]]  (Normal) Collected: 08/05/22 1606    Lab Status: Preliminary result Specimen: Blood from Hand, Right Updated: 08/07/22 1631     Blood Culture No growth at 2 days    Blood Culture - Blood, Arm, Right [[id number]]  (Normal) Collected: 08/05/22 1548    Lab Status: Preliminary result Specimen: Blood from Arm, Right Updated: 08/07/22 1631      Blood Culture No growth at 2 days    Legionella Antigen, Urine - Urine, Urine, Clean Catch [[id number]]  (Normal) Collected: 08/05/22 2227    Lab Status: Final result Specimen: Urine, Clean Catch Updated: 08/06/22 1236     LEGIONELLA ANTIGEN, URINE Negative    S. Pneumo Ag Urine or CSF - Urine, Urine, Clean Catch [[id number]]  (Normal) Collected: 08/05/22 2227    Lab Status: Final result Specimen: Urine, Clean Catch Updated: 08/06/22 1236     Strep Pneumo Ag Negative    MRSA Screen, PCR (Inpatient) - Swab, Nares [[id number]]  (Normal) Collected: 08/05/22 2327    Lab Status: Final result Specimen: Swab from Nares Updated: 08/06/22 0734     MRSA PCR Negative    Narrative:      The negative predictive value of this diagnostic test is high and should only be used to consider de-escalating anti-MRSA therapy. A positive result may indicate colonization with MRSA and must be correlated clinically.  MRSA Negative    COVID PRE-OP / PRE-PROCEDURE SCREENING ORDER (NO ISOLATION) - Swab, Nasopharynx [[id number]]  (Normal) Collected: 08/05/22 1609    Lab Status: Final result Specimen: Swab from Nasopharynx Updated: 08/05/22 1649    Narrative:      The following orders were created for panel order COVID PRE-OP / PRE-PROCEDURE SCREENING ORDER (NO ISOLATION) - Swab, Nasopharynx.  Procedure                               Abnormality         Status                     ---------                               -----------         ------                     COVID-19 and FLU A/B PCR...[[id number]]  Normal              Final result                 Please view results for these tests on the individual orders.    COVID-19 and FLU A/B PCR - Swab, Nasopharynx [[id number]]  (Normal) Collected: 08/05/22 1609    Lab Status: Final result Specimen: Swab from Nasopharynx Updated: 08/05/22 1649     COVID19 Not Detected     Influenza A PCR Not Detected     Influenza B PCR Not Detected    Narrative:      Fact sheet for providers:  https://www.fda.gov/media/636458/download    Fact sheet for patients: https://www.fda.gov/media/594413/download    Test performed by PCR.          Adult Transthoracic Echo Complete W/ Cont if Necessary Per Protocol    Result Date: 8/6/2022  · Left ventricular ejection fraction appears to be 26 - 30%. Left ventricular systolic function is severely decreased. · Left ventricular wall thickness is consistent with mild concentric hypertrophy. · Mildly reduced right ventricular systolic function noted. · Left atrial volume is severely increased. · There is moderate calcification of the aortic valve mainly affecting the left coronary cusp(s). · Moderate mitral valve regurgitation is present. · Moderate to severe tricuspid valve regurgitation is present. · Estimated right ventricular systolic pressure from tricuspid regurgitation is mildly elevated (35-45 mmHg).        Results for orders placed during the hospital encounter of 08/05/22    Adult Transthoracic Echo Complete W/ Cont if Necessary Per Protocol    Interpretation Summary  · Left ventricular ejection fraction appears to be 26 - 30%. Left ventricular systolic function is severely decreased.  · Left ventricular wall thickness is consistent with mild concentric hypertrophy.  · Mildly reduced right ventricular systolic function noted.  · Left atrial volume is severely increased.  · There is moderate calcification of the aortic valve mainly affecting the left coronary cusp(s).  · Moderate mitral valve regurgitation is present.  · Moderate to severe tricuspid valve regurgitation is present.  · Estimated right ventricular systolic pressure from tricuspid regurgitation is mildly elevated (35-45 mmHg).      I have reviewed the medications:  Scheduled Meds:albuterol, 2.5 mg, Nebulization, Q4H - RT  apixaban, 2.5 mg, Oral, BID  aspirin, 81 mg, Oral, Daily  bumetanide, 1 mg, Intravenous, Q12H  cefTRIAXone, 1 g, Intravenous, Q24H  citalopram, 20 mg, Oral, Daily  donepezil, 5  mg, Oral, Nightly  doxycycline, 100 mg, Oral, Q12H  insulin detemir, 10 Units, Subcutaneous, Nightly  insulin lispro, 0-7 Units, Subcutaneous, TID AC  isosorbide dinitrate, 20 mg, Oral, TID - Nitrates  metoprolol tartrate, 150 mg, Oral, BID  sodium chloride, 10 mL, Intravenous, Q12H      Continuous Infusions:   PRN Meds:.•  acetaminophen **OR** acetaminophen **OR** acetaminophen  •  dextrose  •  dextrose  •  glucagon (human recombinant)  •  HYDROcodone-acetaminophen  •  ondansetron **OR** ondansetron  •  sodium chloride  •  sodium chloride    Assessment & Plan   Assessment & Plan     Active Hospital Problems    Diagnosis  POA   • Pneumonia of both lower lobes due to infectious organism [J18.9]  Yes   • Acute on chronic diastolic CHF (congestive heart failure) ([facility]) [I50.33]  Yes   • Acute respiratory failure with hypoxia ([facility]) [J96.01]  Yes   • Acute sepsis ([facility]) [A41.9]  Yes   • DEANGELO (acute kidney injury) ([facility]) [N17.9]  Yes   • Coronary artery disease involving native coronary artery of native heart with angina pectoris ([facility]) [I25.119]  Yes      Resolved Hospital Problems   No resolved problems to display.        Brief Hospital Course to date:  Mary Ramirez is a 87 y.o. female with a past medical history significant for paroxysmal atrial fibrillation, essential hypertension, CAD, diastolic congestive heart failure, CKD III, diabetes mellitus type 2, diverticulosis, Dementia, hyperlipidemia and TIA presented to the ED with complaints of shortness of air.    Assessment and plan :  Multifocal pneumonia   Acute hypoxia  Initially started on cefepime and vancomycin.  Currently on Rocephin and doxycycline  Oxygen requirement is minimal ranging between 1 to 2 L/min oxygen saturation around 95%  Urine antigen and MRSA swab negative  Continue current antibiotic regimen  Patient is DNR and DNI  Seen and evaluated by palliative team.  Awaiting hospice team to evaluate the patient per family request    Acute on chronic  HFpEF  ProBNP  33K on admission   Received 2mg IV Bumex in the ED  Follows with Dr. Flores  TTE  EF 26-30% (last echo ef 55%)  Continue gentle diuresis with IV Bumex  Strict I &O   Daily weight   Appreciate help from cardiology team    Paroxysmal atrial fibrillation   Currently rate controlled   Continue anticoagulation with Eliquis     Mild DEANGELO on CKD III   Creatinine back to baseline  Continue to monitor kidney function closely with ongoing diuresis    Essential hypertension   Home meds     Dementia  Continue aricept   Fall precautions     Diabetes mellitus type 2  Had hypoglycemic episode this morning likely secondary to poor oral intake  A1c 6.8% which is significantly low for her age  Discontinue insulin Lantus    GOC/Family update   My partner discussed with the patient's son on 8/7/2022. He is now aware that her EF is significantly decreased. He confirmed she is DNR/DNI. He would like to speak to hospice. He states that his sister also makes decisions and that they might need to meet with both of them. Palliative medicine was consulted this am.     Expected Discharge Location and Transportation: Home vs rehab   Expected Discharge Date: 8/10    DVT prophylaxis:  Medical DVT prophylaxis orders are present.     AM-PAC 6 Clicks Score (PT): 7 (08/08/22 0831)    CODE STATUS:   Code Status and Medical Interventions:   Ordered at: 08/05/22 8202     Medical Intervention Limits:    NO intubation (DNI)    NO cardioversion    NO dialysis    NO NIPPV (Non-Invasive Positive Pressure Ventilation)    NO artificial nutrition    NO vasopressors     Level Of Support Discussed With:    Health Care Surrogate     Code Status (Patient has no pulse and is not breathing):    No CPR (Do Not Attempt to Resuscitate)     Medical Interventions (Patient has pulse or is breathing):    Limited Support       Rikki Calix MD  08/08/22

## 2022-08-08 NOTE — CONSULTS
Continued Stay Note  [facility]     Patient Name: Mary Ramirez  MRN: [id number]  Today's Date: 8/8/2022    Admit Date: 8/5/2022     Discharge Plan     Row Name 08/08/22 1150       Plan    Plan Facility with hospice    Plan Comments Hospice consult received. Chart reviewed. Phone call made to the patient's son, Josh. Per Josh, patient is a resident at the [facility]. States that the patient is currently private pay at this facility. Educated the patient's son on hospice services and what to expect. Son was understanding. States that he would like for the patient to return back to the facility when medically ready and would like for hospice to follow. Josh requests in person visit today at 1400. Will continue to follow. Please call [phone] for any questions or concerns.               Discharge Codes    No documentation.               Expected Discharge Date and Time     Expected Discharge Date Expected Discharge Time    Aug 12, 2022             USSANNE SAENZ

## 2022-08-08 NOTE — PLAN OF CARE
Goal Outcome Evaluation:                   Patient received 25g IV dextrose this morning for low blood sugar (resolved). No other issues or concerns noted throughout shift.

## 2022-08-08 NOTE — CASE MANAGEMENT/SOCIAL WORK
Continued Stay Note  [facility]     Patient Name: Mary Ramirez  MRN: [id number]  Today's Date: 8/8/2022    Admit Date: 8/5/2022     Discharge Plan     Row Name 08/08/22 1521       Plan    Plan update    Patient/Family in Agreement with Plan yes    Plan Comments Per Hospice liaison, patient to discharge back to [facility] and have made arrangments for  Ambulance to transport Wednesday at 1545.  CM following remotely.    Final Discharge Disposition Code 50 - home with hospice    Row Name 08/08/22 1451       Plan    Plan LTC with hospice    Plan Comments Met with the patient's son, Josh. Josh requests for the patient to return back to the [facility] as soon as possible. Ambulance set up for Wednesday at 1545. Called [facility] and notified of the patient returning back to the facility with hospice services. EMS/DNR completed. No other needs at this time. Provided the son with the 24 hour number to call when the patient arrives back to the facility. Please call [phone] for any questions or concerns.    Row Name 08/08/22 144       Plan    Plan LTC with hospice services    Plan Comments Patient will be returning to the [facility].               Discharge Codes    No documentation.               Expected Discharge Date and Time     Expected Discharge Date Expected Discharge Time    Aug 12, 2022             Betsy Avilez RN

## 2022-08-08 NOTE — PLAN OF CARE
Goal Outcome Evaluation:  Plan of Care Reviewed With: patient        Progress: no change (PT eval)  Outcome Evaluation: Patient presents with weakness, decreased ROM, and cognitive impairments affecting functional mobility. Unsure of pt's baseline mobility- to be assessed further. She was Max/Dep for rolling, demonstrating difficulty sequencing rolling to R. Pt declined EOB/OOB activity, alternating between alert and lethargic. Skilled IP PT warranted. Recommend SNF at discharge pending participation.

## 2022-08-08 NOTE — THERAPY EVALUATION
Patient Name: Mary Ramirez  : 1934    MRN: [id number]                              Today's Date: 2022       Admit Date: 2022    Visit Dx:     ICD-10-CM ICD-9-CM   1. Pneumonia of both lower lobes due to infectious organism  J18.9 486   2. Acute on chronic respiratory failure with hypoxia ([facility])  J96.21 518.84     799.02   3. Renal insufficiency  N28.9 593.9   4. Elevated brain natriuretic peptide (BNP) level  R79.89 790.99   5. Sepsis, due to unspecified organism, unspecified whether acute organ dysfunction present ([facility])  A41.9 038.9     995.91     Patient Active Problem List   Diagnosis   • Fall   • Fracture, intertrochanteric, left femur ([facility])   • Essential hypertension   • Coronary artery disease involving native coronary artery of native heart with angina pectoris ([facility])   • Dementia ([facility])   • Paroxysmal atrial fibrillation ([facility])   • Anticoagulated   • SSS (sick sinus syndrome) ([facility])   • Chronic diastolic congestive heart failure ([facility])   • DEANGELO (acute kidney injury) ([facility])   • Benign essential hypertension   • CKD (chronic kidney disease), stage III ([facility])   • Mixed hyperlipidemia   • Major depressive disorder, single episode, mild ([facility])   • Chronic allergic rhinitis   • Memory loss   • Bradycardia   • At risk for falls   • Closed fracture of femur ([facility])   • Coronary arteriosclerosis after percutaneous transluminal coronary angioplasty (PTCA)   • Diabetic polyneuropathy ([facility])   • Diverticular disease of colon   • Foot pain   • GERD (gastroesophageal reflux disease)   • Peripheral artery insufficiency ([facility])   • Annual physical exam   • Vitamin D deficiency   • Renal disorder   • Closed fracture of right hip ([facility])   • Postoperative anemia due to acute blood loss   • Late onset Alzheimer's disease without behavioral disturbance ([facility])   • Altered mental status, unspecified altered mental status type   • Acute respiratory failure with hypoxia ([facility])   • Multifocal pneumonia   • Acute on chronic diastolic  CHF (congestive heart failure) ([facility])   • Acute sepsis (H[facility])   • Pneumonia of both lower lobes due to infectious organism     Past Medical History:   Diagnosis Date   • Atrial fibrillation (HCC)    • Benign essential hypertension    • CAD (coronary artery disease)    • Carotid artery disease (HCC)    • CHF (congestive heart failure) (HCC)    • Chronic allergic rhinitis    • CKD (chronic kidney disease), stage III (HCC)    • DDD (degenerative disc disease), cervical    • Dementia (HCC)    • Diabetes mellitus (HCC)    • Diverticulosis     WITH PERFORATION    • Hypertension    • Major depressive disorder, single episode, mild (HCC)    • Memory loss    • Mixed hyperlipidemia    • SSS (sick sinus syndrome) (HCC)    • TIA (transient ischemic attack) 01/2016     Past Surgical History:   Procedure Laterality Date   • ANKLE SURGERY Left 1996   • ATHERECTOMY  1995   • ATHRECTOMY ILIAC, FEMORAL, TIBIAL ARTERY     • BREAST LUMPECTOMY Left 1981   • COLON SURGERY     • COLOSTOMY  1983   • CORONARY STENT PLACEMENT  2001   • FINGER FRACTURE SURGERY Right    • HIP TROCHANTERIC NAILING WITH INTRAMEDULLARY HIP SCREW Left 11/23/2017   • HIP TROCHANTERIC NAILING WITH INTRAMEDULLARY HIP SCREW Right 3/30/2019    Procedure: HIP TROCANTERIC NAILING WITH INTRAMEDULLARY HIP SCREW RIGHT;  Surgeon: Theo Figueroa MD;  Location: [facility];  Service: Orthopedics   • HYSTERECTOMY  1984   • OTHER SURGICAL HISTORY  1984    REANASTAMOSIS       General Information     Row Name 08/08/22 0831          Physical Therapy Time and Intention    Document Type evaluation  -NS     Mode of Treatment individual therapy;physical therapy  -NS     Row Name 08/08/22 0831          General Information    Patient Profile Reviewed yes  -NS     Prior Level of Function --  Pt poor historian, TBA further  -NS     Existing Precautions/Restrictions fall;other (see comments)  baseline dementia  -NS     Barriers to Rehab medically complex;previous functional  deficit;cognitive status  -NS     Row Name 08/08/22 0831          Living Environment    People in Home facility resident  -NS     Row Name 08/08/22 0831          Home Main Entrance    Number of Stairs, Main Entrance none  -NS     Row Name 08/08/22 0831          Stairs Within Home, Primary    Number of Stairs, Within Home, Primary none  -NS     Row Name 08/08/22 0831          Cognition    Orientation Status (Cognition) oriented to;person;disoriented to;place;situation;time  -NS     Row Name 08/08/22 0831          Safety Issues, Functional Mobility    Safety Issues Affecting Function (Mobility) ability to follow commands;insight into deficits/self-awareness;judgment;problem-solving;safety precaution awareness;safety precautions follow-through/compliance;sequencing abilities  -NS     Impairments Affecting Function (Mobility) balance;cognition;endurance/activity tolerance;motor planning;strength;pain;postural/trunk control  -NS     Cognitive Impairments, Mobility Safety/Performance attention;awareness, need for assistance;insight into deficits/self-awareness;problem-solving/reasoning;safety precaution awareness;safety precaution follow-through;sequencing abilities  -NS     Comment, Safety Issues/Impairments (Mobility) Pt following approx 50% of commands but transitioned back and forth between awake and lethargic during eval.  -NS           User Key  (r) = Recorded By, (t) = Taken By, (c) = Cosigned By    Initials Name Provider Type    Karen Gaitan PT Physical Therapist               Mobility     Row Name 08/08/22 0831          Bed Mobility    Bed Mobility rolling left;rolling right;scooting/bridging  -NS     Rolling Left [place] (Bed Mobility) maximum assist (25% patient effort);verbal cues  -NS     Rolling Right [place] (Bed Mobility) dependent (less than 25% patient effort);verbal cues  -NS     Scooting/Bridging [place] (Bed Mobility) dependent (less than 25% patient effort);2 person assist  -NS      Assistive Device (Bed Mobility) draw sheet;head of bed elevated;bed rails  -NS     Comment, (Bed Mobility) Pt able to assist with L rolling with UEs and hips, did not assist with roll to R. Pt declined EOB activity.  -NS     Row Name 08/08/22 0831          Transfers    Comment, (Transfers) Pt initially agreeable to transfer to chair via lift, then declined, keeping eyes closed and becoming minimally verbal.  -NS     Row Name 08/08/22 0831          Bed-Chair Transfer    Bed-Chair [place] (Transfers) not tested  -NS     Row Name 08/08/22 0831          Sit-Stand Transfer    Sit-Stand [place] (Transfers) not tested  -NS           User Key  (r) = Recorded By, (t) = Taken By, (c) = Cosigned By    Initials Name Provider Type    Karen Gaitan PT Physical Therapist               Obj/Interventions     Row Name 08/08/22 0831          Range of Motion Comprehensive    General Range of Motion lower extremity range of motion deficits identified  -NS     Comment, General Range of Motion Stiff PROM- decreased ROM at B ankles and knees, with limited knee extension R worse than L.  -NS     Row Name 08/08/22 0831          Strength Comprehensive (MMT)    General Manual Muscle Testing (MMT) Assessment lower extremity strength deficits identified  -NS     Comment, General Manual Muscle Testing (MMT) Assessment Unable to formally assess due to poor command following. Did not observe any active movement against gravity.  -NS     Row Name 08/08/22 0831          Motor Skills    Therapeutic Exercise hip;knee;ankle  -NS     Row Name 08/08/22 0831          Hip (Therapeutic Exercise)    Hip (Therapeutic Exercise) PROM (passive range of motion)  -NS     Hip PROM (Therapeutic Exercise) bilateral;flexion;aDduction;external rotation;10 repetitions  -NS     Row Name 08/08/22 0831          Knee (Therapeutic Exercise)    Knee (Therapeutic Exercise) PROM (passive range of motion)  -NS     Knee PROM (Therapeutic Exercise)  bilateral;extension;flexion;10 repetitions  -NS     [facility] Name 08/08/22 0831          Ankle (Therapeutic Exercise)    Ankle (Therapeutic Exercise) PROM (passive range of motion)  -NS     Ankle PROM (Therapeutic Exercise) bilateral;dorsiflexion;plantarflexion;3 repetitions  Pt demonstrated increase in pain with feet touched- deferred futher reps  -NS     [facility] Name 08/08/22 0831          Sensory Assessment (Somatosensory)    Sensory Assessment (Somatosensory) unable/difficult to assess  -NS           User Key  (r) = Recorded By, (t) = Taken By, (c) = Cosigned By    Initials Name Provider Type    Karen Gaitan PT Physical Therapist               Goals/Plan     Row Name 08/08/22 0831          Bed Mobility Goal 1 (PT)    Activity/Assistive Device (Bed Mobility Goal 1, PT) sit to supine/supine to sit  -NS     [place] Level/Cues Needed (Bed Mobility Goal 1, PT) moderate assist (50-74% patient effort)  -NS     Time Frame (Bed Mobility Goal 1, PT) long term goal (LTG);2 weeks  -NS     Row Name 08/08/22 0831          Transfer Goal 1 (PT)    Activity/Assistive Device (Transfer Goal 1, PT) sit-to-stand/stand-to-sit;bed-to-chair/chair-to-bed  -NS     [place] Level/Cues Needed (Transfer Goal 1, PT) moderate assist (50-74% patient effort)  -NS     Time Frame (Transfer Goal 1, PT) long term goal (LTG);2 weeks  -NS     Row Name 08/08/22 0831          Therapy Assessment/Plan (PT)    Planned Therapy Interventions (PT) balance training;bed mobility training;home exercise program;neuromuscular re-education;ROM (range of motion);patient/family education;strengthening;stretching;transfer training  -NS           User Key  (r) = Recorded By, (t) = Taken By, (c) = Cosigned By    Initials Name Provider Type    Karen Gaitan PT Physical Therapist               Clinical Impression     Row Name 08/08/22 0831          Pain    Pretreatment Pain Rating --  -NS     Posttreatment Pain Rating --  -NS     Pain Intervention(s)  Repositioned  -NS     [facility] Name 08/08/22 0831          Pain Scale: FACES Pre/Post-Treatment    Pain: FACES Scale, Pretreatment 0-->no hurt  -NS     Posttreatment Pain Rating 0-->no hurt  -NS     Pre/Posttreatment Pain Comment Pt demonstrated no signs/symptoms of pain pre/post treatment but demonstrated increased pain when B feet touched.  -NS     [facility] Name 08/08/22 0831          Plan of Care Review    Plan of Care Reviewed With patient  -NS     Progress no change  PT eval  -NS     Outcome Evaluation Patient presents with weakness, decreased ROM, and cognitive impairments affecting functional mobility. Unsure of pt's baseline mobility- to be assessed further. She was Max/Dep for rolling, demonstrating difficulty sequencing rolling to R. Pt declined EOB/OOB activity, alternating between alert and lethargic. Skilled IP PT warranted. Recommend SNF at discharge pending participation.  -NS     [facility] Name 08/08/22 0831          Therapy Assessment/Plan (PT)    Patient/Family Therapy Goals Statement (PT) unsure at this time  -NS     Rehab Potential (PT) fair, will monitor progress closely  -NS     Criteria for Skilled Interventions Met (PT) yes;meets criteria;skilled treatment is necessary  -NS     Therapy Frequency (PT) daily  -NS     Predicted Duration of Therapy Intervention (PT) 2 weeks  -NS     [facility] Name 08/08/22 0831          Vital Signs    Pre Systolic BP Rehab 147  -NS     Pre Treatment Diastolic BP 97  -NS     Pretreatment Heart Rate (beats/min) 108  -NS     Posttreatment Heart Rate (beats/min) 107  -NS     Pre SpO2 (%) 99  -NS     O2 Delivery Pre Treatment room air  -NS     Post SpO2 (%) 98  -NS     O2 Delivery Post Treatment room air  -NS     Pre Patient Position Supine  -NS     Intra Patient Position Side Lying  -NS     Post Patient Position Sitting  -NS     [facility] Name 08/08/22 0831          Positioning and Restraints    Pre-Treatment Position in bed  -NS     Post Treatment Position bed  -NS     In Bed notified  nsg;fowlers;call light within reach;encouraged to call for assist;exit alarm on;side rails up x3;pillow between legs;heels elevated;with nsg;with other staff  -NS           User Key  (r) = Recorded By, (t) = Taken By, (c) = Cosigned By    Initials Name Provider Type    Karen Gaitan PT Physical Therapist               Outcome Measures     Row Name 08/08/22 0831          How much help from another person do you currently need...    Turning from your back to your side while in flat bed without using bedrails? 2  -NS     Moving from lying on back to sitting on the side of a flat bed without bedrails? 1  -NS     Moving to and from a bed to a chair (including a wheelchair)? 1  -NS     Standing up from a chair using your arms (e.g., wheelchair, bedside chair)? 1  -NS     Climbing 3-5 steps with a railing? 1  -NS     To walk in hospital room? 1  -NS     AM-PAC 6 Clicks Score (PT) 7  -NS     Highest level of mobility 2 --> Bed activities/dependent transfer  -NS     Row Name 08/08/22 0831          Functional Assessment    Outcome Measure Options AM-PAC 6 Clicks Basic Mobility (PT)  -NS           User Key  (r) = Recorded By, (t) = Taken By, (c) = Cosigned By    Initials Name Provider Type    Karen Gaitan PT Physical Therapist                             Physical Therapy Education                 Title: PT OT SLP Therapies (In Progress)     Topic: Physical Therapy (In Progress)     Point: Mobility training (In Progress)     Learning Progress Summary           Patient Acceptance, E, NR by NS at 8/8/2022 0942                   Point: Home exercise program (In Progress)     Learning Progress Summary           Patient Acceptance, E, NR by NS at 8/8/2022 0942                   Point: Body mechanics (In Progress)     Learning Progress Summary           Patient Acceptance, E, NR by NS at 8/8/2022 0942                   Point: Precautions (In Progress)     Learning Progress Summary           Patient Acceptance, E, NR by NS at  8/8/2022 0942                               User Key     Initials Effective Dates Name Provider Type Discipline    NS 06/16/21 -  Karen Fonseca PT Physical Therapist PT              PT Recommendation and Plan  Planned Therapy Interventions (PT): balance training, bed mobility training, home exercise program, neuromuscular re-education, ROM (range of motion), patient/family education, strengthening, stretching, transfer training  Plan of Care Reviewed With: patient  Progress: no change (PT eval)  Outcome Evaluation: Patient presents with weakness, decreased ROM, and cognitive impairments affecting functional mobility. Unsure of pt's baseline mobility- to be assessed further. She was Max/Dep for rolling, demonstrating difficulty sequencing rolling to R. Pt declined EOB/OOB activity, alternating between alert and lethargic. Skilled IP PT warranted. Recommend SNF at discharge pending participation.     Time Calculation:    PT Charges     Row Name 08/08/22 0831             Time Calculation    Start Time 0831  -NS      PT Received On 08/08/22  -NS      PT Goal Re-Cert Due Date 08/18/22  -NS              Timed Charges    11312 - PT Therapeutic Exercise Minutes 8  -NS              Untimed Charges    PT Eval/Re-eval Minutes 33  -NS              Total Minutes    Timed Charges Total Minutes 8  -NS      Untimed Charges Total Minutes 33  -NS       Total Minutes 41  -NS            User Key  (r) = Recorded By, (t) = Taken By, (c) = Cosigned By    Initials Name Provider Type    NS Karen Fonseca PT Physical Therapist              Therapy Charges for Today     Code Description Service Date Service Provider Modifiers Qty    30210316999 HC PT THER PROC EA 15 MIN 8/8/2022 Karen Fonseca, PT GP 1    62034667278 HC PT EVAL LOW COMPLEXITY 3 8/8/2022 Karen Fonseca, PT GP 1          PT G-Codes  Outcome Measure Options: AM-PAC 6 Clicks Basic Mobility (PT)  AM-PAC 6 Clicks Score (PT): 7  AM-PAC 6 Clicks Score (OT): 9    Karen Fonseca  PT  8/8/2022

## 2022-08-08 NOTE — PROGRESS NOTES
"[facility] Progress Note     LOS: 3 days   Patient Care Team:  Tiana Hong MD as PCP - General (Internal Medicine)  Ta Flores III, MD as Cardiologist (Cardiology)  PCP:  Tiana Hong MD    Chief Complaint: Atrial fibrillation, CHF    SUBJECTIVE: Resting comfortably in bed.  Not requiring supplemental oxygen.  Telemetry appears to reveal atypical flutter with mild RVR, heart rate 110 bpm.  No lower extremity edema.      Review of Systems:   All systems have been reviewed and are negative with the exception of those mentioned above.      OBJECTIVE:    Vital Sign Min/Max for last 24 hours  Temp  Min: 96.7 °F (35.9 °C)  Max: 98.3 °F (36.8 °C)   BP  Min: 124/74  Max: 162/110   Pulse  Min: 105  Max: 109   Resp  Min: 16  Max: 20   SpO2  Min: 93 %  Max: 100 %   No data recorded   Weight  Min: 55.3 kg (121 lb 14.6 oz)  Max: 55.3 kg (121 lb 14.6 oz)     Flowsheet Rows    Flowsheet Row First Filed Value   Admission Height 152.4 cm (60\") Documented at 08/05/2022 1513   Admission Weight 54.4 kg (120 lb) Documented at 08/05/2022 1513          Telemetry: Atypical flutter      Intake/Output Summary (Last 24 hours) at 8/8/2022 1639  Last data filed at 8/8/2022 1100  Gross per 24 hour   Intake 363 ml   Output 2000 ml   Net -1637 ml     Intake & Output (last 3 days)       08/05 0701 08/06 0700 08/06 0701  08/07 0700 08/07 0701  08/08 0700 08/08 0701 08/09 0700    P.O.   240     IV Piggyback 100 236.6 100 143    Total Intake(mL/kg) 100 (1.8) 236.6 (4.3) 340 (6.1) 143 (2.6)    Urine (mL/kg/hr) 800 2650 (2) 1200 (0.9) 800 (1.5)    Stool    0    Total Output 800 2650 1200 800    Net -700 -2413.4 -860 -767            Urine Unmeasured Occurrence   1 x     Stool Unmeasured Occurrence    1 x           Physical Exam:    General Appearance:    Alert, no acute distress   Neck:   Supple, symmetrical, trachea midline.   Lungs:     Clear to auscultation " bilaterally, respirations unlabored   Chest Wall:    No tenderness or deformity    Heart:   Tacky, S1 and S2 normal, no murmur, rub   or gallop, normal carotid impulse bilaterally without bruit.   Extremities:   Extremities normal, atraumatic, no cyanosis or edema   Pulses:   2+ and symmetric all extremities   Skin:   Skin color, texture, turgor normal, no rashes or lesions      LABS/DIAGNOSTIC DATA:  Results from last 7 days   Lab Units 08/06/22 0227 08/05/22  1508   WBC 10*3/mm3 8.13 12.08*   HEMOGLOBIN g/dL 12.5 13.6   HEMATOCRIT % 39.3 44.8   PLATELETS 10*3/mm3 136* 166     Lab Results   Lab Value Date/Time    TROPONINT 0.032 (C) 08/05/2022 1508    TROPONINT 0.028 06/10/2022 2230    TROPONINT 0.033 (C) 06/03/2022 1659    TROPONINT 0.038 (C) 06/03/2022 1443    TROPONINT <0.010 11/11/2019 1743    TROPONINT <0.010 09/21/2019 2115    TROPONINT <0.010 09/21/2019 1829         Results from last 7 days   Lab Units 08/08/22  0513 08/06/22 0227 08/05/22 2229 08/05/22  1508   SODIUM mmol/L 142 134*  --  131*   POTASSIUM mmol/L 3.2* 4.7 5.5* 5.8*   CHLORIDE mmol/L 97* 96*  --  99   CO2 mmol/L 31.0* 23.0  --  21.0*   BUN mg/dL 32* 53*  --  49*   CREATININE mg/dL 1.30* 1.97*  --  1.84*   CALCIUM mg/dL 8.8 8.6  --  9.2   BILIRUBIN mg/dL  --   --   --  0.5   ALK PHOS U/L  --   --   --  97   ALT (SGPT) U/L  --   --   --  105*   AST (SGOT) U/L  --   --   --  33*   GLUCOSE mg/dL 29* 144*  --  126*     Results from last 7 days   Lab Units 08/06/22 0227   HEMOGLOBIN A1C % 6.80*     Results from last 7 days   Lab Units 08/06/22 0227   CHOLESTEROL mg/dL 124   TRIGLYCERIDES mg/dL 122   HDL CHOL mg/dL 25*   LDL CHOL mg/dL 77     Results from last 7 days   Lab Units 08/06/22  0227   TSH uIU/mL 5.830*           Medication Review:   apixaban, 2.5 mg, Oral, BID  aspirin, 81 mg, Oral, Daily  bumetanide, 1 mg, Oral, BID  cefTRIAXone, 1 g, Intravenous, Q24H  citalopram, 20 mg, Oral, Daily  donepezil, 5 mg, Oral, Nightly  doxycycline, 100  mg, Oral, Q12H  insulin lispro, 0-7 Units, Subcutaneous, TID AC  isosorbide dinitrate, 20 mg, Oral, TID - Nitrates  metoprolol tartrate, 150 mg, Oral, BID  sodium chloride, 10 mL, Intravenous, Q12H             ASSESSMENT/PLAN:    Coronary artery disease involving native coronary artery of native heart with angina pectoris (HCC)    DEANGELO (acute kidney injury) ([facility])    Acute respiratory failure with hypoxia ([facility])    Acute on chronic diastolic CHF (congestive heart failure) ([facility])    Acute sepsis ([facility])    Pneumonia of both lower lobes due to infectious organism    Severe Alzheimer's dementia with ongoing multifocal pneumonia, permanent atrial fibrillation and newly diagnosed systolic CHF.  -Continue Eliquis 2.5 mg p.o. twice daily for stroke prophylaxis  -Continue metoprolol tartrate 150 mg p.o. twice daily for rate control, will transition to metoprolol succinate at discharge, trending rate response.  Not recommending anticoagulation or restoration of sinus rhythm at this time.  -Adequate response to diuresis, electrolytes being replaced per protocol.  Continue twice daily Bumex.          Ta Flores III, MD   08/08/22  16:39 EDT

## 2022-08-08 NOTE — DISCHARGE PLACEMENT REQUEST
"Don Meade (87 y.o. Female)     DX; CHF, Dementia  Referring MD: Dr. Navarro  PCP: Dr. Tiana THOMPSON - 08/05/2022        Date of Birth   1934    Social Security Number       Address   [address] [place] [facility] [address]    Home Phone   [phone]    MRN   [id number]       Judaism   Worship    Marital Status                               Admission Date   8/5/22    Admission Type   Emergency    Admitting Provider   Rikki Calix MD    Attending Provider   Rikki Calix MD    Department, Room/Bed   [facility] 6B, N632/1       Discharge Date       Discharge Disposition       Discharge Destination                               Attending Provider: Rikki Calix MD    Allergies: Penicillins    Isolation: None   Infection: None   Code Status: No CPR   Advance Care Planning Activity    Ht: 152.4 cm (60\")   Wt: 55.3 kg (121 lb 14.6 oz)    Admission Cmt: None   Principal Problem: None                Active Insurance as of 8/5/2022     Primary Coverage     Payor Plan Insurance Group Employer/Plan Group    HUMANA MEDICARE REPLACEMENT HUMANA MEDICARE REPLACEMENT [id number]     Payor Plan Address Payor Plan Phone Number Payor Plan Fax Number Effective Dates    [address] [address] [phone]  1/1/2018 - None Entered    [facility] [address]       Subscriber Name Subscriber Birth Date Member ID       DON MEADE 1934 [id number]                 Emergency Contacts      (Rel.) Home Phone Work Phone Mobile Phone    Josh Meade (Son) -- -- [phone]    Gloria Giron (Daughter) [phone] -- --            Emergency Contact Information     Name Relation Home Work Mobile    Josh Meade Son   [phone]    Gloria Giron Daughter [phone]            Insurance Information                HUMANA MEDICARE REPLACEMENT/HUMANA MEDICARE REPLACEMENT Phone: [phone]    Subscriber: James Don WADE Subscriber#: [id number]    Group#: [id number] " Precert#: [id number]             History & Physical      Merari Finn MD at 22 2153              [facility] Medicine Services  HISTORY AND PHYSICAL    Patient Name: Mary Ramirez  : 1934  MRN: [id number]  Primary Care Physician: Tiana Hong MD  Date of admission: 2022      Subjective   Subjective     Chief Complaint:  SOA    HPI:  Mary Ramirez is a 87 y.o. female  with a past medical history significant for paroxysmal atrial fibrillation, essential hypertension, CAD, diastolic congestive heart failure, CKD III, diabetes mellitus type 2, diverticulosis, Dementia, hyperlipidemia and TIA presented to the ED with complaints of shortness of air.  Due to underlying dementia patient is unable to provide much history, her son is at bedside and reports that the patient had been doing well earlier this week when she was seen by Dr. Flores with Cardiology. The following day, she developed PNA and was put on ABX at her nursing home (data deficient).  Pt's son states that she acutely worsened overnight and they called him this am to ask if he wanted her to come to the ED for evaluation or if he wanted to pursue hospice.       Review of Systems   KAYLEEN due to pt's dementia      All other systems reviewed and are negative.     Personal History     Past Medical History:   Diagnosis Date   • Atrial fibrillation (HCC)    • Benign essential hypertension    • CAD (coronary artery disease)    • Carotid artery disease (HCC)    • CHF (congestive heart failure) ([facility])    • Chronic allergic rhinitis    • CKD (chronic kidney disease), stage III ([facility])    • DDD (degenerative disc disease), cervical    • Dementia ([facility])    • Diabetes mellitus ([facility])    • Diverticulosis     WITH PERFORATION    • Hypertension    • Major depressive disorder, single episode, mild ([facility])    • Memory loss    • Mixed hyperlipidemia    • SSS (sick sinus syndrome) ([facility])    • TIA (transient ischemic attack)  01/2016             Past Surgical History:   Procedure Laterality Date   • ANKLE SURGERY Left 1996   • ATHERECTOMY  1995   • ATHRECTOMY ILIAC, FEMORAL, TIBIAL ARTERY     • BREAST LUMPECTOMY Left 1981   • COLON SURGERY     • COLOSTOMY  1983   • CORONARY STENT PLACEMENT  2001   • FINGER FRACTURE SURGERY Right    • HIP TROCHANTERIC NAILING WITH INTRAMEDULLARY HIP SCREW Left 11/23/2017   • HIP TROCHANTERIC NAILING WITH INTRAMEDULLARY HIP SCREW Right 3/30/2019    Procedure: HIP TROCANTERIC NAILING WITH INTRAMEDULLARY HIP SCREW RIGHT;  Surgeon: Theo Figueroa MD;  [facility];  Service: Orthopedics   • HYSTERECTOMY  1984   • OTHER SURGICAL HISTORY  1984    REANASTAMOSIS        Family History:  family history includes Coronary artery disease (age of onset: 56) in her mother; Hypertension in her father and mother; Lung cancer in her brother and father; Osteoporosis in her sister. Otherwise pertinent FHx was reviewed and unremarkable.     Social History:  reports that she quit smoking about 43 years ago. Her smoking use included cigarettes. She started smoking about 46 years ago. She quit after 2.00 years of use. She has never used smokeless tobacco. She reports that she does not drink alcohol and does not use drugs.  Social History     Social History Narrative    [facility]    Patient drinks 2 cups of caffeine per day.,       Medications:  Available home medication information reviewed.  Medications Prior to Admission   Medication Sig Dispense Refill Last Dose   • acetaminophen (TYLENOL) 500 MG tablet Take 500 mg by mouth 2 (Two) Times a Day. Take one tablet twice a day for leg pain   And prn every 6 hours      • aluminum-magnesium hydroxide-simethicone (MAALOX MAX) 400-400-40 MG/5ML suspension Take 5 mL by mouth Every 6 (Six) Hours As Needed for Indigestion or Heartburn.      • Amino Acids-Protein Hydrolys (PRO-STAT AWC PO) Take 1 dose by mouth 2 (Two) Times a Day.      • apixaban  (ELIQUIS) 2.5 MG tablet tablet Take 1 tablet by mouth 2 (Two) Times a Day. 60 tablet 6    • aspirin (aspirin) 81 MG EC tablet Take 1 tablet by mouth Daily. (Patient taking differently: Take 325 mg by mouth Daily.) 30 tablet 6    • citalopram (CeleXA) 20 MG tablet Take 20 mg by mouth Daily.      • docusate sodium (COLACE) 100 MG capsule Take 100 mg by mouth 2 (Two) Times a Day.      • donepezil (ARICEPT) 5 MG tablet Take 5 mg by mouth Every Night.      • fluticasone (FLONASE) 50 MCG/ACT nasal spray 2 sprays into the nostril(s) as directed by provider Daily.      • furosemide (LASIX) 40 MG tablet Take 1 tablet by mouth Daily for 30 days. 30 tablet 0    • Glucagon, rDNA, (Glucagon Emergency) 1 MG kit As Needed.      • hydrALAZINE (APRESOLINE) 10 MG tablet Take 10 mg by mouth Daily As Needed.      • insulin aspart (novoLOG FLEXPEN) 100 UNIT/ML solution pen-injector sc pen Inject 2-5 Units under the skin into the appropriate area as directed 4 (Four) Times a Day.      • isosorbide mononitrate (IMDUR) 60 MG 24 hr tablet Take 60 mg by mouth 3 (Three) Times a Day. 0.5 tab      • Lantus SoloStar 100 UNIT/ML injection pen Daily.      • lidocaine (LIDODERM) 5 % Place 1 patch on the skin as directed by provider Daily. Remove & Discard patch within 12 hours or as directed by MD 30 patch 0    • metFORMIN ER (GLUCOPHAGE-XR) 500 MG 24 hr tablet Take 1 tablet by mouth Daily With Breakfast. (Patient taking differently: Take 500 mg by mouth 2 (Two) Times a Day.) 30 tablet 5    • metoprolol tartrate (LOPRESSOR) 100 MG tablet Take 1 tablet by mouth 2 (Two) Times a Day. 60 tablet 11    • metoprolol tartrate (LOPRESSOR) 25 MG tablet Take 25 mg by mouth 2 (Two) Times a Day As Needed.      • Multiple Vitamin (MULTIVITAMINS PO) Take 1 tablet by mouth Daily.      • ondansetron (ZOFRAN) 4 MG tablet Take 4 mg by mouth Every 8 (Eight) Hours As Needed for Nausea or Vomiting.      • polyethylene glycol (MIRALAX) pack packet Take 17 g by mouth  "Daily. (Patient taking differently: Take 17 g by mouth Daily As Needed.)          Allergies   Allergen Reactions   • Penicillins Other (See Comments)     Pt states \"i don't know\"       Objective   Objective     Vital Signs:   Temp:  [95.8 °F (35.4 °C)-97.7 °F (36.5 °C)] 95.8 °F (35.4 °C)  Heart Rate:  [] 105  Resp:  [20] 20  BP: (101-127)/(67-98) 117/95       Physical Exam   Constitutional: Awake, alert  Eyes: PERRLA, sclerae anicteric, no conjunctival injection  HENT: NCAT, mucous membranes moist  Neck: Supple, no thyromegaly, no lymphadenopathy, trachea midline  Respiratory: Clear to auscultation bilaterally, nonlabored respirations   Cardiovascular: RRR, no murmurs, rubs, or gallops, palpable pedal pulses bilaterally  Gastrointestinal: Positive bowel sounds, soft, nontender, nondistended  Musculoskeletal: No bilateral ankle edema, no clubbing or cyanosis to extremities  Psychiatric: Appropriate affect, cooperative  Neurologic: awake, not oriented, strength symmetric in all extremities, Cranial Nerves grossly intact to confrontation, speech clear  Skin: No rashes    Result Review:  I have personally reviewed the results from the time of this admission to 8/5/2022 21:53 EDT and agree with these findings:  [x]  Laboratory list / accordion  [x]  Microbiology  [x]  Radiology  []  EKG/Telemetry   []  Cardiology/Vascular   []  Pathology  [x]  Old records  []  Other:  Most notable findings include: see assessment and plan      LAB RESULTS:      Lab 08/05/22  1850 08/05/22  1508   WBC  --  12.08*   HEMOGLOBIN  --  13.6   HEMATOCRIT  --  44.8   PLATELETS  --  166   NEUTROS ABS  --  9.23*   IMMATURE GRANS (ABS)  --  0.08*   LYMPHS ABS  --  1.39   MONOS ABS  --  1.26*   EOS ABS  --  0.09   MCV  --  94.5   PROCALCITONIN  --  0.06   LACTATE 4.3* 4.0*         Lab 08/05/22  1508   SODIUM 131*   POTASSIUM 5.8*   CHLORIDE 99   CO2 21.0*   ANION GAP 11.0   BUN 49*   CREATININE 1.84*   EGFR 26.3*   GLUCOSE 126*   CALCIUM 9.2 "         Lab 08/05/22  1508   TOTAL PROTEIN 6.4   ALBUMIN 3.70   GLOBULIN 2.7   ALT (SGPT) 105*   AST (SGOT) 33*   BILIRUBIN 0.5   ALK PHOS 97         Lab 08/05/22  1508   PROBNP 33,432.0*   TROPONIN T 0.032*                 UA    Urinalysis 6/3/22 6/11/22 6/11/22     0333 0333   Squamous Epithelial Cells, UA   None Seen   Specific [place], UA 1.021 1.007    Ketones, UA Negative Negative    Blood, UA Negative Negative    Leukocytes, UA Negative Trace (A)    Nitrite, UA Negative Negative    RBC, UA   0-2   WBC, UA   None Seen   Bacteria, UA   None Seen   (A) Abnormal value       Comments are available for some flowsheets but are not being displayed.             Microbiology Results (last 10 days)     Procedure Component Value - Date/Time    COVID PRE-OP / PRE-PROCEDURE SCREENING ORDER (NO ISOLATION) - Swab, Nasopharynx [[id number]]  (Normal) Collected: 08/05/22 1609    Lab Status: Final result Specimen: Swab from Nasopharynx Updated: 08/05/22 1649    Narrative:      The following orders were created for panel order COVID PRE-OP / PRE-PROCEDURE SCREENING ORDER (NO ISOLATION) - Swab, Nasopharynx.  Procedure                               Abnormality         Status                     ---------                               -----------         ------                     COVID-19 and FLU A/B PCR...[[id number]]  Normal              Final result                 Please view results for these tests on the individual orders.    COVID-19 and FLU A/B PCR - Swab, Nasopharynx [[id number]]  (Normal) Collected: 08/05/22 1609    Lab Status: Final result Specimen: Swab from Nasopharynx Updated: 08/05/22 1649     COVID19 Not Detected     Influenza A PCR Not Detected     Influenza B PCR Not Detected    Narrative:      Fact sheet for providers: https://www.fda.gov/media/440332/download    Fact sheet for patients: https://www.fda.gov/media/341950/download    Test performed by PCR.          XR Chest 1 View    Result Date: 8/5/2022   DATE OF  EXAM: 8/5/2022 3:19 PM  PROCEDURE: XR CHEST 1 VW-  INDICATIONS: SOA triage protocol  COMPARISON: 06/15/2022  TECHNIQUE: Portable chest radiograph.  FINDINGS:  Stable cardiac enlargement. Large hiatal hernia again noted. No pneumothorax. There is persistent bibasilar airspace disease which may relate to atelectasis and/or pneumonia. Bilateral upper lung airspace disease on the prior study appears improved. Patient's chin overlies the right apex partially limiting assessment. Small bilateral pleural effusions. No pneumothorax.      Impression: 1. Persistent bibasilar airspace disease which may relate to atelectasis or pneumonia. 2. Bilateral upper lobe airspace disease on the prior study has resolved. 3. Small bilateral pleural effusions. 4. Large hiatal hernia.  This report was finalized on 8/5/2022 3:21 PM by Michael Osei MD.        Results for orders placed during the hospital encounter of 06/10/22    Adult Transthoracic Echo Complete W/ Cont if Necessary Per Protocol    Interpretation Summary  · Estimated left ventricular EF = 51% Left ventricular ejection fraction appears to be 51 - 55%. Left ventricular systolic function is normal.  · Left ventricular wall thickness is consistent with mild septal asymmetric hypertrophy.  · There is calcification of the aortic valve mainly affecting the left coronary cusp(s).  · Moderate mitral valve regurgitation is present.  · Estimated right ventricular systolic pressure from tricuspid regurgitation is normal (<35 mmHg).  · Incidental heart rate 120 bpm with diastolic inflow consistent with atrial fibrillation      Assessment & Plan   Assessment & Plan     Active Hospital Problems    Diagnosis  POA   • Acute on chronic diastolic CHF (congestive heart failure) ([facility]) [I50.33]  Yes     Priority: High   • Acute respiratory failure with hypoxia ([facility]) [J96.01]  Yes     Priority: High   • Acute sepsis ([facility]) [A41.9]  Yes     Priority: High   • DEANGELO (acute kidney injury) ([facility]) [N17.9]   Yes     Priority: Medium   • Coronary artery disease involving native coronary artery of native heart with angina pectoris (HCC) [I25.119]  Yes     Priority: Low     · Cardiac cath with PCI data deficit  · Echo (9/1/18): LVEF 60%. LVH. Left atrial dilatation. Aortic calcification with no stenosis.     • Pneumonia of both lower lobes due to infectious organism [J18.9]  Yes       Ms. Ramirez is an 88 yo WF  with a past medical history significant for paroxysmal atrial fibrillation, essential hypertension, CAD, diastolic congestive heart failure, CKD III, diabetes mellitus type 2, diverticulosis, Dementia, hyperlipidemia and TIA presents to the ED with complaints of shortness of air.  She was found to have multifocal PNA.    Plan:    Multifocal pneumonia   Acute hypoxic respiratory failure  -imaging as mentioned above  -Son verifies patient is DNR/DNI  -continue vancomycin and cefepime (PCN allergy)  -blood cultures x2 pending   -sputum culture  -COVID-19 negative   -check urine antigens   -MRSA PCR   -continuous pulse ox  -keep o2 sat >90%     Acute on chronic HFpEF  -last echo with EF 66%, mild tricuspid valve regurgitation  -mild to moderate MVR  -proBNP tonight 33K   -s/p 2mg IV Bumex in the ED  -strict I &O   -daily weight   -follows with Dr. Flores   -- repeat TTE in am  -- continue with IV Bumex     Hyperkalemia  --suspect due to hemolysis, repeat K+ now     Paroxysmal atrial fibrillation   -currently rate controlled   -previously on eliquis but stopped due to multiple falls, however, per her med rec, it appears she is on it.  Will continue for now but need to verify with her facility if she is truly on it.      Elevated Creatinine on CKD III   -baseline creatinine 1.1-1.4, Cr currently 1.84. Likely due to cardiorenal.  -- monitor closely with diuresis  -- if worsens, would send urine studies  -repeat labs in am      Essential hypertension   -continue home meds     Dementia  -on aricept   -fall precautions       Diabetes mellitus type 2  -check hgb A1c   -start ldssi   -fingersticks achs     DVT prophylaxis:  Eliquis (please verify home med list in am)      CODE STATUS:  DNR/DNI  Code Status and Medical Interventions:   Ordered at: 08/05/22 2151     Medical Intervention Limits:    NO intubation (DNI)    NO cardioversion    NO dialysis    NO NIPPV (Non-Invasive Positive Pressure Ventilation)    NO artificial nutrition    NO vasopressors     Level Of Support Discussed With:    Health Care Surrogate     Code Status (Patient has no pulse and is not breathing):    No CPR (Do Not Attempt to Resuscitate)     Medical Interventions (Patient has pulse or is breathing):    Limited Support         Merari Finn MD  08/05/22    Electronically signed by Merari Finn MD at 08/05/22 2204         Current Facility-Administered Medications   Medication Dose Route Frequency Provider Last Rate Last Admin   • acetaminophen (TYLENOL) tablet 650 mg  650 mg Oral Q4H PRN Merari Finn MD        Or   • acetaminophen (TYLENOL) 160 MG/5ML solution 650 mg  650 mg Oral Q4H PRN Merari Finn MD        Or   • acetaminophen (TYLENOL) suppository 650 mg  650 mg Rectal Q4H PRN Merari Finn MD       • albuterol (PROVENTIL) nebulizer solution 0.083% 2.5 mg/3mL  2.5 mg Nebulization Q4H - RT Merari Finn MD   2.5 mg at 08/08/22 0755   • apixaban (ELIQUIS) tablet 2.5 mg  2.5 mg Oral BID Merari Finn MD   2.5 mg at 08/08/22 0849   • aspirin chewable tablet 81 mg  81 mg Oral Daily Pennie German DO   81 mg at 08/08/22 0849   • bumetanide (BUMEX) injection 1 mg  1 mg Intravenous Q12H Merari Finn MD   1 mg at 08/08/22 0555   • cefTRIAXone (ROCEPHIN) 1 g/100 mL 0.9% NS (MBP)  1 g Intravenous Q24H Pennie German DO   1 g at 08/07/22 1037   • citalopram (CeleXA) tablet 20 mg  20 mg Oral Daily Merari Finn MD   20 mg at 08/08/22 0850   • dextrose (D50W)  (25 g/50 mL) IV injection 25 g  25 g Intravenous Q15 Min PRN Merari Finn MD   25 g at 22 0718   • dextrose (GLUTOSE) oral gel 15 g  15 g Oral Q15 Min PRN Merari Finn MD       • donepezil (ARICEPT) tablet 5 mg  5 mg Oral Nightly Merari Finn MD   5 mg at 22   • doxycycline (MONODOX) capsule 100 mg  100 mg Oral Q12H Pennie German DO   100 mg at 22 0850   • glucagon (human recombinant) (GLUCAGEN DIAGNOSTIC) injection 1 mg  1 mg Intramuscular Q15 Min PRN Merari Finn MD       • HYDROcodone-acetaminophen (NORCO) 5-325 MG per tablet 1 tablet  1 tablet Oral Q4H PRN Merari Finn MD       • insulin detemir (LEVEMIR) injection 10 Units  10 Units Subcutaneous Nightly Merari Finn MD   10 Units at 22   • Insulin Lispro (humaLOG) injection 0-7 Units  0-7 Units Subcutaneous TID AC Merari Finn MD   2 Units at 22 1708   • isosorbide dinitrate (ISORDIL) tablet 20 mg  20 mg Oral TID - Nitrates Pennie German DO   20 mg at 22 0849   • metoprolol tartrate (LOPRESSOR) tablet 150 mg  150 mg Oral BID Amos Hanna MD   150 mg at 22 0849   • ondansetron (ZOFRAN) tablet 4 mg  4 mg Oral Q6H PRN Merari Finn MD        Or   • ondansetron (ZOFRAN) injection 4 mg  4 mg Intravenous Q6H PRN Merari Finn MD       • sodium chloride 0.9 % flush 1-10 mL  1-10 mL Intravenous PRN Merari Finn MD       • sodium chloride 0.9 % flush 10 mL  10 mL Intravenous PRN Merari Finn MD       • sodium chloride 0.9 % flush 10 mL  10 mL Intravenous Q12H Merari iFnn MD   10 mL at 22 2006        Physician Progress Notes (last 72 hours)      Pennie German DO at 22 0913              [facility] Medicine Services  PROGRESS NOTE    Patient Name: Mary Ramirez  : 1934  MRN: [id number]    Date of Admission:  8/5/2022  Primary Care Physician: Tiana Hong MD    Subjective   Subjective     CC:  F/u shortness of breath     HPI:   Pt still limited answer to yes and no.   No acute events overnight    ROS:  Unable to obtain due to mental status     Objective   Objective     Vital Signs:   Temp:  [95.3 °F (35.2 °C)-97.2 °F (36.2 °C)] 97.2 °F (36.2 °C)  Heart Rate:  [] 104  Resp:  [16-20] 18  BP: (111-144)/(71-99) 144/99  Flow (L/min):  [0.5-3] 2     Physical Exam:  Constitutional: No acute distress, elderly female    HENT: NCAT, mucous membranes moist  Respiratory: Clear to auscultation bilaterally, respiratory effort normal on 2L NC  Cardiovascular: RRR, no murmurs, rubs, or gallops  Gastrointestinal: Positive bowel sounds, soft, nontender, nondistended  Musculoskeletal: No bilateral ankle edema  Psychiatric: Unable to assess   Neurologic: Oriented x 1,  speech clear  Skin: No rashes      Results Reviewed:  LAB RESULTS:      Lab 08/06/22 0831 08/06/22 0227 08/05/22 2229 08/05/22  1850 08/05/22  1508   WBC  --  8.13  --   --  12.08*   HEMOGLOBIN  --  12.5  --   --  13.6   HEMATOCRIT  --  39.3  --   --  44.8   PLATELETS  --  136*  --   --  166   NEUTROS ABS  --   --   --   --  9.23*   IMMATURE GRANS (ABS)  --   --   --   --  0.08*   LYMPHS ABS  --   --   --   --  1.39   MONOS ABS  --   --   --   --  1.26*   EOS ABS  --   --   --   --  0.09   MCV  --  89.7  --   --  94.5   PROCALCITONIN  --   --   --   --  0.06   LACTATE 2.4* 3.0* 3.9* 4.3* 4.0*         Lab 08/06/22 0227 08/05/22 2229 08/05/22  1508   SODIUM 134*  --  131*   POTASSIUM 4.7 5.5* 5.8*   CHLORIDE 96*  --  99   CO2 23.0  --  21.0*   ANION GAP 15.0  --  11.0   BUN 53*  --  49*   CREATININE 1.97*  --  1.84*   EGFR 24.2*  --  26.3*   GLUCOSE 144*  --  126*   CALCIUM 8.6  --  9.2   HEMOGLOBIN A1C 6.80*  --   --    TSH 5.830*  --   --          Lab 08/05/22  1508   TOTAL PROTEIN 6.4   ALBUMIN 3.70   GLOBULIN 2.7   ALT (SGPT) 105*   AST (SGOT)  33*   BILIRUBIN 0.5   ALK PHOS 97         Lab 08/05/22  1508   PROBNP 33,432.0*   TROPONIN T 0.032*         Lab 08/06/22  0227   CHOLESTEROL 124   LDL CHOL 77   HDL CHOL 25*   TRIGLYCERIDES 122             Brief Urine Lab Results  (Last result in the past 365 days)      Color   Clarity   Blood   Leuk Est   Nitrite   Protein   CREAT   Urine HCG        06/11/22 0333 Yellow   Clear   Negative   Trace   Negative   Negative                 Microbiology Results Abnormal     Procedure Component Value - Date/Time    Blood Culture - Blood, Arm, Right [[id number]]  (Normal) Collected: 08/05/22 1548    Lab Status: Preliminary result Specimen: Blood from Arm, Right Updated: 08/06/22 1633     Blood Culture No growth at 24 hours    Blood Culture - Blood, Hand, Right [[id number]]  (Normal) Collected: 08/05/22 1606    Lab Status: Preliminary result Specimen: Blood from Hand, Right Updated: 08/06/22 1633     Blood Culture No growth at 24 hours    Legionella Antigen, Urine - Urine, Urine, Clean Catch [[id number]]  (Normal) Collected: 08/05/22 2227    Lab Status: Final result Specimen: Urine, Clean Catch Updated: 08/06/22 1236     LEGIONELLA ANTIGEN, URINE Negative    S. Pneumo Ag Urine or CSF - Urine, Urine, Clean Catch [[id number]]  (Normal) Collected: 08/05/22 2227    Lab Status: Final result Specimen: Urine, Clean Catch Updated: 08/06/22 1236     Strep Pneumo Ag Negative    MRSA Screen, PCR (Inpatient) - Swab, Nares [[id number]]  (Normal) Collected: 08/05/22 2327    Lab Status: Final result Specimen: Swab from Nares Updated: 08/06/22 0734     MRSA PCR Negative    Narrative:      The negative predictive value of this diagnostic test is high and should only be used to consider de-escalating anti-MRSA therapy. A positive result may indicate colonization with MRSA and must be correlated clinically.  MRSA Negative    COVID PRE-OP / PRE-PROCEDURE SCREENING ORDER (NO ISOLATION) - Swab, Nasopharynx [[id number]]  (Normal) Collected: 08/05/22  1609    Lab Status: Final result Specimen: Swab from Nasopharynx Updated: 08/05/22 1649    Narrative:      The following orders were created for panel order COVID PRE-OP / PRE-PROCEDURE SCREENING ORDER (NO ISOLATION) - Swab, Nasopharynx.  Procedure                               Abnormality         Status                     ---------                               -----------         ------                     COVID-19 and FLU A/B PCR...[[id number]]  Normal              Final result                 Please view results for these tests on the individual orders.    COVID-19 and FLU A/B PCR - Swab, Nasopharynx [[id number]]  (Normal) Collected: 08/05/22 1609    Lab Status: Final result Specimen: Swab from Nasopharynx Updated: 08/05/22 1649     COVID19 Not Detected     Influenza A PCR Not Detected     Influenza B PCR Not Detected    Narrative:      Fact sheet for providers: https://www.fda.gov/media/069393/download    Fact sheet for patients: https://www.fda.gov/media/357819/download    Test performed by PCR.          Adult Transthoracic Echo Complete W/ Cont if Necessary Per Protocol    Result Date: 8/6/2022  · Left ventricular ejection fraction appears to be 26 - 30%. Left ventricular systolic function is severely decreased. · Left ventricular wall thickness is consistent with mild concentric hypertrophy. · Mildly reduced right ventricular systolic function noted. · Left atrial volume is severely increased. · There is moderate calcification of the aortic valve mainly affecting the left coronary cusp(s). · Moderate mitral valve regurgitation is present. · Moderate to severe tricuspid valve regurgitation is present. · Estimated right ventricular systolic pressure from tricuspid regurgitation is mildly elevated (35-45 mmHg).      XR Chest 1 View    Result Date: 8/5/2022   DATE OF EXAM: 8/5/2022 3:19 PM  PROCEDURE: XR CHEST 1 VW-  INDICATIONS: SOA triage protocol  COMPARISON: 06/15/2022  TECHNIQUE: Portable chest radiograph.   FINDINGS:  Stable cardiac enlargement. Large hiatal hernia again noted. No pneumothorax. There is persistent bibasilar airspace disease which may relate to atelectasis and/or pneumonia. Bilateral upper lung airspace disease on the prior study appears improved. Patient's chin overlies the right apex partially limiting assessment. Small bilateral pleural effusions. No pneumothorax.      Impression: 1. Persistent bibasilar airspace disease which may relate to atelectasis or pneumonia. 2. Bilateral upper lobe airspace disease on the prior study has resolved. 3. Small bilateral pleural effusions. 4. Large hiatal hernia.  This report was finalized on 8/5/2022 3:21 PM by Michael Osei MD.        Results for orders placed during the hospital encounter of 08/05/22    Adult Transthoracic Echo Complete W/ Cont if Necessary Per Protocol    Interpretation Summary  · Left ventricular ejection fraction appears to be 26 - 30%. Left ventricular systolic function is severely decreased.  · Left ventricular wall thickness is consistent with mild concentric hypertrophy.  · Mildly reduced right ventricular systolic function noted.  · Left atrial volume is severely increased.  · There is moderate calcification of the aortic valve mainly affecting the left coronary cusp(s).  · Moderate mitral valve regurgitation is present.  · Moderate to severe tricuspid valve regurgitation is present.  · Estimated right ventricular systolic pressure from tricuspid regurgitation is mildly elevated (35-45 mmHg).      I have reviewed the medications:  Scheduled Meds:albuterol, 2.5 mg, Nebulization, Q4H - RT  apixaban, 2.5 mg, Oral, BID  aspirin, 81 mg, Oral, Daily  bumetanide, 1 mg, Intravenous, Q12H  cefTRIAXone, 1 g, Intravenous, Q24H  citalopram, 20 mg, Oral, Daily  donepezil, 5 mg, Oral, Nightly  doxycycline, 100 mg, Oral, Q12H  insulin detemir, 10 Units, Subcutaneous, Nightly  insulin lispro, 0-7 Units, Subcutaneous, TID AC  isosorbide dinitrate, 20 mg,  Oral, TID - Nitrates  metoprolol tartrate, 100 mg, Oral, BID  sodium chloride, 10 mL, Intravenous, Q12H      Continuous Infusions:   PRN Meds:.•  acetaminophen **OR** acetaminophen **OR** acetaminophen  •  dextrose  •  dextrose  •  glucagon (human recombinant)  •  HYDROcodone-acetaminophen  •  ondansetron **OR** ondansetron  •  sodium chloride  •  sodium chloride    Assessment & Plan   Assessment & Plan     Active Hospital Problems    Diagnosis  POA   • Pneumonia of both lower lobes due to infectious organism [J18.9]  Yes   • Acute on chronic diastolic CHF (congestive heart failure) ([facility]) [I50.33]  Yes   • Acute respiratory failure with hypoxia ([facility]) [J96.01]  Yes   • Acute sepsis ([facility]) [A41.9]  Yes   • DEANGELO (acute kidney injury) ([facility]) [N17.9]  Yes   • Coronary artery disease involving native coronary artery of native heart with angina pectoris ([facility]) [I25.119]  Yes      Resolved Hospital Problems   No resolved problems to display.        Brief Hospital Course to date:  Mary Ramirez is a 87 y.o. female with a past medical history significant for paroxysmal atrial fibrillation, essential hypertension, CAD, diastolic congestive heart failure, CKD III, diabetes mellitus type 2, diverticulosis, Dementia, hyperlipidemia and TIA presented to the ED with complaints of shortness of air.    Multifocal pneumonia   Acute hypoxic respiratory failure  - patient is DNR/DNI  -vanc and cefepime changed to rocephin and doxy   -blood cultures x2 NG at 24 hours  -sputum culture yet to be obtained   -COVID-19 negative   - urine antigens negative   -MRSA PCR negative        Acute on chronic HFpEF  -proBNP  33K on admission   -s/p 2mg IV Bumex in the ED  -strict I &O   -daily weight   -follows with Dr. Flores   -- TTE  EF 26-30% last echo ef 55%  -- continue with IV Bumex  -cardiology to see         Paroxysmal atrial fibrillation   -currently rate controlled   -previously on eliquis but stopped due to multiple falls, however, per her med  rec, it appears she is on it.  Will continue for now but need to verify with her facility if she is truly on it.      Elevated Creatinine on CKD III   -baseline creatinine 1.1-1.4, Cr currently 1.97.   -- monitor closely with diuresis  -- if worsens, would send urine studies  -repeat labs in am      Essential hypertension   -continue home meds     Dementia  -on aricept   -fall precautions      Diabetes mellitus type 2  [facility]       GOC/Family update   Today I was able to update the patient's son over the phone. He is now aware that her EF is significantly decreased. He confirmed she is DNR/DNI. He would like to speak to hospice. He states that his sister also makes decisions and that they might need to meet with both of them. Palliative medicine was consulted this am.     Expected Discharge Location and Transportation: Home vs rehab   Expected Discharge Date: 8/10    DVT prophylaxis:  Medical DVT prophylaxis orders are present.     AM-PAC 6 Clicks Score (PT): 8 (22 0800)    CODE STATUS:   Code Status and Medical Interventions:   Ordered at: 22 2151     Medical Intervention Limits:    NO intubation (DNI)    NO cardioversion    NO dialysis    NO NIPPV (Non-Invasive Positive Pressure Ventilation)    NO artificial nutrition    NO vasopressors     Level Of Support Discussed With:    Health Care Surrogate     Code Status (Patient has no pulse and is not breathing):    No CPR (Do Not Attempt to Resuscitate)     Medical Interventions (Patient has pulse or is breathing):    Limited Support       Pennie German DO  22                Electronically signed by Pennie German DO at 22 1359     Pennie German DO at 22 0928              [facility] Medicine Services  PROGRESS NOTE    Patient Name: Mary Ramirez  : 1934  MRN: [id number]    Date of Admission: 2022  Primary Care Physician: Tiana Hong,  "MD    Subjective   Subjective     CC:  F/u shortness of breath     HPI:  Pt resting. Answers with one word answers. Says \"no\" to pain and shortness of breath     ROS:  Unable to obtain due to mental status     Objective   Objective     Vital Signs:   Temp:  [95.4 °F (35.2 °C)-97.7 °F (36.5 °C)] 96.9 °F (36.1 °C)  Heart Rate:  [] 105  Resp:  [18-22] 20  BP: (100-127)/(67-98) 104/79  Flow (L/min):  [2-4] 4     Physical Exam:  Constitutional: No acute distress, elderly female resting in bed   HENT: NCAT, mucous membranes moist  Respiratory: Clear to auscultation bilaterally, respiratory effort normal on 2L NC  Cardiovascular: RRR, no murmurs, rubs, or gallops  Gastrointestinal: Positive bowel sounds, soft, nontender, nondistended  Musculoskeletal: No bilateral ankle edema  Psychiatric: Unable to assess   Neurologic: Oriented x 1,  speech clear  Skin: No rashes      Results Reviewed:  LAB RESULTS:      Lab 08/06/22 0227 08/05/22 2229 08/05/22 1850 08/05/22  1508   WBC 8.13  --   --  12.08*   HEMOGLOBIN 12.5  --   --  13.6   HEMATOCRIT 39.3  --   --  44.8   PLATELETS 136*  --   --  166   NEUTROS ABS  --   --   --  9.23*   IMMATURE GRANS (ABS)  --   --   --  0.08*   LYMPHS ABS  --   --   --  1.39   MONOS ABS  --   --   --  1.26*   EOS ABS  --   --   --  0.09   MCV 89.7  --   --  94.5   PROCALCITONIN  --   --   --  0.06   LACTATE 3.0* 3.9* 4.3* 4.0*         Lab 08/06/22 0227 08/05/22 2229 08/05/22  1508   SODIUM 134*  --  131*   POTASSIUM 4.7 5.5* 5.8*   CHLORIDE 96*  --  99   CO2 23.0  --  21.0*   ANION GAP 15.0  --  11.0   BUN 53*  --  49*   CREATININE 1.97*  --  1.84*   EGFR 24.2*  --  26.3*   GLUCOSE 144*  --  126*   CALCIUM 8.6  --  9.2   HEMOGLOBIN A1C 6.80*  --   --    TSH 5.830*  --   --          Lab 08/05/22  1508   TOTAL PROTEIN 6.4   ALBUMIN 3.70   GLOBULIN 2.7   ALT (SGPT) 105*   AST (SGOT) 33*   BILIRUBIN 0.5   ALK PHOS 97         Lab 08/05/22  1508   PROBNP 33,432.0*   TROPONIN T 0.032*         Lab " 08/06/22  0227   CHOLESTEROL 124   LDL CHOL 77   HDL CHOL 25*   TRIGLYCERIDES 122             Brief Urine Lab Results  (Last result in the past 365 days)      Color   Clarity   Blood   Leuk Est   Nitrite   Protein   CREAT   Urine HCG        06/11/22 0333 Yellow   Clear   Negative   Trace   Negative   Negative                 Microbiology Results Abnormal     Procedure Component Value - Date/Time    MRSA Screen, PCR (Inpatient) - Swab, Nares [[id number]]  (Normal) Collected: 08/05/22 2327    Lab Status: Final result Specimen: Swab from Nares Updated: 08/06/22 0734     MRSA PCR Negative    Narrative:      The negative predictive value of this diagnostic test is high and should only be used to consider de-escalating anti-MRSA therapy. A positive result may indicate colonization with MRSA and must be correlated clinically.  MRSA Negative    COVID PRE-OP / PRE-PROCEDURE SCREENING ORDER (NO ISOLATION) - Swab, Nasopharynx [[id number]]  (Normal) Collected: 08/05/22 1609    Lab Status: Final result Specimen: Swab from Nasopharynx Updated: 08/05/22 1649    Narrative:      The following orders were created for panel order COVID PRE-OP / PRE-PROCEDURE SCREENING ORDER (NO ISOLATION) - Swab, Nasopharynx.  Procedure                               Abnormality         Status                     ---------                               -----------         ------                     COVID-19 and FLU A/B PCR...[[id number]]  Normal              Final result                 Please view results for these tests on the individual orders.    COVID-19 and FLU A/B PCR - Swab, Nasopharynx [[id number]]  (Normal) Collected: 08/05/22 1609    Lab Status: Final result Specimen: Swab from Nasopharynx Updated: 08/05/22 1649     COVID19 Not Detected     Influenza A PCR Not Detected     Influenza B PCR Not Detected    Narrative:      Fact sheet for providers: https://www.fda.gov/media/576456/download    Fact sheet for patients:  https://www.fda.gov/media/276277/download    Test performed by PCR.          XR Chest 1 View    Result Date: 8/5/2022   DATE OF EXAM: 8/5/2022 3:19 PM  PROCEDURE: XR CHEST 1 VW-  INDICATIONS: SOA triage protocol  COMPARISON: 06/15/2022  TECHNIQUE: Portable chest radiograph.  FINDINGS:  Stable cardiac enlargement. Large hiatal hernia again noted. No pneumothorax. There is persistent bibasilar airspace disease which may relate to atelectasis and/or pneumonia. Bilateral upper lung airspace disease on the prior study appears improved. Patient's chin overlies the right apex partially limiting assessment. Small bilateral pleural effusions. No pneumothorax.      Impression: 1. Persistent bibasilar airspace disease which may relate to atelectasis or pneumonia. 2. Bilateral upper lobe airspace disease on the prior study has resolved. 3. Small bilateral pleural effusions. 4. Large hiatal hernia.  This report was finalized on 8/5/2022 3:21 PM by Michael Osei MD.        Results for orders placed during the hospital encounter of 06/10/22    Adult Transthoracic Echo Complete W/ Cont if Necessary Per Protocol    Interpretation Summary  · Estimated left ventricular EF = 51% Left ventricular ejection fraction appears to be 51 - 55%. Left ventricular systolic function is normal.  · Left ventricular wall thickness is consistent with mild septal asymmetric hypertrophy.  · There is calcification of the aortic valve mainly affecting the left coronary cusp(s).  · Moderate mitral valve regurgitation is present.  · Estimated right ventricular systolic pressure from tricuspid regurgitation is normal (<35 mmHg).  · Incidental heart rate 120 bpm with diastolic inflow consistent with atrial fibrillation      I have reviewed the medications:  Scheduled Meds:albuterol, 2.5 mg, Nebulization, Q4H - RT  apixaban, 2.5 mg, Oral, BID  aspirin, 325 mg, Oral, Daily  bumetanide, 1 mg, Intravenous, Q12H  cefepime, 2 g, Intravenous, Q24H  citalopram, 20 mg,  Oral, Daily  donepezil, 5 mg, Oral, Nightly  insulin detemir, 10 Units, Subcutaneous, Nightly  insulin lispro, 0-7 Units, Subcutaneous, TID AC  isosorbide dinitrate, 30 mg, Oral, TID - Nitrates  metoprolol tartrate, 100 mg, Oral, BID  sodium chloride, 10 mL, Intravenous, Q12H  vancomycin (dosing per levels), , Does not apply, Daily      Continuous Infusions:Pharmacy to dose vancomycin,       PRN Meds:.•  acetaminophen **OR** acetaminophen **OR** acetaminophen  •  dextrose  •  dextrose  •  glucagon (human recombinant)  •  HYDROcodone-acetaminophen  •  ondansetron **OR** ondansetron  •  [DISCONTINUED] vancomycin **AND** Pharmacy to dose vancomycin  •  sodium chloride  •  sodium chloride    Assessment & Plan   Assessment & Plan     Active Hospital Problems    Diagnosis  POA   • Pneumonia of both lower lobes due to infectious organism [J18.9]  Yes   • Acute on chronic diastolic CHF (congestive heart failure) ([facility]) [I50.33]  Yes   • Acute respiratory failure with hypoxia ([facility]) [J96.01]  Yes   • Acute sepsis ([facility]) [A41.9]  Yes   • DEANGELO (acute kidney injury) ([facility]) [N17.9]  Yes   • Coronary artery disease involving native coronary artery of native heart with angina pectoris ([facility]) [I25.119]  Yes      Resolved Hospital Problems   No resolved problems to display.        Brief Hospital Course to date:  Mary Ramirez is a 87 y.o. female with a past medical history significant for paroxysmal atrial fibrillation, essential hypertension, CAD, diastolic congestive heart failure, CKD III, diabetes mellitus type 2, diverticulosis, Dementia, hyperlipidemia and TIA presented to the ED with complaints of shortness of air.    Multifocal pneumonia   Acute hypoxic respiratory failure  -imaging as mentioned above  - patient is DNR/DNI  -vanc and cefepime changed to rocephin and doxy   -blood cultures x2   -sputum culture  -COVID-19 negative   -check urine antigens   -MRSA PCR   -continuous pulse ox  -keep o2 sat >90%     Acute on chronic  HFpEF  -last echo with EF 66%, mild tricuspid valve regurgitation  -mild to moderate MVR  -proBNP  33K   -s/p 2mg IV Bumex in the ED  -strict I &O   -daily weight   -follows with Dr. Flores   -- repeat TTE pending   -- continue with IV Bumex        Paroxysmal atrial fibrillation   -currently rate controlled   -previously on eliquis but stopped due to multiple falls, however, per her med rec, it appears she is on it.  Will continue for now but need to verify with her facility if she is truly on it.      Elevated Creatinine on CKD III   -baseline creatinine 1.1-1.4, Cr currently 1.97.   -- monitor closely with diuresis  -- if worsens, would send urine studies  -repeat labs in am      Essential hypertension   -continue home meds     Dementia  -on aricept   -fall precautions      Diabetes mellitus type 2  - hgb A1c   -start ldssi   -fingersticks achs     Expected Discharge Location and Transportation: Home vs rehab   Expected Discharge Date: 8/10    DVT prophylaxis:  Medical DVT prophylaxis orders are present.     AM-PAC 6 Clicks Score (PT): 7 (08/05/22 2015)    CODE STATUS:   Code Status and Medical Interventions:   Ordered at: 08/05/22 2151     Medical Intervention Limits:    NO intubation (DNI)    NO cardioversion    NO dialysis    NO NIPPV (Non-Invasive Positive Pressure Ventilation)    NO artificial nutrition    NO vasopressors     Level Of Support Discussed With:    Health Care Surrogate     Code Status (Patient has no pulse and is not breathing):    No CPR (Do Not Attempt to Resuscitate)     Medical Interventions (Patient has pulse or is breathing):    Limited Support       Pennie German DO  08/06/22                Electronically signed by Pennie German DO at 08/06/22 1631          Consult Notes (last 72 hours)      Mike Navarro DO at 08/07/22 1222      Consult Orders    1. Inpatient Palliative Care MD Consult [[id number]] ordered by Pennie German DO at 08/07/22 8568                Tiana Hong MD  Consulting physician: Maxi    Chief Complaint   Patient presents with   • Shortness of Breath       Reason for consult: [facility]    HPI: Pt is a 87 year old female brought into the hospital due to dyspnea.  She is a resident of a nursing facility with a history of CHf as well as dementia.  Pt herself unable to provide any type of HPI or subjective data at this point.  During the patients work up she was found to have PNA and has been treated for this.    Pain assesment: Denies    Dyspnea: Postitive but improved    N/V: Denies    PPS: 30      Past Medical History:   Diagnosis Date   • Atrial fibrillation (HCC)    • Benign essential hypertension    • CAD (coronary artery disease)    • Carotid artery disease (HCC)    • CHF (congestive heart failure) ([facility])    • Chronic allergic rhinitis    • CKD (chronic kidney disease), stage III ([facility])    • DDD (degenerative disc disease), cervical    • Dementia ([facility])    • Diabetes mellitus ([facility])    • Diverticulosis     WITH PERFORATION    • Hypertension    • Major depressive disorder, single episode, mild ([facility])    • Memory loss    • Mixed hyperlipidemia    • SSS (sick sinus syndrome) ([facility])    • TIA (transient ischemic attack) 01/2016     Past Surgical History:   Procedure Laterality Date   • ANKLE SURGERY Left 1996   • ATHERECTOMY  1995   • ATHRECTOMY ILIAC, FEMORAL, TIBIAL ARTERY     • BREAST LUMPECTOMY Left 1981   • COLON SURGERY     • COLOSTOMY  1983   • CORONARY STENT PLACEMENT  2001   • FINGER FRACTURE SURGERY Right    • HIP TROCHANTERIC NAILING WITH INTRAMEDULLARY HIP SCREW Left 11/23/2017   • HIP TROCHANTERIC NAILING WITH INTRAMEDULLARY HIP SCREW Right 3/30/2019    Procedure: HIP TROCANTERIC NAILING WITH INTRAMEDULLARY HIP SCREW RIGHT;  Surgeon: Theo Figueroa MD;  Location: [facility];  Service: Orthopedics   • HYSTERECTOMY  1984   • OTHER SURGICAL HISTORY  1984    REANASTAMOSIS      Current Code Status     Date Active Code  Status Order ID Comments User Context       8/5/2022 2151 No CPR (Do Not Attempt to Resuscitate) [id number]  Merari Finn MD Inpatient     Advance Care Planning Activity      Questions for Current Code Status     Question Answer    Code Status (Patient has no pulse and is not breathing) No CPR (Do Not Attempt to Resuscitate)    Medical Interventions (Patient has pulse or is breathing) Limited Support    Medical Intervention Limits: NO intubation (DNI)     NO cardioversion     NO dialysis     NO NIPPV (Non-Invasive Positive Pressure Ventilation)     NO artificial nutrition     NO vasopressors    Level Of Support Discussed With Health Care Surrogate        Current Facility-Administered Medications   Medication Dose Route Frequency Provider Last Rate Last Admin   • acetaminophen (TYLENOL) tablet 650 mg  650 mg Oral Q4H PRN Merari Finn MD        Or   • acetaminophen (TYLENOL) 160 MG/5ML solution 650 mg  650 mg Oral Q4H PRN Merari Finn MD        Or   • acetaminophen (TYLENOL) suppository 650 mg  650 mg Rectal Q4H PRN Merari Finn MD       • albuterol (PROVENTIL) nebulizer solution 0.083% 2.5 mg/3mL  2.5 mg Nebulization Q4H - RT Merari Finn MD   2.5 mg at 08/07/22 1151   • apixaban (ELIQUIS) tablet 2.5 mg  2.5 mg Oral BID Merari Finn MD   2.5 mg at 08/07/22 0846   • aspirin chewable tablet 81 mg  81 mg Oral Daily Pennie German DO   81 mg at 08/07/22 0845   • bumetanide (BUMEX) injection 1 mg  1 mg Intravenous Q12H Merari Finn MD   1 mg at 08/07/22 0522   • cefTRIAXone (ROCEPHIN) 1 g/100 mL 0.9% NS (MBP)  1 g Intravenous Q24H Pennie German DO   1 g at 08/07/22 1037   • citalopram (CeleXA) tablet 20 mg  20 mg Oral Daily Merari Finn MD   20 mg at 08/07/22 0846   • dextrose (D50W) (25 g/50 mL) IV injection 25 g  25 g Intravenous Q15 Min PRN Merari Finn, MD   25 g at 08/06/22 2130   • dextrose (GLUTOSE)  "oral gel 15 g  15 g Oral Q15 Min PRN Merari Finn MD       • donepezil (ARICEPT) tablet 5 mg  5 mg Oral Nightly Merari Finn MD   5 mg at 08/06/22 2145   • doxycycline (MONODOX) capsule 100 mg  100 mg Oral Q12H Pennie German, DO   100 mg at 08/07/22 0845   • glucagon (human recombinant) (GLUCAGEN DIAGNOSTIC) injection 1 mg  1 mg Intramuscular Q15 Min PRN Merari Finn MD       • HYDROcodone-acetaminophen (NORCO) 5-325 MG per tablet 1 tablet  1 tablet Oral Q4H PRN Merari Finn MD       • insulin detemir (LEVEMIR) injection 10 Units  10 Units Subcutaneous Nightly Merari Finn MD   10 Units at 08/05/22 2236   • Insulin Lispro (humaLOG) injection 0-7 Units  0-7 Units Subcutaneous TID AC Merari Finn MD       • isosorbide dinitrate (ISORDIL) tablet 20 mg  20 mg Oral TID - Nitrates Pennie German, DO   20 mg at 08/07/22 0846   • metoprolol tartrate (LOPRESSOR) tablet 100 mg  100 mg Oral BID Merari Finn MD   100 mg at 08/07/22 0846   • ondansetron (ZOFRAN) tablet 4 mg  4 mg Oral Q6H PRN Merari Finn MD        Or   • ondansetron (ZOFRAN) injection 4 mg  4 mg Intravenous Q6H PRN Merari Finn MD       • sodium chloride 0.9 % flush 1-10 mL  1-10 mL Intravenous PRN Mreari Finn MD       • sodium chloride 0.9 % flush 10 mL  10 mL Intravenous PRN Merari Finn MD       • sodium chloride 0.9 % flush 10 mL  10 mL Intravenous Q12H Merari Finn MD   10 mL at 08/07/22 0846        •  acetaminophen **OR** acetaminophen **OR** acetaminophen  •  dextrose  •  dextrose  •  glucagon (human recombinant)  •  HYDROcodone-acetaminophen  •  ondansetron **OR** ondansetron  •  sodium chloride  •  sodium chloride  Allergies   Allergen Reactions   • Penicillins Other (See Comments)     Pt states \"i don't know\"     Family History   Problem Relation Age of Onset   • Hypertension Mother    • Coronary artery " "disease Mother 56        PREMATURE    • Lung cancer Father    • Hypertension Father    • Osteoporosis Sister    • Lung cancer Brother      Social History     Socioeconomic History   • Marital status:      Spouse name: N/A   • Number of children: 2   • Years of education: H.S   Tobacco Use   • Smoking status: Former Smoker     Years: 2.00     Types: Cigarettes     Start date: 1975     Quit date: 1978     Years since quittin.7   • Smokeless tobacco: Never Used   • Tobacco comment: QUIT DATE 1984   Substance and Sexual Activity   • Alcohol use: No   • Drug use: No   • Sexual activity: Never     Review of Systems - Unable to fully acess due to pts confusion      /89 (BP Location: Right arm, Patient Position: Sitting)   Pulse 105   Temp 97.1 °F (36.2 °C) (Oral)   Resp 17   Ht 152.4 cm (60\")   Wt 55.3 kg (122 lb)   SpO2 99%   BMI 23.83 kg/m²     Intake/Output Summary (Last 24 hours) at 2022 1222  Last data filed at 2022 0358  Gross per 24 hour   Intake 50 ml   Output 2050 ml   Net -2000 ml     Physical Exam:      General Appearance:    Alert, confused, only mumbles a few words during exam   Head:    Normocephalic, without obvious abnormality, atraumatic   Eyes:            Lids and lashes normal, conjunctivae and sclerae normal, no   icterus, no pallor, corneas clear, PERRLA   Ears:    Ears appear intact with no abnormalities noted   Throat:   No oral lesions, no thrush, oral mucosa moist   Neck:   No adenopathy, supple, trachea midline, no thyromegaly, no     carotid bruit, no JVD   Back:     No kyphosis present, no scoliosis present, no skin lesions,       erythema or scars, no tenderness to percussion or                   palpation,   range of motion normal   Lungs:     Clear to auscultation,respirations regular, even and                   unlabored    Heart:    Regular rhythm and normal rate, normal S1 and S2, no            murmur, no gallop, no rub, no click   Breast " Exam:    Deferred   Abdomen:     Normal bowel sounds, no masses, no organomegaly, soft        non-tender, non-distended, no guarding, no rebound                 tenderness   Genitalia:    Deferred   Extremities:   Moves all extremities well, no edema, no cyanosis, no              redness   Pulses:   Pulses palpable and equal bilaterally   Skin:   No bleeding, bruising or rash   Lymph nodes:   No palpable adenopathy   Neurologic:   Cranial nerves 2 - 12 grossly intact, sensation intact, DTR        present and equal bilaterally       Results from last 7 days   Lab Units 08/06/22 0227   WBC 10*3/mm3 8.13   HEMOGLOBIN g/dL 12.5   HEMATOCRIT % 39.3   PLATELETS 10*3/mm3 136*     Results from last 7 days   Lab Units 08/06/22 0227 08/05/22 2229 08/05/22  1508   SODIUM mmol/L 134*  --  131*   POTASSIUM mmol/L 4.7   < > 5.8*   CHLORIDE mmol/L 96*  --  99   CO2 mmol/L 23.0  --  21.0*   BUN mg/dL 53*  --  49*   CREATININE mg/dL 1.97*  --  1.84*   CALCIUM mg/dL 8.6  --  9.2   BILIRUBIN mg/dL  --   --  0.5   ALK PHOS U/L  --   --  97   ALT (SGPT) U/L  --   --  105*   AST (SGOT) U/L  --   --  33*   GLUCOSE mg/dL 144*  --  126*    < > = values in this interval not displayed.     Results from last 7 days   Lab Units 08/06/22 0227   SODIUM mmol/L 134*   POTASSIUM mmol/L 4.7   CHLORIDE mmol/L 96*   CO2 mmol/L 23.0   BUN mg/dL 53*   CREATININE mg/dL 1.97*   GLUCOSE mg/dL 144*   CALCIUM mg/dL 8.6     Imaging Results (Last 72 Hours)     Procedure Component Value Units Date/Time    XR Chest 1 View [[id number]] Collected: 08/05/22 1519     Updated: 08/05/22 1524    Narrative:         DATE OF EXAM:   8/5/2022 3:19 PM     PROCEDURE:   XR CHEST 1 VW-     INDICATIONS:   SOA triage protocol     COMPARISON:  06/15/2022     TECHNIQUE:   Portable chest radiograph.     FINDINGS:    Stable cardiac enlargement. Large hiatal hernia again noted. No  pneumothorax. There is persistent bibasilar airspace disease which may  relate to atelectasis and/or  pneumonia. Bilateral upper lung airspace  disease on the prior study appears improved. Patient's chin overlies the  right apex partially limiting assessment. Small bilateral pleural  effusions. No pneumothorax.       Impression:      1. Persistent bibasilar airspace disease which may relate to atelectasis  or pneumonia.  2. Bilateral upper lobe airspace disease on the prior study has  resolved.  3. Small bilateral pleural effusions.  4. Large hiatal hernia.     This report was finalized on 8/5/2022 3:21 PM by Michael Osei MD.           Impression: Dementia  UTI  Dyspnea  Change in MS  GOC    Plan: Pts son appears to be deicsion maker.  We will plan on talking to him about GOC.  Apparently hospice has been discussed in the past and prior to this admission.   Pts code status noted to be DNR which I agree with.          Mike Navarro DO  08/07/22  12:22 EDT              Electronically signed by Mike Navarro DO at 08/07/22 1225     Amos Hanna MD at 08/07/22 1146      Consult Orders    1. Inpatient Cardiology Consult [[id number]] ordered by Pennie German DO at 08/07/22 0917               Cardiology Consult:     Mary Ramirez  1934  [phone]  There is no work phone number on file.    08/07/22    DATE OF ADMISSION: 8/5/2022  [facility]    Tiana Hong MD  [address] [facility] [address]  Referring Provider: No ref. provider found     Chief Complaint   Patient presents with   • Shortness of Breath   Reason for consultation: Acute on chronic systolic heart failure/shortness of breath    Cardiac PMH: (Old records have been reviewed and summarized below)  1. CHF/HFrEF  a. A/C DHF 6/10/2022  b. Echocardiogram, Normal EF.  Aortic calcification with no stenosis  c. Admission to [facility] 8/5/2022 with shortness of breath and elevated BNP/abnormal chest x-ray  d. Echocardiogram 8/6/2022: EF 26 to 30%, left atrial volume severely  increased, moderate calcification of the aortic valve, moderate MR, moderate to severe TR, RVSP 35 to 45 mmHg  2. Afib  a. Permanent A. fib  b. Amiodarone started 6/10/2022 during hospitalization and subsequent discontinued patient remains in permanent A. fib  c. Chadsvasc=6, currently Eliquis 2.5 mg twice daily  3. Recent multifocal PNA, sepsis Admission to [facility] 6/10/2022-6/17/2022  1. Recurrent multifocal pneumonia August 2022.   4. CKD Stage III  5. GERD  6. Alzheimer's dementia          History of Present Illness:   Mary Ramirez is an 87-year-old female with past medical history significant for permanent atrial fibrillation, heart failure (preserved EF now with heart failure with reduced EF, recurrent pneumonia, chronic kidney disease stage III, Alzheimer's dementia who cardiology is asked to see today due to acute systolic heart failure.  The patient was admitted on 8/5/2022 when she presented to the ED with complaints of shortness of air.  She has underlying severe dementia and is unable to provide much history but according to records her son was at bedside upon admission and reports the patient had been doing well earlier in the week when she was seen in the cardiology office but then started developing worsening shortness of breath.  She was put on antibiotics by her nursing home but continued to worsen.    ED evaluation showed elevated white count of 12 elevated proBNP of 33,000 and gray zone troponin 0.032, creatinine 1.84, and imaging of her lungs showing persistent bibasilar air space disease likely pneumonia and also small bilateral pleural effusions.  She was started on antibiotics and IV Bumex.  COVID test negative.  She was also found to be in rate controlled atrial fibrillation.  Her home medications including metoprolol and Eliquis have been continued.  Currently the patient is sitting up in the chair with 2 L oxygen via nasal cannula with normal O2 sats.  She is unable to give any history but  "denies any current complaints of chest pain or shortness of breath.  I tried to call her son on his phone but he did not answer.      Allergies   Allergen Reactions   • Penicillins Other (See Comments)     Pt states \"i don't know\"       Prior to Admission Medications     Prescriptions Last Dose Informant Patient Reported? Taking?    acetaminophen (TYLENOL) 500 MG tablet  Nursing Home Yes Yes    Take 500 mg by mouth Every 6 (Six) Hours As Needed (leg pain).    aluminum-magnesium hydroxide-simethicone (MAALOX MAX) 400-400-40 MG/5ML suspension  Nursing Home Yes Yes    Take 5 mL by mouth Every 6 (Six) Hours As Needed for Indigestion or Heartburn.    citalopram (CeleXA) 20 MG tablet  Nursing Home Yes Yes    Take 20 mg by mouth Daily.    docusate sodium (COLACE) 100 MG capsule  Nursing Home Yes Yes    Take 100 mg by mouth 2 (Two) Times a Day.    donepezil (ARICEPT) 5 MG tablet  Nursing Home Yes Yes    Take 5 mg by mouth Every Night.    fluticasone (FLONASE) 50 MCG/ACT nasal spray  Nursing Home Yes Yes    2 sprays into the nostril(s) as directed by provider Daily.    Glucagon, rDNA, (Glucagon Emergency) 1 MG kit  Nursing Home Yes Yes    As Needed.    insulin aspart (novoLOG FLEXPEN) 100 UNIT/ML solution pen-injector sc pen  Nursing Home Yes Yes    Inject 2-8 Units under the skin into the appropriate area as directed 4 (Four) Times a Day Before Meals & at Bedtime.    lidocaine (LIDODERM) 5 %  Nursing Home No Yes    Place 1 patch on the skin as directed by provider Daily. Remove & Discard patch within 12 hours or as directed by MD    Multiple Vitamin (MULTIVITAMINS PO)  Nursing Home Yes Yes    Take 1 tablet by mouth Daily.    ondansetron (ZOFRAN) 4 MG tablet  Nursing Home Yes Yes    Take 4 mg by mouth Every 8 (Eight) Hours As Needed for Nausea or Vomiting.    polyethylene glycol (MIRALAX) pack packet  Nursing Home No Yes    Take 17 g by mouth Daily.    acetaminophen (TYLENOL) 500 MG tablet  Nursing Home Yes Yes    Take 500 " mg by mouth 2 (Two) Times a Day.    apixaban (ELIQUIS) 2.5 MG tablet tablet  Nursing Home No Yes    Take 1 tablet by mouth 2 (Two) Times a Day.    aspirin 81 MG chewable tablet  Nursing Home Yes Yes    Chew 81 mg Daily.    hydrALAZINE (APRESOLINE) 10 MG tablet  Nursing Home Yes Yes    Take 10 mg by mouth Daily As Needed (for SBP > 170 or DBP > 90).    isosorbide dinitrate (ISORDIL) 40 MG tablet  Nursing Home Yes Yes    Take 20 mg by mouth 3 (Three) Times a Day.    Lantus SoloStar 100 UNIT/ML injection pen  Nursing Home Yes Yes    Inject 10 Units under the skin into the appropriate area as directed Every Night.    metFORMIN (GLUCOPHAGE) 500 MG tablet  Nursing Home Yes Yes    Take 500 mg by mouth 2 (Two) Times a Day With Meals.    metoprolol tartrate (LOPRESSOR) 100 MG tablet  Nursing Home No Yes    Take 1 tablet by mouth 2 (Two) Times a Day.    metoprolol tartrate (LOPRESSOR) 25 MG tablet  Nursing Home Yes Yes    Take 25 mg by mouth Every 12 (Twelve) Hours As Needed (for HR > 100).            Current Facility-Administered Medications:   •  acetaminophen (TYLENOL) tablet 650 mg, 650 mg, Oral, Q4H PRN **OR** acetaminophen (TYLENOL) 160 MG/5ML solution 650 mg, 650 mg, Oral, Q4H PRN **OR** acetaminophen (TYLENOL) suppository 650 mg, 650 mg, Rectal, Q4H PRN, Merari Finn MD  •  albuterol (PROVENTIL) nebulizer solution 0.083% 2.5 mg/3mL, 2.5 mg, Nebulization, Q4H - RT, Merari Finn MD, 2.5 mg at 08/07/22 0749  •  apixaban (ELIQUIS) tablet 2.5 mg, 2.5 mg, Oral, BID, Merari Finn MD, 2.5 mg at 08/07/22 0846  •  aspirin chewable tablet 81 mg, 81 mg, Oral, Daily, Pennie German DO, 81 mg at 08/07/22 0845  •  bumetanide (BUMEX) injection 1 mg, 1 mg, Intravenous, Q12H, Merari Finn MD, 1 mg at 08/07/22 0522  •  cefTRIAXone (ROCEPHIN) 1 g/100 mL 0.9% NS (MBP), 1 g, Intravenous, Q24H, Pennie German DO, 1 g at 08/07/22 1037  •  citalopram (CeleXA) tablet 20 mg, 20  mg, Oral, Daily, Merari Finn MD, 20 mg at 08/07/22 0846  •  dextrose (D50W) (25 g/50 mL) IV injection 25 g, 25 g, Intravenous, Q15 Min PRN, Merari Finn MD, 25 g at 08/06/22 2136  •  dextrose (GLUTOSE) oral gel 15 g, 15 g, Oral, Q15 Min PRN, Merari Finn MD  •  donepezil (ARICEPT) tablet 5 mg, 5 mg, Oral, Nightly, Merari Finn MD, 5 mg at 08/06/22 2145  •  doxycycline (MONODOX) capsule 100 mg, 100 mg, Oral, Q12H, Pennie German DO, 100 mg at 08/07/22 0845  •  glucagon (human recombinant) (GLUCAGEN DIAGNOSTIC) injection 1 mg, 1 mg, Intramuscular, Q15 Min PRN, Merari Finn MD  •  HYDROcodone-acetaminophen (NORCO) 5-325 MG per tablet 1 tablet, 1 tablet, Oral, Q4H PRN, Merari Finn MD  •  insulin detemir (LEVEMIR) injection 10 Units, 10 Units, Subcutaneous, Nightly, Merari Finn MD, 10 Units at 08/05/22 2236  •  Insulin Lispro (humaLOG) injection 0-7 Units, 0-7 Units, Subcutaneous, TID AC, Merari Finn MD  •  isosorbide dinitrate (ISORDIL) tablet 20 mg, 20 mg, Oral, TID - Nitrates, Pennie German DO, 20 mg at 08/07/22 0846  •  metoprolol tartrate (LOPRESSOR) tablet 100 mg, 100 mg, Oral, BID, Merari Finn MD, 100 mg at 08/07/22 0846  •  ondansetron (ZOFRAN) tablet 4 mg, 4 mg, Oral, Q6H PRN **OR** ondansetron (ZOFRAN) injection 4 mg, 4 mg, Intravenous, Q6H PRN, Merari Finn MD  •  sodium chloride 0.9 % flush 1-10 mL, 1-10 mL, Intravenous, PRN, Merari Finn MD  •  sodium chloride 0.9 % flush 10 mL, 10 mL, Intravenous, PRN, Merari Finn MD  •  sodium chloride 0.9 % flush 10 mL, 10 mL, Intravenous, Q12H, Merari Finn MD, 10 mL at 08/07/22 0846    Social History     Socioeconomic History   • Marital status:      Spouse name: N/A   • Number of children: 2   • Years of education: H.S   Tobacco Use   • Smoking status: Former Smoker     Years: 2.00     Types:  Cigarettes     Start date: 1975     Quit date: 1978     Years since quittin.7   • Smokeless tobacco: Never Used   • Tobacco comment: QUIT DATE 1984   Substance and Sexual Activity   • Alcohol use: No   • Drug use: No   • Sexual activity: Never       Family History   Problem Relation Age of Onset   • Hypertension Mother    • Coronary artery disease Mother 56        PREMATURE    • Lung cancer Father    • Hypertension Father    • Osteoporosis Sister    • Lung cancer Brother        REVIEW OF SYSTEMS: Unable to obtain due to patient's dementia    Vitals:    22 0800 22 0900 22 1000 22 1100   BP:    137/89   BP Location:    Right arm   Patient Position:    Sitting   Pulse: 104 105 104 104   Resp:    18   Temp:    97.1 °F (36.2 °C)   TempSrc:    Oral   SpO2: 100%  100% 95%   Weight:       Height:             Vital Sign Min/Max for last 24 hours  Temp  Min: 95.3 °F (35.2 °C)  Max: 97.2 °F (36.2 °C)   BP  Min: 111/71  Max: 144/99   Pulse  Min: 98  Max: 105   Resp  Min: 16  Max: 18   SpO2  Min: 90 %  Max: 100 %   Flow (L/min)  Min: 0.5  Max: 2      Intake/Output Summary (Last 24 hours) at 2022 1147  Last data filed at 2022 0358  Gross per 24 hour   Intake 50 ml   Output 2050 ml   Net -2000 ml             Physical Exam:  GEN: Well nourished, Well- developed  No acute distress.  Alert in no acute distress  HEENT: Normocephalic, Atraumatic, PERRLA, moist mucous membranes  NECK: supple, NO JVD, no thyromegaly, no lymphadenopathy  CARDIAC: S1S2, irregularly irregular no murmur, gallop, rub  LUNGS: Poor effort , diminished breath sounds in bases  ABDOMEN: Soft, nontender, normal bowel sounds  EXTREMITIES:No gross deformities,  No clubbing, cyanosis, or edema  SKIN: Warm, dry  NEURO: No focal deficits  PSYCHIATRIC: Normal affect and mood      I personally viewed and interpreted the patient's EKG/Telemetry/lab data    Data:   Results from last 7 days   Lab Units 22  7650  08/05/22  1508   WBC 10*3/mm3 8.13 12.08*   HEMOGLOBIN g/dL 12.5 13.6   HEMATOCRIT % 39.3 44.8   PLATELETS 10*3/mm3 136* 166     Results from last 7 days   Lab Units 08/06/22 0227 08/05/22 2229 08/05/22  1508   SODIUM mmol/L 134*  --  131*   POTASSIUM mmol/L 4.7 5.5* 5.8*   CHLORIDE mmol/L 96*  --  99   CO2 mmol/L 23.0  --  21.0*   BUN mg/dL 53*  --  49*   CREATININE mg/dL 1.97*  --  1.84*   GLUCOSE mg/dL 144*  --  126*      Results from last 7 days   Lab Units 08/06/22  0227   HEMOGLOBIN A1C % 6.80*     Results from last 7 days   Lab Units 08/06/22  0227   TSH uIU/mL 5.830*     Results from last 7 days   Lab Units 08/05/22  1508   PROBNP pg/mL 33,432.0*         Results from last 7 days   Lab Units 08/05/22  1508   TROPONIN T ng/mL 0.032*     Results from last 7 days   Lab Units 08/06/22  0227   CHOLESTEROL mg/dL 124   TRIGLYCERIDES mg/dL 122   HDL CHOL mg/dL 25*   LDL CHOL mg/dL 77     Results from last 7 days   Lab Units 08/05/22  1508   PROBNP pg/mL 33,432.0*       Intake/Output Summary (Last 24 hours) at 8/7/2022 1147  Last data filed at 8/7/2022 0358  Gross per 24 hour   Intake 50 ml   Output 2050 ml   Net -2000 ml     Study Result chest x-ray 8/5/2022    Narrative & Impression      DATE OF EXAM:   8/5/2022 3:19 PM     PROCEDURE:   XR CHEST 1 VW-     INDICATIONS:   SOA triage protocol     COMPARISON:  06/15/2022     TECHNIQUE:   Portable chest radiograph.     FINDINGS:    Stable cardiac enlargement. Large hiatal hernia again noted. No  pneumothorax. There is persistent bibasilar airspace disease which may  relate to atelectasis and/or pneumonia. Bilateral upper lung airspace  disease on the prior study appears improved. Patient's chin overlies the  right apex partially limiting assessment. Small bilateral pleural  effusions. No pneumothorax.     IMPRESSION:  1. Persistent bibasilar airspace disease which may relate to atelectasis  or pneumonia.  2. Bilateral upper lobe airspace disease on the prior study  has  resolved.  3. Small bilateral pleural effusions.  4. Large hiatal hernia.     This report was finalized on 8/5/2022 3:21 PM by Michael Osei MD.         Telemetry: Atrial fibrillation with heart rates mostly in the 90s and low 100s        Assessment and Plan:   1.  HFrEF/Acute Systolic CHF:  -Patient with clinical features of acute CHF with abnormal chest x-ray and elevated proBNP 33,000  -Echocardiogram 8/6/2022 with markedly reduced EF of 26 to 30% which is markedly changed from previous EF on 6/11/2022 echocardiogram 51-55%  -Suspect partially from atrial fibrillation with RVR  -Agree with gentle diuresis with Bumex 1 mg IV twice daily, strict I's and O's, and monitoring of electrolytes.  Over the last 24 hours the patient has diuresed 2 L.  -Continue metoprolol 100 mg twice daily, no Entresto/ACE/ARB due to chronic kidney disease with elevated creatinine this admission  -Patient is DNR/DNI status  -Did attempt to call the son today he did not answer.  Will defer further discussions with family to Dr. Flores who will resume care tomorrow    2.  Permanent atrial fibrillation:  -Heart rates not optimally controlled on metoprolol 100 mg twice daily. BP should tolerate an increase in Metoprolol to 150 mg BID. If cannot be rate controlled, may need AVN BiV PM, however, discuss of GOC do need to be made with her son who is POA. Defer to Dr. Flores.  -CHADSVasc = 6 currently on Eliquis 2.5 mg twice daily    3.  Multifocal pneumonia:  -Treatment per primary team, IV antibiotics, neb treatments        Electronically signed by MARI Mahmood, 08/07/22, 11:47 AM EDT.    History and physical examination verified.  Agree with PAs note.  87-year-old white female with a past medical history of CHF chronic systolic dysfunction: Chronic kidney disease, GERD, Alzheimer's dementia, pneumonia, and permanent atrial fibrillation who was admitted secondary to shortness of breath.  We are asked to see her for management of her  heart failure and atrial fibrillation.  The patient was admitted on 8/5/2022 with increasing shortness of breath.  She does have significant dementia at the baseline state and most of the history was provided by family members.  She was admitted from nursing home.  Upon arrival in the ED, she was found to have a BNP of 33,000 with a troponin of 0.32 and a baseline creatinine of 1.8.  Chest x-ray was concerning for pneumonia.  She was started on IV antibiotics as well as IV diuretics.  She was also found to be in atrial fibrillation with relatively stable heart rates.  Home medications did include both Eliquis as well as metoprolol.  Presently she denies any active cardiovascular issues or complaints.  Patient has had net -2 L urine output over the past 24 hours.    Family history/social history/review of systems as per PAs note.    Physical Exam Temperature 97.1 heart rate 100 respiratory rate 18 blood pressure 137/89 O2 saturation 100% on 2 L  Constitutional: Confused Unable to answer questions  HENT: Normocephalic.   Eyes: Conjunctivae are normal. No scleral icterus.   Neck: Normal carotid pulses, + JVD present. Carotid bruit is not present. No tracheal deviation, no edema and no erythema present. No thyromegaly present.   Cardiovascular: Irregular irregular rate and rhythm normal S1 and S2.  No obvious murmurs appreciated.  Pulmonary/Chest: Decreased breath sounds right greater than left.  Respiratory effort poor.  Abdominal: Soft. Bowel sounds are normal. no distension and no mass. There is no hepatosplenomegaly. There is no tenderness. There is no rebound and no guarding. No aortic pulsations.  Musculoskeletal:  exhibits no edema, tenderness or deformity.   Neurological: Confused demented nonfocal  Skin: Warm to touch positive ecchymosis on arms   Psychiatric: Flat mood and affect.    Laboratory data as per PAs note.  Creatinine 1.97.  Hemoglobin 12.5.  TSH 5.8.  BNP 33,432.  Troponin 0.032.    Chest x-ray:  Heart size enlarged.  Bilateral pleural effusions right greater than left.     Twelve-lead EK2022 atrial fibrillation with ventricular rate of 100 bpm, ST elevation inferiorly with Q waves and ST depression laterally.  No change compared EKG 6/10/2022.    Echocardiogram 2022 LVEF 25 to 30% mildly reduced RV systolic function left atrial size severely increased moderate MR moderate to severe TR    Impression/plan:    1.  Chronic systolic LV dysfunction congestive heart failure with LVEF 25 to 30%.  This is a marked decrease in LVEF since most recent echocardiogram on 2022.  Although twelve-lead EKGs would suggest inferior myocardial infarction, there are no significant changes compared to last EKG on 6/10/2022.  The new LV dysfunction is most likely multifactorial including possible underlying CAD and A. fib with RVR.  Last known EKG in sinus rhythm was 2019.  Continue diuresis for now.  Would not be aggressive in restoring sinus rhythm in this patient at this time due to severe underlying dementia and multiple comorbidities.  Goal will be for rate control.    2.  Permanent atrial fibrillation see #1.  Needs better control of heart rates.  Adjust metoprolol as needed.  Would not be aggressive with procedures at this time.    3.  Positive troponin: Most likely due to congestive heart failure in the setting of chronic renal insufficiency.    4.  Chronic renal insufficiency: Per hospitalist.  Limits our ability to treat her heart failure.    5.   Pneumonia per hospitalist.      IAmos MD, personally performed the services face to face as described and documented by the above named individual. I have made any necessary edits and it is both accurate and complete 2022  13:32 EDT        Electronically signed by Amos Hanna MD at 22 1413     Mayco Parker IV, PharmD at 22 0340          Pharmacy Consult-Vancomycin Dosing  Mary Ramirez is a  87 y.o. female  "receiving vancomycin therapy.     Indication: PNA  Consulting Provider: Hospitalist  ID Consult:     Goal Trough: 15-20 mcg/mL    Current Antimicrobial Therapy  Anti-Infectives (From admission, onward)      Ordered     Dose/Rate Route Frequency Start Stop    08/05/22 2152  vancomycin - no scheduled doses (Pharmacy dosing per levels)        Ordering Provider: Mayco Parker IV, PharmD     Does not apply Daily 08/06/22 0900 08/08/22 0859    08/05/22 2155  cefepime (MAXIPIME) 2 g/100 mL 0.9% NS (mbp)        Ordering Provider: Mayco Parker IV, PharmD    2 g  over 4 Hours Intravenous Every 24 Hours 08/06/22 0600 08/13/22 0559    08/05/22 2150  Pharmacy to dose vancomycin        Ordering Provider: Merari Finn MD   \"And\" Linked Group Details     Does not apply Continuous PRN 08/05/22 2147 08/12/22 2146    08/05/22 1536  vancomycin (VANCOCIN) 1000 mg/200 mL dextrose 5% IVPB        Ordering Provider: Rasta Marcano DO    20 mg/kg × 54.4 kg Intravenous Once 08/05/22 1600 08/05/22 1820    08/05/22 1536  cefepime (MAXIPIME) 2 g/100 mL 0.9% NS (mbp)        Ordering Provider: Rasta Marcano DO    2 g  200 mL/hr over 30 Minutes Intravenous Once 08/05/22 1538 08/05/22 1656            Allergies  Allergies as of 08/05/2022 - Reviewed 08/05/2022   Allergen Reaction Noted   • Penicillins Other (See Comments) 11/23/2017       Labs    Results from last 7 days   Lab Units 08/06/22  0227 08/05/22  1508   BUN mg/dL 53* 49*   CREATININE mg/dL 1.97* 1.84*       Results from last 7 days   Lab Units 08/06/22  0227 08/05/22  1508   WBC 10*3/mm3 8.13 12.08*       Evaluation of Dosing     Last Dose Received in the ED/Outside Facility: 1000 mg @ 1657 on 8/5/22  Is Patient on Dialysis or Renal Replacement:     Ht - 152.4 cm (60\")  Wt - 54.6 kg (120 lb 6.4 oz)    Estimated Creatinine Clearance: 15.6 mL/min (A) (by C-G formula based on SCr of 1.97 mg/dL (H)).    Intake & Output (last 3 days)         08/03 " 0701  08/04 0700 08/04 0701  08/05 0700 08/05 0701  08/06 0700    IV Piggyback   100    Total Intake(mL/kg)   100 (1.8)    Net   +100                   Microbiology and Radiology  Microbiology Results (last 10 days)       Procedure Component Value - Date/Time    COVID PRE-OP / PRE-PROCEDURE SCREENING ORDER (NO ISOLATION) - Swab, Nasopharynx [[id number]]  (Normal) Collected: 08/05/22 1609    Lab Status: Final result Specimen: Swab from Nasopharynx Updated: 08/05/22 1649    Narrative:      The following orders were created for panel order COVID PRE-OP / PRE-PROCEDURE SCREENING ORDER (NO ISOLATION) - Swab, Nasopharynx.  Procedure                               Abnormality         Status                     ---------                               -----------         ------                     COVID-19 and FLU A/B PCR...[[id number]]  Normal              Final result                 Please view results for these tests on the individual orders.    COVID-19 and FLU A/B PCR - Swab, Nasopharynx [[id number]]  (Normal) Collected: 08/05/22 1609    Lab Status: Final result Specimen: Swab from Nasopharynx Updated: 08/05/22 1649     COVID19 Not Detected     Influenza A PCR Not Detected     Influenza B PCR Not Detected    Narrative:      Fact sheet for providers: https://www.fda.gov/media/270380/download    Fact sheet for patients: https://www.fda.gov/media/406105/download    Test performed by PCR.            Vancomycin Levels:    Results from last 7 days   Lab Units 08/06/22  0227   VANCOMYCIN RM mcg/mL 13.60                     Assessment/Plan:    Advanced age female with multifocal PNA, renal failure.   Status post 1000 mg IV vancomycin load yesterday afternoon.  Random level this morning reflective of adequate distribution, impaired clearance.     MRSA PCR per protocol.  Supplement with 500 mg IV vancomycin this morning.    Further per rounding pharmacist pending therapy continuation.    Thank you for this consult.  Mayco HUMPHREYS  Elena ROBERTS, PharmD, BCPS  8/6/2022  03:40 EDT      Electronically signed by Mayco Parker IV, PharmD at 08/06/22 6066

## 2022-08-08 NOTE — PLAN OF CARE
Goal Outcome Evaluation:  Plan of Care Reviewed With: other (see comments) (Dr. Navarro to follow up with family)        Progress: no change  Outcome Evaluation: Palliative consult for GOC/ACP and support to pt and family; seen in consult yesterday by Dr. Navarro, who will follow up with family re GOC. Hospice consult placed, per documentation that son would like information. Pt sleeping with no observable signs of discomfort at time of Palliative RN encounter today.    Hospice Liaison scheduled to meet with pt's son today.    1330 Palliative IDT meeting: GAURAV Edwards RN, CHPN; VIVIAN Zuniga, APRN; RBIDGET Navarro, DO; LAVERNE Green, [facility], [facility]; LAVERNE Yip, [facility]; JASON Chamberlain RN, PN; JORGE LUIS Sharma RN, PN; JASMYN Gardner, W    Problem: Palliative Care  Goal: Enhanced Quality of Life  Outcome: Ongoing, Progressing  Intervention: Promote Advance Care Planning  Flowsheets (Taken 8/8/2022 1147)  Life Transition/Adjustment: (Palliative information provided at bedside for family; Hospice consult placed)  • palliative care initiated  • other (see comments)  Intervention: Maximize Comfort  Flowsheets (Taken 8/8/2022 1147)  Pain Management Interventions: (no observable signs of discomfort, no documented c/o pain) other (see comments)  Intervention: Optimize Function  Flowsheets (Taken 8/8/2022 1147)  Sleep/Rest Enhancement: awakenings minimized  Intervention: Optimize Psychosocial Wellbeing  Flowsheets (Taken 8/8/2022 1147)  Family/Support System Care: (Palliative information provided at bedside; Dr. Navarro to follow up with family)  • support provided  • other (see comments)

## 2022-08-09 LAB
ALBUMIN SERPL-MCNC: 3.4 G/DL (ref 3.5–5.2)
ALBUMIN/GLOB SERPL: 1.5 G/DL
ALP SERPL-CCNC: 94 U/L (ref 39–117)
ALT SERPL W P-5'-P-CCNC: 43 U/L (ref 1–33)
ANION GAP SERPL CALCULATED.3IONS-SCNC: 15 MMOL/L (ref 5–15)
AST SERPL-CCNC: 24 U/L (ref 1–32)
BASOPHILS # BLD AUTO: 0.02 10*3/MM3 (ref 0–0.2)
BASOPHILS NFR BLD AUTO: 0.2 % (ref 0–1.5)
BILIRUB SERPL-MCNC: 1 MG/DL (ref 0–1.2)
BUN SERPL-MCNC: 26 MG/DL (ref 8–23)
BUN/CREAT SERPL: 20.8 (ref 7–25)
CALCIUM SPEC-SCNC: 8.4 MG/DL (ref 8.6–10.5)
CHLORIDE SERPL-SCNC: 95 MMOL/L (ref 98–107)
CO2 SERPL-SCNC: 32 MMOL/L (ref 22–29)
CREAT SERPL-MCNC: 1.25 MG/DL (ref 0.57–1)
DEPRECATED RDW RBC AUTO: 51.3 FL (ref 37–54)
EGFRCR SERPLBLD CKD-EPI 2021: 41.8 ML/MIN/1.73
EOSINOPHIL # BLD AUTO: 0.02 10*3/MM3 (ref 0–0.4)
EOSINOPHIL NFR BLD AUTO: 0.2 % (ref 0.3–6.2)
ERYTHROCYTE [DISTWIDTH] IN BLOOD BY AUTOMATED COUNT: 16.4 % (ref 12.3–15.4)
GLOBULIN UR ELPH-MCNC: 2.3 GM/DL
GLUCOSE BLDC GLUCOMTR-MCNC: 107 MG/DL (ref 70–130)
GLUCOSE BLDC GLUCOMTR-MCNC: 188 MG/DL (ref 70–130)
GLUCOSE BLDC GLUCOMTR-MCNC: 196 MG/DL (ref 70–130)
GLUCOSE SERPL-MCNC: 172 MG/DL (ref 65–99)
HCT VFR BLD AUTO: 42.8 % (ref 34–46.6)
HGB BLD-MCNC: 13.9 G/DL (ref 12–15.9)
IMM GRANULOCYTES # BLD AUTO: 0.04 10*3/MM3 (ref 0–0.05)
IMM GRANULOCYTES NFR BLD AUTO: 0.4 % (ref 0–0.5)
LYMPHOCYTES # BLD AUTO: 1.21 10*3/MM3 (ref 0.7–3.1)
LYMPHOCYTES NFR BLD AUTO: 12.6 % (ref 19.6–45.3)
MAGNESIUM SERPL-MCNC: 1.4 MG/DL (ref 1.6–2.4)
MCH RBC QN AUTO: 28.6 PG (ref 26.6–33)
MCHC RBC AUTO-ENTMCNC: 32.5 G/DL (ref 31.5–35.7)
MCV RBC AUTO: 88.1 FL (ref 79–97)
MONOCYTES # BLD AUTO: 1.26 10*3/MM3 (ref 0.1–0.9)
MONOCYTES NFR BLD AUTO: 13.2 % (ref 5–12)
NEUTROPHILS NFR BLD AUTO: 7.02 10*3/MM3 (ref 1.7–7)
NEUTROPHILS NFR BLD AUTO: 73.4 % (ref 42.7–76)
NRBC BLD AUTO-RTO: 0 /100 WBC (ref 0–0.2)
PLATELET # BLD AUTO: 116 10*3/MM3 (ref 140–450)
PMV BLD AUTO: 12.6 FL (ref 6–12)
POTASSIUM SERPL-SCNC: 3 MMOL/L (ref 3.5–5.2)
PROT SERPL-MCNC: 5.7 G/DL (ref 6–8.5)
RBC # BLD AUTO: 4.86 10*6/MM3 (ref 3.77–5.28)
SODIUM SERPL-SCNC: 142 MMOL/L (ref 136–145)
WBC NRBC COR # BLD: 9.57 10*3/MM3 (ref 3.4–10.8)

## 2022-08-09 PROCEDURE — 85025 COMPLETE CBC W/AUTO DIFF WBC: CPT | Performed by: INTERNAL MEDICINE

## 2022-08-09 PROCEDURE — 25010000002 MAGNESIUM SULFATE IN D5W 1G/100ML (PREMIX) 1-5 GM/100ML-% SOLUTION: Performed by: INTERNAL MEDICINE

## 2022-08-09 PROCEDURE — 25010000002 CEFTRIAXONE PER 250 MG: Performed by: FAMILY MEDICINE

## 2022-08-09 PROCEDURE — 80053 COMPREHEN METABOLIC PANEL: CPT | Performed by: INTERNAL MEDICINE

## 2022-08-09 PROCEDURE — 63710000001 INSULIN LISPRO (HUMAN) PER 5 UNITS: Performed by: INTERNAL MEDICINE

## 2022-08-09 PROCEDURE — 82962 GLUCOSE BLOOD TEST: CPT

## 2022-08-09 PROCEDURE — 99231 SBSQ HOSP IP/OBS SF/LOW 25: CPT | Performed by: INTERNAL MEDICINE

## 2022-08-09 PROCEDURE — 83735 ASSAY OF MAGNESIUM: CPT | Performed by: INTERNAL MEDICINE

## 2022-08-09 RX ORDER — POTASSIUM CHLORIDE 1.5 G/1.77G
40 POWDER, FOR SOLUTION ORAL AS NEEDED
Status: DISCONTINUED | OUTPATIENT
Start: 2022-08-09 | End: 2022-08-11 | Stop reason: HOSPADM

## 2022-08-09 RX ORDER — POTASSIUM CHLORIDE 750 MG/1
40 CAPSULE, EXTENDED RELEASE ORAL AS NEEDED
Status: DISCONTINUED | OUTPATIENT
Start: 2022-08-09 | End: 2022-08-11 | Stop reason: HOSPADM

## 2022-08-09 RX ORDER — MAGNESIUM SULFATE HEPTAHYDRATE 40 MG/ML
2 INJECTION, SOLUTION INTRAVENOUS AS NEEDED
Status: DISCONTINUED | OUTPATIENT
Start: 2022-08-09 | End: 2022-08-11 | Stop reason: HOSPADM

## 2022-08-09 RX ORDER — MAGNESIUM SULFATE 1 G/100ML
1 INJECTION INTRAVENOUS AS NEEDED
Status: DISCONTINUED | OUTPATIENT
Start: 2022-08-09 | End: 2022-08-11 | Stop reason: HOSPADM

## 2022-08-09 RX ORDER — MAGNESIUM SULFATE HEPTAHYDRATE 40 MG/ML
4 INJECTION, SOLUTION INTRAVENOUS AS NEEDED
Status: DISCONTINUED | OUTPATIENT
Start: 2022-08-09 | End: 2022-08-11 | Stop reason: HOSPADM

## 2022-08-09 RX ADMIN — ASPIRIN 81 MG CHEWABLE TABLET 81 MG: 81 TABLET CHEWABLE at 08:34

## 2022-08-09 RX ADMIN — POTASSIUM CHLORIDE 40 MEQ: 750 CAPSULE, EXTENDED RELEASE ORAL at 14:10

## 2022-08-09 RX ADMIN — SODIUM CHLORIDE 1 G: 900 INJECTION INTRAVENOUS at 11:13

## 2022-08-09 RX ADMIN — Medication 10 ML: at 21:21

## 2022-08-09 RX ADMIN — ISOSORBIDE DINITRATE 20 MG: 20 TABLET ORAL at 17:04

## 2022-08-09 RX ADMIN — CITALOPRAM HYDROBROMIDE 20 MG: 20 TABLET ORAL at 08:34

## 2022-08-09 RX ADMIN — DONEPEZIL HYDROCHLORIDE 5 MG: 5 TABLET ORAL at 21:20

## 2022-08-09 RX ADMIN — APIXABAN 2.5 MG: 2.5 TABLET, FILM COATED ORAL at 21:20

## 2022-08-09 RX ADMIN — DOXYCYCLINE 100 MG: 100 CAPSULE ORAL at 21:20

## 2022-08-09 RX ADMIN — BUMETANIDE 1 MG: 1 TABLET ORAL at 21:20

## 2022-08-09 RX ADMIN — POTASSIUM CHLORIDE 40 MEQ: 750 CAPSULE, EXTENDED RELEASE ORAL at 17:04

## 2022-08-09 RX ADMIN — DOXYCYCLINE 100 MG: 100 CAPSULE ORAL at 08:34

## 2022-08-09 RX ADMIN — INSULIN LISPRO 2 UNITS: 100 INJECTION, SOLUTION INTRAVENOUS; SUBCUTANEOUS at 17:04

## 2022-08-09 RX ADMIN — METOPROLOL TARTRATE 150 MG: 100 TABLET, FILM COATED ORAL at 21:20

## 2022-08-09 RX ADMIN — POTASSIUM CHLORIDE 40 MEQ: 750 CAPSULE, EXTENDED RELEASE ORAL at 21:21

## 2022-08-09 RX ADMIN — ISOSORBIDE DINITRATE 20 MG: 20 TABLET ORAL at 08:34

## 2022-08-09 RX ADMIN — Medication 10 ML: at 08:36

## 2022-08-09 RX ADMIN — BUMETANIDE 1 MG: 1 TABLET ORAL at 08:34

## 2022-08-09 RX ADMIN — MAGNESIUM SULFATE 1 G: 1 INJECTION INTRAVENOUS at 21:19

## 2022-08-09 RX ADMIN — MAGNESIUM SULFATE 1 G: 1 INJECTION INTRAVENOUS at 14:10

## 2022-08-09 RX ADMIN — MAGNESIUM SULFATE 1 G: 1 INJECTION INTRAVENOUS at 17:04

## 2022-08-09 RX ADMIN — METOPROLOL TARTRATE 150 MG: 100 TABLET, FILM COATED ORAL at 08:34

## 2022-08-09 RX ADMIN — INSULIN LISPRO 2 UNITS: 100 INJECTION, SOLUTION INTRAVENOUS; SUBCUTANEOUS at 12:24

## 2022-08-09 RX ADMIN — ISOSORBIDE DINITRATE 20 MG: 20 TABLET ORAL at 12:25

## 2022-08-09 RX ADMIN — APIXABAN 2.5 MG: 2.5 TABLET, FILM COATED ORAL at 08:34

## 2022-08-09 NOTE — PROGRESS NOTES
Continued Stay Note  [facility]     Patient Name: Mary Ramirez  MRN: [id number]  Today's Date: 8/9/2022    Admit Date: 8/5/2022     Discharge Plan     Row Name 08/09/22 1123       Plan    Plan LTC    Plan Comments Phone call received from the [facility] stating that the patient was previously at the memory care unit. States that the patient will need to change in level of care and the facility is currently reviewing the patient's records. States that they will review patient's records and will notify by the end of the day if they can accept the patient back. Will continue to follow. Notified patient's son of this as well. Please call [phone] for any questions or concerns.               Discharge Codes    No documentation.               Expected Discharge Date and Time     Expected Discharge Date Expected Discharge Time    Aug 12, 2022             SUSANNE SAENZ

## 2022-08-09 NOTE — PLAN OF CARE
Goal Outcome Evaluation:                 Patient rested well during shift. Potassium and magnesium replacements given.No concerns or questions voiced.

## 2022-08-09 NOTE — PROGRESS NOTES
Continued Stay Note  [facility]     Patient Name: Mary Ramirez  MRN: [id number]  Today's Date: 8/9/2022    Admit Date: 8/5/2022     Discharge Plan     Row Name 08/09/22 1535       Plan    Plan [facility]    Plan Comments Per request of the [facility] r/t bed availability, ambulance rescheduled for Thurs at 11:45.  Team, RN and CM updated.               Discharge Codes    No documentation.               Expected Discharge Date and Time     Expected Discharge Date Expected Discharge Time    Aug 12, 2022             Med Shankar RN

## 2022-08-09 NOTE — CASE MANAGEMENT/SOCIAL WORK
Continued Stay Note  [facility]     Patient Name: Mary Ramirez  MRN: [id number]  Today's Date: 8/9/2022    Admit Date: 8/5/2022     Discharge Plan     Row Name 08/09/22 1201       Plan    Plan update    Patient/Family in Agreement with Plan yes    Plan Comments Plan remains to discharge tomorrow to [facility] with Hospice.  CM following. remotely.  Transportation arranged by hospice for tomorrow, Wednesday 8/10 via  Ambulance scheduled for 1545.    Final Discharge Disposition Code 50 - home with hospice    Row Name 08/09/22 1123       Plan    Plan LTC    Plan Comments Phone call received from the [facility] stating that the patient was previously at the memory care unit. States that the patient will need to change in level of care and the facility is currently reviewing the patient's records. States that they will review patient's records and will notify by the end of the day if they can accept the patient back. Will continue to follow. Notified patient's son of this as well. Please call [phone] for any questions or concerns.               Discharge Codes    No documentation.               Expected Discharge Date and Time     Expected Discharge Date Expected Discharge Time    Aug 12, 2022             Betsy Avilez RN

## 2022-08-09 NOTE — PROGRESS NOTES
[facility] Medicine Services  PROGRESS NOTE    Patient Name: Mary Ramirez  : 1934  MRN: [id number]    Date of Admission: 2022  Primary Care Physician: Tiana Hong MD    Subjective   Subjective     CC:  F/u shortness of breath     HPI:  I have seen and evaluated the patient this morning.  Pleasantly confused and demented.  Cannot contribute much to HPI.  Does not appear to be in distress.  Hemodynamics are stable.  Still needing assistance with feeding    ROS:  Unable to obtain due to dementia    Objective   Objective     Vital Signs:   Temp:  [96.6 °F (35.9 °C)-98.3 °F (36.8 °C)] 96.6 °F (35.9 °C)  Heart Rate:  [105-110] 110  Resp:  [16-18] 16  BP: (119-142)/(74-99) 142/99     Physical Exam:  General: Chronically ill looking.  Minimally conversant.  Pleasant.  Head: Atraumatic and normocephalic, without obvious abnormality  Eyes:   Conjunctivae and sclerae normal, no Icterus. No pallor  Ears:  Ears appear intact with no abnormalities noted  Throat: No oral lesions, no thrush, oral mucosa moist  Neck: Supple, trachea midline, no thyromegaly  Back:   No kyphoscoliosis present. No tenderness to palpation,   no sacral edema  Lungs: Clear to auscultation bilaterally, equal air entry, no wheezing or crackles  Heart:  Normal S1 and S2, no murmur, no gallop, No JVD, no lower extremity swelling  Abdomen:  Soft, no tenderness, no organomegaly, normal bowel sounds  Normal bowel sounds, no masses, no organomegaly. Soft, nontender, nondistended, no guarding, no rebound tenderness.  Extremities: No gross abnormalities, no clubbing, pulses palpable and equal bilaterally  Skin: No bleeding, bruising or rash, normal skin turgor and elasticity  Neurologic: Cranial nerves appear intact with no evidence of facial asymmetry, normal motor and sensory functions in all 4 extremities  Psych: Alert and oriented x2    Results Reviewed:  LAB RESULTS:      Lab 22  0515  08/06/22  0831 08/06/22 0227 08/05/22 2229 08/05/22  1850 08/05/22  1508   WBC 9.57  --  8.13  --   --  12.08*   HEMOGLOBIN 13.9  --  12.5  --   --  13.6   HEMATOCRIT 42.8  --  39.3  --   --  44.8   PLATELETS 116*  --  136*  --   --  166   NEUTROS ABS 7.02*  --   --   --   --  9.23*   IMMATURE GRANS (ABS) 0.04  --   --   --   --  0.08*   LYMPHS ABS 1.21  --   --   --   --  1.39   MONOS ABS 1.26*  --   --   --   --  1.26*   EOS ABS 0.02  --   --   --   --  0.09   MCV 88.1  --  89.7  --   --  94.5   PROCALCITONIN  --   --   --   --   --  0.06   LACTATE  --  2.4* 3.0* 3.9* 4.3* 4.0*         Lab 08/08/22  0513 08/06/22 0227 08/05/22 2229 08/05/22  1508   SODIUM 142 134*  --  131*   POTASSIUM 3.2* 4.7 5.5* 5.8*   CHLORIDE 97* 96*  --  99   CO2 31.0* 23.0  --  21.0*   ANION GAP 14.0 15.0  --  11.0   BUN 32* 53*  --  49*   CREATININE 1.30* 1.97*  --  1.84*   EGFR 39.9* 24.2*  --  26.3*   GLUCOSE 29* 144*  --  126*   CALCIUM 8.8 8.6  --  9.2   HEMOGLOBIN A1C  --  6.80*  --   --    TSH  --  5.830*  --   --          Lab 08/05/22  1508   TOTAL PROTEIN 6.4   ALBUMIN 3.70   GLOBULIN 2.7   ALT (SGPT) 105*   AST (SGOT) 33*   BILIRUBIN 0.5   ALK PHOS 97         Lab 08/05/22  1508   PROBNP 33,432.0*   TROPONIN T 0.032*         Lab 08/06/22 0227   CHOLESTEROL 124   LDL CHOL 77   HDL CHOL 25*   TRIGLYCERIDES 122             Brief Urine Lab Results  (Last result in the past 365 days)      Color   Clarity   Blood   Leuk Est   Nitrite   Protein   CREAT   Urine HCG        06/11/22 0333 Yellow   Clear   Negative   Trace   Negative   Negative                 Microbiology Results Abnormal     Procedure Component Value - Date/Time    Blood Culture - Blood, Arm, Right [[id number]]  (Normal) Collected: 08/05/22 1548    Lab Status: Preliminary result Specimen: Blood from Arm, Right Updated: 08/08/22 1633     Blood Culture No growth at 3 days    Blood Culture - Blood, Hand, Right [[id number]]  (Normal) Collected: 08/05/22 1606    Lab Status:  Preliminary result Specimen: Blood from Hand, Right Updated: 08/08/22 1632     Blood Culture No growth at 3 days    Legionella Antigen, Urine - Urine, Urine, Clean Catch [[id number]]  (Normal) Collected: 08/05/22 2227    Lab Status: Final result Specimen: Urine, Clean Catch Updated: 08/06/22 1236     LEGIONELLA ANTIGEN, URINE Negative    S. Pneumo Ag Urine or CSF - Urine, Urine, Clean Catch [[id number]]  (Normal) Collected: 08/05/22 2227    Lab Status: Final result Specimen: Urine, Clean Catch Updated: 08/06/22 1236     Strep Pneumo Ag Negative    MRSA Screen, PCR (Inpatient) - Swab, Nares [[id number]]  (Normal) Collected: 08/05/22 2327    Lab Status: Final result Specimen: Swab from Nares Updated: 08/06/22 0734     MRSA PCR Negative    Narrative:      The negative predictive value of this diagnostic test is high and should only be used to consider de-escalating anti-MRSA therapy. A positive result may indicate colonization with MRSA and must be correlated clinically.  MRSA Negative    COVID PRE-OP / PRE-PROCEDURE SCREENING ORDER (NO ISOLATION) - Swab, Nasopharynx [[id number]]  (Normal) Collected: 08/05/22 1609    Lab Status: Final result Specimen: Swab from Nasopharynx Updated: 08/05/22 1649    Narrative:      The following orders were created for panel order COVID PRE-OP / PRE-PROCEDURE SCREENING ORDER (NO ISOLATION) - Swab, Nasopharynx.  Procedure                               Abnormality         Status                     ---------                               -----------         ------                     COVID-19 and FLU A/B PCR...[[id number]]  Normal              Final result                 Please view results for these tests on the individual orders.    COVID-19 and FLU A/B PCR - Swab, Nasopharynx [[id number]]  (Normal) Collected: 08/05/22 1609    Lab Status: Final result Specimen: Swab from Nasopharynx Updated: 08/05/22 1649     COVID19 Not Detected     Influenza A PCR Not Detected     Influenza B PCR Not  Detected    Narrative:      Fact sheet for providers: https://www.fda.gov/media/210904/download    Fact sheet for patients: https://www.fda.gov/media/759429/download    Test performed by PCR.          No radiology results from the last 24 hrs    Results for orders placed during the hospital encounter of 08/05/22    Adult Transthoracic Echo Complete W/ Cont if Necessary Per Protocol    Interpretation Summary  · Left ventricular ejection fraction appears to be 26 - 30%. Left ventricular systolic function is severely decreased.  · Left ventricular wall thickness is consistent with mild concentric hypertrophy.  · Mildly reduced right ventricular systolic function noted.  · Left atrial volume is severely increased.  · There is moderate calcification of the aortic valve mainly affecting the left coronary cusp(s).  · Moderate mitral valve regurgitation is present.  · Moderate to severe tricuspid valve regurgitation is present.  · Estimated right ventricular systolic pressure from tricuspid regurgitation is mildly elevated (35-45 mmHg).      I have reviewed the medications:  Scheduled Meds:apixaban, 2.5 mg, Oral, BID  aspirin, 81 mg, Oral, Daily  bumetanide, 1 mg, Oral, BID  cefTRIAXone, 1 g, Intravenous, Q24H  citalopram, 20 mg, Oral, Daily  donepezil, 5 mg, Oral, Nightly  doxycycline, 100 mg, Oral, Q12H  insulin lispro, 0-7 Units, Subcutaneous, TID AC  isosorbide dinitrate, 20 mg, Oral, TID - Nitrates  metoprolol tartrate, 150 mg, Oral, BID  sodium chloride, 10 mL, Intravenous, Q12H      Continuous Infusions:   PRN Meds:.•  acetaminophen **OR** acetaminophen **OR** acetaminophen  •  albuterol  •  dextrose  •  dextrose  •  glucagon (human recombinant)  •  HYDROcodone-acetaminophen  •  ondansetron **OR** ondansetron  •  sodium chloride  •  sodium chloride    Assessment & Plan   Assessment & Plan     Active Hospital Problems    Diagnosis  POA   • Pneumonia of both lower lobes due to infectious organism [J18.9]  Yes   • Acute  on chronic diastolic CHF (congestive heart failure) ([facility]) [I50.33]  Yes   • Acute respiratory failure with hypoxia ([facility]) [J96.01]  Yes   • Acute sepsis ([facility]) [A41.9]  Yes   • DEANGELO (acute kidney injury) ([facility]) [N17.9]  Yes   • Coronary artery disease involving native coronary artery of native heart with angina pectoris ([facility]) [I25.119]  Yes      Resolved Hospital Problems   No resolved problems to display.        Brief Hospital Course to date:  Mary Ramirez is a 87 y.o. female with a past medical history significant for paroxysmal atrial fibrillation, essential hypertension, CAD, diastolic congestive heart failure, CKD III, diabetes mellitus type 2, diverticulosis, Dementia, hyperlipidemia and TIA presented to the ED with complaints of shortness of air.    Assessment and plan :  Multifocal pneumonia, improving  Acute hypoxia  Initially started on cefepime and vancomycin.  Currently on Rocephin and doxycycline  Oxygen saturation currently is ranging between 91 to 95% on room air.  Intermittently requiring low-dose oxygen at 1 to 2 L/min  Urine antigen and MRSA swab negative  Continue current antibiotic regimen  Patient is DNR and DNI  Seen and evaluated by palliative team.  Awaiting hospice team to evaluate the patient per family request    Acute on chronic decompensated systolic and diastolic heart failure  ProBNP  33K on admission   Received 2mg IV Bumex in the ED.  Initially received IV diuresis with Bumex and currently on p.o. Bumex 1 mg twice daily and compensated  Being followed by Dr. Flores  TTE  EF 26-30% (last echo ef 55%)  Strict I &O   Daily weight   Appreciate help from cardiology team    Paroxysmal atrial fibrillation   Currently rate controlled   Continue anticoagulation with Eliquis     Mild DEANGELO on CKD III   Creatinine back to baseline  Continue to monitor kidney function closely with ongoing diuresis    Essential hypertension   Controlled with Lopressor, Imdur and Bumex     Dementia  Continue aricept    Fall precautions     Diabetes mellitus type 2  Had hypoglycemic episode this morning likely secondary to poor oral intake  A1c 6.8% which is significantly low for her age  Discontinue insulin Lantus    GOC/Family update   My partner discussed with the patient's son on 8/7/2022. He is now aware that her EF is significantly decreased. He confirmed she is DNR/DNI.   Hospice care team consulted and the plan is for the patient to go back to the nursing home with hospice care tomorrow    Expected Discharge Location and Transportation: [place] with hospice  Expected Discharge Date: 8/11, ambulance at 11:45 AM        DVT prophylaxis:  Medical DVT prophylaxis orders are present.     AM-PAC 6 Clicks Score (PT): 7 (08/08/22 0831)    CODE STATUS:   Code Status and Medical Interventions:   Ordered at: 08/05/22 7811     Medical Intervention Limits:    NO intubation (DNI)    NO cardioversion    NO dialysis    NO NIPPV (Non-Invasive Positive Pressure Ventilation)    NO artificial nutrition    NO vasopressors     Level Of Support Discussed With:    Health Care Surrogate     Code Status (Patient has no pulse and is not breathing):    No CPR (Do Not Attempt to Resuscitate)     Medical Interventions (Patient has pulse or is breathing):    Limited Support       Rikki Calix MD  08/09/22

## 2022-08-09 NOTE — PLAN OF CARE
Goal Outcome Evaluation:  Plan of Care Reviewed With: patient           Outcome Evaluation: Pt. remains pleasantly confused per chart review.  Pt. had mag replaced today. Small BM documented on 8/8.  Pt. has to be fed and is documented to be eating about 25% of meals.  Tachycardic on RA with spo2 in low to mid 90s.  Plan for pt. to discharge to [place] with hospice Thur at 1145.  Palliative care to follow for support, POC and ongoing GOC conversations.    1330 Palliative IDT meeting: JASMYN Johnson RN, PN; VIVIAN Zuniga, APRN; VIVIAN Bae RN; LEROY Razo MDiv, [facility]; VIVIAN Shankar RN; LAVERNE Green LCSW, [facility]-

## 2022-08-09 NOTE — PLAN OF CARE
Goal Outcome Evaluation:      Patient has rested well throughout shift. Plan continues to discharge back to [facility] tomorrow. Monitoring is ongoing.

## 2022-08-10 LAB
ANION GAP SERPL CALCULATED.3IONS-SCNC: 13 MMOL/L (ref 5–15)
ANION GAP SERPL CALCULATED.3IONS-SCNC: 22 MMOL/L (ref 5–15)
BACTERIA SPEC AEROBE CULT: NORMAL
BACTERIA SPEC AEROBE CULT: NORMAL
BUN SERPL-MCNC: 46 MG/DL (ref 8–23)
BUN SERPL-MCNC: 51 MG/DL (ref 8–23)
BUN/CREAT SERPL: 20.9 (ref 7–25)
BUN/CREAT SERPL: 26.6 (ref 7–25)
CALCIUM SPEC-SCNC: 8.6 MG/DL (ref 8.6–10.5)
CALCIUM SPEC-SCNC: 9.4 MG/DL (ref 8.6–10.5)
CHLORIDE SERPL-SCNC: 105 MMOL/L (ref 98–107)
CHLORIDE SERPL-SCNC: 99 MMOL/L (ref 98–107)
CO2 SERPL-SCNC: 25 MMOL/L (ref 22–29)
CO2 SERPL-SCNC: 27 MMOL/L (ref 22–29)
CREAT SERPL-MCNC: 1.92 MG/DL (ref 0.57–1)
CREAT SERPL-MCNC: 2.2 MG/DL (ref 0.57–1)
DEPRECATED RDW RBC AUTO: 57.4 FL (ref 37–54)
EGFRCR SERPLBLD CKD-EPI 2021: 21.2 ML/MIN/1.73
EGFRCR SERPLBLD CKD-EPI 2021: 25 ML/MIN/1.73
ERYTHROCYTE [DISTWIDTH] IN BLOOD BY AUTOMATED COUNT: 18.3 % (ref 12.3–15.4)
GLUCOSE BLDC GLUCOMTR-MCNC: 167 MG/DL (ref 70–130)
GLUCOSE BLDC GLUCOMTR-MCNC: 329 MG/DL (ref 70–130)
GLUCOSE SERPL-MCNC: 205 MG/DL (ref 65–99)
GLUCOSE SERPL-MCNC: 260 MG/DL (ref 65–99)
HCT VFR BLD AUTO: 53.7 % (ref 34–46.6)
HGB BLD-MCNC: 16.7 G/DL (ref 12–15.9)
MAGNESIUM SERPL-MCNC: 2.6 MG/DL (ref 1.6–2.4)
MCH RBC QN AUTO: 29.2 PG (ref 26.6–33)
MCHC RBC AUTO-ENTMCNC: 31.1 G/DL (ref 31.5–35.7)
MCV RBC AUTO: 94 FL (ref 79–97)
PLATELET # BLD AUTO: 94 10*3/MM3 (ref 140–450)
PMV BLD AUTO: 12.1 FL (ref 6–12)
POTASSIUM SERPL-SCNC: 3.9 MMOL/L (ref 3.5–5.2)
POTASSIUM SERPL-SCNC: 6.1 MMOL/L (ref 3.5–5.2)
QT INTERVAL: 356 MS
QTC INTERVAL: 539 MS
RBC # BLD AUTO: 5.71 10*6/MM3 (ref 3.77–5.28)
SODIUM SERPL-SCNC: 145 MMOL/L (ref 136–145)
SODIUM SERPL-SCNC: 146 MMOL/L (ref 136–145)
WBC NRBC COR # BLD: 10.72 10*3/MM3 (ref 3.4–10.8)

## 2022-08-10 PROCEDURE — 63710000001 INSULIN REGULAR HUMAN PER 5 UNITS: Performed by: INTERNAL MEDICINE

## 2022-08-10 PROCEDURE — 83735 ASSAY OF MAGNESIUM: CPT | Performed by: PHYSICIAN ASSISTANT

## 2022-08-10 PROCEDURE — 25010000002 AMIODARONE IN DEXTROSE 5% 150-4.21 MG/100ML-% SOLUTION: Performed by: INTERNAL MEDICINE

## 2022-08-10 PROCEDURE — 63710000001 INSULIN LISPRO (HUMAN) PER 5 UNITS: Performed by: INTERNAL MEDICINE

## 2022-08-10 PROCEDURE — 25010000002 DIGOXIN PER 500 MCG: Performed by: PHYSICIAN ASSISTANT

## 2022-08-10 PROCEDURE — 82962 GLUCOSE BLOOD TEST: CPT

## 2022-08-10 PROCEDURE — 25010000002 AMIODARONE IN DEXTROSE 5% 360-4.14 MG/200ML-% SOLUTION: Performed by: INTERNAL MEDICINE

## 2022-08-10 PROCEDURE — 80048 BASIC METABOLIC PNL TOTAL CA: CPT | Performed by: PHYSICIAN ASSISTANT

## 2022-08-10 PROCEDURE — 25010000002 ONDANSETRON PER 1 MG: Performed by: INTERNAL MEDICINE

## 2022-08-10 PROCEDURE — 93010 ELECTROCARDIOGRAM REPORT: CPT | Performed by: INTERNAL MEDICINE

## 2022-08-10 PROCEDURE — 25010000002 CEFTRIAXONE PER 250 MG: Performed by: FAMILY MEDICINE

## 2022-08-10 PROCEDURE — 99232 SBSQ HOSP IP/OBS MODERATE 35: CPT | Performed by: INTERNAL MEDICINE

## 2022-08-10 PROCEDURE — 80048 BASIC METABOLIC PNL TOTAL CA: CPT | Performed by: INTERNAL MEDICINE

## 2022-08-10 PROCEDURE — 85027 COMPLETE CBC AUTOMATED: CPT | Performed by: PHYSICIAN ASSISTANT

## 2022-08-10 PROCEDURE — 99233 SBSQ HOSP IP/OBS HIGH 50: CPT | Performed by: INTERNAL MEDICINE

## 2022-08-10 PROCEDURE — 93005 ELECTROCARDIOGRAM TRACING: CPT | Performed by: INTERNAL MEDICINE

## 2022-08-10 RX ORDER — AMIODARONE HYDROCHLORIDE 200 MG/1
200 TABLET ORAL EVERY 8 HOURS
Status: DISCONTINUED | OUTPATIENT
Start: 2022-08-11 | End: 2022-08-11 | Stop reason: HOSPADM

## 2022-08-10 RX ORDER — DIGOXIN 0.25 MG/ML
250 INJECTION INTRAMUSCULAR; INTRAVENOUS ONCE
Status: COMPLETED | OUTPATIENT
Start: 2022-08-10 | End: 2022-08-10

## 2022-08-10 RX ORDER — AMIODARONE HYDROCHLORIDE 200 MG/1
200 TABLET ORAL DAILY
Status: DISCONTINUED | OUTPATIENT
Start: 2022-09-01 | End: 2022-08-11 | Stop reason: HOSPADM

## 2022-08-10 RX ORDER — SODIUM CHLORIDE 9 MG/ML
75 INJECTION, SOLUTION INTRAVENOUS CONTINUOUS
Status: DISCONTINUED | OUTPATIENT
Start: 2022-08-10 | End: 2022-08-11 | Stop reason: HOSPADM

## 2022-08-10 RX ORDER — AMIODARONE HYDROCHLORIDE 200 MG/1
200 TABLET ORAL EVERY 12 HOURS
Status: DISCONTINUED | OUTPATIENT
Start: 2022-08-18 | End: 2022-08-11 | Stop reason: HOSPADM

## 2022-08-10 RX ORDER — DEXTROSE MONOHYDRATE 25 G/50ML
50 INJECTION, SOLUTION INTRAVENOUS ONCE
Status: COMPLETED | OUTPATIENT
Start: 2022-08-10 | End: 2022-08-10

## 2022-08-10 RX ORDER — ESMOLOL HYDROCHLORIDE 10 MG/ML
25-300 INJECTION, SOLUTION INTRAVENOUS
Status: DISCONTINUED | OUTPATIENT
Start: 2022-08-10 | End: 2022-08-11 | Stop reason: HOSPADM

## 2022-08-10 RX ORDER — AMIODARONE HYDROCHLORIDE 200 MG/1
200 TABLET ORAL ONCE
Status: COMPLETED | OUTPATIENT
Start: 2022-08-11 | End: 2022-08-11

## 2022-08-10 RX ADMIN — INSULIN LISPRO 5 UNITS: 100 INJECTION, SOLUTION INTRAVENOUS; SUBCUTANEOUS at 16:35

## 2022-08-10 RX ADMIN — ONDANSETRON 4 MG: 2 INJECTION INTRAMUSCULAR; INTRAVENOUS at 11:58

## 2022-08-10 RX ADMIN — METOPROLOL TARTRATE 150 MG: 100 TABLET, FILM COATED ORAL at 20:52

## 2022-08-10 RX ADMIN — ESMOLOL HYDROCHLORIDE 25 MCG/KG/MIN: 10 INJECTION INTRAVENOUS at 04:43

## 2022-08-10 RX ADMIN — ASPIRIN 81 MG CHEWABLE TABLET 81 MG: 81 TABLET CHEWABLE at 11:39

## 2022-08-10 RX ADMIN — SODIUM CHLORIDE 1 G: 900 INJECTION INTRAVENOUS at 11:36

## 2022-08-10 RX ADMIN — DOXYCYCLINE 100 MG: 100 CAPSULE ORAL at 20:52

## 2022-08-10 RX ADMIN — DIGOXIN 250 MCG: 0.25 INJECTION INTRAMUSCULAR; INTRAVENOUS at 03:35

## 2022-08-10 RX ADMIN — AMIODARONE HYDROCHLORIDE 150 MG: 1.5 INJECTION, SOLUTION INTRAVENOUS at 07:03

## 2022-08-10 RX ADMIN — INSULIN HUMAN 5 UNITS: 100 INJECTION, SOLUTION PARENTERAL at 15:58

## 2022-08-10 RX ADMIN — DOXYCYCLINE 100 MG: 100 CAPSULE ORAL at 11:39

## 2022-08-10 RX ADMIN — AMIODARONE HYDROCHLORIDE 0.5 MG/MIN: 1.8 INJECTION, SOLUTION INTRAVENOUS at 13:18

## 2022-08-10 RX ADMIN — ISOSORBIDE DINITRATE 20 MG: 20 TABLET ORAL at 16:35

## 2022-08-10 RX ADMIN — AMIODARONE HYDROCHLORIDE 1 MG/MIN: 1.8 INJECTION, SOLUTION INTRAVENOUS at 07:17

## 2022-08-10 RX ADMIN — ISOSORBIDE DINITRATE 20 MG: 20 TABLET ORAL at 11:40

## 2022-08-10 RX ADMIN — APIXABAN 2.5 MG: 2.5 TABLET, FILM COATED ORAL at 20:52

## 2022-08-10 RX ADMIN — SODIUM CHLORIDE 500 ML: 9 INJECTION, SOLUTION INTRAVENOUS at 03:35

## 2022-08-10 RX ADMIN — SODIUM CHLORIDE 75 ML/HR: 9 INJECTION, SOLUTION INTRAVENOUS at 20:39

## 2022-08-10 RX ADMIN — DONEPEZIL HYDROCHLORIDE 5 MG: 5 TABLET ORAL at 20:52

## 2022-08-10 RX ADMIN — Medication 10 ML: at 20:53

## 2022-08-10 RX ADMIN — SODIUM ZIRCONIUM CYCLOSILICATE 10 G: 10 POWDER, FOR SUSPENSION ORAL at 16:33

## 2022-08-10 RX ADMIN — DEXTROSE MONOHYDRATE 50 ML: 25 INJECTION, SOLUTION INTRAVENOUS at 15:57

## 2022-08-10 RX ADMIN — SODIUM CHLORIDE 75 ML/HR: 9 INJECTION, SOLUTION INTRAVENOUS at 16:06

## 2022-08-10 RX ADMIN — CITALOPRAM HYDROBROMIDE 20 MG: 20 TABLET ORAL at 11:39

## 2022-08-10 RX ADMIN — SODIUM CHLORIDE 1000 ML: 9 INJECTION, SOLUTION INTRAVENOUS at 15:53

## 2022-08-10 RX ADMIN — BUMETANIDE 1 MG: 1 TABLET ORAL at 11:39

## 2022-08-10 RX ADMIN — METOPROLOL TARTRATE 150 MG: 100 TABLET, FILM COATED ORAL at 11:58

## 2022-08-10 RX ADMIN — APIXABAN 2.5 MG: 2.5 TABLET, FILM COATED ORAL at 11:39

## 2022-08-10 NOTE — PROGRESS NOTES
[facility] Medicine Services  PROGRESS NOTE    Patient Name: Mary Ramirez  : 1934  MRN: [id number]    Date of Admission: 2022  Primary Care Physician: Tiana Hong MD    Subjective   Subjective     CC:  F/u shortness of breath     HPI:  I have seen and evaluated the patient this morning.  Pleasantly confused and demented.  Cannot contribute much to HPI.  Had multiple arrhythmias this morning including rapid A. fib and was started on amiodarone drip.  Does not have any acute complaints    ROS:  Unable to obtain due to dementia    Objective   Objective     Vital Signs:   Temp:  [97.5 °F (36.4 °C)-99.5 °F (37.5 °C)] 98.6 °F (37 °C)  Heart Rate:  [107-157] 115  Resp:  [16-20] 18  BP: ()/() 129/68     Physical Exam:  General: Chronically ill looking.  Minimally conversant.  Pleasant.  Head: Atraumatic and normocephalic, without obvious abnormality  Eyes:   Conjunctivae and sclerae normal, no Icterus. No pallor  Ears:  Ears appear intact with no abnormalities noted  Throat: No oral lesions, no thrush, oral mucosa moist  Neck: Supple, trachea midline, no thyromegaly  Back:   No kyphoscoliosis present. No tenderness to palpation,   no sacral edema  Lungs: Clear to auscultation bilaterally, equal air entry, no wheezing or crackles  Heart:  Normal S1 and S2, no murmur, no gallop, No JVD, no lower extremity swelling  Abdomen:  Soft, no tenderness, no organomegaly, normal bowel sounds  Normal bowel sounds, no masses, no organomegaly. Soft, nontender, nondistended, no guarding, no rebound tenderness.  Extremities: No gross abnormalities, no clubbing, pulses palpable and equal bilaterally  Skin: No bleeding, bruising or rash, normal skin turgor and elasticity  Neurologic: Cranial nerves appear intact with no evidence of facial asymmetry, normal motor and sensory functions in all 4 extremities  Psych: Alert and oriented x2    Results Reviewed:  LAB RESULTS:       Lab 08/10/22  0839 08/09/22  0515 08/06/22  0831 08/06/22 0227 08/05/22 2229 08/05/22  1850 08/05/22  1508   WBC 10.72 9.57  --  8.13  --   --  12.08*   HEMOGLOBIN 16.7* 13.9  --  12.5  --   --  13.6   HEMATOCRIT 53.7* 42.8  --  39.3  --   --  44.8   PLATELETS 94* 116*  --  136*  --   --  166   NEUTROS ABS  --  7.02*  --   --   --   --  9.23*   IMMATURE GRANS (ABS)  --  0.04  --   --   --   --  0.08*   LYMPHS ABS  --  1.21  --   --   --   --  1.39   MONOS ABS  --  1.26*  --   --   --   --  1.26*   EOS ABS  --  0.02  --   --   --   --  0.09   MCV 94.0 88.1  --  89.7  --   --  94.5   PROCALCITONIN  --   --   --   --   --   --  0.06   LACTATE  --   --  2.4* 3.0* 3.9* 4.3* 4.0*         Lab 08/10/22  1333 08/09/22  0932 08/08/22  0513 08/06/22 0227 08/05/22 2229 08/05/22  1508   SODIUM 146* 142 142 134*  --  131*   POTASSIUM 6.1* 3.0* 3.2* 4.7 5.5* 5.8*   CHLORIDE 99 95* 97* 96*  --  99   CO2 25.0 32.0* 31.0* 23.0  --  21.0*   ANION GAP 22.0* 15.0 14.0 15.0  --  11.0   BUN 46* 26* 32* 53*  --  49*   CREATININE 2.20* 1.25* 1.30* 1.97*  --  1.84*   EGFR 21.2* 41.8* 39.9* 24.2*  --  26.3*   GLUCOSE 205* 172* 29* 144*  --  126*   CALCIUM 9.4 8.4* 8.8 8.6  --  9.2   MAGNESIUM 2.6* 1.4*  --   --   --   --    HEMOGLOBIN A1C  --   --   --  6.80*  --   --    TSH  --   --   --  5.830*  --   --          Lab 08/09/22  0932 08/05/22  1508   TOTAL PROTEIN 5.7* 6.4   ALBUMIN 3.40* 3.70   GLOBULIN 2.3 2.7   ALT (SGPT) 43* 105*   AST (SGOT) 24 33*   BILIRUBIN 1.0 0.5   ALK PHOS 94 97         Lab 08/05/22  1508   PROBNP 33,432.0*   TROPONIN T 0.032*         Lab 08/06/22  0227   CHOLESTEROL 124   LDL CHOL 77   HDL CHOL 25*   TRIGLYCERIDES 122             Brief Urine Lab Results  (Last result in the past 365 days)      Color   Clarity   Blood   Leuk Est   Nitrite   Protein   CREAT   Urine HCG        06/11/22 0333 Yellow   Clear   Negative   Trace   Negative   Negative                 Microbiology Results Abnormal     Procedure  Component Value - Date/Time    Blood Culture - Blood, Hand, Right [[id number]]  (Normal) Collected: 08/05/22 1606    Lab Status: Preliminary result Specimen: Blood from Hand, Right Updated: 08/09/22 1633     Blood Culture No growth at 4 days    Blood Culture - Blood, Arm, Right [[id number]]  (Normal) Collected: 08/05/22 1548    Lab Status: Preliminary result Specimen: Blood from Arm, Right Updated: 08/09/22 1633     Blood Culture No growth at 4 days    Legionella Antigen, Urine - Urine, Urine, Clean Catch [[id number]]  (Normal) Collected: 08/05/22 2227    Lab Status: Final result Specimen: Urine, Clean Catch Updated: 08/06/22 1236     LEGIONELLA ANTIGEN, URINE Negative    S. Pneumo Ag Urine or CSF - Urine, Urine, Clean Catch [[id number]]  (Normal) Collected: 08/05/22 2227    Lab Status: Final result Specimen: Urine, Clean Catch Updated: 08/06/22 1236     Strep Pneumo Ag Negative    MRSA Screen, PCR (Inpatient) - Swab, Nares [[id number]]  (Normal) Collected: 08/05/22 2327    Lab Status: Final result Specimen: Swab from Nares Updated: 08/06/22 0734     MRSA PCR Negative    Narrative:      The negative predictive value of this diagnostic test is high and should only be used to consider de-escalating anti-MRSA therapy. A positive result may indicate colonization with MRSA and must be correlated clinically.  MRSA Negative    COVID PRE-OP / PRE-PROCEDURE SCREENING ORDER (NO ISOLATION) - Swab, Nasopharynx [[id number]]  (Normal) Collected: 08/05/22 1609    Lab Status: Final result Specimen: Swab from Nasopharynx Updated: 08/05/22 1649    Narrative:      The following orders were created for panel order COVID PRE-OP / PRE-PROCEDURE SCREENING ORDER (NO ISOLATION) - Swab, Nasopharynx.  Procedure                               Abnormality         Status                     ---------                               -----------         ------                     COVID-19 and FLU A/B PCR...[[id number]]  Normal              Final result                  Please view results for these tests on the individual orders.    COVID-19 and FLU A/B PCR - Swab, Nasopharynx [[id number]]  (Normal) Collected: 08/05/22 1609    Lab Status: Final result Specimen: Swab from Nasopharynx Updated: 08/05/22 1649     COVID19 Not Detected     Influenza A PCR Not Detected     Influenza B PCR Not Detected    Narrative:      Fact sheet for providers: https://www.fda.gov/media/765626/download    Fact sheet for patients: https://www.fda.gov/media/690106/download    Test performed by PCR.          No radiology results from the last 24 hrs    Results for orders placed during the hospital encounter of 08/05/22    Adult Transthoracic Echo Complete W/ Cont if Necessary Per Protocol    Interpretation Summary  · Left ventricular ejection fraction appears to be 26 - 30%. Left ventricular systolic function is severely decreased.  · Left ventricular wall thickness is consistent with mild concentric hypertrophy.  · Mildly reduced right ventricular systolic function noted.  · Left atrial volume is severely increased.  · There is moderate calcification of the aortic valve mainly affecting the left coronary cusp(s).  · Moderate mitral valve regurgitation is present.  · Moderate to severe tricuspid valve regurgitation is present.  · Estimated right ventricular systolic pressure from tricuspid regurgitation is mildly elevated (35-45 mmHg).      I have reviewed the medications:  Scheduled Meds:[START ON 8/11/2022] amiodarone, 200 mg, Oral, Once   Followed by  [START ON 8/11/2022] amiodarone, 200 mg, Oral, Q8H   Followed by  [START ON 8/18/2022] amiodarone, 200 mg, Oral, Q12H   Followed by  [START ON 9/1/2022] amiodarone, 200 mg, Oral, Daily  apixaban, 2.5 mg, Oral, BID  aspirin, 81 mg, Oral, Daily  bumetanide, 1 mg, Oral, BID  citalopram, 20 mg, Oral, Daily  dextrose, 50 mL, Intravenous, Once  donepezil, 5 mg, Oral, Nightly  doxycycline, 100 mg, Oral, Q12H  insulin lispro, 0-7 Units, Subcutaneous, TID  AC  insulin regular, 5 Units, Intravenous, TID AC  isosorbide dinitrate, 20 mg, Oral, TID - Nitrates  metoprolol tartrate, 150 mg, Oral, BID  pharmacy consult - MTM, , Does not apply, Daily  sodium chloride, 1,000 mL, Intravenous, Once  sodium chloride, 10 mL, Intravenous, Q12H  sodium zirconium cyclosilicate, 10 g, Oral, Once      Continuous Infusions:amiodarone, 0.5 mg/min, Last Rate: 0.5 mg/min (08/10/22 1318)  esmolol,  mcg/kg/min, Last Rate: 50 mcg/kg/min (08/10/22 0518)  sodium chloride, 75 mL/hr      PRN Meds:.•  acetaminophen **OR** acetaminophen **OR** acetaminophen  •  albuterol  •  dextrose  •  dextrose  •  glucagon (human recombinant)  •  HYDROcodone-acetaminophen  •  magnesium sulfate **OR** magnesium sulfate in D5W 1g/100mL (PREMIX) **OR** magnesium sulfate  •  ondansetron **OR** ondansetron  •  potassium chloride  •  potassium chloride  •  sodium chloride  •  sodium chloride    Assessment & Plan   Assessment & Plan     Active Hospital Problems    Diagnosis  POA   • Pneumonia of both lower lobes due to infectious organism [J18.9]  Yes   • Acute on chronic diastolic CHF (congestive heart failure) ([facility]) [I50.33]  Yes   • Acute respiratory failure with hypoxia ([facility]) [J96.01]  Yes   • Acute sepsis ([facility]) [A41.9]  Yes   • DEANGELO (acute kidney injury) ([facility]) [N17.9]  Yes   • Coronary artery disease involving native coronary artery of native heart with angina pectoris ([facility]) [I25.119]  Yes      Resolved Hospital Problems   No resolved problems to display.        Brief Hospital Course to date:  Mary Ramirez is a 87 y.o. female with a past medical history significant for paroxysmal atrial fibrillation, essential hypertension, CAD, diastolic congestive heart failure, CKD III, diabetes mellitus type 2, diverticulosis, Dementia, hyperlipidemia and TIA presented to the ED with complaints of shortness of air.    Assessment and plan :  Multifocal pneumonia, improving  Acute hypoxia  Initially started on cefepime  and vancomycin.  Currently on Rocephin and doxycycline  Oxygen saturation currently is ranging between 91 to 95% on room air.  Intermittently requiring low-dose oxygen at 1 to 2 L/min  Urine antigen and MRSA swab negative  Continue current antibiotic regimen  Patient is DNR and DNI  Seen and evaluated by palliative team.  Awaiting hospice team to evaluate the patient per family request    DEANGELO on CKD stage III  Hyperkalemia  Kidney functions/creatinine doubled today  Hold diuretic therapy and start gentle IV fluids  1 dose of Lokelma, dextrose insulin and monitor potassium level  Monitor kidney functions closely    Acute on chronic decompensated systolic and diastolic heart failure  ProBNP  33K on admission   Received 2mg IV Bume hi Mr. Nelson this is Rita x in the ED.  Initially received IV diuresis with Bumex and currently on p.o. Bumex 1 mg twice daily and compensated.  Given her worsening kidney function, will hold diuretic therapy and start her on gentle IV fluids  Being followed by Dr. Flores  TTE  EF 26-30% (last echo ef 55%)  Strict I &O   Daily weight   Appreciate help from cardiology team    Paroxysmal atrial fibrillation   Developed rapid A. fib this morning.  Cardiology team started patient on amiodarone drip  Continue anticoagulation with Eliquis    Essential hypertension   Controlled with Lopressor, Imdur and Bumex     Dementia  Continue aricept   Fall precautions     Diabetes mellitus type 2  Had hypoglycemic episode this morning likely secondary to poor oral intake  A1c 6.8% which is significantly low for her age  Discontinue insulin Lantus    GOC/Family update   My partner discussed with the patient's son on 8/7/2022. He is now aware that her EF is significantly decreased. He confirmed she is DNR/DNI.   Hospice care team consulted and the plan is for the patient to go back to the nursing home with hospice care tomorrow    Expected Discharge Location and Transportation: [place] with hospice  Expected  Discharge Date: 8/11, ambulance at 11:45 AM        DVT prophylaxis:  Medical DVT prophylaxis orders are present.     AM-PAC 6 Clicks Score (PT): 7 (08/08/22 0831)    CODE STATUS:   Code Status and Medical Interventions:   Ordered at: 08/05/22 8071     Medical Intervention Limits:    NO intubation (DNI)    NO cardioversion    NO dialysis    NO NIPPV (Non-Invasive Positive Pressure Ventilation)    NO artificial nutrition    NO vasopressors     Level Of Support Discussed With:    Health Care Surrogate     Code Status (Patient has no pulse and is not breathing):    No CPR (Do Not Attempt to Resuscitate)     Medical Interventions (Patient has pulse or is breathing):    Limited Support       Rikki Calix MD  08/10/22

## 2022-08-10 NOTE — CASE MANAGEMENT/SOCIAL WORK
Continued Stay Note  [facility]     Patient Name: Mary Ramirez  MRN: [id number]  Today's Date: 8/10/2022    Admit Date: 8/5/2022     Discharge Plan     Row Name 08/10/22 0940       Plan    Plan update    Patient/Family in Agreement with Plan yes    Plan Comments According to hospice liaison, transport has been changed to Thursday 8/11 via  Ambulance scheduled for 1145.    Final Discharge Disposition Code 50 - home with hospice               Discharge Codes    No documentation.               Expected Discharge Date and Time     Expected Discharge Date Expected Discharge Time    Aug 12, 2022             Betsy Avilez RN

## 2022-08-10 NOTE — PLAN OF CARE
Goal Outcome Evaluation:           Progress: declining  Outcome Evaluation: Pt is very lethargic and difficult to arouse. Nursing reports episodes of vtach. Pt is having myoclonus and urine is dark guera. Decreased po intake. pt is taking sips and bites. pt gagging after taking po meds. Pt given zofran for possible nausea.  Pt is wearing 02 pnc and resp unlabored. Plans for [place] with hospice tomorrow. Pt is currently on amiodarone iv.  Pt nearing end of life with decreased intake, responsiveness, myoclonus and guera urine.    Palliative IDT 1300:   Mikaela TOBIN, Marzena Waller RN CHPN,  Mike Navarro DO, Keri Johnson RN CHPN, Tiera Cunha RN OCN, GAVIN, Jeremy Yip MDIV, Anderson Almanza MSW

## 2022-08-10 NOTE — PROGRESS NOTES
"[facility] Progress Note     LOS: 5 days   Patient Care Team:  Tiana Hong MD as PCP - General (Internal Medicine)  Ta Flores III, MD as Cardiologist (Cardiology)  PCP:  Tiana Hong MD    Chief Complaint: Atrial fibrillation, CHF    SUBJECTIVE: Resting comfortably in bed.  Not requiring supplemental oxygen.  Opens her eyes but is essentially nonverbal    Review of Systems:   All systems have been reviewed and are negative with the exception of those mentioned above.      OBJECTIVE:    Vital Sign Min/Max for last 24 hours  Temp  Min: 97.5 °F (36.4 °C)  Max: 99.5 °F (37.5 °C)   BP  Min: 89/50  Max: 140/57   Pulse  Min: 107  Max: 157   Resp  Min: 16  Max: 20   SpO2  Min: 88 %  Max: 100 %   No data recorded   No data recorded     Flowsheet Rows    Flowsheet Row First Filed Value   Admission Height 152.4 cm (60\") Documented at 08/05/2022 1513   Admission Weight 54.4 kg (120 lb) Documented at 08/05/2022 1513          Telemetry: Sinus tachycardia first-degree AV block versus underlying atypical flutter      Intake/Output Summary (Last 24 hours) at 8/10/2022 1650  Last data filed at 8/10/2022 1606  Gross per 24 hour   Intake 1750.36 ml   Output 550 ml   Net 1200.36 ml     Intake & Output (last 3 days)       08/07 0701  08/08 0700 08/08 0701  08/09 0700 08/09 0701  08/10 0700 08/10 0701  08/11 0700    P.O. 240       I.V. (mL/kg)   100 (1.8) 600.4 (10.9)    IV Piggyback 100 143  1150    Total Intake(mL/kg) 340 (6.1) 143 (2.6) 100 (1.8) 1750.4 (31.7)    Urine (mL/kg/hr) 1200 (0.9) 1200 (0.9) 550 (0.4)     Stool  0      Total Output 1200 1200 550     Net -860 -1057 -450 +1750.4            Urine Unmeasured Occurrence 1 x       Stool Unmeasured Occurrence  1 x             Physical Exam:    General Appearance:   Somnolent, nonverbal   Neck:   Supple, symmetrical, trachea midline.   Lungs:     Clear to auscultation bilaterally, respirations " unlabored   Chest Wall:    No tenderness or deformity    Heart:   Tacky, S1 and S2 normal, no murmur, rub   or gallop, normal carotid impulse bilaterally without bruit.   Extremities:   Extremities normal, atraumatic, no cyanosis or edema   Pulses:   2+ and symmetric all extremities   Skin:   Skin color, texture, turgor normal, no rashes or lesions      LABS/DIAGNOSTIC DATA:  Results from last 7 days   Lab Units 08/10/22  0839 08/09/22  0515 08/06/22  0227   WBC 10*3/mm3 10.72 9.57 8.13   HEMOGLOBIN g/dL 16.7* 13.9 12.5   HEMATOCRIT % 53.7* 42.8 39.3   PLATELETS 10*3/mm3 94* 116* 136*     Lab Results   Lab Value Date/Time    TROPONINT 0.032 (C) 08/05/2022 1508    TROPONINT 0.028 06/10/2022 2230    TROPONINT 0.033 (C) 06/03/2022 1659    TROPONINT 0.038 (C) 06/03/2022 1443    TROPONINT <0.010 11/11/2019 1743    TROPONINT <0.010 09/21/2019 2115    TROPONINT <0.010 09/21/2019 1829         Results from last 7 days   Lab Units 08/10/22  1333 08/09/22  0932 08/08/22  0513 08/05/22  2229 08/05/22  1508   SODIUM mmol/L 146* 142 142   < > 131*   POTASSIUM mmol/L 6.1* 3.0* 3.2*   < > 5.8*   CHLORIDE mmol/L 99 95* 97*   < > 99   CO2 mmol/L 25.0 32.0* 31.0*   < > 21.0*   BUN mg/dL 46* 26* 32*   < > 49*   CREATININE mg/dL 2.20* 1.25* 1.30*   < > 1.84*   CALCIUM mg/dL 9.4 8.4* 8.8   < > 9.2   BILIRUBIN mg/dL  --  1.0  --   --  0.5   ALK PHOS U/L  --  94  --   --  97   ALT (SGPT) U/L  --  43*  --   --  105*   AST (SGOT) U/L  --  24  --   --  33*   GLUCOSE mg/dL 205* 172* 29*   < > 126*    < > = values in this interval not displayed.     Results from last 7 days   Lab Units 08/06/22 0227   HEMOGLOBIN A1C % 6.80*     Results from last 7 days   Lab Units 08/06/22 0227   CHOLESTEROL mg/dL 124   TRIGLYCERIDES mg/dL 122   HDL CHOL mg/dL 25*   LDL CHOL mg/dL 77     Results from last 7 days   Lab Units 08/06/22 0227   TSH uIU/mL 5.830*           Medication Review:   [START ON 8/11/2022] amiodarone, 200 mg, Oral, Once   Followed  by  [START ON 8/11/2022] amiodarone, 200 mg, Oral, Q8H   Followed by  [START ON 8/18/2022] amiodarone, 200 mg, Oral, Q12H   Followed by  [START ON 9/1/2022] amiodarone, 200 mg, Oral, Daily  apixaban, 2.5 mg, Oral, BID  aspirin, 81 mg, Oral, Daily  citalopram, 20 mg, Oral, Daily  donepezil, 5 mg, Oral, Nightly  doxycycline, 100 mg, Oral, Q12H  insulin lispro, 0-7 Units, Subcutaneous, TID AC  insulin regular, 5 Units, Intravenous, TID AC  isosorbide dinitrate, 20 mg, Oral, TID - Nitrates  metoprolol tartrate, 150 mg, Oral, BID  pharmacy consult - MTM, , Does not apply, Daily  sodium chloride, 1,000 mL, Intravenous, Once  sodium chloride, 10 mL, Intravenous, Q12H       amiodarone, 0.5 mg/min, Last Rate: 0.5 mg/min (08/10/22 1318)  esmolol,  mcg/kg/min, Last Rate: 50 mcg/kg/min (08/10/22 0518)  sodium chloride, 75 mL/hr, Last Rate: 75 mL/hr (08/10/22 1606)         ASSESSMENT/PLAN:    Coronary 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  Developing DEANGELO and associated hyperkalemia with overdiuresis.  -Continue Eliquis 2.5 mg p.o. twice daily for stroke prophylaxis  -Continue metoprolol tartrate 150 mg p.o. twice daily for rate control, will transition to metoprolol succinate at discharge, trending rate response.  Not recommending anticoagulation or restoration of sinus rhythm at this time.  -Diuresis on hold due to developing DEANGELO.  Overdiuresis also likely responsible for increased rate response.  -Continue amiodarone load.  -Agree with plan for patient to transition to hospice care.        Ta Flores III, MD    08/10/22  16:50 EDT

## 2022-08-10 NOTE — PROGRESS NOTES
Continued Stay Note  [facility]     Patient Name: Mary Ramirez  MRN: [id number]  Today's Date: 8/10/2022    Admit Date: 8/5/2022     Discharge Plan     Row Name 08/10/22 1518       Plan    Plan [place] [facility] tomorrow    Plan Comments Hospice visit made to the patient's room. Upon arrival, patient was noted with eyes open and was able to shake her head when asked if she was in pain. Patient did not appear to be in any distress at this time. Spoke with the patient's son about the patient recent change in status. Son states that he wishes to proceed with the plan to go to the [place] [facility] tomorrow and for the patient to be admitted to hospice then. Advised if patient has a change in status over night could assess for hospice inpatient appropriateness. Son was understanding. Hospice will continue to follow. Please call [phone] for any questions or concerns.               Discharge Codes    No documentation.               Expected Discharge Date and Time     Expected Discharge Date Expected Discharge Time    Aug 12, 2022             SUSANNE SAENZ

## 2022-08-10 NOTE — PROGRESS NOTES
visited patient per palliative care referral.  (Patient had been visited multiple times before by multiple chaplains but sleeping.) Patient was awake during visit but not able to communicate for a spiritual care consult.  Notes are that patient will go to the [place] with hospice care on Thursday.  Will attempt to communicate to family that spiritual care is available through hospice if family desires and/or if would be meaningful to patient.

## 2022-08-10 NOTE — PLAN OF CARE
Goal Outcome Evaluation:      Pt AOxself, afib per monitor, RA, VSS. around 0300 pt was in Afib rvr rate sustaining >150. hospitalist notified. 1x digoxin, 500ml NS bolus given. Cardiology notified after no change in rate, esmolol bolus given, titrated gtt started at 25 mcg.     Pt became hypotensive, esmolol stopped. Pt had runs of vtach on the monitor, hospitalist, cardiology notified. Amiodarone bolus per cardiology. Pt currently on amiodarone gtt.

## 2022-08-11 VITALS
SYSTOLIC BLOOD PRESSURE: 125 MMHG | HEIGHT: 60 IN | DIASTOLIC BLOOD PRESSURE: 84 MMHG | WEIGHT: 114.86 LBS | RESPIRATION RATE: 14 BRPM | OXYGEN SATURATION: 100 % | TEMPERATURE: 96 F | BODY MASS INDEX: 22.55 KG/M2 | HEART RATE: 104 BPM

## 2022-08-11 PROBLEM — I50.23 ACUTE ON CHRONIC SYSTOLIC HEART FAILURE (HCC): Status: ACTIVE | Noted: 2022-08-11

## 2022-08-11 LAB
ANION GAP SERPL CALCULATED.3IONS-SCNC: 11 MMOL/L (ref 5–15)
BASOPHILS # BLD AUTO: 0.06 10*3/MM3 (ref 0–0.2)
BASOPHILS NFR BLD AUTO: 0.7 % (ref 0–1.5)
BUN SERPL-MCNC: 53 MG/DL (ref 8–23)
BUN/CREAT SERPL: 27.7 (ref 7–25)
CALCIUM SPEC-SCNC: 8.4 MG/DL (ref 8.6–10.5)
CHLORIDE SERPL-SCNC: 107 MMOL/L (ref 98–107)
CO2 SERPL-SCNC: 31 MMOL/L (ref 22–29)
CREAT SERPL-MCNC: 1.91 MG/DL (ref 0.57–1)
DEPRECATED RDW RBC AUTO: 61.8 FL (ref 37–54)
EGFRCR SERPLBLD CKD-EPI 2021: 25.1 ML/MIN/1.73
EOSINOPHIL # BLD AUTO: 0.01 10*3/MM3 (ref 0–0.4)
EOSINOPHIL NFR BLD AUTO: 0.1 % (ref 0.3–6.2)
ERYTHROCYTE [DISTWIDTH] IN BLOOD BY AUTOMATED COUNT: 18.3 % (ref 12.3–15.4)
GLUCOSE BLDC GLUCOMTR-MCNC: 122 MG/DL (ref 70–130)
GLUCOSE BLDC GLUCOMTR-MCNC: 167 MG/DL (ref 70–130)
GLUCOSE SERPL-MCNC: 135 MG/DL (ref 65–99)
HCT VFR BLD AUTO: 45.9 % (ref 34–46.6)
HGB BLD-MCNC: 13.9 G/DL (ref 12–15.9)
IMM GRANULOCYTES # BLD AUTO: 0.05 10*3/MM3 (ref 0–0.05)
IMM GRANULOCYTES NFR BLD AUTO: 0.6 % (ref 0–0.5)
LYMPHOCYTES # BLD AUTO: 1.65 10*3/MM3 (ref 0.7–3.1)
LYMPHOCYTES NFR BLD AUTO: 18.3 % (ref 19.6–45.3)
MCH RBC QN AUTO: 29.1 PG (ref 26.6–33)
MCHC RBC AUTO-ENTMCNC: 30.3 G/DL (ref 31.5–35.7)
MCV RBC AUTO: 96.2 FL (ref 79–97)
MONOCYTES # BLD AUTO: 0.5 10*3/MM3 (ref 0.1–0.9)
MONOCYTES NFR BLD AUTO: 5.6 % (ref 5–12)
NEUTROPHILS NFR BLD AUTO: 6.73 10*3/MM3 (ref 1.7–7)
NEUTROPHILS NFR BLD AUTO: 74.7 % (ref 42.7–76)
NRBC BLD AUTO-RTO: 0 /100 WBC (ref 0–0.2)
PLATELET # BLD AUTO: 105 10*3/MM3 (ref 140–450)
PMV BLD AUTO: 12.2 FL (ref 6–12)
POTASSIUM SERPL-SCNC: 3.7 MMOL/L (ref 3.5–5.2)
QT INTERVAL: 430 MS
QTC INTERVAL: 490 MS
RBC # BLD AUTO: 4.77 10*6/MM3 (ref 3.77–5.28)
SODIUM SERPL-SCNC: 149 MMOL/L (ref 136–145)
WBC NRBC COR # BLD: 9 10*3/MM3 (ref 3.4–10.8)

## 2022-08-11 PROCEDURE — 80048 BASIC METABOLIC PNL TOTAL CA: CPT | Performed by: INTERNAL MEDICINE

## 2022-08-11 PROCEDURE — 85025 COMPLETE CBC W/AUTO DIFF WBC: CPT | Performed by: INTERNAL MEDICINE

## 2022-08-11 PROCEDURE — 93010 ELECTROCARDIOGRAM REPORT: CPT | Performed by: INTERNAL MEDICINE

## 2022-08-11 PROCEDURE — 82962 GLUCOSE BLOOD TEST: CPT

## 2022-08-11 PROCEDURE — 93005 ELECTROCARDIOGRAM TRACING: CPT | Performed by: INTERNAL MEDICINE

## 2022-08-11 PROCEDURE — 25010000002 AMIODARONE IN DEXTROSE 5% 360-4.14 MG/200ML-% SOLUTION: Performed by: INTERNAL MEDICINE

## 2022-08-11 PROCEDURE — 99239 HOSP IP/OBS DSCHRG MGMT >30: CPT | Performed by: INTERNAL MEDICINE

## 2022-08-11 RX ORDER — METOPROLOL SUCCINATE 200 MG/1
200 TABLET, EXTENDED RELEASE ORAL DAILY
Qty: 30 TABLET | Refills: 11 | Status: SHIPPED | OUTPATIENT
Start: 2022-08-11 | End: 2023-08-11

## 2022-08-11 RX ORDER — BUMETANIDE 0.5 MG/1
0.5 TABLET ORAL DAILY
Qty: 30 TABLET | Refills: 3 | Status: SHIPPED | OUTPATIENT
Start: 2022-08-11

## 2022-08-11 RX ORDER — DOXYCYCLINE HYCLATE 100 MG/1
100 CAPSULE ORAL 2 TIMES DAILY
Qty: 10 CAPSULE | Refills: 0 | Status: SHIPPED | OUTPATIENT
Start: 2022-08-11 | End: 2022-08-16

## 2022-08-11 RX ORDER — AMIODARONE HYDROCHLORIDE 200 MG/1
200 TABLET ORAL DAILY
Qty: 30 TABLET | Refills: 3 | Status: SHIPPED | OUTPATIENT
Start: 2022-09-01

## 2022-08-11 RX ORDER — CEFDINIR 300 MG/1
300 CAPSULE ORAL DAILY
Qty: 5 CAPSULE | Refills: 0 | Status: SHIPPED | OUTPATIENT
Start: 2022-08-11

## 2022-08-11 RX ORDER — AMIODARONE HYDROCHLORIDE 200 MG/1
200 TABLET ORAL EVERY 12 HOURS
Qty: 28 TABLET | Refills: 0 | Status: SHIPPED | OUTPATIENT
Start: 2022-08-18 | End: 2022-09-01

## 2022-08-11 RX ADMIN — APIXABAN 2.5 MG: 2.5 TABLET, FILM COATED ORAL at 09:23

## 2022-08-11 RX ADMIN — Medication 10 ML: at 09:25

## 2022-08-11 RX ADMIN — AMIODARONE HYDROCHLORIDE 0.5 MG/MIN: 1.8 INJECTION, SOLUTION INTRAVENOUS at 00:52

## 2022-08-11 RX ADMIN — CITALOPRAM HYDROBROMIDE 20 MG: 20 TABLET ORAL at 09:25

## 2022-08-11 RX ADMIN — METOPROLOL TARTRATE 150 MG: 100 TABLET, FILM COATED ORAL at 09:24

## 2022-08-11 RX ADMIN — AMIODARONE HYDROCHLORIDE 200 MG: 200 TABLET ORAL at 09:24

## 2022-08-11 RX ADMIN — ISOSORBIDE DINITRATE 20 MG: 20 TABLET ORAL at 09:24

## 2022-08-11 RX ADMIN — ASPIRIN 81 MG CHEWABLE TABLET 81 MG: 81 TABLET CHEWABLE at 09:24

## 2022-08-11 NOTE — PLAN OF CARE
Goal Outcome Evaluation:  Progress: improving  Outcome Evaluation: Patient arouses to voice. Able to tell me her name. Sinus tach throughout most of the night. Placed on 2L nasal cannula. Amio gtt and NS going overnight. No complaints of pain. Skin care provided as needed. Small BM overnight. Continue POC.

## 2022-08-11 NOTE — DISCHARGE SUMMARY
[facility] Medicine Services  DISCHARGE SUMMARY    Patient Name: Mary Ramirez  : 1934  MRN: [id number]    Date of Admission: 2022  2:45 PM  Date of Discharge:  2022  Primary Care Physician: Tiana Hong MD    Consults     Date and Time Order Name Status Description    2022  9:17 AM Inpatient Palliative Care MD Consult Completed     2022  9:17 AM Inpatient Cardiology Consult Completed         Hospital Course     Presenting Problem:   Pneumonia of both lower lobes due to infectious organism [J18.9]    Active Hospital Problems    Diagnosis  POA   • Acute on chronic systolic heart failure (CMS/HCC) [I50.23]  Unknown   • Pneumonia of both lower lobes due to infectious organism [J18.9]  Yes   • Acute on chronic diastolic CHF (congestive heart failure) (HCC) [I50.33]  Yes   • Acute respiratory failure with hypoxia ([facility]) [J96.01]  Yes   • Acute sepsis ([facility]) [A41.9]  Yes   • DEANGELO (acute kidney injury) ([facility]) [N17.9]  Yes   • Coronary artery disease involving native coronary artery of native heart with angina pectoris ([facility]) [I25.119]  Yes      Resolved Hospital Problems   No resolved problems to display.      Hospital Course:  Mary Ramirez is a 87 y.o. female with a past medical history significant for paroxysmal atrial fibrillation, essential hypertension, CAD, diastolic congestive heart failure, CKD III, diabetes mellitus type 2, diverticulosis, Dementia, hyperlipidemia and TIA presented to the ED with complaints of shortness of air.  She was found to have bilateral multifocal pneumonia with acute hypoxia.  She was started empirically on Rocephin and doxycycline with marked improvement of her respiratory symptoms.  Oxygen requirement is minimal ranging between room air (sat 90%) to 1 to 2 L/min (sat 95%).  She was also found to be in decompensated systolic and diastolic heart failure.  Echocardiogram was repeated during this hospitalization and showed  worsening systolic function with ejection fraction of 26 to 30%.  She improved with IV diuresis but eventually developed DEANGELO on 8/10/2022 with mild hyperkalemia that resulted in A. fib with RVR requiring amiodarone drip per cardiology recommendations.  Diuretic therapy was held and she was started on gentle IV fluids and her kidney functions did not decline further.  One of the reason her kidney function got worse as the poor oral intake since she is not eating or drinking enough.  I discussed that with the family/son Mr. Josh Ramirez and explained to him that with her poor oral intake, most likely her kidney functions will continue to get worse.  He was understanding and he is not interested in pursuing artificial feeding through feeding tube and wants to proceed with discharge planning to the nursing home with hospice care.    Goals of care  Patient with advanced dementia and multiple organ dysfunction including congestive heart failure, chronic kidney disease and renal failure  Discussion of the goals of care with the family was done at multiple occasions.  The patient is DNR and DNI  The plan is to transition the patient to the nursing home with hospice care  I clearly explained to the son (Mr. Josh Polanco) today that the patient oral intake is very poor and most likely that would result in worsening kidney functions and multiple electrolyte abnormalities that would result in cardiac arrhythmias.  He was understanding and declined PEG tube placement.  He was to proceed with the discharge plan for the patient to go to the nursing home with hospice care    Multifocal pneumonia, improving  Acute hypoxia  Initially started on cefepime and vancomycin.  Currently on Rocephin and doxycycline  Oxygen saturation currently is ranging between 91 to 95% on room air.  Intermittently requiring low-dose oxygen at 1 to 2 L/min  Urine antigen and MRSA swab negative  Continue current antibiotic regimen  Patient is DNR and DNI Seen  and evaluated by palliative and hospice team.  Patient is to be discharged to the nursing home with hospice care     DEANGELO on CKD stage III  Hyperkalemia, resolved  Patient developed DEANGELO.  Creatinine increased from 1.2 to 2.2 likely secondary to overdiuresis and poor oral intake  Creatinine improved today to 1.9 after starting gentle IV fluids and holding diuretic therapy  Hyperkalemia resolved with dextrose insulin, Lokelma  Given her poor oral intake, kidney function might get worse.  Family understand the consequences.      Acute on chronic decompensated systolic and diastolic heart failure  ProBNP  33K on admission   Received 2mg IV Bume hi Mr. Nelson this is Rita x in the ED.  Initially received IV diuresis with Bumex and currently on p.o. Bumex 1 mg twice daily and compensated.    Diuretic therapy was held on 8/10/2022 because of worsening kidney functions.  We will restart diuretic therapy tomorrow with p.o. Bumex 0.5 mg daily  Being followed by Dr. Flores  TTE  EF 26-30% (last echo ef 55%)  Strict I &O   Daily weight   Appreciate help from cardiology team     Paroxysmal atrial fibrillation   Developed rapid A. fib and started amiodarone drip   Transition to p.o. amiodarone   On anticoagulation with Eliquis     Essential hypertension   Controlled with Lopressor, Imdur and Bumex     Dementia  Continue aricept   Fall precautions      Diabetes mellitus type 2  Had hypoglycemic episode this morning likely secondary to poor oral intake  A1c 6.8% which is significantly low for her age  Discontinue insulin Lantus    Discharge Follow Up Recommendations for outpatient labs/diagnostics:  Facility physician in 1 to 2 days  Patient to be discharged to the nursing home with hospice care    Day of Discharge     HPI:   I have seen and evaluated the patient this morning.  Pleasantly demented.  Remains confused and cannot contribute much to HPI.  Currently normal sinus rhythm.  No further arrhythmias noted.  Still with very poor  oral intake.    Review of Systems  Unable to obtain because of her severe dementia    Vital Signs:   Temp:  [96 °F (35.6 °C)-98.6 °F (37 °C)] 96 °F (35.6 °C)  Heart Rate:  [] 104  Resp:  [14-18] 14  BP: (101-142)/() 125/84  Flow (L/min):  [2] 2    Physical Exam:  General: Chronically ill looking.  Minimally conversant.  Pleasant.  Head: Atraumatic and normocephalic, without obvious abnormality  Eyes:   Conjunctivae and sclerae normal, no Icterus. No pallor  Ears:  Ears appear intact with no abnormalities noted  Throat: No oral lesions, no thrush, oral mucosa moist  Neck: Supple, trachea midline, no thyromegaly  Back:   No kyphoscoliosis present. No tenderness to palpation,   no sacral edema  Lungs: Clear to auscultation bilaterally, equal air entry, no wheezing or crackles  Heart:  Normal S1 and S2, no murmur, no gallop, No JVD, no lower extremity swelling  Abdomen:  Soft, no tenderness, no organomegaly, normal bowel sounds  Normal bowel sounds, no masses, no organomegaly. Soft, nontender, nondistended, no guarding, no rebound tenderness.  Extremities: No gross abnormalities, no clubbing, pulses palpable and equal bilaterally  Skin: No bleeding, bruising or rash, normal skin turgor and elasticity  Neurologic: Cranial nerves appear intact with no evidence of facial asymmetry, normal motor and sensory functions in all 4 extremities  Psych: Alert and oriented x1    Pertinent  and/or Most Recent Results     LAB RESULTS:      Lab 08/11/22  0416 08/10/22  0839 08/09/22  0515 08/06/22  0831 08/06/22  0227 08/05/22  2229 08/05/22  1850 08/05/22  1508   WBC 9.00 10.72 9.57  --  8.13  --   --  12.08*   HEMOGLOBIN 13.9 16.7* 13.9  --  12.5  --   --  13.6   HEMATOCRIT 45.9 53.7* 42.8  --  39.3  --   --  44.8   PLATELETS 105* 94* 116*  --  136*  --   --  166   NEUTROS ABS 6.73  --  7.02*  --   --   --   --  9.23*   IMMATURE GRANS (ABS) 0.05  --  0.04  --   --   --   --  0.08*   LYMPHS ABS 1.65  --  1.21  --   --   --    --  1.39   MONOS ABS 0.50  --  1.26*  --   --   --   --  1.26*   EOS ABS 0.01  --  0.02  --   --   --   --  0.09   MCV 96.2 94.0 88.1  --  89.7  --   --  94.5   PROCALCITONIN  --   --   --   --   --   --   --  0.06   LACTATE  --   --   --  2.4* 3.0* 3.9* 4.3* 4.0*         Lab 08/11/22  0416 08/10/22  2007 08/10/22  1333 08/09/22  0932 08/08/22  0513 08/06/22 0227   SODIUM 149* 145 146* 142 142 134*   POTASSIUM 3.7 3.9 6.1* 3.0* 3.2* 4.7   CHLORIDE 107 105 99 95* 97* 96*   CO2 31.0* 27.0 25.0 32.0* 31.0* 23.0   ANION GAP 11.0 13.0 22.0* 15.0 14.0 15.0   BUN 53* 51* 46* 26* 32* 53*   CREATININE 1.91* 1.92* 2.20* 1.25* 1.30* 1.97*   EGFR 25.1* 25.0* 21.2* 41.8* 39.9* 24.2*   GLUCOSE 135* 260* 205* 172* 29* 144*   CALCIUM 8.4* 8.6 9.4 8.4* 8.8 8.6   MAGNESIUM  --   --  2.6* 1.4*  --   --    HEMOGLOBIN A1C  --   --   --   --   --  6.80*   TSH  --   --   --   --   --  5.830*         Lab 08/09/22  0932 08/05/22  1508   TOTAL PROTEIN 5.7* 6.4   ALBUMIN 3.40* 3.70   GLOBULIN 2.3 2.7   ALT (SGPT) 43* 105*   AST (SGOT) 24 33*   BILIRUBIN 1.0 0.5   ALK PHOS 94 97         Lab 08/05/22  1508   PROBNP 33,432.0*   TROPONIN T 0.032*         Lab 08/06/22  0227   CHOLESTEROL 124   LDL CHOL 77   HDL CHOL 25*   TRIGLYCERIDES 122       Brief Urine Lab Results  (Last result in the past 365 days)      Color   Clarity   Blood   Leuk Est   Nitrite   Protein   CREAT   Urine HCG        06/11/22 0333 Yellow   Clear   Negative   Trace   Negative   Negative               Microbiology Results (last 10 days)     Procedure Component Value - Date/Time    MRSA Screen, PCR (Inpatient) - Swab, Nares [[id number]]  (Normal) Collected: 08/05/22 1264    Lab Status: Final result Specimen: Swab from Nares Updated: 08/06/22 0734     MRSA PCR Negative    Narrative:      The negative predictive value of this diagnostic test is high and should only be used to consider de-escalating anti-MRSA therapy. A positive result may indicate colonization with MRSA and  must be correlated clinically.  MRSA Negative    S. Pneumo Ag Urine or CSF - Urine, Urine, Clean Catch [[id number]]  (Normal) Collected: 08/05/22 2227    Lab Status: Final result Specimen: Urine, Clean Catch Updated: 08/06/22 1236     Strep Pneumo Ag Negative    Legionella Antigen, Urine - Urine, Urine, Clean Catch [[id number]]  (Normal) Collected: 08/05/22 2227    Lab Status: Final result Specimen: Urine, Clean Catch Updated: 08/06/22 1236     LEGIONELLA ANTIGEN, URINE Negative    COVID PRE-OP / PRE-PROCEDURE SCREENING ORDER (NO ISOLATION) - Swab, Nasopharynx [[id number]]  (Normal) Collected: 08/05/22 1609    Lab Status: Final result Specimen: Swab from Nasopharynx Updated: 08/05/22 1649    Narrative:      The following orders were created for panel order COVID PRE-OP / PRE-PROCEDURE SCREENING ORDER (NO ISOLATION) - Swab, Nasopharynx.  Procedure                               Abnormality         Status                     ---------                               -----------         ------                     COVID-19 and FLU A/B PCR...[[id number]]  Normal              Final result                 Please view results for these tests on the individual orders.    COVID-19 and FLU A/B PCR - Swab, Nasopharynx [[id number]]  (Normal) Collected: 08/05/22 1609    Lab Status: Final result Specimen: Swab from Nasopharynx Updated: 08/05/22 1649     COVID19 Not Detected     Influenza A PCR Not Detected     Influenza B PCR Not Detected    Narrative:      Fact sheet for providers: https://www.fda.gov/media/000166/download    Fact sheet for patients: https://www.fda.gov/media/927308/download    Test performed by PCR.    Blood Culture - Blood, Hand, Right [[id number]]  (Normal) Collected: 08/05/22 1606    Lab Status: Final result Specimen: Blood from Hand, Right Updated: 08/10/22 1634     Blood Culture No growth at 5 days    Blood Culture - Blood, Arm, Right [[id number]]  (Normal) Collected: 08/05/22 1548    Lab Status: Final result  Specimen: Blood from Arm, Right Updated: 08/10/22 1634     Blood Culture No growth at 5 days        Adult Transthoracic Echo Complete W/ Cont if Necessary Per Protocol    Result Date: 8/6/2022  · Left ventricular ejection fraction appears to be 26 - 30%. Left ventricular systolic function is severely decreased. · Left ventricular wall thickness is consistent with mild concentric hypertrophy. · Mildly reduced right ventricular systolic function noted. · Left atrial volume is severely increased. · There is moderate calcification of the aortic valve mainly affecting the left coronary cusp(s). · Moderate mitral valve regurgitation is present. · Moderate to severe tricuspid valve regurgitation is present. · Estimated right ventricular systolic pressure from tricuspid regurgitation is mildly elevated (35-45 mmHg).      XR Chest 1 View    Result Date: 8/5/2022   DATE OF EXAM: 8/5/2022 3:19 PM  PROCEDURE: XR CHEST 1 VW-  INDICATIONS: SOA triage protocol  COMPARISON: 06/15/2022  TECHNIQUE: Portable chest radiograph.  FINDINGS:  Stable cardiac enlargement. Large hiatal hernia again noted. No pneumothorax. There is persistent bibasilar airspace disease which may relate to atelectasis and/or pneumonia. Bilateral upper lung airspace disease on the prior study appears improved. Patient's chin overlies the right apex partially limiting assessment. Small bilateral pleural effusions. No pneumothorax.      1. Persistent bibasilar airspace disease which may relate to atelectasis or pneumonia. 2. Bilateral upper lobe airspace disease on the prior study has resolved. 3. Small bilateral pleural effusions. 4. Large hiatal hernia.  This report was finalized on 8/5/2022 3:21 PM by Michael Osei MD.      Results for orders placed during the hospital encounter of 06/10/22    Duplex Venous Lower Extremity - Bilateral CAR    Interpretation Summary  · Normal bilateral lower extremity venous duplex scan.    Results for orders placed during the  hospital encounter of 06/10/22    Duplex Venous Lower Extremity - Bilateral CAR    Interpretation Summary  · Normal bilateral lower extremity venous duplex scan.    Results for orders placed during the hospital encounter of 08/05/22    Adult Transthoracic Echo Complete W/ Cont if Necessary Per Protocol    Interpretation Summary  · Left ventricular ejection fraction appears to be 26 - 30%. Left ventricular systolic function is severely decreased.  · Left ventricular wall thickness is consistent with mild concentric hypertrophy.  · Mildly reduced right ventricular systolic function noted.  · Left atrial volume is severely increased.  · There is moderate calcification of the aortic valve mainly affecting the left coronary cusp(s).  · Moderate mitral valve regurgitation is present.  · Moderate to severe tricuspid valve regurgitation is present.  · Estimated right ventricular systolic pressure from tricuspid regurgitation is mildly elevated (35-45 mmHg).    Plan for Follow-up of Pending Labs/Results:     Discharge Details        Discharge Medications      New Medications      Instructions Start Date   amiodarone 200 MG tablet  Commonly known as: PACERONE   200 mg, Oral, Every 12 Hours   Start Date: August 18, 2022     amiodarone 200 MG tablet  Commonly known as: PACERONE   200 mg, Oral, Daily   Start Date: September 1, 2022     bumetanide 0.5 MG tablet  Commonly known as: BUMEX   0.5 mg, Oral, Daily      cefdinir 300 MG capsule  Commonly known as: OMNICEF   300 mg, Oral, Daily      doxycycline 100 MG capsule  Commonly known as: VIBRAMYCIN   100 mg, Oral, 2 Times Daily      metoprolol succinate  MG 24 hr tablet  Commonly known as: Toprol XL   200 mg, Oral, Daily         Continue These Medications      Instructions Start Date   acetaminophen 500 MG tablet  Commonly known as: TYLENOL   500 mg, Oral, Every 6 Hours PRN      acetaminophen 500 MG tablet  Commonly known as: TYLENOL   500 mg, Oral, 2 Times Daily     "  aluminum-magnesium hydroxide-simethicone 400-400-40 MG/5ML suspension  Commonly known as: MAALOX MAX   5 mL, Oral, Every 6 Hours PRN      apixaban 2.5 MG tablet tablet  Commonly known as: ELIQUIS   2.5 mg, Oral, 2 Times Daily      aspirin 81 MG chewable tablet   81 mg, Oral, Daily      citalopram 20 MG tablet  Commonly known as: CeleXA   20 mg, Oral, Daily      docusate sodium 100 MG capsule  Commonly known as: COLACE   100 mg, Oral, 2 Times Daily      donepezil 5 MG tablet  Commonly known as: ARICEPT   5 mg, Oral, Nightly      fluticasone 50 MCG/ACT nasal spray  Commonly known as: FLONASE   2 sprays, Nasal, Daily      Glucagon Emergency 1 MG kit   As Needed      insulin aspart 100 UNIT/ML solution pen-injector sc pen  Commonly known as: novoLOG FLEXPEN   2-8 Units, Subcutaneous, 4 Times Daily Before Meals & Nightly      isosorbide dinitrate 40 MG tablet  Commonly known as: ISORDIL   20 mg, Oral, 3 Times Daily      Lantus SoloStar 100 UNIT/ML injection pen  Generic drug: Insulin Glargine   10 Units, Subcutaneous, Nightly      lidocaine 5 %  Commonly known as: Lidoderm   1 patch, Transdermal, Every 24 Hours, Remove & Discard patch within 12 hours or as directed by MD      MULTIVITAMINS PO   1 tablet, Oral, Daily      ondansetron 4 MG tablet  Commonly known as: ZOFRAN   4 mg, Oral, Every 8 Hours PRN      polyethylene glycol pack packet  Commonly known as: MIRALAX   17 g, Oral, Daily         Stop These Medications    hydrALAZINE 10 MG tablet  Commonly known as: APRESOLINE     metFORMIN 500 MG tablet  Commonly known as: GLUCOPHAGE     metoprolol tartrate 100 MG tablet  Commonly known as: LOPRESSOR     metoprolol tartrate 25 MG tablet  Commonly known as: LOPRESSOR          Allergies   Allergen Reactions   • Penicillins Other (See Comments)     Pt states \"i don't know\"     Discharge Disposition:  Long Term Care (DC - External)    Diet:  Hospital:  Diet Order   Procedures   • Diet Pureed; Cardiac, Consistent Carbohydrate "     Activity:  Activity Instructions     Activity as Tolerated          Restrictions or Other Recommendations:  None        CODE STATUS:    Code Status and Medical Interventions:   Ordered at: 08/05/22 2151     Medical Intervention Limits:    NO intubation (DNI)    NO cardioversion    NO dialysis    NO NIPPV (Non-Invasive Positive Pressure Ventilation)    NO artificial nutrition    NO vasopressors     Level Of Support Discussed With:    Health Care Surrogate     Code Status (Patient has no pulse and is not breathing):    No CPR (Do Not Attempt to Resuscitate)     Medical Interventions (Patient has pulse or is breathing):    Limited Support     Future Appointments   Date Time Provider Department Center   8/11/2022 11:45 AM EMS 1  [facility]   2/20/2023  9:15 AM Ta Flores III, MD [facility]     Rikki Calix MD  08/11/22    Time Spent on Discharge:  I spent  39  minutes on this discharge activity which included: face-to-face encounter with the patient, reviewing the data in the system, coordination of the care with the nursing staff as well as consultants, documentation, and entering orders.

## 2022-08-11 NOTE — PLAN OF CARE
Goal Outcome Evaluation:                   Patient transported to [place] via ambulance.

## (undated) DEVICE — ANTIBACTERIAL UNDYED BRAIDED (POLYGLACTIN 910), SYNTHETIC ABSORBABLE SUTURE: Brand: COATED VICRYL

## (undated) DEVICE — LEGGINGS, PAIR, 29X43, STERILE: Brand: MEDLINE

## (undated) DEVICE — BIT DRL 3FLUT QC CALIB 4.2X330X100MM

## (undated) DEVICE — 3M™ IOBAN™ 2 ANTIMICROBIAL INCISE DRAPE 6650EZ: Brand: IOBAN™ 2

## (undated) DEVICE — Device: Brand: COVER, PERINEAL POST, 12 PK

## (undated) DEVICE — C-ARM: Brand: UNBRANDED

## (undated) DEVICE — GLV SURG TRIUMPH ORTHO W/ALOE PF LTX 8 STRL

## (undated) DEVICE — SPNG GZ WOVN 4X4IN 12PLY 10/BX STRL

## (undated) DEVICE — REAMER SHAFT, MOD.TRINKLE: Brand: BIXCUT

## (undated) DEVICE — GLV SURG DERMASSURE GRN LF PF 8.0

## (undated) DEVICE — Device

## (undated) DEVICE — CVR HNDL LT SURG ACCSSRY BLU STRL

## (undated) DEVICE — SOL NACL 0.9PCT 1000ML

## (undated) DEVICE — DRAPE,REIN 53X77,STERILE: Brand: MEDLINE

## (undated) DEVICE — DRSNG GZ PETROLTM XEROFORM CURAD 1X8IN STRL

## (undated) DEVICE — SNAP KOVER: Brand: UNBRANDED

## (undated) DEVICE — AIRWY 90MM NO9

## (undated) DEVICE — PROXIMATE RH ROTATING HEAD SKIN STAPLERS (35 WIDE) CONTAINS 35 STAINLESS STEEL STAPLES: Brand: PROXIMATE

## (undated) DEVICE — PAD CAST SOF ROL NS 4IN

## (undated) DEVICE — DRILL, AO, STERILE

## (undated) DEVICE — PETROLATUM DRESSING. FINE MESH GAUZE IMPREGNATED WITH 3% BISMUTH TRIBROMOPHENATE  IN A PETROLATUM BLEND.: Brand: XEROFORM PETROLATUM

## (undated) DEVICE — K-WIRE
Type: IMPLANTABLE DEVICE | Site: HIP | Status: NON-FUNCTIONAL
Removed: 2019-03-30

## (undated) DEVICE — 2963 MEDIPORE SOFT CLOTH TAPE 3 IN X 10 YD 12 RLS/CS: Brand: 3M™ MEDIPORE™

## (undated) DEVICE — ST NERV BLCK CONT CONTIPLEX ECHO CLSD 18G 4IN

## (undated) DEVICE — 3M™ TEGADERM™ CHG DRESSING 25/CARTON 4 CARTONS/CASE 1658: Brand: TEGADERM™

## (undated) DEVICE — GUIDE WIRE, BALL-TIPPED, STERILE

## (undated) DEVICE — DRSNG WND GZ PAD BORDERED LF 4X5IN STRL

## (undated) DEVICE — NERVE BLOCK SUPPORT KIT/BLUE: Brand: MEDLINE INDUSTRIES, INC.

## (undated) DEVICE — GOWN,REINF,POLY,ECL,PP SLV,XL: Brand: MEDLINE

## (undated) DEVICE — GLV SURG TRIUMPH CLASSIC PF LTX 7.5 STRL

## (undated) DEVICE — Device: Brand: PROTECTORS, LEG SPAR BALL JOINT, 12/PR

## (undated) DEVICE — IMPLANTABLE DEVICE
Type: IMPLANTABLE DEVICE | Status: NON-FUNCTIONAL
Removed: 2017-11-23

## (undated) DEVICE — GW FOR TROCH NAIL 3.2X400MM

## (undated) DEVICE — DRSNG GZ CURAD XEROFORM NONADHS 5X9IN STRL

## (undated) DEVICE — GLV SURG SIGNATURE TOUCH PF LTX 8 STRL BX/50

## (undated) DEVICE — MEDI-VAC NON-CONDUCTIVE SUCTION TUBING: Brand: CARDINAL HEALTH

## (undated) DEVICE — SUT ETHLN 3/0 PC5 18IN 1893G

## (undated) DEVICE — SELF-ADHERENT BANDAGE 3" X5YD; STERILE; FOR SINGLE USE ONLY; STORE IN A COOL, DRY PLACE.: Brand: CARDINAL HEALTH

## (undated) DEVICE — K-WIRE, STERILE
Type: IMPLANTABLE DEVICE | Site: HIP | Status: NON-FUNCTIONAL
Removed: 2019-03-30

## (undated) DEVICE — SUCTION CANISTER, 2500CC, RIGID: Brand: DEROYAL

## (undated) DEVICE — DRSNG WND BORDR/ADHS NONADHR/GZ LF 4X4IN STRL

## (undated) DEVICE — MEDI-VAC YANKAUER SUCTION HANDLE W/BULBOUS TIP: Brand: CARDINAL HEALTH

## (undated) DEVICE — BNDG ELAS CO-FLEX SLF ADHR 6IN 5YD LF STRL

## (undated) DEVICE — PK MAJ FX HIP 10

## (undated) DEVICE — CANNULA,OXY,ADULT,SUPERSOFT,W/7'TUB,UC: Brand: MEDLINE